# Patient Record
Sex: MALE | Race: BLACK OR AFRICAN AMERICAN | Employment: OTHER | URBAN - METROPOLITAN AREA
[De-identification: names, ages, dates, MRNs, and addresses within clinical notes are randomized per-mention and may not be internally consistent; named-entity substitution may affect disease eponyms.]

---

## 2017-01-25 ENCOUNTER — HOSPITAL ENCOUNTER (OUTPATIENT)
Dept: LAB | Age: 72
Discharge: HOME | End: 2017-01-25
Payer: MEDICARE

## 2017-01-25 ENCOUNTER — OFFICE VISIT (OUTPATIENT)
Dept: FAMILY MEDICINE CLINIC | Age: 72
End: 2017-01-25

## 2017-01-25 VITALS
DIASTOLIC BLOOD PRESSURE: 74 MMHG | HEIGHT: 66 IN | RESPIRATION RATE: 18 BRPM | WEIGHT: 250.4 LBS | BODY MASS INDEX: 40.24 KG/M2 | SYSTOLIC BLOOD PRESSURE: 151 MMHG | HEART RATE: 61 BPM | TEMPERATURE: 97.5 F | OXYGEN SATURATION: 98 %

## 2017-01-25 DIAGNOSIS — R01.1 MURMUR, CARDIAC: ICD-10-CM

## 2017-01-25 DIAGNOSIS — Z79.4 TYPE 2 DIABETES MELLITUS WITH DIABETIC NEPHROPATHY, WITH LONG-TERM CURRENT USE OF INSULIN (HCC): Primary | ICD-10-CM

## 2017-01-25 DIAGNOSIS — E05.00 GRAVES DISEASE: ICD-10-CM

## 2017-01-25 DIAGNOSIS — Z79.4 TYPE 2 DIABETES MELLITUS WITH DIABETIC NEPHROPATHY, WITH LONG-TERM CURRENT USE OF INSULIN (HCC): ICD-10-CM

## 2017-01-25 DIAGNOSIS — I10 ESSENTIAL HYPERTENSION: ICD-10-CM

## 2017-01-25 DIAGNOSIS — E11.21 TYPE 2 DIABETES MELLITUS WITH DIABETIC NEPHROPATHY, WITH LONG-TERM CURRENT USE OF INSULIN (HCC): ICD-10-CM

## 2017-01-25 DIAGNOSIS — E83.42 HYPOMAGNESEMIA: ICD-10-CM

## 2017-01-25 DIAGNOSIS — E11.21 TYPE 2 DIABETES MELLITUS WITH DIABETIC NEPHROPATHY, WITH LONG-TERM CURRENT USE OF INSULIN (HCC): Primary | ICD-10-CM

## 2017-01-25 DIAGNOSIS — E11.9 COMPREHENSIVE DIABETIC FOOT EXAMINATION, TYPE 2 DM, ENCOUNTER FOR (HCC): ICD-10-CM

## 2017-01-25 LAB
ALBUMIN SERPL BCP-MCNC: 3.7 G/DL (ref 3.4–5)
ALBUMIN/GLOB SERPL: 1.2 {RATIO} (ref 0.8–1.7)
ALP SERPL-CCNC: 97 U/L (ref 45–117)
ALT SERPL-CCNC: 34 U/L (ref 16–61)
ANION GAP BLD CALC-SCNC: 8 MMOL/L (ref 3–18)
AST SERPL W P-5'-P-CCNC: 14 U/L (ref 15–37)
BASOPHILS # BLD AUTO: 0 K/UL (ref 0–0.06)
BASOPHILS # BLD: 0 % (ref 0–2)
BILIRUB SERPL-MCNC: 0.3 MG/DL (ref 0.2–1)
BUN SERPL-MCNC: 22 MG/DL (ref 7–18)
BUN/CREAT SERPL: 17 (ref 12–20)
CALCIUM SERPL-MCNC: 9.2 MG/DL (ref 8.5–10.1)
CHLORIDE SERPL-SCNC: 110 MMOL/L (ref 100–108)
CHOLEST SERPL-MCNC: 140 MG/DL
CO2 SERPL-SCNC: 23 MMOL/L (ref 21–32)
CREAT SERPL-MCNC: 1.29 MG/DL (ref 0.6–1.3)
DIFFERENTIAL METHOD BLD: ABNORMAL
EOSINOPHIL # BLD: 0.4 K/UL (ref 0–0.4)
EOSINOPHIL NFR BLD: 8 % (ref 0–5)
ERYTHROCYTE [DISTWIDTH] IN BLOOD BY AUTOMATED COUNT: 15.6 % (ref 11.6–14.5)
EST. AVERAGE GLUCOSE BLD GHB EST-MCNC: 157 MG/DL
GLOBULIN SER CALC-MCNC: 3.2 G/DL (ref 2–4)
GLUCOSE SERPL-MCNC: 119 MG/DL (ref 74–99)
HBA1C MFR BLD: 7.1 % (ref 4.2–5.6)
HCT VFR BLD AUTO: 40 % (ref 36–48)
HDLC SERPL-MCNC: 51 MG/DL (ref 40–60)
HDLC SERPL: 2.7 {RATIO} (ref 0–5)
HGB BLD-MCNC: 13.3 G/DL (ref 13–16)
LDLC SERPL CALC-MCNC: 72 MG/DL (ref 0–100)
LIPID PROFILE,FLP: NORMAL
LYMPHOCYTES # BLD AUTO: 35 % (ref 21–52)
LYMPHOCYTES # BLD: 1.9 K/UL (ref 0.9–3.6)
MAGNESIUM SERPL-MCNC: 1.7 MG/DL (ref 1.8–2.4)
MCH RBC QN AUTO: 28.8 PG (ref 24–34)
MCHC RBC AUTO-ENTMCNC: 33.3 G/DL (ref 31–37)
MCV RBC AUTO: 86.6 FL (ref 74–97)
MONOCYTES # BLD: 0.6 K/UL (ref 0.05–1.2)
MONOCYTES NFR BLD AUTO: 12 % (ref 3–10)
NEUTS SEG # BLD: 2.5 K/UL (ref 1.8–8)
NEUTS SEG NFR BLD AUTO: 45 % (ref 40–73)
PLATELET # BLD AUTO: 203 K/UL (ref 135–420)
PMV BLD AUTO: 9.4 FL (ref 9.2–11.8)
POTASSIUM SERPL-SCNC: 4.4 MMOL/L (ref 3.5–5.5)
PROT SERPL-MCNC: 6.9 G/DL (ref 6.4–8.2)
RBC # BLD AUTO: 4.62 M/UL (ref 4.7–5.5)
SODIUM SERPL-SCNC: 141 MMOL/L (ref 136–145)
TRIGL SERPL-MCNC: 85 MG/DL (ref ?–150)
TSH SERPL DL<=0.05 MIU/L-ACNC: 1.12 UIU/ML (ref 0.36–3.74)
VLDLC SERPL CALC-MCNC: 17 MG/DL
WBC # BLD AUTO: 5.4 K/UL (ref 4.6–13.2)

## 2017-01-25 PROCEDURE — 83735 ASSAY OF MAGNESIUM: CPT | Performed by: FAMILY MEDICINE

## 2017-01-25 PROCEDURE — 84443 ASSAY THYROID STIM HORMONE: CPT | Performed by: FAMILY MEDICINE

## 2017-01-25 PROCEDURE — 85025 COMPLETE CBC W/AUTO DIFF WBC: CPT | Performed by: FAMILY MEDICINE

## 2017-01-25 PROCEDURE — 80053 COMPREHEN METABOLIC PANEL: CPT | Performed by: FAMILY MEDICINE

## 2017-01-25 PROCEDURE — 83036 HEMOGLOBIN GLYCOSYLATED A1C: CPT | Performed by: FAMILY MEDICINE

## 2017-01-25 PROCEDURE — 82043 UR ALBUMIN QUANTITATIVE: CPT | Performed by: FAMILY MEDICINE

## 2017-01-25 PROCEDURE — 80061 LIPID PANEL: CPT | Performed by: FAMILY MEDICINE

## 2017-01-25 RX ORDER — ZINC GLUCONATE 50 MG
1000 TABLET ORAL DAILY
COMMUNITY
End: 2019-09-11

## 2017-01-25 NOTE — PROGRESS NOTES
Chief Complaint   Patient presents with    Follow-up     DM2 and CKD, patient states blood sugars averaging 150 at home      1. Have you been to the ER, urgent care clinic since your last visit? Hospitalized since your last visit? No    2. Have you seen or consulted any other health care providers outside of the [address] [place] [address] since your last visit? Include any pap smears or colon screening. No     HPI   A [employer] comes in for f/u care. 1) DM2: Patient is on insulin. His blood glucose numbers have been in the 150's he says. Foot exam is stable. Will check labs today. I discussed dietary modification and portion control. 2) CKD: He has CKD. Will check labs. He has seen the nephrologist in the past.  3) Cardiac: patient has h/o cardiac murmur. Past echo showed valvular stenosis. He would like referral to cardiology for f/u care. He denies exertional dyspnea. He has dependent edema. I will request echocardiogram and place referral to cardiology. 4) Gout: stable on medication  5) Thyroid: patient has h/o Graves disease. Will check labs today. 6) Back pain: patient has low back pain. He has had surgery to the back last year. He is f/u by the spine physician. 7) HTN: On spironolactone and lopressor      Past Medical History  Past Medical History   Diagnosis Date    Diabetes ([facility] [address] 1995    Fractures 1995     R Knee/ Tib fib fracture/surgery    Gout 2010    High cholesterol 2005    HTN (hypertension) 1990    Osteoarthritis 2013    Thyroid trouble        Surgical History  Past Surgical History   Procedure Laterality Date    Hx hip replacement Left 2009    Hx orthopaedic Right      R knee/Tibfib surgery        Medications  Current Outpatient Prescriptions   Medication Sig Dispense Refill    cyanocobalamin (VITAMIN B-12) 1,000 mcg tablet Take 1,000 mcg by mouth daily.  febuxostat (ULORIC) 40 mg tab tablet Take 1 Tab by mouth daily.  Indications: GOUT PREVENTION 90 Tab 1    insulin glargine (LANTUS) 100 unit/mL injection Take 40 units once daily  Indications: TYPE 2 DIABETES MELLITUS 3 Vial 3    levothyroxine (SYNTHROID) 200 mcg tablet Take 1 Tab by mouth Daily (before breakfast). Indications: HYPOTHYROIDISM 30 Tab 5    insulin aspart (NOVOLOG) 100 unit/mL injection by SubCUTAneous route. 6 units before meals      spironolactone (ALDACTONE) 25 mg tablet Take 1 Tab by mouth daily. Indications: EDEMA 30 Tab 3    metoprolol (LOPRESSOR) 25 mg tablet Take 0.5 Tabs by mouth two (2) times a day. 30 Tab 1    glucose blood VI test strips (ASCENSIA AUTODISC VI, ONE TOUCH ULTRA TEST VI) strip Dispense precision extra test strips 3 x day to check blood glucose 200 Each 3    Magnesium Chloride 64 mg TbEC Take  by mouth. 8 pills 3 times daily      amLODIPine (NORVASC) 10 mg tablet Take 5 mg by mouth daily.  Omeprazole delayed release (PRILOSEC D/R) 20 mg tablet Take 20 mg by mouth daily.  atorvastatin (LIPITOR) 40 mg tablet Take 20 mg by mouth daily.  testosterone (ANDROGEL) 20.25 mg/1.25 gram (1.62 %) gel Apply  to affected area as needed.  aspirin (ASPIRIN) 325 mg tablet Take 325 mg by mouth daily.  Cholecalciferol, Vitamin D3, 1,000 unit cap Take 3,000 Units by mouth daily.  albuterol (VENTOLIN HFA) 90 mcg/actuation inhaler Take  by inhalation as needed. Allergies  Allergies   Allergen Reactions    Watermelon Swelling    Peach (Prunus Persica) Itching       Family History  Family History   Problem Relation Age of Onset    Drug Abuse Brother     Hypertension Mother     Hypertension Father     Diabetes Father     Thyroid Disease Mother        Social History  Social History     Social History    Marital status:      Spouse name: N/A    Number of children: N/A    Years of education: N/A     Occupational History    Not on file.      Social History Main Topics    Smoking status: Former Smoker     Packs/day: 1.00     Types: Cigarettes     Start date: 1/1/1969     Quit date: 1/1/1995    Smokeless tobacco: Never Used    Alcohol use No    Drug use: No    Sexual activity: Yes     Partners: Female     Other Topics Concern     Service Yes     Served in [address] Blood Transfusions No    Caffeine Concern No    Occupational Exposure No    Hobby Hazards No    Sleep Concern No    Stress Concern No    Weight Concern Yes    Special Diet No    Back Care Yes     Lower Back pain when walking    Exercise No    Bike Helmet No    Seat Belt Yes    Self-Exams Yes     Social History Narrative       Review of Systems  Review of Systems - History obtained from chart review and the patient  General ROS: positive for  - fatigue and malaise  negative for - chills or fever  Psychological ROS: negative  Ophthalmic ROS: negative  Endocrine ROS: DM2  Respiratory ROS: no cough, shortness of breath, or wheezing  Cardiovascular ROS: negative for - chest pain, dyspnea on exertion, irregular heartbeat, palpitations or rapid heart rate  Gastrointestinal ROS: no abdominal pain, change in bowel habits, or black or bloody stools  Musculoskeletal ROS: positive for - pain in back - lower  Neurological ROS: negative    Vital Signs  Visit Vitals    /74 (BP 1 Location: Left arm, BP Patient Position: Sitting)    Pulse 61    Temp 97.5 °F (36.4 °C) (Oral)    Resp 18    Ht 5' 6\" (1.676 m)    Wt 250 lb 6.4 oz (113.6 kg)    SpO2 98%    BMI 40.42 kg/m2         Physical Exam  Physical Examination: General appearance - oriented to person, place, and time, acyanotic, in no respiratory distress and well hydrated  Mental status - alert, oriented to person, place, and time, normal mood, behavior, speech, dress, motor activity, and thought processes  Mouth - mucous membranes moist, pharynx normal without lesions  Neck - supple, no significant adenopathy  Lymphatics - no palpable lymphadenopathy  Chest - clear to auscultation, no wheezes, rales or rhonchi, symmetric air entry, no tachypnea, retractions or cyanosis  Heart - normal rate, regular rhythm, normal S1, S2, no murmurs, rubs, clicks or gallops, no JVD  Back exam - limited range of motion, pain with motion noted during exam, tenderness noted PARALUMBAR MUSCLES  Neurological - alert, oriented, normal speech, no focal findings or movement disorder noted  Extremities - peripheral pulses normal, no pedal edema, no clubbing or cyanosis, feet normal, good pulses, normal color, temperature and sensation, monofilament sensory exam is normal in both feet  Diabetic foot exam:     Left: Reflexes 2+     Vibratory sensation normal    Proprioception normal   Sharp/dull discrimination normal    Filament test normal sensation with micro filament   Pulse DP: 2+ (normal)   Pulse PT: 2+ (normal)   Deformities: None  Right: Reflexes 2+   Vibratory sensation normal   Proprioception normal   Sharp/dull discrimination normal   Filament test normal sensation with micro filament   Pulse DP: 2+ (normal)   Pulse PT: 2+ (normal)   Deformities: None    Diagnostics  Orders Placed This Encounter    CBC WITH AUTOMATED DIFF     Standing Status:   Future     Standing Expiration Date:   1/26/2018    HEMOGLOBIN A1C WITH EAG     Standing Status:   Future     Standing Expiration Date:   1/26/2018    LIPID PANEL     Standing Status:   Future     Standing Expiration Date:   2/84/7003    METABOLIC PANEL, COMPREHENSIVE     Standing Status:   Future     Standing Expiration Date:   1/26/2018    MICROALBUMIN, UR, RAND W/ MICROALBUMIN/CREA RATIO     Standing Status:   Future     Standing Expiration Date:   1/26/2018    TSH 3RD GENERATION     Standing Status:   Future     Standing Expiration Date:   1/26/2018    MAGNESIUM     Standing Status:   Future     Standing Expiration Date:   1/26/2018    REFERRAL TO CARDIOLOGY     Referral Priority:   Routine     Referral Type:   Consultation     Referral Reason:   Specialty Services Required    2D ECHO COMPLETE ADULT (TTE) W OR WO CONTR     Standing Status:   Future     Standing Expiration Date:   7/25/2017     Order Specific Question:   Reason for Exam:     Answer:   cardiac murmur     Order Specific Question:   Contrast Enhancement (Bubble Study, Definity, Optison) may be used if criteria listed in established evidence-based protocol has been identified. Answer: Yes     DIABETES FOOT EXAM    cyanocobalamin (VITAMIN B-12) 1,000 mcg tablet     Sig: Take 1,000 mcg by mouth daily. Results  Results for orders placed or performed in visit on 10/28/16   AMB EXT CREATININE   Result Value Ref Range    Creatinine, External 1.3        ASSESSMENT and PLAN    ICD-10-CM ICD-9-CM    1. Type 2 diabetes mellitus with diabetic nephropathy, with long-term current use of insulin ([facility]) E11.21 250.40 CBC WITH AUTOMATED DIFF    Z79.4 583.81 HEMOGLOBIN A1C WITH EAG     V58.67 LIPID PANEL      METABOLIC PANEL, COMPREHENSIVE      MICROALBUMIN, UR, RAND W/ MICROALBUMIN/CREA RATIO   2. Essential hypertension I10 401.9    3. Graves disease E05.00 242.00 TSH 3RD GENERATION   4. Hypomagnesemia E83.42 275.2 MAGNESIUM   5. Murmur, cardiac R01.1 785.2 2D ECHO COMPLETE ADULT (TTE) W OR WO CONTR      REFERRAL TO CARDIOLOGY   6.  Comprehensive diabetic foot examination, type 2 DM, encounter for [facility] E11.9 250.00  DIABETES FOOT EXAM     current treatment plan is effective, no change in therapy  lab results and schedule of future lab studies reviewed with patient  reviewed diet, exercise and weight control  reviewed medications and side effects in detail  specific diabetic recommendations: low cholesterol diet, weight control and daily exercise discussed, home glucose monitoring emphasized, all medications, side effects and compliance discussed carefully, foot care discussed and Podiatry visits discussed, annual eye examinations at Ophthalmology discussed, glycohemoglobin and other lab monitoring discussed and long term diabetic complications discussed  use of aspirin to prevent MI and TIA's discussed      I have discussed the diagnosis with the patient and the intended plan of care as seen in the above orders. The patient has received an after-visit summary and questions were answered concerning future plans. I have discussed medication, side effects, and warnings with the patient in detail. The patient verbalized understanding and is in agreement with the plan of care. The patient will contact the office with any additional concerns.     Silvia Singer MD

## 2017-01-25 NOTE — PATIENT INSTRUCTIONS

## 2017-01-25 NOTE — MR AVS SNAPSHOT
Visit Information Date & Time Provider Department Dept. Phone Encounter #  
 1/25/2017  9:15 AM Aroldo Roman MD [address] [place] [phone] [id number] Follow-up Instructions Return in about 3 months (around 4/25/2017), or if symptoms worsen or fail to improve, for DM2, CKD. Upcoming Health Maintenance Date Due ZOSTER VACCINE AGE 60> 4/26/2006 EYE EXAM RETINAL OR DILATED Q1 10/14/2016 Pneumococcal 65+ High/Highest Risk (2 of 2 - PPSV23) 11/10/2016 FOOT EXAM Q1 11/16/2016 LIPID PANEL Q1 12/9/2016 MICROALBUMIN Q1 2/24/2017 HEMOGLOBIN A1C Q6M 3/15/2017 MEDICARE YEARLY EXAM 4/28/2017 FOBT Q 1 YEAR AGE 50-75 5/26/2017 GLAUCOMA SCREENING Q2Y 10/14/2017 DTaP/Tdap/Td series (2 - Td) 4/28/2025 Allergies as of 1/25/2017  Review Complete On: 1/25/2017 By: Angela Paredes MD  
  
 Severity Noted Reaction Type Reactions Watermelon High 01/21/2015    Swelling Peach (Prunus Persica)  01/21/2015    Itching Current Immunizations  Reviewed on 9/15/2016 Name Date Influenza Vaccine (Quad) PF 9/17/2015 Pneumococcal Conjugate (PCV-13) 9/15/2016 Tdap 4/28/2015 Not reviewed this visit You Were Diagnosed With   
  
 Codes Comments Type 2 diabetes mellitus with diabetic nephropathy, with long-term current use of insulin (HCC)    -  Primary ICD-10-CM: E11.21, Z79.4 ICD-9-CM: 250.40, 583.81, V58.67 Essential hypertension     ICD-10-CM: I10 
ICD-9-CM: 401.9 Graves disease     ICD-10-CM: E05.00 ICD-9-CM: 242.00 Hypomagnesemia     ICD-10-CM: K60.31 
ICD-9-CM: 275.2 Murmur, cardiac     ICD-10-CM: R01.1 ICD-9-CM: 193. 2 Comprehensive diabetic foot examination, type 2 DM, encounter for [facility])     ICD-10-CM: E11.9 ICD-9-CM: 250.00 Vitals BP Pulse Temp Resp Height(growth percentile) Weight(growth percentile)  151/74 (BP 1 Location: Left arm, BP Patient Position: Sitting) 61 97.5 °F (36.4 °C) (Oral) 18 5' 6\" (1.676 m) 250 lb 6.4 oz (113.6 kg) SpO2 BMI Smoking Status 98% 40.42 kg/m2 Former Smoker BMI and BSA Data Body Mass Index Body Surface Area 40.42 kg/m 2 2.3 m 2 Preferred Pharmacy Pharmacy Name Phone [facility] [address] [place] [phone] Your Updated Medication List  
  
   
This list is accurate as of: 1/25/17 10:11 AM.  Always use your most recent med list. amLODIPine 10 mg tablet Commonly known as:  Kayla Fraction Take 5 mg by mouth daily. aspirin 325 mg tablet Commonly known as:  ASPIRIN Take 325 mg by mouth daily. atorvastatin 40 mg tablet Commonly known as:  LIPITOR Take 20 mg by mouth daily. cholecalciferol 1,000 unit Cap Commonly known as:  VITAMIN D3 Take 3,000 Units by mouth daily. febuxostat 40 mg Tab tablet Commonly known as:  Janeane Dines Take 1 Tab by mouth daily. Indications: GOUT PREVENTION  
  
 glucose blood VI test strips strip Commonly known as:  ASCENSIA AUTODISC VI, ONE TOUCH ULTRA TEST VI Dispense precision extra test strips 3 x day to check blood glucose  
  
 insulin aspart 100 unit/mL injection Commonly known as:  NOVOLOG  
by SubCUTAneous route. 6 units before meals  
  
 insulin glargine 100 unit/mL injection Commonly known as:  LANTUS Take 40 units once daily  Indications: TYPE 2 DIABETES MELLITUS  
  
 levothyroxine 200 mcg tablet Commonly known as:  SYNTHROID Take 1 Tab by mouth Daily (before breakfast). Indications: HYPOTHYROIDISM  
  
 magnesium chloride 64 mg delayed release tablet Commonly known as:  MAG DELAY Take  by mouth. 8 pills 3 times daily  
  
 metoprolol tartrate 25 mg tablet Commonly known as:  LOPRESSOR Take 0.5 Tabs by mouth two (2) times a day. Omeprazole delayed release 20 mg tablet Commonly known as:  PRILOSEC D/R Take 20 mg by mouth daily. spironolactone 25 mg tablet Commonly known as:  ALDACTONE Take 1 Tab by mouth daily. Indications: EDEMA  
  
 testosterone 1.62 % (20.25 mg/1.25gram) gel Commonly known as:  ANDROGEL Apply  to affected area as needed. VENTOLIN HFA 90 mcg/actuation inhaler Generic drug:  albuterol Take  by inhalation as needed. VITAMIN B-12 1,000 mcg tablet Generic drug:  cyanocobalamin Take 1,000 mcg by mouth daily. We Performed the Following  DIABETES FOOT EXAM [HM7 Custom] REFERRAL TO CARDIOLOGY [BPM20 Custom] Comments:  
 Please evaluate patient for cardiac murmur f/u. Follow-up Instructions Return in about 3 months (around 4/25/2017), or if symptoms worsen or fail to improve, for DM2, CKD. To-Do List   
 01/25/2017 ECHO:  2D ECHO COMPLETE ADULT (TTE) W OR WO CONTR   
  
 01/25/2017 Lab:  CBC WITH AUTOMATED DIFF   
  
 01/25/2017 Lab:  HEMOGLOBIN A1C WITH EAG   
  
 01/25/2017 Lab:  LIPID PANEL   
  
 01/25/2017 Lab:  MAGNESIUM   
  
 01/25/2017 Lab:  METABOLIC PANEL, COMPREHENSIVE   
  
 01/25/2017 Lab:  MICROALBUMIN, UR, RAND W/ MICROALBUMIN/CREA RATIO   
  
 01/25/2017 Lab:  TSH 3RD GENERATION Referral Information Referral ID Referred By Referred To  
  
 [id number] Niranjan RIVERA Not Available Visits Status Start Date End Date 1 New Request 1/25/17 1/25/18 If your referral has a status of pending review or denied, additional information will be sent to support the outcome of this decision. Patient Instructions Learning About Diabetes Food Guidelines Your Care Instructions Meal planning is important to manage diabetes. It helps keep your blood sugar at a target level (which you set with your doctor). You don't have to eat special foods. You can eat what your family eats, including sweets once in a while. But you do have to pay attention to how often you eat and how much you eat of certain foods. You may want to work with a dietitian or a certified diabetes educator (CDE) to help you plan meals and snacks. A dietitian or CDE can also help you lose weight if that is one of your goals. What should you know about eating carbs? Managing the amount of carbohydrate (carbs) you eat is an important part of healthy meals when you have diabetes. Carbohydrate is found in many foods. · Learn which foods have carbs. And learn the amounts of carbs in different foods. ¨ Bread, cereal, pasta, and rice have about 15 grams of carbs in a serving. A serving is 1 slice of bread (1 ounce), ½ cup of cooked cereal, or 1/3 cup of cooked pasta or rice. ¨ Fruits have 15 grams of carbs in a serving. A serving is 1 small fresh fruit, such as an apple or orange; ½ of a banana; ½ cup of cooked or canned fruit; ½ cup of fruit juice; 1 cup of melon or raspberries; or 2 tablespoons of dried fruit. ¨ Milk and no-sugar-added yogurt have 15 grams of carbs in a serving. A serving is 1 cup of milk or 2/3 cup of no-sugar-added yogurt. ¨ Starchy vegetables have 15 grams of carbs in a serving. A serving is ½ cup of mashed potatoes or sweet potato; 1 cup winter squash; ½ of a small baked potato; ½ cup of cooked beans; or ½ cup cooked corn or green peas. · Learn how much carbs to eat each day and at each meal. A dietitian or CDE can teach you how to keep track of the amount of carbs you eat. This is called carbohydrate counting. · If you are not sure how to count carbohydrate grams, use the Plate Method to plan meals. It is a good, quick way to make sure that you have a balanced meal. It also helps you spread carbs throughout the day. ¨ Divide your plate by types of foods. Put non-starchy vegetables on half the plate, meat or other protein food on one-quarter of the plate, and a grain or starchy vegetable in the final quarter of the plate.  To this you can add a small piece of fruit and 1 cup of milk or yogurt, depending on how many carbs you are supposed to eat at a meal. 
· Try to eat about the same amount of carbs at each meal. Do not \"save up\" your daily allowance of carbs to eat at one meal. 
· Proteins have very little or no carbs per serving. Examples of proteins are beef, chicken, turkey, fish, eggs, tofu, cheese, cottage cheese, and peanut butter. A serving size of meat is 3 ounces, which is about the size of a deck of cards. Examples of meat substitute serving sizes (equal to 1 ounce of meat) are 1/4 cup of cottage cheese, 1 egg, 1 tablespoon of peanut butter, and ½ cup of tofu. How can you eat out and still eat healthy? · Learn to estimate the serving sizes of foods that have carbohydrate. If you measure food at home, it will be easier to estimate the amount in a serving of restaurant food. · If the meal you order has too much carbohydrate (such as potatoes, corn, or baked beans), ask to have a low-carbohydrate food instead. Ask for a salad or green vegetables. · If you use insulin, check your blood sugar before and after eating out to help you plan how much to eat in the future. · If you eat more carbohydrate at a meal than you had planned, take a walk or do other exercise. This will help lower your blood sugar. What else should you know? · Limit saturated fat, such as the fat from meat and dairy products. This is a healthy choice because people who have diabetes are at higher risk of heart disease. So choose lean cuts of meat and nonfat or low-fat dairy products. Use olive or canola oil instead of butter or shortening when cooking. · Don't skip meals. Your blood sugar may drop too low if you skip meals and take insulin or certain medicines for diabetes. · Check with your doctor before you drink alcohol. Alcohol can cause your blood sugar to drop too low. Alcohol can also cause a bad reaction if you take certain diabetes medicines. Follow-up care is a key part of your treatment and safety.  Be sure to make and go to all appointments, and call your doctor if you are having problems. It's also a good idea to know your test results and keep a list of the medicines you take. Where can you learn more? Go to http://adal-janes.info/. Enter D021 in the search box to learn more about \"Learning About Diabetes Food Guidelines. \" Current as of: May 23, 2016 Content Version: 11.1 © 8014-9329 [employer]. Care instructions adapted under license by [employer] (which disclaims liability or warranty for this information). If you have questions about a medical condition or this instruction, always ask your healthcare professional. [address] any warranty or liability for your use of this information. Introducing [facility]! Romayne Duster introduces [employer] patient portal. Now you can access parts of your medical record, email your doctor's office, and request medication refills online. 1. In your internet browser, go to [email or web address] 2. Click on the First Time User? Click Here link in the Sign In box. You will see the New Member Sign Up page. 3. Enter your [employer] Access Code exactly as it appears below. You will not need to use this code after youve completed the sign-up process. If you do not sign up before the expiration date, you must request a new code. · [employer] Access Code: [email or web address] Expires: 4/25/2017  9:20 AM 
 
4. Enter the last four digits of your Social Security Number (xxxx) and Date of Birth (mm/dd/yyyy) as indicated and click Submit. You will be taken to the next sign-up page. 5. Create a [employer] ID. This will be your [employer] login ID and cannot be changed, so think of one that is secure and easy to remember. 6. Create a [employer] password. You can change your password at any time. 7. Enter your Password Reset Question and Answer.  This can be used at a later time if you forget your password. 8. Enter your e-mail address. You will receive e-mail notification when new information is available in [address] 9. Click Sign Up. You can now view and download portions of your medical record. 10. Click the Download Summary menu link to download a portable copy of your medical information. If you have questions, please visit the Frequently Asked Questions section of the [employer] website. Remember, [employer] is NOT to be used for urgent needs. For medical emergencies, dial 911. Now available from your iPhone and Android! Please provide this summary of care documentation to your next provider. Your primary care clinician is listed as Aroldo Perez. If you have any questions after today's visit, please call [phone].

## 2017-01-26 LAB
CREAT UR-MCNC: 80.96 MG/DL (ref 30–125)
MICROALBUMIN UR-MCNC: 2.3 MG/DL (ref 0–3)
MICROALBUMIN/CREAT UR-RTO: 28 MG/G (ref 0–30)

## 2017-01-27 ENCOUNTER — DOCUMENTATION ONLY (OUTPATIENT)
Dept: FAMILY MEDICINE CLINIC | Age: 72
End: 2017-01-27

## 2017-01-27 NOTE — PROGRESS NOTES
Please let patient know his hba1c is up from previous. Should continue with his insulin as advised and keep a blood glucose log. He should keep blood glucose log and ensure his blood glucose pre breakfast levels are 80-13- range. If higher he should make appointment to come in for evaluation. His magnesium level is low and he should continue with the supplementation. Rest of results are stable.   Anthony Beasley MD

## 2017-01-30 NOTE — PROGRESS NOTES
Informed patient his hba1c is up from previous. Should continue with his insulin as advised and keep a blood glucose log. He should keep blood glucose log and ensure his blood glucose pre breakfast levels are  range. If higher he should make appointment to come in for evaluation. His magnesium level is low and he should continue with the supplementation. Rest of results are stable. Patient verbalized understanding.

## 2017-02-07 ENCOUNTER — HOSPITAL ENCOUNTER (OUTPATIENT)
Dept: NON INVASIVE DIAGNOSTICS | Age: 72
Discharge: HOME | End: 2017-02-07
Attending: FAMILY MEDICINE
Payer: MEDICARE

## 2017-02-07 DIAGNOSIS — R01.1 MURMUR, CARDIAC: ICD-10-CM

## 2017-02-07 PROCEDURE — C8929 TTE W OR WO FOL WCON,DOPPLER: HCPCS

## 2017-02-07 PROCEDURE — 74011250636 HC RX REV CODE- 250/636: Performed by: FAMILY MEDICINE

## 2017-02-07 RX ADMIN — PERFLUTREN 2 ML: 6.52 INJECTION, SUSPENSION INTRAVENOUS at 13:34

## 2017-02-13 ENCOUNTER — TELEPHONE (OUTPATIENT)
Dept: FAMILY MEDICINE CLINIC | Age: 72
End: 2017-02-13

## 2017-02-13 NOTE — PROGRESS NOTES
Patient has an appointment with the cardiologist on the March 21st. Would like to know if he still has to come in to discuss echo results with Dr. Sushila Gonzalez. Informed patient Dr. Sushila Gonzalez was out this week but would be returning on February 20th. I will put in a message for Dr. Sushila Gonzalez and get back to him next week. Patient verbalized understanding.

## 2017-02-13 NOTE — TELEPHONE ENCOUNTER
Patient was informed that he will need to come in to see Dr. Naldo Cox to discuss his echocardiogram results. Patient stated he has an appointment with the Cardiologist on March 21st and asked if he would still need to come in. I told patient I was not sure, I would put in a message for Dr. Naldo Cox for when he returns and we will contact him then.

## 2017-02-21 ENCOUNTER — TELEPHONE (OUTPATIENT)
Dept: FAMILY MEDICINE CLINIC | Age: 72
End: 2017-02-21

## 2017-02-21 NOTE — TELEPHONE ENCOUNTER
Mr. Shahbaz Enamorado called, he showed up at [facility] thinking his appointment was today, but it is scheduled for March 21st. Patient states he will not be able to make his appointment then. He would like for us to try to get him in to see a Cardiologist within [employer] between March 1-10th. I told patient we would resubmit his referral to a Cardiologist within [email or web address] but I was not sure if I would be able to get him an appointment within that time frame.

## 2017-02-21 NOTE — TELEPHONE ENCOUNTER
Called patient, informed him of the appointment I was able to get him at [facility] Pawan Ob[facility] on March 1st at 2pm. Patient was given the address and phone number.      [address] [facility], [address]

## 2017-03-01 ENCOUNTER — OFFICE VISIT (OUTPATIENT)
Dept: CARDIOLOGY CLINIC | Age: 72
End: 2017-03-01

## 2017-03-01 VITALS
SYSTOLIC BLOOD PRESSURE: 156 MMHG | DIASTOLIC BLOOD PRESSURE: 75 MMHG | HEART RATE: 75 BPM | BODY MASS INDEX: 40.82 KG/M2 | WEIGHT: 254 LBS | HEIGHT: 66 IN

## 2017-03-01 DIAGNOSIS — E11.21 TYPE 2 DIABETES MELLITUS WITH DIABETIC NEPHROPATHY, UNSPECIFIED LONG TERM INSULIN USE STATUS: ICD-10-CM

## 2017-03-01 DIAGNOSIS — R01.1 MURMUR, CARDIAC: ICD-10-CM

## 2017-03-01 DIAGNOSIS — R06.02 SOB (SHORTNESS OF BREATH): Primary | ICD-10-CM

## 2017-03-01 DIAGNOSIS — N18.30 CKD (CHRONIC KIDNEY DISEASE) STAGE 3, GFR 30-59 ML/MIN (HCC): ICD-10-CM

## 2017-03-01 DIAGNOSIS — Z01.818 PRE-OP EVALUATION: ICD-10-CM

## 2017-03-01 DIAGNOSIS — E78.5 DYSLIPIDEMIA: ICD-10-CM

## 2017-03-01 DIAGNOSIS — I10 ESSENTIAL HYPERTENSION: ICD-10-CM

## 2017-03-01 LAB
A-G RATIO,AGRAT: 1.4 RATIO (ref 1.1–2.6)
ALBUMIN SERPL-MCNC: 4 G/DL (ref 3.5–5)
ALP SERPL-CCNC: 71 U/L (ref 40–125)
ALT SERPL-CCNC: 24 U/L (ref 5–40)
ANION GAP SERPL CALC-SCNC: 14.1 MMOL/L
APTT PPP: 25 SEC (ref 22–36)
AST SERPL W P-5'-P-CCNC: 18 U/L (ref 10–37)
BILIRUB SERPL-MCNC: 0.3 MG/DL (ref 0.2–1.2)
BUN SERPL-MCNC: 16 MG/DL (ref 6–22)
CALCIUM SERPL-MCNC: 9.7 MG/DL (ref 8.4–10.4)
CHLORIDE SERPL-SCNC: 108 MMOL/L (ref 98–110)
CO2 SERPL-SCNC: 21 MMOL/L (ref 20–32)
CREAT SERPL-MCNC: 1 MG/DL (ref 0.8–1.6)
ERYTHROCYTE [DISTWIDTH] IN BLOOD BY AUTOMATED COUNT: 13.8 % (ref 10–16)
GFRAA, 66117: >60
GFRNA, 66118: >60
GLOBULIN,GLOB: 2.9 G/DL (ref 2–4)
GLUCOSE SERPL-MCNC: 130 MG/DL (ref 65–99)
HCT VFR BLD AUTO: 39.2 % (ref 37.8–52.2)
HGB BLD-MCNC: 13.2 G/DL (ref 12.6–17.1)
INR PPP: 1.04 (ref 0.89–1.29)
MAGNESIUM SERPL-MCNC: 1.8 MG/DL (ref 1.6–2.5)
MCH RBC QN AUTO: 28 PG (ref 26–34)
MCHC RBC AUTO-ENTMCNC: 34 G/DL (ref 32–36)
MCV RBC AUTO: 84 FL (ref 80–95)
PLATELET # BLD AUTO: 223 K/UL (ref 140–440)
PMV BLD AUTO: 9.3 FL (ref 6–10.8)
POTASSIUM SERPL-SCNC: 4.5 MMOL/L (ref 3.5–5.5)
PROT SERPL-MCNC: 6.9 G/DL (ref 6.2–8.1)
PROTHROMBIN TIME: 10.7 SEC (ref 9–13)
RBC # BLD AUTO: 4.68 M/UL (ref 3.8–5.8)
SODIUM SERPL-SCNC: 143 MMOL/L (ref 133–145)
TSH SERPL DL<=0.005 MIU/L-ACNC: 1.27 MCU/ML (ref 0.27–4.2)
WBC # BLD AUTO: 5.6 K/UL (ref 4–11)

## 2017-03-01 NOTE — MR AVS SNAPSHOT
Visit Information Date & Time Provider Department Dept. Phone Encounter #  
 3/1/2017 12:45 PM Catrachito Beck MD [facility] [id number] Follow-up Instructions Return in about 2 weeks (around 3/15/2017). Your Appointments 4/25/2017  9:15 AM  
ROUTINE CARE with MD Saima Lopez Dr ([facility]) Appt Note: rov for DM2 CKD [address]  
  
   
 [address] [facility] Upcoming Health Maintenance Date Due ZOSTER VACCINE AGE 60> 4/26/2006 MEDICARE YEARLY EXAM 5/28/2016 EYE EXAM RETINAL OR DILATED Q1 10/14/2016 Pneumococcal 65+ High/Highest Risk (2 of 2 - PPSV23) 11/10/2016 FOBT Q 1 YEAR AGE 50-75 5/26/2017 HEMOGLOBIN A1C Q6M 7/25/2017 GLAUCOMA SCREENING Q2Y 10/14/2017 FOOT EXAM Q1 1/25/2018 MICROALBUMIN Q1 1/25/2018 LIPID PANEL Q1 1/25/2018 DTaP/Tdap/Td series (2 - Td) 4/28/2025 Allergies as of 3/1/2017  Review Complete On: 3/1/2017 By: Catrachito Beck MD  
  
 Severity Noted Reaction Type Reactions Watermelon High 01/21/2015    Swelling Peach (Prunus Persica)  01/21/2015    Itching Current Immunizations  Reviewed on 9/15/2016 Name Date Influenza Vaccine (Quad) PF 9/17/2015 Pneumococcal Conjugate (PCV-13) 9/15/2016 Tdap 4/28/2015 Not reviewed this visit You Were Diagnosed With   
  
 Codes Comments SOB (shortness of breath)    -  Primary ICD-10-CM: R06.02 
ICD-9-CM: 786.05   
 Murmur, cardiac     ICD-10-CM: R01.1 ICD-9-CM: 785.2 moderate to severe Essential hypertension     ICD-10-CM: I10 
ICD-9-CM: 401.9 Type 2 diabetes mellitus with diabetic nephropathy, unspecified long term insulin use status (HCC)     ICD-10-CM: E11.21 
ICD-9-CM: 250.40, 583.81   
 CKD (chronic kidney disease) stage 3, GFR 30-59 ml/min     ICD-10-CM: N18.3 ICD-9-CM: 004. 3 Dyslipidemia     ICD-10-CM: E78.5 ICD-9-CM: 272.4 Vitals BP  
  
  
  
  
  
 156/75 Vitals History BMI and BSA Data Body Mass Index Body Surface Area 41 kg/m 2 2.32 m 2 Preferred Pharmacy Pharmacy Name Phone [facility] [address], [address] [place] [phone] Your Updated Medication List  
  
   
This list is accurate as of: 3/1/17  2:00 PM.  Always use your most recent med list. amLODIPine 10 mg tablet Commonly known as:  Dayna Spruce Take 5 mg by mouth daily. aspirin 325 mg tablet Commonly known as:  ASPIRIN Take 325 mg by mouth daily. atorvastatin 40 mg tablet Commonly known as:  LIPITOR Take 20 mg by mouth daily. cholecalciferol 1,000 unit Cap Commonly known as:  VITAMIN D3 Take 3,000 Units by mouth daily. febuxostat 40 mg Tab tablet Commonly known as:  [place] Take 1 Tab by mouth daily. Indications: GOUT PREVENTION  
  
 glucose blood VI test strips strip Commonly known as:  ASCENSIA AUTODISC VI, ONE TOUCH ULTRA TEST VI Dispense precision extra test strips 3 x day to check blood glucose  
  
 insulin aspart 100 unit/mL injection Commonly known as:  NOVOLOG  
by SubCUTAneous route. 6 units before meals  
  
 insulin glargine 100 unit/mL injection Commonly known as:  LANTUS Take 40 units once daily  Indications: TYPE 2 DIABETES MELLITUS  
  
 levothyroxine 200 mcg tablet Commonly known as:  SYNTHROID Take 1 Tab by mouth Daily (before breakfast). Indications: HYPOTHYROIDISM  
  
 magnesium chloride 64 mg delayed release tablet Commonly known as:  MAG DELAY Take  by mouth. 8 pills 3 times daily  
  
 metoprolol tartrate 25 mg tablet Commonly known as:  LOPRESSOR Take 0.5 Tabs by mouth two (2) times a day. Omeprazole delayed release 20 mg tablet Commonly known as:  PRILOSEC D/R Take 20 mg by mouth daily. spironolactone 25 mg tablet Commonly known as:  ALDACTONE Take 1 Tab by mouth daily. Indications: EDEMA  
  
 testosterone 20.25 mg/1.25 gram (1.62 %) gel Commonly known as:  ANDROGEL Apply  to affected area as needed. VENTOLIN HFA 90 mcg/actuation inhaler Generic drug:  albuterol Take  by inhalation as needed. VITAMIN B-12 1,000 mcg tablet Generic drug:  cyanocobalamin Take 1,000 mcg by mouth daily. We Performed the Following AMB POC EKG ROUTINE W/ 12 LEADS, INTER & REP [81182 CPT(R)] Follow-up Instructions Return in about 2 weeks (around 3/15/2017). To-Do List   
 03/01/2017 Lab:  CBC W/O DIFF   
  
 03/01/2017 Lab:  MAGNESIUM   
  
 03/01/2017 Lab:  METABOLIC PANEL, COMPREHENSIVE   
  
 03/01/2017 Lab:  PROTHROMBIN TIME + INR   
  
 03/01/2017 Lab:  PTT   
  
 03/01/2017 Lab:  TSH 3RD GENERATION   
  
 03/03/2017 Imaging:  XR CHEST PA LAT   
  
 03/06/2017 Cardiac Services:  CARDIAC CATHETERIZATION Patient Instructions High Blood Pressure: Care Instructions Your Care Instructions If your blood pressure is usually above 140/90, you have high blood pressure, or hypertension. That means the top number is 140 or higher or the bottom number is 90 or higher, or both. Despite what a lot of people think, high blood pressure usually doesn't cause headaches or make you feel dizzy or lightheaded. It usually has no symptoms. But it does increase your risk for heart attack, stroke, and kidney or eye damage. The higher your blood pressure, the more your risk increases. Your doctor will give you a goal for your blood pressure. Your goal will be based on your health and your age. An example of a goal is to keep your blood pressure below 140/90. Lifestyle changes, such as eating healthy and being active, are always important to help lower blood pressure.  You might also take medicine to reach your blood pressure goal. 
Follow-up care is a key part of your treatment and safety. Be sure to make and go to all appointments, and call your doctor if you are having problems. It's also a good idea to know your test results and keep a list of the medicines you take. How can you care for yourself at home? Medical treatment · If you stop taking your medicine, your blood pressure will go back up. You may take one or more types of medicine to lower your blood pressure. Be safe with medicines. Take your medicine exactly as prescribed. Call your doctor if you think you are having a problem with your medicine. · Talk to your doctor before you start taking aspirin every day. Aspirin can help certain people lower their risk of a heart attack or stroke. But taking aspirin isn't right for everyone, because it can cause serious bleeding. · See your doctor regularly. You may need to see the doctor more often at first or until your blood pressure comes down. · If you are taking blood pressure medicine, talk to your doctor before you take decongestants or anti-inflammatory medicine, such as ibuprofen. Some of these medicines can raise blood pressure. · Learn how to check your blood pressure at home. Lifestyle changes · Stay at a healthy weight. This is especially important if you put on weight around the waist. Losing even 10 pounds can help you lower your blood pressure. · If your doctor recommends it, get more exercise. Walking is a good choice. Bit by bit, increase the amount you walk every day. Try for at least 30 minutes on most days of the week. You also may want to swim, bike, or do other activities. · Avoid or limit alcohol. Talk to your doctor about whether you can drink any alcohol. · Try to limit how much sodium you eat to less than 2,300 milligrams (mg) a day. Your doctor may ask you to try to eat less than 1,500 mg a day.  
· Eat plenty of fruits (such as bananas and oranges), vegetables, legumes, whole grains, and low-fat dairy products. · Lower the amount of saturated fat in your diet. Saturated fat is found in animal products such as milk, cheese, and meat. Limiting these foods may help you lose weight and also lower your risk for heart disease. · Do not smoke. Smoking increases your risk for heart attack and stroke. If you need help quitting, talk to your doctor about stop-smoking programs and medicines. These can increase your chances of quitting for good. When should you call for help? Call 911 anytime you think you may need emergency care. This may mean having symptoms that suggest that your blood pressure is causing a serious heart or blood vessel problem. Your blood pressure may be over 180/110. For example, call 911 if: 
· You have symptoms of a heart attack. These may include: ¨ Chest pain or pressure, or a strange feeling in the chest. 
¨ Sweating. ¨ Shortness of breath. ¨ Nausea or vomiting. ¨ Pain, pressure, or a strange feeling in the back, neck, jaw, or upper belly or in one or both shoulders or arms. ¨ Lightheadedness or sudden weakness. ¨ A fast or irregular heartbeat. · You have symptoms of a stroke. These may include: 
¨ Sudden numbness, tingling, weakness, or loss of movement in your face, arm, or leg, especially on only one side of your body. ¨ Sudden vision changes. ¨ Sudden trouble speaking. ¨ Sudden confusion or trouble understanding simple statements. ¨ Sudden problems with walking or balance. ¨ A sudden, severe headache that is different from past headaches. · You have severe back or belly pain. Do not wait until your blood pressure comes down on its own. Get help right away. Call your doctor now or seek immediate care if: 
· Your blood pressure is much higher than normal (such as 180/110 or higher), but you don't have symptoms. · You think high blood pressure is causing symptoms, such as: ¨ Severe headache. ¨ Blurry vision. Watch closely for changes in your health, and be sure to contact your doctor if: 
· Your blood pressure measures 140/90 or higher at least 2 times. That means the top number is 140 or higher or the bottom number is 90 or higher, or both. · You think you may be having side effects from your blood pressure medicine. · Your blood pressure is usually normal, but it goes above normal at least 2 times. Where can you learn more? Go to http://adal-janes.info/. Enter T881 in the search box to learn more about \"High Blood Pressure: Care Instructions. \" Current as of: August 8, 2016 Content Version: 11.1 © 1581-8751 [facility]. Care instructions adapted under license by [employer] (which disclaims liability or warranty for this information). If you have questions about a medical condition or this instruction, always ask your healthcare professional. [address] any warranty or liability for your use of this information. Instructions Patients Name:  [address] are scheduled to have a Cath/PCI on March 6,2017  at [facility] Please check in at              . 2. Please go to [facility] and park in the outpatient parking lot that is located around to the back of the hospital and enter through the [facility] building. Once you enter through the [facility] check in with the  there. The  will either give you directions or assist you in getting to the cath holding area. 3. You are not to eat anything after midnight before the procedure. Please continue to drink fluids up until 12:00.  (water, juice, black coffee, soft drinks). Do not drink milk or milk products or anything with cream. You may take medications(except for diabetes) with a small sip of water before 6am on the day of the procedure. Luis Kumar 4. If you are diabetic, do not take your insulin/sugar pill the morning of the procedure. 5. MEDICATION INSTRUCTIONS:   Please take your morning medications with the following special instructions: 
 
          Please make sure to take your Blood pressure medication : With just enough water to swallow. Take your Aspirin and/or Plavix. With just enough water to swallow. Stop your Coumadin on   and do not resume it until after the procedure. Take Prednisone 60 mg and Benadryl 25 mg by mouth at Bedtime on  and again on  at . This is to prevent you from having an allergic reaction to the dye. 6. We encourage families to wait in the waiting room on the first floor while the procedure is being done. The Doctor will come out and talk with you as soon as the procedure is over. 7. There is the possibility that you may spend the night in the hospital, depending on the results of the procedure. This will be determined after the procedure is done. If angioplasty or stent is planned, you will stay at least one day. 8. If you or your family have any questions, please call our office Monday Friday, 9:00 a. m.4:30 p.m.,  At [phone], and ask to speak to one of the nurses. Introducing [facility] [place] [facility]! Dear Martha Villanueva: Thank you for requesting a [employer] account. Our records indicate that you already have an active [employer] account. You can access your account anytime at [email or web address] Did you know that you can access your hospital and ER discharge instructions at any time in [employer]? You can also review all of your test results from your hospital stay or ER visit. Additional Information If you have questions, please visit the Frequently Asked Questions section of the [employer] website at [email or web address] Remember, [employer] is NOT to be used for urgent needs. For medical emergencies, dial 911. Now available from your iPhone and Android! Please provide this summary of care documentation to your next provider. Your primary care clinician is listed as Aroldo Perez. If you have any questions after today's visit, please call [phone].

## 2017-03-01 NOTE — PATIENT INSTRUCTIONS
High Blood Pressure: Care Instructions  Your Care Instructions  If your blood pressure is usually above 140/90, you have high blood pressure, or hypertension. That means the top number is 140 or higher or the bottom number is 90 or higher, or both. Despite what a lot of people think, high blood pressure usually doesn't cause headaches or make you feel dizzy or lightheaded. It usually has no symptoms. But it does increase your risk for heart attack, stroke, and kidney or eye damage. The higher your blood pressure, the more your risk increases. Your doctor will give you a goal for your blood pressure. Your goal will be based on your health and your age. An example of a goal is to keep your blood pressure below 140/90. Lifestyle changes, such as eating healthy and being active, are always important to help lower blood pressure. You might also take medicine to reach your blood pressure goal.  Follow-up care is a key part of your treatment and safety. Be sure to make and go to all appointments, and call your doctor if you are having problems. It's also a good idea to know your test results and keep a list of the medicines you take. How can you care for yourself at home? Medical treatment  · If you stop taking your medicine, your blood pressure will go back up. You may take one or more types of medicine to lower your blood pressure. Be safe with medicines. Take your medicine exactly as prescribed. Call your doctor if you think you are having a problem with your medicine. · Talk to your doctor before you start taking aspirin every day. Aspirin can help certain people lower their risk of a heart attack or stroke. But taking aspirin isn't right for everyone, because it can cause serious bleeding. · See your doctor regularly. You may need to see the doctor more often at first or until your blood pressure comes down.   · If you are taking blood pressure medicine, talk to your doctor before you take decongestants or anti-inflammatory medicine, such as ibuprofen. Some of these medicines can raise blood pressure. · Learn how to check your blood pressure at home. Lifestyle changes  · Stay at a healthy weight. This is especially important if you put on weight around the waist. Losing even 10 pounds can help you lower your blood pressure. · If your doctor recommends it, get more exercise. Walking is a good choice. Bit by bit, increase the amount you walk every day. Try for at least 30 minutes on most days of the week. You also may want to swim, bike, or do other activities. · Avoid or limit alcohol. Talk to your doctor about whether you can drink any alcohol. · Try to limit how much sodium you eat to less than 2,300 milligrams (mg) a day. Your doctor may ask you to try to eat less than 1,500 mg a day. · Eat plenty of fruits (such as bananas and oranges), vegetables, legumes, whole grains, and low-fat dairy products. · Lower the amount of saturated fat in your diet. Saturated fat is found in animal products such as milk, cheese, and meat. Limiting these foods may help you lose weight and also lower your risk for heart disease. · Do not smoke. Smoking increases your risk for heart attack and stroke. If you need help quitting, talk to your doctor about stop-smoking programs and medicines. These can increase your chances of quitting for good. When should you call for help? Call 911 anytime you think you may need emergency care. This may mean having symptoms that suggest that your blood pressure is causing a serious heart or blood vessel problem. Your blood pressure may be over 180/110. For example, call 911 if:  · You have symptoms of a heart attack. These may include:  ¨ Chest pain or pressure, or a strange feeling in the chest.  ¨ Sweating. ¨ Shortness of breath. ¨ Nausea or vomiting. ¨ Pain, pressure, or a strange feeling in the back, neck, jaw, or upper belly or in one or both shoulders or arms.   ¨ Lightheadedness or sudden weakness. ¨ A fast or irregular heartbeat. · You have symptoms of a stroke. These may include:  ¨ Sudden numbness, tingling, weakness, or loss of movement in your face, arm, or leg, especially on only one side of your body. ¨ Sudden vision changes. ¨ Sudden trouble speaking. ¨ Sudden confusion or trouble understanding simple statements. ¨ Sudden problems with walking or balance. ¨ A sudden, severe headache that is different from past headaches. · You have severe back or belly pain. Do not wait until your blood pressure comes down on its own. Get help right away. Call your doctor now or seek immediate care if:  · Your blood pressure is much higher than normal (such as 180/110 or higher), but you don't have symptoms. · You think high blood pressure is causing symptoms, such as:  ¨ Severe headache. ¨ Blurry vision. Watch closely for changes in your health, and be sure to contact your doctor if:  · Your blood pressure measures 140/90 or higher at least 2 times. That means the top number is 140 or higher or the bottom number is 90 or higher, or both. · You think you may be having side effects from your blood pressure medicine. · Your blood pressure is usually normal, but it goes above normal at least 2 times. Where can you learn more? Go to http://adal-janes.info/. Enter Z957 in the search box to learn more about \"High Blood Pressure: Care Instructions. \"  Current as of: August 8, 2016  Content Version: 11.1  © 4333-8986 [employer]. Care instructions adapted under license by [employer] (which disclaims liability or warranty for this information). If you have questions about a medical condition or this instruction, always ask your healthcare professional. [place] [address] any warranty or liability for your use of this information.           Instructions    Patients Name:  [address] are scheduled to have a Cath/PCI on March 6,2017  at [facility] Please check in at 7:00 a.m.. 2. Please go to [facility] and park in the outpatient parking lot that is located around to the back of the hospital and enter through the [facility] building. Once you enter through the [facility] check in with the  there. The  will either give you directions or assist you in getting to the cath holding area. 3. You are not to eat anything after midnight before the procedure. Please continue to drink fluids up until 12:00.  (water, juice, black coffee, soft drinks). Do not drink milk or milk products or anything with cream. You may take medications(except for diabetes) with a small sip of water before 6am on the day of the procedure. .    4. If you are diabetic, do not take your insulin/sugar pill the morning of the procedure. 5. MEDICATION INSTRUCTIONS:   Please take your morning medications with the following special instructions:    [x]          Please make sure to take your Blood pressure medication : With just enough water to swallow. [x]          Take your Aspirin and/or Plavix. With just enough water to swallow. []          Stop your Coumadin on   and do not resume it until after the procedure.     []          Take Prednisone 60 mg and Benadryl 25 mg by mouth at Bedtime on  and again on  at . This is to prevent you from having an allergic reaction to the dye. 6. We encourage families to wait in the waiting room on the first floor while the procedure is being done. The Doctor will come out and talk with you as soon as the procedure is over. 7. There is the possibility that you may spend the night in the hospital, depending on the results of the procedure. This will be determined after the procedure is done. If angioplasty or stent is planned, you will stay at least one day.     8. If you or your family have any questions, please call our office Monday Friday, 9: 00 a. m.4:30 p.m.,  At [phone], and ask to speak to one of the nurses.

## 2017-03-01 NOTE — PROGRESS NOTES
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 New Patient   The history is provided by the patient. This is a chronic problem. The current episode started more than 1 week ago. The problem occurs daily. The problem has been gradually worsening. Associated symptoms include shortness of breath. Pertinent negatives include no chest pain, no abdominal pain and no headaches. Heart Murmur   The history is provided by the patient. This is a chronic problem. Associated symptoms include shortness of breath. Pertinent negatives include no chest pain, no abdominal pain and no headaches. Shortness of Breath   The history is provided by the patient. This is a chronic problem. The problem occurs intermittently. The current episode started more than 1 week ago. The problem has not changed since onset. Pertinent negatives include no fever, no headaches, no ear pain, no neck pain, no cough, no sputum production, no hemoptysis, no wheezing, no PND, no orthopnea, no chest pain, no syncope, no vomiting, no abdominal pain, no rash, no leg pain, no leg swelling and no claudication. Associated medical issues do not include chronic lung disease, CAD or heart failure. Review of Systems   Constitutional: Negative for chills, diaphoresis, fever, malaise/fatigue and weight loss. HENT: Negative for ear discharge, ear pain, hearing loss, nosebleeds and tinnitus. Eyes: Negative for blurred vision. Respiratory: Positive for shortness of breath. Negative for cough, hemoptysis, sputum production, wheezing and stridor. Cardiovascular: Negative for chest pain, palpitations, orthopnea, claudication, leg swelling, syncope and PND. Gastrointestinal: Negative for abdominal pain, heartburn, nausea and vomiting. Musculoskeletal: Negative for myalgias and neck pain. Skin: Negative for itching and rash.    Neurological: Negative for dizziness, tingling, tremors, focal weakness, loss of consciousness, weakness and headaches. Psychiatric/Behavioral: Negative for depression and suicidal ideas. Family History   Problem Relation Age of Onset    Hypertension Mother     Thyroid Disease Mother     Hypertension Father     Diabetes Father     Drug Abuse Brother        Past Medical History:   Diagnosis Date    Diabetes ([address] 1995    Fractures 1995    R Knee/ Tib fib fracture/surgery    Gout 2010    High cholesterol 2005    HTN (hypertension) 1990    Osteoarthritis 2013    Thyroid trouble        Past Surgical History:   Procedure Laterality Date    HX HIP REPLACEMENT Left 2009    HX ORTHOPAEDIC Right     R knee/Tibfib surgery       Social History   Substance Use Topics    Smoking status: Former Smoker     Packs/day: 1.00     Types: Cigarettes     Start date: 1/1/1969     Quit date: 1/1/1995    Smokeless tobacco: Never Used    Alcohol use No       Allergies   Allergen Reactions    Watermelon Swelling    Peach (Prunus Persica) Itching       Outpatient Prescriptions Marked as Taking for the 3/1/17 encounter (Office Visit) with Aura Walsh MD   Medication Sig Dispense Refill    cyanocobalamin (VITAMIN B-12) 1,000 mcg tablet Take 1,000 mcg by mouth daily.  febuxostat (ULORIC) 40 mg tab tablet Take 1 Tab by mouth daily. Indications: GOUT PREVENTION 90 Tab 1    insulin glargine (LANTUS) 100 unit/mL injection Take 40 units once daily  Indications: TYPE 2 DIABETES MELLITUS 3 Vial 3    levothyroxine (SYNTHROID) 200 mcg tablet Take 1 Tab by mouth Daily (before breakfast). Indications: HYPOTHYROIDISM 30 Tab 5    insulin aspart (NOVOLOG) 100 unit/mL injection by SubCUTAneous route. 6 units before meals      spironolactone (ALDACTONE) 25 mg tablet Take 1 Tab by mouth daily. Indications: EDEMA 30 Tab 3    metoprolol (LOPRESSOR) 25 mg tablet Take 0.5 Tabs by mouth two (2) times a day.  30 Tab 1    glucose blood VI test strips (ASCENSIA AUTODISC VI, ONE TOUCH ULTRA TEST VI) strip Dispense precision extra test strips 3 x day to check blood glucose 200 Each 3    Magnesium Chloride 64 mg TbEC Take  by mouth. 8 pills 3 times daily      amLODIPine (NORVASC) 10 mg tablet Take 5 mg by mouth daily.  Omeprazole delayed release (PRILOSEC D/R) 20 mg tablet Take 20 mg by mouth daily.  atorvastatin (LIPITOR) 40 mg tablet Take 20 mg by mouth daily.  testosterone (ANDROGEL) 20.25 mg/1.25 gram (1.62 %) gel Apply  to affected area as needed.  aspirin (ASPIRIN) 325 mg tablet Take 325 mg by mouth daily.  Cholecalciferol, Vitamin D3, 1,000 unit cap Take 3,000 Units by mouth daily.  albuterol (VENTOLIN HFA) 90 mcg/actuation inhaler Take  by inhalation as needed. Visit Vitals    /75    Pulse 75    Ht 5' 6\" (1.676 m)    Wt 115.2 kg (254 lb)    BMI 41 kg/m2     Physical Exam   Constitutional: He is oriented to person, place, and time. He appears well-developed and well-nourished. No distress. HENT:   Head: Atraumatic. Mouth/Throat: No oropharyngeal exudate. Eyes: Conjunctivae are normal. Right eye exhibits no discharge. Left eye exhibits no discharge. No scleral icterus. Neck: Normal range of motion. Neck supple. No JVD present. No tracheal deviation present. No thyromegaly present. Cardiovascular: Normal rate and regular rhythm. Exam reveals no gallop. Murmur (3/6 late peaking ejection systolic murmur best heard at aortic area with radiation to b/l carotids) heard. Pulmonary/Chest: Effort normal and breath sounds normal. No stridor. No respiratory distress. He has no wheezes. He has no rales. He exhibits no tenderness. Abdominal: Soft. There is no tenderness. There is no rebound and no guarding. Musculoskeletal: Normal range of motion. He exhibits no edema or tenderness. Lymphadenopathy:     He has no cervical adenopathy. Neurological: He is alert and oriented to person, place, and time. He exhibits normal muscle tone. Skin: Skin is warm. He is not diaphoretic. Psychiatric: He has a normal mood and affect. His behavior is normal.     ekg sinus rhythm with PVC, no acute st-t changes  Echo 02/2017:  SUMMARY:  Left ventricle: Systolic function was normal by visual assessment. Ejection fraction was estimated in the range of 60 % to 65 %. No obvious  wall motion abnormalities identified in the views obtained. Wall thickness  was at the upper limits of normal.    Aortic valve: Leaflets exhibited markedly increased thickness and reduced  mobility. There was moderate to severe stenosis. Valve peak gradient was  61 mmHg. Valve mean gradient was 39 mmHg. Estimated aortic valve area (by  VTI) was 0.5 cmï¾². ZACKERY likely overestimates severity of aortic stenosis due  to difficulty in measuring LVOT diameter. Aorta, systemic arteries: The root exhibited dilatation. ASSESSMENT and PLAN    ICD-10-CM ICD-9-CM    1. SOB (shortness of breath) R06.02 786.05    2. aortic stenosis R01.1 785.2 AMB POC EKG ROUTINE W/ 12 LEADS, INTER & REP      CARDIAC CATHETERIZATION      XR CHEST PA LAT      MAGNESIUM      METABOLIC PANEL, COMPREHENSIVE      PTT      PROTHROMBIN TIME + INR      TSH 3RD GENERATION      CBC W/O DIFF    moderate to severe   3. Essential hypertension I10 401.9    4. Type 2 diabetes mellitus with diabetic nephropathy, unspecified long term insulin use status (HCC) E11.21 250.40      583.81    5. CKD (chronic kidney disease) stage 3, GFR 30-59 ml/min N18.3 585.3    6. Dyslipidemia E78.5 272.4      Orders Placed This Encounter    XR CHEST PA LAT     Standing Status:   Future     Standing Expiration Date:   4/1/2018     Order Specific Question:   Reason for Exam     Answer:   pre op, sob     Order Specific Question:   Is Patient Allergic to Contrast Dye?      Answer:   No    MAGNESIUM     Standing Status:   Future     Standing Expiration Date:   2/4/6021    METABOLIC PANEL, COMPREHENSIVE     Standing Status:   Future     Standing Expiration Date:   3/2/2018    PTT     Standing Status:   Future     Standing Expiration Date:   3/2/2018    PROTHROMBIN TIME + INR     Standing Status:   Future     Standing Expiration Date:   3/2/2018    TSH 3RD GENERATION     Standing Status:   Future     Standing Expiration Date:   3/2/2018    CBC W/O DIFF     Standing Status:   Future     Standing Expiration Date:   3/2/2018    CARDIAC CATHETERIZATION     Standing Status:   Future     Standing Expiration Date:   9/1/2017     Order Specific Question:   Reason for Exam:     Answer:   aortic stenosis    AMB POC EKG ROUTINE W/ 12 LEADS, INTER & REP     Order Specific Question:   Reason for Exam:     Answer:   heart mumur     Follow-up Disposition:  Return in about 2 weeks (around 3/15/2017). current treatment plan is effective, no change in therapy  reviewed diet, exercise and weight control  cardiovascular risk and specific lipid/LDL goals reviewed  use of aspirin to prevent MI and TIA's discussed. Patient with moderate to severe AS by echo and severe AS by examination seen for pre op evaluation prior to knee surgery. Patient has exertional dyspnea with no HF symptoms. No syncope or chest pain. Will proceed with [facility] to evaluate CAD/ VHD.

## 2017-03-06 ENCOUNTER — HOSPITAL ENCOUNTER (OUTPATIENT)
Dept: CARDIAC CATH/INVASIVE PROCEDURES | Age: 72
Discharge: HOME | End: 2017-03-06
Attending: INTERNAL MEDICINE | Admitting: INTERNAL MEDICINE
Payer: MEDICARE

## 2017-03-06 VITALS
HEART RATE: 56 BPM | SYSTOLIC BLOOD PRESSURE: 160 MMHG | HEIGHT: 70 IN | OXYGEN SATURATION: 98 % | DIASTOLIC BLOOD PRESSURE: 77 MMHG | TEMPERATURE: 98.5 F | WEIGHT: 254 LBS | BODY MASS INDEX: 36.36 KG/M2 | RESPIRATION RATE: 15 BRPM

## 2017-03-06 LAB — GLUCOSE BLD STRIP.AUTO-MCNC: 142 MG/DL (ref 70–110)

## 2017-03-06 PROCEDURE — 77030015766

## 2017-03-06 PROCEDURE — C1769 GUIDE WIRE: HCPCS

## 2017-03-06 PROCEDURE — 74011636320 HC RX REV CODE- 636/320: Performed by: INTERNAL MEDICINE

## 2017-03-06 PROCEDURE — C1894 INTRO/SHEATH, NON-LASER: HCPCS

## 2017-03-06 PROCEDURE — 99153 MOD SED SAME PHYS/QHP EA: CPT

## 2017-03-06 PROCEDURE — 93571 IV DOP VEL&/PRESS C FLO 1ST: CPT

## 2017-03-06 PROCEDURE — 74011000250 HC RX REV CODE- 250: Performed by: INTERNAL MEDICINE

## 2017-03-06 PROCEDURE — 74011250636 HC RX REV CODE- 250/636: Performed by: INTERNAL MEDICINE

## 2017-03-06 PROCEDURE — 77030004558 HC CATH ANGI DX SUPR TORQ CARD -A

## 2017-03-06 PROCEDURE — 77030013797 HC KT TRNSDUC PRSSR EDWD -A

## 2017-03-06 PROCEDURE — 77030029997 HC DEV COM RDL R BND TELE -B

## 2017-03-06 PROCEDURE — 77030028790 HC ACC ST CTRL VLV COPLT ABBT -B

## 2017-03-06 PROCEDURE — 77030004567 HC CATH ANGI DX TELE -C

## 2017-03-06 PROCEDURE — C1887 CATHETER, GUIDING: HCPCS

## 2017-03-06 PROCEDURE — 99152 MOD SED SAME PHYS/QHP 5/>YRS: CPT

## 2017-03-06 PROCEDURE — 93458 L HRT ARTERY/VENTRICLE ANGIO: CPT

## 2017-03-06 PROCEDURE — 82962 GLUCOSE BLOOD TEST: CPT

## 2017-03-06 RX ORDER — VERAPAMIL HYDROCHLORIDE 2.5 MG/ML
5 INJECTION, SOLUTION INTRAVENOUS ONCE
Status: COMPLETED | OUTPATIENT
Start: 2017-03-06 | End: 2017-03-06

## 2017-03-06 RX ORDER — LIDOCAINE HYDROCHLORIDE 10 MG/ML
1-30 INJECTION, SOLUTION EPIDURAL; INFILTRATION; INTRACAUDAL; PERINEURAL
Status: DISCONTINUED | OUTPATIENT
Start: 2017-03-06 | End: 2017-03-06 | Stop reason: HOSPADM

## 2017-03-06 RX ORDER — SODIUM CHLORIDE 0.9 % (FLUSH) 0.9 %
5-10 SYRINGE (ML) INJECTION EVERY 8 HOURS
Status: DISCONTINUED | OUTPATIENT
Start: 2017-03-06 | End: 2017-03-06 | Stop reason: HOSPADM

## 2017-03-06 RX ORDER — FENTANYL CITRATE 50 UG/ML
25-100 INJECTION, SOLUTION INTRAMUSCULAR; INTRAVENOUS
Status: DISCONTINUED | OUTPATIENT
Start: 2017-03-06 | End: 2017-03-06 | Stop reason: HOSPADM

## 2017-03-06 RX ORDER — HEPARIN SODIUM 200 [USP'U]/100ML
1000 INJECTION, SOLUTION INTRAVENOUS ONCE
Status: COMPLETED | OUTPATIENT
Start: 2017-03-06 | End: 2017-03-06

## 2017-03-06 RX ORDER — EPTIFIBATIDE 0.75 MG/ML
2 INJECTION, SOLUTION INTRAVENOUS CONTINUOUS
Status: DISCONTINUED | OUTPATIENT
Start: 2017-03-06 | End: 2017-03-06 | Stop reason: HOSPADM

## 2017-03-06 RX ORDER — MIDAZOLAM HYDROCHLORIDE 1 MG/ML
1-4 INJECTION, SOLUTION INTRAMUSCULAR; INTRAVENOUS
Status: DISCONTINUED | OUTPATIENT
Start: 2017-03-06 | End: 2017-03-06 | Stop reason: HOSPADM

## 2017-03-06 RX ORDER — EPTIFIBATIDE 2 MG/ML
180 INJECTION, SOLUTION INTRAVENOUS ONCE
Status: DISCONTINUED | OUTPATIENT
Start: 2017-03-06 | End: 2017-03-06 | Stop reason: HOSPADM

## 2017-03-06 RX ORDER — SODIUM CHLORIDE 0.9 % (FLUSH) 0.9 %
5-10 SYRINGE (ML) INJECTION AS NEEDED
Status: DISCONTINUED | OUTPATIENT
Start: 2017-03-06 | End: 2017-03-06 | Stop reason: HOSPADM

## 2017-03-06 RX ORDER — HEPARIN SODIUM 1000 [USP'U]/ML
1000-10000 INJECTION, SOLUTION INTRAVENOUS; SUBCUTANEOUS
Status: DISCONTINUED | OUTPATIENT
Start: 2017-03-06 | End: 2017-03-06 | Stop reason: HOSPADM

## 2017-03-06 RX ORDER — SODIUM CHLORIDE 9 MG/ML
50 INJECTION, SOLUTION INTRAVENOUS CONTINUOUS
Status: DISPENSED | OUTPATIENT
Start: 2017-03-06 | End: 2017-03-06

## 2017-03-06 RX ADMIN — HEPARIN SODIUM 1000 UNITS: 200 INJECTION, SOLUTION INTRAVENOUS at 08:37

## 2017-03-06 RX ADMIN — HEPARIN SODIUM 2000 UNITS: 1000 INJECTION, SOLUTION INTRAVENOUS; SUBCUTANEOUS at 09:03

## 2017-03-06 RX ADMIN — SODIUM CHLORIDE 50 ML/HR: 900 INJECTION, SOLUTION INTRAVENOUS at 08:17

## 2017-03-06 RX ADMIN — FENTANYL CITRATE 25 MCG: 50 INJECTION INTRAMUSCULAR; INTRAVENOUS at 08:50

## 2017-03-06 RX ADMIN — MIDAZOLAM HYDROCHLORIDE 1 MG: 1 INJECTION, SOLUTION INTRAMUSCULAR; INTRAVENOUS at 08:51

## 2017-03-06 RX ADMIN — IOPAMIDOL 100 ML: 612 INJECTION, SOLUTION INTRAVENOUS at 09:44

## 2017-03-06 RX ADMIN — LIDOCAINE HYDROCHLORIDE 3 ML: 10 INJECTION, SOLUTION EPIDURAL; INFILTRATION; INTRACAUDAL; PERINEURAL at 08:59

## 2017-03-06 RX ADMIN — HEPARIN SODIUM 3000 UNITS: 1000 INJECTION, SOLUTION INTRAVENOUS; SUBCUTANEOUS at 09:24

## 2017-03-06 RX ADMIN — FENTANYL CITRATE 25 MCG: 50 INJECTION INTRAMUSCULAR; INTRAVENOUS at 09:26

## 2017-03-06 RX ADMIN — MIDAZOLAM HYDROCHLORIDE 1 MG: 1 INJECTION, SOLUTION INTRAMUSCULAR; INTRAVENOUS at 08:37

## 2017-03-06 RX ADMIN — VERAPAMIL HYDROCHLORIDE 1.25 MG: 2.5 INJECTION, SOLUTION INTRAVENOUS at 09:04

## 2017-03-06 RX ADMIN — FENTANYL CITRATE 25 MCG: 50 INJECTION INTRAMUSCULAR; INTRAVENOUS at 08:37

## 2017-03-06 RX ADMIN — NITROGLYCERIN 100 MCG: 5 INJECTION, SOLUTION INTRAVENOUS at 09:04

## 2017-03-06 NOTE — DISCHARGE INSTRUCTIONS
DISCHARGE SUMMARY from Nurse                     Cardiac Catheterization/Angiography Discharge Instructions    *Check the puncture site frequently for swelling or bleeding. If you see any bleeding, lie down and apply pressure over the area with a clean town or washcloth. Notify your doctor for any redness, swelling, drainage or oozing from the puncture site. Notify your doctor for any fever or chills. *If the leg or arm with the puncture becomes cold, numb or painful, call Dr Delfin Torres MD    *Activity should be limited for the next 48 hours. Climb stairs as little as possible and avoid any stooping, bending or strenuous activity for 48 hours. No heavy lifting (anything over 10 pounds) for three days. *Do not drive for 48 hours. *You may resume your usual diet. Drink more fluids than usual.    *Have a responsible person drive you home and stay with you for at least 24 hours after your heart catheterization/angiography. *You may remove the bandage from your Right Wrist in 24 hours. You may shower in 24 hours. No tub baths, hot tubs or swimming for one week. Do not place any lotions, creams, powders, ointments over the puncture site for one week. You may place a clean band-aid over the puncture site each day for 5 days. Change this daily. PATIENT INSTRUCTIONS:    After general anesthesia or intravenous sedation, for 24 hours or while taking prescription Narcotics:  · Limit your activities  · Do not drive and operate hazardous machinery  · Do not make important personal or business decisions  · Do  not drink alcoholic beverages  · If you have not urinated within 8 hours after discharge, please contact your surgeon on call.     Report the following to your surgeon:  · Excessive pain, swelling, redness or odor of or around the surgical area  · Temperature over 100.5  · Nausea and vomiting lasting longer than 4 hours or if unable to take medications  · Any signs of decreased circulation or nerve impairment to extremity: change in color, persistent  numbness, tingling, coldness or increase pain  · Any questions        What to do at Home:  Recommended activity: No lifting, Driving, or Strenuous exercise for 24 hours. If you experience any of the following symptoms bleeding, swelling,acute pain or numbness, fever, please follow up with Dr. Alex Mckeon MD.      *  Please give a list of your current medications to your Primary Care Provider. *  Please update this list whenever your medications are discontinued, doses are      changed, or new medications (including over-the-counter products) are added. *  Please carry medication information at all times in case of emergency situations. These are general instructions for a healthy lifestyle:    No smoking/ No tobacco products/ Avoid exposure to second hand smoke    Surgeon General's Warning:  Quitting smoking now greatly reduces serious risk to your health. Obesity, smoking, and sedentary lifestyle greatly increases your risk for illness    A healthy diet, regular physical exercise & weight monitoring are important for maintaining a healthy lifestyle    You may be retaining fluid if you have a history of heart failure or if you experience any of the following symptoms:  Weight gain of 3 pounds or more overnight or 5 pounds in a week, increased swelling in our hands or feet or shortness of breath while lying flat in bed. Please call your doctor as soon as you notice any of these symptoms; do not wait until your next office visit. Recognize signs and symptoms of STROKE:    F-face looks uneven    A-arms unable to move or move unevenly    S-speech slurred or non-existent    T-time-call 911 as soon as signs and symptoms begin-DO NOT go       Back to bed or wait to see if you get better-TIME IS BRAIN. Warning Signs of HEART ATTACK     Call 911 if you have these symptoms:   Chest discomfort.  Most heart attacks involve discomfort in the center of the chest that lasts more than a few minutes, or that goes away and comes back. It can feel like uncomfortable pressure, squeezing, fullness, or pain.  Discomfort in other areas of the upper body. Symptoms can include pain or discomfort in one or both arms, the back, neck, jaw, or stomach.  Shortness of breath with or without chest discomfort.  Other signs may include breaking out in a cold sweat, nausea, or lightheadedness. Don't wait more than five minutes to call 911 - MINUTES MATTER! Fast action can save your life. Calling 911 is almost always the fastest way to get lifesaving treatment. Emergency Medical Services staff can begin treatment when they arrive -- up to an hour sooner than if someone gets to the hospital by car. The discharge information has been reviewed with the patient. The patient verbalized understanding. Discharge medications reviewed with the patient and appropriate educational materials and side effects teaching were provided. Patient armband removed and shredded  MyChart Activation    Thank you for requesting access to [employer]. Please follow the instructions below to securely access and download your online medical record. [employer] allows you to send messages to your doctor, view your test results, renew your prescriptions, schedule appointments, and more. How Do I Sign Up? 1. In your internet browser, go to [email or web address]. 2. Click on the First Time User? Click Here link in the Sign In box. You will see the New Member Sign Up page. 3. Enter your [employer] Access Code exactly as it appears below. You will not need to use this code after youve completed the sign-up process. If you do not sign up before the expiration date, you must request a new code. [employer] Access Code: Activation code not generated  Current [employer] Status: Active (This is the date your [employer] access code will )    4.  Enter the last four digits of your Social Security Number (xxxx) and Date of Birth (mm/dd/yyyy) as indicated and click Submit. You will be taken to the next sign-up page. 5. Create a makr ID. This will be your makr login ID and cannot be changed, so think of one that is secure and easy to remember. 6. Create a makr password. You can change your password at any time. 7. Enter your Password Reset Question and Answer. This can be used at a later time if you forget your password. 8. Enter your e-mail address. You will receive e-mail notification when new information is available in [address] 9. Click Sign Up. You can now view and download portions of your medical record. 10. Click the Download Summary menu link to download a portable copy of your medical information. Additional Information    If you have questions, please visit the Frequently Asked Questions section of the makr website at [email or web address] Remember, makr is NOT to be used for urgent needs. For medical emergencies, dial 911.

## 2017-03-06 NOTE — ROUTINE PROCESS
Right wrist R-Band removed. Sterile dressing applied. No bleeding or swelling. Normal radial pulse. Normal distal circulation and neuro check.  Safety instructions reviewed with the patient

## 2017-03-06 NOTE — PROCEDURES
[facility] CATH LAB    Name:  Nallely Campoverde  MR#:  [id number]  :  1945  Account #:  [de-identified]  Date of Adm:  2017  Date of Service:  2017      ESTIMATED BLOOD LOSS: none    SPECIMENS REMOVED: none    PROCEDURE PERFORMED BY: Rik Kimball MD.    NAME OF PROCEDURE:  1. Left heart catheterization. 2. Left ventricular angiogram.  3. Coronary angiogram.  4. Aortic valve gradient measurement. 5. IFR of mid left anterior descending. INDICATION: Aortic valve stenosis, preop evaluation. ENTRY: Right radial artery. COMPLICATIONS: None. PROCEDURE IN DETAIL: After obtaining a written witnessed informed  consent, the patient was brought to cardiac catheterization laboratory  in fasting state. The patient was prepped and draped in a sterile  fashion. Sedation was obtained using IV Versed and fentanyl. Lidocaine 1% was injected around the right radial artery and right  radial artery was accessed using a 6-[place] arterial sheath without  any complication. Verapamil 1.25 mg and 100 mcg of nitroglycerin were  administered intraarterial to relieve vasospasm. Following this 2000  units of intravenous heparin was administered. We then obtained left  coronary angiogram using a Forrest catheter. A right coronary  angiogram was performed using a JR catheter. We then used a JR  catheter and a straight wire to cross the valve. With careful  manipulation, we were unable to cross the valve and a JR catheter  was then advanced over the wire to the left ventricle. A straight wire  was removed and was exchanged for an 0.035 long exchange wire. Following this, we then removed the JR catheter and the [facility]  catheter was advanced to the level of the left ventricle. The wire was  then removed. We then obtained simultaneous aortic and LV  pressures, following which we then performed LV angiogram using the  [facility] catheter.  Following this, the [facility] diagnostic catheter  was then removed. At this time, we decided to proceed with IFR of mid  LAD as it appeared to be moderately stenosed. There was  also moderate calcification in the mid LAD segment. XB LAD 4.0 guide  was advanced to the level of left main artery. An additional 3000 units  of IV heparin was administered. Following this, IFR wire was advanced  to the level of left main artery. We then advanced the wire to the level  of proximal LAD. Normalization was performed in the left main. Following with careful manipulation, we were able to cross the lesion  and the wire was positioned in the mid to distal left anterior descending  artery. IFR of this was 1.00. IFR in the proximal segment was  1.0. Following crossing the lesion IFR was 0.96 indicating  noncritical stenosis. Following this, supporting wire removed. We then  performed a repeat angiogram. Following this, supporting catheter and  wire were removed. A Rad-Band was applied to right radial artery to  achieve hemostasis. FINDINGS:  1. Left main is patent. It bifurcates into left anterior descending artery  and circumflex artery. 2. Left anterior descending artery has wall irregularities in its proximal  portion followed by a mid long calcific 50% stenosis. IFR was 0.96,  indicating noncritical nature of stenosis. Diagonal 1 and diagonal 2  artery medium caliber vessel with wall irregularities. 3. Circumflex artery is codominant with wall irregularities. Obtuse  marginal 1 and obtuse marginal 2 arteries appear to be patent. 4. Right coronary artery is codominant and appears to be medium  caliber vessel with mid 30% to 40% stenosis. 5. Left ventricular ejection fraction is 60% with left ventricular end-  diastolic pressure of 13 mmHg. 6. LV pressures were 173 x 16 mmHg. 7. Aortic pressures were 146 x 71 mmHg. 8. Peak-to-peak gradient was about 30 mmHg. Mean gradient was  calculated to be 39 mm of gradient. CONCLUSION:  1.  Moderate to severe aortic stenosis. 2. Normal left ventricular function. 3. Moderate left anterior descending stenosis with IFR of 0.96  indicating noncritical nature of stenosis. The patient is to be on intense medical management and risk factor  modification. We will continue to monitor aortic valve stenosis and will  need aortic valve replacement in the near future.         Ivan Boas, MD [facility] / Shankar  D:  03/06/2017   10:32  T:  03/06/2017   15:24  Job #:  [id number]

## 2017-03-06 NOTE — IP AVS SNAPSHOT
Markos Pineda 
 
 
 [address] Patient: Prentice Holstein. MRN: [id number] :1945 You are allergic to the following Allergen Reactions Watermelon Swelling Peach (Prunus Persica) Itching Recent Documentation Height Weight BMI Smoking Status 1.778 m 115.2 kg 36.45 kg/m2 Former Smoker Emergency Contacts Name Discharge Info Relation Home Work Mobile Patience Ely  Spouse [3] [phone] About your hospitalization You were admitted on:  2017 You last received care in the:  [facility] You were discharged on:  2017 Unit phone number:  [phone] Why you were hospitalized Your primary diagnosis was:  Not on File Providers Seen During Your Hospitalizations Provider Role Specialty Primary office phone Jennifer Kapoor MD Attending Provider Cardiology [phone] Your Primary Care Physician (PCP) Primary Care Physician Office Phone Office Fax Velasquez Victor [phone] Follow-up Information Follow up With Details Comments Contact Info Liyah Ortiz MD   [facility] [address] [facility] 20 Primary Care 200 [facility] 
[phone] Your Appointments 2017  9:30 AM EDT Follow Up with Jennifer Kapoor MD  
[facility] ([facility]) [address] [facility] [address]  
[phone] Current Discharge Medication List  
  
CONTINUE these medications which have NOT CHANGED Dose & Instructions Dispensing Information Comments Morning Noon Evening Bedtime  
 amLODIPine 10 mg tablet Commonly known as:  Tad Chuy Your next dose is: Today, Tomorrow Other:  _________ Dose:  5 mg Take 5 mg by mouth daily. Refills:  0 aspirin 325 mg tablet Commonly known as:  ASPIRIN Your next dose is: Today, Tomorrow Other:  _________ Dose:  325 mg Take 325 mg by mouth daily. Refills:  0  
     
   
   
   
  
 atorvastatin 40 mg tablet Commonly known as:  LIPITOR Your next dose is: Today, Tomorrow Other:  _________ Dose:  20 mg Take 20 mg by mouth daily. Refills:  0  
     
   
   
   
  
 cholecalciferol 1,000 unit Cap Commonly known as:  VITAMIN D3 Your next dose is: Today, Tomorrow Other:  _________ Dose:  3000 Units Take 3,000 Units by mouth daily. Refills:  0  
     
   
   
   
  
 febuxostat 40 mg Tab tablet Commonly known as:  Daivd Burdock Your next dose is: Today, Tomorrow Other:  _________ Dose:  40 mg Take 1 Tab by mouth daily. Indications: GOUT PREVENTION Quantity:  90 Tab Refills:  1  
     
   
   
   
  
 glucose blood VI test strips strip Commonly known as:  ASCENSIA AUTODISC VI, ONE TOUCH ULTRA TEST VI Your next dose is: Today, Tomorrow Other:  _________ Dispense precision extra test strips 3 x day to check blood glucose Quantity:  200 Each Refills:  3  
     
   
   
   
  
 insulin aspart 100 unit/mL injection Commonly known as:  Surendra [place] Your next dose is: Today, Tomorrow Other:  _________  
   
   
 by SubCUTAneous route. 6 units before meals Refills:  0  
     
   
   
   
  
 insulin glargine 100 unit/mL injection Commonly known as:  LANTUS Your next dose is: Today, Tomorrow Other:  _________ Take 40 units once daily  Indications: TYPE 2 DIABETES MELLITUS Quantity:  3 Vial  
Refills:  3  
     
   
   
   
  
 levothyroxine 200 mcg tablet Commonly known as:  SYNTHROID Your next dose is: Today, Tomorrow Other:  _________ Dose:  200 mcg Take 1 Tab by mouth Daily (before breakfast). Indications: HYPOTHYROIDISM Quantity:  30 Tab Refills:  5  
     
   
   
   
  
 magnesium chloride 64 mg delayed release tablet Commonly known as:  MAG DELAY Your next dose is: Today, Tomorrow Other:  _________ Take  by mouth. 8 pills 3 times daily Refills:  0  
     
   
   
   
  
 metoprolol tartrate 25 mg tablet Commonly known as:  LOPRESSOR Your next dose is: Today, Tomorrow Other:  _________ Dose:  12.5 mg Take 0.5 Tabs by mouth two (2) times a day. Quantity:  30 Tab Refills:  1 Omeprazole delayed release 20 mg tablet Commonly known as:  PRILOSEC D/R Your next dose is: Today, Tomorrow Other:  _________ Dose:  20 mg Take 20 mg by mouth daily. Refills:  0  
     
   
   
   
  
 spironolactone 25 mg tablet Commonly known as:  ALDACTONE Your next dose is: Today, Tomorrow Other:  _________ Dose:  25 mg Take 1 Tab by mouth daily. Indications: EDEMA Quantity:  30 Tab Refills:  3  
     
   
   
   
  
 testosterone 20.25 mg/1.25 gram (1.62 %) gel Commonly known as:  ANDROGEL Your next dose is: Today, Tomorrow Other:  _________ Apply  to affected area as needed. Refills:  0 VENTOLIN HFA 90 mcg/actuation inhaler Generic drug:  albuterol Your next dose is: Today, Tomorrow Other:  _________ Take  by inhalation as needed. Refills:  0  
     
   
   
   
  
 VITAMIN B-12 1,000 mcg tablet Generic drug:  cyanocobalamin Your next dose is: Today, Tomorrow Other:  _________ Dose:  1000 mcg Take 1,000 mcg by mouth daily. Refills:  0 Discharge Instructions DISCHARGE SUMMARY from Nurse Cardiac Catheterization/Angiography Discharge Instructions *Check the puncture site frequently for swelling or bleeding. If you see any bleeding, lie down and apply pressure over the area with a clean town or washcloth. Notify your doctor for any redness, swelling, drainage or oozing from the puncture site. Notify your doctor for any fever or chills. *If the leg or arm with the puncture becomes cold, numb or painful, call Dr Stan Ledesma MD 
 
*Activity should be limited for the next 48 hours. Climb stairs as little as possible and avoid any stooping, bending or strenuous activity for 48 hours. No heavy lifting (anything over 10 pounds) for three days. *Do not drive for 48 hours. *You may resume your usual diet. Drink more fluids than usual. 
 
*Have a responsible person drive you home and stay with you for at least 24 hours after your heart catheterization/angiography. *You may remove the bandage from your Right Wrist in 24 hours. You may shower in 24 hours. No tub baths, hot tubs or swimming for one week. Do not place any lotions, creams, powders, ointments over the puncture site for one week. You may place a clean band-aid over the puncture site each day for 5 days. Change this daily. PATIENT INSTRUCTIONS: 
 
After general anesthesia or intravenous sedation, for 24 hours or while taking prescription Narcotics: · Limit your activities · Do not drive and operate hazardous machinery · Do not make important personal or business decisions · Do  not drink alcoholic beverages · If you have not urinated within 8 hours after discharge, please contact your surgeon on call. Report the following to your surgeon: 
· Excessive pain, swelling, redness or odor of or around the surgical area · Temperature over 100.5 · Nausea and vomiting lasting longer than 4 hours or if unable to take medications · Any signs of decreased circulation or nerve impairment to extremity: change in color, persistent  numbness, tingling, coldness or increase pain · Any questions What to do at Home: 
Recommended activity: No lifting, Driving, or Strenuous exercise for 24 hours. If you experience any of the following symptoms bleeding, swelling,acute pain or numbness, fever, please follow up with Dr. Ciara Vargas MD. 
 
 
*  Please give a list of your current medications to your Primary Care Provider. *  Please update this list whenever your medications are discontinued, doses are 
    changed, or new medications (including over-the-counter products) are added. *  Please carry medication information at all times in case of emergency situations. These are general instructions for a healthy lifestyle: No smoking/ No tobacco products/ Avoid exposure to second hand smoke Surgeon General's Warning:  Quitting smoking now greatly reduces serious risk to your health. Obesity, smoking, and sedentary lifestyle greatly increases your risk for illness A healthy diet, regular physical exercise & weight monitoring are important for maintaining a healthy lifestyle You may be retaining fluid if you have a history of heart failure or if you experience any of the following symptoms:  Weight gain of 3 pounds or more overnight or 5 pounds in a week, increased swelling in our hands or feet or shortness of breath while lying flat in bed. Please call your doctor as soon as you notice any of these symptoms; do not wait until your next office visit. Recognize signs and symptoms of STROKE: 
 
F-face looks uneven A-arms unable to move or move unevenly S-speech slurred or non-existent T-time-call 911 as soon as signs and symptoms begin-DO NOT go Back to bed or wait to see if you get better-TIME IS BRAIN. Warning Signs of HEART ATTACK Call 911 if you have these symptoms: 
? Chest discomfort.  Most heart attacks involve discomfort in the center of the chest that lasts more than a few minutes, or that goes away and comes back. It can feel like uncomfortable pressure, squeezing, fullness, or pain. ? Discomfort in other areas of the upper body. Symptoms can include pain or discomfort in one or both arms, the back, neck, jaw, or stomach. ? Shortness of breath with or without chest discomfort. ? Other signs may include breaking out in a cold sweat, nausea, or lightheadedness. Don't wait more than five minutes to call [address]! Fast action can save your life. Calling 911 is almost always the fastest way to get lifesaving treatment. Emergency Medical Services staff can begin treatment when they arrive  up to an hour sooner than if someone gets to the hospital by car. The discharge information has been reviewed with the patient. The patient verbalized understanding. Discharge medications reviewed with the patient and appropriate educational materials and side effects teaching were provided. Patient armband removed and shredded MyChart Activation Thank you for requesting access to [employer]. Please follow the instructions below to securely access and download your online medical record. [employer] allows you to send messages to your doctor, view your test results, renew your prescriptions, schedule appointments, and more. How Do I Sign Up? 1. In your internet browser, go to [email or web address]. 2. Click on the First Time User? Click Here link in the Sign In box. You will see the New Member Sign Up page. 3. Enter your [employer] Access Code exactly as it appears below. You will not need to use this code after youve completed the sign-up process. If you do not sign up before the expiration date, you must request a new code. [employer] Access Code: Activation code not generated Current [employer] Status: Active (This is the date your [employer]ato access code will ) 4.  Enter the last four digits of your Social Security Number (xxxx) and Date of Birth (mm/dd/yyyy) as indicated and click Submit. You will be taken to the next sign-up page. 5. Create a [employer] ID. This will be your [employer] login ID and cannot be changed, so think of one that is secure and easy to remember. 6. Create a [employer] password. You can change your password at any time. 7. Enter your Password Reset Question and Answer. This can be used at a later time if you forget your password. 8. Enter your e-mail address. You will receive e-mail notification when new information is available in [address] 9. Click Sign Up. You can now view and download portions of your medical record. 10. Click the Download Summary menu link to download a portable copy of your medical information. Additional Information If you have questions, please visit the Frequently Asked Questions section of the [employer] website at [email or web address] Remember, [employer] is NOT to be used for urgent needs. For medical emergencies, dial 911. Discharge Orders None [facility]! Dear Noe West: Thank you for requesting a [employer] account. Our records indicate that you already have an active [employer] account. You can access your account anytime at [email or web address] Did you know that you can access your hospital and ER discharge instructions at any time in [employer]? You can also review all of your test results from your hospital stay or ER visit. Additional Information If you have questions, please visit the Frequently Asked Questions section of the [employer] website at [email or web address] Remember, [employer] is NOT to be used for urgent needs. For medical emergencies, dial 911. Now available from your iPhone and Android! General Information Please provide this summary of care documentation to your next provider.  
  
  
    
    
 Patient Signature: ____________________________________________________________ Date:  ____________________________________________________________  
  
Lena Wayne Provider Signature:  ____________________________________________________________ Date:  ____________________________________________________________

## 2017-03-06 NOTE — PROGRESS NOTES
A+Ox4, denied any complaints. Right wrist dressing intact, no bleeding or swelling. Discharge information reviewed. Escorted to car with wheelchair. TOT his wife for transport.

## 2017-03-06 NOTE — H&P
H&P update    No new symptoms  Risks, benefits and alternatives of LHC explained to  Patient. All questions answered.

## 2017-03-29 ENCOUNTER — OFFICE VISIT (OUTPATIENT)
Dept: CARDIOLOGY CLINIC | Age: 72
End: 2017-03-29

## 2017-03-29 VITALS
HEIGHT: 70 IN | DIASTOLIC BLOOD PRESSURE: 61 MMHG | BODY MASS INDEX: 36.36 KG/M2 | WEIGHT: 254 LBS | SYSTOLIC BLOOD PRESSURE: 128 MMHG | HEART RATE: 59 BPM

## 2017-03-29 DIAGNOSIS — I35.0 SEVERE AORTIC STENOSIS: Primary | ICD-10-CM

## 2017-03-29 DIAGNOSIS — I10 ESSENTIAL HYPERTENSION: ICD-10-CM

## 2017-03-29 DIAGNOSIS — N18.30 CKD (CHRONIC KIDNEY DISEASE) STAGE 3, GFR 30-59 ML/MIN (HCC): ICD-10-CM

## 2017-03-29 DIAGNOSIS — E11.21 TYPE 2 DIABETES MELLITUS WITH DIABETIC NEPHROPATHY, UNSPECIFIED LONG TERM INSULIN USE STATUS: ICD-10-CM

## 2017-03-29 DIAGNOSIS — Z01.818 PRE-OP EVALUATION: ICD-10-CM

## 2017-03-29 DIAGNOSIS — R06.02 SOB (SHORTNESS OF BREATH): ICD-10-CM

## 2017-03-29 DIAGNOSIS — E78.5 DYSLIPIDEMIA: ICD-10-CM

## 2017-03-29 DIAGNOSIS — E05.00 GRAVES DISEASE: ICD-10-CM

## 2017-03-29 NOTE — PROGRESS NOTES
1. Have you been to the ER, urgent care clinic since your last visit? Hospitalized since your last visit?     no    2. Have you seen or consulted any other health care providers outside of the [address] [place] [address] since your last visit? Include any pap smears or colon screening. No     3. Since your last visit, have you had any of the following symptoms? no     4. Have you had any blood work, X-rays or cardiac testing? No       5. Where do you normally have your labs drawn? 6. Do you need any refills today?    no

## 2017-03-29 NOTE — PROGRESS NOTES
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 Aortic Stenosis   The history is provided by the patient. This is a chronic problem. The problem occurs every several days. The problem has not changed since onset. Associated symptoms include shortness of breath. Pertinent negatives include no chest pain. Hypertension   The history is provided by the patient. This is a chronic problem. The current episode started more than 1 week ago. The problem occurs every several days. The problem has not changed since onset. Associated symptoms include shortness of breath. Pertinent negatives include no chest pain. Shortness of Breath   The history is provided by the patient. This is a chronic problem. The problem occurs intermittently. The current episode started more than 1 week ago. The problem has not changed since onset. Pertinent negatives include no fever, no ear pain, no neck pain, no cough, no sputum production, no hemoptysis, no wheezing, no PND, no orthopnea, no chest pain, no syncope, no vomiting, no rash, no leg pain, no leg swelling and no claudication. Associated medical issues do not include chronic lung disease, CAD or heart failure. Review of Systems   Constitutional: Negative for chills, diaphoresis, fever, malaise/fatigue and weight loss. HENT: Negative for ear discharge, ear pain, hearing loss, nosebleeds and tinnitus. Eyes: Negative for blurred vision. Respiratory: Positive for shortness of breath. Negative for cough, hemoptysis, sputum production, wheezing and stridor. Cardiovascular: Negative for chest pain, palpitations, orthopnea, claudication, leg swelling, syncope and PND. Gastrointestinal: Negative for heartburn, nausea and vomiting. Musculoskeletal: Negative for myalgias and neck pain. Skin: Negative for itching and rash. Neurological: Negative for dizziness, tingling, tremors, focal weakness, loss of consciousness and weakness.    Psychiatric/Behavioral: Negative for depression and suicidal ideas. Family History   Problem Relation Age of Onset    Hypertension Mother     Thyroid Disease Mother     Hypertension Father     Diabetes Father     Drug Abuse Brother        Past Medical History:   Diagnosis Date    Diabetes ([address] 1995    Fractures 1995    R Knee/ Tib fib fracture/surgery    Gout 2010    High cholesterol 2005    HTN (hypertension) 1990    Osteoarthritis 2013    Thyroid trouble        Past Surgical History:   Procedure Laterality Date    HX HIP REPLACEMENT Left 2009    HX ORTHOPAEDIC Right     R knee/Tibfib surgery       Social History   Substance Use Topics    Smoking status: Former Smoker     Packs/day: 1.00     Types: Cigarettes     Start date: 1/1/1969     Quit date: 1/1/1995    Smokeless tobacco: Never Used    Alcohol use No       Allergies   Allergen Reactions    Watermelon Swelling    Peach (Prunus Persica) Itching       Outpatient Prescriptions Marked as Taking for the 3/29/17 encounter (Office Visit) with Yessenia Tang MD   Medication Sig Dispense Refill    cyanocobalamin (VITAMIN B-12) 1,000 mcg tablet Take 1,000 mcg by mouth daily.  febuxostat (ULORIC) 40 mg tab tablet Take 1 Tab by mouth daily. Indications: GOUT PREVENTION 90 Tab 1    insulin glargine (LANTUS) 100 unit/mL injection Take 40 units once daily  Indications: TYPE 2 DIABETES MELLITUS 3 Vial 3    levothyroxine (SYNTHROID) 200 mcg tablet Take 1 Tab by mouth Daily (before breakfast). Indications: HYPOTHYROIDISM 30 Tab 5    insulin aspart (NOVOLOG) 100 unit/mL injection by SubCUTAneous route. 6 units before meals      spironolactone (ALDACTONE) 25 mg tablet Take 1 Tab by mouth daily. Indications: EDEMA 30 Tab 3    metoprolol (LOPRESSOR) 25 mg tablet Take 0.5 Tabs by mouth two (2) times a day.  30 Tab 1    glucose blood VI test strips (ASCENSIA AUTODISC VI, ONE TOUCH ULTRA TEST VI) strip Dispense precision extra test strips 3 x day to check blood glucose 200 Each 3    Magnesium Chloride 64 mg TbEC Take  by mouth. 8 pills 3 times daily      amLODIPine (NORVASC) 10 mg tablet Take 5 mg by mouth daily.  Omeprazole delayed release (PRILOSEC D/R) 20 mg tablet Take 20 mg by mouth daily.  atorvastatin (LIPITOR) 40 mg tablet Take 20 mg by mouth daily.  testosterone (ANDROGEL) 20.25 mg/1.25 gram (1.62 %) gel Apply  to affected area as needed.  aspirin (ASPIRIN) 325 mg tablet Take 325 mg by mouth daily.  Cholecalciferol, Vitamin D3, 1,000 unit cap Take 3,000 Units by mouth daily.  albuterol (VENTOLIN HFA) 90 mcg/actuation inhaler Take  by inhalation as needed. Visit Vitals    /61    Pulse (!) 59    Ht 5' 10\" (1.778 m)    Wt 115.2 kg (254 lb)    BMI 36.45 kg/m2     Physical Exam   Constitutional: He is oriented to person, place, and time. He appears well-developed and well-nourished. No distress. HENT:   Head: Atraumatic. Mouth/Throat: No oropharyngeal exudate. Eyes: Conjunctivae are normal. Right eye exhibits no discharge. Left eye exhibits no discharge. No scleral icterus. Neck: Normal range of motion. Neck supple. No JVD present. No tracheal deviation present. No thyromegaly present. Cardiovascular: Normal rate and regular rhythm. Exam reveals no gallop. Murmur (3/6 late peaking ejection systolic murmur best heard at aortic area with radiation to b/l carotids) heard. Pulmonary/Chest: Effort normal and breath sounds normal. No stridor. No respiratory distress. He has no wheezes. He has no rales. He exhibits no tenderness. Abdominal: Soft. There is no tenderness. There is no rebound and no guarding. Musculoskeletal: Normal range of motion. He exhibits no edema or tenderness. Lymphadenopathy:     He has no cervical adenopathy. Neurological: He is alert and oriented to person, place, and time. He exhibits normal muscle tone. Skin: Skin is warm. He is not diaphoretic.    Psychiatric: He has a normal mood and affect. His behavior is normal.     ekg sinus rhythm with PVC, no acute st-t changes  Echo 02/2017:  SUMMARY:  Left ventricle: Systolic function was normal by visual assessment. Ejection fraction was estimated in the range of 60 % to 65 %. No obvious  wall motion abnormalities identified in the views obtained. Wall thickness  was at the upper limits of normal.    Aortic valve: Leaflets exhibited markedly increased thickness and reduced  mobility. There was moderate to severe stenosis. Valve peak gradient was  61 mmHg. Valve mean gradient was 39 mmHg. Estimated aortic valve area (by  VTI) was 0.5 cmï¾². ZACKERY likely overestimates severity of aortic stenosis due  to difficulty in measuring LVOT diameter. Aorta, systemic arteries: The root exhibited dilatation. ASSESSMENT and PLAN    ICD-10-CM ICD-9-CM    1. Severe aortic stenosis I35.0 424.1    2. Essential hypertension I10 401.9    3. Dyslipidemia E78.5 272.4    4. Graves disease E05.00 242.00    5. Type 2 diabetes mellitus with diabetic nephropathy, unspecified long term insulin use status (HCC) E11.21 250.40      583.81    6. CKD (chronic kidney disease) stage 3, GFR 30-59 ml/min N18.3 585.3    7. SOB (shortness of breath) R06.02 786.05    8. Pre-op evaluation Z01.818 V72.84     prior to knee replacement     No orders of the defined types were placed in this encounter. Follow-up Disposition:  Return in about 3 months (around 6/29/2017). current treatment plan is effective, no change in therapy  reviewed diet, exercise and weight control  cardiovascular risk and specific lipid/LDL goals reviewed  use of aspirin to prevent MI and TIA's discussed. Patient with  severe AS by echo and [facility]. Patient has exertional dyspnea with no HF symptoms. No syncope or chest pain. Probably needs aortic valve replacement prior to elective surgery.

## 2017-03-29 NOTE — PATIENT INSTRUCTIONS

## 2017-03-29 NOTE — MR AVS SNAPSHOT
Visit Information Date & Time Provider Department Dept. Phone Encounter #  
 3/29/2017  9:30 AM Jennifer Kapoor MD [facility] [phone] [id number] Follow-up Instructions Return in about 3 months (around 6/29/2017). Your Appointments 4/25/2017  9:15 AM  
ROUTINE CARE with MD [facility] Appt Note: rov for DM2 CKD [address] [id number] [address]  
  
   
 [address] [facility] 6/14/2017  9:45 AM  
Follow Up with Jennifer Kapoor MD  
[facility] ([facility]) Appt Note: 3 month follow up  
 [address] [facility] [address]  
  
   
 [address] [address] [address] [address] Upcoming Health Maintenance Date Due ZOSTER VACCINE AGE 60> 4/26/2006 MEDICARE YEARLY EXAM 5/28/2016 EYE EXAM RETINAL OR DILATED Q1 10/14/2016 Pneumococcal 65+ High/Highest Risk (2 of 2 - PPSV23) 11/10/2016 FOBT Q 1 YEAR AGE 50-75 5/26/2017 HEMOGLOBIN A1C Q6M 7/25/2017 GLAUCOMA SCREENING Q2Y 10/14/2017 FOOT EXAM Q1 1/25/2018 MICROALBUMIN Q1 1/25/2018 LIPID PANEL Q1 1/25/2018 DTaP/Tdap/Td series (2 - Td) 4/28/2025 Allergies as of 3/29/2017  Review Complete On: 3/6/2017 By: Carmen Perdomo RN Severity Noted Reaction Type Reactions Watermelon High 01/21/2015    Swelling Peach (Prunus Persica)  01/21/2015    Itching Current Immunizations  Reviewed on 9/15/2016 Name Date Influenza Vaccine (Quad) PF 9/17/2015 Pneumococcal Conjugate (PCV-13) 9/15/2016 Tdap 4/28/2015 Not reviewed this visit Vitals BP Pulse Height(growth percentile) Weight(growth percentile) BMI Smoking Status 128/61 (!) 59 5' 10\" (1.778 m) 254 lb (115.2 kg) 36.45 kg/m2 Former Smoker Vitals History BMI and BSA Data Body Mass Index Body Surface Area  
 36.45 kg/m 2 2.39 m 2 Preferred Pharmacy Pharmacy Name Phone [facility] [address] [place] [phone] Your Updated Medication List  
  
   
This list is accurate as of: 3/29/17  9:51 AM.  Always use your most recent med list. amLODIPine 10 mg tablet Commonly known as:  Catron Mend Take 5 mg by mouth daily. aspirin 325 mg tablet Commonly known as:  ASPIRIN Take 325 mg by mouth daily. atorvastatin 40 mg tablet Commonly known as:  LIPITOR Take 20 mg by mouth daily. cholecalciferol 1,000 unit Cap Commonly known as:  VITAMIN D3 Take 3,000 Units by mouth daily. febuxostat 40 mg Tab tablet Commonly known as:  Willis Fair Take 1 Tab by mouth daily. Indications: GOUT PREVENTION  
  
 glucose blood VI test strips strip Commonly known as:  ASCENSIA AUTODISC VI, ONE TOUCH ULTRA TEST VI Dispense precision extra test strips 3 x day to check blood glucose  
  
 insulin aspart 100 unit/mL injection Commonly known as:  NOVOLOG  
by SubCUTAneous route. 6 units before meals  
  
 insulin glargine 100 unit/mL injection Commonly known as:  LANTUS Take 40 units once daily  Indications: TYPE 2 DIABETES MELLITUS  
  
 levothyroxine 200 mcg tablet Commonly known as:  SYNTHROID Take 1 Tab by mouth Daily (before breakfast). Indications: HYPOTHYROIDISM  
  
 magnesium chloride 64 mg delayed release tablet Commonly known as:  MAG DELAY Take  by mouth. 8 pills 3 times daily  
  
 metoprolol tartrate 25 mg tablet Commonly known as:  LOPRESSOR Take 0.5 Tabs by mouth two (2) times a day. Omeprazole delayed release 20 mg tablet Commonly known as:  PRILOSEC D/R Take 20 mg by mouth daily. spironolactone 25 mg tablet Commonly known as:  ALDACTONE Take 1 Tab by mouth daily. Indications: EDEMA  
  
 testosterone 20.25 mg/1.25 gram (1.62 %) gel Commonly known as:  ANDROGEL Apply  to affected area as needed. VENTOLIN HFA 90 mcg/actuation inhaler Generic drug:  albuterol Take  by inhalation as needed. VITAMIN B-12 1,000 mcg tablet Generic drug:  cyanocobalamin Take 1,000 mcg by mouth daily. Follow-up Instructions Return in about 3 months (around 6/29/2017). Patient Instructions Shortness of Breath: Care Instructions Your Care Instructions Shortness of breath has many causes. Sometimes conditions such as anxiety can lead to shortness of breath. Some people get mild shortness of breath when they exercise. Trouble breathing also can be a symptom of a serious problem, such as asthma, lung disease, emphysema, heart problems, and pneumonia. If your shortness of breath continues, you may need tests and treatment. Watch for any changes in your breathing and other symptoms. Follow-up care is a key part of your treatment and safety. Be sure to make and go to all appointments, and call your doctor if you are having problems. Its also a good idea to know your test results and keep a list of the medicines you take. How can you care for yourself at home? · Do not smoke or allow others to smoke around you. If you need help quitting, talk to your doctor about stop-smoking programs and medicines. These can increase your chances of quitting for good. · Get plenty of rest and sleep. · Take your medicines exactly as prescribed. Call your doctor if you think you are having a problem with your medicine. · Find healthy ways to deal with stress. ¨ Exercise daily. ¨ Get plenty of sleep. ¨ Eat regularly and well. When should you call for help? Call 911 anytime you think you may need emergency care. For example, call if: 
· You have severe shortness of breath. · You have symptoms of a heart attack. These may include: ¨ Chest pain or pressure, or a strange feeling in the chest. 
¨ Sweating. ¨ Shortness of breath. ¨ Nausea or vomiting. ¨ Pain, pressure, or a strange feeling in the back, neck, jaw, or upper belly or in one or both shoulders or arms. ¨ Lightheadedness or sudden weakness. ¨ A fast or irregular heartbeat. After you call 911, the  may tell you to chew 1 adult-strength or 2 to 4 low-dose aspirin. Wait for an ambulance. Do not try to drive yourself. Call your doctor now or seek immediate medical care if: 
· Your shortness of breath gets worse or you start to wheeze. Wheezing is a high-pitched sound when you breathe. · You wake up at night out of breath or have to prop your head up on several pillows to breathe. · You are short of breath after only light activity or while at rest. 
Watch closely for changes in your health, and be sure to contact your doctor if: 
· You do not get better over the next 1 to 2 days. Where can you learn more? Go to http://adal-janes.info/. Enter S780 in the search box to learn more about \"Shortness of Breath: Care Instructions. \" Current as of: May 23, 2016 Content Version: 11.2 © 6626-9323 [employer]. Care instructions adapted under license by [employer] (which disclaims liability or warranty for this information). If you have questions about a medical condition or this instruction, always ask your healthcare professional. [place] [address] any warranty or liability for your use of this information. Introducing [facility]! Dear Diane Stanford: Thank you for requesting a Privepass account. Our records indicate that you already have an active Privepass account. You can access your account anytime at [email or web address] Did you know that you can access your hospital and ER discharge instructions at any time in Privepass? You can also review all of your test results from your hospital stay or ER visit. Additional Information If you have questions, please visit the Frequently Asked Questions section of the [employer] website at [email or web address] Remember, CoinBatch is NOT to be used for urgent needs. For medical emergencies, dial 911. Now available from your iPhone and Android! Please provide this summary of care documentation to your next provider. Your primary care clinician is listed as Aroldo Perez. If you have any questions after today's visit, please call [phone].

## 2017-04-20 ENCOUNTER — TELEPHONE (OUTPATIENT)
Dept: CARDIOLOGY CLINIC | Age: 72
End: 2017-04-20

## 2017-04-20 NOTE — TELEPHONE ENCOUNTER
Patient called stating that he is scheduled to have a knee replacement with Dr Brock Hickman on 5/23/17. Patient last saw you in the office on 3/29/17. Can patient be cleared from that office visit for his knee replacement? Please Advise.

## 2017-04-25 ENCOUNTER — HOSPITAL ENCOUNTER (OUTPATIENT)
Dept: LAB | Age: 72
Discharge: HOME | End: 2017-04-25
Payer: MEDICARE

## 2017-04-25 ENCOUNTER — OFFICE VISIT (OUTPATIENT)
Dept: FAMILY MEDICINE CLINIC | Age: 72
End: 2017-04-25

## 2017-04-25 VITALS
SYSTOLIC BLOOD PRESSURE: 149 MMHG | BODY MASS INDEX: 35.62 KG/M2 | HEIGHT: 70 IN | HEART RATE: 50 BPM | RESPIRATION RATE: 18 BRPM | WEIGHT: 248.8 LBS | TEMPERATURE: 97.7 F | OXYGEN SATURATION: 95 % | DIASTOLIC BLOOD PRESSURE: 62 MMHG

## 2017-04-25 DIAGNOSIS — Z13.6 SCREENING FOR ISCHEMIC HEART DISEASE: ICD-10-CM

## 2017-04-25 DIAGNOSIS — Z79.4 TYPE 2 DIABETES MELLITUS WITH DIABETIC NEPHROPATHY, WITH LONG-TERM CURRENT USE OF INSULIN (HCC): Primary | ICD-10-CM

## 2017-04-25 DIAGNOSIS — I15.0 RENOVASCULAR HYPERTENSION: ICD-10-CM

## 2017-04-25 DIAGNOSIS — E11.21 TYPE 2 DIABETES MELLITUS WITH DIABETIC NEPHROPATHY, WITH LONG-TERM CURRENT USE OF INSULIN (HCC): Primary | ICD-10-CM

## 2017-04-25 DIAGNOSIS — M17.0 PRIMARY OSTEOARTHRITIS OF BOTH KNEES: ICD-10-CM

## 2017-04-25 DIAGNOSIS — Z12.5 SCREENING FOR PROSTATE CANCER: ICD-10-CM

## 2017-04-25 DIAGNOSIS — H26.9 CATARACT OF BOTH EYES, UNSPECIFIED CATARACT TYPE: ICD-10-CM

## 2017-04-25 DIAGNOSIS — I35.0 SEVERE AORTIC STENOSIS: ICD-10-CM

## 2017-04-25 DIAGNOSIS — Z13.39 SCREENING FOR ALCOHOLISM: ICD-10-CM

## 2017-04-25 DIAGNOSIS — Z00.00 ROUTINE GENERAL MEDICAL EXAMINATION AT A HEALTH CARE FACILITY: ICD-10-CM

## 2017-04-25 DIAGNOSIS — Z79.4 TYPE 2 DIABETES MELLITUS WITH DIABETIC NEPHROPATHY, WITH LONG-TERM CURRENT USE OF INSULIN (HCC): ICD-10-CM

## 2017-04-25 DIAGNOSIS — E11.21 TYPE 2 DIABETES MELLITUS WITH DIABETIC NEPHROPATHY, WITH LONG-TERM CURRENT USE OF INSULIN (HCC): ICD-10-CM

## 2017-04-25 DIAGNOSIS — Z71.89 ACP (ADVANCE CARE PLANNING): ICD-10-CM

## 2017-04-25 DIAGNOSIS — Z12.11 SCREEN FOR COLON CANCER: ICD-10-CM

## 2017-04-25 LAB
CHOLEST SERPL-MCNC: 137 MG/DL
EST. AVERAGE GLUCOSE BLD GHB EST-MCNC: 148 MG/DL
HBA1C MFR BLD: 6.8 % (ref 4.2–5.6)
HDLC SERPL-MCNC: 54 MG/DL (ref 40–60)
HDLC SERPL: 2.5 {RATIO} (ref 0–5)
LDLC SERPL CALC-MCNC: 69.6 MG/DL (ref 0–100)
LIPID PROFILE,FLP: NORMAL
PSA SERPL-MCNC: 2 NG/ML (ref 0–4)
TRIGL SERPL-MCNC: 67 MG/DL (ref ?–150)
VLDLC SERPL CALC-MCNC: 13.4 MG/DL

## 2017-04-25 PROCEDURE — 83036 HEMOGLOBIN GLYCOSYLATED A1C: CPT | Performed by: FAMILY MEDICINE

## 2017-04-25 PROCEDURE — 84153 ASSAY OF PSA TOTAL: CPT | Performed by: FAMILY MEDICINE

## 2017-04-25 PROCEDURE — 80061 LIPID PANEL: CPT | Performed by: FAMILY MEDICINE

## 2017-04-25 RX ORDER — INSULIN GLARGINE 100 [IU]/ML
45 INJECTION, SOLUTION SUBCUTANEOUS DAILY
Qty: 3 VIAL | Refills: 3 | COMMUNITY
Start: 2017-04-25

## 2017-04-25 NOTE — PROGRESS NOTES
[facility]  [employer] comes in for f/u care. 1) Cardiac: patient has been found to have aortic stenosis, moderate to severe. He was seen by the cardiologist. He would like to have surgery done and does need to get pre op cardiac clearance prior to procedure. This is TKA right. May need to have the aortic stenosis repaired prior to any elective surgery. Will set up appointment for him to see the cardiologist to discus this further. 2) DM2: he is on lantus and novolog. He has been seen through the [place] and was advised to lower the lantus to 30 units daily. Also has had his novolog reduced. Said to have been going into episodes of hypoglycemia. Will check labs today. 3) Knee pain: patient has right knee osteoarthritis. Would like to get this replaced. He has seen the orthopedist. Does need to get cardiac clearance prior to surgery. 4) Ophthalmology: patient has bilateral cataracts. Will need to have surgery for this. He does have tentative dates for this. 5) HTN: Stable on medication    Past Medical History  Past Medical History:   Diagnosis Date    Diabetes ([facility] [address] 1995    Fractures 1995    R Knee/ Tib fib fracture/surgery    Gout 2010    High cholesterol 2005    HTN (hypertension) 1990    Osteoarthritis 2013    Thyroid trouble        Surgical History  Past Surgical History:   Procedure Laterality Date    HX HIP REPLACEMENT Left 2009    HX ORTHOPAEDIC Right     R knee/Tibfib surgery        Medications  Current Outpatient Prescriptions   Medication Sig Dispense Refill    insulin glargine (LANTUS) 100 unit/mL injection Take 30 units once daily  Indications: type 2 diabetes mellitus 3 Vial 3    cyanocobalamin (VITAMIN B-12) 1,000 mcg tablet Take 1,000 mcg by mouth daily.  febuxostat (ULORIC) 40 mg tab tablet Take 1 Tab by mouth daily. Indications: GOUT PREVENTION 90 Tab 1    levothyroxine (SYNTHROID) 200 mcg tablet Take 1 Tab by mouth Daily (before breakfast).  Indications: HYPOTHYROIDISM 30 Tab 5    insulin aspart (NOVOLOG) 100 unit/mL injection by SubCUTAneous route. 6 units before meals      spironolactone (ALDACTONE) 25 mg tablet Take 1 Tab by mouth daily. Indications: EDEMA 30 Tab 3    metoprolol (LOPRESSOR) 25 mg tablet Take 0.5 Tabs by mouth two (2) times a day. 30 Tab 1    glucose blood VI test strips (ASCENSIA AUTODISC VI, ONE TOUCH ULTRA TEST VI) strip Dispense precision extra test strips 3 x day to check blood glucose 200 Each 3    Magnesium Chloride 64 mg TbEC Take  by mouth. 8 pills 3 times daily      amLODIPine (NORVASC) 10 mg tablet Take 5 mg by mouth daily.  Omeprazole delayed release (PRILOSEC D/R) 20 mg tablet Take 20 mg by mouth daily.  atorvastatin (LIPITOR) 40 mg tablet Take 20 mg by mouth daily.  testosterone (ANDROGEL) 20.25 mg/1.25 gram (1.62 %) gel Apply  to affected area as needed.  aspirin (ASPIRIN) 325 mg tablet Take 325 mg by mouth daily.  Cholecalciferol, Vitamin D3, 1,000 unit cap Take 3,000 Units by mouth daily.  albuterol (VENTOLIN HFA) 90 mcg/actuation inhaler Take  by inhalation as needed. Allergies  Allergies   Allergen Reactions    Watermelon Swelling    Peach (Prunus Persica) Itching       Family History  Family History   Problem Relation Age of Onset    Hypertension Mother     Thyroid Disease Mother     Hypertension Father     Diabetes Father     Drug Abuse Brother        Social History  Social History     Social History    Marital status:      Spouse name: N/A    Number of children: N/A    Years of education: N/A     Occupational History    Not on file.      Social History Main Topics    Smoking status: Former Smoker     Packs/day: 1.00     Types: Cigarettes     Start date: 1/1/1969     Quit date: 1/1/1995    Smokeless tobacco: Never Used    Alcohol use No    Drug use: No    Sexual activity: Yes     Partners: Female     Other Topics Concern     Service Yes     Served in [address] Blood Transfusions No    Caffeine Concern No    Occupational Exposure No    Hobby Hazards No    Sleep Concern No    Stress Concern No    Weight Concern Yes    Special Diet No    Back Care Yes     Lower Back pain when walking    Exercise No    Bike Helmet No    Seat Belt Yes    Self-Exams Yes     Social History Narrative       Review of Systems  Review of Systems - History obtained from chart review and the patient  General ROS: negative for - chills, fatigue, fever or malaise  Psychological ROS: negative  Ophthalmic ROS: positive for - decreased vision  Endocrine ROS: DM2, Hypothyroidism  Respiratory ROS: no cough, shortness of breath, or wheezing  Cardiovascular ROS: negative for - chest pain, edema or shortness of breath  Gastrointestinal ROS: no abdominal pain, change in bowel habits, or black or bloody stools  Genito-Urinary ROS: no dysuria, trouble voiding, or hematuria  Musculoskeletal ROS: positive for - joint pain and pain in knee - right  Neurological ROS: no TIA or stroke symptoms    Vital Signs  Visit Vitals    /62 (BP 1 Location: Left arm, BP Patient Position: Sitting)    Pulse (!) 50    Temp 97.7 °F (36.5 °C) (Oral)    Resp 18    Ht 5' 10\" (1.778 m)    Wt 248 lb 12.8 oz (112.9 kg)    SpO2 95%    BMI 35.7 kg/m2         Physical Exam  Physical Examination: General appearance - oriented to person, place, and time, acyanotic, in no respiratory distress and well hydrated  Mental status - alert, oriented to person, place, and time, normal mood, behavior, speech, dress, motor activity, and thought processes  Mouth - mucous membranes moist, pharynx normal without lesions  Neck - supple, no significant adenopathy  Chest - no tachypnea, retractions or cyanosis  Heart - normal rate and regular rhythm, S1 and S2 normal  Neurological - alert, oriented, normal speech, no focal findings or movement disorder noted  Musculoskeletal - right knee with limited ROM due to discomfort  Extremities - no pedal edema noted    Diagnostics  Orders Placed This Encounter    Lipid Panel ([id number])     Standing Status:   Future     Standing Expiration Date:   10/25/2017    PSA - SCREENING ()     Standing Status:   Future     Standing Expiration Date:   4/26/2018    OCCULT BLOOD, IMMUNOASSAY (FIT)     Standing Status:   Future     Standing Expiration Date:   4/26/2018    HEMOGLOBIN A1C WITH EAG     Standing Status:   Future     Standing Expiration Date:   4/26/2018    WV COLLECTION VENOUS BLOOD,VENIPUNCTURE    insulin glargine (LANTUS) 100 unit/mL injection     Sig: Take 30 units once daily  Indications: type 2 diabetes mellitus     Dispense:  3 Vial     Refill:  3         Results  Results for orders placed or performed during the hospital encounter of 03/06/17   GLUCOSE, POC   Result Value Ref Range    Glucose (POC) 142 (H) 70 - 110 mg/dL     ASSESSMENT and PLAN  1. Type 2 diabetes mellitus with diabetic nephropathy, with long-term current use of insulin (HCC)  - insulin glargine (LANTUS) 100 unit/mL injection; Take 30 units once daily  Indications: type 2 diabetes mellitus  Dispense: 3 Vial; Refill: 3  - HEMOGLOBIN A1C WITH EAG; Future  - [place] COLLECTION VENOUS BLOOD,VENIPUNCTURE    2. Renovascular hypertension    3. Severe aortic stenosis    4. Primary osteoarthritis of both knees    5.  Cataract of both eyes, unspecified cataract type    current treatment plan is effective, no change in therapy  lab results and schedule of future lab studies reviewed with patient  reviewed diet, exercise and weight control  cardiovascular risk and specific lipid/LDL goals reviewed  reviewed medications and side effects in detail  specific diabetic recommendations: low cholesterol diet, weight control and daily exercise discussed, home glucose monitoring emphasized, all medications, side effects and compliance discussed carefully and glycohemoglobin and other lab monitoring discussed    I have discussed the diagnosis with the patient and the intended plan of care as seen in the above orders. The patient has received an after-visit summary and questions were answered concerning future plans. I have discussed medication, side effects, and warnings with the patient in detail. The patient verbalized understanding and is in agreement with the plan of care. The patient will contact the office with any additional concerns.     Loraine Hussein MD

## 2017-04-25 NOTE — PATIENT INSTRUCTIONS
Medicare Part B Preventive Services Limitations Recommendation Scheduled   Bone Mass Measurement  (age 72 & older, biennial) Requires diagnosis related to osteoporosis or estrogen deficiency. Biennial benefit unless patient has history of long-term glucocorticoid tx or baseline is needed because initial test was by other method n/a    Cardiovascular Screening Blood Tests (every 5 years)  Total cholesterol, HDL, Triglycerides Order as a panel if possible UTD 3/01/17    Colorectal Cancer Screening  -Fecal occult blood test (annual)  -Flexible sigmoidoscopy (5y)  -Screening colonoscopy (10y)  -Barium Enema  UTD     Counseling to Prevent Tobacco Use (up to 8 sessions per year)  - Counseling greater than 3 and up to 10 minutes  - Counseling greater than 10 minutes Patients must be asymptomatic of tobacco-related conditions to receive as preventive service     Diabetes Screening Tests (at least every 3 years, Medicare covers annually or at 6-month intervals for prediabetic patients)    Fasting blood sugar (FBS) or glucose tolerance test (GTT) Patient must be diagnosed with one of the following:  -Hypertension, Dyslipidemia, obesity, previous impaired FBS or GTT  Or any two of the following: overweight, FH of diabetes, age ? 72, history of gestational diabetes, birth of baby weighing more than 9 pounds DUE 1/25/17     Diabetes Self-Management Training (DSMT) (no USPSTF recommendation) Requires referral by treating physician for patient with diabetes or renal disease. 10 hours of initial DSMT session of no less than 30 minutes each in a continuous 12-month period. 2 hours of follow-up DSMT in subsequent years.      Glaucoma Screening (no USPSTF recommendation) Diabetes mellitus, family history, , age 48 or over,  American, age 72 or over UTD Jan 2017     Human Immunodeficiency Virus (HIV) Screening (annually for increased risk patients)  HIV-1 and HIV-2 by EIA, DEREJE, rapid antibody test, or oral mucosa transudate Patient must be at increased risk for HIV infection per USPSTF guidelines or pregnant. Tests covered annually for patients at increased risk. Pregnant patients may receive up to 3 test during pregnancy. n/a    Medical Nutrition Therapy (MNT) (for diabetes or renal disease not recommended schedule) Requires referral by treating physician for patient with diabetes or renal disease. Can be provided in same year as diabetes self-management training (DSMT), and CMS recommends medical nutrition therapy take place after DSMT. Up to 3 hours for initial year and 2 hours in subsequent years. n/a    Prostate Cancer Screening (annually up to age 76)  - Digital rectal exam (ALTA)  - Prostate specific antigen (PSA) Annually (age 48 or over), ALTA not paid separately when covered E/M service is provided on same date  Men up to age 76 may need a screening blood test for prostate cancer at certain intervals, depending on their personal and family history. This decision is between the patient and his provider. UTD     Seasonal Influenza Vaccination (annually)  09/2016       Pneumococcal Vaccination (once after 72)      Hepatitis B Vaccinations (if medium/high risk) Medium/high risk factors:  End-stage renal disease,  Hemophiliacs who received Factor VIII or IX concentrates, Clients of institutions for the mentally retarded, Persons who live in the same house as a HepB virus carrier, Homosexual men, Illicit injectable drug abusers. UTD     Shingles Vaccination A shingles vaccine is also recommended once in a lifetime after age 61 DUE     Ultrasound Screening for Abdominal Aortic Aneurysm (AAA) (once) Patient must be referred through [facility] and not have had a screening for abdominal aortic aneurysm before under Medicare.   Limited to patients who meet one of the following criteria:  - Men who are 73-68 years old and have smoked more than 100 cigarettes in their lifetime.  -Anyone with a FH of AAA  -Anyone recommended for screening by USPSTF N/A

## 2017-04-25 NOTE — PROGRESS NOTES
Chief Complaint   Patient presents with    Follow-up     DM2    Annual Wellness Visit     1. Have you been to the ER, urgent care clinic since your last visit? Hospitalized since your last visit? No    2. Have you seen or consulted any other health care providers outside of the [address] [place] [address] since your last visit? Include any pap smears or colon screening. No    This is a Subsequent Medicare Annual Wellness Visit providing Personalized Prevention Plan Services (PPPS) (Performed 12 months after initial AWV and PPPS )    I have reviewed the patient's medical history in detail and updated the computerized patient record. 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 Past Medical History:   Diagnosis Date    Diabetes ([address] 1995    Fractures 1995    R Knee/ Tib fib fracture/surgery    Gout 2010    High cholesterol 2005    HTN (hypertension) 1990    Osteoarthritis 2013    Thyroid trouble       Past Surgical History:   Procedure Laterality Date    HX HIP REPLACEMENT Left 2009    HX ORTHOPAEDIC Right     R knee/Tibfib surgery     Current Outpatient Prescriptions   Medication Sig Dispense Refill    cyanocobalamin (VITAMIN B-12) 1,000 mcg tablet Take 1,000 mcg by mouth daily.  febuxostat (ULORIC) 40 mg tab tablet Take 1 Tab by mouth daily. Indications: GOUT PREVENTION 90 Tab 1    insulin glargine (LANTUS) 100 unit/mL injection Take 40 units once daily  Indications: TYPE 2 DIABETES MELLITUS (Patient taking differently: 30 Units. Take 40 units once daily  Indications: type 2 diabetes mellitus) 3 Vial 3    levothyroxine (SYNTHROID) 200 mcg tablet Take 1 Tab by mouth Daily (before breakfast). Indications: HYPOTHYROIDISM 30 Tab 5    insulin aspart (NOVOLOG) 100 unit/mL injection by SubCUTAneous route. 6 units before meals      spironolactone (ALDACTONE) 25 mg tablet Take 1 Tab by mouth daily.  Indications: EDEMA 30 Tab 3    metoprolol (LOPRESSOR) 25 mg tablet Take 0.5 Tabs by mouth two (2) times a day. 30 Tab 1    glucose blood VI test strips (ASCENSIA AUTODISC VI, ONE TOUCH ULTRA TEST VI) strip Dispense precision extra test strips 3 x day to check blood glucose 200 Each 3    Magnesium Chloride 64 mg TbEC Take  by mouth. 8 pills 3 times daily      amLODIPine (NORVASC) 10 mg tablet Take 5 mg by mouth daily.  Omeprazole delayed release (PRILOSEC D/R) 20 mg tablet Take 20 mg by mouth daily.  atorvastatin (LIPITOR) 40 mg tablet Take 20 mg by mouth daily.  testosterone (ANDROGEL) 20.25 mg/1.25 gram (1.62 %) gel Apply  to affected area as needed.  aspirin (ASPIRIN) 325 mg tablet Take 325 mg by mouth daily.  Cholecalciferol, Vitamin D3, 1,000 unit cap Take 3,000 Units by mouth daily.  albuterol (VENTOLIN HFA) 90 mcg/actuation inhaler Take  by inhalation as needed.        Allergies   Allergen Reactions    Watermelon Swelling    Peach (Prunus Persica) Itching     Family History   Problem Relation Age of Onset    Hypertension Mother     Thyroid Disease Mother     Hypertension Father     Diabetes Father     Drug Abuse Brother      Social History   Substance Use Topics    Smoking status: Former Smoker     Packs/day: 1.00     Types: Cigarettes     Start date: 1/1/1969     Quit date: 1/1/1995    Smokeless tobacco: Never Used    Alcohol use No     Patient Active Problem List   Diagnosis Code    Graves disease E05.00    Diabetes mellitus, type 2 ([facility] [address] E11.9    Hypertension I10    Heartburn R12    Gout M10.9    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    Hypomagnesemia E83.42    Renovascular hypertension I15.0    Murmur, cardiac R01.1    CKD (chronic kidney disease) stage 3, GFR 30-59 ml/min N18.3    Hypothyroidism E03.9    Dyslipidemia E78.5    SOB (shortness of breath) R06.02    Pre-op evaluation Z01.818    Severe aortic stenosis I35.0       Depression Risk Factor Screening:     PHQ 2 / 9, over the last two weeks 4/25/2017   Little interest or pleasure in doing things Not at all   Feeling down, depressed or hopeless Not at all   Total Score PHQ 2 0     Alcohol Risk Factor Screening: On any occasion during the past 3 months, have you had more than 4 drinks containing alcohol? No    Do you average more than 14 drinks per week? No      Functional Ability and Level of Safety:   1. Was the patient's timed Up and GO test unsteady or longer than 30 seconds? No  2. Does the patient need help with the phone, transportation, shopping, preparing meals, housework, laundry, medications or managing money? No  3. Does the patients' home have rugs in the hallway, lack grab bars in the bathroom, lack handrails on the stairs or have poor lighting? Yes  4. Have you noticed any hearing difficulties? No  Hearing Evaluation:    Hearing Loss   none    Activities of Daily Living   Self-care. Requires assistance with: no ADLs    Fall Risk     Fall Risk Assessment, last 12 mths 4/25/2017   Able to walk? Yes   Fall in past 12 months? No     Abuse Screen   Patient is not abused    Review of Systems   A comprehensive review of systems was negative.     Physical Examination     Evaluation of Cognitive Function:  Mood/affect:  neutral  Appearance: age appropriate and casually dressed  Family member/caregiver input: 0    Visit Vitals    /62 (BP 1 Location: Left arm, BP Patient Position: Sitting)    Pulse (!) 50    Temp 97.7 °F (36.5 °C) (Oral)    Resp 18    Ht 5' 10\" (1.778 m)    Wt 248 lb 12.8 oz (112.9 kg)    SpO2 95%    BMI 35.7 kg/m2     General appearance: alert, cooperative, no distress, appears stated age  Head: Normocephalic, without obvious abnormality, atraumatic  Lungs: clear to auscultation bilaterally  Heart: S1, S2 normal  Pulses: 2+ and symmetric  Neurologic: Grossly normal    Patient Care Team:  Malachi Cisneros MD as PCP - General (Family Practice)  Reggie Rm MD (Nephrology)    Advice/Referrals/Counseling   Education and counseling provided:  Prostate cancer screening tests (PSA, covered annually)      Assessment/Plan     1. Routine general medical examination at a health care facility    2. Screening for alcoholism  - UT COLLECTION VENOUS BLOOD,VENIPUNCTURE    3. Screening for ischemic heart disease  - Lipid Panel ([id number]); Future  - UT COLLECTION VENOUS BLOOD,VENIPUNCTURE    4. Screen for colon cancer  - OCCULT BLOOD, IMMUNOASSAY (FIT); Future    5. Screening for prostate cancer  - PSA - SCREENING (); Future  - UT COLLECTION VENOUS BLOOD,VENIPUNCTURE    6. ACP (advance care planning)    current treatment plan is effective, no change in therapy  lab results and schedule of future lab studies reviewed with patient  reviewed medications and side effects in detail. I have discussed the diagnosis with the patient and the intended plan of care as seen in the above orders. The patient has received an after-visit summary and questions were answered concerning future plans. I have discussed medication, side effects, and warnings with the patient in detail. The patient verbalized understanding and is in agreement with the plan of care. The patient will contact the office with any additional concerns.     Rosita Sharpe MD

## 2017-04-25 NOTE — MR AVS SNAPSHOT
Visit Information Date & Time Provider Department Dept. Phone Encounter #  
 4/25/2017  9:15 AM Hesed Lois Lamar MD [facility] [phone] [id number] Follow-up Instructions Return in about 3 months (around 7/25/2017), or if symptoms worsen or fail to improve, for DM2, HTN. Your Appointments 6/14/2017  9:45 AM  
Follow Up with Kala Han MD  
[facility] ([facility]) Appt Note: 3 month follow up  
 [address] [facility] [address]  
  
   
 [address] [id number] [address] [place] [address] Upcoming Health Maintenance Date Due ZOSTER VACCINE AGE 60> 4/26/2006 MEDICARE YEARLY EXAM 5/28/2016 EYE EXAM RETINAL OR DILATED Q1 10/14/2016 FOBT Q 1 YEAR AGE 50-75 5/26/2017 HEMOGLOBIN A1C Q6M 7/25/2017 Pneumococcal 65+ Low/Medium Risk (2 of 2 - PPSV23) 9/15/2017 GLAUCOMA SCREENING Q2Y 10/14/2017 FOOT EXAM Q1 1/25/2018 MICROALBUMIN Q1 1/25/2018 LIPID PANEL Q1 1/25/2018 DTaP/Tdap/Td series (2 - Td) 4/28/2025 Allergies as of 4/25/2017  Review Complete On: 4/25/2017 By: Robby Tucker MD  
  
 Severity Noted Reaction Type Reactions Watermelon High 01/21/2015    Swelling Peach (Prunus Persica)  01/21/2015    Itching Current Immunizations  Reviewed on 9/15/2016 Name Date Influenza Vaccine (Quad) PF 9/17/2015 Pneumococcal Conjugate (PCV-13) 9/15/2016 Tdap 4/28/2015 Not reviewed this visit You Were Diagnosed With   
  
 Codes Comments Type 2 diabetes mellitus with diabetic nephropathy, with long-term current use of insulin (HCC)    -  Primary ICD-10-CM: E11.21, Z79.4 ICD-9-CM: 250.40, 583.81, V58.67 Routine general medical examination at a health care facility     ICD-10-CM: Z00.00 ICD-9-CM: V70.0 Screening for alcoholism     ICD-10-CM: Z13.89 ICD-9-CM: V79.1 Screening for ischemic heart disease     ICD-10-CM: Z13.6 ICD-9-CM: V81.0 Screen for colon cancer     ICD-10-CM: Z12.11 ICD-9-CM: V76.51 Screening for prostate cancer     ICD-10-CM: Z12.5 ICD-9-CM: V76.44 Renovascular hypertension     ICD-10-CM: I15.0 ICD-9-CM: 405.91 Vitals BP Pulse Temp Resp Height(growth percentile) Weight(growth percentile) 149/62 (BP 1 Location: Left arm, BP Patient Position: Sitting) (!) 50 97.7 °F (36.5 °C) (Oral) 18 5' 10\" (1.778 m) 248 lb 12.8 oz (112.9 kg) SpO2 BMI Smoking Status 95% 35.7 kg/m2 Former Smoker Vitals History BMI and BSA Data Body Mass Index Body Surface Area 35.7 kg/m 2 2.36 m 2 Preferred Pharmacy Pharmacy Name Phone [facility] [address] [facility] [phone] Your Updated Medication List  
  
   
This list is accurate as of: 4/25/17 10:48 AM.  Always use your most recent med list. amLODIPine 10 mg tablet Commonly known as:  Karyle Suleiman Take 5 mg by mouth daily. aspirin 325 mg tablet Commonly known as:  ASPIRIN Take 325 mg by mouth daily. atorvastatin 40 mg tablet Commonly known as:  LIPITOR Take 20 mg by mouth daily. cholecalciferol 1,000 unit Cap Commonly known as:  VITAMIN D3 Take 3,000 Units by mouth daily. febuxostat 40 mg Tab tablet Commonly known as:  Meg Norris Take 1 Tab by mouth daily. Indications: GOUT PREVENTION  
  
 glucose blood VI test strips strip Commonly known as:  ASCENSIA AUTODISC VI, ONE TOUCH ULTRA TEST VI Dispense precision extra test strips 3 x day to check blood glucose  
  
 insulin aspart 100 unit/mL injection Commonly known as:  NOVOLOG  
by SubCUTAneous route. 6 units before meals  
  
 insulin glargine 100 unit/mL injection Commonly known as:  LANTUS Take 30 units once daily  Indications: type 2 diabetes mellitus  
  
 levothyroxine 200 mcg tablet Commonly known as:  SYNTHROID  
 Take 1 Tab by mouth Daily (before breakfast). Indications: HYPOTHYROIDISM  
  
 magnesium chloride 64 mg delayed release tablet Commonly known as:  MAG DELAY Take  by mouth. 8 pills 3 times daily  
  
 metoprolol tartrate 25 mg tablet Commonly known as:  LOPRESSOR Take 0.5 Tabs by mouth two (2) times a day. Omeprazole delayed release 20 mg tablet Commonly known as:  PRILOSEC D/R Take 20 mg by mouth daily. spironolactone 25 mg tablet Commonly known as:  ALDACTONE Take 1 Tab by mouth daily. Indications: EDEMA  
  
 testosterone 20.25 mg/1.25 gram (1.62 %) gel Commonly known as:  ANDROGEL Apply  to affected area as needed. VENTOLIN HFA 90 mcg/actuation inhaler Generic drug:  albuterol Take  by inhalation as needed. VITAMIN B-12 1,000 mcg tablet Generic drug:  cyanocobalamin Take 1,000 mcg by mouth daily. Follow-up Instructions Return in about 3 months (around 7/25/2017), or if symptoms worsen or fail to improve, for DM2, HTN. To-Do List   
 04/25/2017 Lab:  HEMOGLOBIN A1C WITH EAG   
  
 04/25/2017 Lab:  LIPID PANEL   
  
 04/25/2017 Lab:  OCCULT BLOOD, IMMUNOASSAY (FIT)   
  
 04/25/2017 Lab:  PSA SCREENING (SCREENING) Patient Instructions Medicare Part B Preventive Services Limitations Recommendation Scheduled Bone Mass Measurement 
(age 72 & older, biennial) Requires diagnosis related to osteoporosis or estrogen deficiency. Biennial benefit unless patient has history of long-term glucocorticoid tx or baseline is needed because initial test was by other method n/a Cardiovascular Screening Blood Tests (every 5 years) Total cholesterol, HDL, Triglycerides Order as a panel if possible UTD 3/01/17 Colorectal Cancer Screening 
-Fecal occult blood test (annual) -Flexible sigmoidoscopy (5y) 
-Screening colonoscopy (10y) -Barium Enema  UTD Counseling to Prevent Tobacco Use (up to 8 sessions per year) - Counseling greater than 3 and up to 10 minutes - Counseling greater than 10 minutes Patients must be asymptomatic of tobacco-related conditions to receive as preventive service Diabetes Screening Tests (at least every 3 years, Medicare covers annually or at 6-month intervals for prediabetic patients) Fasting blood sugar (FBS) or glucose tolerance test (GTT) Patient must be diagnosed with one of the following: 
-Hypertension, Dyslipidemia, obesity, previous impaired FBS or GTT 
Or any two of the following: overweight, FH of diabetes, age ? 72, history of gestational diabetes, birth of baby weighing more than 9 pounds DUE 1/25/17 Diabetes Self-Management Training (DSMT) (no USPSTF recommendation) Requires referral by treating physician for patient with diabetes or renal disease. 10 hours of initial DSMT session of no less than 30 minutes each in a continuous 12-month period. 2 hours of follow-up DSMT in subsequent years. Glaucoma Screening (no USPSTF recommendation) Diabetes mellitus, family history, , age 48 or over,  American, age 72 or over UTD Jan 2017 Human Immunodeficiency Virus (HIV) Screening (annually for increased risk patients) HIV-1 and HIV-2 by EIA, DEREJE, rapid antibody test, or oral mucosa transudate Patient must be at increased risk for HIV infection per USPSTF guidelines or pregnant. Tests covered annually for patients at increased risk. Pregnant patients may receive up to 3 test during pregnancy. n/a Medical Nutrition Therapy (MNT) (for diabetes or renal disease not recommended schedule) Requires referral by treating physician for patient with diabetes or renal disease. Can be provided in same year as diabetes self-management training (DSMT), and CMS recommends medical nutrition therapy take place after DSMT. Up to 3 hours for initial year and 2 hours in subsequent years. n/a Prostate Cancer Screening (annually up to age 76) - Digital rectal exam (ALTA) - Prostate specific antigen (PSA) Annually (age 48 or over), ALTA not paid separately when covered E/M service is provided on same date Men up to age 76 may need a screening blood test for prostate cancer at certain intervals, depending on their personal and family history. This decision is between the patient and his provider. UTD Seasonal Influenza Vaccination (annually)  09/2016 Pneumococcal Vaccination (once after 65) Hepatitis B Vaccinations (if medium/high risk) Medium/high risk factors:  End-stage renal disease, Hemophiliacs who received Factor VIII or IX concentrates, Clients of institutions for the mentally retarded, Persons who live in the same house as a HepB virus carrier, Homosexual men, Illicit injectable drug abusers. UTD Shingles Vaccination A shingles vaccine is also recommended once in a lifetime after age 61 DUE Ultrasound Screening for Abdominal Aortic Aneurysm (AAA) (once) Patient must be referred through IPPE and not have had a screening for abdominal aortic aneurysm before under Medicare. Limited to patients who meet one of the following criteria: 
- Men who are 73-68 years old and have smoked more than 100 cigarettes in their lifetime. 
-Anyone with a FH of AAA 
-Anyone recommended for screening by USPSTF N/A Introducing [facility]! Dear Tiny Oliver: Thank you for requesting a [employer] account. Our records indicate that you already have an active [employer] account. You can access your account anytime at [email or web address] Did you know that you can access your hospital and ER discharge instructions at any time in [employer]? You can also review all of your test results from your hospital stay or ER visit. Additional Information If you have questions, please visit the Frequently Asked Questions section of the [employer] website at [email or web address] Remember, MyChart is NOT to be used for urgent needs. For medical emergencies, dial 911. Now available from your iPhone and Android! Please provide this summary of care documentation to your next provider. Your primary care clinician is listed as Aroldo Perez. If you have any questions after today's visit, please call [phone].

## 2017-04-26 ENCOUNTER — OFFICE VISIT (OUTPATIENT)
Dept: CARDIOLOGY CLINIC | Age: 72
End: 2017-04-26

## 2017-04-26 ENCOUNTER — TELEPHONE (OUTPATIENT)
Dept: CARDIOTHORACIC SURGERY | Age: 72
End: 2017-04-26

## 2017-04-26 VITALS
DIASTOLIC BLOOD PRESSURE: 62 MMHG | BODY MASS INDEX: 35.36 KG/M2 | HEART RATE: 56 BPM | SYSTOLIC BLOOD PRESSURE: 139 MMHG | HEIGHT: 70 IN | WEIGHT: 247 LBS

## 2017-04-26 DIAGNOSIS — I35.0 SEVERE AORTIC STENOSIS: Primary | ICD-10-CM

## 2017-04-26 DIAGNOSIS — N18.30 CKD (CHRONIC KIDNEY DISEASE) STAGE 3, GFR 30-59 ML/MIN (HCC): ICD-10-CM

## 2017-04-26 DIAGNOSIS — E03.4 HYPOTHYROIDISM DUE TO ACQUIRED ATROPHY OF THYROID: ICD-10-CM

## 2017-04-26 DIAGNOSIS — E11.21 TYPE 2 DIABETES MELLITUS WITH DIABETIC NEPHROPATHY, UNSPECIFIED LONG TERM INSULIN USE STATUS: ICD-10-CM

## 2017-04-26 DIAGNOSIS — E78.5 DYSLIPIDEMIA: ICD-10-CM

## 2017-04-26 DIAGNOSIS — I10 ESSENTIAL HYPERTENSION: ICD-10-CM

## 2017-04-26 NOTE — TELEPHONE ENCOUNTER
S/w Marilin Valente @ Dr. Eli Davies office. Marilin Valente informs Dr. Amarilys Oconnor is requesting Dr. Monet Bertrand consult w/ pt for aortic stenosis. Able to schedule pt for consult appt Tuesday, 5/2 @ 2:30. Pt asked to arrive by 2 to complete necessary paperwork.

## 2017-04-26 NOTE — PROGRESS NOTES
Informed patient at this time of normal  lab results Lipid ,PSA, and A1C at this time. Patient verbalized understanding at this time.

## 2017-04-26 NOTE — ACP (ADVANCE CARE PLANNING)
Advance Care Planning (ACP) Provider Conversation Snapshot    Date of ACP Conversation: 04/25/17  Persons included in Conversation:  patient  Length of ACP Conversation in minutes:  16 minutes    Authorized Decision Maker (if patient is incapable of making informed decisions): This person is:   Patient has not thought much about advance directives. He is full code at the moment. He will go and discuss with his family and will fill out forms then bring them in for scanning into his chart.           For Patients with Decision Making Capacity:   Values/Goals: Exploration of values, goals, and preferences if recovery is not expected, even with continued medical treatment in the event of:  Imminent death  Severe, permanent brain injury    Conversation Outcomes / Follow-Up Plan:   Recommended completion of Advance Directive form after review of ACP materials and conversation with prospective healthcare agent      Aroldo Jones MD

## 2017-04-26 NOTE — PATIENT INSTRUCTIONS

## 2017-04-26 NOTE — PROGRESS NOTES
Please let patient know hba1c down to 6.8 indicating improve blood glucose control.   Chris Garcia MD

## 2017-04-26 NOTE — MR AVS SNAPSHOT
Visit Information Date & Time Provider Department Dept. Phone Encounter #  
 4/26/2017  1:00 PM Kala Han MD [facility] [phone] [id number] Follow-up Instructions Return in about 1 month (around 5/26/2017). Your Appointments 5/31/2017 10:45 AM  
Follow Up with Kala Han MD  
[facility] [place] [facility] ([address]) Appt Note: 1 month follow up  
 [address] [facility] [address]  
  
   
 [address] [facility]  
  
    
 8/1/2017  9:15 AM  
ROUTINE CARE with Aroldo Lamar MD  
[facility] ([address]) Appt Note: Return in about 3 months (around 7/25/2017), or if symptoms worsen or fail to improve, for DM2, HTN. [address] [id number] [address]  
  
   
 [address] [facility] Upcoming Health Maintenance Date Due ZOSTER VACCINE AGE 60> 4/26/2006 EYE EXAM RETINAL OR DILATED Q1 10/14/2016 FOBT Q 1 YEAR AGE 50-75 5/26/2017 Pneumococcal 65+ Low/Medium Risk (2 of 2 - PPSV23) 9/15/2017 GLAUCOMA SCREENING Q2Y 10/14/2017 HEMOGLOBIN A1C Q6M 10/25/2017 FOOT EXAM Q1 1/25/2018 MICROALBUMIN Q1 1/25/2018 LIPID PANEL Q1 4/25/2018 MEDICARE YEARLY EXAM 4/26/2018 DTaP/Tdap/Td series (2 - Td) 4/28/2025 Allergies as of 4/26/2017  Review Complete On: 4/26/2017 By: Donovan Coffey. Karina Méndez Severity Noted Reaction Type Reactions Watermelon High 01/21/2015    Swelling Peach (Prunus Persica)  01/21/2015    Itching Current Immunizations  Reviewed on 9/15/2016 Name Date Influenza Vaccine (Quad) PF 9/17/2015 Pneumococcal Conjugate (PCV-13) 9/15/2016 Tdap 4/28/2015 Not reviewed this visit Vitals BP Pulse Height(growth percentile) Weight(growth percentile) BMI Smoking Status 139/62 (!) 56 5' 10\" (1.778 m) 247 lb (112 kg) 35.44 kg/m2 Former Smoker Vitals History BMI and BSA Data Body Mass Index Body Surface Area  
 35.44 kg/m 2 2.35 m 2 Preferred Pharmacy Pharmacy Name Phone [facility] [address] [place] [phone] Your Updated Medication List  
  
   
This list is accurate as of: 4/26/17  2:40 PM.  Always use your most recent med list. amLODIPine 10 mg tablet Commonly known as:  Amy Shaun Take 5 mg by mouth daily. aspirin 325 mg tablet Commonly known as:  ASPIRIN Take 325 mg by mouth daily. atorvastatin 40 mg tablet Commonly known as:  LIPITOR Take 20 mg by mouth daily. cholecalciferol 1,000 unit Cap Commonly known as:  VITAMIN D3 Take 3,000 Units by mouth daily. febuxostat 40 mg Tab tablet Commonly known as:  [place] Take 1 Tab by mouth daily. Indications: GOUT PREVENTION  
  
 glucose blood VI test strips strip Commonly known as:  ASCENSIA AUTODISC VI, ONE TOUCH ULTRA TEST VI Dispense precision extra test strips 3 x day to check blood glucose  
  
 insulin aspart 100 unit/mL injection Commonly known as:  NOVOLOG  
by SubCUTAneous route. 6 units before meals  
  
 insulin glargine 100 unit/mL injection Commonly known as:  LANTUS Take 30 units once daily  Indications: type 2 diabetes mellitus  
  
 levothyroxine 200 mcg tablet Commonly known as:  SYNTHROID Take 1 Tab by mouth Daily (before breakfast). Indications: HYPOTHYROIDISM  
  
 magnesium chloride 64 mg delayed release tablet Commonly known as:  MAG DELAY Take  by mouth. 8 pills 3 times daily  
  
 metoprolol tartrate 25 mg tablet Commonly known as:  LOPRESSOR Take 0.5 Tabs by mouth two (2) times a day. Omeprazole delayed release 20 mg tablet Commonly known as:  PRILOSEC D/R Take 20 mg by mouth daily. spironolactone 25 mg tablet Commonly known as:  ALDACTONE  
 Take 1 Tab by mouth daily. Indications: EDEMA  
  
 testosterone 20.25 mg/1.25 gram (1.62 %) gel Commonly known as:  ANDROGEL Apply  to affected area as needed. VENTOLIN HFA 90 mcg/actuation inhaler Generic drug:  albuterol Take  by inhalation as needed. VITAMIN B-12 1,000 mcg tablet Generic drug:  cyanocobalamin Take 1,000 mcg by mouth daily. Follow-up Instructions Return in about 1 month (around 5/26/2017). Patient Instructions High Blood Pressure: Care Instructions Your Care Instructions If your blood pressure is usually above 140/90, you have high blood pressure, or hypertension. That means the top number is 140 or higher or the bottom number is 90 or higher, or both. Despite what a lot of people think, high blood pressure usually doesn't cause headaches or make you feel dizzy or lightheaded. It usually has no symptoms. But it does increase your risk for heart attack, stroke, and kidney or eye damage. The higher your blood pressure, the more your risk increases. Your doctor will give you a goal for your blood pressure. Your goal will be based on your health and your age. An example of a goal is to keep your blood pressure below 140/90. Lifestyle changes, such as eating healthy and being active, are always important to help lower blood pressure. You might also take medicine to reach your blood pressure goal. 
Follow-up care is a key part of your treatment and safety. Be sure to make and go to all appointments, and call your doctor if you are having problems. It's also a good idea to know your test results and keep a list of the medicines you take. How can you care for yourself at home? Medical treatment · If you stop taking your medicine, your blood pressure will go back up. You may take one or more types of medicine to lower your blood pressure. Be safe with medicines. Take your medicine exactly as prescribed.  Call your doctor if you think you are having a problem with your medicine. · Talk to your doctor before you start taking aspirin every day. Aspirin can help certain people lower their risk of a heart attack or stroke. But taking aspirin isn't right for everyone, because it can cause serious bleeding. · See your doctor regularly. You may need to see the doctor more often at first or until your blood pressure comes down. · If you are taking blood pressure medicine, talk to your doctor before you take decongestants or anti-inflammatory medicine, such as ibuprofen. Some of these medicines can raise blood pressure. · Learn how to check your blood pressure at home. Lifestyle changes · Stay at a healthy weight. This is especially important if you put on weight around the waist. Losing even 10 pounds can help you lower your blood pressure. · If your doctor recommends it, get more exercise. Walking is a good choice. Bit by bit, increase the amount you walk every day. Try for at least 30 minutes on most days of the week. You also may want to swim, bike, or do other activities. · Avoid or limit alcohol. Talk to your doctor about whether you can drink any alcohol. · Try to limit how much sodium you eat to less than 2,300 milligrams (mg) a day. Your doctor may ask you to try to eat less than 1,500 mg a day. · Eat plenty of fruits (such as bananas and oranges), vegetables, legumes, whole grains, and low-fat dairy products. · Lower the amount of saturated fat in your diet. Saturated fat is found in animal products such as milk, cheese, and meat. Limiting these foods may help you lose weight and also lower your risk for heart disease. · Do not smoke. Smoking increases your risk for heart attack and stroke. If you need help quitting, talk to your doctor about stop-smoking programs and medicines. These can increase your chances of quitting for good. When should you call for help? Call 911 anytime you think you may need emergency care. This may mean having symptoms that suggest that your blood pressure is causing a serious heart or blood vessel problem. Your blood pressure may be over 180/110. For example, call 911 if: 
· You have symptoms of a heart attack. These may include: ¨ Chest pain or pressure, or a strange feeling in the chest. 
¨ Sweating. ¨ Shortness of breath. ¨ Nausea or vomiting. ¨ Pain, pressure, or a strange feeling in the back, neck, jaw, or upper belly or in one or both shoulders or arms. ¨ Lightheadedness or sudden weakness. ¨ A fast or irregular heartbeat. · You have symptoms of a stroke. These may include: 
¨ Sudden numbness, tingling, weakness, or loss of movement in your face, arm, or leg, especially on only one side of your body. ¨ Sudden vision changes. ¨ Sudden trouble speaking. ¨ Sudden confusion or trouble understanding simple statements. ¨ Sudden problems with walking or balance. ¨ A sudden, severe headache that is different from past headaches. · You have severe back or belly pain. Do not wait until your blood pressure comes down on its own. Get help right away. Call your doctor now or seek immediate care if: 
· Your blood pressure is much higher than normal (such as 180/110 or higher), but you don't have symptoms. · You think high blood pressure is causing symptoms, such as: ¨ Severe headache. ¨ Blurry vision. Watch closely for changes in your health, and be sure to contact your doctor if: 
· Your blood pressure measures 140/90 or higher at least 2 times. That means the top number is 140 or higher or the bottom number is 90 or higher, or both. · You think you may be having side effects from your blood pressure medicine. · Your blood pressure is usually normal, but it goes above normal at least 2 times. Where can you learn more? Go to http://adal-janes.info/. Enter X397 in the search box to learn more about \"High Blood Pressure: Care Instructions. \" Current as of: August 8, 2016 Content Version: 11.2 © 1760-8981 [employer]. Care instructions adapted under license by [employer] (which disclaims liability or warranty for this information). If you have questions about a medical condition or this instruction, always ask your healthcare professional. [address] any warranty or liability for your use of this information. Patient is scheduled to see Dr. Camila Wilson on 5/2/17 @ 2:00. Introducing [facility]! Dear Harvinder Bauman: Thank you for requesting a [employer] account. Our records indicate that you already have an active [employer] account. You can access your account anytime at [email or web address] Did you know that you can access your hospital and ER discharge instructions at any time in [employer]? You can also review all of your test results from your hospital stay or ER visit. Additional Information If you have questions, please visit the Frequently Asked Questions section of the [employer] website at [email or web address] Remember, [employer] is NOT to be used for urgent needs. For medical emergencies, dial 911. Now available from your iPhone and Android! Please provide this summary of care documentation to your next provider. Your primary care clinician is listed as Aroldo Perez. If you have any questions after today's visit, please call [phone].

## 2017-04-27 NOTE — PROGRESS NOTES
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 Aortic Stenosis   The history is provided by the patient. This is a chronic problem. The problem occurs every several days. The problem has not changed since onset. Associated symptoms include shortness of breath. Pertinent negatives include no chest pain. Hypertension   The history is provided by the patient. This is a chronic problem. The current episode started more than 1 week ago. The problem occurs every several days. The problem has not changed since onset. Associated symptoms include shortness of breath. Pertinent negatives include no chest pain. Shortness of Breath   The history is provided by the patient. This is a chronic problem. The problem occurs intermittently. The current episode started more than 1 week ago. The problem has not changed since onset. Pertinent negatives include no fever, no ear pain, no neck pain, no cough, no sputum production, no hemoptysis, no wheezing, no PND, no orthopnea, no chest pain, no syncope, no vomiting, no rash, no leg pain, no leg swelling and no claudication. Associated medical issues do not include chronic lung disease, CAD or heart failure. Review of Systems   Constitutional: Negative for chills, diaphoresis, fever, malaise/fatigue and weight loss. HENT: Negative for ear discharge, ear pain, hearing loss, nosebleeds and tinnitus. Eyes: Negative for blurred vision. Respiratory: Positive for shortness of breath. Negative for cough, hemoptysis, sputum production, wheezing and stridor. Cardiovascular: Negative for chest pain, palpitations, orthopnea, claudication, leg swelling, syncope and PND. Gastrointestinal: Negative for heartburn, nausea and vomiting. Musculoskeletal: Negative for myalgias and neck pain. Skin: Negative for itching and rash. Neurological: Negative for dizziness, tingling, tremors, focal weakness, loss of consciousness and weakness.    Psychiatric/Behavioral: Negative for depression and suicidal ideas. Family History   Problem Relation Age of Onset    Hypertension Mother     Thyroid Disease Mother     Hypertension Father     Diabetes Father     Drug Abuse Brother        Past Medical History:   Diagnosis Date    Diabetes ([address] 1995    Fractures 1995    R Knee/ Tib fib fracture/surgery    Gout 2010    High cholesterol 2005    HTN (hypertension) 1990    Osteoarthritis 2013    Thyroid trouble        Past Surgical History:   Procedure Laterality Date    HX HIP REPLACEMENT Left 2009    HX ORTHOPAEDIC Right     R knee/Tibfib surgery       Social History   Substance Use Topics    Smoking status: Former Smoker     Packs/day: 1.00     Types: Cigarettes     Start date: 1/1/1969     Quit date: 1/1/1995    Smokeless tobacco: Never Used    Alcohol use No       Allergies   Allergen Reactions    Watermelon Swelling    Peach (Prunus Persica) Itching       Outpatient Prescriptions Marked as Taking for the 4/26/17 encounter (Office Visit) with Elpidio Miller MD   Medication Sig Dispense Refill    insulin glargine (LANTUS) 100 unit/mL injection Take 30 units once daily  Indications: type 2 diabetes mellitus 3 Vial 3    cyanocobalamin (VITAMIN B-12) 1,000 mcg tablet Take 1,000 mcg by mouth daily.  febuxostat (ULORIC) 40 mg tab tablet Take 1 Tab by mouth daily. Indications: GOUT PREVENTION 90 Tab 1    levothyroxine (SYNTHROID) 200 mcg tablet Take 1 Tab by mouth Daily (before breakfast). Indications: HYPOTHYROIDISM (Patient taking differently: Take 175 mcg by mouth Daily (before breakfast). Indications: hypothyroidism) 30 Tab 5    insulin aspart (NOVOLOG) 100 unit/mL injection by SubCUTAneous route. 6 units before meals      spironolactone (ALDACTONE) 25 mg tablet Take 1 Tab by mouth daily. Indications: EDEMA 30 Tab 3    metoprolol (LOPRESSOR) 25 mg tablet Take 0.5 Tabs by mouth two (2) times a day.  30 Tab 1    glucose blood VI test strips (ASCENSIA AUTODISC VI, ONE TOUCH ULTRA TEST VI) strip Dispense precision extra test strips 3 x day to check blood glucose 200 Each 3    Magnesium Chloride 64 mg TbEC Take  by mouth. 8 pills 3 times daily      amLODIPine (NORVASC) 10 mg tablet Take 5 mg by mouth daily.  Omeprazole delayed release (PRILOSEC D/R) 20 mg tablet Take 20 mg by mouth daily.  atorvastatin (LIPITOR) 40 mg tablet Take 20 mg by mouth daily.  testosterone (ANDROGEL) 20.25 mg/1.25 gram (1.62 %) gel Apply  to affected area as needed.  aspirin (ASPIRIN) 325 mg tablet Take 325 mg by mouth daily.  Cholecalciferol, Vitamin D3, 1,000 unit cap Take 3,000 Units by mouth daily. Visit Vitals    /62    Pulse (!) 56    Ht 5' 10\" (1.778 m)    Wt 112 kg (247 lb)    BMI 35.44 kg/m2     Physical Exam   Constitutional: He is oriented to person, place, and time. He appears well-developed and well-nourished. No distress. HENT:   Head: Atraumatic. Mouth/Throat: No oropharyngeal exudate. Eyes: Conjunctivae are normal. Right eye exhibits no discharge. Left eye exhibits no discharge. No scleral icterus. Neck: Normal range of motion. Neck supple. No JVD present. No tracheal deviation present. No thyromegaly present. Cardiovascular: Normal rate and regular rhythm. Exam reveals no gallop. Murmur (3/6 late peaking ejection systolic murmur best heard at aortic area with radiation to b/l carotids) heard. Pulmonary/Chest: Effort normal and breath sounds normal. No stridor. No respiratory distress. He has no wheezes. He has no rales. He exhibits no tenderness. Abdominal: Soft. There is no tenderness. There is no rebound and no guarding. Musculoskeletal: Normal range of motion. He exhibits no edema or tenderness. Lymphadenopathy:     He has no cervical adenopathy. Neurological: He is alert and oriented to person, place, and time. He exhibits normal muscle tone. Skin: Skin is warm. He is not diaphoretic. Psychiatric: He has a normal mood and affect. His behavior is normal.     ekg sinus rhythm with PVC, no acute st-t changes  Echo 02/2017:  SUMMARY:  Left ventricle: Systolic function was normal by visual assessment. Ejection fraction was estimated in the range of 60 % to 65 %. No obvious  wall motion abnormalities identified in the views obtained. Wall thickness  was at the upper limits of normal.    Aortic valve: Leaflets exhibited markedly increased thickness and reduced  mobility. There was moderate to severe stenosis. Valve peak gradient was  61 mmHg. Valve mean gradient was 39 mmHg. Estimated aortic valve area (by  VTI) was 0.5 cmï¾². ZACKERY likely overestimates severity of aortic stenosis due  to difficulty in measuring LVOT diameter. Aorta, systemic arteries: The root exhibited dilatation. ASSESSMENT and PLAN    ICD-10-CM ICD-9-CM    1. Severe aortic stenosis I35.0 424.1 REFERRAL TO CARDIOLOGY   2. Essential hypertension I10 401.9    3. Dyslipidemia E78.5 272.4    4. Type 2 diabetes mellitus with diabetic nephropathy, unspecified long term insulin use status E11.21 250.40      583.81    5. CKD (chronic kidney disease) stage 3, GFR 30-59 ml/min N18.3 585.3    6. Hypothyroidism due to acquired atrophy of thyroid E03.4 244.8      246.8      Orders Placed This Encounter    REFERRAL TO CARDIOLOGY     Referral Priority:   Routine     Referral Type:   Consultation     Referral Reason:   Specialty Services Required     Referred to Provider:   Klaudia Le MD     Follow-up Disposition:  Return in about 1 month (around 5/26/2017). current treatment plan is effective, no change in therapy  reviewed diet, exercise and weight control  cardiovascular risk and specific lipid/LDL goals reviewed  use of aspirin to prevent MI and TIA's discussed. Patient with  severe AS by echo and LHC. Patient has exertional dyspnea with no HF symptoms. No syncope or chest pain.   Will refer to CTS for AVR

## 2017-05-02 ENCOUNTER — OFFICE VISIT (OUTPATIENT)
Dept: CARDIOTHORACIC SURGERY | Age: 72
End: 2017-05-02

## 2017-05-02 ENCOUNTER — DOCUMENTATION ONLY (OUTPATIENT)
Dept: CARDIOTHORACIC SURGERY | Age: 72
End: 2017-05-02

## 2017-05-02 VITALS
SYSTOLIC BLOOD PRESSURE: 151 MMHG | WEIGHT: 245 LBS | HEART RATE: 62 BPM | RESPIRATION RATE: 20 BRPM | DIASTOLIC BLOOD PRESSURE: 73 MMHG | OXYGEN SATURATION: 98 % | BODY MASS INDEX: 35.07 KG/M2 | HEIGHT: 70 IN | TEMPERATURE: 98 F

## 2017-05-02 DIAGNOSIS — I35.0 NONRHEUMATIC AORTIC VALVE STENOSIS: Primary | ICD-10-CM

## 2017-05-02 NOTE — PROGRESS NOTES
Esme Fernandes is a 71 y.o. male referred to Dr. María Saini by Dr. Ashlee Sunshine for aortic stenosis. Advanced directive: No    Medications not taking or discontinued: See Med Rec    1. Have you been to the ER, urgent care clinic since your last visit? Hospitalized since your last visit? No    2. Have you seen or consulted any other health care providers outside of the [address] [place] [address] since your last visit? Include any pap smears or colon screening. No    Mr. Gregory Brower has a reminder for a \"due or due soon\" health maintenance. I have asked that he contact his primary care provider for follow-up on this health maintenance.

## 2017-05-02 NOTE — PATIENT INSTRUCTIONS
Aortic Valve Stenosis: Care Instructions  Your Care Instructions    Having aortic valve stenosis means that the valve between your heart and the large blood vessel that carries blood to the body (aorta) has narrowed. That forces the heart to pump harder to get enough blood through the valve. As stenosis gets worse, the valve gets narrower. This can cause symptoms. Symptoms include chest pain, dizziness, fainting, or shortness of breath. Surgery can fix the valve. The most common surgery is to replace the aortic valve. Your doctor may want to delay valve replacement until you have severe narrowing. You may take medicine to prevent or treat problems caused by aortic valve stenosis. Follow-up care is a key part of your treatment and safety. Be sure to make and go to all appointments, and call your doctor if you are having problems. It's also a good idea to know your test results and keep a list of the medicines you take. How can you care for yourself at home? · Be safe with medicines. Take your medicines exactly as prescribed. Call your doctor if you think you are having a problem with your medicine. You will get more details on the specific medicines your doctor prescribes. · Plan your meals so that you are eating heart-healthy foods. ¨ Eat a variety of foods daily. Fresh fruits and vegetables and whole grains are good choices. ¨ Limit your fat intake, especially saturated and trans fat. ¨ Limit salt (sodium). ¨ Increase fiber in your diet. ¨ Limit alcohol. · Be active. Ask your doctor what type and level of exercise is safe for you. Walking is a good choice. Your doctor may suggest that you join a cardiac rehabilitation program so that you can have help increasing your physical activity safely. · Do not smoke. Smoking can make aortic valve stenosis worse. If you need help quitting, talk to your doctor about stop-smoking programs and medicines.  These can increase your chances of quitting for good.  · Stay at a healthy weight. Lose weight if you need to. · Avoid colds and flu. Get a pneumococcal vaccine shot. If you have had one before, ask your doctor if you need another dose. Get a flu vaccine every year. · Take care of your teeth and gums. Get regular dental checkups. Good dental health is important because bacteria can spread from infected teeth and gums to the heart valves. When should you call for help? Call 911 anytime you think you may need emergency care. For example, call if:  · You passed out (lost consciousness). · You have symptoms of a heart attack. These may include:  ¨ Chest pain or pressure, or a strange feeling in the chest.  ¨ Sweating. ¨ Shortness of breath. ¨ Nausea or vomiting. ¨ Pain, pressure, or a strange feeling in the back, neck, jaw, or upper belly or in one or both shoulders or arms. ¨ Lightheadedness or sudden weakness. ¨ A fast or irregular heartbeat. After you call 911, the  may tell you to chew 1 adult-strength or 2 to 4 low-dose aspirin. Wait for an ambulance. Do not try to drive yourself. Call your doctor now or seek immediate medical care if:  · You develop new symptoms of aortic valve stenosis, such as chest pain, dizziness, or shortness of breath. · You have new or increased shortness of breath. · You are dizzy or lightheaded, or you feel like you may faint. · You have sudden weight gain, such as 3 pounds or more in 2 to 3 days. · You have increased swelling in your legs, ankles, or feet. Watch closely for changes in your health, and be sure to contact your doctor if:  · You have trouble making healthy lifestyle changes. · You want more information about healthy lifestyle changes. Where can you learn more? Go to http://adal-janes.info/. Enter K919 in the search box to learn more about \"Aortic Valve Stenosis: Care Instructions. \"  Current as of: January 27, 2016  Content Version: 11.2  © 5676-9945 [employer]. Care instructions adapted under license by [employer] (which disclaims liability or warranty for this information). If you have questions about a medical condition or this instruction, always ask your healthcare professional. [address] any warranty or liability for your use of this information. We will schedule the operation for next week.

## 2017-05-02 NOTE — PROGRESS NOTES
CARDIOTHORACIC SURGERY CONSULTATION NOTE    5/2/2017  3:31 PM    I am seeing this patient for the first time in consultation at the request of Dr. Nicola Flores. I have also reviewed his records and pertinent studies, and have obtained additional information on the patient's history from the patient's spouse who was present during the evaluation. Luis M Elaine is a 71 y.o. male who presents with mild shortness of breath that he notices primarily when he is singing in [employer]. He has suffered significant trauma to his right leg and is unable to ambulate very far easily so is unsure if he has significant exertional dyspnea. He needs surgery to replace his right knee, but can not have it until his severe aortic stenosis is repaired. He has also been experiencing lower extremity edema, palpitations, and light headedness, but denies chest pain, chest pressure/discomfort, claudication, exertional chest pressure/discomfort, fatigue, near-syncope, orthopnea, paroxysmal nocturnal dyspnea, syncope, tachypnea. Cardiac risk factors include dyslipidemia, diabetes mellitus, hypertension. There is no history of smoking/ tobacco exposure, family history. Past Medical History:   Diagnosis Date    Diabetes ([address] 1995    Fractures 1995    R Knee/ Tib fib fracture/surgery    Gout 2010    High cholesterol 2005    HTN (hypertension) 1990    Osteoarthritis 2013    Thyroid trouble        The past medical history is also notable for a right leg DVT and cataracts for which he needs an operation. Past Surgical History:   Procedure Laterality Date    HX HIP REPLACEMENT Left 2009    HX ORTHOPAEDIC Right     R knee/Tibfib surgery       The past surgical history is also notable for back surgery (fusion).     Present medications include   Current Outpatient Prescriptions   Medication Sig Dispense Refill    insulin glargine (LANTUS) 100 unit/mL injection Take 30 units once daily  Indications: type 2 diabetes mellitus 3 Vial 3    cyanocobalamin (VITAMIN B-12) 1,000 mcg tablet Take 1,000 mcg by mouth daily.  febuxostat (ULORIC) 40 mg tab tablet Take 1 Tab by mouth daily. Indications: GOUT PREVENTION 90 Tab 1    levothyroxine (SYNTHROID) 200 mcg tablet Take 1 Tab by mouth Daily (before breakfast). Indications: HYPOTHYROIDISM (Patient taking differently: Take 175 mcg by mouth Daily (before breakfast). Indications: hypothyroidism) 30 Tab 5    insulin aspart (NOVOLOG) 100 unit/mL injection by SubCUTAneous route. 6 units before meals      spironolactone (ALDACTONE) 25 mg tablet Take 1 Tab by mouth daily. Indications: EDEMA 30 Tab 3    metoprolol (LOPRESSOR) 25 mg tablet Take 0.5 Tabs by mouth two (2) times a day. 30 Tab 1    glucose blood VI test strips (ASCENSIA AUTODISC VI, ONE TOUCH ULTRA TEST VI) strip Dispense precision extra test strips 3 x day to check blood glucose 200 Each 3    Magnesium Chloride 64 mg TbEC Take  by mouth. 8 pills 3 times daily      amLODIPine (NORVASC) 10 mg tablet Take 5 mg by mouth daily.  Omeprazole delayed release (PRILOSEC D/R) 20 mg tablet Take 20 mg by mouth daily.  atorvastatin (LIPITOR) 40 mg tablet Take 20 mg by mouth daily.  testosterone (ANDROGEL) 20.25 mg/1.25 gram (1.62 %) gel Apply  to affected area as needed.  aspirin (ASPIRIN) 325 mg tablet Take 325 mg by mouth daily.  Cholecalciferol, Vitamin D3, 1,000 unit cap Take 3,000 Units by mouth daily.  albuterol (VENTOLIN HFA) 90 mcg/actuation inhaler Take  by inhalation as needed. Drug allergies: Allergies   Allergen Reactions    Watermelon Swelling    Peach (Prunus Persica) Itching       Family History: There is not a history of heart disease in the family. Social History: Smoking history as above. He denies alcohol use. He lives with his spouse and is a retired .      REVIEW OF SYSTEMS:   Constitutional: denies significant weight gain or loss recently, fevers or chills  Integumentary: denies recent rashes  Eyes: denies diplopia, transient visual loss  ENMT: denies hearing loss, sinus congestion, sore throat  Respiratory: denies chronic cough, recent productive cough  Cardiovascular: as noted above in history   Gastrointestinal: positive for diarrhea, and denies jaundice, abdominal pain  Genitourinary: denies flank pain, dysuria  Musculoskeletal: positive for chronic back pain and leg cramping and joint pain, but denies recent fractures  Hematologic / Lymphatic: denies easy bruisability, clots in legs  Neurological: positive for sciatica, and denies headaches and seizures  Psychiatric: denies anxiety, depression    PHYSICAL EXAMINATION:  Vital signs:   BP Readings from Last 3 Encounters:   05/02/17 151/73   04/26/17 139/62   04/25/17 149/62     @TIRU(15)@  Wt Readings from Last 3 Encounters:   05/02/17 111.1 kg (245 lb)   04/26/17 112 kg (247 lb)   04/25/17 112.9 kg (248 lb 12.8 oz)     Ht Readings from Last 3 Encounters:   05/02/17 5' 10\" (1.778 m)   04/26/17 5' 10\" (1.778 m)   04/25/17 5' 10\" (1.778 m)     General appearance:  He appeared well-nourished and of moderate build. Integument: The skin was warm and dry and had good turgor. There were not lower extremity venous stasis changes. Head and Neck: The head was normocephalic and was atraumatic. The neck was supple with good range of motion. Thyromegaly was absent, and cervical and supraclavicular lymphadenopathy were absent. EENMT: Conjunctivae were not injected. The sclerae were anicteric, and the nares were patent bilaterally. Oral mucosa were moist.  The tongue was in the midline. Dentition was good. Back: CVA and vertebral tenderness were absent. He was not kyphotic. Lungs: Respirations were not labored, and the lungs were clear to auscultation bilaterally, without rales, without rhonchi, and without wheezes. Heart:   The rate and rhythm were regular, without an S3, without JVD, and with a grade III/VI harsh systolic murmur heard best over the right upper sternal border. The PMI was not displaced laterally. Abdomen: The abdomen was globoid and was soft and was not tender, with good bowel sounds, and hepatomegaly was absent, and splenomegaly was absent. Pulsatile masses and bruits in the abdomen were absent. Vascular: Carotid pulses were 2+ bilaterally, and radial pulses were 2+ bilaterally. Pedal pulses were present bilaterally. Varicose veins were absent in both legs. Extremities: Clubbing was absent, cyanosis was absent, and pedal edema was mild. Neurologic: He is alert and oriented, and is a good historian. Strength and motion appeared normal and symmetrical in his extremities. Psychiatric: He is pleasant, calm, and has a normal affect. LABORATORIES:   Lab Results   Component Value Date/Time    WBC 5.6 03/01/2017 03:35 PM    HCT 39.2 03/01/2017 03:35 PM     03/01/2017 03:35 PM      Lab Results   Component Value Date/Time     03/01/2017 03:35 PM    K 4.5 03/01/2017 03:35 PM     03/01/2017 03:35 PM    CO2 21 03/01/2017 03:35 PM     (H) 03/01/2017 03:35 PM    BUN 16 03/01/2017 03:35 PM    CREA 1.0 03/01/2017 03:35 PM    CREA 1.29 01/25/2017 10:22 AM    CREA 1.41 (H) 07/08/2016 12:05 PM     Albumin 4, total bilirubin 0.3, INR 1  Cholesterol 137, triglycerides 67, HDL 59, and LDL 69    The EKG print-out, which I have personally reviewed, shows sinus rhythm with PVCs. The echocardiogram image revealed an ejection fraction of 65%. There was severe aortic stenosis, with a peak and mean gradients of 61 and 39 mm Hg, respectively, and an aortic valve area of 0.5 square centimeters. The LVOT was 1.9 cm. The ascending aorta proximally was 4.1 cm. I have also personally reviewed the cardiac catheterization images. They show an ejection fraction of 60%. The LVEDP was 13. The peak gradient across the aortic valve was 30 and mean was 39.   Coronary arteriography was noted to reveal no atheromatous plaque stenoses in the native coronary arteries. Impression:  Diagnoses:  1. severe aortic stenosis (non-rheumatic)  2. Essential hypertension  3. hypercholesterolemia  4. diabetes mellitus type-2    With severe aortic stenosis there would be potential benefit from aortic valve repalcement. I have discussed this operation in detail with him and his spouse, along with the alternatives (medical therapy). I also outlined the risks and benefits of the surgery, which would include, but not be limited to, operative mortality, stroke, pneumonia, renal failure, infection, bleeding and need for re-exploration, perioperative myocardial infarction, postoperative arrhythmias, sternal dehiscence, hand dysesthesias and/or weakness, blood component transfusions, and the need for a permanent pacemaker postoperatively. The estimated STS risks were calculated for this patient and were discussed with the patient as outlined above. Recommendations: Following our discussion, he has decided to proceed with surgery next week. He will need the additional preoperative tests, which I will order:  CBC  Chem-7  Coagulation profile / INR  HbA1c  Urinalysis  EKG  CXR  Chest CT scan to assess the size of his aorta and LVOT    Thank you for involving me in his care.     Pratik Shrestha MD

## 2017-05-02 NOTE — MR AVS SNAPSHOT
Visit Information Date & Time Provider Department Dept. Phone Encounter #  
 5/2/2017  2:30 PM Nallely Hamlin, [address] AND THORACIC Christophe Lowell [phone] [id number] Your Appointments 5/31/2017 10:45 AM  
Follow Up with Vidya Juan MD  
[facility] [place] ([address]) Appt Note: 1 month follow up  
 [address] [facility] [address]  
  
   
 [address] [facility]  
  
    
 8/1/2017  9:15 AM  
ROUTINE CARE with Aroldo Garcia MD  
[facility] ([address]) Appt Note: Return in about 3 months (around 7/25/2017), or if symptoms worsen or fail to improve, for DM2, HTN. [facility] [address] [id number] [address]  
  
   
 [facility] [address] Upcoming Health Maintenance Date Due ZOSTER VACCINE AGE 60> 4/26/2006 EYE EXAM RETINAL OR DILATED Q1 10/14/2016 FOBT Q 1 YEAR AGE 50-75 5/26/2017 INFLUENZA AGE 9 TO ADULT 8/1/2017 Pneumococcal 65+ Low/Medium Risk (2 of 2 - PPSV23) 9/15/2017 GLAUCOMA SCREENING Q2Y 10/14/2017 HEMOGLOBIN A1C Q6M 10/25/2017 FOOT EXAM Q1 1/25/2018 MICROALBUMIN Q1 1/25/2018 LIPID PANEL Q1 4/25/2018 MEDICARE YEARLY EXAM 4/26/2018 DTaP/Tdap/Td series (2 - Td) 4/28/2025 Allergies as of 5/2/2017  Review Complete On: 5/2/2017 By: Nallely Hamlin MD  
  
 Severity Noted Reaction Type Reactions Watermelon High 01/21/2015    Swelling Peach (Prunus Persica)  01/21/2015    Itching Current Immunizations  Reviewed on 9/15/2016 Name Date Influenza Vaccine (Quad) PF 9/17/2015 Pneumococcal Conjugate (PCV-13) 9/15/2016 Tdap 4/28/2015 Not reviewed this visit You Were Diagnosed With   
  
 Codes Comments Nonrheumatic aortic valve stenosis    -  Primary ICD-10-CM: I35.0 ICD-9-CM: 424.1 Vitals BP Pulse Temp Resp Height(growth percentile) Weight(growth percentile) 151/73 (BP 1 Location: Left arm, BP Patient Position: Sitting) 62 98 °F (36.7 °C) (Oral) 20 5' 10\" (1.778 m) 245 lb (111.1 kg) SpO2 BMI Smoking Status 98% 35.15 kg/m2 Former Smoker Vitals History BMI and BSA Data Body Mass Index Body Surface Area  
 35.15 kg/m 2 2.34 m 2 Preferred Pharmacy Pharmacy Name Phone [facility] [address] [place] [phone] Your Updated Medication List  
  
   
This list is accurate as of: 5/2/17  4:09 PM.  Always use your most recent med list. amLODIPine 10 mg tablet Commonly known as:  Saira Rafter Take 5 mg by mouth daily. aspirin 325 mg tablet Commonly known as:  ASPIRIN Take 325 mg by mouth daily. atorvastatin 40 mg tablet Commonly known as:  LIPITOR Take 20 mg by mouth daily. cholecalciferol 1,000 unit Cap Commonly known as:  VITAMIN D3 Take 3,000 Units by mouth daily. febuxostat 40 mg Tab tablet Commonly known as:  Bettylou Birks Take 1 Tab by mouth daily. Indications: GOUT PREVENTION  
  
 glucose blood VI test strips strip Commonly known as:  ASCENSIA AUTODISC VI, ONE TOUCH ULTRA TEST VI Dispense precision extra test strips 3 x day to check blood glucose  
  
 insulin aspart 100 unit/mL injection Commonly known as:  NOVOLOG  
by SubCUTAneous route. 6 units before meals  
  
 insulin glargine 100 unit/mL injection Commonly known as:  LANTUS Take 30 units once daily  Indications: type 2 diabetes mellitus  
  
 levothyroxine 200 mcg tablet Commonly known as:  SYNTHROID Take 1 Tab by mouth Daily (before breakfast). Indications: HYPOTHYROIDISM  
  
 magnesium chloride 64 mg delayed release tablet Commonly known as:  MAG DELAY Take  by mouth. 8 pills 3 times daily  
  
 metoprolol tartrate 25 mg tablet Commonly known as:  LOPRESSOR  
 Take 0.5 Tabs by mouth two (2) times a day. Omeprazole delayed release 20 mg tablet Commonly known as:  PRILOSEC D/R Take 20 mg by mouth daily. spironolactone 25 mg tablet Commonly known as:  ALDACTONE Take 1 Tab by mouth daily. Indications: EDEMA  
  
 testosterone 20.25 mg/1.25 gram (1.62 %) gel Commonly known as:  ANDROGEL Apply  to affected area as needed. VENTOLIN HFA 90 mcg/actuation inhaler Generic drug:  albuterol Take  by inhalation as needed. VITAMIN B-12 1,000 mcg tablet Generic drug:  cyanocobalamin Take 1,000 mcg by mouth daily. To-Do List   
 05/02/2017 Imaging:  CT CHEST W CONT   
  
 05/04/2017 1:00 PM  
  Appointment with [facility] CT RM 2 at [facility] RAD [address] ([phone]) GENERAL INSTRUCTIONS  If you were given medications to take for a contrast allergy prior to having this study, please arrange to have someone drive you to your appointment. If you have had a creatinine level drawn within the past 30 days, please bring most recent results to your appt. This study does not require you to drink contrast prior to your study. MEDICATIONS  Bring a complete list of all medications you are currently taking to include prescriptions, over-the-counter meds, herbals, vitamins & any dietary supplements. STUDY DETAILS Patient must be NPO (nothing to eat/drink, including meds and water) for 4 hours prior to study. OUTSIDE FILMS  Bring outside films, CDs, and reports related to the study with you on the day of your exam.  QUESTIONS  Notify the CT Department if you have any questions concerning your study. [address] [phone]  
  
 05/11/2017 11:00 AM  
  Appointment with CVTS, NURSE VISIT at [address] ([phone]) Patient Instructions Aortic Valve Stenosis: Care Instructions Your Care Instructions Having aortic valve stenosis means that the valve between your heart and the large blood vessel that carries blood to the body (aorta) has narrowed. That forces the heart to pump harder to get enough blood through the valve. As stenosis gets worse, the valve gets narrower. This can cause symptoms. Symptoms include chest pain, dizziness, fainting, or shortness of breath. Surgery can fix the valve. The most common surgery is to replace the aortic valve. Your doctor may want to delay valve replacement until you have severe narrowing. You may take medicine to prevent or treat problems caused by aortic valve stenosis. Follow-up care is a key part of your treatment and safety. Be sure to make and go to all appointments, and call your doctor if you are having problems. It's also a good idea to know your test results and keep a list of the medicines you take. How can you care for yourself at home? · Be safe with medicines. Take your medicines exactly as prescribed. Call your doctor if you think you are having a problem with your medicine. You will get more details on the specific medicines your doctor prescribes. · Plan your meals so that you are eating heart-healthy foods. ¨ Eat a variety of foods daily. Fresh fruits and vegetables and whole grains are good choices. ¨ Limit your fat intake, especially saturated and trans fat. ¨ Limit salt (sodium). ¨ Increase fiber in your diet. ¨ Limit alcohol. · Be active. Ask your doctor what type and level of exercise is safe for you. Walking is a good choice. Your doctor may suggest that you join a cardiac rehabilitation program so that you can have help increasing your physical activity safely. · Do not smoke. Smoking can make aortic valve stenosis worse. If you need help quitting, talk to your doctor about stop-smoking programs and medicines. These can increase your chances of quitting for good. · Stay at a healthy weight. Lose weight if you need to. · Avoid colds and flu. Get a pneumococcal vaccine shot. If you have had one before, ask your doctor if you need another dose. Get a flu vaccine every year. · Take care of your teeth and gums. Get regular dental checkups. Good dental health is important because bacteria can spread from infected teeth and gums to the heart valves. When should you call for help? Call 911 anytime you think you may need emergency care. For example, call if: 
· You passed out (lost consciousness). · You have symptoms of a heart attack. These may include: ¨ Chest pain or pressure, or a strange feeling in the chest. 
¨ Sweating. ¨ Shortness of breath. ¨ Nausea or vomiting. ¨ Pain, pressure, or a strange feeling in the back, neck, jaw, or upper belly or in one or both shoulders or arms. ¨ Lightheadedness or sudden weakness. ¨ A fast or irregular heartbeat. After you call 911, the  may tell you to chew 1 adult-strength or 2 to 4 low-dose aspirin. Wait for an ambulance. Do not try to drive yourself. Call your doctor now or seek immediate medical care if: 
· You develop new symptoms of aortic valve stenosis, such as chest pain, dizziness, or shortness of breath. · You have new or increased shortness of breath. · You are dizzy or lightheaded, or you feel like you may faint. · You have sudden weight gain, such as 3 pounds or more in 2 to 3 days. · You have increased swelling in your legs, ankles, or feet. Watch closely for changes in your health, and be sure to contact your doctor if: 
· You have trouble making healthy lifestyle changes. · You want more information about healthy lifestyle changes. Where can you learn more? Go to http://adal-janes.info/. Enter S204 in the search box to learn more about \"Aortic Valve Stenosis: Care Instructions. \" Current as of: January 27, 2016 Content Version: 11.2 © 2221-0372 [employer], Station [employer].  Care instructions adapted under license by Mike Musa (which disclaims liability or warranty for this information). If you have questions about a medical condition or this instruction, always ask your healthcare professional. [address] any warranty or liability for your use of this information. We will schedule the operation for next week. Introducing [facility]! Dear Kemal Garcia: Thank you for requesting a [employer] account. Our records indicate that you already have an active [employer] account. You can access your account anytime at [email or web address] Did you know that you can access your hospital and ER discharge instructions at any time in [employer]? You can also review all of your test results from your hospital stay or ER visit. Additional Information If you have questions, please visit the Frequently Asked Questions section of the [employer] website at [email or web address] Remember, [employer] is NOT to be used for urgent needs. For medical emergencies, dial 911. Now available from your iPhone and Android! Please provide this summary of care documentation to your next provider. Your primary care clinician is listed as Aroldo Perez. If you have any questions after today's visit, please call [phone].

## 2017-05-02 NOTE — LETTER
2017 Patient:  Valerie Sagastume. MRN:     [id number] :     1945 Dear Kim Knight: 
 
I had the pleasure of seeing Valerie Martinez in consultation today at [facility]. Thanks very much for involving me in his care. He is a 71 y.o. male who presents with severe aortic stenosis. Cardiac catheterization revealed no significant coronary artery disease. I had a lengthy discussion with him and his spouse regarding treatment options. Following our discussion, he has decided to proceed with surgery next week. I appreciate you referring him along to me. If you have any questions about the case, please feel free to contact me. Best regards, Kala Wilson MD 
Medical Director, Cardiovascular and Thoracic Services [facility] [place] [facility]

## 2017-05-03 ENCOUNTER — TELEPHONE (OUTPATIENT)
Dept: CARDIOTHORACIC SURGERY | Age: 72
End: 2017-05-03

## 2017-05-03 DIAGNOSIS — I71.21 ASCENDING AORTIC ANEURYSM: Primary | ICD-10-CM

## 2017-05-03 NOTE — TELEPHONE ENCOUNTER
S/w scheduling to give an updated code for pt's scheduled CT scan tomorrow, 5/4. When scan was scheduled yesterday, our office was informed the code Dr. Gage Alcantara used was not covered by Medicare. Dr. Gage Alcantara updated the code this morning. Called to give scheduling the new, updated code. Was informed by scheduling this code is covered by Medicare. Confirmed that pt is still scheduled for CT scan tomorrow, 5/4 @ 1 pm @ [facility].

## 2017-05-04 ENCOUNTER — HOSPITAL ENCOUNTER (OUTPATIENT)
Dept: PREADMISSION TESTING | Age: 72
Discharge: HOME | End: 2017-05-04
Payer: MEDICARE

## 2017-05-04 ENCOUNTER — CLINICAL SUPPORT (OUTPATIENT)
Dept: CARDIOTHORACIC SURGERY | Age: 72
End: 2017-05-04

## 2017-05-04 ENCOUNTER — HOSPITAL ENCOUNTER (OUTPATIENT)
Dept: GENERAL RADIOLOGY | Age: 72
Discharge: HOME | End: 2017-05-04
Payer: MEDICARE

## 2017-05-04 ENCOUNTER — HOSPITAL ENCOUNTER (OUTPATIENT)
Dept: CT IMAGING | Age: 72
Discharge: HOME | End: 2017-05-04
Attending: THORACIC SURGERY (CARDIOTHORACIC VASCULAR SURGERY)
Payer: MEDICARE

## 2017-05-04 VITALS
DIASTOLIC BLOOD PRESSURE: 73 MMHG | HEART RATE: 54 BPM | BODY MASS INDEX: 35.07 KG/M2 | SYSTOLIC BLOOD PRESSURE: 147 MMHG | RESPIRATION RATE: 20 BRPM | WEIGHT: 245 LBS | OXYGEN SATURATION: 98 % | TEMPERATURE: 97.9 F | HEIGHT: 70 IN

## 2017-05-04 DIAGNOSIS — I35.0 SEVERE AORTIC STENOSIS: Primary | ICD-10-CM

## 2017-05-04 DIAGNOSIS — Z01.818 PREOP TESTING: ICD-10-CM

## 2017-05-04 DIAGNOSIS — I71.21 ASCENDING AORTIC ANEURYSM: ICD-10-CM

## 2017-05-04 LAB
ALBUMIN SERPL BCP-MCNC: 3.7 G/DL (ref 3.4–5)
ALBUMIN/GLOB SERPL: 1 {RATIO} (ref 0.8–1.7)
ALP SERPL-CCNC: 80 U/L (ref 45–117)
ALT SERPL-CCNC: 30 U/L (ref 16–61)
ANION GAP BLD CALC-SCNC: 7 MMOL/L (ref 3–18)
APPEARANCE UR: CLEAR
APTT PPP: 24.5 SEC (ref 23–36.4)
AST SERPL W P-5'-P-CCNC: 13 U/L (ref 15–37)
BACTERIA URNS QL MICRO: ABNORMAL /HPF
BILIRUB SERPL-MCNC: 0.5 MG/DL (ref 0.2–1)
BILIRUB UR QL: NEGATIVE
BUN SERPL-MCNC: 26 MG/DL (ref 7–18)
BUN/CREAT SERPL: 19 (ref 12–20)
CALCIUM SERPL-MCNC: 9.3 MG/DL (ref 8.5–10.1)
CHLORIDE SERPL-SCNC: 109 MMOL/L (ref 100–108)
CO2 SERPL-SCNC: 25 MMOL/L (ref 21–32)
COLOR UR: YELLOW
CREAT SERPL-MCNC: 1.35 MG/DL (ref 0.6–1.3)
CREAT UR-MCNC: 1.4 MG/DL (ref 0.6–1.3)
EPITH CASTS URNS QL MICRO: ABNORMAL /LPF (ref 0–5)
ERYTHROCYTE [DISTWIDTH] IN BLOOD BY AUTOMATED COUNT: 14.2 % (ref 11.6–14.5)
GLOBULIN SER CALC-MCNC: 3.6 G/DL (ref 2–4)
GLUCOSE SERPL-MCNC: 85 MG/DL (ref 74–99)
GLUCOSE UR STRIP.AUTO-MCNC: NEGATIVE MG/DL
HCT VFR BLD AUTO: 40.7 % (ref 36–48)
HGB BLD-MCNC: 14 G/DL (ref 13–16)
HGB UR QL STRIP: NEGATIVE
HYALINE CASTS URNS QL MICRO: ABNORMAL /LPF (ref 0–2)
INR PPP: 1.1 (ref 0.8–1.2)
KETONES UR QL STRIP.AUTO: NEGATIVE MG/DL
LEUKOCYTE ESTERASE UR QL STRIP.AUTO: ABNORMAL
MCH RBC QN AUTO: 28.6 PG (ref 24–34)
MCHC RBC AUTO-ENTMCNC: 34.4 G/DL (ref 31–37)
MCV RBC AUTO: 83.2 FL (ref 74–97)
NITRITE UR QL STRIP.AUTO: NEGATIVE
PH UR STRIP: 5 [PH] (ref 5–8)
PLATELET # BLD AUTO: 222 K/UL (ref 135–420)
PMV BLD AUTO: 9.3 FL (ref 9.2–11.8)
POTASSIUM SERPL-SCNC: 4.2 MMOL/L (ref 3.5–5.5)
PROT SERPL-MCNC: 7.3 G/DL (ref 6.4–8.2)
PROT UR STRIP-MCNC: NEGATIVE MG/DL
PROTHROMBIN TIME: 13.5 SEC (ref 11.5–15.2)
RBC # BLD AUTO: 4.89 M/UL (ref 4.7–5.5)
RBC #/AREA URNS HPF: NEGATIVE /HPF (ref 0–5)
SODIUM SERPL-SCNC: 141 MMOL/L (ref 136–145)
SP GR UR REFRACTOMETRY: 1.02 (ref 1–1.03)
UROBILINOGEN UR QL STRIP.AUTO: 0.2 EU/DL (ref 0.2–1)
WBC # BLD AUTO: 7 K/UL (ref 4.6–13.2)
WBC URNS QL MICRO: ABNORMAL /HPF (ref 0–4)

## 2017-05-04 PROCEDURE — 86920 COMPATIBILITY TEST SPIN: CPT | Performed by: PHYSICIAN ASSISTANT

## 2017-05-04 PROCEDURE — 71260 CT THORAX DX C+: CPT

## 2017-05-04 PROCEDURE — 74011636320 HC RX REV CODE- 636/320: Performed by: THORACIC SURGERY (CARDIOTHORACIC VASCULAR SURGERY)

## 2017-05-04 RX ORDER — CHLORHEXIDINE GLUCONATE 4 G/100ML
15 SOLUTION TOPICAL DAILY
Qty: 3 PACKET | Refills: 0 | Status: SHIPPED | COMMUNITY
Start: 2017-05-05 | End: 2017-05-08

## 2017-05-04 RX ADMIN — IOPAMIDOL 100 ML: 612 INJECTION, SOLUTION INTRAVENOUS at 14:12

## 2017-05-04 NOTE — PROGRESS NOTES
Spent approximately 75 minutes with patient and his wife today for preoperative testing and teaching. Obtained consent for surgery and blood transfusion. Reviewed preoperative instructions and dispensed Hibiclens. Provided patient with incentive spirometer and instructed on use. Patient able to get IS up to 2500 with good technique. Provided patient with education concerning the surgery, hospital expectations, home recovery and advance directives. Provided educational handouts for both surgical site and bloodstream infection and prevention. Patient and his wife viewed videos \"Preparing for Heart Surgery\" and \"Heart Surgery: First days of recovery\". Provided guided tour of nursing units and showed where to check in morning of surgery. Patient sent to patient registration for pre-operative testing. Received verbal order w/ read back from Dr. Yulisa Canada to dispense three 15 mL packets of HIbiclens to pt to use pre-operatively.

## 2017-05-04 NOTE — PATIENT INSTRUCTIONS
Surgical Instructions:  · Complete your three showers using the Hibiclens surgical soap on Friday night, Saturday night, and Sunday night. · Discontinue your Aspirin medication after Friday's dose. · Nothing to eat or drink after midnight on Sunday night. · Take your Metoprolol, Synthroid, and Prilosec medication with a very small sip of water the morning of surgery. · Check in at 6 am Monday morning to the lobby of the [facility]. · Please bring your incentive spirometer and all your medication bottles to the hospital the morning of surgery. Call [phone] if any questions or concerns.

## 2017-05-04 NOTE — MR AVS SNAPSHOT
Visit Information Date & Time Provider Department Dept. Phone Encounter #  
 5/4/2017 11:00 AM CVTS, NURSE VISIT CARDIOVASCULAR AND THORACIC [address] [phone] [id number] Your Appointments 5/31/2017 10:45 AM  
Follow Up with Juan Lockett MD  
[facility] ([facility]) Appt Note: 1 month follow up  
 [address] [facility] [address]  
  
   
 [address] [facility]  
  
    
 8/1/2017  9:15 AM  
ROUTINE CARE with Aroldo Webster MD  
[facility] ([facility]) Appt Note: Return in about 3 months (around 7/25/2017), or if symptoms worsen or fail to improve, for DM2, HTN. [address]  
  
   
 [address] [facility] Upcoming Health Maintenance Date Due ZOSTER VACCINE AGE 60> 4/26/2006 EYE EXAM RETINAL OR DILATED Q1 10/14/2016 FOBT Q 1 YEAR AGE 50-75 5/26/2017 INFLUENZA AGE 9 TO ADULT 8/1/2017 Pneumococcal 65+ Low/Medium Risk (2 of 2 - PPSV23) 9/15/2017 GLAUCOMA SCREENING Q2Y 10/14/2017 HEMOGLOBIN A1C Q6M 10/25/2017 FOOT EXAM Q1 1/25/2018 MICROALBUMIN Q1 1/25/2018 LIPID PANEL Q1 4/25/2018 MEDICARE YEARLY EXAM 4/26/2018 DTaP/Tdap/Td series (2 - Td) 4/28/2025 Allergies as of 5/4/2017  Review Complete On: 5/4/2017 By: Mauricio Sepulveda LPN Severity Noted Reaction Type Reactions Watermelon High 01/21/2015    Swelling Peach (Prunus Persica)  01/21/2015    Itching Current Immunizations  Reviewed on 9/15/2016 Name Date Influenza Vaccine (Quad) PF 9/17/2015 Pneumococcal Conjugate (PCV-13) 9/15/2016 Tdap 4/28/2015 Not reviewed this visit You Were Diagnosed With   
  
 Codes Comments Severe aortic stenosis    -  Primary ICD-10-CM: I35.0 ICD-9-CM: 424.1 Vitals BP Pulse Temp Resp Height(growth percentile) Weight(growth percentile) 162/74 (BP 1 Location: Left arm, BP Patient Position: Sitting) (!) 54 97.9 °F (36.6 °C) (Oral) 20 5' 10\" (1.778 m) 245 lb (111.1 kg) SpO2 BMI Smoking Status 98% 35.15 kg/m2 Former Smoker Vitals History BMI and BSA Data Body Mass Index Body Surface Area  
 35.15 kg/m 2 2.34 m 2 Preferred Pharmacy Pharmacy Name Phone [facility] [address] [place] [phone] Your Updated Medication List  
  
   
This list is accurate as of: 5/4/17 11:56 AM.  Always use your most recent med list. amLODIPine 10 mg tablet Commonly known as:  Clauddread Odonnellbach Take 5 mg by mouth daily. aspirin 325 mg tablet Commonly known as:  ASPIRIN Take 325 mg by mouth daily. atorvastatin 40 mg tablet Commonly known as:  LIPITOR Take 20 mg by mouth daily. chlorhexidine 4 % liquid Commonly known as:  HIBICLENS Apply 15 mL to affected area daily for 3 days. Start taking on:  5/5/2017  
  
 cholecalciferol 1,000 unit Cap Commonly known as:  VITAMIN D3 Take 3,000 Units by mouth daily. febuxostat 40 mg Tab tablet Commonly known as:  Dimitry Bowling Take 1 Tab by mouth daily. Indications: GOUT PREVENTION  
  
 glucose blood VI test strips strip Commonly known as:  ASCENSIA AUTODISC VI, ONE TOUCH ULTRA TEST VI Dispense precision extra test strips 3 x day to check blood glucose  
  
 insulin aspart 100 unit/mL injection Commonly known as:  NOVOLOG  
by SubCUTAneous route. 6 units before meals  
  
 insulin glargine 100 unit/mL injection Commonly known as:  LANTUS Take 30 units once daily  Indications: type 2 diabetes mellitus  
  
 levothyroxine 200 mcg tablet Commonly known as:  SYNTHROID Take 1 Tab by mouth Daily (before breakfast). Indications: HYPOTHYROIDISM  
  
 magnesium chloride 64 mg delayed release tablet Commonly known as:  MAG DELAY Take  by mouth. 8 pills 3 times daily  
  
 metoprolol tartrate 25 mg tablet Commonly known as:  LOPRESSOR Take 0.5 Tabs by mouth two (2) times a day. Omeprazole delayed release 20 mg tablet Commonly known as:  PRILOSEC D/R Take 20 mg by mouth daily. spironolactone 25 mg tablet Commonly known as:  ALDACTONE Take 1 Tab by mouth daily. Indications: EDEMA  
  
 testosterone 20.25 mg/1.25 gram (1.62 %) gel Commonly known as:  ANDROGEL Apply  to affected area as needed. VENTOLIN HFA 90 mcg/actuation inhaler Generic drug:  albuterol Take  by inhalation as needed. VITAMIN B-12 1,000 mcg tablet Generic drug:  cyanocobalamin Take 1,000 mcg by mouth daily. To-Do List   
 05/04/2017 1:00 PM  
  Appointment with [facility] CT RM 2 at [facility] RAD [address] ([phone]) GENERAL INSTRUCTIONS  If you were given medications to take for a contrast allergy prior to having this study, please arrange to have someone drive you to your appointment. If you have had a creatinine level drawn within the past 30 days, please bring most recent results to your appt. This study does not require you to drink contrast prior to your study. MEDICATIONS  Bring a complete list of all medications you are currently taking to include prescriptions, over-the-counter meds, herbals, vitamins & any dietary supplements. STUDY DETAILS Patient must be NPO (nothing to eat/drink, including meds and water) for 4 hours prior to study. OUTSIDE FILMS  Bring outside films, CDs, and reports related to the study with you on the day of your exam.  QUESTIONS  Notify the CT Department if you have any questions concerning your study. Opal Earing - [phone] [facility] Yessenia Khan - [phone] Patient Instructions Surgical Instructions: · Complete your three showers using the Hibiclens surgical soap on Friday night, Saturday night, and Sunday night. · Discontinue your Aspirin medication after Friday's dose. · Nothing to eat or drink after midnight on Sunday night. · Take your Metoprolol, Synthroid, and Prilosec medication with a very small sip of water the morning of surgery. · Check in at 6 am Monday morning to the lobby of the [facility]. · Please bring your incentive spirometer and all your medication bottles to the hospital the morning of surgery. Call [phone] if any questions or concerns. Introducing [facility]! Dear Amanuel Arriaga: Thank you for requesting a BUX account. Our records indicate that you already have an active BUX account. You can access your account anytime at [email or web address] Did you know that you can access your hospital and ER discharge instructions at any time in BUX? You can also review all of your test results from your hospital stay or ER visit. Additional Information If you have questions, please visit the Frequently Asked Questions section of the BUX website at [email or web address] Remember, BUX is NOT to be used for urgent needs. For medical emergencies, dial 911. Now available from your iPhone and Android! Please provide this summary of care documentation to your next provider. Your primary care clinician is listed as Aroldo Perez. If you have any questions after today's visit, please call [phone].

## 2017-05-05 ENCOUNTER — HOSPITAL ENCOUNTER (OUTPATIENT)
Dept: LAB | Age: 72
Discharge: HOME | End: 2017-05-05
Payer: MEDICARE

## 2017-05-05 DIAGNOSIS — Z12.11 SCREEN FOR COLON CANCER: ICD-10-CM

## 2017-05-05 LAB
ATRIAL RATE: 55 BPM
CALCULATED P AXIS, ECG09: 47 DEGREES
CALCULATED R AXIS, ECG10: -29 DEGREES
CALCULATED T AXIS, ECG11: 10 DEGREES
DIAGNOSIS, 93000: NORMAL
P-R INTERVAL, ECG05: 146 MS
Q-T INTERVAL, ECG07: 432 MS
QRS DURATION, ECG06: 108 MS
QTC CALCULATION (BEZET), ECG08: 413 MS
VENTRICULAR RATE, ECG03: 55 BPM

## 2017-05-05 PROCEDURE — 82274 ASSAY TEST FOR BLOOD FECAL: CPT | Performed by: FAMILY MEDICINE

## 2017-05-07 LAB
BACTERIA SPEC CULT: ABNORMAL
SERVICE CMNT-IMP: ABNORMAL

## 2017-05-08 ENCOUNTER — TELEPHONE (OUTPATIENT)
Dept: CARDIOTHORACIC SURGERY | Age: 72
End: 2017-05-08

## 2017-05-08 DIAGNOSIS — I15.0 RENOVASCULAR HYPERTENSION: ICD-10-CM

## 2017-05-08 DIAGNOSIS — Z01.818 PRE-OP EVALUATION: ICD-10-CM

## 2017-05-08 DIAGNOSIS — I35.0 SEVERE AORTIC STENOSIS: Primary | ICD-10-CM

## 2017-05-08 DIAGNOSIS — N18.30 CKD (CHRONIC KIDNEY DISEASE) STAGE 3, GFR 30-59 ML/MIN (HCC): ICD-10-CM

## 2017-05-08 DIAGNOSIS — R01.1 MURMUR, CARDIAC: ICD-10-CM

## 2017-05-08 RX ORDER — SULFAMETHOXAZOLE AND TRIMETHOPRIM 400; 80 MG/1; MG/1
1 TABLET ORAL 2 TIMES DAILY
Qty: 10 TAB | Refills: 0 | Status: ON HOLD | OUTPATIENT
Start: 2017-05-08 | End: 2017-05-16

## 2017-05-08 NOTE — TELEPHONE ENCOUNTER
Verbal order with read back given from Dr. María Saini for pt to complete UA w/ culture this Wednesday, 5/10/2017. S/w pt re- request from Dr. María Saini to have pt complete UA w/ culture this coming Wednesday, 5/10. Pt states he will be able to come to [facility] that morning and give a urine sample. Relayed above information to Dr. María Saini. Dr. María Saini informs pt has been prescribed Bactrim for results of UA pt completed last Friday, 5/5/2017. Dr. María Saini also requests pt stop taking Spironolactone starting today, 5/8/2017. Called pt back, relayed that order has been entered into the system for pt to complete UA w/ culture. Also relayed that Bridgette Whitmore has asked pt stop taking Spironolactone starting today. Pt states he took this medication this morning, but will not take any more until advised to.

## 2017-05-10 ENCOUNTER — TELEPHONE (OUTPATIENT)
Dept: CARDIOTHORACIC SURGERY | Age: 72
End: 2017-05-10

## 2017-05-10 ENCOUNTER — HOSPITAL ENCOUNTER (OUTPATIENT)
Dept: PREADMISSION TESTING | Age: 72
Discharge: HOME | End: 2017-05-10
Payer: MEDICARE

## 2017-05-10 DIAGNOSIS — I35.0 SEVERE AORTIC STENOSIS: ICD-10-CM

## 2017-05-10 DIAGNOSIS — Z01.818 PRE-OP EVALUATION: ICD-10-CM

## 2017-05-10 DIAGNOSIS — N39.0 URINARY TRACT INFECTION WITHOUT HEMATURIA, SITE UNSPECIFIED: Primary | ICD-10-CM

## 2017-05-10 LAB
APPEARANCE UR: CLEAR
BACTERIA URNS QL MICRO: ABNORMAL /HPF
BILIRUB UR QL: NEGATIVE
COLOR UR: YELLOW
EPITH CASTS URNS QL MICRO: ABNORMAL /LPF (ref 0–5)
GLUCOSE UR STRIP.AUTO-MCNC: NEGATIVE MG/DL
HGB UR QL STRIP: NEGATIVE
KETONES UR QL STRIP.AUTO: NEGATIVE MG/DL
LEUKOCYTE ESTERASE UR QL STRIP.AUTO: ABNORMAL
NITRITE UR QL STRIP.AUTO: NEGATIVE
PH UR STRIP: 5 [PH] (ref 5–8)
PROT UR STRIP-MCNC: NEGATIVE MG/DL
RBC #/AREA URNS HPF: ABNORMAL /HPF (ref 0–5)
SP GR UR REFRACTOMETRY: 1.01 (ref 1–1.03)
UROBILINOGEN UR QL STRIP.AUTO: 0.2 EU/DL (ref 0.2–1)
WBC URNS QL MICRO: ABNORMAL /HPF (ref 0–4)

## 2017-05-10 PROCEDURE — 87186 SC STD MICRODIL/AGAR DIL: CPT | Performed by: THORACIC SURGERY (CARDIOTHORACIC VASCULAR SURGERY)

## 2017-05-10 PROCEDURE — 87086 URINE CULTURE/COLONY COUNT: CPT | Performed by: THORACIC SURGERY (CARDIOTHORACIC VASCULAR SURGERY)

## 2017-05-10 PROCEDURE — 87077 CULTURE AEROBIC IDENTIFY: CPT | Performed by: THORACIC SURGERY (CARDIOTHORACIC VASCULAR SURGERY)

## 2017-05-10 PROCEDURE — 36415 COLL VENOUS BLD VENIPUNCTURE: CPT | Performed by: THORACIC SURGERY (CARDIOTHORACIC VASCULAR SURGERY)

## 2017-05-10 PROCEDURE — 81001 URINALYSIS AUTO W/SCOPE: CPT | Performed by: THORACIC SURGERY (CARDIOTHORACIC VASCULAR SURGERY)

## 2017-05-10 NOTE — TELEPHONE ENCOUNTER
Patient called to see about the soap for his surgery. I was advised by Renetta Fajardo to let the patient know he had one package left and to use that pack the night of his surgery. Dr. Fabby Trevino , [phone] or nurse will be contacting him after the results of his urinalysis come back for re-scheduling of his surgery.

## 2017-05-11 LAB — HEMOCCULT STL QL IA: NEGATIVE

## 2017-05-12 ENCOUNTER — TELEPHONE (OUTPATIENT)
Dept: CARDIOTHORACIC SURGERY | Age: 72
End: 2017-05-12

## 2017-05-12 LAB
BACTERIA SPEC CULT: ABNORMAL
SERVICE CMNT-IMP: ABNORMAL

## 2017-05-12 NOTE — TELEPHONE ENCOUNTER
I spoke with Mrs Mara Garcia last night. Her 's urine culture after 5 days of Bactrim continues to show 100K col of gram negative rods. He though remains asymptomatic. I had spoken to Dr Tish Parker, Infectious Disease specialist, prior to calling the patient. She stated that asymptomatic UTIs do not need to be treated in general, and if it is treated in preparation for implanting a prosthetic device, a full course of antibiotics (as he received) is appropriate treatment and a follow-up urine culture is not needed. She recommended proceeding with the surgery without further treatment. We will bring him in early next week for surgery.

## 2017-05-15 ENCOUNTER — TELEPHONE (OUTPATIENT)
Dept: CARDIOTHORACIC SURGERY | Age: 72
End: 2017-05-15

## 2017-05-15 ENCOUNTER — ANESTHESIA EVENT (OUTPATIENT)
Dept: CARDIOTHORACIC SURGERY | Age: 72
DRG: 220 | End: 2017-05-15
Payer: MEDICARE

## 2017-05-15 ENCOUNTER — PATIENT MESSAGE (OUTPATIENT)
Dept: CARDIOTHORACIC SURGERY | Age: 72
End: 2017-05-15

## 2017-05-15 RX ORDER — PHENYLEPHRINE HCL IN 0.9% NACL 30MG/250ML
10-100 PLASTIC BAG, INJECTION (ML) INTRAVENOUS
Status: DISCONTINUED | OUTPATIENT
Start: 2017-05-16 | End: 2017-05-16

## 2017-05-15 NOTE — ANESTHESIA PREPROCEDURE EVALUATION
Anesthetic History   No history of anesthetic complications       Comments:   Risk Factors for Postoperative nausea/vomiting:       History of postoperative nausea/vomiting? NO       Female? NO       Motion sickness? NO       Intended opioid administration for postoperative analgesia? YES      Smoking Abstinence  Current Smoker? NO  Elective Surgery? YES  Seen preoperatively by anesthesiologist or proxy prior to day of surgery? YES  Pt abstained from smoking 24 hours prior to anesthesia? YES     Review of Systems / Medical History  Patient summary reviewed and pertinent labs reviewed    Pulmonary          Shortness of breath and undiagnosed apnea         Neuro/Psych   Within defined limits           Cardiovascular    Hypertension: poorly controlled  Valvular problems/murmurs: aortic stenosis    CHF: NYHA Classification II, dyspnea on exertion    Hyperlipidemia    Exercise tolerance: >4 METS  Comments: Left ventricle: Systolic function was normal by visual assessment. Ejection fraction was estimated in the range of 60 % to 65 %. No obvious wall motion abnormalities identified in the views obtained. Wall thickness was at the upper limits of normal.    Aortic valve: Leaflets exhibited markedly increased thickness and reduced mobility. There was moderate to severe stenosis. Valve peak gradient was 61 mmHg. Valve mean gradient was 39 mmHg. Estimated aortic valve area (by VTI) was 0.5 cmï¾². ZACKERY likely overestimates severity of aortic stenosis due to difficulty in measuring LVOT diameter. Aorta, systemic arteries: The root exhibited dilatation. LHC  1. Left main is patent. It bifurcates into left anterior descending artery and circumflex artery. 2. Left anterior descending artery has wall irregularities in its proximal portion followed by a mid long calcific 50% stenosis. IFR was 0.96, indicating noncritical nature of stenosis.  Diagonal 1 and diagonal 2 artery medium caliber vessel with wall irregularities. 3. Circumflex artery is codominant with wall irregularities. Obtuse  marginal 1 and obtuse marginal 2 arteries appear to be patent. 4. Right coronary artery is codominant and appears to be medium  caliber vessel with mid 30% to 40% stenosis. 5. Left ventricular ejection fraction is 60% with left ventricular end-  diastolic pressure of 13 mmHg. 6. LV pressures were 173 x 16 mmHg. 7. Aortic pressures were 146 x 71 mmHg. 8. Peak-to-peak gradient was about 30 mmHg. Mean gradient was  calculated to be 39 mm of gradient. GI/Hepatic/Renal     GERD: well controlled    Renal disease: CRI      Comments: RENAL:  1. No significant renal artery stenosis identified, both renal  arteries visualized. 2. The right kidney measures 10.6 cm.  3. The left kidney measures 11.6 cm.  4. The renal vein is patent bilaterally. 5. No evidence of aneurysm noted in the abdominal aorta. 6. Cyst(s) noted on both kidneys measuring 1.2 x .94 cm on the right and 1.1 x .92 cm on the left.    Endo/Other    Diabetes: poorly controlled, type 2  Hypothyroidism: well controlled  Obesity and arthritis     Other Findings   Comments: Calcifications of the aortic valve leaflet noted. The thoracic aorta is normal in caliber as described above. At the level of the main pulmonary artery, the ascending aorta measures 3.9 cm x 3.8 cm.     Three-vessel coronary artery disease.     No adenopathy.     Mild dependent atelectasis.     Left lower lobe granuloma.          Physical Exam    Airway  Mallampati: II  TM Distance: 4 - 6 cm  Neck ROM: normal range of motion        Cardiovascular    Rhythm: regular  Rate: abnormal    Murmur: Grade 5, Aortic area     Dental    Dentition: Caps/crowns     Pulmonary  Breath sounds clear to auscultation               Abdominal  GI exam deferred       Other Findings            Anesthetic Plan    ASA: 4  Anesthesia type: general    Monitoring Plan: Arterial line, NATHANAEL, BIS, CVP and Worden-Joseline    Post procedure ventilation   Induction: Intravenous  Anesthetic plan and risks discussed with: Patient and Spouse

## 2017-05-15 NOTE — TELEPHONE ENCOUNTER
Called pt to remind him of his upcoming surgery on 5/16 at 0730. He denies any cough, fever or UTI symptoms. He is still wearing his blood band. Confirmed NPO after midnight except for metoprolol, synthroid and prilosec with a sip of water and 0600 arrival. Confirmed with blood bank that his current specimen in the lab is adequate and he does not need another sample. Urine culture results noted and d/w Dr. Lucio Yadav. See his previous note. Surgery instructions also sent to pt via [employer].     Dami Cannon PA-C CVTS  05/15/17, 1:42 PM

## 2017-05-16 ENCOUNTER — HOSPITAL ENCOUNTER (INPATIENT)
Age: 72
LOS: 4 days | Discharge: HOME HEALTH | DRG: 220 | End: 2017-05-20
Attending: THORACIC SURGERY (CARDIOTHORACIC VASCULAR SURGERY) | Admitting: THORACIC SURGERY (CARDIOTHORACIC VASCULAR SURGERY)
Payer: MEDICARE

## 2017-05-16 ENCOUNTER — ANESTHESIA (OUTPATIENT)
Dept: CARDIOTHORACIC SURGERY | Age: 72
DRG: 220 | End: 2017-05-16
Payer: MEDICARE

## 2017-05-16 ENCOUNTER — APPOINTMENT (OUTPATIENT)
Dept: GENERAL RADIOLOGY | Age: 72
DRG: 220 | End: 2017-05-16
Attending: PHYSICIAN ASSISTANT
Payer: MEDICARE

## 2017-05-16 DIAGNOSIS — I35.0 SEVERE AORTIC STENOSIS: Primary | Chronic | ICD-10-CM

## 2017-05-16 DIAGNOSIS — Z95.2 S/P AVR (AORTIC VALVE REPLACEMENT): ICD-10-CM

## 2017-05-16 PROBLEM — E78.5 DYSLIPIDEMIA: Chronic | Status: ACTIVE | Noted: 2017-03-01

## 2017-05-16 LAB
ADMINISTERED INITIALS, ADMINIT: NORMAL
ALBUMIN SERPL BCP-MCNC: 3.4 G/DL (ref 3.4–5)
ALBUMIN/GLOB SERPL: 0.9 {RATIO} (ref 0.8–1.7)
ALP SERPL-CCNC: 94 U/L (ref 45–117)
ALT SERPL-CCNC: 33 U/L (ref 16–61)
ANION GAP BLD CALC-SCNC: 5 MMOL/L (ref 3–18)
ANION GAP BLD CALC-SCNC: 7 MMOL/L (ref 3–18)
APTT PPP: 28.6 SEC (ref 23–36.4)
ARTERIAL PATENCY WRIST A: ABNORMAL
AST SERPL W P-5'-P-CCNC: 21 U/L (ref 15–37)
ATRIAL RATE: 65 BPM
BASE DEFICIT BLD-SCNC: 1 MMOL/L
BASE DEFICIT BLD-SCNC: 2 MMOL/L
BASE DEFICIT BLD-SCNC: 3 MMOL/L
BASE DEFICIT BLD-SCNC: 4 MMOL/L
BASE DEFICIT BLD-SCNC: 5 MMOL/L
BASE DEFICIT BLD-SCNC: 6 MMOL/L
BASE DEFICIT BLD-SCNC: 8 MMOL/L
BASE EXCESS BLD CALC-SCNC: 0 MMOL/L
BASE EXCESS BLD CALC-SCNC: 3 MMOL/L
BASOPHILS # BLD AUTO: 0 K/UL (ref 0–0.1)
BASOPHILS # BLD: 0 % (ref 0–2)
BDY SITE: ABNORMAL
BILIRUB SERPL-MCNC: 0.3 MG/DL (ref 0.2–1)
BODY TEMPERATURE: 32.4
BODY TEMPERATURE: 32.9
BODY TEMPERATURE: 34.8
BODY TEMPERATURE: 35.2
BODY TEMPERATURE: 35.3
BODY TEMPERATURE: 35.3
BODY TEMPERATURE: 35.9
BODY TEMPERATURE: 36.2
BODY TEMPERATURE: 36.2
BODY TEMPERATURE: 36.3
BODY TEMPERATURE: 36.4
BUN SERPL-MCNC: 21 MG/DL (ref 7–18)
BUN SERPL-MCNC: 24 MG/DL (ref 7–18)
BUN/CREAT SERPL: 18 (ref 12–20)
BUN/CREAT SERPL: 19 (ref 12–20)
CA-I BLD-MCNC: 0.96 MMOL/L (ref 1.12–1.32)
CA-I BLD-MCNC: 1.05 MMOL/L (ref 1.12–1.32)
CA-I BLD-MCNC: 1.07 MMOL/L (ref 1.12–1.32)
CA-I BLD-MCNC: 1.1 MMOL/L (ref 1.12–1.32)
CA-I BLD-MCNC: 1.1 MMOL/L (ref 1.12–1.32)
CA-I BLD-MCNC: 1.18 MMOL/L (ref 1.12–1.32)
CA-I BLD-MCNC: 1.21 MMOL/L (ref 1.12–1.32)
CA-I BLD-MCNC: 1.21 MMOL/L (ref 1.12–1.32)
CA-I BLD-MCNC: 1.23 MMOL/L (ref 1.12–1.32)
CA-I BLD-MCNC: 1.29 MMOL/L (ref 1.12–1.32)
CALCIUM SERPL-MCNC: 8.6 MG/DL (ref 8.5–10.1)
CALCIUM SERPL-MCNC: 9.2 MG/DL (ref 8.5–10.1)
CALCULATED P AXIS, ECG09: 45 DEGREES
CALCULATED R AXIS, ECG10: -36 DEGREES
CALCULATED T AXIS, ECG11: 8 DEGREES
CHLORIDE SERPL-SCNC: 110 MMOL/L (ref 100–108)
CHLORIDE SERPL-SCNC: 113 MMOL/L (ref 100–108)
CO2 SERPL-SCNC: 23 MMOL/L (ref 21–32)
CO2 SERPL-SCNC: 26 MMOL/L (ref 21–32)
CREAT SERPL-MCNC: 1.15 MG/DL (ref 0.6–1.3)
CREAT SERPL-MCNC: 1.27 MG/DL (ref 0.6–1.3)
D50 ADMINISTERED, D50ADM: 0 ML
D50 ORDER, D50ORD: 0 ML
DIAGNOSIS, 93000: NORMAL
DIFFERENTIAL METHOD BLD: ABNORMAL
EOSINOPHIL # BLD: 0.1 K/UL (ref 0–0.4)
EOSINOPHIL NFR BLD: 1 % (ref 0–5)
ERYTHROCYTE [DISTWIDTH] IN BLOOD BY AUTOMATED COUNT: 14.4 % (ref 11.6–14.5)
EST. AVERAGE GLUCOSE BLD GHB EST-MCNC: 134 MG/DL
GAS FLOW.O2 O2 DELIVERY SYS: ABNORMAL L/MIN
GAS FLOW.O2 SETTING OXYMISER: 12 BPM
GAS FLOW.O2 SETTING OXYMISER: 12 BPM
GAS FLOW.O2 SETTING OXYMISER: 14 BPM
GAS FLOW.O2 SETTING OXYMISER: 4 L/M
GLOBULIN SER CALC-MCNC: 3.9 G/DL (ref 2–4)
GLUCOSE BLD STRIP.AUTO-MCNC: 102 MG/DL (ref 70–110)
GLUCOSE BLD STRIP.AUTO-MCNC: 113 MG/DL (ref 70–110)
GLUCOSE BLD STRIP.AUTO-MCNC: 115 MG/DL (ref 70–110)
GLUCOSE BLD STRIP.AUTO-MCNC: 119 MG/DL (ref 70–110)
GLUCOSE BLD STRIP.AUTO-MCNC: 120 MG/DL (ref 70–110)
GLUCOSE BLD STRIP.AUTO-MCNC: 120 MG/DL (ref 74–106)
GLUCOSE BLD STRIP.AUTO-MCNC: 127 MG/DL (ref 74–106)
GLUCOSE BLD STRIP.AUTO-MCNC: 130 MG/DL (ref 74–106)
GLUCOSE BLD STRIP.AUTO-MCNC: 136 MG/DL (ref 74–106)
GLUCOSE BLD STRIP.AUTO-MCNC: 143 MG/DL (ref 74–106)
GLUCOSE BLD STRIP.AUTO-MCNC: 144 MG/DL (ref 70–110)
GLUCOSE BLD STRIP.AUTO-MCNC: 153 MG/DL (ref 70–110)
GLUCOSE BLD STRIP.AUTO-MCNC: 156 MG/DL (ref 70–110)
GLUCOSE BLD STRIP.AUTO-MCNC: 158 MG/DL (ref 70–110)
GLUCOSE BLD STRIP.AUTO-MCNC: 159 MG/DL (ref 74–106)
GLUCOSE BLD STRIP.AUTO-MCNC: 164 MG/DL (ref 70–110)
GLUCOSE BLD STRIP.AUTO-MCNC: 169 MG/DL (ref 74–106)
GLUCOSE BLD STRIP.AUTO-MCNC: 79 MG/DL (ref 74–106)
GLUCOSE BLD STRIP.AUTO-MCNC: 80 MG/DL (ref 74–106)
GLUCOSE BLD STRIP.AUTO-MCNC: 80 MG/DL (ref 74–106)
GLUCOSE BLD STRIP.AUTO-MCNC: 93 MG/DL (ref 70–110)
GLUCOSE BLD STRIP.AUTO-MCNC: 98 MG/DL (ref 70–110)
GLUCOSE SERPL-MCNC: 100 MG/DL (ref 74–99)
GLUCOSE SERPL-MCNC: 122 MG/DL (ref 74–99)
GLUCOSE, GLC: 102 MG/DL
GLUCOSE, GLC: 113 MG/DL
GLUCOSE, GLC: 115 MG/DL
GLUCOSE, GLC: 119 MG/DL
GLUCOSE, GLC: 120 MG/DL
GLUCOSE, GLC: 120 MG/DL
GLUCOSE, GLC: 144 MG/DL
GLUCOSE, GLC: 153 MG/DL
GLUCOSE, GLC: 156 MG/DL
GLUCOSE, GLC: 158 MG/DL
GLUCOSE, GLC: 164 MG/DL
GLUCOSE, GLC: 98 MG/DL
HBA1C MFR BLD: 6.3 % (ref 4.2–5.6)
HCO3 BLD-SCNC: 18.2 MMOL/L (ref 22–26)
HCO3 BLD-SCNC: 19.5 MMOL/L (ref 22–26)
HCO3 BLD-SCNC: 20.6 MMOL/L (ref 22–26)
HCO3 BLD-SCNC: 21.5 MMOL/L (ref 22–26)
HCO3 BLD-SCNC: 21.9 MMOL/L (ref 22–26)
HCO3 BLD-SCNC: 22 MMOL/L (ref 22–26)
HCO3 BLD-SCNC: 23.1 MMOL/L (ref 22–26)
HCO3 BLD-SCNC: 23.2 MMOL/L (ref 22–26)
HCO3 BLD-SCNC: 23.2 MMOL/L (ref 22–26)
HCO3 BLD-SCNC: 24.1 MMOL/L (ref 22–26)
HCO3 BLD-SCNC: 24.2 MMOL/L (ref 22–26)
HCO3 BLD-SCNC: 24.3 MMOL/L (ref 22–26)
HCO3 BLD-SCNC: 27.5 MMOL/L (ref 22–26)
HCT VFR BLD AUTO: 31.3 % (ref 36–48)
HCT VFR BLD CALC: 25 % (ref 36–49)
HCT VFR BLD CALC: 26 % (ref 36–49)
HCT VFR BLD CALC: 28 % (ref 36–49)
HCT VFR BLD CALC: 29 % (ref 36–49)
HCT VFR BLD CALC: 29 % (ref 36–49)
HCT VFR BLD CALC: 30 % (ref 36–49)
HCT VFR BLD CALC: 33 % (ref 36–49)
HGB BLD-MCNC: 10.2 G/DL (ref 12–16)
HGB BLD-MCNC: 10.7 G/DL (ref 13–16)
HGB BLD-MCNC: 11.2 G/DL (ref 12–16)
HGB BLD-MCNC: 8.5 G/DL (ref 12–16)
HGB BLD-MCNC: 8.8 G/DL (ref 12–16)
HGB BLD-MCNC: 9.5 G/DL (ref 12–16)
HGB BLD-MCNC: 9.9 G/DL (ref 12–16)
HGB BLD-MCNC: 9.9 G/DL (ref 12–16)
HIGH TARGET, HITG: 150 MG/DL
HIGH TARGET, HITG: 180 MG/DL
INR PPP: 1.4 (ref 0.8–1.2)
INSPIRATION.DURATION SETTING TIME VENT: 1 SEC
INSPIRATION.DURATION SETTING TIME VENT: 1 SEC
INSULIN ADMINSTERED, INSADM: 0 UNITS/HOUR
INSULIN ADMINSTERED, INSADM: 0.5 UNITS/HOUR
INSULIN ADMINSTERED, INSADM: 1.1 UNITS/HOUR
INSULIN ADMINSTERED, INSADM: 1.8 UNITS/HOUR
INSULIN ADMINSTERED, INSADM: 1.9 UNITS/HOUR
INSULIN ADMINSTERED, INSADM: 2 UNITS/HOUR
INSULIN ADMINSTERED, INSADM: 2.9 UNITS/HOUR
INSULIN ADMINSTERED, INSADM: 2.9 UNITS/HOUR
INSULIN ADMINSTERED, INSADM: 3 UNITS/HOUR
INSULIN ADMINSTERED, INSADM: 4.2 UNITS/HOUR
INSULIN ADMINSTERED, INSADM: 4.2 UNITS/HOUR
INSULIN ADMINSTERED, INSADM: 4.7 UNITS/HOUR
INSULIN ORDER, INSORD: 0 UNITS/HOUR
INSULIN ORDER, INSORD: 0.5 UNITS/HOUR
INSULIN ORDER, INSORD: 1.1 UNITS/HOUR
INSULIN ORDER, INSORD: 1.8 UNITS/HOUR
INSULIN ORDER, INSORD: 1.9 UNITS/HOUR
INSULIN ORDER, INSORD: 2 UNITS/HOUR
INSULIN ORDER, INSORD: 2.9 UNITS/HOUR
INSULIN ORDER, INSORD: 2.9 UNITS/HOUR
INSULIN ORDER, INSORD: 3 UNITS/HOUR
INSULIN ORDER, INSORD: 4.2 UNITS/HOUR
INSULIN ORDER, INSORD: 4.2 UNITS/HOUR
INSULIN ORDER, INSORD: 4.7 UNITS/HOUR
LOW TARGET, LOT: 140 MG/DL
LOW TARGET, LOT: 80 MG/DL
LYMPHOCYTES # BLD AUTO: 11 % (ref 21–52)
LYMPHOCYTES # BLD: 1.1 K/UL (ref 0.9–3.6)
MAGNESIUM SERPL-MCNC: 2.5 MG/DL (ref 1.6–2.6)
MCH RBC QN AUTO: 28.4 PG (ref 24–34)
MCHC RBC AUTO-ENTMCNC: 34.2 G/DL (ref 31–37)
MCV RBC AUTO: 83 FL (ref 74–97)
MINUTES UNTIL NEXT BG, NBG: 60 MIN
MONOCYTES # BLD: 0.3 K/UL (ref 0.05–1.2)
MONOCYTES NFR BLD AUTO: 3 % (ref 3–10)
MULTIPLIER, MUL: 0
MULTIPLIER, MUL: 0.01
MULTIPLIER, MUL: 0.02
MULTIPLIER, MUL: 0.02
MULTIPLIER, MUL: 0.03
MULTIPLIER, MUL: 0.04
MULTIPLIER, MUL: 0.05
NEUTS SEG # BLD: 8 K/UL (ref 1.8–8)
NEUTS SEG NFR BLD AUTO: 85 % (ref 40–73)
O2/TOTAL GAS SETTING VFR VENT: 100 %
O2/TOTAL GAS SETTING VFR VENT: 40 %
O2/TOTAL GAS SETTING VFR VENT: 40 %
O2/TOTAL GAS SETTING VFR VENT: 50 %
O2/TOTAL GAS SETTING VFR VENT: 60 %
O2/TOTAL GAS SETTING VFR VENT: 75 %
O2/TOTAL GAS SETTING VFR VENT: 80 %
ORDER INITIALS, ORDINIT: NORMAL
P-R INTERVAL, ECG05: 182 MS
PCO2 BLD: 31.2 MMHG (ref 35–45)
PCO2 BLD: 31.9 MMHG (ref 35–45)
PCO2 BLD: 32 MMHG (ref 35–45)
PCO2 BLD: 32.3 MMHG (ref 35–45)
PCO2 BLD: 32.6 MMHG (ref 35–45)
PCO2 BLD: 32.7 MMHG (ref 35–45)
PCO2 BLD: 33.4 MMHG (ref 35–45)
PCO2 BLD: 34.9 MMHG (ref 35–45)
PCO2 BLD: 35.9 MMHG (ref 35–45)
PCO2 BLD: 38 MMHG (ref 35–45)
PCO2 BLD: 38.6 MMHG (ref 35–45)
PCO2 BLD: 40.9 MMHG (ref 35–45)
PCO2 BLD: 41 MMHG (ref 35–45)
PEEP RESPIRATORY: 5 CMH2O
PEEP RESPIRATORY: 8 CMH2O
PH BLD: 7.36 [PH] (ref 7.35–7.45)
PH BLD: 7.38 [PH] (ref 7.35–7.45)
PH BLD: 7.39 [PH] (ref 7.35–7.45)
PH BLD: 7.4 [PH] (ref 7.35–7.45)
PH BLD: 7.4 [PH] (ref 7.35–7.45)
PH BLD: 7.43 [PH] (ref 7.35–7.45)
PH BLD: 7.44 [PH] (ref 7.35–7.45)
PH BLD: 7.44 [PH] (ref 7.35–7.45)
PH BLD: 7.45 [PH] (ref 7.35–7.45)
PH BLD: 7.45 [PH] (ref 7.35–7.45)
PH BLD: 7.48 [PH] (ref 7.35–7.45)
PLATELET # BLD AUTO: 112 K/UL (ref 135–420)
PMV BLD AUTO: 9.1 FL (ref 9.2–11.8)
PO2 BLD: 120 MMHG (ref 80–100)
PO2 BLD: 123 MMHG (ref 80–100)
PO2 BLD: 136 MMHG (ref 80–100)
PO2 BLD: 147 MMHG (ref 80–100)
PO2 BLD: 173 MMHG (ref 80–100)
PO2 BLD: 177 MMHG (ref 80–100)
PO2 BLD: 307 MMHG (ref 80–100)
PO2 BLD: 318 MMHG (ref 80–100)
PO2 BLD: 344 MMHG (ref 80–100)
PO2 BLD: 466 MMHG (ref 80–100)
PO2 BLD: 488 MMHG (ref 80–100)
PO2 BLD: 86 MMHG (ref 80–100)
PO2 BLD: 91 MMHG (ref 80–100)
POTASSIUM BLD-SCNC: 3.8 MMOL/L (ref 3.5–5.5)
POTASSIUM BLD-SCNC: 3.8 MMOL/L (ref 3.5–5.5)
POTASSIUM BLD-SCNC: 4 MMOL/L (ref 3.5–5.5)
POTASSIUM BLD-SCNC: 4.1 MMOL/L (ref 3.5–5.5)
POTASSIUM BLD-SCNC: 4.1 MMOL/L (ref 3.5–5.5)
POTASSIUM BLD-SCNC: 4.2 MMOL/L (ref 3.5–5.5)
POTASSIUM BLD-SCNC: 4.5 MMOL/L (ref 3.5–5.5)
POTASSIUM BLD-SCNC: 4.8 MMOL/L (ref 3.5–5.5)
POTASSIUM SERPL-SCNC: 3.9 MMOL/L (ref 3.5–5.5)
POTASSIUM SERPL-SCNC: 4 MMOL/L (ref 3.5–5.5)
PRESSURE SUPPORT SETTING VENT: 10 CMH2O
PRESSURE SUPPORT SETTING VENT: 7 CMH2O
PROT SERPL-MCNC: 7.3 G/DL (ref 6.4–8.2)
PROTHROMBIN TIME: 16.4 SEC (ref 11.5–15.2)
Q-T INTERVAL, ECG07: 404 MS
QRS DURATION, ECG06: 106 MS
QTC CALCULATION (BEZET), ECG08: 420 MS
RBC # BLD AUTO: 3.77 M/UL (ref 4.7–5.5)
SAO2 % BLD: 100 % (ref 92–97)
SAO2 % BLD: 96 % (ref 92–97)
SAO2 % BLD: 98 % (ref 92–97)
SAO2 % BLD: 99 % (ref 92–97)
SERVICE CMNT-IMP: ABNORMAL
SODIUM BLD-SCNC: 138 MMOL/L (ref 136–145)
SODIUM BLD-SCNC: 138 MMOL/L (ref 136–145)
SODIUM BLD-SCNC: 139 MMOL/L (ref 136–145)
SODIUM BLD-SCNC: 140 MMOL/L (ref 136–145)
SODIUM BLD-SCNC: 140 MMOL/L (ref 136–145)
SODIUM BLD-SCNC: 141 MMOL/L (ref 136–145)
SODIUM BLD-SCNC: 143 MMOL/L (ref 136–145)
SODIUM BLD-SCNC: 145 MMOL/L (ref 136–145)
SODIUM SERPL-SCNC: 140 MMOL/L (ref 136–145)
SODIUM SERPL-SCNC: 144 MMOL/L (ref 136–145)
SPECIMEN TYPE: ABNORMAL
TOTAL RESP. RATE, ITRR: 12
TOTAL RESP. RATE, ITRR: 14
VENTILATION MODE VENT: ABNORMAL
VENTRICULAR RATE, ECG03: 65 BPM
VOLUME CONTROL PLUS IVLCP: YES
VT SETTING VENT: 600 ML
WBC # BLD AUTO: 9.5 K/UL (ref 4.6–13.2)

## 2017-05-16 PROCEDURE — 74011250636 HC RX REV CODE- 250/636: Performed by: ANESTHESIOLOGY

## 2017-05-16 PROCEDURE — 74011250636 HC RX REV CODE- 250/636: Performed by: PHYSICIAN ASSISTANT

## 2017-05-16 PROCEDURE — 77030002996 HC SUT SLK J&J -A: Performed by: ANESTHESIOLOGY

## 2017-05-16 PROCEDURE — 74011250636 HC RX REV CODE- 250/636

## 2017-05-16 PROCEDURE — 74011000250 HC RX REV CODE- 250: Performed by: ANESTHESIOLOGY

## 2017-05-16 PROCEDURE — 77030008643 HC TU CONN PERF MEDT -A: Performed by: THORACIC SURGERY (CARDIOTHORACIC VASCULAR SURGERY)

## 2017-05-16 PROCEDURE — 77030013079 HC BLNKT BAIR HGGR 3M -A: Performed by: ANESTHESIOLOGY

## 2017-05-16 PROCEDURE — 77030002912 HC SUT ETHBND J&J -A: Performed by: THORACIC SURGERY (CARDIOTHORACIC VASCULAR SURGERY)

## 2017-05-16 PROCEDURE — 74011000250 HC RX REV CODE- 250

## 2017-05-16 PROCEDURE — 74011636637 HC RX REV CODE- 636/637: Performed by: PHYSICIAN ASSISTANT

## 2017-05-16 PROCEDURE — 83735 ASSAY OF MAGNESIUM: CPT | Performed by: PHYSICIAN ASSISTANT

## 2017-05-16 PROCEDURE — 77030014491 HC PLEDG PTFE BARD -A: Performed by: THORACIC SURGERY (CARDIOTHORACIC VASCULAR SURGERY)

## 2017-05-16 PROCEDURE — 77030011640 HC PAD GRND REM COVD -A: Performed by: THORACIC SURGERY (CARDIOTHORACIC VASCULAR SURGERY)

## 2017-05-16 PROCEDURE — 77030016037 HC MANFLD MULTI QUES -B: Performed by: ANESTHESIOLOGY

## 2017-05-16 PROCEDURE — 77030009354: Performed by: THORACIC SURGERY (CARDIOTHORACIC VASCULAR SURGERY)

## 2017-05-16 PROCEDURE — 74011250637 HC RX REV CODE- 250/637

## 2017-05-16 PROCEDURE — 82803 BLOOD GASES ANY COMBINATION: CPT

## 2017-05-16 PROCEDURE — 77030013368: Performed by: THORACIC SURGERY (CARDIOTHORACIC VASCULAR SURGERY)

## 2017-05-16 PROCEDURE — 74011250637 HC RX REV CODE- 250/637: Performed by: PHYSICIAN ASSISTANT

## 2017-05-16 PROCEDURE — P9045 ALBUMIN (HUMAN), 5%, 250 ML: HCPCS | Performed by: PHYSICIAN ASSISTANT

## 2017-05-16 PROCEDURE — 85730 THROMBOPLASTIN TIME PARTIAL: CPT | Performed by: PHYSICIAN ASSISTANT

## 2017-05-16 PROCEDURE — 77030025885 HC SPNG GELFOAM PFIZ -B: Performed by: THORACIC SURGERY (CARDIOTHORACIC VASCULAR SURGERY)

## 2017-05-16 PROCEDURE — 77030008516 HC TBNG INSUF ENDO S&N -B: Performed by: THORACIC SURGERY (CARDIOTHORACIC VASCULAR SURGERY)

## 2017-05-16 PROCEDURE — 77030018729 HC ELECTRD DEFIB PAD CARD -B: Performed by: ANESTHESIOLOGY

## 2017-05-16 PROCEDURE — 82947 ASSAY GLUCOSE BLOOD QUANT: CPT

## 2017-05-16 PROCEDURE — 76060000040 HC ANESTHESIA 4.5 TO 5 HR: Performed by: THORACIC SURGERY (CARDIOTHORACIC VASCULAR SURGERY)

## 2017-05-16 PROCEDURE — 77030006913 HC BLD STRNL SAW BRSM -B: Performed by: THORACIC SURGERY (CARDIOTHORACIC VASCULAR SURGERY)

## 2017-05-16 PROCEDURE — 93005 ELECTROCARDIOGRAM TRACING: CPT

## 2017-05-16 PROCEDURE — C1713 ANCHOR/SCREW BN/BN,TIS/BN: HCPCS | Performed by: THORACIC SURGERY (CARDIOTHORACIC VASCULAR SURGERY)

## 2017-05-16 PROCEDURE — 71010 XR CHEST PORT: CPT

## 2017-05-16 PROCEDURE — 77030006247 HC LD PCMKR MYOCRD BPLR TEMP MEDT -B: Performed by: THORACIC SURGERY (CARDIOTHORACIC VASCULAR SURGERY)

## 2017-05-16 PROCEDURE — 88311 DECALCIFY TISSUE: CPT | Performed by: THORACIC SURGERY (CARDIOTHORACIC VASCULAR SURGERY)

## 2017-05-16 PROCEDURE — 77030018836 HC SOL IRR NACL ICUM -A: Performed by: THORACIC SURGERY (CARDIOTHORACIC VASCULAR SURGERY)

## 2017-05-16 PROCEDURE — 77030003010 HC SUT SURG STL J&J -B: Performed by: THORACIC SURGERY (CARDIOTHORACIC VASCULAR SURGERY)

## 2017-05-16 PROCEDURE — 77030009080 HC CLP HSTAT ENDOSC TELE -A: Performed by: THORACIC SURGERY (CARDIOTHORACIC VASCULAR SURGERY)

## 2017-05-16 PROCEDURE — 77030037326 HC PLT 8H X STRNLLOK TI BIOM -F: Performed by: THORACIC SURGERY (CARDIOTHORACIC VASCULAR SURGERY)

## 2017-05-16 PROCEDURE — 88305 TISSUE EXAM BY PATHOLOGIST: CPT | Performed by: THORACIC SURGERY (CARDIOTHORACIC VASCULAR SURGERY)

## 2017-05-16 PROCEDURE — 77030008544 HC TBNG MON PRSS MRTM -B: Performed by: THORACIC SURGERY (CARDIOTHORACIC VASCULAR SURGERY)

## 2017-05-16 PROCEDURE — 74011000258 HC RX REV CODE- 258: Performed by: PHYSICIAN ASSISTANT

## 2017-05-16 PROCEDURE — 77030010939 HC CLP LIG TELE -B: Performed by: THORACIC SURGERY (CARDIOTHORACIC VASCULAR SURGERY)

## 2017-05-16 PROCEDURE — 77010033678 HC OXYGEN DAILY

## 2017-05-16 PROCEDURE — 77030019908 HC STETH ESOPH SIMS -A: Performed by: ANESTHESIOLOGY

## 2017-05-16 PROCEDURE — 83036 HEMOGLOBIN GLYCOSYLATED A1C: CPT | Performed by: PHYSICIAN ASSISTANT

## 2017-05-16 PROCEDURE — 77030013797 HC KT TRNSDUC PRSSR EDWD -A: Performed by: ANESTHESIOLOGY

## 2017-05-16 PROCEDURE — 5A1221Z PERFORMANCE OF CARDIAC OUTPUT, CONTINUOUS: ICD-10-PCS | Performed by: THORACIC SURGERY (CARDIOTHORACIC VASCULAR SURGERY)

## 2017-05-16 PROCEDURE — 74011000272 HC RX REV CODE- 272: Performed by: THORACIC SURGERY (CARDIOTHORACIC VASCULAR SURGERY)

## 2017-05-16 PROCEDURE — 77030014007 HC SPNG HEMSTAT J&J -B: Performed by: THORACIC SURGERY (CARDIOTHORACIC VASCULAR SURGERY)

## 2017-05-16 PROCEDURE — 77030008500 HC TBNG CONN PRSS BD -A: Performed by: ANESTHESIOLOGY

## 2017-05-16 PROCEDURE — 80048 BASIC METABOLIC PNL TOTAL CA: CPT | Performed by: PHYSICIAN ASSISTANT

## 2017-05-16 PROCEDURE — 76010000199 HC CV SURG 4.5 TO 5 HR INTENSV-TIER 1: Performed by: THORACIC SURGERY (CARDIOTHORACIC VASCULAR SURGERY)

## 2017-05-16 PROCEDURE — 77030010938 HC CLP LIG TELE -A: Performed by: THORACIC SURGERY (CARDIOTHORACIC VASCULAR SURGERY)

## 2017-05-16 PROCEDURE — 77030013373: Performed by: THORACIC SURGERY (CARDIOTHORACIC VASCULAR SURGERY)

## 2017-05-16 PROCEDURE — 77030020061 HC IV BLD WRMR ADMIN SET 3M -B: Performed by: ANESTHESIOLOGY

## 2017-05-16 PROCEDURE — 77030016570 HC BLNKT BAIR HGGR 3M -B: Performed by: THORACIC SURGERY (CARDIOTHORACIC VASCULAR SURGERY)

## 2017-05-16 PROCEDURE — 77030002987 HC SUT PROL J&J -B: Performed by: THORACIC SURGERY (CARDIOTHORACIC VASCULAR SURGERY)

## 2017-05-16 PROCEDURE — 77030027138 HC INCENT SPIROMETER -A

## 2017-05-16 PROCEDURE — 85014 HEMATOCRIT: CPT

## 2017-05-16 PROCEDURE — 77030010796: Performed by: THORACIC SURGERY (CARDIOTHORACIC VASCULAR SURGERY)

## 2017-05-16 PROCEDURE — 77030008771 HC TU NG SALEM SUMP -A: Performed by: ANESTHESIOLOGY

## 2017-05-16 PROCEDURE — 65620000000 HC RM CCU GENERAL

## 2017-05-16 PROCEDURE — 85025 COMPLETE CBC W/AUTO DIFF WBC: CPT | Performed by: PHYSICIAN ASSISTANT

## 2017-05-16 PROCEDURE — 77030008683 HC TU ET CUF COVD -A: Performed by: ANESTHESIOLOGY

## 2017-05-16 PROCEDURE — 77030026918 HC ADMN ST IV BLD BD -A: Performed by: ANESTHESIOLOGY

## 2017-05-16 PROCEDURE — 36600 WITHDRAWAL OF ARTERIAL BLOOD: CPT

## 2017-05-16 PROCEDURE — 02RF08Z REPLACEMENT OF AORTIC VALVE WITH ZOOPLASTIC TISSUE, OPEN APPROACH: ICD-10-PCS | Performed by: THORACIC SURGERY (CARDIOTHORACIC VASCULAR SURGERY)

## 2017-05-16 PROCEDURE — 77030019802: Performed by: THORACIC SURGERY (CARDIOTHORACIC VASCULAR SURGERY)

## 2017-05-16 PROCEDURE — 31500 INSERT EMERGENCY AIRWAY: CPT

## 2017-05-16 PROCEDURE — 77030018729 HC ELECTRD DEFIB PAD CARD -B: Performed by: THORACIC SURGERY (CARDIOTHORACIC VASCULAR SURGERY)

## 2017-05-16 PROCEDURE — P9045 ALBUMIN (HUMAN), 5%, 250 ML: HCPCS

## 2017-05-16 PROCEDURE — 85610 PROTHROMBIN TIME: CPT | Performed by: PHYSICIAN ASSISTANT

## 2017-05-16 PROCEDURE — 74011250636 HC RX REV CODE- 250/636: Performed by: THORACIC SURGERY (CARDIOTHORACIC VASCULAR SURGERY)

## 2017-05-16 PROCEDURE — 77030010817: Performed by: THORACIC SURGERY (CARDIOTHORACIC VASCULAR SURGERY)

## 2017-05-16 PROCEDURE — 77030030254: Performed by: THORACIC SURGERY (CARDIOTHORACIC VASCULAR SURGERY)

## 2017-05-16 PROCEDURE — 77030012390 HC DRN CHST BTL GTNG -B: Performed by: THORACIC SURGERY (CARDIOTHORACIC VASCULAR SURGERY)

## 2017-05-16 PROCEDURE — 82962 GLUCOSE BLOOD TEST: CPT

## 2017-05-16 PROCEDURE — 77030012891

## 2017-05-16 PROCEDURE — 77030010800: Performed by: THORACIC SURGERY (CARDIOTHORACIC VASCULAR SURGERY)

## 2017-05-16 PROCEDURE — 77030002913 HC SUT ETHBND J&J -B: Performed by: THORACIC SURGERY (CARDIOTHORACIC VASCULAR SURGERY)

## 2017-05-16 PROCEDURE — 77030037329 HC PLT L STRNLLOK TI BIOM -F: Performed by: THORACIC SURGERY (CARDIOTHORACIC VASCULAR SURGERY)

## 2017-05-16 PROCEDURE — 77030002986 HC SUT PROL J&J -A: Performed by: THORACIC SURGERY (CARDIOTHORACIC VASCULAR SURGERY)

## 2017-05-16 PROCEDURE — 77030019799 HC CBL ATRL DISP MEDT -B: Performed by: THORACIC SURGERY (CARDIOTHORACIC VASCULAR SURGERY)

## 2017-05-16 PROCEDURE — 77030028990 HC ADH TISS DERMFLX CHMP -B: Performed by: THORACIC SURGERY (CARDIOTHORACIC VASCULAR SURGERY)

## 2017-05-16 PROCEDURE — 74011000250 HC RX REV CODE- 250: Performed by: PHYSICIAN ASSISTANT

## 2017-05-16 PROCEDURE — 77030005401 HC CATH RAD ARRO -A: Performed by: ANESTHESIOLOGY

## 2017-05-16 PROCEDURE — 77030012012 HC AIRWY OP SFT TELE -A: Performed by: ANESTHESIOLOGY

## 2017-05-16 PROCEDURE — 80053 COMPREHEN METABOLIC PANEL: CPT | Performed by: PHYSICIAN ASSISTANT

## 2017-05-16 PROCEDURE — 77030037494 HC VLV AORT TRIFECTA STJU -J: Performed by: THORACIC SURGERY (CARDIOTHORACIC VASCULAR SURGERY)

## 2017-05-16 PROCEDURE — 77030031139 HC SUT VCRL2 J&J -A: Performed by: THORACIC SURGERY (CARDIOTHORACIC VASCULAR SURGERY)

## 2017-05-16 PROCEDURE — 77030018719 HC DRSG PTCH ANTIMIC J&J -A: Performed by: ANESTHESIOLOGY

## 2017-05-16 PROCEDURE — 74011000250 HC RX REV CODE- 250: Performed by: THORACIC SURGERY (CARDIOTHORACIC VASCULAR SURGERY)

## 2017-05-16 PROCEDURE — 77030016791 HC CATH CV SWN1 EDWD -C: Performed by: ANESTHESIOLOGY

## 2017-05-16 PROCEDURE — 77030010514 HC APPL CLP LIG COVD -B: Performed by: THORACIC SURGERY (CARDIOTHORACIC VASCULAR SURGERY)

## 2017-05-16 DEVICE — VALVE STNT HRT AORT 25MM -- TRIFECTA: Type: IMPLANTABLE DEVICE | Site: AORTIC VALVE | Status: FUNCTIONAL

## 2017-05-16 DEVICE — IMPLANTABLE DEVICE: Type: IMPLANTABLE DEVICE | Site: CHEST | Status: FUNCTIONAL

## 2017-05-16 DEVICE — BARD® PTFE FELT PLEDGETS, (RECTANGLE), 4.8 MM X 6 MM
Type: IMPLANTABLE DEVICE | Site: AORTIC VALVE | Status: FUNCTIONAL
Brand: BARD® PTFE FELT PLEDGETS

## 2017-05-16 DEVICE — IMPLANTABLE DEVICE
Type: IMPLANTABLE DEVICE | Site: CHEST | Status: FUNCTIONAL
Brand: STERNALOCK® BLU SYSTEM

## 2017-05-16 DEVICE — PLATE BNE 8 H X SHP STERNALOCK BLU PRI CLSR SYS: Type: IMPLANTABLE DEVICE | Site: CHEST | Status: FUNCTIONAL

## 2017-05-16 RX ORDER — FENTANYL CITRATE 50 UG/ML
25-50 INJECTION, SOLUTION INTRAMUSCULAR; INTRAVENOUS
Status: DISCONTINUED | OUTPATIENT
Start: 2017-05-16 | End: 2017-05-19

## 2017-05-16 RX ORDER — SODIUM CHLORIDE 9 MG/ML
INJECTION INTRAMUSCULAR; INTRAVENOUS; SUBCUTANEOUS
Status: DISCONTINUED
Start: 2017-05-16 | End: 2017-05-16

## 2017-05-16 RX ORDER — ENOXAPARIN SODIUM 100 MG/ML
40 INJECTION SUBCUTANEOUS EVERY 24 HOURS
Status: DISCONTINUED | OUTPATIENT
Start: 2017-05-17 | End: 2017-05-19

## 2017-05-16 RX ORDER — DEXAMETHASONE SODIUM PHOSPHATE 4 MG/ML
INJECTION, SOLUTION INTRA-ARTICULAR; INTRALESIONAL; INTRAMUSCULAR; INTRAVENOUS; SOFT TISSUE AS NEEDED
Status: DISCONTINUED | OUTPATIENT
Start: 2017-05-16 | End: 2017-05-16 | Stop reason: HOSPADM

## 2017-05-16 RX ORDER — NALOXONE HYDROCHLORIDE 0.4 MG/ML
0.4 INJECTION, SOLUTION INTRAMUSCULAR; INTRAVENOUS; SUBCUTANEOUS AS NEEDED
Status: DISCONTINUED | OUTPATIENT
Start: 2017-05-16 | End: 2017-05-20 | Stop reason: HOSPADM

## 2017-05-16 RX ORDER — HEPARIN SODIUM 1000 [USP'U]/ML
INJECTION, SOLUTION INTRAVENOUS; SUBCUTANEOUS AS NEEDED
Status: DISCONTINUED | OUTPATIENT
Start: 2017-05-16 | End: 2017-05-16 | Stop reason: HOSPADM

## 2017-05-16 RX ORDER — FENTANYL CITRATE 50 UG/ML
INJECTION, SOLUTION INTRAMUSCULAR; INTRAVENOUS
Status: COMPLETED
Start: 2017-05-16 | End: 2017-05-16

## 2017-05-16 RX ORDER — AMINOCAPROIC ACID 250 MG/ML
INJECTION, SOLUTION INTRAVENOUS AS NEEDED
Status: DISCONTINUED | OUTPATIENT
Start: 2017-05-16 | End: 2017-05-16 | Stop reason: HOSPADM

## 2017-05-16 RX ORDER — CEFAZOLIN SODIUM 1 G/3ML
INJECTION, POWDER, FOR SOLUTION INTRAMUSCULAR; INTRAVENOUS AS NEEDED
Status: DISCONTINUED | OUTPATIENT
Start: 2017-05-16 | End: 2017-05-16 | Stop reason: HOSPADM

## 2017-05-16 RX ORDER — LIDOCAINE HYDROCHLORIDE 20 MG/ML
JELLY TOPICAL
Status: DISCONTINUED
Start: 2017-05-16 | End: 2017-05-16

## 2017-05-16 RX ORDER — CISATRACURIUM BESYLATE 2 MG/ML
INJECTION, SOLUTION INTRAVENOUS AS NEEDED
Status: DISCONTINUED | OUTPATIENT
Start: 2017-05-16 | End: 2017-05-16 | Stop reason: HOSPADM

## 2017-05-16 RX ORDER — SODIUM CHLORIDE 9 MG/ML
75 INJECTION, SOLUTION INTRAVENOUS CONTINUOUS
Status: DISCONTINUED | OUTPATIENT
Start: 2017-05-16 | End: 2017-05-16

## 2017-05-16 RX ORDER — SODIUM CHLORIDE 0.9 % (FLUSH) 0.9 %
5-10 SYRINGE (ML) INJECTION AS NEEDED
Status: DISCONTINUED | OUTPATIENT
Start: 2017-05-16 | End: 2017-05-16

## 2017-05-16 RX ORDER — CEFAZOLIN SODIUM 2 G/50ML
2 SOLUTION INTRAVENOUS
Status: COMPLETED | OUTPATIENT
Start: 2017-05-16 | End: 2017-05-16

## 2017-05-16 RX ORDER — AMIODARONE HYDROCHLORIDE 200 MG/1
400 TABLET ORAL 2 TIMES DAILY
Status: DISCONTINUED | OUTPATIENT
Start: 2017-05-17 | End: 2017-05-20 | Stop reason: HOSPADM

## 2017-05-16 RX ORDER — ETOMIDATE 2 MG/ML
INJECTION INTRAVENOUS AS NEEDED
Status: DISCONTINUED | OUTPATIENT
Start: 2017-05-16 | End: 2017-05-16 | Stop reason: HOSPADM

## 2017-05-16 RX ORDER — FEBUXOSTAT 40 MG/1
40 TABLET, FILM COATED ORAL DAILY
Status: DISCONTINUED | OUTPATIENT
Start: 2017-05-17 | End: 2017-05-20 | Stop reason: HOSPADM

## 2017-05-16 RX ORDER — SODIUM CHLORIDE 9 MG/ML
10 INJECTION, SOLUTION INTRAVENOUS CONTINUOUS
Status: DISCONTINUED | OUTPATIENT
Start: 2017-05-16 | End: 2017-05-18

## 2017-05-16 RX ORDER — ALBUMIN HUMAN 50 G/1000ML
12.5 SOLUTION INTRAVENOUS
Status: DISCONTINUED | OUTPATIENT
Start: 2017-05-16 | End: 2017-05-20 | Stop reason: HOSPADM

## 2017-05-16 RX ORDER — FENTANYL CITRATE 50 UG/ML
INJECTION, SOLUTION INTRAMUSCULAR; INTRAVENOUS AS NEEDED
Status: DISCONTINUED | OUTPATIENT
Start: 2017-05-16 | End: 2017-05-16 | Stop reason: HOSPADM

## 2017-05-16 RX ORDER — POTASSIUM CHLORIDE 14.9 MG/ML
10 INJECTION INTRAVENOUS ONCE
Status: COMPLETED | OUTPATIENT
Start: 2017-05-16 | End: 2017-05-19

## 2017-05-16 RX ORDER — ACETAMINOPHEN 650 MG/1
SUPPOSITORY RECTAL
Status: COMPLETED
Start: 2017-05-16 | End: 2017-05-16

## 2017-05-16 RX ORDER — HYDROMORPHONE HYDROCHLORIDE 1 MG/ML
0.5 INJECTION, SOLUTION INTRAMUSCULAR; INTRAVENOUS; SUBCUTANEOUS
Status: DISPENSED | OUTPATIENT
Start: 2017-05-16 | End: 2017-05-17

## 2017-05-16 RX ORDER — POLYETHYLENE GLYCOL 3350 17 G/17G
17 POWDER, FOR SOLUTION ORAL DAILY
Status: DISCONTINUED | OUTPATIENT
Start: 2017-05-17 | End: 2017-05-20 | Stop reason: HOSPADM

## 2017-05-16 RX ORDER — DOPAMINE HYDROCHLORIDE 160 MG/100ML
3-10 INJECTION, SOLUTION INTRAVENOUS
Status: DISCONTINUED | OUTPATIENT
Start: 2017-05-16 | End: 2017-05-16

## 2017-05-16 RX ORDER — PAPAVERINE HYDROCHLORIDE 30 MG/ML
INJECTION INTRAMUSCULAR; INTRAVENOUS
Status: DISCONTINUED
Start: 2017-05-16 | End: 2017-05-16

## 2017-05-16 RX ORDER — DOCUSATE SODIUM 100 MG/1
100 CAPSULE, LIQUID FILLED ORAL 2 TIMES DAILY
Status: DISCONTINUED | OUTPATIENT
Start: 2017-05-17 | End: 2017-05-20 | Stop reason: HOSPADM

## 2017-05-16 RX ORDER — CALCIUM CHLORIDE INJECTION 100 MG/ML
INJECTION, SOLUTION INTRAVENOUS
Status: DISCONTINUED
Start: 2017-05-16 | End: 2017-05-16

## 2017-05-16 RX ORDER — VANCOMYCIN HYDROCHLORIDE 1 G/20ML
INJECTION, POWDER, LYOPHILIZED, FOR SOLUTION INTRAVENOUS AS NEEDED
Status: DISCONTINUED | OUTPATIENT
Start: 2017-05-16 | End: 2017-05-16 | Stop reason: HOSPADM

## 2017-05-16 RX ORDER — MAGNESIUM SULFATE 1 G/100ML
INJECTION INTRAVENOUS AS NEEDED
Status: DISCONTINUED | OUTPATIENT
Start: 2017-05-16 | End: 2017-05-16 | Stop reason: HOSPADM

## 2017-05-16 RX ORDER — ALBUTEROL SULFATE 0.83 MG/ML
2.5 SOLUTION RESPIRATORY (INHALATION)
Status: DISCONTINUED | OUTPATIENT
Start: 2017-05-16 | End: 2017-05-20 | Stop reason: HOSPADM

## 2017-05-16 RX ORDER — SUCCINYLCHOLINE CHLORIDE 20 MG/ML
INJECTION INTRAMUSCULAR; INTRAVENOUS AS NEEDED
Status: DISCONTINUED | OUTPATIENT
Start: 2017-05-16 | End: 2017-05-16 | Stop reason: HOSPADM

## 2017-05-16 RX ORDER — MANNITOL 20 G/100ML
INJECTION, SOLUTION INTRAVENOUS
Status: DISCONTINUED | OUTPATIENT
Start: 2017-05-16 | End: 2017-05-16 | Stop reason: HOSPADM

## 2017-05-16 RX ORDER — ALBUMIN HUMAN 50 G/1000ML
12.5 SOLUTION INTRAVENOUS
Status: COMPLETED | OUTPATIENT
Start: 2017-05-16 | End: 2017-05-16

## 2017-05-16 RX ORDER — LIDOCAINE HYDROCHLORIDE 20 MG/ML
INJECTION, SOLUTION INFILTRATION; PERINEURAL AS NEEDED
Status: DISCONTINUED | OUTPATIENT
Start: 2017-05-16 | End: 2017-05-16 | Stop reason: HOSPADM

## 2017-05-16 RX ORDER — MIDAZOLAM HYDROCHLORIDE 1 MG/ML
INJECTION, SOLUTION INTRAMUSCULAR; INTRAVENOUS AS NEEDED
Status: DISCONTINUED | OUTPATIENT
Start: 2017-05-16 | End: 2017-05-16 | Stop reason: HOSPADM

## 2017-05-16 RX ORDER — ALBUMIN HUMAN 50 G/1000ML
SOLUTION INTRAVENOUS
Status: DISCONTINUED | OUTPATIENT
Start: 2017-05-16 | End: 2017-05-16 | Stop reason: HOSPADM

## 2017-05-16 RX ORDER — ACETAMINOPHEN 325 MG/1
650 TABLET ORAL
Status: DISCONTINUED | OUTPATIENT
Start: 2017-05-16 | End: 2017-05-20 | Stop reason: HOSPADM

## 2017-05-16 RX ORDER — ASPIRIN 81 MG/1
81 TABLET ORAL DAILY
Status: DISCONTINUED | OUTPATIENT
Start: 2017-05-17 | End: 2017-05-20 | Stop reason: HOSPADM

## 2017-05-16 RX ORDER — SODIUM CHLORIDE 0.9 % (FLUSH) 0.9 %
5-10 SYRINGE (ML) INJECTION EVERY 8 HOURS
Status: DISCONTINUED | OUTPATIENT
Start: 2017-05-16 | End: 2017-05-20 | Stop reason: HOSPADM

## 2017-05-16 RX ORDER — SODIUM CHLORIDE 0.9 % (FLUSH) 0.9 %
5-10 SYRINGE (ML) INJECTION AS NEEDED
Status: DISCONTINUED | OUTPATIENT
Start: 2017-05-16 | End: 2017-05-20 | Stop reason: HOSPADM

## 2017-05-16 RX ORDER — BUPIVACAINE HYDROCHLORIDE 5 MG/ML
INJECTION, SOLUTION EPIDURAL; INTRACAUDAL AS NEEDED
Status: DISCONTINUED | OUTPATIENT
Start: 2017-05-16 | End: 2017-05-16 | Stop reason: HOSPADM

## 2017-05-16 RX ORDER — PROPOFOL 10 MG/ML
INJECTION, EMULSION INTRAVENOUS
Status: DISCONTINUED | OUTPATIENT
Start: 2017-05-16 | End: 2017-05-16 | Stop reason: HOSPADM

## 2017-05-16 RX ORDER — PROTAMINE SULFATE 10 MG/ML
INJECTION, SOLUTION INTRAVENOUS AS NEEDED
Status: DISCONTINUED | OUTPATIENT
Start: 2017-05-16 | End: 2017-05-16 | Stop reason: HOSPADM

## 2017-05-16 RX ORDER — HEPARIN SODIUM 1000 [USP'U]/ML
INJECTION, SOLUTION INTRAVENOUS; SUBCUTANEOUS
Status: DISCONTINUED
Start: 2017-05-16 | End: 2017-05-16

## 2017-05-16 RX ORDER — CHLORHEXIDINE GLUCONATE 1.2 MG/ML
10 RINSE ORAL 2 TIMES DAILY
Status: DISCONTINUED | OUTPATIENT
Start: 2017-05-16 | End: 2017-05-20 | Stop reason: HOSPADM

## 2017-05-16 RX ORDER — SODIUM BICARBONATE 1 MEQ/ML
50 SYRINGE (ML) INTRAVENOUS ONCE
Status: COMPLETED | OUTPATIENT
Start: 2017-05-16 | End: 2017-05-16

## 2017-05-16 RX ORDER — VANCOMYCIN HYDROCHLORIDE 1 G/20ML
INJECTION, POWDER, LYOPHILIZED, FOR SOLUTION INTRAVENOUS
Status: DISCONTINUED
Start: 2017-05-16 | End: 2017-05-16

## 2017-05-16 RX ORDER — SODIUM CHLORIDE 0.9 % (FLUSH) 0.9 %
5-10 SYRINGE (ML) INJECTION EVERY 8 HOURS
Status: DISCONTINUED | OUTPATIENT
Start: 2017-05-16 | End: 2017-05-16

## 2017-05-16 RX ORDER — METOPROLOL TARTRATE 25 MG/1
12.5 TABLET, FILM COATED ORAL EVERY 12 HOURS
Status: DISCONTINUED | OUTPATIENT
Start: 2017-05-17 | End: 2017-05-20 | Stop reason: HOSPADM

## 2017-05-16 RX ORDER — HYDROCODONE BITARTRATE AND ACETAMINOPHEN 5; 325 MG/1; MG/1
1-2 TABLET ORAL
Status: DISCONTINUED | OUTPATIENT
Start: 2017-05-16 | End: 2017-05-20 | Stop reason: HOSPADM

## 2017-05-16 RX ORDER — SODIUM BICARBONATE 1 MEQ/ML
SYRINGE (ML) INTRAVENOUS
Status: COMPLETED
Start: 2017-05-16 | End: 2017-05-16

## 2017-05-16 RX ORDER — NITROGLYCERIN 40 MG/100ML
INJECTION INTRAVENOUS
Status: COMPLETED
Start: 2017-05-16 | End: 2017-05-16

## 2017-05-16 RX ORDER — WARFARIN SODIUM 5 MG/1
5 TABLET ORAL ONCE
Status: COMPLETED | OUTPATIENT
Start: 2017-05-17 | End: 2017-05-17

## 2017-05-16 RX ORDER — MAGNESIUM SULFATE 100 %
4 CRYSTALS MISCELLANEOUS AS NEEDED
Status: DISCONTINUED | OUTPATIENT
Start: 2017-05-16 | End: 2017-05-17 | Stop reason: SDUPTHER

## 2017-05-16 RX ORDER — ATORVASTATIN CALCIUM 20 MG/1
20 TABLET, FILM COATED ORAL DAILY
Status: DISCONTINUED | OUTPATIENT
Start: 2017-05-17 | End: 2017-05-20 | Stop reason: HOSPADM

## 2017-05-16 RX ORDER — MUPIROCIN 20 MG/G
OINTMENT TOPICAL 2 TIMES DAILY
Status: DISCONTINUED | OUTPATIENT
Start: 2017-05-16 | End: 2017-05-16 | Stop reason: SDUPTHER

## 2017-05-16 RX ORDER — POTASSIUM CHLORIDE 29.8 MG/ML
INJECTION INTRAVENOUS AS NEEDED
Status: DISCONTINUED | OUTPATIENT
Start: 2017-05-16 | End: 2017-05-16 | Stop reason: HOSPADM

## 2017-05-16 RX ORDER — OXYCODONE HYDROCHLORIDE 5 MG/1
5-10 TABLET ORAL
Status: DISPENSED | OUTPATIENT
Start: 2017-05-16 | End: 2017-05-17

## 2017-05-16 RX ORDER — PROPOFOL 10 MG/ML
5-100 VIAL (ML) INTRAVENOUS
Status: DISCONTINUED | OUTPATIENT
Start: 2017-05-16 | End: 2017-05-17

## 2017-05-16 RX ORDER — MUPIROCIN 20 MG/G
OINTMENT TOPICAL 2 TIMES DAILY
Status: DISCONTINUED | OUTPATIENT
Start: 2017-05-16 | End: 2017-05-20 | Stop reason: HOSPADM

## 2017-05-16 RX ORDER — NITROGLYCERIN 40 MG/100ML
5-200 INJECTION INTRAVENOUS
Status: DISCONTINUED | OUTPATIENT
Start: 2017-05-16 | End: 2017-05-17

## 2017-05-16 RX ORDER — CEFAZOLIN SODIUM 2 G/50ML
2 SOLUTION INTRAVENOUS EVERY 8 HOURS
Status: COMPLETED | OUTPATIENT
Start: 2017-05-16 | End: 2017-05-19

## 2017-05-16 RX ORDER — CISATRACURIUM BESYLATE 2 MG/ML
INJECTION, SOLUTION INTRAVENOUS
Status: DISCONTINUED
Start: 2017-05-16 | End: 2017-05-16

## 2017-05-16 RX ORDER — ONDANSETRON 2 MG/ML
4 INJECTION INTRAMUSCULAR; INTRAVENOUS
Status: DISCONTINUED | OUTPATIENT
Start: 2017-05-16 | End: 2017-05-20 | Stop reason: HOSPADM

## 2017-05-16 RX ORDER — ONDANSETRON 2 MG/ML
INJECTION INTRAMUSCULAR; INTRAVENOUS AS NEEDED
Status: DISCONTINUED | OUTPATIENT
Start: 2017-05-16 | End: 2017-05-16 | Stop reason: HOSPADM

## 2017-05-16 RX ORDER — SODIUM CHLORIDE 9 MG/ML
INJECTION, SOLUTION INTRAVENOUS
Status: DISCONTINUED | OUTPATIENT
Start: 2017-05-16 | End: 2017-05-16 | Stop reason: HOSPADM

## 2017-05-16 RX ORDER — PROPOFOL 10 MG/ML
INJECTION, EMULSION INTRAVENOUS AS NEEDED
Status: DISCONTINUED | OUTPATIENT
Start: 2017-05-16 | End: 2017-05-16 | Stop reason: HOSPADM

## 2017-05-16 RX ORDER — FAMOTIDINE 20 MG/1
20 TABLET, FILM COATED ORAL 2 TIMES DAILY
Status: DISCONTINUED | OUTPATIENT
Start: 2017-05-17 | End: 2017-05-20 | Stop reason: HOSPADM

## 2017-05-16 RX ORDER — DEXTROSE 50 % IN WATER (D50W) INTRAVENOUS SYRINGE
25-50 AS NEEDED
Status: DISCONTINUED | OUTPATIENT
Start: 2017-05-16 | End: 2017-05-20 | Stop reason: HOSPADM

## 2017-05-16 RX ORDER — BISACODYL 5 MG
10 TABLET, DELAYED RELEASE (ENTERIC COATED) ORAL DAILY
Status: DISCONTINUED | OUTPATIENT
Start: 2017-05-17 | End: 2017-05-20 | Stop reason: HOSPADM

## 2017-05-16 RX ORDER — BUPIVACAINE HYDROCHLORIDE 5 MG/ML
INJECTION, SOLUTION EPIDURAL; INTRACAUDAL
Status: DISCONTINUED
Start: 2017-05-16 | End: 2017-05-16

## 2017-05-16 RX ORDER — PHENYLEPHRINE HCL IN 0.9% NACL 30MG/250ML
10-100 PLASTIC BAG, INJECTION (ML) INTRAVENOUS
Status: DISCONTINUED | OUTPATIENT
Start: 2017-05-16 | End: 2017-05-17

## 2017-05-16 RX ORDER — CALCIUM CHLORIDE INJECTION 100 MG/ML
INJECTION, SOLUTION INTRAVENOUS AS NEEDED
Status: DISCONTINUED | OUTPATIENT
Start: 2017-05-16 | End: 2017-05-16 | Stop reason: HOSPADM

## 2017-05-16 RX ADMIN — CALCIUM CHLORIDE INJECTION 200 MG: 100 INJECTION, SOLUTION INTRAVENOUS at 11:40

## 2017-05-16 RX ADMIN — SODIUM CHLORIDE 1 UNITS/HR: 900 INJECTION, SOLUTION INTRAVENOUS at 09:38

## 2017-05-16 RX ADMIN — MUPIROCIN: 20 OINTMENT TOPICAL at 14:25

## 2017-05-16 RX ADMIN — ONDANSETRON 4 MG: 2 INJECTION INTRAMUSCULAR; INTRAVENOUS at 09:00

## 2017-05-16 RX ADMIN — SODIUM CHLORIDE 2.9 UNITS/HR: 900 INJECTION, SOLUTION INTRAVENOUS at 18:56

## 2017-05-16 RX ADMIN — SODIUM CHLORIDE: 9 INJECTION, SOLUTION INTRAVENOUS at 11:35

## 2017-05-16 RX ADMIN — Medication 10 ML: at 06:48

## 2017-05-16 RX ADMIN — ALBUMIN (HUMAN) 12.5 G: 12.5 INJECTION, SOLUTION INTRAVENOUS at 20:35

## 2017-05-16 RX ADMIN — SODIUM CHLORIDE 1 G/HR: 900 INJECTION, SOLUTION INTRAVENOUS at 08:50

## 2017-05-16 RX ADMIN — NITROGLYCERIN 10 MCG/MIN: 40 INJECTION INTRAVENOUS at 14:04

## 2017-05-16 RX ADMIN — HYDROMORPHONE HYDROCHLORIDE 0.5 MG: 1 INJECTION, SOLUTION INTRAMUSCULAR; INTRAVENOUS; SUBCUTANEOUS at 18:42

## 2017-05-16 RX ADMIN — PROPOFOL 50 MG: 10 INJECTION, EMULSION INTRAVENOUS at 09:45

## 2017-05-16 RX ADMIN — PROPOFOL 15 MCG/KG/MIN: 10 INJECTION, EMULSION INTRAVENOUS at 13:14

## 2017-05-16 RX ADMIN — PROTAMINE SULFATE 50 MG: 10 INJECTION, SOLUTION INTRAVENOUS at 11:50

## 2017-05-16 RX ADMIN — SODIUM BICARBONATE 50 MEQ: 84 INJECTION, SOLUTION INTRAVENOUS at 14:36

## 2017-05-16 RX ADMIN — ONDANSETRON 4 MG: 2 INJECTION INTRAMUSCULAR; INTRAVENOUS at 12:15

## 2017-05-16 RX ADMIN — MIDAZOLAM HYDROCHLORIDE 1 MG: 1 INJECTION, SOLUTION INTRAMUSCULAR; INTRAVENOUS at 07:56

## 2017-05-16 RX ADMIN — FENTANYL CITRATE 25 MCG: 50 INJECTION INTRAMUSCULAR; INTRAVENOUS at 17:37

## 2017-05-16 RX ADMIN — LIDOCAINE HYDROCHLORIDE 100 MG: 20 INJECTION, SOLUTION INFILTRATION; PERINEURAL at 11:08

## 2017-05-16 RX ADMIN — CEFAZOLIN SODIUM 2 G: 2 SOLUTION INTRAVENOUS at 21:00

## 2017-05-16 RX ADMIN — CHLORHEXIDINE GLUCONATE 10 ML: 1.2 RINSE ORAL at 18:00

## 2017-05-16 RX ADMIN — HYDROMORPHONE HYDROCHLORIDE 0.5 MG: 1 INJECTION, SOLUTION INTRAMUSCULAR; INTRAVENOUS; SUBCUTANEOUS at 22:42

## 2017-05-16 RX ADMIN — HEPARIN SODIUM 44000 UNITS: 1000 INJECTION, SOLUTION INTRAVENOUS; SUBCUTANEOUS at 09:25

## 2017-05-16 RX ADMIN — PROPOFOL 20 MG: 10 INJECTION, EMULSION INTRAVENOUS at 08:20

## 2017-05-16 RX ADMIN — SODIUM CHLORIDE 75 ML/HR: 900 INJECTION, SOLUTION INTRAVENOUS at 07:00

## 2017-05-16 RX ADMIN — CHLORHEXIDINE GLUCONATE 10 ML: 1.2 RINSE ORAL at 14:24

## 2017-05-16 RX ADMIN — SODIUM CHLORIDE: 9 INJECTION, SOLUTION INTRAVENOUS at 07:15

## 2017-05-16 RX ADMIN — PROPOFOL 5 MCG/KG/MIN: 10 INJECTION, EMULSION INTRAVENOUS at 09:38

## 2017-05-16 RX ADMIN — CEFAZOLIN SODIUM 2 G: 2 SOLUTION INTRAVENOUS at 08:50

## 2017-05-16 RX ADMIN — OXYCODONE HYDROCHLORIDE 5 MG: 5 TABLET ORAL at 20:01

## 2017-05-16 RX ADMIN — CISATRACURIUM BESYLATE 10 MG: 2 INJECTION, SOLUTION INTRAVENOUS at 09:45

## 2017-05-16 RX ADMIN — PROTAMINE SULFATE 50 MG: 10 INJECTION, SOLUTION INTRAVENOUS at 11:42

## 2017-05-16 RX ADMIN — FENTANYL CITRATE 50 MCG: 50 INJECTION, SOLUTION INTRAMUSCULAR; INTRAVENOUS at 11:43

## 2017-05-16 RX ADMIN — SODIUM CHLORIDE 10 ML/HR: 900 INJECTION, SOLUTION INTRAVENOUS at 20:54

## 2017-05-16 RX ADMIN — FENTANYL CITRATE 200 MCG: 50 INJECTION, SOLUTION INTRAMUSCULAR; INTRAVENOUS at 08:17

## 2017-05-16 RX ADMIN — NITROGLYCERIN 5 MCG/MIN: 40 INJECTION INTRAVENOUS at 09:00

## 2017-05-16 RX ADMIN — ACETAMINOPHEN 650 MG: 650 SUPPOSITORY RECTAL at 12:00

## 2017-05-16 RX ADMIN — ALBUMIN (HUMAN) 12.5 G: 12.5 INJECTION, SOLUTION INTRAVENOUS at 19:30

## 2017-05-16 RX ADMIN — Medication 50 MEQ: at 14:36

## 2017-05-16 RX ADMIN — SUCCINYLCHOLINE CHLORIDE 120 MG: 20 INJECTION INTRAMUSCULAR; INTRAVENOUS at 08:19

## 2017-05-16 RX ADMIN — Medication 10 ML: at 15:01

## 2017-05-16 RX ADMIN — SODIUM CHLORIDE 1 G/HR: 900 INJECTION, SOLUTION INTRAVENOUS at 13:18

## 2017-05-16 RX ADMIN — SODIUM CHLORIDE 0.5 UNITS/HR: 900 INJECTION, SOLUTION INTRAVENOUS at 15:17

## 2017-05-16 RX ADMIN — MIDAZOLAM HYDROCHLORIDE 1 MG: 1 INJECTION, SOLUTION INTRAMUSCULAR; INTRAVENOUS at 08:05

## 2017-05-16 RX ADMIN — FENTANYL CITRATE 550 MCG: 50 INJECTION, SOLUTION INTRAMUSCULAR; INTRAVENOUS at 09:00

## 2017-05-16 RX ADMIN — NITROGLYCERIN 5 MCG/MIN: 40 INJECTION INTRAVENOUS at 14:48

## 2017-05-16 RX ADMIN — ACETAMINOPHEN 650 MG: 650 SUPPOSITORY RECTAL at 08:25

## 2017-05-16 RX ADMIN — ALBUMIN HUMAN 500 ML/HR: 50 SOLUTION INTRAVENOUS at 12:25

## 2017-05-16 RX ADMIN — PROPOFOL 50 MG: 10 INJECTION, EMULSION INTRAVENOUS at 10:59

## 2017-05-16 RX ADMIN — CISATRACURIUM BESYLATE 20 MG: 2 INJECTION, SOLUTION INTRAVENOUS at 11:00

## 2017-05-16 RX ADMIN — FENTANYL CITRATE 50 MCG: 50 INJECTION, SOLUTION INTRAMUSCULAR; INTRAVENOUS at 11:21

## 2017-05-16 RX ADMIN — DEXAMETHASONE SODIUM PHOSPHATE 4 MG: 4 INJECTION, SOLUTION INTRA-ARTICULAR; INTRALESIONAL; INTRAMUSCULAR; INTRAVENOUS; SOFT TISSUE at 08:25

## 2017-05-16 RX ADMIN — PROPOFOL 50 MG: 10 INJECTION, EMULSION INTRAVENOUS at 11:11

## 2017-05-16 RX ADMIN — AMINOCAPROIC ACID 5 G: 250 INJECTION, SOLUTION INTRAVENOUS at 09:00

## 2017-05-16 RX ADMIN — PROTAMINE SULFATE 10 MG: 10 INJECTION, SOLUTION INTRAVENOUS at 11:34

## 2017-05-16 RX ADMIN — MUPIROCIN: 20 OINTMENT TOPICAL at 18:00

## 2017-05-16 RX ADMIN — MANNITOL: 20 INJECTION, SOLUTION INTRAVENOUS at 09:09

## 2017-05-16 RX ADMIN — PROTAMINE SULFATE 50 MG: 10 INJECTION, SOLUTION INTRAVENOUS at 11:46

## 2017-05-16 RX ADMIN — POTASSIUM CHLORIDE 10 MEQ: 14.9 INJECTION, SOLUTION INTRAVENOUS at 15:53

## 2017-05-16 RX ADMIN — ALBUMIN (HUMAN) 12.5 G: 12.5 INJECTION, SOLUTION INTRAVENOUS at 18:26

## 2017-05-16 RX ADMIN — MAGNESIUM SULFATE 1 G: 1 INJECTION INTRAVENOUS at 09:35

## 2017-05-16 RX ADMIN — CALCIUM CHLORIDE INJECTION 200 MG: 100 INJECTION, SOLUTION INTRAVENOUS at 11:36

## 2017-05-16 RX ADMIN — CEFAZOLIN SODIUM 2 G: 2 SOLUTION INTRAVENOUS at 14:22

## 2017-05-16 RX ADMIN — ETOMIDATE 20 MG: 2 INJECTION INTRAVENOUS at 08:18

## 2017-05-16 RX ADMIN — CISATRACURIUM BESYLATE 10 MG: 2 INJECTION, SOLUTION INTRAVENOUS at 09:30

## 2017-05-16 RX ADMIN — EPINEPHRINE 1 MCG/MIN: 1 INJECTION PARENTERAL at 11:18

## 2017-05-16 RX ADMIN — CEFAZOLIN SODIUM 2 G: 1 INJECTION, POWDER, FOR SOLUTION INTRAMUSCULAR; INTRAVENOUS at 12:00

## 2017-05-16 RX ADMIN — PROTAMINE SULFATE 50 MG: 10 INJECTION, SOLUTION INTRAVENOUS at 11:38

## 2017-05-16 RX ADMIN — Medication 10 ML: at 21:00

## 2017-05-16 RX ADMIN — CISATRACURIUM BESYLATE 20 MG: 2 INJECTION, SOLUTION INTRAVENOUS at 08:30

## 2017-05-16 RX ADMIN — PROTAMINE SULFATE 40 MG: 10 INJECTION, SOLUTION INTRAVENOUS at 11:36

## 2017-05-16 RX ADMIN — POTASSIUM CHLORIDE 20 MEQ: 29.8 INJECTION INTRAVENOUS at 12:15

## 2017-05-16 RX ADMIN — SODIUM CHLORIDE 0 UNITS/HR: 900 INJECTION, SOLUTION INTRAVENOUS at 16:21

## 2017-05-16 RX ADMIN — CALCIUM CHLORIDE INJECTION 200 MG: 100 INJECTION, SOLUTION INTRAVENOUS at 12:09

## 2017-05-16 RX ADMIN — AMINOCAPROIC ACID 5 G: 250 INJECTION, SOLUTION INTRAVENOUS at 08:50

## 2017-05-16 NOTE — ROUTINE PROCESS
1920: Report received and care assumed from [facility]. Vital signs stable pt resting in bed. Will continue to monitor. 2230: contacted [facility]ON regarding PAP 20/9 and CVP of 4. 5% Albumin 250 ml x 3 doses given per PRN order. New order received for 5% albumin 250 mls x 2 dose received if needed. 2250: Failure to get 100% sense and capture with pacer. Increased MA slowly to 20 on atrial wires and achieved 100% sense/capture. Will continue to monitor. 0000: Bedside shift change report given to [employer] (oncoming nurse) by Nisha Reece RN (offgoing nurse). Report included the following information SBAR, Kardex, OR Summary, Procedure Summary, Intake/Output, Recent Results, Med Rec Status, Cardiac Rhythm paced and Alarm Parameters .

## 2017-05-16 NOTE — ROUTINE PROCESS
Received pt from CVT OR. Intubated VSS. Not responsive to any stimuli. Settled. Atrial paced. 5% Albumin given for low CI of 1.7, slowly increased to 2.0. Nitro used for short period of time for increased BP with good effect. Propofol weaned slowly, pt at first woke and grabbed at ETT, and was unable to follow instructions. Restraints used for short period. At 1556 pt extubated to 4L  NC. At first deniing any pain, later IV Fentanyl and Hydromorphone given. With greater results from Dilaudid. IS used to 1250. Pt in high fowlers position with pillows behind knees.

## 2017-05-16 NOTE — H&P (VIEW-ONLY)
CARDIOTHORACIC SURGERY CONSULTATION NOTE    5/2/2017  3:31 PM    I am seeing this patient for the first time in consultation at the request of Dr. Heaven Chen. I have also reviewed his records and pertinent studies, and have obtained additional information on the patient's history from the patient's spouse who was present during the evaluation. Dinora Avalos is a 71 y.o. male who presents with mild shortness of breath that he notices primarily when he is singing in [employer]. He has suffered significant trauma to his right leg and is unable to ambulate very far easily so is unsure if he has significant exertional dyspnea. He needs surgery to replace his right knee, but can not have it until his severe aortic stenosis is repaired. He has also been experiencing lower extremity edema, palpitations, and light headedness, but denies chest pain, chest pressure/discomfort, claudication, exertional chest pressure/discomfort, fatigue, near-syncope, orthopnea, paroxysmal nocturnal dyspnea, syncope, tachypnea. Cardiac risk factors include dyslipidemia, diabetes mellitus, hypertension. There is no history of smoking/ tobacco exposure, family history. Past Medical History:   Diagnosis Date    Diabetes ([facility] [address] 1995    Fractures 1995    R Knee/ Tib fib fracture/surgery    Gout 2010    High cholesterol 2005    HTN (hypertension) 1990    Osteoarthritis 2013    Thyroid trouble        The past medical history is also notable for a right leg DVT and cataracts for which he needs an operation. Past Surgical History:   Procedure Laterality Date    HX HIP REPLACEMENT Left 2009    HX ORTHOPAEDIC Right     R knee/Tibfib surgery       The past surgical history is also notable for back surgery (fusion).     Present medications include   Current Outpatient Prescriptions   Medication Sig Dispense Refill    insulin glargine (LANTUS) 100 unit/mL injection Take 30 units once daily  Indications: type 2 diabetes mellitus 3 Vial 3    cyanocobalamin (VITAMIN B-12) 1,000 mcg tablet Take 1,000 mcg by mouth daily.  febuxostat (ULORIC) 40 mg tab tablet Take 1 Tab by mouth daily. Indications: GOUT PREVENTION 90 Tab 1    levothyroxine (SYNTHROID) 200 mcg tablet Take 1 Tab by mouth Daily (before breakfast). Indications: HYPOTHYROIDISM (Patient taking differently: Take 175 mcg by mouth Daily (before breakfast). Indications: hypothyroidism) 30 Tab 5    insulin aspart (NOVOLOG) 100 unit/mL injection by SubCUTAneous route. 6 units before meals      spironolactone (ALDACTONE) 25 mg tablet Take 1 Tab by mouth daily. Indications: EDEMA 30 Tab 3    metoprolol (LOPRESSOR) 25 mg tablet Take 0.5 Tabs by mouth two (2) times a day. 30 Tab 1    glucose blood VI test strips (ASCENSIA AUTODISC VI, ONE TOUCH ULTRA TEST VI) strip Dispense precision extra test strips 3 x day to check blood glucose 200 Each 3    Magnesium Chloride 64 mg TbEC Take  by mouth. 8 pills 3 times daily      amLODIPine (NORVASC) 10 mg tablet Take 5 mg by mouth daily.  Omeprazole delayed release (PRILOSEC D/R) 20 mg tablet Take 20 mg by mouth daily.  atorvastatin (LIPITOR) 40 mg tablet Take 20 mg by mouth daily.  testosterone (ANDROGEL) 20.25 mg/1.25 gram (1.62 %) gel Apply  to affected area as needed.  aspirin (ASPIRIN) 325 mg tablet Take 325 mg by mouth daily.  Cholecalciferol, Vitamin D3, 1,000 unit cap Take 3,000 Units by mouth daily.  albuterol (VENTOLIN HFA) 90 mcg/actuation inhaler Take  by inhalation as needed. Drug allergies: Allergies   Allergen Reactions    Watermelon Swelling    Peach (Prunus Persica) Itching       Family History: There is not a history of heart disease in the family. Social History: Smoking history as above. He denies alcohol use. He lives with his spouse and is a retired .      REVIEW OF SYSTEMS:   Constitutional: denies significant weight gain or loss recently, fevers or chills  Integumentary: denies recent rashes  Eyes: denies diplopia, transient visual loss  ENMT: denies hearing loss, sinus congestion, sore throat  Respiratory: denies chronic cough, recent productive cough  Cardiovascular: as noted above in history   Gastrointestinal: positive for diarrhea, and denies jaundice, abdominal pain  Genitourinary: denies flank pain, dysuria  Musculoskeletal: positive for chronic back pain and leg cramping and joint pain, but denies recent fractures  Hematologic / Lymphatic: denies easy bruisability, clots in legs  Neurological: positive for sciatica, and denies headaches and seizures  Psychiatric: denies anxiety, depression    PHYSICAL EXAMINATION:  Vital signs:   BP Readings from Last 3 Encounters:   05/02/17 151/73   04/26/17 139/62   04/25/17 149/62     @EXHW(16)@  Wt Readings from Last 3 Encounters:   05/02/17 111.1 kg (245 lb)   04/26/17 112 kg (247 lb)   04/25/17 112.9 kg (248 lb 12.8 oz)     Ht Readings from Last 3 Encounters:   05/02/17 5' 10\" (1.778 m)   04/26/17 5' 10\" (1.778 m)   04/25/17 5' 10\" (1.778 m)     General appearance:  He appeared well-nourished and of moderate build. Integument: The skin was warm and dry and had good turgor. There were not lower extremity venous stasis changes. Head and Neck: The head was normocephalic and was atraumatic. The neck was supple with good range of motion. Thyromegaly was absent, and cervical and supraclavicular lymphadenopathy were absent. EENMT: Conjunctivae were not injected. The sclerae were anicteric, and the nares were patent bilaterally. Oral mucosa were moist.  The tongue was in the midline. Dentition was good. Back: CVA and vertebral tenderness were absent. He was not kyphotic. Lungs: Respirations were not labored, and the lungs were clear to auscultation bilaterally, without rales, without rhonchi, and without wheezes. Heart:   The rate and rhythm were regular, without an S3, without JVD, and with a grade III/VI harsh systolic murmur heard best over the right upper sternal border. The PMI was not displaced laterally. Abdomen: The abdomen was globoid and was soft and was not tender, with good bowel sounds, and hepatomegaly was absent, and splenomegaly was absent. Pulsatile masses and bruits in the abdomen were absent. Vascular: Carotid pulses were 2+ bilaterally, and radial pulses were 2+ bilaterally. Pedal pulses were present bilaterally. Varicose veins were absent in both legs. Extremities: Clubbing was absent, cyanosis was absent, and pedal edema was mild. Neurologic: He is alert and oriented, and is a good historian. Strength and motion appeared normal and symmetrical in his extremities. Psychiatric: He is pleasant, calm, and has a normal affect. LABORATORIES:   Lab Results   Component Value Date/Time    WBC 5.6 03/01/2017 03:35 PM    HCT 39.2 03/01/2017 03:35 PM     03/01/2017 03:35 PM      Lab Results   Component Value Date/Time     03/01/2017 03:35 PM    K 4.5 03/01/2017 03:35 PM     03/01/2017 03:35 PM    CO2 21 03/01/2017 03:35 PM     (H) 03/01/2017 03:35 PM    BUN 16 03/01/2017 03:35 PM    CREA 1.0 03/01/2017 03:35 PM    CREA 1.29 01/25/2017 10:22 AM    CREA 1.41 (H) 07/08/2016 12:05 PM     Albumin 4, total bilirubin 0.3, INR 1  Cholesterol 137, triglycerides 67, HDL 59, and LDL 69    The EKG print-out, which I have personally reviewed, shows sinus rhythm with PVCs. The echocardiogram image revealed an ejection fraction of 65%. There was severe aortic stenosis, with a peak and mean gradients of 61 and 39 mm Hg, respectively, and an aortic valve area of 0.5 square centimeters. The LVOT was 1.9 cm. The ascending aorta proximally was 4.1 cm. I have also personally reviewed the cardiac catheterization images. They show an ejection fraction of 60%. The LVEDP was 13. The peak gradient across the aortic valve was 30 and mean was 39.   Coronary arteriography was noted to reveal no atheromatous plaque stenoses in the native coronary arteries. Impression:  Diagnoses:  1. severe aortic stenosis (non-rheumatic)  2. Essential hypertension  3. hypercholesterolemia  4. diabetes mellitus type-2    With severe aortic stenosis there would be potential benefit from aortic valve repalcement. I have discussed this operation in detail with him and his spouse, along with the alternatives (medical therapy). I also outlined the risks and benefits of the surgery, which would include, but not be limited to, operative mortality, stroke, pneumonia, renal failure, infection, bleeding and need for re-exploration, perioperative myocardial infarction, postoperative arrhythmias, sternal dehiscence, hand dysesthesias and/or weakness, blood component transfusions, and the need for a permanent pacemaker postoperatively. The estimated STS risks were calculated for this patient and were discussed with the patient as outlined above. Recommendations: Following our discussion, he has decided to proceed with surgery next week. He will need the additional preoperative tests, which I will order:  CBC  Chem-7  Coagulation profile / INR  HbA1c  Urinalysis  EKG  CXR  Chest CT scan to assess the size of his aorta and LVOT    Thank you for involving me in his care.     Sally Benavides MD

## 2017-05-16 NOTE — OP NOTES
CARDIAC SURGERY OPERATIVE NOTE    5/16/2017    PREOPERATIVE DIAGNOSIS: severe aortic stenosis    POSTOPERATIVE DIAGNOSIS: severe aortic stenosis    PROCEDURE: Aortic valve replacement with a 25 mm bovine pericardial bioprosthesis ([employer]if[employer]) and epiaortic scanning. ATTENDING SURGEON:  Flex Mccormack MD       ASSISTANT: NITESH Ortega, [facility]. (There was no qualified resident available to assist with this operation.)      ANESTHESIA:  General with endotracheal tube. ANESTHESIOLOGIST:  Lupis Crocker MD     PERFUSIONIST: Kaushal Gómez CCP     TOTAL BYPASS TIME: 108 minutes    TOTAL CROSS-CLAMP TIME: 78 minutes    ESTIMATED BLOOD LOSS: not applicable (cardiopulmonary bypass)    SPECIMENS REMOVED: aortic valve tissue sent to Pathology    INDICATIONS:  Leonard Mensah is a 71 y.o. male who presents with class II congestive heart failure. Cardiac risk factors include dyslipidemia, diabetes mellitus, hypertension. Echocardiography demonstrated good left ventricular function with severe aortic stenosis with peak and mean gradients of 61 and 39, respectively, and an estimated aortic valve area of 0.5 cm squared. Cardiac catheterization showed an Ejection Fraction of 60%. Coronary arteriography was noted to reveal no significant atheromatous plaque stenoses in the native coronary arteries. He is taken for aortic valve replacement (with a bioprosthesis at his request). FINDINGS: The sternum was of good quality and was not osteoporotic. The ascending aorta was not calcified. The left ventricle was hypertrophied and was not dilated. The aortic valve had three cusps and had severe calcification extending down into the LVOT. It was quite friable and crumbly. Transesophageal echocardiography at the beginning of the operation showed good left ventricular function, and at the conclusion of the operation showed no new wall motion abnormalities.   The new valve appeared well-seated and had no paravalvar leak. PROCEDURE:  He was taken to the operating room and was placed on the table in supine position, and after being placed under general anesthesia, was intubated without difficulty. Appropriate monitoring lines were then placed, including a radial arterial line, a pulmonary artery catheter, a Heath catheter, and a transesophegeal echo probe. He was then prepped and draped in sterile fashion from neck to toes. A full Time Out was performed and the Cardiac Surgery Operative Checklist was then reviewed, which included confirming the patients identity and planned operation, and also that the preoperative antibiotics were appropriate and had been delivered in appropriate timely fashion, and that beta blockers were not contraindicated and were administered within the past 24 hours. A median sternotomy incision was then performed with a sternal saw and the pericardium was suspended. Epiaortic echocardiography was then undertaken using a probe passed onto the field in a sterile sheath to scan the entire ascending aorta, revealing no thickened intraluminal atheromatous disease. He was then systemically heparinized and was cannulated for cardiopulmonary bypass with a single cannula in the distal ascending aorta through double purse-string sutures. A single two-staged cannula was placed into the right atrium through a single purse-string suture. An antegrade cardioplegia cannula was placed into the ascending aorta. After confirming that the ACT was greater than 400 seconds, and following retrograde autologous priming of the cardiopulmonary bypass machine, the patient was placed on cardiopulmonary bypass and cooled to 32 degrees Celsius. A retrograde cardioplegia cannula was placed into the coronary sinus via the right atrium.      A cross clamp was then placed on the distal ascending aorta, and 500 cc of warm blood potassium cardioplegia was infused into the aortic root to obtain cardiac standstill, followed by 500 cc of cold blood potassium cardioplegia in retrograde fashion. Thereafter, approximately every 10 minutes, additional boluses of cold blood potassium cardioplegia were administered in retrograde fashion and as well into the right coronary ostium. The anterior surface of the right ventricle was covered with a cold lap pad. Dark blood was seen to emanate well from both coronary ostia with delivery of retrograde cardioplegia, and the veins on the diaphragmatic surface of the heart simultaneously appeared to fill with red blood. A dose of warm cardioplegia was administered in retrograde fashion just prior to removal of the cross clamp. Carbon dioxide was now insufflated across the field (and remained as such until the aortotomy was eventually closed). A transverse aortotomy was then performed a few cm above the sinotubular junction for approximately half the circumference of the aorta. The aortic valve was examined and was found to have severe calcification. The leaflets were resected and the annulus was carefully decalcified, taking care not to violate the integrity of the annulus. The left ventricle was then irrigated with copious amounts of sterile saline to remove any potential debris. After appropriate sizing, a 25 mm bovine pericardial bioprosthetic valve was secured in place using 21 sutures of 2-0 Ethibond, placed in horizontal mattress fashion with pledgets on the left ventricular side. The valve appeared unhindered from surrounding structures once seated, and did not impinge on the coronary ostia once seated. While he was being warmed, the transverse aortotomy was closed in two layers, with appropriate de-airing techniques performed just prior to securing the suture line, including filling the left ventricle and inflating the lungs. After a total time of 78 minutes, the aortic cross clamp was removed.   Ventricular fibrillation occurred and was easily cardioverted with 20 joules. Normal sinus rhythm returned and remained. The retrograde cardioplegia cannula was removed, and epicardial pacing wires were placed on the right atrium and left ventricle and were secured to the skin with interrupted sutures. Drainage tubes were positioned in the mediastinum (two in all), were secured to the skin with interrupted sutures, and were then attached to a Pleurevac drainage system. After allowing adequate time for reperfusion, he was weaned successfully from cardiopulmonary bypass after a total time of 108 minutes using neither inotropic nor pressor support and with no pacing. There were no ischemic EKG changes. The antegrade cardioplegia cannula was removed as were the cardiopulmonary bypass cannulas, and Protamine was administered. With adequate hemostasis present, the chest was then closed by approximating the pericardium with interrupted silk sutures, then using 3 parasternal wires and 3 Sternalok plates after spraying the edges with platelet gel. The sternal wound was irrigated with sterile saline, and 50 cc of 1/2% Marcaine was infiltrated into the parasternal tissues for postoperative pain control. Wound closure was then completed with by running a heavy nylon suture in the subcutaneous layer, then running absorbable suture in the superficial subcutaneous layer, and a running absorbable suture in the subcuticular layer. The procedure was then completed after application of dry sterile dressings to all wounds. He tolerated the procedure well and was transported to the intensive care unit in stable condition. Needle and sponge counts were correct following the procedure. There were no complications. I was present in the operating room from the time the patient arrived until he left the room and performed the entire procedure as dictated above.

## 2017-05-16 NOTE — IP AVS SNAPSHOT
Robyn Cheryl 
 
 
 [address] [facility] [address] 
[phone] Patient: Bridgett Morel. MRN: [id number] :1945 You are allergic to the following Allergen Reactions Watermelon Swelling Peach (Prunus Persica) Itching Recent Documentation Height Weight BMI Smoking Status 1.778 m 113.4 kg 35.89 kg/m2 Former Smoker Emergency Contacts Name Discharge Info Relation Home Work Mobile Patience Ely DISCHARGE CAREGIVER [3] Spouse [3]  [phone] About your hospitalization You were admitted on:  May 16, 2017 You last received care in the:  [facility] 2 CV INTNSV CARE You were discharged on:  May 20, 2017 Unit phone number:  [phone] Why you were hospitalized Your primary diagnosis was:  Severe Aortic Stenosis Your diagnoses also included:  Type 2 Diabetes Mellitus With Diabetic Nephropathy (Hcc), Ckd (Chronic Kidney Disease) Stage 3, Gfr 30-59 Ml/Min, Hypothyroidism, Dyslipidemia, Osteoarthritis, Renovascular Hypertension, Graves Disease, S/P Avr (Aortic Valve Replacement) Providers Seen During Your Hospitalizations Provider Role Specialty Primary office phone Nelson Rabago MD Attending Provider Cardiothoracic Surgery [phone] Your Primary Care Physician (PCP) Primary Care Physician Office Phone Office Fax Kirsten Echols [phone] Follow-up Information Follow up With Details Comments Contact Info Aman [address]  for [address] with physical therapy & occupational therapy [address] [facility], [facility] [address] 
[phone] Robinson Alves MD In 1 week  [address] [place] [address] Primary Care [address] [facility] 
[phone] Abdon Neely MD In 3 weeks  [address] [place] [address] [address] [facility] [place] [address] 
[phone] Kika John MD In 1 week  [address] [facility] [address] 
[phone] Kika John MD In 30 days  [address] [facility] [address] [phone] Your Appointments Wednesday May 31, 2017 10:45 AM EDT Follow Up with Shandra Gómez MD  
[facility] [place] [facility] [address] [facility]  
[phone] Current Discharge Medication List  
  
START taking these medications Dose & Instructions Dispensing Information Comments Morning Noon Evening Bedtime  
 amiodarone 200 mg tablet Commonly known as:  CORDARONE Your last dose was: Your next dose is:    
   
   
 Dose:  200 mg Take 1 Tab by mouth daily for 14 days. Quantity:  14 Tab Refills:  0  
     
   
   
   
  
 aspirin delayed-release 81 mg tablet Replaces:  aspirin 325 mg tablet Your last dose was: Your next dose is:    
   
   
 Dose:  81 mg Take 1 Tab by mouth daily. Quantity:  30 Tab Refills:  2  
     
   
   
   
  
 docusate sodium 100 mg capsule Commonly known as:  Haven Kilgore Your last dose was: Your next dose is:    
   
   
 Dose:  100 mg Take 1 Cap by mouth two (2) times daily as needed for Constipation for up to 90 days. Quantity:  60 Cap Refills:  2 HYDROcodone-acetaminophen 5-325 mg per tablet Commonly known as:  Eliezer Charles Your last dose was: Your next dose is:    
   
   
 Dose:  1 Tab Take 1 Tab by mouth every six (6) hours as needed. Max Daily Amount: 4 Tabs. Quantity:  40 Tab Refills:  0  
     
   
   
   
  
 warfarin 2.5 mg tablet Commonly known as:  COUMADIN Your last dose was: Your next dose is:    
   
   
 Dose:  5 mg Take 2 Tabs by mouth daily. Or as directed. Goal INR 2.5-3.5. Quantity:  100 Tab Refills:  2 CONTINUE these medications which have CHANGED Dose & Instructions Dispensing Information Comments Morning Noon Evening Bedtime  
 levothyroxine 200 mcg tablet Commonly known as:  SYNTHROID What changed:  how much to take Your last dose was: Your next dose is:    
   
   
 Dose:  200 mcg Take 1 Tab by mouth Daily (before breakfast). Indications: HYPOTHYROIDISM Quantity:  30 Tab Refills:  5 CONTINUE these medications which have NOT CHANGED Dose & Instructions Dispensing Information Comments Morning Noon Evening Bedtime  
 atorvastatin 40 mg tablet Commonly known as:  LIPITOR Your last dose was: Your next dose is:    
   
   
 Dose:  20 mg Take 20 mg by mouth daily. Refills:  0  
     
   
   
   
  
 cholecalciferol 1,000 unit Cap Commonly known as:  VITAMIN D3 Your last dose was: Your next dose is:    
   
   
 Dose:  3000 Units Take 3,000 Units by mouth daily. Refills:  0  
     
   
   
   
  
 febuxostat 40 mg Tab tablet Commonly known as:  Arianna Madsena Your last dose was: Your next dose is:    
   
   
 Dose:  40 mg Take 1 Tab by mouth daily. Indications: GOUT PREVENTION Quantity:  90 Tab Refills:  1  
     
   
   
   
  
 glucose blood VI test strips strip Commonly known as:  ASCENSIA AUTODISC VI, ONE TOUCH ULTRA TEST VI Your last dose was: Your next dose is:    
   
   
 Dispense precision extra test strips 3 x day to check blood glucose Quantity:  200 Each Refills:  3  
     
   
   
   
  
 insulin aspart 100 unit/mL injection Commonly known as:  Luis Antonio Trinh Your last dose was: Your next dose is:    
   
   
 by SubCUTAneous route. 6 units before meals Refills:  0  
     
   
   
   
  
 insulin glargine 100 unit/mL injection Commonly known as:  LANTUS Your last dose was: Your next dose is: Take 30 units once daily  Indications: type 2 diabetes mellitus Quantity:  3 Vial  
Refills:  3  
     
   
   
   
  
 metoprolol tartrate 25 mg tablet Commonly known as:  LOPRESSOR Your last dose was: Your next dose is:    
   
   
 Dose:  12.5 mg Take 0.5 Tabs by mouth two (2) times a day. Quantity:  30 Tab Refills:  1 Omeprazole delayed release 20 mg tablet Commonly known as:  PRILOSEC D/R Your last dose was: Your next dose is:    
   
   
 Dose:  20 mg Take 20 mg by mouth daily. Refills:  0  
     
   
   
   
  
 spironolactone 25 mg tablet Commonly known as:  ALDACTONE Your last dose was: Your next dose is:    
   
   
 Dose:  25 mg Take 1 Tab by mouth daily. Indications: EDEMA Quantity:  30 Tab Refills:  3  
     
   
   
   
  
 testosterone 20.25 mg/1.25 gram (1.62 %) gel Commonly known as:  ANDROGEL Your last dose was: Your next dose is:    
   
   
 Apply  to affected area as needed. Refills:  0  
     
   
   
   
  
 VITAMIN B-12 1,000 mcg tablet Generic drug:  cyanocobalamin Your last dose was: Your next dose is:    
   
   
 Dose:  1000 mcg Take 1,000 mcg by mouth daily. Refills:  0 STOP taking these medications   
 amLODIPine 10 mg tablet Commonly known as:  NORVASC  
   
  
 aspirin 325 mg tablet Commonly known as:  ASPIRIN Replaced by:  aspirin delayed-release 81 mg tablet  
   
  
 magnesium chloride 64 mg delayed release tablet Commonly known as:  MAG DELAY Where to Get Your Medications Information on where to get these meds will be given to you by the nurse or doctor. ! Ask your nurse or doctor about these medications  
  amiodarone 200 mg tablet  
 aspirin delayed-release 81 mg tablet  
 docusate sodium 100 mg capsule HYDROcodone-acetaminophen 5-325 mg per tablet  
 warfarin 2.5 mg tablet Discharge Instructions DISCHARGE SUMMARY from Nurse The following personal items are in your possession at time of discharge: 
 
Dental Appliances: None Visual Aid: None Home Medications: Kept at bedside Jewelry: None Clothing: Shirt, Socks, Footwear, Undergarments, Pants Other Valuables: Cell Phone PATIENT INSTRUCTIONS: 
 
 
F-face looks uneven A-arms unable to move or move unevenly S-speech slurred or non-existent T-time-call 911 as soon as signs and symptoms begin-DO NOT go Back to bed or wait to see if you get better-TIME IS BRAIN. Warning Signs of HEART ATTACK Call 911 if you have these symptoms: 
? Chest discomfort. Most heart attacks involve discomfort in the center of the chest that lasts more than a few minutes, or that goes away and comes back. It can feel like uncomfortable pressure, squeezing, fullness, or pain. ? Discomfort in other areas of the upper body. Symptoms can include pain or discomfort in one or both arms, the back, neck, jaw, or stomach. ? Shortness of breath with or without chest discomfort. ? Other signs may include breaking out in a cold sweat, nausea, or lightheadedness. Don't wait more than five minutes to call [address]! Fast action can save your life. Calling 911 is almost always the fastest way to get lifesaving treatment. Emergency Medical Services staff can begin treatment when they arrive  up to an hour sooner than if someone gets to the hospital by car. The discharge information has been reviewed with the patient and spouse. The patient and spouse verbalized understanding. Discharge medications reviewed with the patient and spouse and appropriate educational materials and side effects teaching were provided. Discharge Instructions Attachments/References ASPIRIN (BY MOUTH) (ENGLISH) HYDROCODONE/ACETAMINOPHEN (BY MOUTH) (ENGLISH) AMIODARONE (BY MOUTH) (ENGLISH) WARFARIN (BY MOUTH) (ENGLISH) LAXATIVE, STOOL SOFTENERS (BY MOUTH) (ENGLISH) Discharge Orders Procedure Order Date Status Priority Quantity Spec Type Associated Dx XR CHEST PA LAT 05/20/17 0841 Future Routine 1  Severe aortic stenosis [[id number]] Schedule Instructions:  Please obtain the day of or just before your appointment with Dr. Jim Rosado. Questions: Reason for Exam:  s/p Cardiothoracic Surgery Is Patient Allergic to Contrast Dye?:  Unknown EKG, 12 LEAD, INITIAL 05/20/17 0841 Future Routine 1  Severe aortic stenosis [[id number]] Comments:  Please obtain the day of or just before the appointment with Dr. Jim Rosado. Questions: Reason for Exam:  s/p Cardiac Surgery ACO Transitions of Care Introducing [facility] [address] offers a voluntary care coordination program to provide high quality service and care to [facility] fee-for-service beneficiaries. Meg Kasper was designed to help you enhance your health and well-being through the following services: ? Transitions of Care  support for individuals who are transitioning from one care setting to another (example: Hospital to home). ? Chronic and Complex Care Coordination  support for individuals and caregivers of those with serious or chronic illnesses or with more than one chronic (ongoing) condition and those who take a number of different medications. If you meet specific medical criteria, a [address] [place] [address] Rd may call you directly to coordinate your care with your primary care physician and your other care providers. For questions about the [facility] programs, please, contact your physicians office. For general questions or additional information about Accountable Care Organizations: Please visit www.medicare.gov/acos. html or call 1-800-MEDICARE (1-103.998.2980) TTY users should call 7-921.273.7728. Introducing [facility]! Dear Alverda Gitelman: Thank you for requesting a Tractive account. Our records indicate that you already have an active Tractive account. You can access your account anytime at [email or web address] Did you know that you can access your hospital and ER discharge instructions at any time in Tractive? You can also review all of your test results from your hospital stay or ER visit. Additional Information If you have questions, please visit the Frequently Asked Questions section of the Tractive website at [email or web address] Remember, Tractive is NOT to be used for urgent needs. For medical emergencies, dial 911. Now available from your iPhone and Android! General Information Please provide this summary of care documentation to your next provider. Patient Signature:  ____________________________________________________________ Date:  ____________________________________________________________  
  
César Sanches Provider Signature:  ____________________________________________________________ Date:  ____________________________________________________________ More Information Aspirin (By mouth) Aspirin (AS-pir-in) Treats pain, fever, and inflammation. May lower risk of heart attack and stroke. Brand Name(s): Ascriptin Regular Strength, Aspergum, Aspir Low, Aspirin Adult Low Dose, Florencio Aspirin Children's, Florencio Aspirin Regimen, Florencio Extra Strength, Florencio Genuine Aspirin, Bufferin, Bufferin Low Dose, Durlaza, Ecotrin, Ecpirin, Enteric Aspirin, Genuine Aspirin There may be other brand names for this medicine. When This Medicine Should Not Be Used: This medicine is not right for everyone.  Do not use it if you had an allergic reaction to aspirin or other NSAIDs, or if you have a history of asthma with nasal polyps and rhinitis. How to Use This Medicine:  
Delayed Release Capsule, Long Acting Capsule, Gum, Tablet, Chewable Tablet, Fizzy Tablet, Coated Tablet, Long Acting Tablet, 24 Hour Capsule · Your doctor will tell you how much medicine to use. Do not use more than directed. · It is best to take this medicine with food or milk. · Capsule, tablet, or coated tablet: Swallow whole. Do not crush, break, or chew it. · Chewable tablet: You may chew it completely or swallow it whole. · Gum: Chew completely to make sure you get as much medicine as possible. Drink a full glass (8 ounces) of water after chewing the gum. · Swallow the extended-release capsule whole. Do not crush, break, or chew it. Take the capsule with a full glass of water at the same time each day. · Follow the instructions on the medicine label if you are using this medicine without a prescription. · Missed dose: If you miss a dose of Durlaza, skip the missed dose and go back to your regular dosing schedule. Do not take extra medicine to make up for a missed dose. · Store the medicine in a closed container at room temperature, away from heat, moisture, and direct light. Drugs and Foods to Avoid: Ask your doctor or pharmacist before using any other medicine, including over-the-counter medicines, vitamins, and herbal products. · Some foods and medicines can affect how aspirin works. Tell your doctor if you are using any of the following: ¨ Dipyridamole, methotrexate, probenecid, sulfinpyrazone, ticlopidine ¨ Blood thinner (including clopidogrel, prasugrel, ticagrelor, warfarin) ¨ Blood pressure medicine ¨ Medicine to treat seizures (including phenytoin, valproic acid) ¨ NSAID pain or arthritis medicine (including celecoxib, diclofenac, ibuprofen, naproxen) ¨ Steroid medicine (including dexamethasone, hydrocortisone, methylprednisolone, prednisolone, prednisone) · Do not take Durlaza 2 hours before or 1 hour after you drink alcohol or take medicines that contain alcohol. Warnings While Using This Medicine: · Tell your doctor if you are pregnant or breastfeeding. Do not use this medicine during the later part of a pregnancy unless your doctor tells you to. · Tell your doctor if you have kidney disease, liver disease, high blood pressure, heart disease, or a history of stomach bleeding or ulcers. · This medicine may increase your risk for bleeding, including stomach ulcers. · Do not give aspirin to a child or teenager who has chickenpox or flu symptoms, unless the doctor says it is okay. Aspirin can cause a life-threatening reaction called Reye syndrome. · Tell any doctor or dentist who treats you that you are using this medicine. This medicine may affect certain medical test results. · Keep all medicine out of the reach of children. Never share your medicine with anyone. Possible Side Effects While Using This Medicine:  
Call your doctor right away if you notice any of these side effects: · Allergic reaction: Itching or hives, swelling in your face or hands, swelling or tingling in your mouth or throat, chest tightness, trouble breathing · Bloody or black stools, bloody vomit or vomit that looks like coffee grounds · Chest tightness, wheezing · Ringing in the ears · Severe stomach pain · Unusual bleeding, bruising, or weakness If you notice other side effects that you think are caused by this medicine, tell your doctor. Call your doctor for medical advice about side effects. You may report side effects to FDA at 5-742-FDA-3248 © 2017 [address] Information is for End User's use only and may not be sold, redistributed or otherwise used for commercial purposes. The above information is an  only.  It is not intended as medical advice for individual conditions or treatments. Talk to your doctor, nurse or pharmacist before following any medical regimen to see if it is safe and effective for you. Hydrocodone/Acetaminophen (By mouth) Acetaminophen (o-lpif-h-MIN-oh-fen), Hydrocodone Bitartrate (oqy-dtrg-JUY-done bye-TAR-trate) Treats pain. This medicine contains a narcotic pain reliever. Brand Name(s): Hycet, Lorcet, Lorcet HD, Lorcet Plus, Lortab 10/325, Lortab 5/325, Lortab 7.5/325, Lortab Elixir, Norco, Verdrocet, Vicodin, Vicodin ES, Vicodin HP, Xodol, Xodol 5/300 There may be other brand names for this medicine. When This Medicine Should Not Be Used: This medicine is not right for everyone. Do not use it if you had an allergic reaction to acetaminophen, hydrocodone, or other narcotic medicines, or stomach or bowel blockage (including paralytic ileus). How to Use This Medicine:  
Capsule, Liquid, Tablet · Your doctor will tell you how much medicine to use. Do not use more than directed. · An overdose can be dangerous. Follow directions carefully so you do not get too much medicine at one time. · Oral liquid: Measure the oral liquid medicine with a marked measuring spoon, oral syringe, or medicine cup. · Drink plenty of liquids to help avoid constipation. · This medicine should come with a Medication Guide. Ask your pharmacist for a copy if you do not have one. · Missed dose: Take a dose as soon as you remember. If it is almost time for your next dose, wait until then and take a regular dose. Do not take extra medicine to make up for a missed dose. · Store the medicine in a closed container at room temperature, away from heat, moisture, and direct light. Flush any unused Norco® tablets down the toilet. Drugs and Foods to Avoid: Ask your doctor or pharmacist before using any other medicine, including over-the-counter medicines, vitamins, and herbal products. · Do not use this medicine if you are using or have used an MAO inhibitor within the past 14 days. · Some medicines can affect how hydrocodone/acetaminophen works. Tell your doctor if you are using any of the following: ¨ Carbamazepine, erythromycin, ketoconazole, mirtazapine, phenytoin, rifampin, ritonavir, tramadol, trazodone ¨ Diuretic (water pill) ¨ Medicine to treat depression or mental health problems ¨ Medicine to treat migraine headaches ¨ Phenothiazine medicine · Tell your doctor if you use anything else that makes you sleepy. Some examples are allergy medicine, narcotic pain medicine, and alcohol. Tell your doctor if you are using buprenorphine, butorphanol, nalbuphine, pentazocine, or a muscle relaxer. · Do not drink alcohol while you are using this medicine. Acetaminophen can damage your liver, and your risk is higher if you also drink alcohol. Warnings While Using This Medicine: · Tell your doctor if you are pregnant or breastfeeding, or if you have kidney disease, liver disease, lung or breathing problems, gallbladder or pancreas problems, an underactive thyroid, [place] disease, prostate problems, trouble urinating, stomach problems, or a history of head injury or brain tumor, seizures, alcohol or drug addiction. · This medicine may cause the following problems:  
¨ High risk of overdose, which can lead to death ¨ Respiratory depression (serious breathing problem that can be life-threatening) ¨ Liver problems ¨ Serious skin reactions ¨ Serotonin syndrome (when used with certain medicines) · This medicine can be habit-forming. Do not use more than your prescribed dose. Call your doctor if you think your medicine is not working. · This medicine may make you dizzy or drowsy. Do not drive or doing anything else that could be dangerous until you know how this medicine affects you. · This medicine contains acetaminophen.  Read the labels of all other medicines you are using to see if they also contain acetaminophen, or ask your doctor or pharmacist. Do not use more than 4 grams (4,000 milligrams) total of acetaminophen in one day. · Tell any doctor or dentist who treats you that you are using this medicine. This medicine may affect certain medical test results. · This medicine may cause constipation, especially with long-term use. Ask your doctor if you should use a laxative to prevent and treat constipation. · This medicine could cause infertility. Talk with your doctor before using this medicine if you plan to have children. · Keep all medicine out of the reach of children. Never share your medicine with anyone. Possible Side Effects While Using This Medicine:  
Call your doctor right away if you notice any of these side effects: · Allergic reaction: Itching or hives, swelling in your face or hands, swelling or tingling in your mouth or throat, chest tightness, trouble breathing · Anxiety, restlessness, fast heartbeat, fever, sweating, muscle spasms, twitching, diarrhea, seeing or hearing things that are not there · Blistering, peeling, red skin rash · Blue lips, fingernails, or skin · Dark urine or pale stools, loss of appetite, nausea or vomiting, stomach pain, yellow skin or eyes · Extreme weakness, shallow breathing, slow heartbeat, sweating, seizures, cold or clammy skin · Lightheadedness, dizziness, fainting If you notice these less serious side effects, talk with your doctor: · Constipation, nausea, vomiting · Tiredness or sleepiness If you notice other side effects that you think are caused by this medicine, tell your doctor. Call your doctor for medical advice about side effects. You may report side effects to FDA at 4-668-FDA-9302 © 2017 [address] Information is for End User's use only and may not be sold, redistributed or otherwise used for commercial purposes. The above information is an  only. It is not intended as medical advice for individual conditions or treatments. Talk to your doctor, nurse or pharmacist before following any medical regimen to see if it is safe and effective for you. Amiodarone (By mouth) Amiodarone Hydrochloride (b-mda-HZ-da-virginia leonard-droe-KLOR-melodie) Treats heart rhythm problems. Brand Name(s): Cordarone, Pacerone There may be other brand names for this medicine. When This Medicine Should Not Be Used: This medicine is not right for everyone. Do not use it if you had an allergic reaction to amiodarone or iodine, or you are pregnant or breastfeeding. How to Use This Medicine:  
Tablet · Take your medicine as directed. Your dose may need to be changed several times to find what works best for you. · Take this medicine the same way every day. This means take it at the same time and take it consistently with or without food. · This medicine should come with a Medication Guide. Ask your pharmacist for a copy if you do not have one. · Missed dose: Take a dose as soon as you remember. If it is almost time for your next dose, wait until then and take a regular dose. Do not take extra medicine to make up for a missed dose. · Store the medicine in a closed container at room temperature, away from heat, moisture, and direct light. Drugs and Foods to Avoid: Ask your doctor or pharmacist before using any other medicine, including over-the-counter medicines, vitamins, and herbal products. · Some foods and medicines can affect how amiodarone works. Tell your doctor if you are using any of the following: ¨ Cimetidine, clopidogrel, cyclosporine, dabigatran, dextromethorphan, digoxin, fentanyl, ledipasvir/sofosbuvir, lithium, loratadine, phenytoin, rifampin, simeprevir, sofosbuvir, or Kb's wort ¨ Blood pressure medicines (including diltiazem, verapamil) ¨ Blood thinner (including warfarin) ¨ Medicine to lower cholesterol (including atorvastatin, cholestyramine, lovastatin, simvastatin) ¨ Medicine to treat depression (including trazodone) ¨ Medicine to treat heart rhythm problems (including flecainide, lidocaine, procainamide, quinidine) ¨ Medicine to treat HIV/AIDS ¨ Medicine to treat an infection ¨ Phenothiazine medicine (including chlorpromazine, perphenazine, promethazine, thioridazine) · Do not eat grapefruit or drink grapefruit juice while you are using this medicine. Warnings While Using This Medicine: · It is not safe to take this medicine during pregnancy. It could harm an unborn baby. Tell your doctor right away if you become pregnant. · Tell your doctor if you have liver problems, heart disease, heart failure, thyroid problems, or lung disease or breathing problems. Tell your doctor if you have a pacemaker or another implanted heart device. · This medicine may cause the following problems: 
¨ Lung problems ¨ Worsening heart rhythm problems ¨ Thyroid problems ¨ Liver problems ¨ Changes in vision · Do not stop using this medicine suddenly. Your doctor will need to slowly decrease your dose before you stop it completely. · This medicine may make your skin more sensitive to sunlight. Wear sunscreen. Do not use sunlamps or tanning beds. Your skin may become discolored if you use this medicine for a long time. · Your doctor will do lab tests at regular visits to check on the effects of this medicine. Keep all appointments. · Keep all medicine out of the reach of children. Never share your medicine with anyone. Possible Side Effects While Using This Medicine:  
Call your doctor right away if you notice any of these side effects: · Allergic reaction: Itching or hives, swelling in your face or hands, swelling or tingling in your mouth or throat, chest tightness, trouble breathing · Blistering, peeling, or red skin rash · Blurred vision or other vision changes, eye pain · Chest pain, cough, trouble breathing · Dark urine or pale stools, nausea, vomiting, loss of appetite, stomach pain, yellow skin or eyes · Fast, slow, pounding, or uneven heartbeat (new or worsening) · Lightheadedness, dizziness, or fainting · Numbness, tingling, or burning pain in your hands, arms, legs, or feet · Weight gain or loss, nervousness, tremors, trouble sleeping, unusual tiredness, hair loss If you notice these less serious side effects, talk with your doctor: · Mild nausea, vomiting, or constipation If you notice other side effects that you think are caused by this medicine, tell your doctor. Call your doctor for medical advice about side effects. You may report side effects to FDA at 1-602-FDA-6649 © 2017 [address] Information is for End User's use only and may not be sold, redistributed or otherwise used for commercial purposes. The above information is an  only. It is not intended as medical advice for individual conditions or treatments. Talk to your doctor, nurse or pharmacist before following any medical regimen to see if it is safe and effective for you. Warfarin (By mouth) Warfarin (WAR-far-in) Prevents and treats blood clots. May lower the risk of serious complications after a heart attack. This medicine is a blood thinner. Brand Name(s): CoumadinTorin There may be other brand names for this medicine. When This Medicine Should Not Be Used: This medicine is not right for everyone. Do not use it if you had an allergic reaction to warfarin, if you are pregnant, or if you have health problems that could cause bleeding. How to Use This Medicine:  
Tablet · Take your medicine as directed. Your dose may need to be changed several times to find what works best for you. · This medicine should come with a Medication Guide. Ask your pharmacist for a copy if you do not have one. · Missed dose: Take a dose as soon as you remember. If it is almost time for your next dose, wait until then and take a regular dose. Do not take extra medicine to make up for a missed dose. · Store the medicine in a closed container at room temperature, away from heat, moisture, and direct light. Drugs and Foods to Avoid: Ask your doctor or pharmacist before using any other medicine, including over-the-counter medicines, vitamins, and herbal products. · Many medicines and foods can affect how warfarin works and may affect the PT/INR test results. Tell your doctor before you start or stop any medicine, especially the following: ¨ Ginkgo, echinacea, goldenseal, or Kb's wort ¨ Another blood thinner, including apixaban, argatroban, bivalirudin, cilostazol, clopidogrel, dabigatran, desirudin, dipyridamole, heparin, lepirudin, prasugrel, rivaroxaban, ticlopidine ¨ Medicine to treat depression or anxiety, including citalopram, desvenlafaxine, duloxetine, escitalopram, fluoxetine, fluvoxamine, milnacipran, paroxetine, sertraline, venlafaxine, vilazodone ¨ Medicine to treat an infection ¨ NSAID pain or arthritis medicine, including aspirin, celecoxib, diclofenac, diflunisal, fenoprofen, ibuprofen, indomethacin, ketoprofen, ketorolac, mefenamic acid, naproxen, oxaprozin, piroxicam, sulindac. Check labels for over-the-counter medicines to find out if they contain an NSAID. ¨ Steroid medicine, including dexamethasone, hydrocortisone, methylprednisolone, prednisolone, prednisone · Warfarin works best if you eat about the same amount of vitamin K every day. Foods high in vitamin K include asparagus, broccoli, brussels sprouts, cabbage, green leafy vegetables, plums, rhubarb, and canola oil. Talk to your doctor before you make changes to your normal diet. · Do not eat grapefruit or drink grapefruit juice while you are using this medicine. Warnings While Using This Medicine: · It is not safe to take this medicine during pregnancy. It could harm an unborn baby. Tell your doctor right away if you become pregnant. Use an effective form of birth control to keep from getting pregnant during treatment and for at least 1 month after your last dose. · Tell your doctor if you are breastfeeding, or if you have kidney disease, liver disease, diabetes, heart disease, heart failure, high blood pressure, an infection, a stomach ulcer, or protein C deficiency. Also tell your doctor if you had recent surgery or an injury, or a history of stroke, anemia, severe bleeding or bruising, or problems caused by heparin use. · This medicine may cause the following problems: ¨ Bleeding, which may be life-threatening ¨ Gangrene (skin or tissue damage) ¨ Calciphylaxis or calcium uremic arteriolopathy ¨ Purple toes syndrome · You must have a PT/INR blood test while you use this medicine to check how well your blood is clotting. Your doctor will use the test results to make sure the medicine is working properly. Keep all appointments for the PT/INR blood tests. · You may bleed or bruise more easily with warfarin. To prevent injury or cuts, do not play rough sports, be careful with sharp objects, and brush and floss your teeth gently. Shaun Nova your nose gently, and do not pick your nose. · Carry an ID card or wear a medical alert bracelet to let emergency caregivers know that you use warfarin. · Tell any doctor or dentist who treats you that you are using this medicine. You may need to stop using this medicine several days before you have surgery or medical tests. · Keep all medicine out of the reach of children. Never share your medicine with anyone. Possible Side Effects While Using This Medicine:  
Call your doctor right away if you notice any of these side effects: · Allergic reaction: Itching or hives, swelling in your face or hands, swelling or tingling in your mouth or throat, chest tightness, trouble breathing · Bleeding from your gums or nose, coughing up blood, heavy monthly periods · Bleeding that does not stop, bruising, or weakness · Dizziness, fainting, lightheadedness · Pain, brown or black skin, or skin that is cool to the touch · Purple toes or feet, or new pain in your leg, foot, or toes · Purplish red, net-like, blotchy spots on the skin · Red or dark brown urine, or red or black, tarry stools · Vomiting blood or material that looks like coffee grounds If you notice other side effects that you think are caused by this medicine, tell your doctor. Call your doctor for medical advice about side effects. You may report side effects to FDA at 6-126-FDA-1466 © 2017 [address]  Information is for End User's use only and may not be sold, redistributed or otherwise used for commercial purposes. The above information is an  only. It is not intended as medical advice for individual conditions or treatments. Talk to your doctor, nurse or pharmacist before following any medical regimen to see if it is safe and effective for you. Laxative, Stool Softeners (By mouth) Treats constipation by helping you have a bowel movement. Brand Name(s): Col-Rite, Colace, Colace Clear, DSS, Diocto, Diocto Liquid, Doc-Q-Lace, Docu Liquid, Docuprene, Docusil, Dok, Dulcolax, Fleet Sof-Lax, [facility] Docusate Calcium, [facility] Stool Softener There may be other brand names for this medicine. When This Medicine Should Not Be Used: You should not use this medicine if you have severe stomach pain, nausea, or vomiting. Stool softeners should not be used if you have severe stomach pain and do not know the cause. How to Use This Medicine:  
Capsule, Tablet, Liquid, Liquid Filled Capsule · Your doctor will tell you how much medicine to use. Do not use more than directed. · Follow the instructions on the medicine label if you are using this medicine without a prescription. · Drink 6 to 8 glasses of water daily while using any laxative. · To make the oral liquid taste better, you may mix it with one-half glass of milk or fruit juice. · Measure the oral liquid medicine with a marked measuring spoon, oral syringe, medicine cup, or medicine dropper. If a dose is missed: · Use the missed dose as soon as possible. · If you do not remember the missed dose until the next day, skip the missed dose and go back to your regular dosing schedule. · You should not use two doses at the same time. How to Store and Dispose of This Medicine: · Store the medicine in a tightly closed container at room temperature, away from heat and moisture. Do not store liquid-filled capsules in the refrigerator. · Keep all medicine out of the reach of children. Drugs and Foods to Avoid: Ask your doctor or pharmacist before using any other medicine, including over-the-counter medicines, vitamins, and herbal products. · You should not use mineral oil while you are using a stool softener. · You should not use a stool softener within 2 hours before or after taking any other medicines. Laxatives can keep other medicines from working correctly. Warnings While Using This Medicine: · If you are pregnant or breastfeeding, talk to your doctor before taking this medicine. · Do not give laxatives to children under 10years old unless you talk to your doctor. · You should not use this laxative for longer than 1 week unless approved by your doctor. Laxatives may be habit-forming and can harm your bowels if you use them too long. · Stool softeners usually work in 1 to 2 days, but for some people, results can take as long as 3 to 5 days. Possible Side Effects While Using This Medicine: If you notice these less serious side effects, talk with your doctor: · Nausea · Sore throat · Skin rash If you notice other side effects that you think are caused by this medicine, tell your doctor. Call your doctor for medical advice about side effects. You may report side effects to FDA at 3-181-TQO-6811 © 2017 [address] Information is for End User's use only and may not be sold, redistributed or otherwise used for commercial purposes. The above information is an  only. It is not intended as medical advice for individual conditions or treatments. Talk to your doctor, nurse or pharmacist before following any medical regimen to see if it is safe and effective for you.

## 2017-05-16 NOTE — PROGRESS NOTES
When  attempted to visit patient in Cardiac ICU, it was not a good time to visit. Staff was with patient. Chaplains will provide spiritual care as needed, as requested. José Antonio Bonilla MDiv.   Board Certified [employer] [phone]

## 2017-05-16 NOTE — INTERVAL H&P NOTE
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 History and physical has been reviewed. The patient has been examined. There have been no significant clinical changes since the completion of the originally dated History and Physical. Urine culture noted. Additional treatment not indicated.  (See Dr. Cynthia De La O note in EMR.)    Signed By: Tiffanie Zapata PA-C     May 16, 2017 7:50 AM

## 2017-05-16 NOTE — IP AVS SNAPSHOT
Current Discharge Medication List  
  
START taking these medications Dose & Instructions Dispensing Information Comments Morning Noon Evening Bedtime  
 amiodarone 200 mg tablet Commonly known as:  CORDARONE Your last dose was: Your next dose is:    
   
   
 Dose:  200 mg Take 1 Tab by mouth daily for 14 days. Quantity:  14 Tab Refills:  0  
     
   
   
   
  
 aspirin delayed-release 81 mg tablet Replaces:  aspirin 325 mg tablet Your last dose was: Your next dose is:    
   
   
 Dose:  81 mg Take 1 Tab by mouth daily. Quantity:  30 Tab Refills:  2  
     
   
   
   
  
 docusate sodium 100 mg capsule Commonly known as:  Major Durbin Your last dose was: Your next dose is:    
   
   
 Dose:  100 mg Take 1 Cap by mouth two (2) times daily as needed for Constipation for up to 90 days. Quantity:  60 Cap Refills:  2 HYDROcodone-acetaminophen 5-325 mg per tablet Commonly known as:  Cori Schlichter Your last dose was: Your next dose is:    
   
   
 Dose:  1 Tab Take 1 Tab by mouth every six (6) hours as needed. Max Daily Amount: 4 Tabs. Quantity:  40 Tab Refills:  0  
     
   
   
   
  
 warfarin 2.5 mg tablet Commonly known as:  COUMADIN Your last dose was: Your next dose is:    
   
   
 Dose:  5 mg Take 2 Tabs by mouth daily. Or as directed. Goal INR 2.5-3.5. Quantity:  100 Tab Refills:  2 CONTINUE these medications which have CHANGED Dose & Instructions Dispensing Information Comments Morning Noon Evening Bedtime  
 levothyroxine 200 mcg tablet Commonly known as:  SYNTHROID What changed:  how much to take Your last dose was: Your next dose is:    
   
   
 Dose:  200 mcg Take 1 Tab by mouth Daily (before breakfast). Indications: HYPOTHYROIDISM Quantity:  30 Tab Refills:  5 CONTINUE these medications which have NOT CHANGED Dose & Instructions Dispensing Information Comments Morning Noon Evening Bedtime  
 atorvastatin 40 mg tablet Commonly known as:  LIPITOR Your last dose was: Your next dose is:    
   
   
 Dose:  20 mg Take 20 mg by mouth daily. Refills:  0  
     
   
   
   
  
 cholecalciferol 1,000 unit Cap Commonly known as:  VITAMIN D3 Your last dose was: Your next dose is:    
   
   
 Dose:  3000 Units Take 3,000 Units by mouth daily. Refills:  0  
     
   
   
   
  
 febuxostat 40 mg Tab tablet Commonly known as:  Mariusz Mccabe Your last dose was: Your next dose is:    
   
   
 Dose:  40 mg Take 1 Tab by mouth daily. Indications: GOUT PREVENTION Quantity:  90 Tab Refills:  1  
     
   
   
   
  
 glucose blood VI test strips strip Commonly known as:  ASCENSIA AUTODISC VI, ONE TOUCH ULTRA TEST VI Your last dose was: Your next dose is:    
   
   
 Dispense precision extra test strips 3 x day to check blood glucose Quantity:  200 Each Refills:  3  
     
   
   
   
  
 insulin aspart 100 unit/mL injection Commonly known as:  Mayur Garcia Your last dose was: Your next dose is:    
   
   
 by SubCUTAneous route. 6 units before meals Refills:  0  
     
   
   
   
  
 insulin glargine 100 unit/mL injection Commonly known as:  LANTUS Your last dose was: Your next dose is: Take 30 units once daily  Indications: type 2 diabetes mellitus Quantity:  3 Vial  
Refills:  3  
     
   
   
   
  
 metoprolol tartrate 25 mg tablet Commonly known as:  LOPRESSOR Your last dose was: Your next dose is:    
   
   
 Dose:  12.5 mg Take 0.5 Tabs by mouth two (2) times a day. Quantity:  30 Tab Refills:  1 Omeprazole delayed release 20 mg tablet Commonly known as:  PRILOSEC D/R Your last dose was: Your next dose is:    
   
   
 Dose:  20 mg Take 20 mg by mouth daily. Refills:  0  
     
   
   
   
  
 spironolactone 25 mg tablet Commonly known as:  ALDACTONE Your last dose was: Your next dose is:    
   
   
 Dose:  25 mg Take 1 Tab by mouth daily. Indications: EDEMA Quantity:  30 Tab Refills:  3  
     
   
   
   
  
 testosterone 20.25 mg/1.25 gram (1.62 %) gel Commonly known as:  ANDROGEL Your last dose was: Your next dose is:    
   
   
 Apply  to affected area as needed. Refills:  0  
     
   
   
   
  
 VITAMIN B-12 1,000 mcg tablet Generic drug:  cyanocobalamin Your last dose was: Your next dose is:    
   
   
 Dose:  1000 mcg Take 1,000 mcg by mouth daily. Refills:  0 STOP taking these medications   
 amLODIPine 10 mg tablet Commonly known as:  NORVASC  
   
  
 aspirin 325 mg tablet Commonly known as:  ASPIRIN Replaced by:  aspirin delayed-release 81 mg tablet  
   
  
 magnesium chloride 64 mg delayed release tablet Commonly known as:  MAG DELAY Where to Get Your Medications Information on where to get these meds will be given to you by the nurse or doctor. ! Ask your nurse or doctor about these medications  
  amiodarone 200 mg tablet  
 aspirin delayed-release 81 mg tablet  
 docusate sodium 100 mg capsule HYDROcodone-acetaminophen 5-325 mg per tablet  
 warfarin 2.5 mg tablet

## 2017-05-16 NOTE — ROUTINE PROCESS
Bedside shift change report given to Jaz   (oncoming nurse) by Maryellen Cali (offgoing nurse). Report included the following information SBAR, Kardex, OR Summary, Intake/Output, MAR, Recent Results, Med Rec Status, Cardiac Rhythm Atrial Paced and Alarm Parameters .

## 2017-05-16 NOTE — ANESTHESIA PROCEDURE NOTES
Central Line Placement    Performed by: Sarah Vaughn by: Lisa Nelson     Indications: vascular access, central pressure monitoring and need for vasopressors  Preanesthetic Checklist: patient identified, risks and benefits discussed, anesthesia consent, site marked, patient being monitored and timeout performed      Pre-procedure: All elements of maximal sterile barrier technique followed? Yes    Maximal barrier precautions followed, 2% Chlorhexidine for cutaneous antisepsis and Hand hygiene performed prior to catheter insertion            Procedure:   Prep:  Chlorhexidine  Location:  Internal jugular  Orientation:  Right  Patient position:  Trendelenburg  Catheter type:  Single lumen  Catheter size:  9 Fr  Catheter length:  16 cm  Number of attempts:  1  Successful placement: Yes      Assessment:   Post-procedure:  Catheter secured, sterile dressing applied and sterile dressing with CHG applied  Assessment:  Free fluid flow and blood return through all ports  Insertion:  Uncomplicated  Patient tolerance:  Patient tolerated the procedure well with no immediate complications  Central Line Procedure Note / PAC Francisco Mccall CCO catheter placement note    Referring physician:      Indication: Need for vasopressors    Risks, benefits, alternatives explained and patient agrees to proceed. Patient positioned in Trendelenburg. 7-Step Sterility Protocol followed. (cap, mask sterile gown, sterile gloves, large sterile sheet, hand hygiene, 2% chlorhexidine for cutaneous antisepsis)  5 mL 1% Lidocaine placed at insertion site. 9 Fr Introducer placed into the right IJ using a sterile Seldinger technique. Right internal jugular cannulated x 1 attempt(s) using  Selinger technique. Venous cannulation confirmed with column drop test.    Catheter secured & Biopatch applied. Sterile Tegaderm placed. CXR pending.       7 Fr CCO PAC placed through the introducer into the PA, adequate waveform obtained, no wedge pressure attempted, secured at 50 cm ana. No blood loss. No complications.

## 2017-05-16 NOTE — ANESTHESIA PROCEDURE NOTES
Arterial Line Placement    Performed by: Robel Graham by: Mary Avila     Pre-Procedure  Indications:  Arterial pressure monitoring and blood sampling  Preanesthetic Checklist: patient identified, risks and benefits discussed, anesthesia consent, site marked, patient being monitored, timeout performed and patient being monitored      Procedure:   Prep:  Chlorhexidine  Seldinger Technique?: Yes    Orientation:  Left  Location:  Radial artery  Catheter size:  20 G  Number of attempts:  2  Cont Cardiac Output Sensor: No      Assessment:   Post-procedure:  Line secured and sterile dressing applied  Patient Tolerance:  Patient tolerated the procedure well with no immediate complications

## 2017-05-16 NOTE — ANESTHESIA PROCEDURE NOTES
NATHANAEL        Procedure Details: probe placement, image aquisition & interpretation    Site marked, Timeout performed  Risks and benefits discussed with the patient and plans are to proceed    Procedure Note    Performed by: Anaya Ortiz by: María Bryson       Indications: assessment of ascending aorta and assessment of surgical repair  Modalities: 2D, CF, CWD, PWD  Probe Type: epiaortic and multiplane  Insertion: atraumatic  Patient Status: intubated and sedated    Echocardiographic and Doppler Measurements   Aorta  Size  Diam(cm)  Dissection PlaqueThick(mm)  Plaque Mobile    Ascending dilated  No >3 No    Arch dilated  No >3 No    Descending normal  No >3 No          Valves  Annulus  Stenosis  Area/Grad  Regurg  Leaflet   Morph  Leaflet   Motion    Aortic calcified severe  2+ calcified, thickened restricted    Mitral  none  1+ thickened normal    Tricuspid normal none  1+ normal normal          Atria  Size  SEC (smoke)  Thrombus  Tumor  Device    Rt Atrium normal No No No Yes    Lt Atrium normal No No No No     Interatrial Septum Morphology: normal    Interventricular Septum Morphology: hypertrophy    Ventricle  Cavity Size  Cavity Dimension Hypertrophy  Thrombus  Gloal FXN  EF    RV normal  No no normal     LV normal  Yes No normal        Regional Function  (1 = normal, 2 = mildly hypokinetic, 3 = severely hypokinetic, 4 = akinetic, 5 = dyskinetic) LAV - Long [place]   ME LAV = 0  ME LAV = 90  ME LAV = 130   Basal Sept:1 Basal Ant:1 Basal Post:1   Mid Sept:1 Mid Ant:1 Mid Post:1   Apical Sept:1 Apical Ant:1 Basal Ant Sept:1   Basal Lat:1 Basal Inf:1 Mid Ant Sept:1   Mid Lat:1 Mid Inf:1    Apical Lat:1 Apical Inf:1        Pericardium: normal    Post Intervention Follow-up Study  Ventricular Global Function: unchanged  Ventricular Regional Function: unchanged     Valve  Function  Regurgitation  Area    Aortic improved 0     Mitral no change 2+     Tricuspid no change 1+     Prosthetic normal Complications: None  Comments: PRE CPB EXAM:    LV EF 55%  LVH CONCENTRIC   NO RWMA     AV 3 LEAFLETS - SEVERE AS PG 30mmHg / MG 23 mmHg ; MILD TO MODERATE AI - EXCENTRIC AI JET ORIENTED POSTERIORLY / VTI LVOT/AO RATIO LESS THAN 0.2 ; severely restricted, RCC fused to [address], heavily calcified extended into the LVOT; and anterior mitral valve annulus    MV - ANNULUS CALCIFIED; MILD MR CENTRAL JET     LA - WNL DIAMOND FREE OF MASS, PV FLOW WNL    AO - ATHEROMA GRADE 2 AND 3 IN ASC AO/ ARCH/ DESC AO ; ASC AO AND ARCH MILDLY DILATED     RV - WNL     TV - MILD TR CENTRAL JET     PV - WNL     PA - MILD DILATATION     RA WNL IAS - NO DEFECT BY CFD      POST CPB:     NO INOTROP     AV BIOPROSTHETIC TRIFECTA 25# VALVE WELL SITTED, NO PARAVALVULAR LEAK, PG 9 mmHg, MG 3 mmHg, WITH CHEST CLOSED   LV EF >60% NO RWMA   NO AO DISSECTION POST-DECANULATION     NO OTHER CHANGES FROM PRE CPB EXAM     discussed with the surgeon all of the above

## 2017-05-16 NOTE — PROGRESS NOTES
Passed SBT. Able to follow commands.   Positive cuff leak.     05/16/17 1652   Weaning Parameters   Spontaneous Breathing Trial Complete Yes   Resp Rate Observed 12   Ve 6.9      RSBI 13   Plan to extubate per protocol to Nasal Cannula

## 2017-05-17 ENCOUNTER — APPOINTMENT (OUTPATIENT)
Dept: GENERAL RADIOLOGY | Age: 72
DRG: 220 | End: 2017-05-17
Attending: PHYSICIAN ASSISTANT
Payer: MEDICARE

## 2017-05-17 LAB
ADMINISTERED INITIALS, ADMINIT: NORMAL
ANION GAP BLD CALC-SCNC: 8 MMOL/L (ref 3–18)
ARTERIAL PATENCY WRIST A: ABNORMAL
ATRIAL RATE: 80 BPM
BASE DEFICIT BLD-SCNC: 3 MMOL/L
BDY SITE: ABNORMAL
BODY TEMPERATURE: 36.5
BUN SERPL-MCNC: 20 MG/DL (ref 7–18)
BUN/CREAT SERPL: 17 (ref 12–20)
CA-I BLD-MCNC: 1.22 MMOL/L (ref 1.12–1.32)
CALCIUM SERPL-MCNC: 8.4 MG/DL (ref 8.5–10.1)
CALCULATED P AXIS, ECG09: 33 DEGREES
CALCULATED R AXIS, ECG10: 84 DEGREES
CALCULATED T AXIS, ECG11: 66 DEGREES
CHLORIDE SERPL-SCNC: 113 MMOL/L (ref 100–108)
CO2 SERPL-SCNC: 25 MMOL/L (ref 21–32)
CREAT SERPL-MCNC: 1.17 MG/DL (ref 0.6–1.3)
D50 ADMINISTERED, D50ADM: 0 ML
D50 ORDER, D50ORD: 0 ML
DIAGNOSIS, 93000: NORMAL
ERYTHROCYTE [DISTWIDTH] IN BLOOD BY AUTOMATED COUNT: 14.9 % (ref 11.6–14.5)
GAS FLOW.O2 O2 DELIVERY SYS: ABNORMAL L/MIN
GAS FLOW.O2 SETTING OXYMISER: 3 L/M
GLUCOSE BLD STRIP.AUTO-MCNC: 101 MG/DL (ref 70–110)
GLUCOSE BLD STRIP.AUTO-MCNC: 109 MG/DL (ref 70–110)
GLUCOSE BLD STRIP.AUTO-MCNC: 111 MG/DL (ref 70–110)
GLUCOSE BLD STRIP.AUTO-MCNC: 112 MG/DL (ref 70–110)
GLUCOSE BLD STRIP.AUTO-MCNC: 116 MG/DL (ref 70–110)
GLUCOSE BLD STRIP.AUTO-MCNC: 117 MG/DL (ref 70–110)
GLUCOSE BLD STRIP.AUTO-MCNC: 123 MG/DL (ref 70–110)
GLUCOSE BLD STRIP.AUTO-MCNC: 168 MG/DL (ref 70–110)
GLUCOSE BLD STRIP.AUTO-MCNC: 74 MG/DL (ref 70–110)
GLUCOSE BLD STRIP.AUTO-MCNC: 85 MG/DL (ref 70–110)
GLUCOSE BLD STRIP.AUTO-MCNC: 86 MG/DL (ref 70–110)
GLUCOSE BLD STRIP.AUTO-MCNC: 86 MG/DL (ref 70–110)
GLUCOSE BLD STRIP.AUTO-MCNC: 86 MG/DL (ref 74–106)
GLUCOSE BLD STRIP.AUTO-MCNC: 87 MG/DL (ref 70–110)
GLUCOSE BLD STRIP.AUTO-MCNC: 89 MG/DL (ref 70–110)
GLUCOSE BLD STRIP.AUTO-MCNC: 91 MG/DL (ref 70–110)
GLUCOSE BLD STRIP.AUTO-MCNC: 94 MG/DL (ref 70–110)
GLUCOSE BLD STRIP.AUTO-MCNC: 94 MG/DL (ref 70–110)
GLUCOSE BLD STRIP.AUTO-MCNC: 95 MG/DL (ref 70–110)
GLUCOSE BLD STRIP.AUTO-MCNC: 98 MG/DL (ref 70–110)
GLUCOSE SERPL-MCNC: 86 MG/DL (ref 74–99)
GLUCOSE, GLC: 101 MG/DL
GLUCOSE, GLC: 109 MG/DL
GLUCOSE, GLC: 112 MG/DL
GLUCOSE, GLC: 116 MG/DL
GLUCOSE, GLC: 117 MG/DL
GLUCOSE, GLC: 123 MG/DL
GLUCOSE, GLC: 168 MG/DL
GLUCOSE, GLC: 74 MG/DL
GLUCOSE, GLC: 85 MG/DL
GLUCOSE, GLC: 86 MG/DL
GLUCOSE, GLC: 86 MG/DL
GLUCOSE, GLC: 87 MG/DL
GLUCOSE, GLC: 89 MG/DL
GLUCOSE, GLC: 91 MG/DL
GLUCOSE, GLC: 94 MG/DL
GLUCOSE, GLC: 94 MG/DL
GLUCOSE, GLC: 95 MG/DL
GLUCOSE, GLC: 98 MG/DL
HCO3 BLD-SCNC: 22.8 MMOL/L (ref 22–26)
HCT VFR BLD AUTO: 29.2 % (ref 36–48)
HCT VFR BLD CALC: 29 % (ref 36–49)
HGB BLD-MCNC: 9.9 G/DL (ref 12–16)
HGB BLD-MCNC: 9.9 G/DL (ref 13–16)
HIGH TARGET, HITG: 150 MG/DL
INSULIN ADMINSTERED, INSADM: 0.6 UNITS/HOUR
INSULIN ADMINSTERED, INSADM: 1 UNITS/HOUR
INSULIN ADMINSTERED, INSADM: 1.3 UNITS/HOUR
INSULIN ADMINSTERED, INSADM: 1.3 UNITS/HOUR
INSULIN ADMINSTERED, INSADM: 1.4 UNITS/HOUR
INSULIN ADMINSTERED, INSADM: 1.4 UNITS/HOUR
INSULIN ADMINSTERED, INSADM: 1.5 UNITS/HOUR
INSULIN ADMINSTERED, INSADM: 1.6 UNITS/HOUR
INSULIN ADMINSTERED, INSADM: 1.7 UNITS/HOUR
INSULIN ADMINSTERED, INSADM: 1.8 UNITS/HOUR
INSULIN ADMINSTERED, INSADM: 1.9 UNITS/HOUR
INSULIN ADMINSTERED, INSADM: 2.1 UNITS/HOUR
INSULIN ADMINSTERED, INSADM: 2.1 UNITS/HOUR
INSULIN ADMINSTERED, INSADM: 2.5 UNITS/HOUR
INSULIN ADMINSTERED, INSADM: 2.8 UNITS/HOUR
INSULIN ADMINSTERED, INSADM: 2.9 UNITS/HOUR
INSULIN ADMINSTERED, INSADM: 3.2 UNITS/HOUR
INSULIN ADMINSTERED, INSADM: 5.4 UNITS/HOUR
INSULIN ORDER, INSORD: 0.6 UNITS/HOUR
INSULIN ORDER, INSORD: 1 UNITS/HOUR
INSULIN ORDER, INSORD: 1.3 UNITS/HOUR
INSULIN ORDER, INSORD: 1.3 UNITS/HOUR
INSULIN ORDER, INSORD: 1.4 UNITS/HOUR
INSULIN ORDER, INSORD: 1.4 UNITS/HOUR
INSULIN ORDER, INSORD: 1.5 UNITS/HOUR
INSULIN ORDER, INSORD: 1.6 UNITS/HOUR
INSULIN ORDER, INSORD: 1.7 UNITS/HOUR
INSULIN ORDER, INSORD: 1.8 UNITS/HOUR
INSULIN ORDER, INSORD: 1.9 UNITS/HOUR
INSULIN ORDER, INSORD: 2.1 UNITS/HOUR
INSULIN ORDER, INSORD: 2.1 UNITS/HOUR
INSULIN ORDER, INSORD: 2.5 UNITS/HOUR
INSULIN ORDER, INSORD: 2.8 UNITS/HOUR
INSULIN ORDER, INSORD: 2.9 UNITS/HOUR
INSULIN ORDER, INSORD: 3.2 UNITS/HOUR
INSULIN ORDER, INSORD: 5.4 UNITS/HOUR
LOW TARGET, LOT: 80 MG/DL
MAGNESIUM SERPL-MCNC: 2.1 MG/DL (ref 1.6–2.6)
MCH RBC QN AUTO: 28.6 PG (ref 24–34)
MCHC RBC AUTO-ENTMCNC: 33.9 G/DL (ref 31–37)
MCV RBC AUTO: 84.4 FL (ref 74–97)
MINUTES UNTIL NEXT BG, NBG: 60 MIN
MULTIPLIER, MUL: 0.04
MULTIPLIER, MUL: 0.05
ORDER INITIALS, ORDINIT: NORMAL
P-R INTERVAL, ECG05: 176 MS
PCO2 BLD: 40.7 MMHG (ref 35–45)
PH BLD: 7.35 [PH] (ref 7.35–7.45)
PLATELET # BLD AUTO: 99 K/UL (ref 135–420)
PMV BLD AUTO: 9.3 FL (ref 9.2–11.8)
PO2 BLD: 82 MMHG (ref 80–100)
POTASSIUM BLD-SCNC: 3.8 MMOL/L (ref 3.5–5.5)
POTASSIUM SERPL-SCNC: 3.8 MMOL/L (ref 3.5–5.5)
Q-T INTERVAL, ECG07: 396 MS
QRS DURATION, ECG06: 108 MS
QTC CALCULATION (BEZET), ECG08: 456 MS
RBC # BLD AUTO: 3.46 M/UL (ref 4.7–5.5)
SAO2 % BLD: 96 % (ref 92–97)
SERVICE CMNT-IMP: ABNORMAL
SODIUM BLD-SCNC: 146 MMOL/L (ref 136–145)
SODIUM SERPL-SCNC: 146 MMOL/L (ref 136–145)
SPECIMEN TYPE: ABNORMAL
TOTAL RESP. RATE, ITRR: 16
VENTRICULAR RATE, ECG03: 80 BPM
WBC # BLD AUTO: 9.9 K/UL (ref 4.6–13.2)

## 2017-05-17 PROCEDURE — 97116 GAIT TRAINING THERAPY: CPT

## 2017-05-17 PROCEDURE — 74011250636 HC RX REV CODE- 250/636

## 2017-05-17 PROCEDURE — 80048 BASIC METABOLIC PNL TOTAL CA: CPT | Performed by: PHYSICIAN ASSISTANT

## 2017-05-17 PROCEDURE — 93005 ELECTROCARDIOGRAM TRACING: CPT

## 2017-05-17 PROCEDURE — 85027 COMPLETE CBC AUTOMATED: CPT | Performed by: PHYSICIAN ASSISTANT

## 2017-05-17 PROCEDURE — 65620000000 HC RM CCU GENERAL

## 2017-05-17 PROCEDURE — 71010 XR CHEST PORT: CPT

## 2017-05-17 PROCEDURE — 74011250636 HC RX REV CODE- 250/636: Performed by: PHYSICIAN ASSISTANT

## 2017-05-17 PROCEDURE — 74011636637 HC RX REV CODE- 636/637: Performed by: PHYSICIAN ASSISTANT

## 2017-05-17 PROCEDURE — P9047 ALBUMIN (HUMAN), 25%, 50ML: HCPCS

## 2017-05-17 PROCEDURE — 97162 PT EVAL MOD COMPLEX 30 MIN: CPT

## 2017-05-17 PROCEDURE — 83735 ASSAY OF MAGNESIUM: CPT | Performed by: PHYSICIAN ASSISTANT

## 2017-05-17 PROCEDURE — 77030027138 HC INCENT SPIROMETER -A

## 2017-05-17 PROCEDURE — 97165 OT EVAL LOW COMPLEX 30 MIN: CPT

## 2017-05-17 PROCEDURE — 74011000250 HC RX REV CODE- 250

## 2017-05-17 PROCEDURE — 82947 ASSAY GLUCOSE BLOOD QUANT: CPT

## 2017-05-17 PROCEDURE — 82962 GLUCOSE BLOOD TEST: CPT

## 2017-05-17 PROCEDURE — 97535 SELF CARE MNGMENT TRAINING: CPT

## 2017-05-17 PROCEDURE — 77030012891

## 2017-05-17 PROCEDURE — 74011250636 HC RX REV CODE- 250/636: Performed by: THORACIC SURGERY (CARDIOTHORACIC VASCULAR SURGERY)

## 2017-05-17 PROCEDURE — 82803 BLOOD GASES ANY COMBINATION: CPT

## 2017-05-17 PROCEDURE — 74011250637 HC RX REV CODE- 250/637: Performed by: PHYSICIAN ASSISTANT

## 2017-05-17 RX ORDER — INSULIN LISPRO 100 [IU]/ML
INJECTION, SOLUTION INTRAVENOUS; SUBCUTANEOUS
Status: DISCONTINUED | OUTPATIENT
Start: 2017-05-18 | End: 2017-05-20 | Stop reason: HOSPADM

## 2017-05-17 RX ORDER — INSULIN LISPRO 100 [IU]/ML
INJECTION, SOLUTION INTRAVENOUS; SUBCUTANEOUS ONCE
Status: ACTIVE | OUTPATIENT
Start: 2017-05-18 | End: 2017-05-18

## 2017-05-17 RX ORDER — POTASSIUM CHLORIDE 7.45 MG/ML
10 INJECTION INTRAVENOUS
Status: ACTIVE | OUTPATIENT
Start: 2017-05-17 | End: 2017-05-17

## 2017-05-17 RX ORDER — INSULIN GLARGINE 100 [IU]/ML
20 INJECTION, SOLUTION SUBCUTANEOUS ONCE
Status: COMPLETED | OUTPATIENT
Start: 2017-05-17 | End: 2017-05-17

## 2017-05-17 RX ORDER — MAGNESIUM SULFATE 100 %
4 CRYSTALS MISCELLANEOUS AS NEEDED
Status: DISCONTINUED | OUTPATIENT
Start: 2017-05-17 | End: 2017-05-20 | Stop reason: HOSPADM

## 2017-05-17 RX ORDER — DEXTROSE 50 % IN WATER (D50W) INTRAVENOUS SYRINGE
25-50 AS NEEDED
Status: DISCONTINUED | OUTPATIENT
Start: 2017-05-17 | End: 2017-05-20 | Stop reason: HOSPADM

## 2017-05-17 RX ORDER — POTASSIUM CHLORIDE 14.9 MG/ML
20 INJECTION INTRAVENOUS ONCE
Status: COMPLETED | OUTPATIENT
Start: 2017-05-17 | End: 2017-05-19

## 2017-05-17 RX ORDER — INSULIN GLARGINE 100 [IU]/ML
20 INJECTION, SOLUTION SUBCUTANEOUS
Status: DISCONTINUED | OUTPATIENT
Start: 2017-05-18 | End: 2017-05-18

## 2017-05-17 RX ADMIN — FAMOTIDINE 20 MG: 20 TABLET ORAL at 18:02

## 2017-05-17 RX ADMIN — INSULIN GLARGINE 20 UNITS: 100 INJECTION, SOLUTION SUBCUTANEOUS at 18:03

## 2017-05-17 RX ADMIN — Medication 10 ML: at 14:00

## 2017-05-17 RX ADMIN — POTASSIUM CHLORIDE 20 MEQ: 14.9 INJECTION, SOLUTION INTRAVENOUS at 06:00

## 2017-05-17 RX ADMIN — BISACODYL 10 MG: 5 TABLET, COATED ORAL at 08:53

## 2017-05-17 RX ADMIN — HYDROCODONE BITARTRATE AND ACETAMINOPHEN 2 TABLET: 5; 325 TABLET ORAL at 22:33

## 2017-05-17 RX ADMIN — WARFARIN SODIUM 5 MG: 5 TABLET ORAL at 18:02

## 2017-05-17 RX ADMIN — FENTANYL CITRATE 25 MCG: 50 INJECTION INTRAMUSCULAR; INTRAVENOUS at 00:57

## 2017-05-17 RX ADMIN — Medication 10 ML: at 21:16

## 2017-05-17 RX ADMIN — AMIODARONE HYDROCHLORIDE 400 MG: 200 TABLET ORAL at 18:02

## 2017-05-17 RX ADMIN — CEFAZOLIN SODIUM 2 G: 2 SOLUTION INTRAVENOUS at 06:09

## 2017-05-17 RX ADMIN — FENTANYL CITRATE 50 MCG: 50 INJECTION INTRAMUSCULAR; INTRAVENOUS at 20:26

## 2017-05-17 RX ADMIN — AMIODARONE HYDROCHLORIDE 400 MG: 200 TABLET ORAL at 08:53

## 2017-05-17 RX ADMIN — POLYETHYLENE GLYCOL 3350 17 G: 17 POWDER, FOR SOLUTION ORAL at 08:58

## 2017-05-17 RX ADMIN — HYDROCODONE BITARTRATE AND ACETAMINOPHEN 2 TABLET: 5; 325 TABLET ORAL at 09:32

## 2017-05-17 RX ADMIN — ENOXAPARIN SODIUM 40 MG: 40 INJECTION SUBCUTANEOUS at 18:09

## 2017-05-17 RX ADMIN — HYDROMORPHONE HYDROCHLORIDE 0.5 MG: 1 INJECTION, SOLUTION INTRAMUSCULAR; INTRAVENOUS; SUBCUTANEOUS at 06:08

## 2017-05-17 RX ADMIN — MUPIROCIN: 20 OINTMENT TOPICAL at 23:35

## 2017-05-17 RX ADMIN — Medication 10 ML: at 07:24

## 2017-05-17 RX ADMIN — FAMOTIDINE 20 MG: 20 TABLET ORAL at 08:53

## 2017-05-17 RX ADMIN — METOPROLOL TARTRATE 12.5 MG: 25 TABLET ORAL at 21:12

## 2017-05-17 RX ADMIN — ATORVASTATIN CALCIUM 20 MG: 20 TABLET, FILM COATED ORAL at 08:53

## 2017-05-17 RX ADMIN — METOPROLOL TARTRATE 12.5 MG: 25 TABLET ORAL at 08:53

## 2017-05-17 RX ADMIN — ASPIRIN 81 MG: 81 TABLET, COATED ORAL at 08:53

## 2017-05-17 RX ADMIN — CHLORHEXIDINE GLUCONATE 10 ML: 1.2 RINSE ORAL at 08:52

## 2017-05-17 RX ADMIN — FEBUXOSTAT 40 MG: 40 TABLET ORAL at 08:53

## 2017-05-17 RX ADMIN — DOCUSATE SODIUM 100 MG: 100 CAPSULE, LIQUID FILLED ORAL at 08:53

## 2017-05-17 RX ADMIN — LEVOTHYROXINE SODIUM 175 MCG: 125 TABLET ORAL at 08:53

## 2017-05-17 RX ADMIN — CHLORHEXIDINE GLUCONATE 10 ML: 1.2 RINSE ORAL at 18:02

## 2017-05-17 RX ADMIN — HYDROCODONE BITARTRATE AND ACETAMINOPHEN 2 TABLET: 5; 325 TABLET ORAL at 16:07

## 2017-05-17 RX ADMIN — DOCUSATE SODIUM 100 MG: 100 CAPSULE, LIQUID FILLED ORAL at 18:02

## 2017-05-17 NOTE — PROGRESS NOTES
Problem: Mobility Impaired (Adult and Pediatric)  Goal: *Acute Goals and Plan of Care (Insert Text)  STGs to be addressed within 3 days:  1. Bed mobility: Supine to sit to supine S with HR for meals. 2. Activity tolerance: Tolerate up in chair 1-2 hrs for ADLs. 3. Transfers: Sit to stand to chair S with LRAD for ADLs. LTGs to be addressed within 7 days:  1. Standing/Ambulation Balance: Increase to Good with LRAD for safe transfers and gait. 2. Ambulation: Ambulate > 200 ft. S with LRAD for home mobility. 3. Patient Education: Independent with HEP for home safety. 4. Stairs: Up/Down 2 steps CGA with HR for home entry. Outcome: Progressing Towards Goal  PHYSICAL THERAPY EVALUATION     Patient: Hellen Shore (76 y.o. male)  Date: 5/17/2017  Primary Diagnosis: Acquired stenosis of aortic valve [I35.0]  Procedure(s) (LRB):  AORTIC VALVE REPLACEMENT (AVR) WITH BIOPROSTHESIS WITH STERNALOK (N/A) 1 Day Post-Op   Precautions:  Fall, Sternal, A-line placement, Chest tube placement (R) flank      ASSESSMENT :  Based on the objective data described below, the patient presents to PT s/p aortic valve replacement with decreased functional mobility with regard to bed mobility, transfers, gait, and overall tolerance for activity. Patient up in chair upon arrival for PT. Patient educated with sternal precautions and use of heart hugger. Patient transitioned into standing min A with vc's for use of heart hugger. Patient able to ambulate ~ 175 ft CGA with rollator, no LOB, however, gt is antalgic on R LE. Patient reports that his R LE was weak PTA. Patient able to return back to sitting in chair at conclusion of session. Patient would benefit from PT to address above impairments and assist with discharge planning. Patient will benefit from skilled intervention to address the above impairments.   Patients rehabilitation potential is considered to be Good  Factors which may influence rehabilitation potential include:   [X]         None noted  [ ]         Mental ability/status  [ ]         Medical condition  [ ]         Home/family situation and support systems  [ ]         Safety awareness  [ ]         Pain tolerance/management  [ ]         Other:        PLAN :  Recommendations and Planned Interventions:  [X]           Bed Mobility Training             [X]    Neuromuscular Re-Education  [X]           Transfer Training                   [ ]    Orthotic/Prosthetic Training  [X]           Gait Training                          [ ]    Modalities  [X]           Therapeutic Exercises          [ ]    Edema Management/Control  [X]           Therapeutic Activities            [X]    Patient and Family Training/Education  [ ]           Other (comment):     Frequency/Duration: Patient will be followed by physical therapy daily x 4-7 x week to address goals. Discharge Recommendations: Home Health  Further Equipment Recommendations for Discharge: rolling walker       SUBJECTIVE:   Patient stated I am feeling okay.       OBJECTIVE DATA SUMMARY:       Past Medical History:   Diagnosis Date    Diabetes ([facility] [address] 1995    Dyslipidemia 3/1/2017    Fractures 1995     R Knee/ Tib fib fracture/surgery    Gout 2010    Graves disease 1/21/2015    High cholesterol 2005    HTN (hypertension) 1990    Osteoarthritis 2013    Severe aortic stenosis 3/29/2017    Spinal stenosis of lumbar region 3/1/2016    Spondylolisthesis 3/1/2016    Thyroid trouble      Type 2 diabetes mellitus with diabetic nephropathy ([facility] [address] 4/28/2015     Past Surgical History:   Procedure Laterality Date    HX HIP REPLACEMENT Left 2009    HX ORTHOPAEDIC Right       R knee/Tibfib surgery     Barriers to Learning/Limitations: None  Compensate with: N/A  Prior Level of Function/Home Situation:   Home Situation  Home Environment: Private residence  # Steps to Enter: 5  Rails to Enter: Yes  Hand Rails : Bilateral  One/Two Story Residence: One story  Living Alone: No  Support Systems: Spouse/Significant Other/Partner, Family member(s)  Patient Expects to be Discharged to[de-identified] Private residence  Current DME Used/Available at Home: Abram Cumins, straight  Tub or Shower Type: Tub/Shower combination  Critical Behavior:  Neurologic State: Alert; Appropriate for age  Psychosocial  Patient Behaviors: Appropriate for age;Calm; Cooperative  Family  Behaviors: Appropriate for situation;Calm; Cooperative;Supportive  Needs Expressed: Educational;Social/Environmental  Purposeful Interaction: Yes  Pt Identified Daily Priority: Clinical issues (comment); Family issues (comment)  Caritas Process: Nurture loving kindness;Establish trust;Attend basic human needs;Create healing environment  Caring Interventions: Reassure; Therapeutic modalities  Reassure: Therapeutic listening; Informing  Therapeutic Modalities: Intentional therapeutic touch  Strength:    Strength: Generally decreased, functional (R LE 3/5, L LE 5/5)  Tone & Sensation:   Tone: Normal (B LE)  Sensation: Intact (B LE intact to LT)   Range Of Motion:  AROM: Within functional limits (B LE)  Functional Mobility:  Bed Mobility:  Rolling:  (N/A-pt up in chair at bedside)  Scooting: Contact guard assistance  Transfers:  Sit to Stand: Minimum assistance; Additional time (with rollator)  Stand to Sit: Contact guard assistance; Additional time  Balance:   Sitting: Intact  Standing: Intact; With support (with rollator)  Ambulation/Gait Training:  Distance (ft): 175 Feet (ft)  Assistive Device: Walker, rollator  Ambulation - Level of Assistance: Contact guard assistance  Base of Support: Widened  Speed/Deloris: Pace decreased (<100 feet/min)  Interventions: Verbal cues  Stairs:  Stairs - Level of Assistance:  (N/A)  Pain:  Pain Scale 1: Numeric (0 - 10)  Pain Intensity 1: 4  Pain Location 1: Chest;Incisional  Pain Orientation 1: Anterior  Pain Description 1: Aching  Pain Intervention(s) 1: Nurse notified  Activity Tolerance:   Good  Please refer to the flowsheet for vital signs taken during this treatment. After treatment:   [X] Patient left in no apparent distress sitting up in chair  [ ] Patient left sitting on EOB  [ ] Patient left in no apparent distress in bed  [ ] Patient declined to be OOB at this time due to   [X] Call bell left within reach  [X] Nursing notified(Sameera, RN)  [X] Caregiver present  [ ] Bed alarm activated      COMMUNICATION/EDUCATION:   [X]         Fall prevention education was provided and the patient/caregiver indicated understanding. [X]         Patient/family have participated as able in goal setting and plan of care. [X]         Patient/family agree to work toward stated goals and plan of care. [ ]         Patient understands intent and goals of therapy, but is neutral about his/her participation. [ ]         Patient is unable to participate in goal setting and plan of care. Thank you for this referral.  Gerardo Pennington, PT   Time Calculation: 29 mins      G-codes:  Mobility  Current  CJ= 20-39%   Goal  CI= 1-19%. The severity rating is based on the Level of Assistance required for Functional Mobility and ADLs.      Eval Complexity: History: MEDIUM  Complexity : 1-2 comorbidities / personal factors will impact the outcome/ POC Exam:MEDIUM Complexity : 3 Standardized tests and measures addressing body structure, function, activity limitation and / or participation in recreation  Presentation: MEDIUM Complexity : Evolving with changing characteristics Overall Complexity:MEDIUM

## 2017-05-17 NOTE — DIABETES MGMT
Diabetes Patient/Family Education Record    Factors That  May Influence Patients Ability  to Learn or  Comply With  Recommendations:    []   Language barrier    []   Cultural needs   []   Motivation    []   Cognitive limitation    []   Physical   [x]   Education    []   Physiological factors   []   Hearing/vision/speaking impairment   []   Mosque beliefs    []   Financial factors   []  Other:   []  No factors identified at this time. Person Instructed:   [x]   Patient   []   Family   []  Other     Preference for Learning:   [x]   Verbal   [x]   Written   []  Demonstration     Level of Comprehension & Competence:    [x]  Good                                      [] Fair                                     []  Poor                             []  Needs Reinforcement   [x]  Teachback completed    Education Component:   [x]  Medication management, including how to administer insulin (if appropriate) and potential medication interactions: Patient stated that he is taking the following diabetes meds at home as prescribed by his doctor from the [facility]:  Lantus insulin 30 units daily. Novolog insulin 6 units TID AC. [x]  Nutritional management including the role of carbohydrate intake: Discussed and encouraged patient to eat 3 meals daily, serving size/portion control of carbs (starches, fruits, dairy), and limit intake of concentrated sweets. [x]  Exercise: Patient verbalized understanding to follow the recommended exercise to him post CABG.    [x]  Signs, symptoms, and treatment of hyperglycemia and hypoglycemia   [x] Treatment of hyperglycemia and hypoglycemia   [x]  Importance of blood glucose monitoring and how to obtain a blood glucose meter: Patient stated that he is using OneTouch BG meter and testing his blood sugar at least 3-4x daily at home.    []  Instruction on use of blood glucose meter   [x]  Discuss the importance of HbA1C monitoring and provide patient with results: 6.3% (05/16/2017) is equivalent to average blood glucose of 134 mg/dL during the past 2-3 months. Encouraged patient to continue to follow his diabetes treatment plan to help keep A1C <7%.    []  Sick day guidelines   []  Proper use and disposal of lancets, needles, syringes or insulin pens (if appropriate)   []  Potential long-term complications (retinopathy, kidney disease, neuropathy, heart disease, stroke, vascular disease, foot care)   [x] Provide emergency contact number and contact number for more information    [x]  Goal:  Patient/family will demonstrate understanding of Diabetes Self Management Skills by: 05/24/2017  Plan for post-discharge education or self-management support:    [x] Outpatient class schedule provided            [] Patient Declined    [] Scheduled for outpatient classes (date) _______         Amanda Marin RN

## 2017-05-17 NOTE — PROGRESS NOTES
CARDIAC SURGERY PROGRESS NOTE    2017, 10:20 AM     Post Operative Day # 1     Chart reviewed. Interval History/Events of Past 24 hours:   Up in chair no gtts. PW not working  Subjective:  Patient seen and examined on rounds today. - c/o pain  Pain level: not controlled    Objective:  Vital signs:   Visit Vitals    /70    Pulse 90    Temp 98.3 °F (36.8 °C)    Resp 18    Ht 5' 10\" (1.778 m)    Wt 112.2 kg (247 lb 4.8 oz)    SpO2 98%    BMI 35.48 kg/m2     Temp (24hrs), Av.1 °F (36.2 °C), Min:95.7 °F (35.4 °C), Max:98.3 °F (36.8 °C)    Admission Weight: Last Weight   Weight: 111.1 kg (245 lb) Weight: 112.2 kg (247 lb 4.8 oz)     Last 3 Recorded Weights in this Encounter    17 0605 17 0628   Weight: 111.1 kg (245 lb) 112.2 kg (247 lb 4.8 oz)       Telemetry:     Physical Examination:     General:  Alert, oriented  Lungs: Clear to ascultation without rales, wheezes or rhonchi. Chest: Dressings clean and dry. Sternum stable. Heart: regular rate and rhythm, No murmur. Abdomen: Soft and non-tender without masses. Bowel sounds present. Extremities: Warm and well perfused. Edema absent. Incisions: clean and dry  Neuro: No deficit. Beta-blocker:  Yes  ASA: Yes   Statin: Yes  ACE-I: No  Diuretic: No     SUP Prophylaxis: Pepcid  DVT Prophylaxis:   pneumatic compression boots: Yes  Compression stockings:  Yes  Enoxaparin:  No    Chest tubes:  present, 560 ml/18h    Pacing wires:  present, not functioning properly    DME needs:  tbd          Labs:  Lab Results   Component Value Date/Time    WBC 9.9 2017 04:00 AM    HCT 29.2 (L) 2017 04:00 AM    PLT 99 (L) 2017 04:00 AM      Lab Results   Component Value Date/Time     (H) 2017 04:00 AM    K 3.8 2017 04:00 AM     (H) 2017 04:00 AM    CO2 25 2017 04:00 AM    GLU 86 2017 04:00 AM    BUN 20 (H) 2017 04:00 AM    CREA 1.17 2017 04:00 AM    CREA 1.15 2017 01:05 PM CREA 1.27 05/16/2017 06:31 AM     Lab Results   Component Value Date/Time    HBA1C 6.3 (H) 05/16/2017 06:31 AM     pH (POC)   Date Value Ref Range Status   05/17/2017 7. 354 7.35 - 7.45   Final     pCO2 (POC)   Date Value Ref Range Status   05/17/2017 40.7 35 - 45 MMHG Final     pO2 (POC)   Date Value Ref Range Status   05/17/2017 82 80 - 100 MMHG Final     HCO3 (POC)   Date Value Ref Range Status   05/17/2017 22.8 22 - 26 MMOL/L Final     sO2 (POC)   Date Value Ref Range Status   05/17/2017 96 92 - 97 % Final     Base excess (POC)   Date Value Ref Range Status   05/16/2017 0 mmol/L Final          EKG: NSR    CXR: RUL ATX, T/L ok    Assessment:  AS S/P  AVR 25mm bio - on ASA  HTN/DLD  - resume BB, statin  Respiratory parameters stable  Cardiac status stable     Plans:    1. Start enoxaparin tonight,  warfarin 5 mg tonight  2. Lantus  3. d/c mendoza  4. wean oxygen  5. d/c cordis  6. D/c gordy  7. Pulmonary hygiene  8. eval chest tubes for d/c later today    Heart Hugger use encouraged to mitigate pain and will also assist with deep breathing and incentive spirometry goals. Possible discharge 3 days. SARAI Phelps-FABIANA CVTS  05/17/17, 10:20 AM      ATTENDING SURGEON NOTE    I saw and evaluated Lawyer A Juna Sever. on rounds today, and discussed my assessment and the plans with the PA on our service. He is resting comfortably in a chair and is feeling well. He was extubated last night without difficulty and is not short of breath presently. Hemodynamically stable overnight. His incisional pain is under good control. He did sleep well last night. VS stable. Lungs are clear to auscultation bilaterally, without wheezes and without rhonchi. The heart rate and rhythm were regular. The abdomen was soft, with scant bowel sounds. The sternal wound dressing was dry and intact. His oxygen saturation was 98% on 3 liters of oxygen via nasal cannula.     Hct 29  K+ 3.8  creat 1.1    The CXR images, which I personally reviewed, showed clear lungs bilaterally, with RUL atelectasis resolved. The EKG demonstrates sinus rhythm. He is progressing well. Plans for today include:  Ambulate at bedside and in jones. Advance oral intake as tolerated. Wean off oxygen. Remove Heath catheter. Keep in epicardial wires and chest tubes. Start warafrin today (2.5 mg). Patient verbalizes understanding and agreement with the plan. I spoke with his spouse today to provide an update on his condition.     Mak Okeefe MD

## 2017-05-17 NOTE — ANESTHESIA POSTPROCEDURE EVALUATION
Post-Anesthesia Evaluation and Assessment    Patient: Osvaldo Ruth MRN: [id number]  SSN: [id number]    YOB: 1945  Age: 71 y.o. Sex: male       Cardiovascular Function/Vital Signs  Visit Vitals    /72    Pulse 72    Temp 37 °C (98.6 °F)    Resp 24    Ht 5' 10\" (1.778 m)    Wt 112.2 kg (247 lb 4.8 oz)    SpO2 92%    BMI 35.48 kg/m2       Patient is status post general anesthesia for Procedure(s):  AORTIC VALVE REPLACEMENT (AVR) WITH BIOPROSTHESIS WITH STERNALOK. Nausea/Vomiting: None    Postoperative hydration reviewed and adequate. Pain:  Pain Scale 1: Numeric (0 - 10) (05/17/17 1600)  Pain Intensity 1: 2 (05/17/17 1730)   Managed    Neurological Status:   Neuro (WDL): Within Defined Limits (05/16/17 0620)  Neuro  Neurologic State: Alert (05/17/17 1246)  Orientation Level: Oriented X4 (05/17/17 1246)  Cognition: Follows commands (05/17/17 1246)  Speech: Clear (05/17/17 0800)   At baseline    Mental Status and Level of Consciousness: Arousable    Pulmonary Status:   O2 Device: Nasal cannula (05/17/17 0800)   Adequate oxygenation and airway patent    Complications related to anesthesia: None    Post-anesthesia assessment completed.  No concerns    Signed By: Ross Dumont MD     May 17, 2017

## 2017-05-17 NOTE — DIABETES MGMT
Glycemic Control Plan of Care    Recommendations: 20 units basal lantus insulin daily and very resistant dose of correctional lispro insulin after regular insulin drip discontinued. Administer the first dose of lantus insulin 2 hours before regular insulin drip is discontinued. Assessment:   Patient is 71year old with past medical history including type 2 diabetes mellitus, diabetic nephropathy,  CKD stage 3, hypothyroidism, and dyslipidemia - was admitted on 05/16/2017 for procedure. Noted:  Severe aortic stenosis. Status post aortic valve replacement. Type 2 diabetes mellitus with current A1C of 6.3% (05/16/2017). Regular insulin drip (GlucoStabilzer) post op started on 05/16/2017at 1312. Discussed inpatient glycemic management with patient. Completed assessment of home diabetes management and education. See separate notes, 05/17/2017. Most recent blood glucose values:    Results for Guillermo Gilmore (MRN [id number]) as of 5/17/2017 12:51   Ref. Range 5/16/2017 14:10 5/16/2017 15:14 5/16/2017 16:20 5/16/2017 16:49 5/16/2017 17:55 5/16/2017 17:57 5/16/2017 18:55 5/16/2017 19:49 5/16/2017 20:43 5/16/2017 21:38 5/16/2017 22:31 5/16/2017 23:34   GLUCOSE,FAST - POC Latest Ref Range: 70 - 110 mg/dL 115 (H) 113 (H) 102 120 (H) 158 (H) 159 (H) 156 (H) 164 (H) 153 (H) 144 (H) 119 (H) 98     Results for Guillermo Gilmore (MRN [id number]) as of 5/17/2017 12:51   Ref. Range 5/17/2017 00:36 5/17/2017 01:44 5/17/2017 02:46 5/17/2017 03:55 5/17/2017 03:58 5/17/2017 04:59 5/17/2017 06:07 5/17/2017 07:12 5/17/2017 08:15 5/17/2017 09:12 5/17/2017 10:21 5/17/2017 11:21 5/17/2017 12:33   GLUCOSE,FAST - POC Latest Ref Range: 70 - 110 mg/dL 98 87 116 (H) 89 86 91 85 101 94 123 (H) 109 95 117 (H)     Current A1C: 6.3% (05/16/2017) is equivalent to average blood glucose of 134 mg/dL during the past 2-3 months. Current hospital diabetes medications:  Regular insulin drip (Loyce Jewel).  Drip rate at time of review: 2.9 units/hr    Total dose insulin requirement previous day: 05/16/2017  Regular insulin drip (Carlos Bridgette): 67.9 units    Home diabetes medications: As stated by patient on 05/17/2017:  Lantus insulin 30 units daily. Novolog insulin 6 units TID AC.     Diet: Clear liquid; no concentrated sweets - then advance as tolerated    Goals:  Blood glucose will be within target range of  mg/dL by 05/18/2017    Education:  __X_  Refer to Diabetes Education Record: 05/17/2017             ___  Education not indicated at this time    Dario Rodriguez RN

## 2017-05-17 NOTE — ROUTINE PROCESS
Bedside and Verbal shift change report given to Lacey De Leon (oncoming nurse) by Jonathan Washburn RN   (offgoing nurse). Report included the following information SBAR, Kardex, Recent Results, Med Rec Status and Cardiac Rhythm PACED.

## 2017-05-17 NOTE — PROGRESS NOTES
Problem: Self Care Deficits Care Plan (Adult)  Goal: *Acute Goals and Plan of Care (Insert Text)  Occupational Therapy Goals  Initiated 5/17/2017 within 7 day(s). 1. Patient will perform grooming with supervision/set-up   2. Patient will perform lower body dressing/bathing with supervision/set-up. 3. Patient will perform upper body dressing/bathing with supervision/set-up. 4. Patient will perform toilet transfers with supervision/set-up. 5. Patient will perform all aspects of toileting with supervision/set-up. 6. Patient will participate in upper extremity therapeutic exercise/activities with supervision/set-up in preparation for self care tasks. 7. Patient will recall and implement sternal precautions during functional tasks with no verbal cues. Outcome: Progressing Towards Goal  OCCUPATIONAL THERAPY EVALUATION     Patient: Joycelyn Fine (76 y.o. male)  Date: 5/17/2017  Primary Diagnosis: Acquired stenosis of aortic valve [I35.0]  Procedure(s) (LRB):  AORTIC VALVE REPLACEMENT (AVR) WITH BIOPROSTHESIS WITH STERNALOK (N/A) 1 Day Post-Op   Precautions:  Fall, Sternal      ASSESSMENT :  Based on the objective data described below, the patient needed CGA during functional mobility with rollator and needed min/mod A during simulated self care tasks. Patient needed CGA during simulated toilet transfer. Patient had decreased, but functional BUE AROM, secondary to sternal precautions, and bilateral  strength. Educated patient on sternal precautions as it relates to function; patient needed max verbal cues to recall and min verbal cues to implement them during functional tasks. Patient left comfortable in chair. Patient will benefit from skilled intervention to address the above impairments.   Patients rehabilitation potential is considered to be Good  Factors which may influence rehabilitation potential include:   [ ]             None noted  [ ]             Mental ability/status  [X] Medical condition  [ ]             Home/family situation and support systems  [ ]             Safety awareness  [ ]             Pain tolerance/management  [ ]             Other:        PLAN :  Recommendations and Planned Interventions:  [X]               Self Care Training                  [X]        Therapeutic Activities  [X]               Functional Mobility Training    [ ]        Cognitive Retraining  [X]               Therapeutic Exercises           [X]        Endurance Activities  [X]               Balance Training                   [ ]        Neuromuscular Re-Education  [ ]               Visual/Perceptual Training     [X]   Home Safety Training  [X]               Patient Education                 [ ]        Family Training/Education  [ ]               Other (comment):     Frequency/Duration: Patient will be followed by occupational therapy 1-2 times per day/4-7 days per week to address goals. Discharge Recommendations: Home Health  Further Equipment Recommendations for Discharge: TBD       PATIENT COMPLEXITY      Eval Complexity: History: LOW Complexity : Brief history review ; Examination: LOW Complexity : 1-3 performance deficits relating to physical, cognitive , or psychosocial skils that result in activity limitations and / or participation restrictions ; Decision Making:LOW Complexity : No comorbidities that affect functional and no verbal or physical assistance needed to complete eval tasks  Assessment: Low Complexity       G-CODES:      Self Care  Current  CK= 40-59%   Goal  CI= 1-19%. The severity rating is based on the Level of Assistance required for Functional Mobility and ADLs. SUBJECTIVE:   Patient stated It feels good being up.       OBJECTIVE DATA SUMMARY:       Past Medical History:   Diagnosis Date    Diabetes ([facility] [address] 1995    Dyslipidemia 3/1/2017    Fractures 1995     R Knee/ Tib fib fracture/surgery    Gout 2010    Graves disease 1/21/2015    High cholesterol 2005    HTN (hypertension) 1990    Osteoarthritis 2013    Severe aortic stenosis 3/29/2017    Spinal stenosis of lumbar region 3/1/2016    Spondylolisthesis 3/1/2016    Thyroid trouble      Type 2 diabetes mellitus with diabetic nephropathy ([facility] [address] 4/28/2015     Past Surgical History:   Procedure Laterality Date    HX HIP REPLACEMENT Left 2009    HX ORTHOPAEDIC Right       R knee/Tibfib surgery     Prior Level of Function/Home Situation: Patient reported he was independent in basic self care tasks and functional mobility PTA. Home Situation  Home Environment: Private residence  # Steps to Enter: 5  Rails to Enter: Yes  Hand Rails : Bilateral  One/Two Story Residence: One story  Living Alone: No  Support Systems: Spouse/Significant Other/Partner, Family member(s)  Patient Expects to be Discharged to[de-identified] Private residence  Current DME Used/Available at Home: Cane, straight  Tub or Shower Type: Tub/Shower combination  [X]  Right hand dominant          [ ]  Left hand dominant  Cognitive/Behavioral Status:  Neurologic State: Alert  Orientation Level: Oriented X4  Cognition: Follows commands  Safety/Judgement: Awareness of environment; Fall prevention;Good awareness of safety precautions     Skin: No skin changes noted     Edema: No edema noted     Vision/Perceptual:       Acuity: Within Defined Limits       Coordination:  Coordination: Within functional limits (BUEs)       Balance:  Sitting: Intact  Standing: Intact; With support (rollator)     Strength:  Strength: Generally decreased, functional ( strength)     Tone & Sensation:  Tone: Normal (BUEs)  Sensation: Intact (BUEs)     Range of Motion:  AROM: Generally decreased, functional (BUEs within sternal precautions)     Functional Mobility and Transfers for ADLs:  Bed Mobility:  Rolling:  (N/A-pt up in chair at bedside)   Scooting: Contact guard assistance  Transfers:  Sit to Stand: Minimum assistance;Contact guard assistance              Toilet Transfer : Contact guard assistance (simulated with rollator)     ADL Assessment:(clincial judgement based on functional mobility)  Feeding: Independent     Oral Facial Hygiene/Grooming: Contact guard assistance     Bathing: Moderate assistance     Upper Body Dressing: Minimum assistance     Lower Body Dressing: Moderate assistance     Toileting: Minimum assistance     Pain:  Pt reports 3/10 pain or discomfort prior to treatment. Pt reports 3/10 pain or discomfort post treatment. Activity Tolerance:  Good     Please refer to the flowsheet for vital signs taken during this treatment. After treatment:   [X] Patient left in no apparent distress sitting up in chair  [ ] Patient left in no apparent distress in bed  [X] Call bell left within reach  [X] Nursing notified  [X] Caregiver present  [ ] Bed alarm activated      COMMUNICATION/EDUCATION:   [ ] Home safety education was provided and the patient/caregiver indicated understanding. [X] Patient/family have participated as able in goal setting and plan of care. [X] Patient/family agree to work toward stated goals and plan of care. [ ] Patient understands intent and goals of therapy, but is neutral about his/her participation. [ ] Patient is unable to participate in goal setting and plan of care.      Thank you for this referral.  Susan Pino OTR/L  Time Calculation: 29 mins

## 2017-05-17 NOTE — PROGRESS NOTES
NUTRITION    Nutrition Consult: General Nutrition Management & Supplements     RECOMMENDATIONS / PLAN:     - Advance po diet as medically appropriate  - Continue RD inpatient monitoring and evaluation. NUTRITION INTERVENTIONS & DIAGNOSIS:     [x] Meals/Snacks: modified diet  [x] Nutrition education: coumadin and vitamin K consistent diet on 5/17; cardiac diet on 5/17    Nutrition Diagnosis:  Inadequate energy intake related to insufficient calorie diet following surgical procedure as evidenced by clear liquid diet  Food and nutrition related knowledge deficit related to decreased knowledge of cardiac diet and vitamin K consistent diet as evidenced by diet education provided    ASSESSMENT:     Subjective/Objective:  Patient reported appetite and meal intake were good PTA and since admission. On clear liquid diet following surgery; pt tolerating with good po intake. Wants to try solid foods. Provided education on cardiac diet; educational handouts provided/reviewed with pt and family. Pt stated on coumadin; consistent vitamin K diet discussed with pt. Educational handout provided/reviewed with pt. Average po intake adequate to meet patients estimated nutritional needs:   [] Yes     [x] No   [] Unable to determine at this time    Diet: DIET CLEAR LIQUID No Conc. Sweets      Food Allergies:  Watermelon, peach  Current Appetite:   [x] Good     [] Fair     [] Poor     [] Other:  Appetite/meal intake prior to admission:   [x] Good     [] Fair     [] Poor     [] Other:  Feeding Limitations:  [] Swallowing difficulty    [] Chewing difficulty    [] Other:  Current Meal Intake: No data found.       BM:  5/16  Skin Integrity:  Surgical incision on mid chest  Edema:  None   Pertinent Medications: Reviewed    Recent Labs      05/17/17   0400  05/16/17   1305  05/16/17   0631   NA  146*  144  140   K  3.8  3.9  4.0   CL  113*  113*  110*   CO2  25  26  23   GLU  86  122*  100*   BUN  20*  21*  24*   CREA  1.17  1.15  1.27 CA  8.4*  8.6  9.2   MG  2.1  2.5   --    ALB   --    --   3.4   SGOT   --    --   21   ALT   --    --   33       Intake/Output Summary (Last 24 hours) at 05/17/17 1259  Last data filed at 05/17/17 1000   Gross per 24 hour   Intake          3451.82 ml   Output             2640 ml   Net           811.82 ml       Anthropometrics:  Ht Readings from Last 1 Encounters:   05/16/17 5' 10\" (1.778 m)     Last 3 Recorded Weights in this Encounter    05/16/17 0605 05/17/17 0628   Weight: 111.1 kg (245 lb) 112.2 kg (247 lb 4.8 oz)     Body mass index is 35.48 kg/(m^2).           Weight History:  Patient denied experiencing any recent changes in weight PTA    Weight Metrics 5/17/2017 5/16/2017 5/4/2017 5/2/2017 4/26/2017 4/25/2017 3/29/2017   Weight 247 lb 4.8 oz - 245 lb 245 lb 247 lb 248 lb 12.8 oz 254 lb   BMI - 35.48 kg/m2 35.15 kg/m2 35.15 kg/m2 35.44 kg/m2 35.7 kg/m2 36.45 kg/m2        Admitting Diagnosis: Acquired stenosis of aortic valve [I35.0]  Past Medical History:   Diagnosis Date    Diabetes ([facility] [address] 1995    Dyslipidemia 3/1/2017    Fractures 1995    R Knee/ Tib fib fracture/surgery    Gout 2010    Graves disease 1/21/2015    High cholesterol 2005    HTN (hypertension) 1990    Osteoarthritis 2013    Severe aortic stenosis 3/29/2017    Spinal stenosis of lumbar region 3/1/2016    Spondylolisthesis 3/1/2016    Thyroid trouble     Type 2 diabetes mellitus with diabetic nephropathy ([facility] [address] 4/28/2015       Education Needs:        [] None identified  [] Identified - Not appropriate at this time  [x]  Identified and addressed - refer to education log  Learning Limitations:   [x] None identified  [] Identified    Cultural, Hoahaoism & ethnic food preferences:  [x] None identified    [] Identified and addressed     ESTIMATED NUTRITION NEEDS:     Calories: 9015-9188 kcal (MSJx1.2-1.3) based on  [x] Actual BW: 112 kg      [] IBW   CHO: 282-306 gm (50% kcal)   Protein:  gm (0.8-1 gm/kg) based on  [x] Actual BW [] IBW   Fluid: 1 mL/kcal     MONITORING & EVALUATION:     Nutrition Goal(s):   1. Po intake of meals will meet >75% of patient estimated nutritional needs within the next 7 days. Outcome:  [] Met/Ongoing    []  Not Met    [x] New/Initial Goal   2. Patient will increase knowledge of appropriate food choices on a cardiac diet prior to discharge. Outcome:  [x] Met/Ongoing    []  Not Met    [x] New/Initial Goal   3. Patient will increase knowledge of appropriate food choices on a vitamin K consistent diet prior to discharge.   Outcome:  [x] Met/Ongoing    []  Not Met    [x] New/Initial Goal       Monitoring:   [x] Diet tolerance   [x] Meal intake   [] Supplement intake   [] GI symptoms/ability to tolerate po diet   [] Respiratory status   [] Plan of care      Previous Recommendations (for follow-up assessments only):     []   Implemented       []   Not Implemented (RD to address)     [] No Recommendation Made     Discharge Planning:  Cardiac, diabetic diet   [x] Participated in care planning, discharge planning, & interdisciplinary rounds as appropriate      Shraddha Haider, [address] [place] [address]   Pager: [phone]

## 2017-05-18 LAB
EST. AVERAGE GLUCOSE BLD GHB EST-MCNC: 131 MG/DL
GLUCOSE BLD STRIP.AUTO-MCNC: 115 MG/DL (ref 70–110)
GLUCOSE BLD STRIP.AUTO-MCNC: 118 MG/DL (ref 70–110)
GLUCOSE BLD STRIP.AUTO-MCNC: 144 MG/DL (ref 70–110)
GLUCOSE BLD STRIP.AUTO-MCNC: 242 MG/DL (ref 70–110)
HBA1C MFR BLD: 6.2 % (ref 4.2–5.6)
INR PPP: 1.3 (ref 0.8–1.2)
PROTHROMBIN TIME: 16.1 SEC (ref 11.5–15.2)

## 2017-05-18 PROCEDURE — 82962 GLUCOSE BLOOD TEST: CPT

## 2017-05-18 PROCEDURE — 74011636637 HC RX REV CODE- 636/637: Performed by: PHYSICIAN ASSISTANT

## 2017-05-18 PROCEDURE — 83036 HEMOGLOBIN GLYCOSYLATED A1C: CPT | Performed by: PHYSICIAN ASSISTANT

## 2017-05-18 PROCEDURE — 74011250636 HC RX REV CODE- 250/636: Performed by: PHYSICIAN ASSISTANT

## 2017-05-18 PROCEDURE — 65620000000 HC RM CCU GENERAL

## 2017-05-18 PROCEDURE — 74011250637 HC RX REV CODE- 250/637: Performed by: THORACIC SURGERY (CARDIOTHORACIC VASCULAR SURGERY)

## 2017-05-18 PROCEDURE — 74011250637 HC RX REV CODE- 250/637: Performed by: PHYSICIAN ASSISTANT

## 2017-05-18 PROCEDURE — 85610 PROTHROMBIN TIME: CPT | Performed by: THORACIC SURGERY (CARDIOTHORACIC VASCULAR SURGERY)

## 2017-05-18 PROCEDURE — 36415 COLL VENOUS BLD VENIPUNCTURE: CPT | Performed by: THORACIC SURGERY (CARDIOTHORACIC VASCULAR SURGERY)

## 2017-05-18 RX ORDER — WARFARIN SODIUM 5 MG/1
2.5 TABLET ORAL ONCE
Status: DISCONTINUED | OUTPATIENT
Start: 2017-05-18 | End: 2017-05-18

## 2017-05-18 RX ORDER — WARFARIN SODIUM 5 MG/1
5 TABLET ORAL ONCE
Status: COMPLETED | OUTPATIENT
Start: 2017-05-18 | End: 2017-05-18

## 2017-05-18 RX ORDER — INSULIN LISPRO 100 [IU]/ML
6 INJECTION, SOLUTION INTRAVENOUS; SUBCUTANEOUS
Status: DISCONTINUED | OUTPATIENT
Start: 2017-05-18 | End: 2017-05-19

## 2017-05-18 RX ORDER — INSULIN GLARGINE 100 [IU]/ML
30 INJECTION, SOLUTION SUBCUTANEOUS
Status: DISCONTINUED | OUTPATIENT
Start: 2017-05-18 | End: 2017-05-20 | Stop reason: HOSPADM

## 2017-05-18 RX ADMIN — METOPROLOL TARTRATE 12.5 MG: 25 TABLET ORAL at 21:27

## 2017-05-18 RX ADMIN — LEVOTHYROXINE SODIUM 175 MCG: 125 TABLET ORAL at 08:18

## 2017-05-18 RX ADMIN — Medication 10 ML: at 14:00

## 2017-05-18 RX ADMIN — DOCUSATE SODIUM 100 MG: 100 CAPSULE, LIQUID FILLED ORAL at 08:19

## 2017-05-18 RX ADMIN — ATORVASTATIN CALCIUM 20 MG: 20 TABLET, FILM COATED ORAL at 08:18

## 2017-05-18 RX ADMIN — INSULIN GLARGINE 30 UNITS: 100 INJECTION, SOLUTION SUBCUTANEOUS at 21:27

## 2017-05-18 RX ADMIN — HYDROCODONE BITARTRATE AND ACETAMINOPHEN 2 TABLET: 5; 325 TABLET ORAL at 13:01

## 2017-05-18 RX ADMIN — INSULIN LISPRO 6 UNITS: 100 INJECTION, SOLUTION INTRAVENOUS; SUBCUTANEOUS at 18:29

## 2017-05-18 RX ADMIN — AMIODARONE HYDROCHLORIDE 400 MG: 200 TABLET ORAL at 08:18

## 2017-05-18 RX ADMIN — Medication 10 ML: at 21:27

## 2017-05-18 RX ADMIN — CHLORHEXIDINE GLUCONATE 10 ML: 1.2 RINSE ORAL at 18:23

## 2017-05-18 RX ADMIN — HYDROCODONE BITARTRATE AND ACETAMINOPHEN 2 TABLET: 5; 325 TABLET ORAL at 20:24

## 2017-05-18 RX ADMIN — CHLORHEXIDINE GLUCONATE 10 ML: 1.2 RINSE ORAL at 08:19

## 2017-05-18 RX ADMIN — DOCUSATE SODIUM 100 MG: 100 CAPSULE, LIQUID FILLED ORAL at 18:21

## 2017-05-18 RX ADMIN — Medication 10 ML: at 05:47

## 2017-05-18 RX ADMIN — BISACODYL 10 MG: 5 TABLET, COATED ORAL at 08:18

## 2017-05-18 RX ADMIN — FEBUXOSTAT 40 MG: 40 TABLET ORAL at 08:18

## 2017-05-18 RX ADMIN — WARFARIN SODIUM 5 MG: 5 TABLET ORAL at 18:22

## 2017-05-18 RX ADMIN — ASPIRIN 81 MG: 81 TABLET, COATED ORAL at 08:18

## 2017-05-18 RX ADMIN — FAMOTIDINE 20 MG: 20 TABLET ORAL at 08:18

## 2017-05-18 RX ADMIN — FAMOTIDINE 20 MG: 20 TABLET ORAL at 18:21

## 2017-05-18 RX ADMIN — MUPIROCIN: 20 OINTMENT TOPICAL at 13:01

## 2017-05-18 RX ADMIN — ENOXAPARIN SODIUM 40 MG: 40 INJECTION SUBCUTANEOUS at 18:22

## 2017-05-18 RX ADMIN — AMIODARONE HYDROCHLORIDE 400 MG: 200 TABLET ORAL at 18:22

## 2017-05-18 RX ADMIN — HYDROCODONE BITARTRATE AND ACETAMINOPHEN 2 TABLET: 5; 325 TABLET ORAL at 04:21

## 2017-05-18 RX ADMIN — INSULIN LISPRO 6 UNITS: 100 INJECTION, SOLUTION INTRAVENOUS; SUBCUTANEOUS at 13:01

## 2017-05-18 RX ADMIN — Medication 10 ML: at 05:48

## 2017-05-18 RX ADMIN — MUPIROCIN: 20 OINTMENT TOPICAL at 18:31

## 2017-05-18 RX ADMIN — METOPROLOL TARTRATE 12.5 MG: 25 TABLET ORAL at 08:18

## 2017-05-18 RX ADMIN — POLYETHYLENE GLYCOL 3350 17 G: 17 POWDER, FOR SOLUTION ORAL at 08:23

## 2017-05-18 NOTE — ROUTINE PROCESS
Bedside, Verbal and Written shift change report given to SIVAKUMAR Arrington RN (oncoming nurse) by Velia Chiu RN (offgoing nurse). Report included the following information SBAR, Kardex, OR Summary, Intake/Output and Recent Results. Assumed care of pt laying in bed, aox4 following commands, denies pain at this time, room air, up at mariama, sternal dressing dry intact, chest hugger on.  2200  Pt resting in bed, scheduled med's given. 0200  Pt status unchanged, sleeping, appears asymptomatic.  0600  Pt OBC, tolerated well, pain with cough, med's given. 0710  Bedside, Verbal and Written shift change report given to Chris Luna RN (oncoming nurse) by Kurt Mckee. Tiarra Linares (offgoing nurse). Report included the following information SBAR, Kardex, OR Summary, Intake/Output and Recent Results.

## 2017-05-18 NOTE — PROGRESS NOTES
1555 pt c/o decreased sensitivity to left leg, pulse per doppler     Bedside and Verbal shift change report given to Rajat Clark (oncoming nurse) by Maria Del Carmen Mccray (offgoing nurse). Report included the following information SBAR, Kardex, MAR and Recent Results.   Maria Del Carmen Mccray

## 2017-05-18 NOTE — DIABETES MGMT
Glycemic Control Plan of Care    Recommendations: Increase basal lantus insulin to 30 units daily at bedtime and add prandial lispro insulin 6 units TID AC. Continue on very resistant dose of correctional lispro insulin. Assessment:   Patient is 71year old with past medical history including type 2 diabetes mellitus, diabetic nephropathy,  CKD stage 3, hypothyroidism, and dyslipidemia - was admitted on 05/16/2017 for procedure. Noted:  Severe aortic stenosis. Status post aortic valve replacement. Type 2 diabetes mellitus with current A1C of 6.3% (05/16/2017). POC BG range on 05/17/2017:  mg/dL. POC BG report on 05/18/2017 at time of review: 118, 242 mg/dL. Patient stated that he ate breakfast well and then blood sugar went up to 242 prior to lunch. Patient also stated that he is on lantus insulin 30 units daily and prandial novolog insulin 6 units TID AC at home. Most recent blood glucose values:    Results for Megan Beyer (MRN [id number]) as of 5/17/2017 12:51   Ref. Range 5/16/2017 14:10 5/16/2017 15:14 5/16/2017 16:20 5/16/2017 16:49 5/16/2017 17:55 5/16/2017 17:57 5/16/2017 18:55 5/16/2017 19:49 5/16/2017 20:43 5/16/2017 21:38 5/16/2017 22:31 5/16/2017 23:34   GLUCOSE,FAST - POC Latest Ref Range: 70 - 110 mg/dL 115 (H) 113 (H) 102 120 (H) 158 (H) 159 (H) 156 (H) 164 (H) 153 (H) 144 (H) 119 (H) 98     Results for Megan Beyer (MRN [id number]) as of 5/17/2017 12:51   Ref. Range 5/17/2017 00:36 5/17/2017 01:44 5/17/2017 02:46 5/17/2017 03:55 5/17/2017 03:58 5/17/2017 04:59 5/17/2017 06:07 5/17/2017 07:12 5/17/2017 08:15 5/17/2017 09:12 5/17/2017 10:21 5/17/2017 11:21 5/17/2017 12:33   GLUCOSE,FAST - POC Latest Ref Range: 70 - 110 mg/dL 98 87 116 (H) 89 86 91 85 101 94 123 (H) 109 95 117 (H)     Current A1C: 6.3% (05/16/2017) is equivalent to average blood glucose of 134 mg/dL during the past 2-3 months.      Current hospital diabetes medications:  Basal lantus insulin 20 units daily at bedtime. Correctional lispro insulin ACHS. Very resistant dose. Total dose insulin requirement previous day: 05/17/2017  Regular insulin drip (Jeremy Leader): 33.6 units. Lantus: 20 units  Lispro: None. Patient did not require insulin coverage. Home diabetes medications: As stated by patient on 05/17/2017:  Lantus insulin 30 units daily. Novolog insulin 6 units TID AC. Diet: Diabetic consistent carb 1800kcal; regular; 2 Gm NA; no concentrated sweets.     Goals:  Blood glucose will be within target range of  mg/dL by 05/21/2017    Education:  __X_  Refer to Diabetes Education Record: 05/17/2017             ___  Education not indicated at this time    Nadine Ceron RN

## 2017-05-18 NOTE — PROGRESS NOTES
Bedside and Verbal shift change report given to Jesse Mahan (oncoming nurse) by Addi Paz (offgoing nurse). Report included the following information SBAR, Kardex, MAR and Recent Results.       Addi Paz

## 2017-05-18 NOTE — CARDIO/PULMONARY
Cardiac rehab in to see patient. We discussed relaxation, exercise and nutrition. He stated that he does sit and relax each day for a little bit. He stated that he has not been doing much activity as he was to have a knee replacement and the heart issue was discovered. Encouraged patient to do what he could and think about outpatient cardiac rehab to build his strength and stamina for the upcoming knee surgery. We then talked about his diet. He stated that he does ok with his diet. He drinks a shake in the am and then usually just eats dinner. Encouraged patient to eat a little something for lunch so that he is getting enough nutrients. He stated that he will begin to eat something small. He stated that he does watch the carbs in his diet as he is diabetic and he also watches the salt intake as well. Will continue to follow throughout his hospital stay.

## 2017-05-18 NOTE — PROGRESS NOTES
Attempted to visit pt in CVT ICU but he is fast asleep. From [place], [place] . [facility] liason state they do not cover there. [facility] also does not cover there. [facility] states they do cover there. Will meet with pt in am to determine [facility] choice & interview pt.

## 2017-05-18 NOTE — ROUTINE PROCESS
Bedside shift change report given to Sameera (oncoming nurse) by [facility] (offgoing nurse). Report included the following information SBAR, Kardex, OR Summary, Procedure Summary, Intake/Output, MAR, Recent Results, Med Rec Status, Cardiac Rhythm NSR and Alarm Parameters .

## 2017-05-18 NOTE — ROUTINE PROCESS
Received pt in chair. C/o of some pain, too early for Norco, Fentanyl 50mcg adm per order, with relief. Pt able to ambulate and return to bed for rest. Slept well till approx 0330. 0415 bath started. Pt pleased with his progress, stated that his left leg is less swollen than at home because he has been \"off it\". IS used WA to 1250. Gait steady with noticeable limp, reminders given about sternal precautions.

## 2017-05-19 LAB
ABO + RH BLD: NORMAL
BASOPHILS # BLD AUTO: 0 K/UL (ref 0–0.06)
BASOPHILS # BLD: 0 % (ref 0–2)
BLD PROD TYP BPU: NORMAL
BLOOD BANK CMNT PATIENT-IMP: NORMAL
BLOOD GROUP ANTIBODIES SERPL: NORMAL
BPU ID: NORMAL
CROSSMATCH RESULT,%XM: NORMAL
DIFFERENTIAL METHOD BLD: ABNORMAL
EOSINOPHIL # BLD: 0.1 K/UL (ref 0–0.4)
EOSINOPHIL NFR BLD: 1 % (ref 0–5)
ERYTHROCYTE [DISTWIDTH] IN BLOOD BY AUTOMATED COUNT: 14.8 % (ref 11.6–14.5)
GLUCOSE BLD STRIP.AUTO-MCNC: 103 MG/DL (ref 70–110)
GLUCOSE BLD STRIP.AUTO-MCNC: 110 MG/DL (ref 70–110)
GLUCOSE BLD STRIP.AUTO-MCNC: 130 MG/DL (ref 70–110)
GLUCOSE BLD STRIP.AUTO-MCNC: 209 MG/DL (ref 70–110)
HCT VFR BLD AUTO: 29.3 % (ref 36–48)
HGB BLD-MCNC: 9.9 G/DL (ref 13–16)
INR PPP: 1.6 (ref 0.8–1.2)
LYMPHOCYTES # BLD AUTO: 19 % (ref 21–52)
LYMPHOCYTES # BLD: 1.4 K/UL (ref 0.9–3.6)
MCH RBC QN AUTO: 28.5 PG (ref 24–34)
MCHC RBC AUTO-ENTMCNC: 33.8 G/DL (ref 31–37)
MCV RBC AUTO: 84.4 FL (ref 74–97)
MONOCYTES # BLD: 0.7 K/UL (ref 0.05–1.2)
MONOCYTES NFR BLD AUTO: 10 % (ref 3–10)
NEUTS SEG # BLD: 5 K/UL (ref 1.8–8)
NEUTS SEG NFR BLD AUTO: 70 % (ref 40–73)
PLATELET # BLD AUTO: 73 K/UL (ref 135–420)
PMV BLD AUTO: 9.5 FL (ref 9.2–11.8)
PROTHROMBIN TIME: 18.5 SEC (ref 11.5–15.2)
RBC # BLD AUTO: 3.47 M/UL (ref 4.7–5.5)
SPECIMEN EXP DATE BLD: NORMAL
STATUS OF UNIT,%ST: NORMAL
UNIT DIVISION, %UDIV: 0
WBC # BLD AUTO: 7.2 K/UL (ref 4.6–13.2)

## 2017-05-19 PROCEDURE — 85610 PROTHROMBIN TIME: CPT | Performed by: PHYSICIAN ASSISTANT

## 2017-05-19 PROCEDURE — 74011636637 HC RX REV CODE- 636/637: Performed by: PHYSICIAN ASSISTANT

## 2017-05-19 PROCEDURE — 85025 COMPLETE CBC W/AUTO DIFF WBC: CPT | Performed by: PHYSICIAN ASSISTANT

## 2017-05-19 PROCEDURE — 82962 GLUCOSE BLOOD TEST: CPT

## 2017-05-19 PROCEDURE — 74011250637 HC RX REV CODE- 250/637: Performed by: PHYSICIAN ASSISTANT

## 2017-05-19 PROCEDURE — 65620000000 HC RM CCU GENERAL

## 2017-05-19 PROCEDURE — 36415 COLL VENOUS BLD VENIPUNCTURE: CPT | Performed by: PHYSICIAN ASSISTANT

## 2017-05-19 RX ORDER — WARFARIN SODIUM 5 MG/1
5 TABLET ORAL ONCE
Status: DISCONTINUED | OUTPATIENT
Start: 2017-05-19 | End: 2017-05-19

## 2017-05-19 RX ORDER — WARFARIN SODIUM 5 MG/1
2.5 TABLET ORAL ONCE
Status: COMPLETED | OUTPATIENT
Start: 2017-05-19 | End: 2017-05-19

## 2017-05-19 RX ADMIN — ATORVASTATIN CALCIUM 20 MG: 20 TABLET, FILM COATED ORAL at 08:09

## 2017-05-19 RX ADMIN — METOPROLOL TARTRATE 12.5 MG: 25 TABLET ORAL at 22:09

## 2017-05-19 RX ADMIN — FAMOTIDINE 20 MG: 20 TABLET ORAL at 17:21

## 2017-05-19 RX ADMIN — HYDROCODONE BITARTRATE AND ACETAMINOPHEN 2 TABLET: 5; 325 TABLET ORAL at 23:54

## 2017-05-19 RX ADMIN — DOCUSATE SODIUM 100 MG: 100 CAPSULE, LIQUID FILLED ORAL at 08:09

## 2017-05-19 RX ADMIN — METOPROLOL TARTRATE 12.5 MG: 25 TABLET ORAL at 08:15

## 2017-05-19 RX ADMIN — POLYETHYLENE GLYCOL 3350 17 G: 17 POWDER, FOR SOLUTION ORAL at 08:12

## 2017-05-19 RX ADMIN — INSULIN GLARGINE 30 UNITS: 100 INJECTION, SOLUTION SUBCUTANEOUS at 22:10

## 2017-05-19 RX ADMIN — LEVOTHYROXINE SODIUM 175 MCG: 125 TABLET ORAL at 08:10

## 2017-05-19 RX ADMIN — WARFARIN SODIUM 2.5 MG: 5 TABLET ORAL at 17:21

## 2017-05-19 RX ADMIN — INSULIN LISPRO 6 UNITS: 100 INJECTION, SOLUTION INTRAVENOUS; SUBCUTANEOUS at 08:14

## 2017-05-19 RX ADMIN — BISACODYL 10 MG: 5 TABLET, COATED ORAL at 08:08

## 2017-05-19 RX ADMIN — Medication 10 ML: at 05:46

## 2017-05-19 RX ADMIN — INSULIN LISPRO 6 UNITS: 100 INJECTION, SOLUTION INTRAVENOUS; SUBCUTANEOUS at 17:22

## 2017-05-19 RX ADMIN — MUPIROCIN: 20 OINTMENT TOPICAL at 17:24

## 2017-05-19 RX ADMIN — CHLORHEXIDINE GLUCONATE 10 ML: 1.2 RINSE ORAL at 08:08

## 2017-05-19 RX ADMIN — Medication 10 ML: at 22:10

## 2017-05-19 RX ADMIN — AMIODARONE HYDROCHLORIDE 400 MG: 200 TABLET ORAL at 17:22

## 2017-05-19 RX ADMIN — FAMOTIDINE 20 MG: 20 TABLET ORAL at 08:09

## 2017-05-19 RX ADMIN — MUPIROCIN: 20 OINTMENT TOPICAL at 08:14

## 2017-05-19 RX ADMIN — FEBUXOSTAT 40 MG: 40 TABLET ORAL at 08:09

## 2017-05-19 RX ADMIN — Medication 10 ML: at 12:06

## 2017-05-19 RX ADMIN — ASPIRIN 81 MG: 81 TABLET, COATED ORAL at 10:01

## 2017-05-19 RX ADMIN — DOCUSATE SODIUM 100 MG: 100 CAPSULE, LIQUID FILLED ORAL at 17:22

## 2017-05-19 RX ADMIN — HYDROCODONE BITARTRATE AND ACETAMINOPHEN 2 TABLET: 5; 325 TABLET ORAL at 10:04

## 2017-05-19 RX ADMIN — CHLORHEXIDINE GLUCONATE 10 ML: 1.2 RINSE ORAL at 17:20

## 2017-05-19 RX ADMIN — HYDROCODONE BITARTRATE AND ACETAMINOPHEN 2 TABLET: 5; 325 TABLET ORAL at 17:21

## 2017-05-19 RX ADMIN — AMIODARONE HYDROCHLORIDE 400 MG: 200 TABLET ORAL at 08:10

## 2017-05-19 NOTE — PROGRESS NOTES
completed follow up visit with patient while in Cardiac ICU and offered Pastoral care, see flow sheets for interventions. Patient was sitting in a chair, his wife was visiting. Patient spoke in positive tones. His wife said their  from [place] has visited.  provided pastoral support. Chaplains will continue to follow and will provide pastoral care  as needed or requested.    Chou Irene, [address]  Board Certified [address] [place] [address]  Office [phone]

## 2017-05-19 NOTE — PROGRESS NOTES
CARDIAC SURGERY PROGRESS NOTE    2017, 10:33 AM     Post Operative Day # 3     Chart reviewed. Interval History/Events of Past 24 hours:   PLT 73    Subjective:  Patient seen and examined on rounds today. Pain level: controlled  Ambulating: well   Sleeping: well  Eating:  well  Sternal pain: NO     BM: No    Objective:  Vital signs:   Visit Vitals    /70    Pulse 78    Temp 98.3 °F (36.8 °C)    Resp 16    Ht 5' 10\" (1.778 m)    Wt 113.9 kg (251 lb)    SpO2 94%    BMI 36.01 kg/m2     Temp (24hrs), Av.4 °F (36.9 °C), Min:98.1 °F (36.7 °C), Max:98.6 °F (37 °C)    Admission Weight: Last Weight   Weight: 111.1 kg (245 lb) Weight: 113.9 kg (251 lb)     Last 3 Recorded Weights in this Encounter    17 0628 17 0600 17 0607   Weight: 112.2 kg (247 lb 4.8 oz) 114.2 kg (251 lb 12.3 oz) 113.9 kg (251 lb)       Telemetry: NSR 71    Physical Examination:     General:  Alert, oriented  Lungs: Clear to ascultation without rales, wheezes or rhonchi. Chest: Dressings clean and dry. Sternum stable. Heart: regular rate and rhythm, No murmur. Abdomen: Soft and non-tender without masses. Bowel sounds present. Extremities: Warm and well perfused. Edema absent. Incisions: clean and dry  Neuro: No deficit. Beta-blocker:  Yes  ASA: Yes   Statin: Yes  ACE-I: No  Diuretic: No     SUP Prophylaxis: Pepcid  DVT Prophylaxis:   pneumatic compression boots: Yes  Compression stockings:  Yes  Enoxaparin:  Yes    Chest tubes:  not present    Pacing wires:  present    DME needs:  Needs RW          Labs:  Lab Results   Component Value Date/Time    WBC 7.2 2017 06:09 AM    HCT 29.3 (L) 2017 06:09 AM    PLT 73 (L) 2017 06:09 AM      Lab Results   Component Value Date/Time     (H) 2017 04:00 AM    K 3.8 2017 04:00 AM     (H) 2017 04:00 AM    CO2 25 2017 04:00 AM    GLU 86 2017 04:00 AM    BUN 20 (H) 2017 04:00 AM    CREA 1.17 2017 04:00 AM    CREA 1.15 05/16/2017 01:05 PM    CREA 1.27 05/16/2017 06:31 AM     Lab Results   Component Value Date/Time    HBA1C 6.2 (H) 05/18/2017 05:47 AM       Assessment:  · AS S/P AVR 25 mm bio on ASA  · afib prophylaxis cont BB and PO Amio  · HTN stable   · DLD on statin  · Respiratory parameters stable  · Cardiac status stable   · Anticoagulation INR 1.6 on warfarin  · Thrombocytopenia PLT 73    Plans:  1. D/c enoxaparin  2. Cont PW  3. CBC in am  4. Warfarin 2.5 mg   5. Cont cathartics  6. Needs RW for home, appreciate CM assistance. HHN not able to see pt at home until 5/23 per CM [employer]. Heart Hugger use encouraged to mitigate pain and will also assist with deep breathing and incentive spirometry goals. Possible discharge 1-2 days.     Silverio Conley PA-C CV[facility]  05/19/17, 10:33 AM

## 2017-05-19 NOTE — PROGRESS NOTES
Care Management Interventions  PCP Verified by CM: Yes  Mode of Transport at Discharge:  (wife )  Transition of Care Consult (CM Consult): [address]: No  Reason Outside [place]: Out of service area  Discharge Durable Medical Equipment: Yes (RW)  Physical Therapy Consult: Yes  Occupational Therapy Consult: Yes  Speech Therapy Consult: No  Current Support Network: Lives with Spouse (Lives independently with wife in a one story home with 5 steps to enter)  Confirm Follow Up Transport: Family  Plan discussed with Pt/Family/Caregiver: Yes (dw pt who is AAO x3 )  Freedom of Choice Offered: Yes (gave pt FOC for [facility] & he chose [facility] / contacted Vicky Still in intake & they state they can take if case but that he cannot be seen until Tues next week / informed Petra MOON & she is agreeable )  Discharge Location  Discharge Placement: Home with [facility] ([address] [facility] , [address])   Wife - [place] - [phone]  DME at home - none,but does have a wheelchair ramp at his home     Met with pt who is AAO x3. States he plans to return home with his wife at [facility] to [facility] address above. HHC has been ordered. [address] cannot take case due to outside of service area. [facility] unable to staff case. [facility] [address] states it is outside their service area also. [facility] states they can staff case but cannot see pt until Tues. Informed Petra MOON. FOC to pt & he chose [facility] . [facility] not working this am so I faxed H&P,Op report facesheet & orders to Vicky Still at [employer] at fax # [phone] & she states she has rec'ed them. Pt wants a Rollator. [facility] Physical therapy staff . They state since a RW was recommended by therapist that it would be safer to hold off on Rollator until later in the recovery . [facility] at [employer] . RW will be ordered thru [facility]. Rj Armando @ [facility] & she will bring the RW to pt today.     Medicare Important Message given to pt with copy to chart. SEFERINO MOON & she anticipates dc this weekend.      Time started 0820 Time completed 2776

## 2017-05-19 NOTE — PROGRESS NOTES
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 He feels well. Incisional pain is absent. He is sleeping better, and his appetite is better also. He is ambulating with a walker. He has not had a bowel movement yet. VS stable. Lungs were clear bilaterally. The heart rate and rhythm were regular. Abdomen was soft, non-tender, good bowel sounds. Sternal wound dressing was dry and intact. Oxygen saturation of 94% on room air. Hct 29  plt ct 73K (down)  INR 1.6    He is progressing well. Unclear why progressive thrombocytopenia. Will discontinue enoxaparin, continue warfarin though slowly due to need to remove pacing wires (which we can not do today with platelet count so low). Emphasized the need to team and to patient to ambulate regularly with enoxaparin off (given potential for MOLLY). Plans for today include:  Ambulate in jones. Advance oral intake. Remove epicardial wires once platelet count better. Warfarin 2.5 mg today. Check plt ct tomorrow. Patient verbalizes understanding and agreement with the plan. I spoke with his spouse today to provide an update on his condition.     Heike Dasilva MD

## 2017-05-19 NOTE — DIABETES MGMT
Glycemic Control Plan of Care    Recommendations: Continue current insulin regimen/dose: basal, prandial and correctional.    Assessment:   Patient is 71year old with past medical history including type 2 diabetes mellitus, diabetic nephropathy,  CKD stage 3, hypothyroidism, and dyslipidemia - was admitted on 05/16/2017 for procedure. Noted:  Severe aortic stenosis. Status post aortic valve replacement. Type 2 diabetes mellitus with current A1C of 6.3% (05/16/2017). POC BG range on 05/18/2017: 115-242 mg/dL. POC BG report on 05/19/2017 at time of review: 103 mg/dL. Discussed inpatient glycemic intervention with patient and he verbalized understanding. Most recent blood glucose values:    Results for Cyril Clayton (MRN [id number]) as of 5/19/2017 09:53   Ref. Range 5/18/2017 07:54 5/18/2017 11:42 5/18/2017 16:50 5/18/2017 21:03   GLUCOSE,FAST - POC Latest Ref Range: 70 - 110 mg/dL 118 (H) 242 (H) 144 (H) 115 (H)     Results for Cyril Clayton (MRN [id number]) as of 5/19/2017 09:53   Ref. Range 5/19/2017 07:53   GLUCOSE,FAST - POC Latest Ref Range: 70 - 110 mg/dL 103     Current A1C: 6.3% (05/16/2017) is equivalent to average blood glucose of 134 mg/dL during the past 2-3 months. Current hospital diabetes medications:  Basal lantus insulin 30 units daily at bedtime. Prandial lispro insulin 6 units TID AC. Correctional lispro insulin ACHS. Very resistant dose. Total dose insulin requirement previous day: 05/18/2017  Lantus: 30 units  Lispro: 12 units (prandial and correctional). TDD: 42 units of insulin    Home diabetes medications: As stated by patient on 05/17/2017:  Lantus insulin 30 units daily. Novolog insulin 6 units TID AC. Diet: Diabetic consistent carb 1800kcal; regular; 2 Gm NA; no concentrated sweets.     Goals:  Blood glucose will be within target range of  mg/dL by 05/22/2017    Education:  __X_  Refer to Diabetes Education Record: 05/17/2017             ___ Education not indicated at this time    Nadine Ceron, RN

## 2017-05-19 NOTE — ROUTINE PROCESS
0730: Report received and care assumed from THE [facility]. Vital signs stable, pt resting up in recliner A&O x4. Will continue to monitor. 1200: Vital signs stable, pt resting in recliner. No change in condition or acute events. 1800: Vital signs stable, pt A&O x4. Amb x3 today. Epicardial pacer wires in place, pt remains SR with 1st AVB. Voiding spont and geena PO intake. Pain well controlled with PO regimen, +BM this evening. 1930: Bedside shift change report given to [address] [place] [address] (oncoming nurse) by Preet Mejia RN (offgoing nurse). Report included the following information SBAR, Kardex, Intake/Output, MAR, Med Rec Status, Cardiac Rhythm SR with 1st AVB and Alarm Parameters .

## 2017-05-19 NOTE — PROGRESS NOTES
NUTRITION    Nutrition Consult: General Nutrition Management & Supplements     RECOMMENDATIONS / PLAN:     - Continue current nutrition interventions. Patient would benefit from outpatient nutrition counseling/classes. - Continue RD inpatient monitoring and evaluation. NUTRITION INTERVENTIONS & DIAGNOSIS:     [x] Meals/Snacks: modified diet  [x] Nutrition education: coumadin and vitamin K consistent diet on 5/17; cardiac diet on 5/17; reinforcement provided 5/19. Outpatient counseling discussed with Dr. Agustín Dubose. Nutrition Diagnosis:  Inadequate energy intake related to insufficient calorie diet following surgical procedure as evidenced by clear liquid diet  Food and nutrition related knowledge deficit related to decreased knowledge of cardiac diet and vitamin K consistent diet as evidenced by diet education provided    ASSESSMENT:     5/19: Tolerating diet. Did not recall receiving education, wife recalled. Reinforced diet education. Pt reports consuming a lot of fried foods and red meat. Wife receptive but appeared unhappy with dietary recommendations as well. Encouraged patient to attend outpatient diabetes classes. 5/17: Patient reported appetite and meal intake were good PTA and since admission. Tolerating clear liquids. Provided education on cardiac diet; educational handouts provided/reviewed with pt and family. Pt stated on coumadin; consistent vitamin K diet discussed with pt. Educational handout provided/reviewed with pt. Average po intake adequate to meet patients estimated nutritional needs:   [x] Yes     [] No   [] Unable to determine at this time    Diet: DIET DIABETIC WITH OPTIONS Consistent Carb 1800kcal; Regular; 2 GM NA (House Low NA); AHA-LOW-CHOL FAT; No Conc.  Sweets      Food Allergies:  Watermelon, peach  Current Appetite:   [x] Good     [] Fair     [] Poor     [] Other:  Appetite/meal intake prior to admission:   [x] Good     [] Fair     [] Poor     [] Other:  Feeding Limitations:  [] Swallowing difficulty    [] Chewing difficulty    [] Other:  Current Meal Intake: Patient Vitals for the past 100 hrs:   % Diet Eaten   05/19/17 0815 100 %   05/18/17 1600 100 %   05/18/17 1200 100 %   05/18/17 0800 100 %   05/17/17 1600 100 %   05/17/17 1200 100 %   05/17/17 0800 100 %       BM:  5/16  Skin Integrity:  Surgical incision on mid chest  Edema:  1+ LEs  Pertinent Medications: Reviewed    Recent Labs      05/17/17   0400  05/16/17   1305   NA  146*  144   K  3.8  3.9   CL  113*  113*   CO2  25  26   GLU  86  122*   BUN  20*  21*   CREA  1.17  1.15   CA  8.4*  8.6   MG  2.1  2.5       Intake/Output Summary (Last 24 hours) at 05/19/17 1155  Last data filed at 05/19/17 0815   Gross per 24 hour   Intake              480 ml   Output             2125 ml   Net            -1645 ml       Anthropometrics:  Ht Readings from Last 1 Encounters:   05/16/17 5' 10\" (1.778 m)     Last 3 Recorded Weights in this Encounter    05/17/17 0628 05/18/17 0600 05/19/17 0607   Weight: 112.2 kg (247 lb 4.8 oz) 114.2 kg (251 lb 12.3 oz) 113.9 kg (251 lb)     Body mass index is 36.01 kg/(m^2).     Obese, class II    Weight History:  Patient denied experiencing any recent changes in weight PTA    Weight Metrics 5/19/2017 5/16/2017 5/4/2017 5/2/2017 4/26/2017 4/25/2017 3/29/2017   Weight 251 lb - 245 lb 245 lb 247 lb 248 lb 12.8 oz 254 lb   BMI - 36.01 kg/m2 35.15 kg/m2 35.15 kg/m2 35.44 kg/m2 35.7 kg/m2 36.45 kg/m2        Admitting Diagnosis: Acquired stenosis of aortic valve [I35.0]  Past Medical History:   Diagnosis Date    Diabetes ([facility] [address] 1995    Dyslipidemia 3/1/2017    Fractures 1995    R Knee/ Tib fib fracture/surgery    Gout 2010    Graves disease 1/21/2015    High cholesterol 2005    HTN (hypertension) 1990    Osteoarthritis 2013    Severe aortic stenosis 3/29/2017    Spinal stenosis of lumbar region 3/1/2016    Spondylolisthesis 3/1/2016    Thyroid trouble     Type 2 diabetes mellitus with diabetic nephropathy ([facility] [address] 4/28/2015       Education Needs:        [] None identified  [] Identified - Not appropriate at this time  [x]  Identified and addressed - refer to education log  Learning Limitations:   [x] None identified  [] Identified    Cultural, Quaker & ethnic food preferences:  [x] None identified    [] Identified and addressed     ESTIMATED NUTRITION NEEDS:     Calories: 0206-8099 kcal (MSJx1.2-1.3) based on  [x] Actual BW: 112 kg      [] IBW   CHO: 282-306 gm (50% kcal)   Protein:  gm (0.8-1 gm/kg) based on  [x] Actual BW      [] IBW   Fluid: 1 mL/kcal     MONITORING & EVALUATION:     Nutrition Goal(s):   1. Po intake of meals will meet >75% of patient estimated nutritional needs within the next 7 days. Outcome:  [x] Met/Ongoing    []  Not Met    [] New/Initial Goal   2. Patient will increase knowledge of appropriate food choices on a cardiac diet prior to discharge. Outcome:  [x] Met/Ongoing    []  Not Met    [] New/Initial Goal   3. Patient will increase knowledge of appropriate food choices on a vitamin K consistent diet prior to discharge.   Outcome:  [x] Met/Ongoing    []  Not Met    [] New/Initial Goal       Monitoring:   [x] Diet tolerance   [x] Meal intake   [] Supplement intake   [] GI symptoms/ability to tolerate po diet   [] Respiratory status   [] Plan of care      Previous Recommendations (for follow-up assessments only):     [x]   Implemented       []   Not Implemented (RD to address)     [] No Recommendation Made     Discharge Planning:  Cardiac, diabetic diet   [x] Participated in care planning, discharge planning, & interdisciplinary rounds as appropriate      Aliza Coy, [address]    Pager: [phone]

## 2017-05-20 VITALS
WEIGHT: 250.1 LBS | OXYGEN SATURATION: 97 % | DIASTOLIC BLOOD PRESSURE: 68 MMHG | RESPIRATION RATE: 13 BRPM | BODY MASS INDEX: 35.8 KG/M2 | HEIGHT: 70 IN | HEART RATE: 72 BPM | SYSTOLIC BLOOD PRESSURE: 121 MMHG | TEMPERATURE: 98.7 F

## 2017-05-20 LAB
ERYTHROCYTE [DISTWIDTH] IN BLOOD BY AUTOMATED COUNT: 14.5 % (ref 11.6–14.5)
GLUCOSE BLD STRIP.AUTO-MCNC: 206 MG/DL (ref 70–110)
GLUCOSE BLD STRIP.AUTO-MCNC: 85 MG/DL (ref 70–110)
HCT VFR BLD AUTO: 29 % (ref 36–48)
HGB BLD-MCNC: 9.9 G/DL (ref 13–16)
INR PPP: 1.8 (ref 0.8–1.2)
MCH RBC QN AUTO: 28.6 PG (ref 24–34)
MCHC RBC AUTO-ENTMCNC: 34.1 G/DL (ref 31–37)
MCV RBC AUTO: 83.8 FL (ref 74–97)
PLATELET # BLD AUTO: 106 K/UL (ref 135–420)
PMV BLD AUTO: 9.2 FL (ref 9.2–11.8)
PROTHROMBIN TIME: 20 SEC (ref 11.5–15.2)
RBC # BLD AUTO: 3.46 M/UL (ref 4.7–5.5)
WBC # BLD AUTO: 6.5 K/UL (ref 4.6–13.2)

## 2017-05-20 PROCEDURE — 36415 COLL VENOUS BLD VENIPUNCTURE: CPT | Performed by: THORACIC SURGERY (CARDIOTHORACIC VASCULAR SURGERY)

## 2017-05-20 PROCEDURE — 74011636637 HC RX REV CODE- 636/637: Performed by: PHYSICIAN ASSISTANT

## 2017-05-20 PROCEDURE — 74011250637 HC RX REV CODE- 250/637: Performed by: PHYSICIAN ASSISTANT

## 2017-05-20 PROCEDURE — 85027 COMPLETE CBC AUTOMATED: CPT | Performed by: PHYSICIAN ASSISTANT

## 2017-05-20 PROCEDURE — 97535 SELF CARE MNGMENT TRAINING: CPT

## 2017-05-20 PROCEDURE — 97530 THERAPEUTIC ACTIVITIES: CPT

## 2017-05-20 PROCEDURE — 85610 PROTHROMBIN TIME: CPT | Performed by: THORACIC SURGERY (CARDIOTHORACIC VASCULAR SURGERY)

## 2017-05-20 PROCEDURE — 82962 GLUCOSE BLOOD TEST: CPT

## 2017-05-20 RX ORDER — WARFARIN 2.5 MG/1
5 TABLET ORAL DAILY
Qty: 100 TAB | Refills: 2 | Status: SHIPPED | OUTPATIENT
Start: 2017-05-20 | End: 2017-06-20 | Stop reason: SDUPTHER

## 2017-05-20 RX ORDER — POTASSIUM CHLORIDE 1.5 G/1.77G
20 POWDER, FOR SOLUTION ORAL DAILY
Status: DISCONTINUED | OUTPATIENT
Start: 2017-05-21 | End: 2017-05-20 | Stop reason: HOSPADM

## 2017-05-20 RX ORDER — AMIODARONE HYDROCHLORIDE 200 MG/1
200 TABLET ORAL DAILY
Qty: 14 TAB | Refills: 0 | Status: SHIPPED | OUTPATIENT
Start: 2017-05-20 | End: 2017-06-03

## 2017-05-20 RX ORDER — ASPIRIN 81 MG/1
81 TABLET ORAL DAILY
Qty: 30 TAB | Refills: 2 | Status: SHIPPED | OUTPATIENT
Start: 2017-05-20 | End: 2018-05-29 | Stop reason: ALTCHOICE

## 2017-05-20 RX ORDER — HYDROCODONE BITARTRATE AND ACETAMINOPHEN 5; 325 MG/1; MG/1
1 TABLET ORAL
Qty: 40 TAB | Refills: 0 | Status: SHIPPED | OUTPATIENT
Start: 2017-05-20 | End: 2017-05-30 | Stop reason: SDUPTHER

## 2017-05-20 RX ORDER — DOCUSATE SODIUM 100 MG/1
100 CAPSULE, LIQUID FILLED ORAL
Qty: 60 CAP | Refills: 2 | Status: SHIPPED | OUTPATIENT
Start: 2017-05-20 | End: 2017-08-11

## 2017-05-20 RX ORDER — FUROSEMIDE 40 MG/1
40 TABLET ORAL DAILY
Status: DISCONTINUED | OUTPATIENT
Start: 2017-05-21 | End: 2017-05-20 | Stop reason: HOSPADM

## 2017-05-20 RX ADMIN — LEVOTHYROXINE SODIUM 175 MCG: 125 TABLET ORAL at 08:20

## 2017-05-20 RX ADMIN — ASPIRIN 81 MG: 81 TABLET, COATED ORAL at 08:21

## 2017-05-20 RX ADMIN — POLYETHYLENE GLYCOL 3350 17 G: 17 POWDER, FOR SOLUTION ORAL at 08:20

## 2017-05-20 RX ADMIN — FAMOTIDINE 20 MG: 20 TABLET ORAL at 08:21

## 2017-05-20 RX ADMIN — BISACODYL 10 MG: 5 TABLET, COATED ORAL at 08:21

## 2017-05-20 RX ADMIN — INSULIN LISPRO 6 UNITS: 100 INJECTION, SOLUTION INTRAVENOUS; SUBCUTANEOUS at 11:55

## 2017-05-20 RX ADMIN — CHLORHEXIDINE GLUCONATE 10 ML: 1.2 RINSE ORAL at 08:21

## 2017-05-20 RX ADMIN — MUPIROCIN: 20 OINTMENT TOPICAL at 11:00

## 2017-05-20 RX ADMIN — AMIODARONE HYDROCHLORIDE 400 MG: 200 TABLET ORAL at 08:20

## 2017-05-20 RX ADMIN — DOCUSATE SODIUM 100 MG: 100 CAPSULE, LIQUID FILLED ORAL at 08:21

## 2017-05-20 RX ADMIN — FEBUXOSTAT 40 MG: 40 TABLET ORAL at 08:21

## 2017-05-20 RX ADMIN — HYDROCODONE BITARTRATE AND ACETAMINOPHEN 1 TABLET: 5; 325 TABLET ORAL at 08:21

## 2017-05-20 RX ADMIN — METOPROLOL TARTRATE 12.5 MG: 25 TABLET ORAL at 08:21

## 2017-05-20 NOTE — DISCHARGE SUMMARY
CARDIAC SURGERY DISCHARGE SUMMARY    5/20/2017  12:52 PM    CHIEF COMPLAINT: shortness of breath      HISTORY OF PRESENT ILLNESS:  Evelia Leach is a 71 y.o. male who presented with shortness of breath, representing CHF. Echocardiography revealed good left ventricular function and severe aortic stenosis. Cardiac catheterization showed no significant coronary artery disease. He was admitted and was taken for aortic valve replacement. PAST MEDICAL HISTORY:   Past Medical History:   Diagnosis Date    Diabetes ([facility] [address] 1995    Dyslipidemia 3/1/2017    Fractures 1995    R Knee/ Tib fib fracture/surgery    Gout 2010    Graves disease 1/21/2015    High cholesterol 2005    HTN (hypertension) 1990    Osteoarthritis 2013    Severe aortic stenosis 3/29/2017    Spinal stenosis of lumbar region 3/1/2016    Spondylolisthesis 3/1/2016    Thyroid trouble     Type 2 diabetes mellitus with diabetic nephropathy ([facility] [address] 4/28/2015       PAST SURGICAL HISTORY:   Past Surgical History:   Procedure Laterality Date    HX HIP REPLACEMENT Left 2009    HX ORTHOPAEDIC Right     R knee/Tibfib surgery       HOSPITAL COURSE:  He was admitted to the hospital and underwent aortic valve replacement with a bioprosthetic valve on 5/16/17. He was extubated on the first night following surgery and was eventually up and ambulating well and taking a diet well. Wires and tubes were removed. He was started on oral anticoagulation with warfarin. He is to be discharged to home today. At the time of discharge, his lungs were clear to auscultation bilaterally and the heart had a regular rate and rhythm. The sternum was stable and all wounds were healing well with no evidence of infection. There was no pedal edema. Room air oximetry was 97%.     LABS:  Lab Results   Component Value Date/Time    WBC 6.5 05/20/2017 06:34 AM    HCT 29.0 (L) 05/20/2017 06:34 AM     (L) 05/20/2017 06:34 AM      Lab Results   Component Value Date/Time     (H) 05/17/2017 04:00 AM    K 3.8 05/17/2017 04:00 AM     (H) 05/17/2017 04:00 AM    CO2 25 05/17/2017 04:00 AM    GLU 86 05/17/2017 04:00 AM    BUN 20 (H) 05/17/2017 04:00 AM    CREA 1.17 05/17/2017 04:00 AM    CREA 1.15 05/16/2017 01:05 PM    CREA 1.27 05/16/2017 06:31 AM       DISCHARGE DIAGNOSES:    Diagnoses:  1. Severe aortic stenosis  2. Congestive heart failure, systolic, Chronic  3. Essential hypertension  4. diabetes mellitus type-2  5. hypercholesterolemia    DISCHARGE DISPOSITION:  1. Activities: limited, with strict sternal precautions including no heavy lifting and with constant wearing of the sternal harness. 2. Diet: Diabetic Diet  3. Condition: good  4. Prognosis:  good    DISCHARGE INSTRUCTIONS:  He is to follow up with the cardiac surgeon in 7 days and in one month, and will see the primary care physician and cardiologist preferably within one month. The visiting nurses will see him once home. DISCHARGE MEDICATIONS:    Current Discharge Medication List      START taking these medications    Details   amiodarone (CORDARONE) 200 mg tablet Take 1 Tab by mouth daily for 14 days. Qty: 14 Tab, Refills: 0      aspirin delayed-release 81 mg tablet Take 1 Tab by mouth daily. Qty: 30 Tab, Refills: 2      docusate sodium (COLACE) 100 mg capsule Take 1 Cap by mouth two (2) times daily as needed for Constipation for up to 90 days. Qty: 60 Cap, Refills: 2      HYDROcodone-acetaminophen (NORCO) 5-325 mg per tablet Take 1 Tab by mouth every six (6) hours as needed. Max Daily Amount: 4 Tabs. Qty: 40 Tab, Refills: 0      warfarin (COUMADIN) 2.5 mg tablet Take 2 Tabs by mouth daily. Or as directed. Goal INR 2.5-3.5.   Qty: 100 Tab, Refills: 2         CONTINUE these medications which have NOT CHANGED    Details   insulin glargine (LANTUS) 100 unit/mL injection Take 30 units once daily  Indications: type 2 diabetes mellitus  Qty: 3 Vial, Refills: 3    Associated Diagnoses: Type 2 diabetes mellitus with diabetic nephropathy, with long-term current use of insulin (HCC)      cyanocobalamin (VITAMIN B-12) 1,000 mcg tablet Take 1,000 mcg by mouth daily. febuxostat (ULORIC) 40 mg tab tablet Take 1 Tab by mouth daily. Indications: GOUT PREVENTION  Qty: 90 Tab, Refills: 1    Associated Diagnoses: Acute idiopathic gout, unspecified site      levothyroxine (SYNTHROID) 200 mcg tablet Take 1 Tab by mouth Daily (before breakfast). Indications: HYPOTHYROIDISM  Qty: 30 Tab, Refills: 5    Associated Diagnoses: Other specified hypothyroidism      insulin aspart (NOVOLOG) 100 unit/mL injection by SubCUTAneous route. 6 units before meals      spironolactone (ALDACTONE) 25 mg tablet Take 1 Tab by mouth daily. Indications: EDEMA  Qty: 30 Tab, Refills: 3    Associated Diagnoses: Renovascular hypertension      metoprolol (LOPRESSOR) 25 mg tablet Take 0.5 Tabs by mouth two (2) times a day. Qty: 30 Tab, Refills: 1      glucose blood VI test strips (ASCENSIA AUTODISC VI, ONE TOUCH ULTRA TEST VI) strip Dispense precision extra test strips 3 x day to check blood glucose  Qty: 200 Each, Refills: 3    Associated Diagnoses: Type 2 diabetes mellitus with diabetic nephropathy (HCC)      Omeprazole delayed release (PRILOSEC D/R) 20 mg tablet Take 20 mg by mouth daily. atorvastatin (LIPITOR) 40 mg tablet Take 20 mg by mouth daily. testosterone (ANDROGEL) 20.25 mg/1.25 gram (1.62 %) gel Apply  to affected area as needed. Cholecalciferol, Vitamin D3, 1,000 unit cap Take 3,000 Units by mouth daily.          STOP taking these medications       Magnesium Chloride 64 mg TbEC Comments:   Reason for Stopping:         amLODIPine (NORVASC) 10 mg tablet Comments:   Reason for Stopping:         aspirin (ASPIRIN) 325 mg tablet Comments:   Reason for Stopping:

## 2017-05-20 NOTE — PROGRESS NOTES
pt is dischagred today and will now need lab drawn on 05-23-17 via home health care nurse.    additional home health order entered in e- dischage and  CM notified  [employer] on-call RN, Jarett Zapata , who stated she will pass on to [employer] in [facility]

## 2017-05-20 NOTE — DISCHARGE INSTRUCTIONS
DISCHARGE SUMMARY from Nurse    The following personal items are in your possession at time of discharge:    Dental Appliances: None  Visual Aid: None     Home Medications: Kept at bedside  Jewelry: None  Clothing: Shirt, Socks, Footwear, Undergarments, Pants  Other Valuables: Cell Phone             PATIENT INSTRUCTIONS:    After general anesthesia or intravenous sedation, for 24 hours or while taking prescription Narcotics:  · Limit your activities  · Do not drive and operate hazardous machinery  · Do not make important personal or business decisions  · Do  not drink alcoholic beverages  · If you have not urinated within 8 hours after discharge, please contact your surgeon on call. Report the following to your surgeon:  · Excessive pain, swelling, redness or odor of or around the surgical area  · Temperature over 100.5  · Nausea and vomiting lasting longer than 4 hours or if unable to take medications  · Any signs of decreased circulation or nerve impairment to extremity: change in color, persistent  numbness, tingling, coldness or increase pain  · Any questions        What to do at Home:  Recommended activity: No lifting, Driving, Bathing or Strenuous exercise for 4 weeks or otherwise directed by your surgeon. *  Please give a list of your current medications to your Primary Care Provider. *  Please update this list whenever your medications are discontinued, doses are      changed, or new medications (including over-the-counter products) are added. *  Please carry medication information at all times in case of emergency situations. These are general instructions for a healthy lifestyle:    No smoking/ No tobacco products/ Avoid exposure to second hand smoke    Surgeon General's Warning:  Quitting smoking now greatly reduces serious risk to your health.     Obesity, smoking, and sedentary lifestyle greatly increases your risk for illness    A healthy diet, regular physical exercise & weight monitoring are important for maintaining a healthy lifestyle    You may be retaining fluid if you have a history of heart failure or if you experience any of the following symptoms:  Weight gain of 3 pounds or more overnight or 5 pounds in a week, increased swelling in our hands or feet or shortness of breath while lying flat in bed. Please call your doctor as soon as you notice any of these symptoms; do not wait until your next office visit. Recognize signs and symptoms of STROKE:    F-face looks uneven    A-arms unable to move or move unevenly    S-speech slurred or non-existent    T-time-call 911 as soon as signs and symptoms begin-DO NOT go       Back to bed or wait to see if you get better-TIME IS BRAIN. Warning Signs of HEART ATTACK     Call 911 if you have these symptoms:   Chest discomfort. Most heart attacks involve discomfort in the center of the chest that lasts more than a few minutes, or that goes away and comes back. It can feel like uncomfortable pressure, squeezing, fullness, or pain.  Discomfort in other areas of the upper body. Symptoms can include pain or discomfort in one or both arms, the back, neck, jaw, or stomach.  Shortness of breath with or without chest discomfort.  Other signs may include breaking out in a cold sweat, nausea, or lightheadedness. Don't wait more than five minutes to call 911 - MINUTES MATTER! Fast action can save your life. Calling 911 is almost always the fastest way to get lifesaving treatment. Emergency Medical Services staff can begin treatment when they arrive -- up to an hour sooner than if someone gets to the hospital by car. The discharge information has been reviewed with the patient and spouse. The patient and spouse verbalized understanding. Discharge medications reviewed with the patient and spouse and appropriate educational materials and side effects teaching were provided.

## 2017-05-20 NOTE — ROUTINE PROCESS
1900:  Rec'd Luis M Dank. @ 1900 from EVIE Fernandes RN. SBAR reviewed with two-nurse check-offs done of meds, dressings, wounds, LDA's & revelant assessment findings. Tonight:  Bright spirits, watching TV.    0700:  Up 0400, did hygiene, one walk, briefly, needs to catch his breath at end of walk. Verbal Patient-side shift change report given to Marlena Lozada, (oncoming nurse) by Bailey Graham RN  (offgoing nurse). Report included the following information SBAR, Kardex, ED Summary, Procedure Summary, Intake/Output, MAR, Recent Results and Med Rec Status. Included:  Intro, hx, two-RN eval of relevant assessment findings, LDAs, skin, diagnostics and infusions.

## 2017-05-20 NOTE — PROGRESS NOTES
CARDIAC SURGERY PROGRESS NOTE    2017, 7:45 AM     Post Operative Day # 4     Chart reviewed. Interval History/Events of Past 24 hours:    none  Subjective:  Patient seen and examined on rounds today. Pain level: controlled  Ambulating: well   Sleeping: well  Eating:  well  Sternal pain: YES     BM: Yes    Objective:  Vital signs:   Visit Vitals    /82    Pulse 78    Temp 98.4 °F (36.9 °C)    Resp 14    Ht 5' 10\" (1.778 m)    Wt 113.4 kg (250 lb 1.6 oz)    SpO2 97%    BMI 35.89 kg/m2     Temp (24hrs), Av.3 °F (36.8 °C), Min:98 °F (36.7 °C), Max:98.8 °F (37.1 °C)    Admission Weight: Last Weight   Weight: 111.1 kg (245 lb) Weight: 113.4 kg (250 lb 1.6 oz)     Last 3 Recorded Weights in this Encounter    17 0600 17 0607 17 0600   Weight: 114.2 kg (251 lb 12.3 oz) 113.9 kg (251 lb) 113.4 kg (250 lb 1.6 oz)       Telemetry: NSR 74    Physical Examination:     General:  Alert, oriented  Lungs: Clear to ascultation without rales, wheezes or rhonchi. Chest: Dressings clean and dry. Midline incision healing well. Sternum stable. Heart: regular rate and rhythm, No murmur. Abdomen: Soft and non-tender without masses. Bowel sounds present. Extremities: Warm and well perfused. Edema absent. Incisions: clean and dry  Neuro: No deficit. Beta-blocker:  Yes  ASA: Yes   Statin: Yes  ACE-I: No  Diuretic: No     SUP Prophylaxis: Pepcid  DVT Prophylaxis:   pneumatic compression boots: Yes  Compression stockings:  Yes  Enoxaparin:  No, warfarin    Pacing wires:  not present    DME needs: [employer] delivered to pt room          Labs:  Lab Results   Component Value Date/Time    WBC 6.5 2017 06:34 AM    HCT 29.0 (L) 2017 06:34 AM     (L) 2017 06:34 AM      Lab Results   Component Value Date/Time     (H) 2017 04:00 AM    K 3.8 2017 04:00 AM     (H) 2017 04:00 AM    CO2 25 2017 04:00 AM    GLU 86 2017 04:00 AM    BUN 20 (H) 05/17/2017 04:00 AM    CREA 1.17 05/17/2017 04:00 AM    CREA 1.15 05/16/2017 01:05 PM    CREA 1.27 05/16/2017 06:31 AM     Lab Results   Component Value Date/Time    HBA1C 6.2 (H) 05/18/2017 05:47 AM     pH (POC)   Date Value Ref Range Status   05/17/2017 7. 354 7.35 - 7.45   Final     pCO2 (POC)   Date Value Ref Range Status   05/17/2017 40.7 35 - 45 MMHG Final     pO2 (POC)   Date Value Ref Range Status   05/17/2017 82 80 - 100 MMHG Final     HCO3 (POC)   Date Value Ref Range Status   05/17/2017 22.8 22 - 26 MMOL/L Final     sO2 (POC)   Date Value Ref Range Status   05/17/2017 96 92 - 97 % Final     Base excess (POC)   Date Value Ref Range Status   05/16/2017 0 mmol/L Final       Assessment:  · AS S/P AVR 25 mm bio on ASA  · afib prophylaxis cont BB and PO Amio  · HTN stable   · DLD on statin  · Respiratory parameters stable  · Cardiac status stable   · Anticoagulation INR 1.8 on warfarin  · Thrombocytopenia, improved PLT 73 > 106    Plans:  1. D/c Pacing wires - done without difficulty. 2. Warfarin 5 mg today   3. Possible home today    Heart Hugger use encouraged to mitigate pain and will also assist with deep breathing and incentive spirometry goals.       Ramakrishna Clarke PA-C CVTS  05/20/17, 7:45 AM

## 2017-05-20 NOTE — PROGRESS NOTES
Problem: Self Care Deficits Care Plan (Adult)  Goal: *Acute Goals and Plan of Care (Insert Text)  Occupational Therapy Goals  Initiated 5/17/2017 within 7 day(s). 1. Patient will perform grooming with supervision/set-up   2. Patient will perform lower body dressing/bathing with supervision/set-up. 3. Patient will perform upper body dressing/bathing with supervision/set-up. 4. Patient will perform toilet transfers with supervision/set-up. 5. Patient will perform all aspects of toileting with supervision/set-up. 6. Patient will participate in upper extremity therapeutic exercise/activities with supervision/set-up in preparation for self care tasks. 7. Patient will recall and implement sternal precautions during functional tasks with no verbal cues. Outcome: Progressing Towards Goal  OCCUPATIONAL THERAPY TREATMENT     Patient: Jayesh Khanna (76 y.o. male)  Date: 5/20/2017  Diagnosis: Acquired stenosis of aortic valve [I35.0] Severe aortic stenosis  Procedure(s) (LRB):  AORTIC VALVE REPLACEMENT (AVR) WITH BIOPROSTHESIS WITH STERNALOK (N/A) 4 Days Post-Op  Precautions: Fall, Sternal  Chart, occupational therapy assessment, plan of care, and goals were reviewed. ASSESSMENT:  Pt is up in the chair upon entry, stating he just returned from long walk w/nurse. Pt however is agreeable to UB/LB ADL training. Pt was able to recall his sternal precautions w/min VCs, required Mod VCs to relate his precautions to his ADLs and functional mobility. Pt participated in LB dressing training w/AE (issued reacher, sock aid, long handled sponge), pt was educated on use of AE, pt required occasional VCs to perform donning socks, and simulate donning of underwear, for std aspects. Pt educated on and simulated UB ADLs w/Supervised A and occasional VCs for technique.  Pt demonstrates good understanding on the importance of following his sternal precautions to ensure proper healing and increase safety and independence w/ADLs. Pt left sitting in the chair w/call bell within reach. Progression toward goals:  [X]          Improving appropriately and progressing toward goals  [ ]          Improving slowly and progressing toward goals  [ ]          Not making progress toward goals and plan of care will be adjusted       PLAN:  Patient continues to benefit from skilled intervention to address the above impairments. Continue treatment per established plan of care. Discharge Recommendations:  Home Health  Further Equipment Recommendations for Discharge:  N/A      G-CODES:      Self Care  Current  CI= 1-19%. The severity rating is based on the Level of Assistance required for Functional Mobility and ADLs. SUBJECTIVE:   Patient stated I am ready to go home. I've been here long enough.       OBJECTIVE DATA SUMMARY:   Cognitive/Behavioral Status:  Neurologic State: Alert  Orientation Level: Oriented X4  Cognition: Appropriate for age attention/concentration, Appropriate decision making, Follows commands  Safety/Judgement: Awareness of environment, Fall prevention, Good awareness of safety precautions     Functional Mobility and Transfers for ADLs:                          Transfers:  Sit to Stand: Stand-by asssistance   Balance:  Sitting: Intact     ADL Intervention:     Grooming  Grooming Assistance: Supervision/set up  Washing Face: Supervision/set-up  Brushing/Combing Hair: Supervision/set-up     Lower Body Bathing  Perineal  : Stand-by assistance (simulated)  Lower Body : Supervision/set-up     Upper Body Dressing Assistance  Pullover Shirt: Stand-by assistance (simulated)     Lower Body Dressing Assistance  Underpants: Supervision/set-up (simulated)  Socks: Supervision/set-up  Adaptive Equipment Used: Reacher;Sock aid     Pain:  Pt reports 2/10 pain or discomfort prior to treatment. Incision site  Pt reports 2/10 pain or discomfort post treatment.  Incision site  Activity Tolerance:    Good     Please refer to the flowsheet for vital signs taken during this treatment.   After treatment:   [X]  Patient left in no apparent distress sitting up in chair  [ ]  Patient left in no apparent distress in bed  [X]  Call bell left within reach  [X]  Nursing notified  [ ]  Caregiver present  [ ]  Bed alarm activated     ROLA Stark  Time Calculation: 27 mins

## 2017-05-20 NOTE — ROUTINE PROCESS
0900: VSS. Afebrile. UO adequate. Bowel movement this AM. Ambulating well. CVT at bedside. Will continue to monitor. 1130: Discharge video watched by patient and spouse. CVT surgeon at bedside and all questions answered. 1330: AVS reviewed with patient and spouse. Arm band applied with CVT phone number. Heart Hugger use encouraged to mitigate pain and will also assist with deep breathing and incentive spirometry goals. All questions answered. Will discharge.

## 2017-05-22 ENCOUNTER — PATIENT OUTREACH (OUTPATIENT)
Dept: FAMILY MEDICINE CLINIC | Age: 72
End: 2017-05-22

## 2017-05-22 ENCOUNTER — TELEPHONE (OUTPATIENT)
Dept: CARDIOTHORACIC SURGERY | Age: 72
End: 2017-05-22

## 2017-05-22 NOTE — PROGRESS NOTES
Transitions of Care Coordination    Lawyer Noel had a scheduled admission to [facility] 5/16-5/20/17 for an aortic valve replacement and was discharged to home with [facility]. RRAT = 33    Portions of the Discharge Summary are below in italics. CHIEF COMPLAINT: shortness of breath      HISTORY OF PRESENT ILLNESS: Andrea Sow is a 71 y.o. male who presented with shortness of breath, representing CHF. Echocardiography revealed good left ventricular function and severe aortic stenosis. Cardiac catheterization showed no significant coronary artery disease. He was admitted and was taken for aortic valve replacement.     PAST MEDICAL HISTORY:        Past Medical History:   Diagnosis Date    Diabetes ([facility] [address] 1995    Dyslipidemia 3/1/2017    Fractures 1995     R Knee/ Tib fib fracture/surgery    Gout 2010    Graves disease 1/21/2015    High cholesterol 2005    HTN (hypertension) 1990    Osteoarthritis 2013    Severe aortic stenosis 3/29/2017    Spinal stenosis of lumbar region 3/1/2016    Spondylolisthesis 3/1/2016    Thyroid trouble      Type 2 diabetes mellitus with diabetic nephropathy ([facility] [address] 4/28/2015         PAST SURGICAL HISTORY:   Past Surgical History:   Procedure Laterality Date    HX HIP REPLACEMENT Left 2009    HX ORTHOPAEDIC Right       R knee/Tibfib surgery         HOSPITAL COURSE: He was admitted to the hospital and underwent aortic valve replacement with a bioprosthetic valve on 5/16/17. He was extubated on the first night following surgery and was eventually up and ambulating well and taking a diet well. Wires and tubes were removed. He was started on oral anticoagulation with warfarin.      He is to be discharged to home today. At the time of discharge, his lungs were clear to auscultation bilaterally and the heart had a regular rate and rhythm. The sternum was stable and all wounds were healing well with no evidence of infection. There was no pedal edema.  Room air oximetry was 97%.     LABS:        Lab Results   Component Value Date/Time     WBC 6.5 05/20/2017 06:34 AM     HCT 29.0 (L) 05/20/2017 06:34 AM      (L) 05/20/2017 06:34 AM            Lab Results   Component Value Date/Time      (H) 05/17/2017 04:00 AM     K 3.8 05/17/2017 04:00 AM      (H) 05/17/2017 04:00 AM     CO2 25 05/17/2017 04:00 AM     GLU 86 05/17/2017 04:00 AM     BUN 20 (H) 05/17/2017 04:00 AM     CREA 1.17 05/17/2017 04:00 AM     CREA 1.15 05/16/2017 01:05 PM     CREA 1.27 05/16/2017 06:31 AM         DISCHARGE DIAGNOSES:   Diagnoses:  1. Severe aortic stenosis  2. Congestive heart failure, systolic, Chronic  3. Essential hypertension  4. diabetes mellitus type-2  5. hypercholesterolemia     DISCHARGE DISPOSITION:  1. Activities: limited, with strict sternal precautions including no heavy lifting and with constant wearing of the sternal harness. 2. Diet: Diabetic Diet  3. Condition: good  4. Prognosis: good     DISCHARGE INSTRUCTIONS:  He is to follow up with the cardiac surgeon in 7 days and in one month, and will see the primary care physician and cardiologist preferably within one month. The visiting nurses will see him once home. Reached patient and identified self/role. Mr. Rubi Hidalgo stated \"As far as I know I'm doing just fine. \"  Patient stated he is wearing heart hugger as directed and has no sternal pain. The patient does have pain from his left shoulder down to his hand and stated he was told it is related to positioning during surgery. Reconciled medications. Patient has filled new prescriptions and is taking all medications as directed. Call to [facility] Home care is scheduled to start service on 5/23/17. An INR is ordered and the agency has received the orders. Results to be called to Dr. Hill Wilhelm. Mr. Rubi Hidalgo is testing blood sugar three-four times daily. Blood sugar this AM was 115.      Upcoming appointments:  Dr. Carley Alvarado 5/30/17 at [phone] and 6/20/17 at 1430; Dr. Sara Felder 17 at [phone]. Patient stated he will schedule an appointment with PCP after he has seen his cardiologist and surgeon. Voiced no questions or concerns and has been provided with my direct number for future assistance. Patient stated he has read and understands all discharged instructions including s/s to be reported immediately to surgeon, cardiologist or PCP. Sepsis Transition of Care Note     Discharge vital signs: 98.7, HR 72, Resp 13, O2 Sat 97% RA, /68    Source of Infection:  None    Antibiotic prescribed at discharge: none     Infectious Disease Consult during admission: no     Patient Questions:     Are you taking your prescribed antibiotic? NA  Have you needed any fever reducers: no  What is your respiratory rate? Unable to remember. Subjectively stated he is breathing normally. What is your heart rate? Mid 70's. Can you tell me your name, , and why you were in the hospital? A&O x 3  Have you experienced any fever, chills or confusion? no  Are you experiencing any of the following; no, cough, shortness of breath, wound site abnormality, difficulty passing urine and abdominal pain    Scheduled follow up appointment with PCP: Will defer until seen by cardiology. Wants to self-schedule          NN provided patient/caregive education as follows:  Reviewed discharge instructions. Patient has read and understands. Discussed taking VS and weight each morning  and recording. Patient understands when to call surgeon/PCP. Reviewed signs of sepsis as listed below. Signs     Confusion   Fast breathing   Chills or shaking   Fever or low body temperature   Fast heart beat   Lightheadedness   Less urine output   Skin rash or skin color changes      The key to a safe transition from the hospital is being an informed patient and working with your health care team very closely over the next 30 days once you get home.

## 2017-05-22 NOTE — TELEPHONE ENCOUNTER
S/w pt to discuss scheduling postop appts. Able to schedule pt to see Bob Balderas Tuesday, 5/30 @ 2:30 pm. Pt informed CXR and EKG needs to be completed prior to appt. Advised pt arrive to pt registration by 1 pm and then come to Dr. Francheska Diaz office when finished. Inquired as to how pt feels he is doing since dc from [facility] pt states he feels he is doing well. Pt has no c/o pain r/t sternal incision but has c/o numbness and pain radiating from his shoulder and arm. Pt states pain is worse when he has to  something with his fingers. Informed will relay the above stated info to Dr. Sharyle Marshall.

## 2017-05-23 ENCOUNTER — TELEPHONE ANTICOAG (OUTPATIENT)
Dept: CARDIOLOGY CLINIC | Age: 72
End: 2017-05-23

## 2017-05-23 ENCOUNTER — TELEPHONE (OUTPATIENT)
Dept: CARDIOTHORACIC SURGERY | Age: 72
End: 2017-05-23

## 2017-05-23 DIAGNOSIS — E11.9 TYPE 2 DIABETES MELLITUS WITHOUT COMPLICATION, WITHOUT LONG-TERM CURRENT USE OF INSULIN (HCC): Primary | ICD-10-CM

## 2017-05-23 LAB — INR, EXTERNAL: 1.12

## 2017-05-23 NOTE — TELEPHONE ENCOUNTER
2017    Patient:  Lorena Henriquez. MRN:     [id number]  :     1945     I called and spoke to Lorena Henriquez. last night. He has been doing well since being home. Denies fevers and chills. Sternal pain is absent. SOB is absent. Ankle swelling is absent. He has had some pain radiating from his neck to his left shoulder and left arm since the operation that is slowly getting better (musculoskeletal in nature it sounds). He is eating well and is moving his bowels regularly, and is ambulating well. His stamina has been increasing. Overall he seems to be doing well. Will have INRs followed for warfarin dosing by [facility]. I reinforced the need to call me with any questions, concerns, or problems, and I will be seeing him within the next week in clinic.     Italo Nails MD

## 2017-05-23 NOTE — PATIENT INSTRUCTIONS
Patient's home care nurse called to give instructions. He will increase coumadin to 7.5mg every evening and repeat INR on Friday. She will educate patient regarding food and medication. He voices understanding and acceptance of this advice and will call back if any further questions or concerns.

## 2017-05-23 NOTE — TELEPHONE ENCOUNTER
S/w Laurette Meigs, RN @ [facility] to request if their office can manage INR for pt per Dr. Elizabet Sandra. Laurette Meigs informs their office will be able to manage INR for pt. Laurette Meigs informs she has s/w pt's HHN to inform of their office managing INR and when it needs to be checked.

## 2017-05-23 NOTE — TELEPHONE ENCOUNTER
Pt's HHN called the office to inform pt tried to get her to take his sutures today. HHN also informs she wants to make Dr. Carley Alvarado aware pt has refused to let her review medications with him. I called pt to remind the sutures will be taken out on his postop visit. Pt stated he was pulling on them and wanted to take them out himself. Again, I requested pt leave the sutures alone and they will be taken out during his postop visit.

## 2017-05-26 ENCOUNTER — TELEPHONE (OUTPATIENT)
Dept: CARDIOTHORACIC SURGERY | Age: 72
End: 2017-05-26

## 2017-05-26 ENCOUNTER — TELEPHONE ANTICOAG (OUTPATIENT)
Dept: CARDIOLOGY CLINIC | Age: 72
End: 2017-05-26

## 2017-05-26 DIAGNOSIS — Z95.2 S/P AVR (AORTIC VALVE REPLACEMENT): ICD-10-CM

## 2017-05-26 LAB — INR, EXTERNAL: 2 (ref 2.5–3.5)

## 2017-05-26 NOTE — PATIENT INSTRUCTIONS
Patient's nurse called to review INR results. He will take coumadin 10mg tonight and Tuesday and 7.5mg on all other days and repeat INR on Tuesday. She voices understanding and acceptance of this advice and will call back if any further questions or concerns.

## 2017-05-26 NOTE — TELEPHONE ENCOUNTER
Appt reminder call placed to pt re- pt's scheduled postop appt w/ Dr. Sheree Bennett 5/30 @ 2:30 pm. Reminded pt to complete EKG and CXR prior to coming up to Dr. Aguilar Mems office. Also asked pt to bring current list of medications.

## 2017-05-30 ENCOUNTER — OFFICE VISIT (OUTPATIENT)
Dept: CARDIOTHORACIC SURGERY | Age: 72
End: 2017-05-30

## 2017-05-30 ENCOUNTER — HOSPITAL ENCOUNTER (OUTPATIENT)
Dept: LAB | Age: 72
Discharge: HOME | End: 2017-05-30
Payer: MEDICARE

## 2017-05-30 ENCOUNTER — HOSPITAL ENCOUNTER (OUTPATIENT)
Dept: GENERAL RADIOLOGY | Age: 72
Discharge: HOME | End: 2017-05-30
Payer: MEDICARE

## 2017-05-30 VITALS
DIASTOLIC BLOOD PRESSURE: 94 MMHG | TEMPERATURE: 98.1 F | BODY MASS INDEX: 34.79 KG/M2 | OXYGEN SATURATION: 98 % | HEIGHT: 70 IN | SYSTOLIC BLOOD PRESSURE: 153 MMHG | RESPIRATION RATE: 20 BRPM | HEART RATE: 81 BPM | WEIGHT: 243 LBS

## 2017-05-30 DIAGNOSIS — I35.0 SEVERE AORTIC STENOSIS: Chronic | ICD-10-CM

## 2017-05-30 DIAGNOSIS — Z95.2 S/P AVR (AORTIC VALVE REPLACEMENT): ICD-10-CM

## 2017-05-30 DIAGNOSIS — Z95.2 S/P AVR (AORTIC VALVE REPLACEMENT): Primary | ICD-10-CM

## 2017-05-30 LAB
ATRIAL RATE: 75 BPM
CALCULATED P AXIS, ECG09: 51 DEGREES
CALCULATED R AXIS, ECG10: 1 DEGREES
CALCULATED T AXIS, ECG11: 72 DEGREES
DIAGNOSIS, 93000: NORMAL
P-R INTERVAL, ECG05: 174 MS
Q-T INTERVAL, ECG07: 410 MS
QRS DURATION, ECG06: 108 MS
QTC CALCULATION (BEZET), ECG08: 457 MS
VENTRICULAR RATE, ECG03: 75 BPM

## 2017-05-30 PROCEDURE — 93005 ELECTROCARDIOGRAM TRACING: CPT

## 2017-05-30 PROCEDURE — 71020 XR CHEST PA LAT: CPT

## 2017-05-30 RX ORDER — HYDROCODONE BITARTRATE AND ACETAMINOPHEN 5; 325 MG/1; MG/1
1 TABLET ORAL
Qty: 15 TAB | Refills: 0 | Status: SHIPPED | OUTPATIENT
Start: 2017-05-30 | End: 2017-06-14

## 2017-05-30 RX ORDER — MAGNESIUM CHLORIDE 70 MG
64 TABLET, DELAYED RELEASE (ENTERIC COATED) ORAL DAILY
COMMUNITY
End: 2017-08-11 | Stop reason: SDUPTHER

## 2017-05-30 NOTE — MR AVS SNAPSHOT
Visit Information Date & Time Provider Department Dept. Phone Encounter #  
 5/30/2017  2:30 PM Lashawn Juan MD [facility] [address] [place] [address] [phone] [id number] Your Appointments 5/31/2017 10:45 AM  
Follow Up with Lesvia Berger MD  
[facility] [place] ([facility]) Appt Note: 1 month follow up  
 [address] [facility] [address]  
  
   
 [address] [address]  
  
    
 6/20/2017  2:30 PM  
SURGICAL FOLLOW UP with Lashawn Juan MD  
[facility] SPEC ([facility] SCHEDULING) Appt Note: 30 day f/u from AVR 5/16/17 [facility] [address] [place] [address]  
[phone] [facility] [address] [address]  
  
    
 8/1/2017  9:15 AM  
ROUTINE CARE with Aroldo Lewis MD  
[facility] ([facility]) Appt Note: Return in about 3 months (around 7/25/2017), or if symptoms worsen or fail to improve, for DM2, HTN. [address] [facility] [place] [address]  
  
   
 [address] [facility] Upcoming Health Maintenance Date Due ZOSTER VACCINE AGE 60> 4/26/2006 EYE EXAM RETINAL OR DILATED Q1 10/14/2016 INFLUENZA AGE 9 TO ADULT 8/1/2017 Pneumococcal 65+ Low/Medium Risk (2 of 2 - PPSV23) 9/15/2017 GLAUCOMA SCREENING Q2Y 10/14/2017 HEMOGLOBIN A1C Q6M 11/18/2017 FOOT EXAM Q1 1/25/2018 MICROALBUMIN Q1 1/25/2018 LIPID PANEL Q1 4/25/2018 MEDICARE YEARLY EXAM 4/26/2018 FOBT Q 1 YEAR AGE 50-75 5/5/2018 DTaP/Tdap/Td series (2 - Td) 4/28/2025 Allergies as of 5/30/2017  Review Complete On: 5/30/2017 By: Lashawn Juan MD  
  
 Severity Noted Reaction Type Reactions Watermelon High 01/21/2015    Swelling Peach (Prunus Persica)  01/21/2015    Itching Current Immunizations  Reviewed on 9/15/2016 Name Date Influenza Vaccine (Quad) PF 9/17/2015 Pneumococcal Conjugate (PCV-13) 9/15/2016 Tdap 4/28/2015 Not reviewed this visit You Were Diagnosed With   
  
 Codes Comments S/P AVR (aortic valve replacement)    -  Primary ICD-10-CM: W52.6 ICD-9-CM: V43.3 Vitals BP Pulse Temp Resp Height(growth percentile) Weight(growth percentile) (!) 153/94 (BP 1 Location: Right arm, BP Patient Position: Sitting) 81 98.1 °F (36.7 °C) (Oral) 20 5' 10\" (1.778 m) 243 lb (110.2 kg) SpO2 BMI Smoking Status 98% 34.87 kg/m2 Former Smoker BMI and BSA Data Body Mass Index Body Surface Area 34.87 kg/m 2 2.33 m 2 Preferred Pharmacy Pharmacy Name Phone [facility] [address] [place] [phone] Your Updated Medication List  
  
   
This list is accurate as of: 5/30/17  3:32 PM.  Always use your most recent med list.  
  
  
  
  
 amiodarone 200 mg tablet Commonly known as:  CORDARONE Take 1 Tab by mouth daily for 14 days. aspirin delayed-release 81 mg tablet Take 1 Tab by mouth daily. atorvastatin 40 mg tablet Commonly known as:  LIPITOR Take 20 mg by mouth daily. cholecalciferol 1,000 unit Cap Commonly known as:  VITAMIN D3 Take 3,000 Units by mouth daily. docusate sodium 100 mg capsule Commonly known as:  Nanetta Ariadna Take 1 Cap by mouth two (2) times daily as needed for Constipation for up to 90 days. febuxostat 40 mg Tab tablet Commonly known as:  Dimitry Bowling Take 1 Tab by mouth daily. Indications: GOUT PREVENTION  
  
 glucose blood VI test strips strip Commonly known as:  ASCENSIA AUTODISC VI, ONE TOUCH ULTRA TEST VI Dispense precision extra test strips 3 x day to check blood glucose HYDROcodone-acetaminophen 5-325 mg per tablet Commonly known as:  Elyn Barley Take 1 Tab by mouth every six (6) hours as needed. Max Daily Amount: 4 Tabs. insulin aspart 100 unit/mL injection Commonly known as:  NOVOLOG  
by SubCUTAneous route. 6 units before meals  
  
 insulin glargine 100 unit/mL injection Commonly known as:  LANTUS Take 30 units once daily  Indications: type 2 diabetes mellitus  
  
 levothyroxine 200 mcg tablet Commonly known as:  SYNTHROID Take 1 Tab by mouth Daily (before breakfast). Indications: HYPOTHYROIDISM  
  
 magnesium chloride 64 mg delayed release tablet Commonly known as:  MAG DELAY Take 64 mg by mouth daily. metoprolol tartrate 25 mg tablet Commonly known as:  LOPRESSOR Take 0.5 Tabs by mouth two (2) times a day. Omeprazole delayed release 20 mg tablet Commonly known as:  PRILOSEC D/R Take 20 mg by mouth daily. spironolactone 25 mg tablet Commonly known as:  ALDACTONE Take 1 Tab by mouth daily. Indications: EDEMA  
  
 testosterone 20.25 mg/1.25 gram (1.62 %) gel Commonly known as:  ANDROGEL Apply  to affected area as needed. VITAMIN B-12 1,000 mcg tablet Generic drug:  cyanocobalamin Take 1,000 mcg by mouth daily. warfarin 2.5 mg tablet Commonly known as:  COUMADIN Take 2 Tabs by mouth daily. Or as directed. Goal INR 2.5-3.5. Prescriptions Printed Refills HYDROcodone-acetaminophen (NORCO) 5-325 mg per tablet 0 Sig: Take 1 Tab by mouth every six (6) hours as needed. Max Daily Amount: 4 Tabs. Class: Print Route: Oral  
  
Patient Instructions Leg and Ankle Edema: Care Instructions Your Care Instructions Swelling in the legs, ankles, and feet is called edema. It is common after you sit or stand for a while. Long plane flights or car rides often cause swelling in the legs and feet. You may also have swelling if you have to stand for long periods of time at your job. Problems with the veins in the legs (varicose veins) and changes in hormones can also cause swelling.  Sometimes the swelling in the ankles and feet is caused by a more serious problem, such as heart failure, infection, blood clots, or liver or kidney disease. Follow-up care is a key part of your treatment and safety. Be sure to make and go to all appointments, and call your doctor if you are having problems. Its also a good idea to know your test results and keep a list of the medicines you take. How can you care for yourself at home? · If your doctor gave you medicine, take it as prescribed. Call your doctor if you think you are having a problem with your medicine. · Whenever you are resting, raise your legs up. Try to keep the swollen area higher than the level of your heart. · Take breaks from standing or sitting in one position. ¨ Walk around to increase the blood flow in your lower legs. ¨ Move your feet and ankles often while you stand, or tighten and relax your leg muscles. · Wear support stockings. Put them on in the morning, before swelling gets worse. · Eat a balanced diet. Lose weight if you need to. · Limit the amount of salt (sodium) in your diet. Salt holds fluid in the body and may increase swelling. When should you call for help? Call 911 anytime you think you may need emergency care. For example, call if: 
· You have symptoms of a blood clot in your lung (called a pulmonary embolism). These may include: 
¨ Sudden chest pain. ¨ Trouble breathing. ¨ Coughing up blood. Call your doctor now or seek immediate medical care if: 
· You have signs of a blood clot, such as: 
¨ Pain in your calf, back of the knee, thigh, or groin. ¨ Redness and swelling in your leg or groin. · You have symptoms of infection, such as: 
¨ Increased pain, swelling, warmth, or redness. ¨ Red streaks or pus. ¨ A fever. Watch closely for changes in your health, and be sure to contact your doctor if: 
· Your swelling is getting worse. · You have new or worsening pain in your legs. · You do not get better as expected. Where can you learn more? Go to http://adal-janes.info/. Enter C679 in the search box to learn more about \"Leg and Ankle Edema: Care Instructions. \" Current as of: May 27, 2016 Content Version: 11.2 © 8483-1515 [employer]. Care instructions adapted under license by [employer] (which disclaims liability or warranty for this information). If you have questions about a medical condition or this instruction, always ask your healthcare professional. [address] any warranty or liability for your use of this information. No heavy lifting, and continue to wear the white sternal harness all the time. Keep your regularly scheduled appointment with us in three weeks. Gradually increase your activities as your stamina allows. Please call the office with any concerns. Follow-up with your Primary Care Provider and Cardiologist in the future as directed. Patient Instructions History Introducing [facility]! Dear Rosa Parra: Thank you for requesting a [employer] account. Our records indicate that you already have an active [employer] account. You can access your account anytime at [email or web address] Did you know that you can access your hospital and ER discharge instructions at any time in [employer]? You can also review all of your test results from your hospital stay or ER visit. Additional Information If you have questions, please visit the Frequently Asked Questions section of the [employer] website at [email or web address] Remember, [employer] is NOT to be used for urgent needs. For medical emergencies, dial 911. Now available from your iPhone and Android! Please provide this summary of care documentation to your next provider. Your primary care clinician is listed as Aroldo Perez. If you have any questions after today's visit, please call [phone].

## 2017-05-30 NOTE — PROGRESS NOTES
Krystin Mitchell. is a 71 y.o. male presenting for f/u from AVR surgery 5/16/17 . Advanced directives: Yes    Medications not taking or discontinued: See Med Rec    Patient requests 1 prescriptions. Patient is requesting a refill of pain medication. Removed x2 chest tube sutures    Surgical incision:  appears intact with minimal redness around midsternal incision site. Minimum swelling noted BLE. Patient is not wearing ELDA stockings. Patient is wearing their heart hugger. Patient is wearing red bracelet. 1. Have you been to the ER, urgent care clinic since your last visit? Hospitalized since your last visit? No    2. Have you seen or consulted any other health care providers outside of the [address] [place] [address] since your last visit? Include any pap smears or colon screening. No      Mr. Kayley Carl has a reminder for a \"due or due soon\" health maintenance. I have asked that he contact his primary care provider for follow-up on this health maintenance.

## 2017-05-30 NOTE — LETTER
2017 Patient:  Krystin Mitchell. MRN:     [id number] :     1945 Dear Damien Nava: 
 
 I had the pleasure of seeing Krystin Lazo in clinic today at [facility] in follow-up from open-heart surgery at [facility]. He has been doing well, and denies angina and dyspnea. Sternal pain is absent. He has noted that his activity level has been increasing. On examination, his vital signs were stable. The lungs were clear to auscultation bilaterally, and his heart had a regular rate and rhythm with a soft murmur. The sternum was stable, and the sternal wound was clean and dry without drainage, healing well without signs of infection. Pedal edema was mild. The EKG shows normal sinus rhythm with no ischemic changes, and the chest x-ray shows clear lungs bilaterally without significant residual effusions. Overall, he appears to be healing well following open-heart surgery and is pleased with the outcome of the operation as am I.  I have asked him to follow-up with you and his Primary Care Physician in the future, and I will be seeing him in clinic again in three weeks. Thanks very much for involving me in his care, and if you have any questions about his case, please feel free to contact me. Best regards, Kasandra Rowe MD 
Medical Director, Cardiovascular and Thoracic Services [facility] [place] [facility]

## 2017-05-30 NOTE — PATIENT INSTRUCTIONS
Leg and Ankle Edema: Care Instructions  Your Care Instructions  Swelling in the legs, ankles, and feet is called edema. It is common after you sit or stand for a while. Long plane flights or car rides often cause swelling in the legs and feet. You may also have swelling if you have to stand for long periods of time at your job. Problems with the veins in the legs (varicose veins) and changes in hormones can also cause swelling. Sometimes the swelling in the ankles and feet is caused by a more serious problem, such as heart failure, infection, blood clots, or liver or kidney disease. Follow-up care is a key part of your treatment and safety. Be sure to make and go to all appointments, and call your doctor if you are having problems. Its also a good idea to know your test results and keep a list of the medicines you take. How can you care for yourself at home? · If your doctor gave you medicine, take it as prescribed. Call your doctor if you think you are having a problem with your medicine. · Whenever you are resting, raise your legs up. Try to keep the swollen area higher than the level of your heart. · Take breaks from standing or sitting in one position. ¨ Walk around to increase the blood flow in your lower legs. ¨ Move your feet and ankles often while you stand, or tighten and relax your leg muscles. · Wear support stockings. Put them on in the morning, before swelling gets worse. · Eat a balanced diet. Lose weight if you need to. · Limit the amount of salt (sodium) in your diet. Salt holds fluid in the body and may increase swelling. When should you call for help? Call 911 anytime you think you may need emergency care. For example, call if:  · You have symptoms of a blood clot in your lung (called a pulmonary embolism). These may include:  ¨ Sudden chest pain. ¨ Trouble breathing. ¨ Coughing up blood.   Call your doctor now or seek immediate medical care if:  · You have signs of a blood clot, such as:  ¨ Pain in your calf, back of the knee, thigh, or groin. ¨ Redness and swelling in your leg or groin. · You have symptoms of infection, such as:  ¨ Increased pain, swelling, warmth, or redness. ¨ Red streaks or pus. ¨ A fever. Watch closely for changes in your health, and be sure to contact your doctor if:  · Your swelling is getting worse. · You have new or worsening pain in your legs. · You do not get better as expected. Where can you learn more? Go to http://adal-janes.info/. Enter T923 in the search box to learn more about \"Leg and Ankle Edema: Care Instructions. \"  Current as of: May 27, 2016  Content Version: 11.2  © 7662-5019 [employer]. Care instructions adapted under license by [employer] (which disclaims liability or warranty for this information). If you have questions about a medical condition or this instruction, always ask your healthcare professional. [place] [address] any warranty or liability for your use of this information. No heavy lifting, and continue to wear the white sternal harness all the time. Keep your regularly scheduled appointment with us in three weeks. Gradually increase your activities as your stamina allows. Please call the office with any concerns. Follow-up with your Primary Care Provider and Cardiologist in the future as directed.

## 2017-05-30 NOTE — PROGRESS NOTES
CARDIAC SURGERY FOLLOW-UP NOTE    5/30/2017  3:14 PM    Subjective:   Lorena Henriquez. returns for a follow-up visit following AVR. He feels well, and feels that he is improving. Angina is absent. Shortness of breath is absent. He has not had fevers and chills. Sternal pain is absent. Stamina is improving, able to perform several daily activities without restriction. Eating well. Bowel movements regular. Sopme lefgt shoulder pain with abduction along with mild numbness in left little finger. Current medications include:  Current Outpatient Prescriptions   Medication Sig Dispense Refill    magnesium chloride (MAG DELAY) 64 mg delayed release tablet Take 64 mg by mouth daily.  amiodarone (CORDARONE) 200 mg tablet Take 1 Tab by mouth daily for 14 days. 14 Tab 0    aspirin delayed-release 81 mg tablet Take 1 Tab by mouth daily. 30 Tab 2    docusate sodium (COLACE) 100 mg capsule Take 1 Cap by mouth two (2) times daily as needed for Constipation for up to 90 days. 60 Cap 2    HYDROcodone-acetaminophen (NORCO) 5-325 mg per tablet Take 1 Tab by mouth every six (6) hours as needed. Max Daily Amount: 4 Tabs. 40 Tab 0    warfarin (COUMADIN) 2.5 mg tablet Take 2 Tabs by mouth daily. Or as directed. Goal INR 2.5-3.5. 100 Tab 2    insulin glargine (LANTUS) 100 unit/mL injection Take 30 units once daily  Indications: type 2 diabetes mellitus 3 Vial 3    cyanocobalamin (VITAMIN B-12) 1,000 mcg tablet Take 1,000 mcg by mouth daily.  febuxostat (ULORIC) 40 mg tab tablet Take 1 Tab by mouth daily. Indications: GOUT PREVENTION 90 Tab 1    levothyroxine (SYNTHROID) 200 mcg tablet Take 1 Tab by mouth Daily (before breakfast). Indications: HYPOTHYROIDISM (Patient taking differently: Take 175 mcg by mouth Daily (before breakfast). Indications: hypothyroidism) 30 Tab 5    insulin aspart (NOVOLOG) 100 unit/mL injection by SubCUTAneous route.  6 units before meals      spironolactone (ALDACTONE) 25 mg tablet Take 1 Tab by mouth daily. Indications: EDEMA 30 Tab 3    metoprolol (LOPRESSOR) 25 mg tablet Take 0.5 Tabs by mouth two (2) times a day. 30 Tab 1    Omeprazole delayed release (PRILOSEC D/R) 20 mg tablet Take 20 mg by mouth daily.  atorvastatin (LIPITOR) 40 mg tablet Take 20 mg by mouth daily.  Cholecalciferol, Vitamin D3, 1,000 unit cap Take 3,000 Units by mouth daily.  glucose blood VI test strips (ASCENSIA AUTODISC VI, ONE TOUCH ULTRA TEST VI) strip Dispense precision extra test strips 3 x day to check blood glucose 200 Each 3    testosterone (ANDROGEL) 20.25 mg/1.25 gram (1.62 %) gel Apply  to affected area as needed. Objective:   Vital signs:    BP Readings from Last 3 Encounters:   05/30/17 (!) 153/94   05/20/17 121/68   05/04/17 147/73     Wt Readings from Last 3 Encounters:   05/30/17 110.2 kg (243 lb)   05/20/17 113.4 kg (250 lb 1.6 oz)   05/04/17 111.1 kg (245 lb)     @TMAX(24)@  Wt Readings from Last 3 Encounters:   05/30/17 110.2 kg (243 lb)   05/20/17 113.4 kg (250 lb 1.6 oz)   05/04/17 111.1 kg (245 lb)     Ht Readings from Last 3 Encounters:   05/30/17 5' 10\" (1.778 m)   05/16/17 5' 10\" (1.778 m)   05/04/17 5' 10\" (1.778 m)     Respiratory exam: clear to auscultation bilaterally. Cardiac exam: regular rate and rhythm, soft valve murmur   Sternum: stable. Sternal wound: clean, dry, no drainage, healed. Pedal edema: mild. EKG: normal sinus rhythm with no ischemic changes. CXR: clear lungs bilaterally without residual pleural effusions. Assessment:  Overall he is doing well following open-heart surgery. Probable mild left brachial plexopathy. Plans / Recommendations:   No heavy lifting, and continue to wear the sternal harness at all times. Regularly scheduled appointment set to see us in three weeks. Increase activities as stamina allows. Call the office with any concerns.   Follow-up with Primary Care Provider and Cardiologist in the future as directed. Will provide additional pain meds for brachial plexopathy pain in left shoulder.

## 2017-05-31 ENCOUNTER — ANTI-COAG VISIT (OUTPATIENT)
Dept: CARDIOLOGY CLINIC | Age: 72
End: 2017-05-31

## 2017-05-31 ENCOUNTER — OFFICE VISIT (OUTPATIENT)
Dept: CARDIOLOGY CLINIC | Age: 72
End: 2017-05-31

## 2017-05-31 ENCOUNTER — PATIENT OUTREACH (OUTPATIENT)
Dept: FAMILY MEDICINE CLINIC | Age: 72
End: 2017-05-31

## 2017-05-31 VITALS
SYSTOLIC BLOOD PRESSURE: 153 MMHG | BODY MASS INDEX: 35.07 KG/M2 | WEIGHT: 245 LBS | DIASTOLIC BLOOD PRESSURE: 79 MMHG | HEART RATE: 74 BPM | HEIGHT: 70 IN

## 2017-05-31 DIAGNOSIS — E03.4 HYPOTHYROIDISM DUE TO ACQUIRED ATROPHY OF THYROID: Chronic | ICD-10-CM

## 2017-05-31 DIAGNOSIS — Z95.2 S/P AVR (AORTIC VALVE REPLACEMENT): ICD-10-CM

## 2017-05-31 DIAGNOSIS — E11.21 TYPE 2 DIABETES MELLITUS WITH DIABETIC NEPHROPATHY, UNSPECIFIED LONG TERM INSULIN USE STATUS: Chronic | ICD-10-CM

## 2017-05-31 DIAGNOSIS — Z95.2 S/P AVR (AORTIC VALVE REPLACEMENT): Primary | ICD-10-CM

## 2017-05-31 DIAGNOSIS — N18.30 CKD (CHRONIC KIDNEY DISEASE) STAGE 3, GFR 30-59 ML/MIN (HCC): Chronic | ICD-10-CM

## 2017-05-31 DIAGNOSIS — E78.5 DYSLIPIDEMIA: Chronic | ICD-10-CM

## 2017-05-31 DIAGNOSIS — I10 ESSENTIAL HYPERTENSION: ICD-10-CM

## 2017-05-31 LAB
INR BLD: 2.4
PT POC: NORMAL SECONDS
VALID INTERNAL CONTROL?: YES

## 2017-05-31 RX ORDER — METOPROLOL TARTRATE 25 MG/1
25 TABLET, FILM COATED ORAL 2 TIMES DAILY
Qty: 180 TAB | Refills: 3 | Status: SHIPPED | OUTPATIENT
Start: 2017-05-31 | End: 2021-05-19 | Stop reason: CLARIF

## 2017-05-31 NOTE — PROGRESS NOTES
Transitions of Care Coordination    Lorenza Smart had a scheduled admission to [facility] 5/16-5/20/17 for an aortic valve replacement and was discharged to home with [facility]. The patient attended office visits with Dr. Bunny Watkins, surgeon, on 5/30/17 and Dr. Hayden Olivares, cardiology, on 5/31/17. Per office visit notes the patient is doing well. Sternal wound is healed, lungs clear, heart with NSR, no CP, SOB gradually improving. Lopressor was increased to 25 mg twice daily. INR on 5/31/17 2.4 with adjustment of Coumadin dosage. Next appointments:  Dr. Bunny Watkins, surgeon, 6/20/17  Dr. Gama Frederick 8/1/17  Dr. Hayden Olivares, cardiology, 8/2/17    Will follow.

## 2017-05-31 NOTE — PROGRESS NOTES
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 Hypertension   The history is provided by the patient. This is a chronic problem. The current episode started more than 1 week ago. The problem occurs every several days. The problem has not changed since onset. Associated symptoms include shortness of breath. Pertinent negatives include no chest pain. Aortic Stenosis   The history is provided by the patient. The problem has been resolved. Associated symptoms include shortness of breath. Pertinent negatives include no chest pain. Shortness of Breath   The history is provided by the patient. This is a chronic problem. The problem occurs intermittently. The current episode started more than 1 week ago. The problem has been gradually improving. Pertinent negatives include no fever, no ear pain, no neck pain, no cough, no sputum production, no hemoptysis, no wheezing, no PND, no orthopnea, no chest pain, no syncope, no vomiting, no rash, no leg pain, no leg swelling and no claudication. Associated medical issues do not include chronic lung disease, CAD or heart failure. Review of Systems   Constitutional: Negative for chills, diaphoresis, fever, malaise/fatigue and weight loss. HENT: Negative for ear discharge, ear pain, hearing loss, nosebleeds and tinnitus. Eyes: Negative for blurred vision. Respiratory: Positive for shortness of breath. Negative for cough, hemoptysis, sputum production, wheezing and stridor. Cardiovascular: Negative for chest pain, palpitations, orthopnea, claudication, leg swelling, syncope and PND. Gastrointestinal: Negative for heartburn, nausea and vomiting. Musculoskeletal: Negative for myalgias and neck pain. Skin: Negative for itching and rash. Neurological: Negative for dizziness, tingling, tremors, focal weakness, loss of consciousness and weakness. Psychiatric/Behavioral: Negative for depression and suicidal ideas.      Family History   Problem Relation Age of Onset    Hypertension Mother     Thyroid Disease Mother     Hypertension Father     Diabetes Father     Drug Abuse Brother        Past Medical History:   Diagnosis Date    Diabetes ([facility] [address] 1995    Dyslipidemia 3/1/2017    Fractures 1995    R Knee/ Tib fib fracture/surgery    Gout 2010    Graves disease 1/21/2015    High cholesterol 2005    HTN (hypertension) 1990    Osteoarthritis 2013    Severe aortic stenosis 3/29/2017    Spinal stenosis of lumbar region 3/1/2016    Spondylolisthesis 3/1/2016    Thyroid trouble     Type 2 diabetes mellitus with diabetic nephropathy ([facility] [address] 4/28/2015       Past Surgical History:   Procedure Laterality Date    HX HIP REPLACEMENT Left 2009    HX ORTHOPAEDIC Right     R knee/Tibfib surgery       Social History   Substance Use Topics    Smoking status: Former Smoker     Packs/day: 1.00     Types: Cigarettes     Start date: 1/1/1969     Quit date: 1/1/1995    Smokeless tobacco: Never Used    Alcohol use No       Allergies   Allergen Reactions    Watermelon Swelling    Peach (Prunus Persica) Itching       Outpatient Prescriptions Marked as Taking for the 5/31/17 encounter (Office Visit) with Kala Han MD   Medication Sig Dispense Refill    metoprolol tartrate (LOPRESSOR) 25 mg tablet Take 1 Tab by mouth two (2) times a day. 180 Tab 3    magnesium chloride (MAG DELAY) 64 mg delayed release tablet Take 64 mg by mouth daily.  HYDROcodone-acetaminophen (NORCO) 5-325 mg per tablet Take 1 Tab by mouth every six (6) hours as needed. Max Daily Amount: 4 Tabs. 15 Tab 0    amiodarone (CORDARONE) 200 mg tablet Take 1 Tab by mouth daily for 14 days. 14 Tab 0    aspirin delayed-release 81 mg tablet Take 1 Tab by mouth daily. 30 Tab 2    docusate sodium (COLACE) 100 mg capsule Take 1 Cap by mouth two (2) times daily as needed for Constipation for up to 90 days.  60 Cap 2    insulin glargine (LANTUS) 100 unit/mL injection Take 30 units once daily  Indications: type 2 diabetes mellitus 3 Vial 3    cyanocobalamin (VITAMIN B-12) 1,000 mcg tablet Take 1,000 mcg by mouth daily.  febuxostat (ULORIC) 40 mg tab tablet Take 1 Tab by mouth daily. Indications: GOUT PREVENTION 90 Tab 1    levothyroxine (SYNTHROID) 200 mcg tablet Take 1 Tab by mouth Daily (before breakfast). Indications: HYPOTHYROIDISM (Patient taking differently: Take 175 mcg by mouth Daily (before breakfast). Indications: hypothyroidism) 30 Tab 5    insulin aspart (NOVOLOG) 100 unit/mL injection by SubCUTAneous route. 6 units before meals      spironolactone (ALDACTONE) 25 mg tablet Take 1 Tab by mouth daily. Indications: EDEMA 30 Tab 3    glucose blood VI test strips (ASCENSIA AUTODISC VI, ONE TOUCH ULTRA TEST VI) strip Dispense precision extra test strips 3 x day to check blood glucose 200 Each 3    Omeprazole delayed release (PRILOSEC D/R) 20 mg tablet Take 20 mg by mouth daily.  atorvastatin (LIPITOR) 40 mg tablet Take 20 mg by mouth daily.  testosterone (ANDROGEL) 20.25 mg/1.25 gram (1.62 %) gel Apply  to affected area as needed.  Cholecalciferol, Vitamin D3, 1,000 unit cap Take 3,000 Units by mouth daily. Visit Vitals    /79    Pulse 74    Ht 5' 10\" (1.778 m)    Wt 111.1 kg (245 lb)    BMI 35.15 kg/m2     Physical Exam   Constitutional: He is oriented to person, place, and time. He appears well-developed and well-nourished. No distress. HENT:   Head: Atraumatic. Mouth/Throat: No oropharyngeal exudate. Eyes: Conjunctivae are normal. Right eye exhibits no discharge. Left eye exhibits no discharge. No scleral icterus. Neck: Normal range of motion. Neck supple. No JVD present. No tracheal deviation present. No thyromegaly present. Cardiovascular: Normal rate and regular rhythm. Exam reveals no gallop. No murmur heard. Surgical sternal scar   Pulmonary/Chest: Effort normal and breath sounds normal. No stridor. No respiratory distress. He has no wheezes. He has no rales. He exhibits no tenderness. Abdominal: Soft. There is no tenderness. There is no rebound and no guarding. Musculoskeletal: Normal range of motion. He exhibits no edema or tenderness. Lymphadenopathy:     He has no cervical adenopathy. Neurological: He is alert and oriented to person, place, and time. He exhibits normal muscle tone. Skin: Skin is warm. He is not diaphoretic. Psychiatric: He has a normal mood and affect. His behavior is normal.     ekg sinus rhythm with PVC, no acute st-t changes  Echo 02/2017:  SUMMARY:  Left ventricle: Systolic function was normal by visual assessment. Ejection fraction was estimated in the range of 60 % to 65 %. No obvious  wall motion abnormalities identified in the views obtained. Wall thickness  was at the upper limits of normal.    Aortic valve: Leaflets exhibited markedly increased thickness and reduced  mobility. There was moderate to severe stenosis. Valve peak gradient was  61 mmHg. Valve mean gradient was 39 mmHg. Estimated aortic valve area (by  VTI) was 0.5 cmï¾². ZACKERY likely overestimates severity of aortic stenosis due  to difficulty in measuring LVOT diameter. Aorta, systemic arteries: The root exhibited dilatation. ASSESSMENT and PLAN    ICD-10-CM ICD-9-CM    1. S/P AVR (aortic valve replacement) Z95.2 V43.3    2. Dyslipidemia E78.5 272.4    3. Type 2 diabetes mellitus with diabetic nephropathy, unspecified long term insulin use status E11.21 250.40      583.81    4. CKD (chronic kidney disease) stage 3, GFR 30-59 ml/min N18.3 585.3    5. Hypothyroidism due to acquired atrophy of thyroid E03.4 244.8      246.8    6. Essential hypertension I10 401.9      Orders Placed This Encounter    metoprolol tartrate (LOPRESSOR) 25 mg tablet     Sig: Take 1 Tab by mouth two (2) times a day. Dispense:  180 Tab     Refill:  3     Follow-up Disposition:  Return in about 3 months (around 8/31/2017).     reviewed diet, exercise and weight control  cardiovascular risk and specific lipid/LDL goals reviewed  use of aspirin to prevent MI and TIA's discussed. Patient with  severe AS by echo and LHC. Patient has exertional dyspnea with no HF symptoms. No syncope or chest pain. Subsequently underwent AVR- bioprosthetic. Now stable post op course. Will increase lopressor to 25 mg bid. Duration of coumadin to be determined by CTS- he is in normal sinus rhythm now.

## 2017-05-31 NOTE — PATIENT INSTRUCTIONS

## 2017-05-31 NOTE — PATIENT INSTRUCTIONS
Patient will continue taking coumadin 10mg on Tuesday and Friday and repeat INR in 2 weeks, He voices understanding and acceptance of this advice and will call back if any further questions or concerns.

## 2017-05-31 NOTE — PROGRESS NOTES
1. Have you been to the ER, urgent care clinic since your last visit? Hospitalized since your last visit? Sharif, aortic valve    2. Have you seen or consulted any other health care providers outside of the [address] [place] [address] since your last visit? Include any pap smears or colon screening. Yes, pcp    3. Since your last visit, have you had any of the following symptoms? Swelling legs and ankles and feet          4. Have you had any blood work, X-rays or cardiac testing? Yes, [facility]        5. Where do you normally have your labs drawn? [facility]    6. Do you need any refills today?   no

## 2017-05-31 NOTE — MR AVS SNAPSHOT
Visit Information Date & Time Provider Department Dept. Phone Encounter #  
 5/31/2017 10:45 AM Tarun Pierce MD [facility] [place] [id number] [phone] Follow-up Instructions Return in about 3 months (around 8/31/2017). Follow-up and Disposition History Your Appointments 6/20/2017  2:30 PM  
SURGICAL FOLLOW UP with Nica Dixon MD  
CARDIOVASCULAR AND THORACIC SPEC (LYNN SCHEDULING) Appt Note: 30 day f/u from AVR 5/16/17 [facility] [place] [address] [address]  
[phone] [facility] [place] [address] [address]  
  
    
 8/1/2017  9:15 AM  
ROUTINE CARE with Aroldo Lee MD  
[facility] ([facility]) Appt Note: Return in about 3 months (around 7/25/2017), or if symptoms worsen or fail to improve, for DM2, HTN. [employer] [address] [id number] [address]  
  
   
 [employer] [address] [facility] Upcoming Health Maintenance Date Due ZOSTER VACCINE AGE 60> 4/26/2006 EYE EXAM RETINAL OR DILATED Q1 10/14/2016 INFLUENZA AGE 9 TO ADULT 8/1/2017 Pneumococcal 65+ Low/Medium Risk (2 of 2 - PPSV23) 9/15/2017 GLAUCOMA SCREENING Q2Y 10/14/2017 HEMOGLOBIN A1C Q6M 11/18/2017 FOOT EXAM Q1 1/25/2018 MICROALBUMIN Q1 1/25/2018 LIPID PANEL Q1 4/25/2018 MEDICARE YEARLY EXAM 4/26/2018 FOBT Q 1 YEAR AGE 50-75 5/5/2018 DTaP/Tdap/Td series (2 - Td) 4/28/2025 Allergies as of 5/31/2017  Review Complete On: 5/31/2017 By: Tarun Pierce MD  
  
 Severity Noted Reaction Type Reactions Watermelon High 01/21/2015    Swelling Peach (Prunus Persica)  01/21/2015    Itching Current Immunizations  Reviewed on 9/15/2016 Name Date Influenza Vaccine (Quad) PF 9/17/2015 Pneumococcal Conjugate (PCV-13) 9/15/2016 Tdap 4/28/2015 Not reviewed this visit You Were Diagnosed With   
  
 Codes Comments S/P AVR (aortic valve replacement)    -  Primary ICD-10-CM: J81.3 ICD-9-CM: V43.3 Dyslipidemia     ICD-10-CM: E78.5 ICD-9-CM: 272.4 Type 2 diabetes mellitus with diabetic nephropathy, unspecified long term insulin use status     ICD-10-CM: E11.21 
ICD-9-CM: 250.40, 583.81   
 CKD (chronic kidney disease) stage 3, GFR 30-59 ml/min     ICD-10-CM: N18.3 ICD-9-CM: 498. 3 Hypothyroidism due to acquired atrophy of thyroid     ICD-10-CM: E03.4 ICD-9-CM: 244.8, 246.8 Essential hypertension     ICD-10-CM: I10 
ICD-9-CM: 401.9 Vitals BP Pulse Height(growth percentile) Weight(growth percentile) BMI Smoking Status 153/79 74 5' 10\" (1.778 m) 245 lb (111.1 kg) 35.15 kg/m2 Former Smoker BMI and BSA Data Body Mass Index Body Surface Area  
 35.15 kg/m 2 2.34 m 2 Preferred Pharmacy Pharmacy Name Phone [facility], 309 [facility] [phone] Your Updated Medication List  
  
   
This list is accurate as of: 5/31/17 11:14 AM.  Always use your most recent med list.  
  
  
  
  
 amiodarone 200 mg tablet Commonly known as:  CORDARONE Take 1 Tab by mouth daily for 14 days. aspirin delayed-release 81 mg tablet Take 1 Tab by mouth daily. atorvastatin 40 mg tablet Commonly known as:  LIPITOR Take 20 mg by mouth daily. cholecalciferol 1,000 unit Cap Commonly known as:  VITAMIN D3 Take 3,000 Units by mouth daily. docusate sodium 100 mg capsule Commonly known as:  Shirley Constant Take 1 Cap by mouth two (2) times daily as needed for Constipation for up to 90 days. febuxostat 40 mg Tab tablet Commonly known as:  Milner Compa Take 1 Tab by mouth daily. Indications: GOUT PREVENTION  
  
 glucose blood VI test strips strip Commonly known as:  ASCENSIA AUTODISC VI, ONE TOUCH ULTRA TEST VI Dispense precision extra test strips 3 x day to check blood glucose HYDROcodone-acetaminophen 5-325 mg per tablet Commonly known as:  Kimmie Proper Take 1 Tab by mouth every six (6) hours as needed. Max Daily Amount: 4 Tabs. insulin aspart 100 unit/mL injection Commonly known as:  NOVOLOG  
by SubCUTAneous route. 6 units before meals  
  
 insulin glargine 100 unit/mL injection Commonly known as:  LANTUS Take 30 units once daily  Indications: type 2 diabetes mellitus  
  
 levothyroxine 200 mcg tablet Commonly known as:  SYNTHROID Take 1 Tab by mouth Daily (before breakfast). Indications: HYPOTHYROIDISM  
  
 magnesium chloride 64 mg delayed release tablet Commonly known as:  MAG DELAY Take 64 mg by mouth daily. metoprolol tartrate 25 mg tablet Commonly known as:  LOPRESSOR Take 1 Tab by mouth two (2) times a day. Omeprazole delayed release 20 mg tablet Commonly known as:  PRILOSEC D/R Take 20 mg by mouth daily. spironolactone 25 mg tablet Commonly known as:  ALDACTONE Take 1 Tab by mouth daily. Indications: EDEMA  
  
 testosterone 20.25 mg/1.25 gram (1.62 %) gel Commonly known as:  ANDROGEL Apply  to affected area as needed. VITAMIN B-12 1,000 mcg tablet Generic drug:  cyanocobalamin Take 1,000 mcg by mouth daily. warfarin 2.5 mg tablet Commonly known as:  COUMADIN Take 2 Tabs by mouth daily. Or as directed. Goal INR 2.5-3.5. Prescriptions Sent to Pharmacy Refills  
 metoprolol tartrate (LOPRESSOR) 25 mg tablet 3 Sig: Take 1 Tab by mouth two (2) times a day. Class: Normal  
 Pharmacy: [facility] 42, 204 [facility] #: [phone] Route: Oral  
  
Follow-up Instructions Return in about 3 months (around 8/31/2017). Patient Instructions High Blood Pressure: Care Instructions Your Care Instructions If your blood pressure is usually above 140/90, you have high blood pressure, or hypertension. That means the top number is 140 or higher or the bottom number is 90 or higher, or both. Despite what a lot of people think, high blood pressure usually doesn't cause headaches or make you feel dizzy or lightheaded. It usually has no symptoms. But it does increase your risk for heart attack, stroke, and kidney or eye damage. The higher your blood pressure, the more your risk increases. Your doctor will give you a goal for your blood pressure. Your goal will be based on your health and your age. An example of a goal is to keep your blood pressure below 140/90. Lifestyle changes, such as eating healthy and being active, are always important to help lower blood pressure. You might also take medicine to reach your blood pressure goal. 
Follow-up care is a key part of your treatment and safety. Be sure to make and go to all appointments, and call your doctor if you are having problems. It's also a good idea to know your test results and keep a list of the medicines you take. How can you care for yourself at home? Medical treatment · If you stop taking your medicine, your blood pressure will go back up. You may take one or more types of medicine to lower your blood pressure. Be safe with medicines. Take your medicine exactly as prescribed. Call your doctor if you think you are having a problem with your medicine. · Talk to your doctor before you start taking aspirin every day. Aspirin can help certain people lower their risk of a heart attack or stroke. But taking aspirin isn't right for everyone, because it can cause serious bleeding. · See your doctor regularly. You may need to see the doctor more often at first or until your blood pressure comes down. · If you are taking blood pressure medicine, talk to your doctor before you take decongestants or anti-inflammatory medicine, such as ibuprofen. Some of these medicines can raise blood pressure. · Learn how to check your blood pressure at home. Lifestyle changes · Stay at a healthy weight. This is especially important if you put on weight around the waist. Losing even 10 pounds can help you lower your blood pressure. · If your doctor recommends it, get more exercise. Walking is a good choice. Bit by bit, increase the amount you walk every day. Try for at least 30 minutes on most days of the week. You also may want to swim, bike, or do other activities. · Avoid or limit alcohol. Talk to your doctor about whether you can drink any alcohol. · Try to limit how much sodium you eat to less than 2,300 milligrams (mg) a day. Your doctor may ask you to try to eat less than 1,500 mg a day. · Eat plenty of fruits (such as bananas and oranges), vegetables, legumes, whole grains, and low-fat dairy products. · Lower the amount of saturated fat in your diet. Saturated fat is found in animal products such as milk, cheese, and meat. Limiting these foods may help you lose weight and also lower your risk for heart disease. · Do not smoke. Smoking increases your risk for heart attack and stroke. If you need help quitting, talk to your doctor about stop-smoking programs and medicines. These can increase your chances of quitting for good. When should you call for help? Call 911 anytime you think you may need emergency care. This may mean having symptoms that suggest that your blood pressure is causing a serious heart or blood vessel problem. Your blood pressure may be over 180/110. For example, call 911 if: 
· You have symptoms of a heart attack. These may include: ¨ Chest pain or pressure, or a strange feeling in the chest. 
¨ Sweating. ¨ Shortness of breath. ¨ Nausea or vomiting. ¨ Pain, pressure, or a strange feeling in the back, neck, jaw, or upper belly or in one or both shoulders or arms. ¨ Lightheadedness or sudden weakness. ¨ A fast or irregular heartbeat. · You have symptoms of a stroke. These may include: 
¨ Sudden numbness, tingling, weakness, or loss of movement in your face, arm, or leg, especially on only one side of your body. ¨ Sudden vision changes. ¨ Sudden trouble speaking. ¨ Sudden confusion or trouble understanding simple statements. ¨ Sudden problems with walking or balance. ¨ A sudden, severe headache that is different from past headaches. · You have severe back or belly pain. Do not wait until your blood pressure comes down on its own. Get help right away. Call your doctor now or seek immediate care if: 
· Your blood pressure is much higher than normal (such as 180/110 or higher), but you don't have symptoms. · You think high blood pressure is causing symptoms, such as: ¨ Severe headache. ¨ Blurry vision. Watch closely for changes in your health, and be sure to contact your doctor if: 
· Your blood pressure measures 140/90 or higher at least 2 times. That means the top number is 140 or higher or the bottom number is 90 or higher, or both. · You think you may be having side effects from your blood pressure medicine. · Your blood pressure is usually normal, but it goes above normal at least 2 times. Where can you learn more? Go to http://adal-janes.info/. Enter X466 in the search box to learn more about \"High Blood Pressure: Care Instructions. \" Current as of: August 8, 2016 Content Version: 11.2 © 0122-6074 [employer]. Care instructions adapted under license by [employer] (which disclaims liability or warranty for this information). If you have questions about a medical condition or this instruction, always ask your healthcare professional. [address] any warranty or liability for your use of this information. Patient Instructions History Introducing [facility] & HEALTH SERVICES! Dear Alverda Gitelman: Thank you for requesting a [employer] account.   Our records indicate that you already have an active Meijob account. You can access your account anytime at [email or web address] Did you know that you can access your hospital and ER discharge instructions at any time in Meijob? You can also review all of your test results from your hospital stay or ER visit. Additional Information If you have questions, please visit the Frequently Asked Questions section of the Meijob website at [email or web address] Remember, Meijob is NOT to be used for urgent needs. For medical emergencies, dial 911. Now available from your iPhone and Android! Please provide this summary of care documentation to your next provider. Your primary care clinician is listed as Aroldo Perez. If you have any questions after today's visit, please call [phone].

## 2017-05-31 NOTE — MR AVS SNAPSHOT
Visit Information Date & Time Provider Department Dept. Phone Encounter #  
 5/31/2017  9:00 AM [facility] [address] [facility] [id number] Follow-up Instructions Return in about 2 weeks (around 6/14/2017). Follow-up and Disposition History Your Appointments 6/20/2017  2:30 PM  
SURGICAL FOLLOW UP with Ashley Michael MD  
CARDIOVASCULAR AND THORACIC SPEC (LYNN SCHEDULING) Appt Note: 30 day f/u from AVR 5/16/17 [facility] [place] [address] [address]  
[phone] [facility] [place] [address] [address]  
  
    
 8/1/2017  9:15 AM  
ROUTINE CARE with Aroldo Saunders MD  
[facility] ([facility]) Appt Note: Return in about 3 months (around 7/25/2017), or if symptoms worsen or fail to improve, for DM2, HTN. [email or web address] [address] [address] [place] [address]  
  
   
 [email or web address] [address] [facility] Upcoming Health Maintenance Date Due ZOSTER VACCINE AGE 60> 4/26/2006 EYE EXAM RETINAL OR DILATED Q1 10/14/2016 INFLUENZA AGE 9 TO ADULT 8/1/2017 Pneumococcal 65+ Low/Medium Risk (2 of 2 - PPSV23) 9/15/2017 GLAUCOMA SCREENING Q2Y 10/14/2017 HEMOGLOBIN A1C Q6M 11/18/2017 FOOT EXAM Q1 1/25/2018 MICROALBUMIN Q1 1/25/2018 LIPID PANEL Q1 4/25/2018 MEDICARE YEARLY EXAM 4/26/2018 FOBT Q 1 YEAR AGE 50-75 5/5/2018 DTaP/Tdap/Td series (2 - Td) 4/28/2025 Allergies as of 5/31/2017  Review Complete On: 5/31/2017 By: Renetta Swann MD  
  
 Severity Noted Reaction Type Reactions Watermelon High 01/21/2015    Swelling Peach (Prunus Persica)  01/21/2015    Itching Current Immunizations  Reviewed on 9/15/2016 Name Date Influenza Vaccine (Quad) PF 9/17/2015 Pneumococcal Conjugate (PCV-13) 9/15/2016 Tdap 4/28/2015 Not reviewed this visit You Were Diagnosed With   
  
 Codes Comments S/P AVR (aortic valve replacement)     ICD-10-CM: A26.3 ICD-9-CM: V43.3 Vitals Smoking Status Former Smoker Preferred Pharmacy Pharmacy Name Phone [facility] [place] [address], [address] [place] [phone] Your Updated Medication List  
  
   
This list is accurate as of: 5/31/17 11:14 AM.  Always use your most recent med list.  
  
  
  
  
 amiodarone 200 mg tablet Commonly known as:  CORDARONE Take 1 Tab by mouth daily for 14 days. aspirin delayed-release 81 mg tablet Take 1 Tab by mouth daily. atorvastatin 40 mg tablet Commonly known as:  LIPITOR Take 20 mg by mouth daily. cholecalciferol 1,000 unit Cap Commonly known as:  VITAMIN D3 Take 3,000 Units by mouth daily. docusate sodium 100 mg capsule Commonly known as:  Darnelle Novak Take 1 Cap by mouth two (2) times daily as needed for Constipation for up to 90 days. febuxostat 40 mg Tab tablet Commonly known as:  Caitlin Quintanilla Take 1 Tab by mouth daily. Indications: GOUT PREVENTION  
  
 glucose blood VI test strips strip Commonly known as:  ASCENSIA AUTODISC VI, ONE TOUCH ULTRA TEST VI Dispense precision extra test strips 3 x day to check blood glucose HYDROcodone-acetaminophen 5-325 mg per tablet Commonly known as:  Kyle Hunter Take 1 Tab by mouth every six (6) hours as needed. Max Daily Amount: 4 Tabs. insulin aspart 100 unit/mL injection Commonly known as:  NOVOLOG  
by SubCUTAneous route. 6 units before meals  
  
 insulin glargine 100 unit/mL injection Commonly known as:  LANTUS Take 30 units once daily  Indications: type 2 diabetes mellitus  
  
 levothyroxine 200 mcg tablet Commonly known as:  SYNTHROID Take 1 Tab by mouth Daily (before breakfast). Indications: HYPOTHYROIDISM  
  
 magnesium chloride 64 mg delayed release tablet Commonly known as:  MAG DELAY Take 64 mg by mouth daily. metoprolol tartrate 25 mg tablet Commonly known as:  LOPRESSOR Take 1 Tab by mouth two (2) times a day. Omeprazole delayed release 20 mg tablet Commonly known as:  PRILOSEC D/R Take 20 mg by mouth daily. spironolactone 25 mg tablet Commonly known as:  ALDACTONE Take 1 Tab by mouth daily. Indications: EDEMA  
  
 testosterone 20.25 mg/1.25 gram (1.62 %) gel Commonly known as:  ANDROGEL Apply  to affected area as needed. VITAMIN B-12 1,000 mcg tablet Generic drug:  cyanocobalamin Take 1,000 mcg by mouth daily. warfarin 2.5 mg tablet Commonly known as:  COUMADIN Take 2 Tabs by mouth daily. Or as directed. Goal INR 2.5-3.5. Description May 2017 Details Sun Mon Tue Wed Thu Fri Sat  
   1  
  
  
  
   2  
  
  
  
   3  
  
  
  
   4  
  
  
  
   5  
  
  
  
   6  
  
  
  
  
  7  
  
  
  
   8  
  
  
  
   9  
  
  
  
   10  
  
  
  
   11  
  
  
  
   12  
  
  
  
   13  
  
  
  
  
  14  
  
  
  
   15  
  
  
  
   16  
  
  
  
   17  
  
  
  
   18  
  
  
  
   19  
  
  
  
   20  
  
  
  
  
  21  
  
  
  
   22  
  
  
  
   23  
  
  
  
   24  
  
  
  
   25  
  
  
  
   26  
  
  
  
   27  
  
  
  
  
  28  
  
  
  
   29  
  
  
  
   30  
  
  
  
   31  
  
7.5 mg  
See details Date Details 05/31 This INR check INR: 2.4! How to take your warfarin dose To take:  7.5 mg Take three of the 2.5 mg tablets. June 2017 Details Noemy Miranda Tue Wed Thu Fri Sat  
      1  
  
7.5 mg  
  
   2  
  
10 mg  
  
   3  
  
7.5 mg  
  
  
  4  
  
7.5 mg  
  
   5  
  
7.5 mg  
  
   6  
  
10 mg  
  
   7  
  
7.5 mg  
  
   8  
  
7.5 mg  
  
   9  
  
10 mg  
  
   10  
  
7.5 mg  
  
  
  11  
  
7.5 mg  
  
   12  
  
7.5 mg  
  
   13  
  
10 mg  
  
   14  
  
7.5 mg  
  
   15  
  
  
  
   16  
  
  
  
   17  
  
  
  
  
  18  
  
  
  
   19  
 20  
  
  
  
   21  
  
  
  
   22  
  
  
  
   23  
  
  
  
   24  
  
  
  
  
  25  
  
  
  
   26  
  
  
  
   27  
  
  
  
   28  
  
  
  
   29  
  
  
  
   30  
  
  
  
   
 Date Details No additional details Date of next INR:  6/14/2017 How to take your warfarin dose To take:  7.5 mg Take three of the 2.5 mg tablets. To take:  10 mg Take four of the 2.5 mg tablets. We Performed the Following AMB POC PT/INR [20532 CPT(R)] Follow-up Instructions Return in about 2 weeks (around 6/14/2017). Patient Instructions Patient will continue taking coumadin 10mg on Tuesday and Friday and repeat INR in 2 weeks, He voices understanding and acceptance of this advice and will call back if any further questions or concerns. Introducing [facility] & HEALTH SERVICES! Dear Tiny Oliver: Thank you for requesting a [employer] account. Our records indicate that you already have an active [employer] account. You can access your account anytime at [email or web address] Did you know that you can access your hospital and ER discharge instructions at any time in San[employer]? You can also review all of your test results from your hospital stay or ER visit. Additional Information If you have questions, please visit the Frequently Asked Questions section of the [employer] website at [email or web address] Remember, Sanako is NOT to be used for urgent needs. For medical emergencies, dial 911. Now available from your iPhone and Android! Please provide this summary of care documentation to your next provider. Your primary care clinician is listed as Aroldo Perez. If you have any questions after today's visit, please call [phone].

## 2017-06-07 ENCOUNTER — PATIENT OUTREACH (OUTPATIENT)
Dept: FAMILY MEDICINE CLINIC | Age: 72
End: 2017-06-07

## 2017-06-07 NOTE — PROGRESS NOTES
Transitions of Care Coordination    Follow up for scheduled admission to [facility] 5/16-5/20/17 for an aortic valve replacement and discharge to home with [facility]. Reached patient and identified self/role. Mr. Mila Esqueda stated \"I'm doing very well except for the nerve pain in my arm. \"  Patient stated the pain in his left arm can be severe and began immediately after surgery. Patient stated he is taking all medications as directed and wearing sternal harness. Patient weighs daily, checks blood sugar and vital signs. Per patient blood sugar is under good control and is 130 or below in the morning. A1c on 5/17/17 was 6.2. Dr. Carolyne Aleman increased patient's metoprolol to 25 mg BID on 5/31/17. Patient mentioned for the last three days his heart rate has been 41 when he gets up in the morning. Patient stated he is not dizzy or light headed. Advised patient to contact Dr. Jocelyn Hu to relay the information re heart rate and patient stated he will call today. Patient also advised to check heart rate several times during the day and record. Will follow. Patient continues to receive PT and OT provided by [facility]. An appointment was scheduled with Dr. Giulia Ramirez on 6/14/17 to address left arm pain. Next appointment with Dr. Jim Rosado, surgeon, is 6/20/17 at 1430.

## 2017-06-14 ENCOUNTER — HOSPITAL ENCOUNTER (OUTPATIENT)
Dept: LAB | Age: 72
Discharge: HOME | End: 2017-06-14
Payer: MEDICARE

## 2017-06-14 ENCOUNTER — OFFICE VISIT (OUTPATIENT)
Dept: CARDIOLOGY CLINIC | Age: 72
End: 2017-06-14

## 2017-06-14 ENCOUNTER — OFFICE VISIT (OUTPATIENT)
Dept: FAMILY MEDICINE CLINIC | Age: 72
End: 2017-06-14

## 2017-06-14 ENCOUNTER — ANTI-COAG VISIT (OUTPATIENT)
Dept: CARDIOLOGY CLINIC | Age: 72
End: 2017-06-14

## 2017-06-14 VITALS
DIASTOLIC BLOOD PRESSURE: 70 MMHG | HEART RATE: 51 BPM | RESPIRATION RATE: 18 BRPM | OXYGEN SATURATION: 95 % | HEIGHT: 70 IN | SYSTOLIC BLOOD PRESSURE: 146 MMHG | WEIGHT: 246 LBS | TEMPERATURE: 97.7 F | BODY MASS INDEX: 35.22 KG/M2

## 2017-06-14 VITALS
HEART RATE: 63 BPM | HEIGHT: 70 IN | WEIGHT: 248 LBS | BODY MASS INDEX: 35.5 KG/M2 | DIASTOLIC BLOOD PRESSURE: 80 MMHG | SYSTOLIC BLOOD PRESSURE: 152 MMHG

## 2017-06-14 DIAGNOSIS — Z95.2 S/P AVR (AORTIC VALVE REPLACEMENT): ICD-10-CM

## 2017-06-14 DIAGNOSIS — E83.42 HYPOMAGNESEMIA: ICD-10-CM

## 2017-06-14 DIAGNOSIS — M25.562 LEFT KNEE PAIN, UNSPECIFIED CHRONICITY: ICD-10-CM

## 2017-06-14 DIAGNOSIS — R35.0 URINARY FREQUENCY: ICD-10-CM

## 2017-06-14 DIAGNOSIS — G62.9 NEUROPATHY: Primary | ICD-10-CM

## 2017-06-14 LAB
BILIRUB UR QL STRIP: NEGATIVE
GLUCOSE UR-MCNC: NEGATIVE MG/DL
INR BLD: 4.4
KETONES P FAST UR STRIP-MCNC: NEGATIVE MG/DL
MAGNESIUM SERPL-MCNC: 1.8 MG/DL (ref 1.6–2.6)
PH UR STRIP: 5 [PH] (ref 4.6–8)
PROT UR QL STRIP: NEGATIVE MG/DL
PT POC: NORMAL SECONDS
SP GR UR STRIP: 1.01 (ref 1–1.03)
UA UROBILINOGEN AMB POC: NORMAL (ref 0.2–1)
URINALYSIS CLARITY POC: CLEAR
URINALYSIS COLOR POC: YELLOW
URINE BLOOD POC: NEGATIVE
URINE LEUKOCYTES POC: NEGATIVE
URINE NITRITES POC: NEGATIVE
VALID INTERNAL CONTROL?: YES

## 2017-06-14 PROCEDURE — 83735 ASSAY OF MAGNESIUM: CPT | Performed by: FAMILY MEDICINE

## 2017-06-14 RX ORDER — GABAPENTIN 100 MG/1
100 CAPSULE ORAL 3 TIMES DAILY
Qty: 90 CAP | Refills: 0 | Status: SHIPPED | OUTPATIENT
Start: 2017-06-14 | End: 2017-08-11

## 2017-06-14 RX ORDER — TRAMADOL HYDROCHLORIDE 50 MG/1
50 TABLET ORAL
Qty: 60 TAB | Refills: 0 | Status: SHIPPED | OUTPATIENT
Start: 2017-06-14 | End: 2017-08-11 | Stop reason: SDUPTHER

## 2017-06-14 NOTE — MR AVS SNAPSHOT
Visit Information Date & Time Provider Department Dept. Phone Encounter #  
 6/14/2017 10:00 AM Aroldo Kumar MD [facility] [phone] [id number] Follow-up Instructions Return if symptoms worsen or fail to improve. Your Appointments 6/14/2017 12:45 PM  
Follow Up with Roc Saucedo MD  
[facility] [place] ([facility]) Appt Note: Low HR  
 [address] [place] [address] [facility] [address]  
  
   
 [address] [place] [id number]  
  
    
 6/20/2017  2:30 PM  
SURGICAL FOLLOW UP with Alexys Conroy MD  
[facility]ARDIOVASCULAR [facility]ORAC[facility] SPEC ([facility] SCHEDULING) Appt Note: 30 day f/u from AVR 5/16/17 [facility] [address] [facility] [address]  
[phone] [facility] [place] [address] [address]  
  
    
 8/1/2017  9:15 AM  
ROUTINE CARE with Aroldo Kumar MD  
[facility] ([facility]) Appt Note: Return in about 3 months (around 7/25/2017), or if symptoms worsen or fail to improve, for DM2, HTN. [address] [place] [address] [facility] [address]  
  
   
 [address]  
  
    
 8/2/2017 10:45 AM  
Follow Up with Roc Saucedo MD  
[facility] [place] ([facility]) Appt Note: 3 month follow up  
 [address] [facility]  
[phone] Upcoming Health Maintenance Date Due ZOSTER VACCINE AGE 60> 4/26/2006 EYE EXAM RETINAL OR DILATED Q1 10/14/2016 INFLUENZA AGE 9 TO ADULT 8/1/2017 Pneumococcal 65+ Low/Medium Risk (2 of 2 - PPSV23) 9/15/2017 GLAUCOMA SCREENING Q2Y 10/14/2017 HEMOGLOBIN A1C Q6M 11/18/2017 FOOT EXAM Q1 1/25/2018 MICROALBUMIN Q1 1/25/2018 LIPID PANEL Q1 4/25/2018 MEDICARE YEARLY EXAM 4/26/2018 FOBT Q 1 YEAR AGE 50-75 5/5/2018 DTaP/Tdap/Td series (2 - Td) 4/28/2025 Allergies as of 6/14/2017  Review Complete On: 6/14/2017 By: Luciano Ortiz MD  
  
 Severity Noted Reaction Type Reactions Watermelon High 01/21/2015    Swelling Peach (Prunus Persica)  01/21/2015    Itching Current Immunizations  Reviewed on 9/15/2016 Name Date Influenza Vaccine (Quad) PF 9/17/2015 Pneumococcal Conjugate (PCV-13) 9/15/2016 Tdap 4/28/2015 Not reviewed this visit You Were Diagnosed With   
  
 Codes Comments Neuropathy    -  Primary ICD-10-CM: G62.9 ICD-9-CM: 355.9 Left knee pain, unspecified chronicity     ICD-10-CM: C27.522 ICD-9-CM: 719.46 Hypomagnesemia     ICD-10-CM: T82.62 
ICD-9-CM: 275.2 Vitals BP Pulse Temp Resp Height(growth percentile) Weight(growth percentile) 146/70 (BP 1 Location: Left arm, BP Patient Position: Sitting) (!) 51 97.7 °F (36.5 °C) (Oral) 18 5' 10\" (1.778 m) 246 lb (111.6 kg) SpO2 BMI Smoking Status 95% 35.3 kg/m2 Former Smoker Vitals History BMI and BSA Data Body Mass Index Body Surface Area  
 35.3 kg/m 2 2.35 m 2 Preferred Pharmacy Pharmacy Name Phone [facility] [address] [facility] [phone] Your Updated Medication List  
  
   
This list is accurate as of: 6/14/17 10:34 AM.  Always use your most recent med list.  
  
  
  
  
 aspirin delayed-release 81 mg tablet Take 1 Tab by mouth daily. atorvastatin 40 mg tablet Commonly known as:  LIPITOR Take 20 mg by mouth daily. cholecalciferol 1,000 unit Cap Commonly known as:  VITAMIN D3 Take 3,000 Units by mouth daily. docusate sodium 100 mg capsule Commonly known as:  Ree Corona Take 1 Cap by mouth two (2) times daily as needed for Constipation for up to 90 days. febuxostat 40 mg Tab tablet Commonly known as:  Milagro Costain Take 1 Tab by mouth daily. Indications: GOUT PREVENTION  
  
 gabapentin 100 mg capsule Commonly known as:  NEURONTIN  
 Take 1 Cap by mouth three (3) times daily. glucose blood VI test strips strip Commonly known as:  ASCENSIA AUTODISC VI, ONE TOUCH ULTRA TEST VI Dispense precision extra test strips 3 x day to check blood glucose  
  
 insulin aspart 100 unit/mL injection Commonly known as:  NOVOLOG  
by SubCUTAneous route. 6 units before meals  
  
 insulin glargine 100 unit/mL injection Commonly known as:  LANTUS Take 30 units once daily  Indications: type 2 diabetes mellitus  
  
 levothyroxine 200 mcg tablet Commonly known as:  SYNTHROID Take 1 Tab by mouth Daily (before breakfast). Indications: HYPOTHYROIDISM  
  
 magnesium chloride 64 mg delayed release tablet Commonly known as:  MAG DELAY Take 64 mg by mouth daily. metoprolol tartrate 25 mg tablet Commonly known as:  LOPRESSOR Take 1 Tab by mouth two (2) times a day. Omeprazole delayed release 20 mg tablet Commonly known as:  PRILOSEC D/R Take 20 mg by mouth daily. spironolactone 25 mg tablet Commonly known as:  ALDACTONE Take 1 Tab by mouth daily. Indications: EDEMA  
  
 testosterone 20.25 mg/1.25 gram (1.62 %) gel Commonly known as:  ANDROGEL Apply  to affected area as needed. traMADol 50 mg tablet Commonly known as:  ULTRAM  
Take 1 Tab by mouth two (2) times daily as needed for Pain. Max Daily Amount: 100 mg. Indications: NEUROPATHIC PAIN  
  
 VITAMIN B-12 1,000 mcg tablet Generic drug:  cyanocobalamin Take 1,000 mcg by mouth daily. warfarin 2.5 mg tablet Commonly known as:  COUMADIN Take 2 Tabs by mouth daily. Or as directed. Goal INR 2.5-3.5. Prescriptions Printed Refills  
 traMADol (ULTRAM) 50 mg tablet 0 Sig: Take 1 Tab by mouth two (2) times daily as needed for Pain. Max Daily Amount: 100 mg. Indications: NEUROPATHIC PAIN Class: Print Route: Oral  
  
Prescriptions Sent to Pharmacy  Refills  
 gabapentin (NEURONTIN) 100 mg capsule 0  
 Sig: Take 1 Cap by mouth three (3) times daily. Class: Normal  
 Pharmacy: [address] [place]  #: [phone] Route: Oral  
  
Follow-up Instructions Return if symptoms worsen or fail to improve. To-Do List   
 06/14/2017 Lab:  MAGNESIUM Patient Instructions Neuropathic Pain: Care Instructions Your Care Instructions Neuropathic pain is caused by pressure on or damage to your nerves. It's often simply called nerve pain. Some people feel this type of pain all the time. For others, it comes and goes. Diabetes, shingles, or an injury can cause nerve pain. Many people say the pain feels sharp, burning, or stabbing. But some people feel it as a dull ache. In some cases, it makes your skin very sensitive. So touch, pressure, and other sensations that did not hurt before may now cause pain. It's important to know that this kind of pain is real and can affect your quality of life. It's also important to know that treatment can help. Treatment includes pain medicines, exercise, and physical therapy. Medicines can help reduce the number of pain signals that travel over the nerves. This can make the painful areas less sensitive. It can also help you sleep better and improve your mood. But medicines are only one part of successful treatment. Most people do best with more than one kind of treatment. Your doctor may recommend that you try cognitive-behavioral therapy and stress management. Or, if needed, you may decide to try to quit smoking, lower your blood pressure, or better control blood sugar. These kinds of healthy changes can also make a difference. If you feel that your treatment is not working, talk to your doctor. And be sure to tell your doctor if you think you might be depressed or anxious. These are common problems that can also be treated. Follow-up care is a key part of your treatment and safety.  Be sure to make and go to all appointments, and call your doctor if you are having problems. It's also a good idea to know your test results and keep a list of the medicines you take. How can you care for yourself at home? · Be safe with medicines. Read and follow all instructions on the label. ¨ If the doctor gave you a prescription medicine for pain, take it as prescribed. ¨ If you are not taking a prescription pain medicine, ask your doctor if you can take an over-the-counter medicine. · Save hard tasks for days when you have less pain. Follow a hard task with an easy task. And remember to take breaks. · Relax, and reduce stress. You may want to try deep breathing or meditation. These can help. · Keep moving. Gentle, daily exercise can help reduce pain. Your doctor or physical therapist can tell you what type of exercise is best for you. This may include walking, swimming, and stationary biking. It may also include stretches and range-of-motion exercises. · Try heat, cold packs, and massage. · Get enough sleep. Constant pain can make you more tired. If the pain makes it hard to sleep, talk with your doctor. · Think positively. Your thoughts can affect your pain. Do fun things to distract yourself from the pain. See a movie, read a book, listen to music, or spend time with a friend. · Keep a pain diary. Try to write down how strong your pain is and what it feels like. Also try to notice and write down how your moods, thoughts, sleep, activities, and medicine affect your pain. These notes can help you and your doctor find the best ways to treat your pain. Reducing constipation caused by pain medicine Pain medicines often cause constipation. To reduce constipation: 
· Include fruits, vegetables, beans, and whole grains in your diet each day. These foods are high in fiber. · Drink plenty of fluids, enough so that your urine is light yellow or clear like water.  If you have kidney, heart, or liver disease and have to limit fluids, talk with your doctor before you increase the amount of fluids you drink. · Get some exercise every day. Build up slowly to 30 to 60 minutes a day on 5 or more days of the week. · Take a fiber supplement, such as Citrucel or Metamucil, every day if needed. Read and follow all instructions on the label. · Schedule time each day for a bowel movement. Having a daily routine may help. Take your time and do not strain when having a bowel movement. · Ask your doctor about a laxative. The goal is to have one easy bowel movement every 1 to 2 days. Do not let constipation go untreated for more than 3 days. When should you call for help? Call your doctor now or seek immediate medical care if: 
· You feel sad, anxious, or hopeless for more than a few days. This could mean you are depressed. Depression is common in people who have a lot of pain. But it can be treated. · You have trouble with bowel movements, such as: 
¨ No bowel movement in 3 days. ¨ Blood in the anal area, in your stool, or on the toilet paper. ¨ Diarrhea for more than 24 hours. Watch closely for changes in your health, and be sure to contact your doctor if: 
· Your pain is getting worse. · You can't sleep because of pain. · You are very worried or anxious about your pain. · You have trouble taking your pain medicine. · You have any concerns about your pain medicine or its side effects. · You have vomiting or cramps for more than 2 hours. Where can you learn more? Go to http://adal-janes.info/. Enter D681 in the search box to learn more about \"Neuropathic Pain: Care Instructions. \" Current as of: October 14, 2016 Content Version: 11.2 © 7400-2936 [employer]. Care instructions adapted under license by [employer] (which disclaims liability or warranty for this information).  If you have questions about a medical condition or this instruction, always ask your healthcare professional. [address] any warranty or liability for your use of this information. Introducing [facility]! Dear Ravin Elizalde: Thank you for requesting a Balloon account. Our records indicate that you already have an active Balloon account. You can access your account anytime at [email or web address] Did you know that you can access your hospital and ER discharge instructions at any time in Balloon? You can also review all of your test results from your hospital stay or ER visit. Additional Information If you have questions, please visit the Frequently Asked Questions section of the Balloon website at [email or web address] Remember, Balloon is NOT to be used for urgent needs. For medical emergencies, dial 911. Now available from your iPhone and Android! Please provide this summary of care documentation to your next provider. Your primary care clinician is listed as Aroldo Perez. If you have any questions after today's visit, please call [phone].

## 2017-06-14 NOTE — PATIENT INSTRUCTIONS
Patient will hold coumadin tonight and tomorrow night and then resume taking coumadin 10mg on Tuesday and Friday and 7.5mg on all other days and repeat INR in one week. He voices understanding and acceptance of this advice and will call back if any further questions or concerns.

## 2017-06-14 NOTE — PATIENT INSTRUCTIONS
Neuropathic Pain: Care Instructions  Your Care Instructions  Neuropathic pain is caused by pressure on or damage to your nerves. It's often simply called nerve pain. Some people feel this type of pain all the time. For others, it comes and goes. Diabetes, shingles, or an injury can cause nerve pain. Many people say the pain feels sharp, burning, or stabbing. But some people feel it as a dull ache. In some cases, it makes your skin very sensitive. So touch, pressure, and other sensations that did not hurt before may now cause pain. It's important to know that this kind of pain is real and can affect your quality of life. It's also important to know that treatment can help. Treatment includes pain medicines, exercise, and physical therapy. Medicines can help reduce the number of pain signals that travel over the nerves. This can make the painful areas less sensitive. It can also help you sleep better and improve your mood. But medicines are only one part of successful treatment. Most people do best with more than one kind of treatment. Your doctor may recommend that you try cognitive-behavioral therapy and stress management. Or, if needed, you may decide to try to quit smoking, lower your blood pressure, or better control blood sugar. These kinds of healthy changes can also make a difference. If you feel that your treatment is not working, talk to your doctor. And be sure to tell your doctor if you think you might be depressed or anxious. These are common problems that can also be treated. Follow-up care is a key part of your treatment and safety. Be sure to make and go to all appointments, and call your doctor if you are having problems. It's also a good idea to know your test results and keep a list of the medicines you take. How can you care for yourself at home? · Be safe with medicines. Read and follow all instructions on the label.   ¨ If the doctor gave you a prescription medicine for pain, take it as prescribed. ¨ If you are not taking a prescription pain medicine, ask your doctor if you can take an over-the-counter medicine. · Save hard tasks for days when you have less pain. Follow a hard task with an easy task. And remember to take breaks. · Relax, and reduce stress. You may want to try deep breathing or meditation. These can help. · Keep moving. Gentle, daily exercise can help reduce pain. Your doctor or physical therapist can tell you what type of exercise is best for you. This may include walking, swimming, and stationary biking. It may also include stretches and range-of-motion exercises. · Try heat, cold packs, and massage. · Get enough sleep. Constant pain can make you more tired. If the pain makes it hard to sleep, talk with your doctor. · Think positively. Your thoughts can affect your pain. Do fun things to distract yourself from the pain. See a movie, read a book, listen to music, or spend time with a friend. · Keep a pain diary. Try to write down how strong your pain is and what it feels like. Also try to notice and write down how your moods, thoughts, sleep, activities, and medicine affect your pain. These notes can help you and your doctor find the best ways to treat your pain. Reducing constipation caused by pain medicine  Pain medicines often cause constipation. To reduce constipation:  · Include fruits, vegetables, beans, and whole grains in your diet each day. These foods are high in fiber. · Drink plenty of fluids, enough so that your urine is light yellow or clear like water. If you have kidney, heart, or liver disease and have to limit fluids, talk with your doctor before you increase the amount of fluids you drink. · Get some exercise every day. Build up slowly to 30 to 60 minutes a day on 5 or more days of the week. · Take a fiber supplement, such as Citrucel or Metamucil, every day if needed. Read and follow all instructions on the label.   · Schedule time each day for a bowel movement. Having a daily routine may help. Take your time and do not strain when having a bowel movement. · Ask your doctor about a laxative. The goal is to have one easy bowel movement every 1 to 2 days. Do not let constipation go untreated for more than 3 days. When should you call for help? Call your doctor now or seek immediate medical care if:  · You feel sad, anxious, or hopeless for more than a few days. This could mean you are depressed. Depression is common in people who have a lot of pain. But it can be treated. · You have trouble with bowel movements, such as:  ¨ No bowel movement in 3 days. ¨ Blood in the anal area, in your stool, or on the toilet paper. ¨ Diarrhea for more than 24 hours. Watch closely for changes in your health, and be sure to contact your doctor if:  · Your pain is getting worse. · You can't sleep because of pain. · You are very worried or anxious about your pain. · You have trouble taking your pain medicine. · You have any concerns about your pain medicine or its side effects. · You have vomiting or cramps for more than 2 hours. Where can you learn more? Go to http://adal-janes.info/. Enter M213 in the search box to learn more about \"Neuropathic Pain: Care Instructions. \"  Current as of: October 14, 2016  Content Version: 11.2  © 3267-4518 [employer]. Care instructions adapted under license by [employer] (which disclaims liability or warranty for this information). If you have questions about a medical condition or this instruction, always ask your healthcare professional. [place] [address] any warranty or liability for your use of this information.

## 2017-06-14 NOTE — MR AVS SNAPSHOT
Visit Information Date & Time Provider Department Dept. Phone Encounter #  
 6/14/2017  8:30 AM [address] [facility] [address] [id number] Your Appointments 6/20/2017  2:30 PM  
SURGICAL FOLLOW UP with Michael Gibbs MD  
CARDIOVASCULAR AND THORACIC SPEC (LYNN SCHEDULING) Appt Note: 30 day f/u from AVR 5/16/17 [facility] [place] [address] [address]  
[phone] [facility] [place] [address] [address]  
  
    
 8/1/2017  9:15 AM  
ROUTINE CARE with Aroldo Giron MD  
[facility] Appt Note: Return in about 3 months (around 7/25/2017), or if symptoms worsen or fail to improve, for DM2, HTN. [address] [facility] [address]  
  
   
 [address]  
  
    
 8/2/2017 10:45 AM  
Follow Up with Sophia Stewart MD  
[facility] ([facility]) Appt Note: 3 month follow up  
 [address] [facility] [address]  
  
   
 [address] [facility] [id number] Upcoming Health Maintenance Date Due ZOSTER VACCINE AGE 60> 4/26/2006 EYE EXAM RETINAL OR DILATED Q1 10/14/2016 INFLUENZA AGE 9 TO ADULT 8/1/2017 Pneumococcal 65+ Low/Medium Risk (2 of 2 - PPSV23) 9/15/2017 GLAUCOMA SCREENING Q2Y 10/14/2017 HEMOGLOBIN A1C Q6M 11/18/2017 FOOT EXAM Q1 1/25/2018 MICROALBUMIN Q1 1/25/2018 LIPID PANEL Q1 4/25/2018 MEDICARE YEARLY EXAM 4/26/2018 FOBT Q 1 YEAR AGE 50-75 5/5/2018 DTaP/Tdap/Td series (2 - Td) 4/28/2025 Allergies as of 6/14/2017  Review Complete On: 6/14/2017 By: Jayant Boudreaux MD  
  
 Severity Noted Reaction Type Reactions Watermelon High 01/21/2015    Swelling Peach (Prunus Persica)  01/21/2015    Itching Current Immunizations  Reviewed on 9/15/2016 Name Date Influenza Vaccine (Quad) PF 9/17/2015 Pneumococcal Conjugate (PCV-13) 9/15/2016 Tdap 4/28/2015 Not reviewed this visit You Were Diagnosed With   
  
 Codes Comments S/P AVR (aortic valve replacement)     ICD-10-CM: M67.8 ICD-9-CM: V43.3 Vitals Smoking Status Former Smoker Preferred Pharmacy Pharmacy Name Phone [facility] [address] [place] [phone] Your Updated Medication List  
  
   
This list is accurate as of: 6/14/17 12:52 PM.  Always use your most recent med list.  
  
  
  
  
 aspirin delayed-release 81 mg tablet Take 1 Tab by mouth daily. atorvastatin 40 mg tablet Commonly known as:  LIPITOR Take 20 mg by mouth daily. cholecalciferol 1,000 unit Cap Commonly known as:  VITAMIN D3 Take 3,000 Units by mouth daily. docusate sodium 100 mg capsule Commonly known as:  Karli Gaw Take 1 Cap by mouth two (2) times daily as needed for Constipation for up to 90 days. febuxostat 40 mg Tab tablet Commonly known as:  Severa Boards Take 1 Tab by mouth daily. Indications: GOUT PREVENTION  
  
 gabapentin 100 mg capsule Commonly known as:  NEURONTIN Take 1 Cap by mouth three (3) times daily. glucose blood VI test strips strip Commonly known as:  ASCENSIA AUTODISC VI, ONE TOUCH ULTRA TEST VI Dispense precision extra test strips 3 x day to check blood glucose  
  
 insulin aspart 100 unit/mL injection Commonly known as:  NOVOLOG  
by SubCUTAneous route. 6 units before meals  
  
 insulin glargine 100 unit/mL injection Commonly known as:  LANTUS Take 30 units once daily  Indications: type 2 diabetes mellitus  
  
 levothyroxine 200 mcg tablet Commonly known as:  SYNTHROID Take 1 Tab by mouth Daily (before breakfast). Indications: HYPOTHYROIDISM  
  
 magnesium chloride 64 mg delayed release tablet Commonly known as:  MAG DELAY Take 64 mg by mouth daily. metoprolol tartrate 25 mg tablet Commonly known as:  LOPRESSOR Take 1 Tab by mouth two (2) times a day. Omeprazole delayed release 20 mg tablet Commonly known as:  PRILOSEC D/R Take 20 mg by mouth daily. spironolactone 25 mg tablet Commonly known as:  ALDACTONE Take 1 Tab by mouth daily. Indications: EDEMA  
  
 testosterone 20.25 mg/1.25 gram (1.62 %) gel Commonly known as:  ANDROGEL Apply  to affected area as needed. traMADol 50 mg tablet Commonly known as:  ULTRAM  
Take 1 Tab by mouth two (2) times daily as needed for Pain. Max Daily Amount: 100 mg. Indications: NEUROPATHIC PAIN  
  
 VITAMIN B-12 1,000 mcg tablet Generic drug:  cyanocobalamin Take 1,000 mcg by mouth daily. warfarin 2.5 mg tablet Commonly known as:  COUMADIN Take 2 Tabs by mouth daily. Or as directed. Goal INR 2.5-3.5. Description June 2017 Details Sun Mon Tue Wed Thu Fri Sat  
      1  
  
  
  
   2  
  
  
  
   3  
  
  
  
  
  4  
  
  
  
   5  
  
  
  
   6  
  
  
  
   7  
  
  
  
   8  
  
  
  
   9  
  
  
  
   10  
  
  
  
  
  11  
  
  
  
   12  
  
  
  
   13  
  
  
  
   14  
  
Hold See details 15 Hold  
  
   16  
  
10 mg  
  
   17  
  
7.5 mg  
  
  
  18  
  
7.5 mg  
  
   19  
  
7.5 mg  
  
   20  
  
10 mg  
  
   21  
  
7.5 mg  
  
   22  
  
  
  
   23  
  
  
  
   24  
  
  
  
  
  25  
  
  
  
   26  
  
  
  
   27  
  
  
  
   28  
  
  
  
   29  
  
  
  
   30  
  
  
  
   
 Date Details 06/14 This INR check INR: 4.4! Date of next INR:  6/21/2017 How to take your warfarin dose To take:  7.5 mg Take three of the 2.5 mg tablets. To take:  10 mg Take four of the 2.5 mg tablets. Hold Do not take your warfarin dose. See the Details table to the right for additional instructions. We Performed the Following AMB POC PT/INR [37585 CPT(R)] Introducing [facility]! Dear Amanuel Arriaga: Thank you for requesting a [employer] account. Our records indicate that you already have an active [employer] account. You can access your account anytime at [email or web address] Did you know that you can access your hospital and ER discharge instructions at any time in [employer]? You can also review all of your test results from your hospital stay or ER visit. Additional Information If you have questions, please visit the Frequently Asked Questions section of the [employer] website at [email or web address] Remember, [employer] is NOT to be used for urgent needs. For medical emergencies, dial 911. Now available from your iPhone and Android! Please provide this summary of care documentation to your next provider. Your primary care clinician is listed as Aroldo Perez. If you have any questions after today's visit, please call [phone].

## 2017-06-14 NOTE — PROGRESS NOTES
1. Have you been to the ER, urgent care clinic since your last visit? Hospitalized since your last visit?     no  2. Have you seen or consulted any other health care providers outside of the [address] [place] [address] since your last visit? Include any pap smears or colon screening. Yes Dr. Cassandra So  3. Since your last visit, have you had any of the following symptoms? no       4. Have you had any blood work, X-rays or cardiac testing? Yes Where: Dr. Cassandra So         5. Where do you normally have your labs drawn? Dr. Cassandra So  6. Do you need any refills today?    no

## 2017-06-14 NOTE — PROGRESS NOTES
Chief Complaint   Patient presents with    Arm Pain     LEFT ARM    1. Have you been to the ER, urgent care clinic since your last visit? Hospitalized since your last visit? No    2. Have you seen or consulted any other health care providers outside of the [address] [place] [address] since your last visit? Include any pap smears or colon screening. 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 1) Left upper extremity numbness and tingling: Patient has numbness and tingling left upper extremity. Also has pain that is sharp and radicular type in nature when he moves his left upper extremity in certain positions. Pain is felt deep in the arm and axillary area. The numbness is worse at night and affects mainly the fingers.  strength is preserved. This has been worse when he is doing physical therapy. He recently had cardiac surgery for valve replacement. The pain is limiting what he can do. Advised to get some medication for neuropathy. He would like to try gabapentin. For now I will give some tramadol for pain as needed. He has been taking this which helps with the pain. Will also give gabapentin to take 100 mg up to 3 times a day. 2) Cardiac: Patient had aortic valve replacement. Post surgery course has been stable. He is on metoprolol. Has noticed some fatigue with the medication. Has also noticed slowing down of his pulse. He has contacted his cardiologist.  He does have an appointment today for medication adjustment. 3) HTN: Patient's blood pressure is stable. Will continue with the current treatment plan. 4) Hypomagnesemia: Patient has a history of hypomagnesemia. Does get leg cramps when magnesium levels are low. While in the hospital his magnesium was stopped. Now he would like to have his magnesium level checked. He did have some muscle cramps but he is back on the magnesium and these seem to have improved. 5) DM2: Patient has a history of type 2 diabetes mellitus. He is on Lantus and NovoLog. Blood glucose numbers have been stable. We will continue with the current treatment plan. 6) UTI: Patient was recently treated for UTI. He does not have any symptoms. Urinalysis done is negative. Past Medical History  Past Medical History:   Diagnosis Date    Diabetes ([facility] [address] 1995    Dyslipidemia 3/1/2017    Fractures 1995    R Knee/ Tib fib fracture/surgery    Gout 2010    Graves disease 1/21/2015    High cholesterol 2005    HTN (hypertension) 1990    Osteoarthritis 2013    Severe aortic stenosis 3/29/2017    Spinal stenosis of lumbar region 3/1/2016    Spondylolisthesis 3/1/2016    Thyroid trouble     Type 2 diabetes mellitus with diabetic nephropathy ([facility] [address] 4/28/2015       Surgical History  Past Surgical History:   Procedure Laterality Date    HX HIP REPLACEMENT Left 2009    HX ORTHOPAEDIC Right     R knee/Tibfib surgery        Medications  Current Outpatient Prescriptions   Medication Sig Dispense Refill    gabapentin (NEURONTIN) 100 mg capsule Take 1 Cap by mouth three (3) times daily. 90 Cap 0    traMADol (ULTRAM) 50 mg tablet Take 1 Tab by mouth two (2) times daily as needed for Pain. Max Daily Amount: 100 mg. Indications: NEUROPATHIC PAIN 60 Tab 0    metoprolol tartrate (LOPRESSOR) 25 mg tablet Take 1 Tab by mouth two (2) times a day. 180 Tab 3    magnesium chloride (MAG DELAY) 64 mg delayed release tablet Take 64 mg by mouth daily.  aspirin delayed-release 81 mg tablet Take 1 Tab by mouth daily. 30 Tab 2    warfarin (COUMADIN) 2.5 mg tablet Take 2 Tabs by mouth daily. Or as directed. Goal INR 2.5-3.5. 100 Tab 2    insulin glargine (LANTUS) 100 unit/mL injection Take 30 units once daily  Indications: type 2 diabetes mellitus 3 Vial 3    cyanocobalamin (VITAMIN B-12) 1,000 mcg tablet Take 1,000 mcg by mouth daily.  febuxostat (ULORIC) 40 mg tab tablet Take 1 Tab by mouth daily.  Indications: GOUT PREVENTION 90 Tab 1    levothyroxine (SYNTHROID) 200 mcg tablet Take 1 Tab by mouth Daily (before breakfast). Indications: HYPOTHYROIDISM (Patient taking differently: Take 175 mcg by mouth Daily (before breakfast). Indications: hypothyroidism) 30 Tab 5    insulin aspart (NOVOLOG) 100 unit/mL injection by SubCUTAneous route. 6 units before meals      spironolactone (ALDACTONE) 25 mg tablet Take 1 Tab by mouth daily. Indications: EDEMA 30 Tab 3    glucose blood VI test strips (ASCENSIA AUTODISC VI, ONE TOUCH ULTRA TEST VI) strip Dispense precision extra test strips 3 x day to check blood glucose 200 Each 3    Omeprazole delayed release (PRILOSEC D/R) 20 mg tablet Take 20 mg by mouth daily.  atorvastatin (LIPITOR) 40 mg tablet Take 20 mg by mouth daily.  testosterone (ANDROGEL) 20.25 mg/1.25 gram (1.62 %) gel Apply  to affected area as needed.  Cholecalciferol, Vitamin D3, 1,000 unit cap Take 3,000 Units by mouth daily.  docusate sodium (COLACE) 100 mg capsule Take 1 Cap by mouth two (2) times daily as needed for Constipation for up to 90 days. 60 Cap 2       Allergies  Allergies   Allergen Reactions    Watermelon Swelling    Peach (Prunus Persica) Itching       Family History  Family History   Problem Relation Age of Onset    Hypertension Mother     Thyroid Disease Mother     Hypertension Father     Diabetes Father     Drug Abuse Brother        Social History  Social History     Social History    Marital status:      Spouse name: N/A    Number of children: N/A    Years of education: N/A     Occupational History    Not on file.      Social History Main Topics    Smoking status: Former Smoker     Packs/day: 1.00     Types: Cigarettes     Start date: 1/1/1969     Quit date: 1/1/1995    Smokeless tobacco: Never Used    Alcohol use No    Drug use: No    Sexual activity: Yes     Partners: Female     Other Topics Concern     Service Yes     Served in [address] [address] Blood Transfusions No    Caffeine Concern No  Occupational Exposure No    Hobby Hazards No    Sleep Concern No    Stress Concern No    Weight Concern Yes    Special Diet No    Back Care Yes     Lower Back pain when walking    Exercise No    Bike Helmet No    Seat Belt Yes    Self-Exams Yes     Social History Narrative       Review of Systems  Review of Systems - History obtained from chart review and the patient  General ROS: positive for  - fatigue and malaise  negative for - chills or fever  Psychological ROS: negative  Ophthalmic ROS: negative  ENT ROS: negative  Allergy and Immunology ROS: negative  Hematological and Lymphatic ROS: negative  Endocrine ROS: DM2  Respiratory ROS: no cough, shortness of breath, or wheezing  Cardiovascular ROS: recent aortic valve replacement  Gastrointestinal ROS: no abdominal pain, change in bowel habits, or black or bloody stools  Genito-Urinary ROS: no dysuria, trouble voiding, or hematuria  Musculoskeletal ROS: positive for - pain in arm - left  Neurological ROS: positive for - numbness/tingling    Vital Signs  Visit Vitals    /70 (BP 1 Location: Left arm, BP Patient Position: Sitting)    Pulse (!) 51    Temp 97.7 °F (36.5 °C) (Oral)    Resp 18    Ht 5' 10\" (1.778 m)    Wt 246 lb (111.6 kg)    SpO2 95%    BMI 35.3 kg/m2         Physical Exam  Physical Examination: General appearance - alert, well appearing, and in no distress, oriented to person, place, and time and acyanotic, in no respiratory distress  Mental status - alert, oriented to person, place, and time  Mouth - mucous membranes moist, pharynx normal without lesions  Neck - supple, no significant adenopathy  Lymphatics - no palpable lymphadenopathy  Chest - clear to auscultation, no wheezes, rales or rhonchi, symmetric air entry, no tachypnea, retractions or cyanosis  Heart - S1 and S2 normal, systolic murmur 2/6 at 2nd left intercostal space  Neurological - neck supple without rigidity, patient has numbness and tingling fingers left hand.  Musculoskeletal -mild discomfort to flexion of the left upper extremity at the shoulder. No swelling no erythema. Pulse normal.  Extremities - no pedal edema noted, intact peripheral pulses    Diagnostics  Orders Placed This Encounter    MAGNESIUM     Standing Status:   Future     Standing Expiration Date:   6/15/2018    AMB POC URINALYSIS DIP STICK MANUAL W/O MICRO    ME COLLECTION VENOUS BLOOD,VENIPUNCTURE    gabapentin (NEURONTIN) 100 mg capsule     Sig: Take 1 Cap by mouth three (3) times daily. Dispense:  90 Cap     Refill:  0    traMADol (ULTRAM) 50 mg tablet     Sig: Take 1 Tab by mouth two (2) times daily as needed for Pain. Max Daily Amount: 100 mg. Indications: NEUROPATHIC PAIN     Dispense:  60 Tab     Refill:  0         Results  Results for orders placed or performed in visit on 06/14/17   AMB POC URINALYSIS DIP STICK MANUAL W/O MICRO   Result Value Ref Range    Color (UA POC) Yellow     Clarity (UA POC) Clear     Glucose (UA POC) Negative Negative    Bilirubin (UA POC) Negative Negative    Ketones (UA POC) Negative Negative    Specific gravity (UA POC) 1.010 1.001 - 1.035    Blood (UA POC) Negative Negative    pH (UA POC) 5.0 4.6 - 8.0    Protein (UA POC) Negative Negative mg/dL    Urobilinogen (UA POC) 0.2 mg/dL 0.2 - 1    Nitrites (UA POC) Negative Negative    Leukocyte esterase (UA POC) Negative Negative       ASSESSMENT and PLAN    ICD-10-CM ICD-9-CM    1. Neuropathy G62.9 355.9 gabapentin (NEURONTIN) 100 mg capsule      traMADol (ULTRAM) 50 mg tablet   2. Left knee pain, unspecified chronicity M25.562 719.46 traMADol (ULTRAM) 50 mg tablet   3. Hypomagnesemia E83.42 275.2 MAGNESIUM      ME COLLECTION VENOUS BLOOD,VENIPUNCTURE   4.  Urinary frequency R35.0 788.41 AMB POC URINALYSIS DIP STICK MANUAL W/O MICRO     lab results and schedule of future lab studies reviewed with patient  reviewed diet, exercise and weight control  cardiovascular risk and specific lipid/LDL goals reviewed  reviewed medications and side effects in detail  specific diabetic recommendations: home glucose monitoring emphasized, all medications, side effects and compliance discussed carefully, annual eye examinations at Ophthalmology discussed and long term diabetic complications discusseduse of aspirin to prevent MI and TIA's discussed    I have discussed the diagnosis with the patient and the intended plan of care as seen in the above orders. The patient has received an after-visit summary and questions were answered concerning future plans. I have discussed medication, side effects, and warnings with the patient in detail. The patient verbalized understanding and is in agreement with the plan of care. The patient will contact the office with any additional concerns.     Addi Cruz MD

## 2017-06-19 ENCOUNTER — TELEPHONE (OUTPATIENT)
Dept: CARDIOTHORACIC SURGERY | Age: 72
End: 2017-06-19

## 2017-06-19 NOTE — TELEPHONE ENCOUNTER
S/w regarding pt's scheduled appt for tomorrow: 06/20/17 @ 2:30 pm.   Pt is aware of date/time/location of appt.

## 2017-06-20 ENCOUNTER — OFFICE VISIT (OUTPATIENT)
Dept: CARDIOTHORACIC SURGERY | Age: 72
End: 2017-06-20

## 2017-06-20 ENCOUNTER — TELEPHONE (OUTPATIENT)
Dept: CARDIAC REHAB | Age: 72
End: 2017-06-20

## 2017-06-20 ENCOUNTER — ANTI-COAG VISIT (OUTPATIENT)
Dept: CARDIOLOGY CLINIC | Age: 72
End: 2017-06-20

## 2017-06-20 VITALS
TEMPERATURE: 98.1 F | OXYGEN SATURATION: 98 % | DIASTOLIC BLOOD PRESSURE: 80 MMHG | SYSTOLIC BLOOD PRESSURE: 150 MMHG | HEART RATE: 55 BPM

## 2017-06-20 DIAGNOSIS — Z95.2 AORTIC VALVE REPLACED: Primary | ICD-10-CM

## 2017-06-20 DIAGNOSIS — Z95.2 S/P AVR (AORTIC VALVE REPLACEMENT): Primary | ICD-10-CM

## 2017-06-20 DIAGNOSIS — Z95.2 S/P AVR (AORTIC VALVE REPLACEMENT): ICD-10-CM

## 2017-06-20 LAB
INR BLD: 2.5
PT POC: NORMAL SECONDS
VALID INTERNAL CONTROL?: YES

## 2017-06-20 RX ORDER — WARFARIN 2.5 MG/1
5 TABLET ORAL DAILY
Qty: 100 TAB | Refills: 2 | Status: SHIPPED | OUTPATIENT
Start: 2017-06-20 | End: 2017-09-13 | Stop reason: ALTCHOICE

## 2017-06-20 NOTE — PATIENT INSTRUCTIONS
Continue warfarin for another 2 months for the aortic valve. Sternal precautions for one more month. Increase activity level as tolerated. Follow up with cardiology as planned.

## 2017-06-20 NOTE — PATIENT INSTRUCTIONS
Called patient instructed patient to continue current dose of coumadin. Recheck INR in 1 week on 6/27/17. He voices understanding and acceptance of this advice and will call back if any further questions or concerns.

## 2017-06-20 NOTE — PROGRESS NOTES
[facility] Progress Note      Today's date: 6/20/2017    Interval History:     Patient is here for his 30 day follow-up visit after aortic valve replacement for severe aortic stenosis. His date of surgery was 5/16/2017. His left ulnar neuropathy has improved after starting gabapentin and using as needed tramadol. His INR is being managed by [facility]. His endurance is progressing well. He reports only getting slightly short of breath with long walks. Assessment:     Very satisfactory 30 day follow-up visit after aortic valve replacement. Plan:     1. Continue Coumadin for a total of 3 months postoperative. 2.  Continue to increase activity level. Cardiac rehab consult placed. 3.  Noted plans for right knee surgery in August.  4.  Follow-up with cardiology and PCP as scheduled. No routine follow-up appointments with our service needed at this time. Subjective:     No specific complaints    Objective:     Admission Weight: Last Weight           Visit Vitals    /80    Pulse (!) 55    Temp 98.1 °F (36.7 °C)    SpO2 98%       Physical Exam:  General: Well-appearing. No apparent distress. Lungs: Clear to auscultation without wheezes, rales or rhonchi. Chest: Midline incision healing very well. Sternum is stable. Heart: Regular rate and rhythm without murmur, rub or gallop  Abdomen: Active sounds  Extremities: Warm and well-perfused.   Trace bipedal edema    Sabrina Muir PA-C

## 2017-06-20 NOTE — MR AVS SNAPSHOT
Visit Information Date & Time Provider Department Dept. Phone Encounter #  
 6/20/2017 12:00 PM Stephany Jordan PA-C CARDIOVASCULAR AND THORACIC [address] [place] [id number] [phone] [id number] Your Appointments 6/21/2017  9:30 AM  
ANTICOAG NURSE with [place] PTINR [facility] ([facility]) Appt Note: one week follow up  
 [address] [facility] [address]  
  
   
 [address] [facility]  
  
    
 8/1/2017  9:15 AM  
ROUTINE CARE with Aroldo Saunders MD  
[facility] ([facility]) Appt Note: Return in about 3 months (around 7/25/2017), or if symptoms worsen or fail to improve, for DM2, HTN. [facility] [address] [facility] [address]  
  
   
 [address]  
  
    
 8/2/2017 10:45 AM  
Follow Up with Renetta Swann MD  
[facility] ([facility]) Appt Note: 3 month follow up  
 [address] [facility] [address]  
  
   
 [address] [address] [place] [address] Upcoming Health Maintenance Date Due ZOSTER VACCINE AGE 60> 4/26/2006 EYE EXAM RETINAL OR DILATED Q1 10/14/2016 INFLUENZA AGE 9 TO ADULT 8/1/2017 Pneumococcal 65+ Low/Medium Risk (2 of 2 - PPSV23) 9/15/2017 GLAUCOMA SCREENING Q2Y 10/14/2017 HEMOGLOBIN A1C Q6M 11/18/2017 FOOT EXAM Q1 1/25/2018 MICROALBUMIN Q1 1/25/2018 LIPID PANEL Q1 4/25/2018 MEDICARE YEARLY EXAM 4/26/2018 FOBT Q 1 YEAR AGE 50-75 5/5/2018 DTaP/Tdap/Td series (2 - Td) 4/28/2025 Allergies as of 6/20/2017  Review Complete On: 6/14/2017 By: Yossi Jefferson MD  
  
 Severity Noted Reaction Type Reactions Watermelon High 01/21/2015    Swelling Peach (Prunus Persica)  01/21/2015    Itching Current Immunizations  Reviewed on 9/15/2016 Name Date Influenza Vaccine (Quad) PF 9/17/2015 Pneumococcal Conjugate (PCV-13) 9/15/2016 Tdap 4/28/2015 Not reviewed this visit You Were Diagnosed With   
  
 Codes Comments S/P AVR (aortic valve replacement)    -  Primary ICD-10-CM: X76.4 ICD-9-CM: V43.3 Vitals BP Pulse Temp SpO2 Smoking Status 150/80 (!) 55 98.1 °F (36.7 °C) 98% Former Smoker Preferred Pharmacy Pharmacy Name Phone [facility] [address] [place] [phone] Your Updated Medication List  
  
   
This list is accurate as of: 6/20/17 12:37 PM.  Always use your most recent med list.  
  
  
  
  
 aspirin delayed-release 81 mg tablet Take 1 Tab by mouth daily. atorvastatin 40 mg tablet Commonly known as:  LIPITOR Take 20 mg by mouth daily. cholecalciferol 1,000 unit Cap Commonly known as:  VITAMIN D3 Take 3,000 Units by mouth daily. docusate sodium 100 mg capsule Commonly known as:  Art Chihuahua Take 1 Cap by mouth two (2) times daily as needed for Constipation for up to 90 days. febuxostat 40 mg Tab tablet Commonly known as:  Matt Mariya Take 1 Tab by mouth daily. Indications: GOUT PREVENTION  
  
 gabapentin 100 mg capsule Commonly known as:  NEURONTIN Take 1 Cap by mouth three (3) times daily. glucose blood VI test strips strip Commonly known as:  ASCENSIA AUTODISC VI, ONE TOUCH ULTRA TEST VI Dispense precision extra test strips 3 x day to check blood glucose  
  
 insulin aspart 100 unit/mL injection Commonly known as:  NOVOLOG  
by SubCUTAneous route. 6 units before meals  
  
 insulin glargine 100 unit/mL injection Commonly known as:  LANTUS Take 30 units once daily  Indications: type 2 diabetes mellitus  
  
 levothyroxine 200 mcg tablet Commonly known as:  SYNTHROID Take 1 Tab by mouth Daily (before breakfast). Indications: HYPOTHYROIDISM  
  
 magnesium chloride 64 mg delayed release tablet Commonly known as:  MAG DELAY Take 64 mg by mouth daily. metoprolol tartrate 25 mg tablet Commonly known as:  LOPRESSOR Take 1 Tab by mouth two (2) times a day. Omeprazole delayed release 20 mg tablet Commonly known as:  PRILOSEC D/R Take 20 mg by mouth daily. spironolactone 25 mg tablet Commonly known as:  ALDACTONE Take 1 Tab by mouth daily. Indications: EDEMA  
  
 testosterone 20.25 mg/1.25 gram (1.62 %) gel Commonly known as:  ANDROGEL Apply  to affected area as needed. traMADol 50 mg tablet Commonly known as:  ULTRAM  
Take 1 Tab by mouth two (2) times daily as needed for Pain. Max Daily Amount: 100 mg. Indications: NEUROPATHIC PAIN  
  
 VITAMIN B-12 1,000 mcg tablet Generic drug:  cyanocobalamin Take 1,000 mcg by mouth daily. warfarin 2.5 mg tablet Commonly known as:  COUMADIN Take 2 Tabs by mouth daily. Or as directed. Goal INR 2.5-3.5. We Performed the Following REFERRAL TO CARDIAC REHAB [LCB590 Custom] Comments:  
 Please evaluate patient for after aortic valve replacement. Referral Information Referral ID Referred By Referred To  
  
 [id number] [address] [place] [address] F Not Available Visits Status Start Date End Date 1 New Request 6/20/17 6/20/18 If your referral has a status of pending review or denied, additional information will be sent to support the outcome of this decision. Patient Instructions Continue warfarin for another 2 months for the aortic valve. Sternal precautions for one more month. Increase activity level as tolerated. Follow up with cardiology as planned. Introducing [facility]! Dear Gorge Lyon: Thank you for requesting a [email or web address]@ account. Our records indicate that you already have an active [email or web address]@ account. You can access your account anytime at [email or web address] Did you know that you can access your hospital and ER discharge instructions at any time in Precog? You can also review all of your test results from your hospital stay or ER visit. Additional Information If you have questions, please visit the Frequently Asked Questions section of the Precog website at [email or web address] Remember, Precog is NOT to be used for urgent needs. For medical emergencies, dial 911. Now available from your iPhone and Android! Please provide this summary of care documentation to your next provider. Your primary care clinician is listed as Aroldo Perez. If you have any questions after today's visit, please call [phone].

## 2017-06-20 NOTE — TELEPHONE ENCOUNTER
Cardiac Rehab spoke to Charles Mishra at Dr. Thelma Abebe office and she stated that program is attending a similar program at [facility].     Thank you,  Clive Blackmon

## 2017-06-20 NOTE — TELEPHONE ENCOUNTER
Requested Prescriptions     Pending Prescriptions Disp Refills    warfarin (COUMADIN) 2.5 mg tablet 100 Tab 2     Sig: Take 2 Tabs by mouth daily. Or as directed. Goal INR 2.5-3.5.      Please refill patient out

## 2017-06-21 ENCOUNTER — PATIENT OUTREACH (OUTPATIENT)
Dept: INTERNAL MEDICINE CLINIC | Age: 72
End: 2017-06-21

## 2017-06-21 ENCOUNTER — TELEPHONE (OUTPATIENT)
Dept: CARDIOTHORACIC SURGERY | Age: 72
End: 2017-06-21

## 2017-06-21 ENCOUNTER — TELEPHONE (OUTPATIENT)
Dept: CARDIAC REHAB | Age: 72
End: 2017-06-21

## 2017-06-21 NOTE — TELEPHONE ENCOUNTER
Cardiac Rehab called and spoke with patient who stated that he is still receiving Occupational Therapy at home. Once Home Health is completed he has been asked to contact us so we may schedule an evaluation with him. I believe he is ready to begin our program and will be speaking with the OT today for an estimated discharge date. Will continue to follow.     Thank you,  Ashlyn Powell

## 2017-06-21 NOTE — TELEPHONE ENCOUNTER
Spoke with Adolph Goodell at cardiac Rehab. Faxed her over the referral. Patient would like to stay with [facility] and is fine with coming over here to [place] to rehab.

## 2017-06-21 NOTE — PROGRESS NOTES
Transitions of Care Coordination    Elmira Chau had a scheduled admission to [address] [place] [address] 5/16-5/20/17 for an aortic valve replacement and was discharged to home with [facility]. Since discharge the patient has attended appointments with his PCP, cardiology and cardiothoracic surgeon. Last appointment was with SARAI Brower at [facility]. The patient is reported to be doing well with increased endurance and decreased SOB. Patient was instructed to continue Coumadin for 3 months post op. [facility]. Patient is attending an outpatient cardiac rehab program at [facility].    Goals met. Episode resolved:  No hospitalization or ED visit 30 days from discharge on 5/20/17.

## 2017-06-27 ENCOUNTER — DOCUMENTATION ONLY (OUTPATIENT)
Dept: CARDIOTHORACIC SURGERY | Age: 72
End: 2017-06-27

## 2017-06-27 ENCOUNTER — ANTI-COAG VISIT (OUTPATIENT)
Dept: CARDIOLOGY CLINIC | Age: 72
End: 2017-06-27

## 2017-06-27 DIAGNOSIS — Z95.2 S/P AVR (AORTIC VALVE REPLACEMENT): ICD-10-CM

## 2017-06-27 LAB
INR BLD: 3.4
PT POC: NORMAL SECONDS
VALID INTERNAL CONTROL?: YES

## 2017-06-27 NOTE — PATIENT INSTRUCTIONS
Patient called to review INR results. Spoke with patient's wife, he will take coumadin 2.5mg tonight, then 7.5mg on Wednesday and Thursday, 10mg on Friday and 7.5mg on Saturday, Sunday and Monday and 10mg on Tuesday and retest on Wednesday. She voices understanding and acceptance of this advice and will call back if any further questions or concerns.

## 2017-06-27 NOTE — PROGRESS NOTES
Follow up with Tino Floyd to see if patient has been discharged with OT so he can start his Cardiac rehab

## 2017-07-05 ENCOUNTER — ANTI-COAG VISIT (OUTPATIENT)
Dept: CARDIOLOGY CLINIC | Age: 72
End: 2017-07-05

## 2017-07-05 ENCOUNTER — HOSPITAL ENCOUNTER (OUTPATIENT)
Dept: LAB | Age: 72
Discharge: HOME | End: 2017-07-05
Payer: MEDICARE

## 2017-07-05 ENCOUNTER — OFFICE VISIT (OUTPATIENT)
Dept: FAMILY MEDICINE CLINIC | Age: 72
End: 2017-07-05

## 2017-07-05 VITALS
TEMPERATURE: 97.6 F | BODY MASS INDEX: 35.65 KG/M2 | WEIGHT: 249 LBS | HEIGHT: 70 IN | RESPIRATION RATE: 20 BRPM | HEART RATE: 63 BPM | DIASTOLIC BLOOD PRESSURE: 58 MMHG | SYSTOLIC BLOOD PRESSURE: 128 MMHG | OXYGEN SATURATION: 96 %

## 2017-07-05 DIAGNOSIS — N18.30 CKD (CHRONIC KIDNEY DISEASE) STAGE 3, GFR 30-59 ML/MIN (HCC): Chronic | ICD-10-CM

## 2017-07-05 DIAGNOSIS — Z95.2 S/P AVR (AORTIC VALVE REPLACEMENT): ICD-10-CM

## 2017-07-05 DIAGNOSIS — I35.0 SEVERE AORTIC STENOSIS: Chronic | ICD-10-CM

## 2017-07-05 DIAGNOSIS — M25.561 CHRONIC PAIN OF RIGHT KNEE: ICD-10-CM

## 2017-07-05 DIAGNOSIS — I10 ESSENTIAL HYPERTENSION: ICD-10-CM

## 2017-07-05 DIAGNOSIS — M10.9 GOUT, UNSPECIFIED CAUSE, UNSPECIFIED CHRONICITY, UNSPECIFIED SITE: Primary | ICD-10-CM

## 2017-07-05 DIAGNOSIS — M10.9 GOUT, UNSPECIFIED CAUSE, UNSPECIFIED CHRONICITY, UNSPECIFIED SITE: ICD-10-CM

## 2017-07-05 DIAGNOSIS — G89.29 CHRONIC PAIN OF RIGHT KNEE: ICD-10-CM

## 2017-07-05 DIAGNOSIS — E11.21 TYPE 2 DIABETES MELLITUS WITH DIABETIC NEPHROPATHY, UNSPECIFIED LONG TERM INSULIN USE STATUS: Chronic | ICD-10-CM

## 2017-07-05 LAB
INR BLD: 2.9
PT POC: NORMAL SECONDS
URATE SERPL-MCNC: 3.4 MG/DL (ref 2.6–7.2)
VALID INTERNAL CONTROL?: YES

## 2017-07-05 PROCEDURE — 84550 ASSAY OF BLOOD/URIC ACID: CPT | Performed by: FAMILY MEDICINE

## 2017-07-05 RX ORDER — PYRIDOXINE HCL (VITAMIN B6) 100 MG
100 TABLET ORAL DAILY
COMMUNITY
End: 2019-09-11

## 2017-07-05 RX ORDER — PREDNISONE 10 MG/1
10 TABLET ORAL 2 TIMES DAILY
Qty: 14 TAB | Refills: 0 | Status: SHIPPED | OUTPATIENT
Start: 2017-07-05 | End: 2017-08-11

## 2017-07-05 RX ORDER — PREDNISONE 10 MG/1
10 TABLET ORAL 2 TIMES DAILY
Qty: 14 TAB | Refills: 0 | Status: SHIPPED | OUTPATIENT
Start: 2017-07-05 | End: 2017-07-05 | Stop reason: SDUPTHER

## 2017-07-05 NOTE — PATIENT INSTRUCTIONS
Patient will continue taking coumadin 10mg on Tuesday and Friday and 7.5mg on all other days and repeat INR in 2 weeks. He voices understanding and acceptance of this advice and will call back if any further questions or concerns. Printed calendar provided to clarify date of repeat INR and dosing.

## 2017-07-05 NOTE — MR AVS SNAPSHOT
Visit Information Date & Time Provider Department Dept. Phone Encounter #  
 7/5/2017 12:15 PM Mahad Naylor [phone] [id number] Your Appointments 7/5/2017 12:15 PM  
SAME DAY with MD Mahad Naylor ([place]) Appt Note: gout [address] [address] [place] [address]  
  
   
 [address] 7/19/2017  8:30 AM  
ANTICOAG NURSE with [place] PTIN[facility] [place] ([place]) Appt Note: 2 week follow up  
 [address] [place] [address] [facility] [address]  
  
   
 [address] [facility] [address]  
  
    
 8/1/2017  9:15 AM  
ROUTINE CARE with MD Mahad Naylor ([place]) Appt Note: Return in about 3 months (around 7/25/2017), or if symptoms worsen or fail to improve, for DM2, HTN. [address] [place] [address] [facility] [address]  
  
   
 [address]  
  
    
 8/2/2017 10:45 AM  
Follow Up with Jinny Ashton MD  
[facility] [place] ([place]) Appt Note: 3 month follow up  
 [address] [place] [address] [facility] [address]  
  
   
 [address] [facility] [address]  
  
    
 8/11/2017 10:45 AM  
ROUTINE CARE with MD Mahad Naylor ([place]) Appt Note: medical clearance for right total knee replacement [address] [facility]  
[phone] Upcoming Health Maintenance Date Due ZOSTER VACCINE AGE 60> 4/26/2006 EYE EXAM RETINAL OR DILATED Q1 10/14/2016 INFLUENZA AGE 9 TO ADULT 8/1/2017 Pneumococcal 65+ Low/Medium Risk (2 of 2 - PPSV23) 9/15/2017 GLAUCOMA SCREENING Q2Y 10/14/2017 HEMOGLOBIN A1C Q6M 11/18/2017 FOOT EXAM Q1 1/25/2018 MICROALBUMIN Q1 1/25/2018 LIPID PANEL Q1 4/25/2018 MEDICARE YEARLY EXAM 4/26/2018 FOBT Q 1 YEAR AGE 50-75 5/5/2018 DTaP/Tdap/Td series (2 - Td) 4/28/2025 Allergies as of 7/5/2017  Review Complete On: 7/5/2017 By: Mariajose Ernst MD  
  
 Severity Noted Reaction Type Reactions Watermelon High 01/21/2015    Swelling Peach (Prunus Persica)  01/21/2015    Itching Current Immunizations  Reviewed on 9/15/2016 Name Date Influenza Vaccine (Quad) PF 9/17/2015 Pneumococcal Conjugate (PCV-13) 9/15/2016 Tdap 4/28/2015 Not reviewed this visit You Were Diagnosed With   
  
 Codes Comments Gout, unspecified cause, unspecified chronicity, unspecified site    -  Primary ICD-10-CM: M10.9 ICD-9-CM: 274.9 Vitals BP Pulse Temp Resp Height(growth percentile) Weight(growth percentile) 128/58 (BP 1 Location: Left arm, BP Patient Position: Sitting) 63 97.6 °F (36.4 °C) (Oral) 20 5' 10\" (1.778 m) 249 lb (112.9 kg) SpO2 BMI Smoking Status 96% 35.73 kg/m2 Former Smoker Vitals History BMI and BSA Data Body Mass Index Body Surface Area 35.73 kg/m 2 2.36 m 2 Preferred Pharmacy Pharmacy Name Phone [facility] [address] [facility] [phone] Your Updated Medication List  
  
   
This list is accurate as of: 7/5/17 12:03 PM.  Always use your most recent med list.  
  
  
  
  
 aspirin delayed-release 81 mg tablet Take 1 Tab by mouth daily. atorvastatin 40 mg tablet Commonly known as:  LIPITOR Take 20 mg by mouth daily. cholecalciferol 1,000 unit Cap Commonly known as:  VITAMIN D3 Take 3,000 Units by mouth daily. docusate sodium 100 mg capsule Commonly known as:  Laruth [place] Take 1 Cap by mouth two (2) times daily as needed for Constipation for up to 90 days. febuxostat 40 mg Tab tablet Commonly known as:  Beverly Beatty  
 Take 1 Tab by mouth daily. Indications: GOUT PREVENTION  
  
 gabapentin 100 mg capsule Commonly known as:  NEURONTIN Take 1 Cap by mouth three (3) times daily. glucose blood VI test strips strip Commonly known as:  ASCENSIA AUTODISC VI, ONE TOUCH ULTRA TEST VI Dispense precision extra test strips 3 x day to check blood glucose  
  
 insulin aspart 100 unit/mL injection Commonly known as:  NOVOLOG  
by SubCUTAneous route. 6 units before meals  
  
 insulin glargine 100 unit/mL injection Commonly known as:  LANTUS Take 30 units once daily  Indications: type 2 diabetes mellitus  
  
 levothyroxine 200 mcg tablet Commonly known as:  SYNTHROID Take 1 Tab by mouth Daily (before breakfast). Indications: HYPOTHYROIDISM  
  
 magnesium chloride 64 mg delayed release tablet Commonly known as:  MAG DELAY Take 64 mg by mouth daily. metoprolol tartrate 25 mg tablet Commonly known as:  LOPRESSOR Take 1 Tab by mouth two (2) times a day. Omeprazole delayed release 20 mg tablet Commonly known as:  PRILOSEC D/R Take 20 mg by mouth daily. predniSONE 10 mg tablet Commonly known as:  Mikael Smaller Take 1 Tab by mouth two (2) times a day. spironolactone 25 mg tablet Commonly known as:  ALDACTONE Take 1 Tab by mouth daily. Indications: EDEMA  
  
 testosterone 20.25 mg/1.25 gram (1.62 %) gel Commonly known as:  ANDROGEL Apply  to affected area as needed. traMADol 50 mg tablet Commonly known as:  ULTRAM  
Take 1 Tab by mouth two (2) times daily as needed for Pain. Max Daily Amount: 100 mg. Indications: NEUROPATHIC PAIN  
  
 VITAMIN B-12 1,000 mcg tablet Generic drug:  cyanocobalamin Take 1,000 mcg by mouth daily. VITAMIN B-6 100 mg tablet Generic drug:  pyridoxine (vitamin B6) Take 100 mg by mouth daily. warfarin 2.5 mg tablet Commonly known as:  COUMADIN Take 2 Tabs by mouth daily. Or as directed. Goal INR 2.5-3.5. Prescriptions Printed Refills  
 predniSONE (DELTASONE) 10 mg tablet 0 Sig: Take 1 Tab by mouth two (2) times a day. Class: Print Route: Oral  
  
To-Do List   
 07/05/2017 Lab:  URIC ACID Patient Instructions Gout: Care Instructions Your Care Instructions Gout is a form of arthritis caused by a buildup of uric acid crystals in a joint. It causes sudden attacks of pain, swelling, redness, and stiffness, usually in one joint, especially the big toe. Gout usually comes on without a cause. But it can be brought on by drinking alcohol (especially beer) or eating seafood and red meat. Taking certain medicines, such as diuretics or aspirin, also can bring on an attack of gout. Taking your medicines as prescribed and following up with your doctor regularly can help you avoid gout attacks in the future. Follow-up care is a key part of your treatment and safety. Be sure to make and go to all appointments, and call your doctor if you are having problems. Its also a good idea to know your test results and keep a list of the medicines you take. How can you care for yourself at home? · If the joint is swollen, put ice or a cold pack on the area for 10 to 20 minutes at a time. Put a thin cloth between the ice and your skin. · Prop up the sore limb on a pillow when you ice it or anytime you sit or lie down during the next 3 days. Try to keep it above the level of your heart. This will help reduce swelling. · Rest sore joints. Avoid activities that put weight or strain on the joints for a few days. Take short rest breaks from your regular activities during the day. · Take your medicines exactly as prescribed. Call your doctor if you think you are having a problem with your medicine. · Take pain medicines exactly as directed. ¨ If the doctor gave you a prescription medicine for pain, take it as prescribed.  
¨ If you are not taking a prescription pain medicine, ask your doctor if you can take an over-the-counter medicine. · Eat less seafood and red meat. · Check with your doctor before drinking alcohol. · Losing weight, if you are overweight, may help reduce attacks of gout. But do not go on a [employer]. \" Losing a lot of weight in a short amount of time can cause a gout attack. When should you call for help? Call your doctor now or seek immediate medical care if: 
· You have a fever. · The joint is so painful you cannot use it. · You have sudden, unexplained swelling, redness, warmth, or severe pain in one or more joints. Watch closely for changes in your health, and be sure to contact your doctor if: 
· You have joint pain. · Your symptoms get worse or are not improving after 2 or 3 days. Where can you learn more? Go to http://adal-janes.info/. Enter J878 in the search box to learn more about \"Gout: Care Instructions. \" Current as of: October 31, 2016 Content Version: 11.3 © 4570-3707 Kelan. Care instructions adapted under license by SocialMatica (which disclaims liability or warranty for this information). If you have questions about a medical condition or this instruction, always ask your healthcare professional. [address] any warranty or liability for your use of this information. Purine-Restricted Diet: Care Instructions Your Care Instructions Purines are substances that are found in some foods. Your body turns purines into uric acid. High levels of uric acid can cause gout, which is a form of arthritis that causes pain and inflammation in joints. You may be able to help control the amount of uric acid in your body by limiting high-purine foods in your diet. Follow-up care is a key part of your treatment and safety. Be sure to make and go to all appointments, and call your doctor if you are having problems.  It's also a good idea to know your test results and keep a list of the medicines you take. How can you care for yourself at home? · Plan your meals and snacks around foods that are low in purines and are safe for you to eat. These foods include: ¨ Green vegetables and tomatoes. ¨ Fruits. ¨ Whole-grain breads, rice, and cereals. ¨ Eggs, peanut butter, and nuts. ¨ Low-fat milk, cheese, and other milk products. ¨ Popcorn. ¨ Gelatin desserts, chocolate, cocoa, and cakes and sweets, in small amounts. · You can eat certain foods that are medium-high in purines, but eat them only once in a while. These foods include: ¨ Legumes, such as dried beans and dried peas. You can have 1 cup cooked legumes each day. ¨ Asparagus, cauliflower, spinach, mushrooms, and green peas. ¨ Fish and seafood (other than very high-purine seafood). ¨ Oatmeal, wheat bran, and wheat germ. · Limit very high-purine foods, including: ¨ Organ meats, such as liver, kidneys, sweetbreads, and brains. ¨ Meats, including carroll, beef, pork, and lamb. ¨ Game meats and any other meats in large amounts. ¨ Anchovies, sardines, herring, mackerel, and scallops. ¨ Gravy. ¨ Beer. Where can you learn more? Go to http://adal-janes.info/. Enter F448 in the search box to learn more about \"Purine-Restricted Diet: Care Instructions. \" Current as of: July 26, 2016 Content Version: 11.3 © 8901-4567 Audemat. Care instructions adapted under license by Aquto (which disclaims liability or warranty for this information). If you have questions about a medical condition or this instruction, always ask your healthcare professional. [address] any warranty or liability for your use of this information. Introducing [facility]! Dear Ashok Gabriel: Thank you for requesting a LoopPay account. Our records indicate that you already have an active LoopPay account.   You can access your account anytime at [email or web address] Did you know that you can access your hospital and ER discharge instructions at any time in Enubila? You can also review all of your test results from your hospital stay or ER visit. Additional Information If you have questions, please visit the Frequently Asked Questions section of the Enubila website at [email or web address] Remember, Enubila is NOT to be used for urgent needs. For medical emergencies, dial 911. Now available from your iPhone and Android! Please provide this summary of care documentation to your next provider. Your primary care clinician is listed as Aroldo Perez. If you have any questions after today's visit, please call [phone].

## 2017-07-05 NOTE — PROGRESS NOTES
Chief Complaint   Patient presents with    Foot Pain     pt here with c/o right foot pain       No other comps at this time. 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 comes in for acute care. 1) Acute gout: Patient has a history of gout arthritis. Currently has an acute events where his right foot has pain redness and swelling especially on the great toe. This is similar to his previous arthritis. Has trouble even wearing his shoes or touching his stool. He does take uloric daily. I will start him on prednisone 10 mg twice daily for a week. He does have tramadol for pain. He has a history of chronic kidney disease thus would avoid any NSAID use including indomethacin. Will check his uric acid levels today. 2) DM2: Patient has a history of diabetes mellitus type 2. This has been controlled. He is on Lantus and NovoLog. He is followed up by an endocrinologist.  Last HbA1c was 6.2. We will continue with the current management plan. 3) HTN: Patient's blood pressure is stable. He is on Lopressor and Spironolactone. He does have a history of renal artery stenosis. Will continue with the current treatment plan. 4) Cardiac: Patient recently had valve replacement. He does have a bioprosthetic valve placed. He is on anticoagulation with Coumadin. He is followed up by the cardiologist.  5) Knee pain: Patient has chronic right knee pain. Has been advised to have total knee replacement. He did have cardiac surgery for valve replacement and now would want to consider right total knee arthroplasty. He is due to see the cardiologist early next month for cardiac clearance. I will see him after that.       Past Medical History  Past Medical History:   Diagnosis Date    Diabetes ([facility] [address] 1995    Dyslipidemia 3/1/2017    Fractures 1995    R Knee/ Tib fib fracture/surgery    Gout 2010    Graves disease 1/21/2015    High cholesterol 2005    HTN (hypertension) 1990    Osteoarthritis 2013    Severe aortic stenosis 3/29/2017    Spinal stenosis of lumbar region 3/1/2016    Spondylolisthesis 3/1/2016    Thyroid trouble     Type 2 diabetes mellitus with diabetic nephropathy ([facility] [address] 4/28/2015       Surgical History  Past Surgical History:   Procedure Laterality Date    HX HIP REPLACEMENT Left 2009    HX ORTHOPAEDIC Right     R knee/Tibfib surgery        Medications  Current Outpatient Prescriptions   Medication Sig Dispense Refill    pyridoxine, vitamin B6, (VITAMIN B-6) 100 mg tablet Take 100 mg by mouth daily.  predniSONE (DELTASONE) 10 mg tablet Take 1 Tab by mouth two (2) times a day. 14 Tab 0    warfarin (COUMADIN) 2.5 mg tablet Take 2 Tabs by mouth daily. Or as directed. Goal INR 2.5-3.5. 100 Tab 2    gabapentin (NEURONTIN) 100 mg capsule Take 1 Cap by mouth three (3) times daily. 90 Cap 0    traMADol (ULTRAM) 50 mg tablet Take 1 Tab by mouth two (2) times daily as needed for Pain. Max Daily Amount: 100 mg. Indications: NEUROPATHIC PAIN 60 Tab 0    metoprolol tartrate (LOPRESSOR) 25 mg tablet Take 1 Tab by mouth two (2) times a day. 180 Tab 3    magnesium chloride (MAG DELAY) 64 mg delayed release tablet Take 64 mg by mouth daily.  aspirin delayed-release 81 mg tablet Take 1 Tab by mouth daily. 30 Tab 2    insulin glargine (LANTUS) 100 unit/mL injection Take 30 units once daily  Indications: type 2 diabetes mellitus 3 Vial 3    cyanocobalamin (VITAMIN B-12) 1,000 mcg tablet Take 1,000 mcg by mouth daily.  febuxostat (ULORIC) 40 mg tab tablet Take 1 Tab by mouth daily. Indications: GOUT PREVENTION 90 Tab 1    levothyroxine (SYNTHROID) 200 mcg tablet Take 1 Tab by mouth Daily (before breakfast). Indications: HYPOTHYROIDISM (Patient taking differently: Take 175 mcg by mouth Daily (before breakfast). Indications: hypothyroidism) 30 Tab 5    insulin aspart (NOVOLOG) 100 unit/mL injection by SubCUTAneous route.  6 units before meals      spironolactone (ALDACTONE) 25 mg tablet Take 1 Tab by mouth daily. Indications: EDEMA 30 Tab 3    glucose blood VI test strips (ASCENSIA AUTODISC VI, ONE TOUCH ULTRA TEST VI) strip Dispense precision extra test strips 3 x day to check blood glucose 200 Each 3    Omeprazole delayed release (PRILOSEC D/R) 20 mg tablet Take 20 mg by mouth daily.  atorvastatin (LIPITOR) 40 mg tablet Take 20 mg by mouth daily.  testosterone (ANDROGEL) 20.25 mg/1.25 gram (1.62 %) gel Apply  to affected area as needed.  Cholecalciferol, Vitamin D3, 1,000 unit cap Take 3,000 Units by mouth daily.  docusate sodium (COLACE) 100 mg capsule Take 1 Cap by mouth two (2) times daily as needed for Constipation for up to 90 days. 60 Cap 2       Allergies  Allergies   Allergen Reactions    Watermelon Swelling    Peach (Prunus Persica) Itching       Family History  Family History   Problem Relation Age of Onset    Hypertension Mother     Thyroid Disease Mother     Hypertension Father     Diabetes Father     Drug Abuse Brother        Social History  Social History     Social History    Marital status:      Spouse name: N/A    Number of children: N/A    Years of education: N/A     Occupational History    Not on file.      Social History Main Topics    Smoking status: Former Smoker     Packs/day: 1.00     Types: Cigarettes     Start date: 1/1/1969     Quit date: 1/1/1995    Smokeless tobacco: Never Used    Alcohol use No    Drug use: No    Sexual activity: Yes     Partners: Female     Other Topics Concern     Service Yes     Served in [address] Blood Transfusions No    Caffeine Concern No    Occupational Exposure No    Hobby Hazards No    Sleep Concern No    Stress Concern No    Weight Concern Yes    Special Diet No    Back Care Yes     Lower Back pain when walking    Exercise No    Bike Helmet No    Seat Belt Yes    Self-Exams Yes     Social History Narrative       Review of Systems  Review of Systems - History obtained from chart review and the patient  General ROS: negative for - chills, fatigue or fever  Psychological ROS: negative  Ophthalmic ROS: positive for - uses glasses  ENT ROS: negative  Allergy and Immunology ROS: negative  Hematological and Lymphatic ROS: on anticoagulation  Endocrine ROS: DM2  Respiratory ROS: no cough, shortness of breath, or wheezing  Cardiovascular ROS: Recent aortic valve replacement  Gastrointestinal ROS: no abdominal pain, change in bowel habits, or black or bloody stools  Genito-Urinary ROS: no dysuria, trouble voiding, or hematuria  Musculoskeletal ROS: gout arthritis attack right big toe  Neurological ROS: no TIA or stroke symptoms    Vital Signs  Visit Vitals    /58 (BP 1 Location: Left arm, BP Patient Position: Sitting)    Pulse 63    Temp 97.6 °F (36.4 °C) (Oral)    Resp 20    Ht 5' 10\" (1.778 m)    Wt 249 lb (112.9 kg)    SpO2 96%    BMI 35.73 kg/m2         Physical Exam  Physical Examination: General appearance - oriented to person, place, and time and acyanotic, in no respiratory distress  Mental status - alert, oriented to person, place, and time, affect appropriate to mood  Eyes - sclera anicteric  Mouth - mucous membranes moist, pharynx normal without lesions  Neck - supple, no significant adenopathy  Lymphatics - no palpable lymphadenopathy  Chest - no tachypnea, retractions or cyanosis  Heart - normal rate and regular rhythm, S1 and S2 normal  Neurological - motor and sensory grossly normal bilaterally  Musculoskeletal - osteoarthritic changes noted in both hands  Extremities - right foot with erythema and tenderness 1st MTP joint with warmth and limited ROM big toe due to pain    Diagnostics  Orders Placed This Encounter    URIC ACID     Standing Status:   Future     Standing Expiration Date:   7/6/2018    [place] COLLECTION VENOUS BLOOD,VENIPUNCTURE    pyridoxine, vitamin B6, (VITAMIN B-6) 100 mg tablet     Sig: Take 100 mg by mouth daily.     DISCONTD: predniSONE (DELTASONE) 10 mg tablet Sig: Take 1 Tab by mouth two (2) times a day. Dispense:  14 Tab     Refill:  0    predniSONE (DELTASONE) 10 mg tablet     Sig: Take 1 Tab by mouth two (2) times a day. Dispense:  14 Tab     Refill:  0         Results  Results for orders placed or performed in visit on 07/05/17   AMB POC PT/INR   Result Value Ref Range    VALID INTERNAL CONTROL POC Yes     Prothrombin time (POC)  seconds    INR POC 2.9      ASSESSMENT and PLAN    ICD-10-CM ICD-9-CM    1. Gout, unspecified cause, unspecified chronicity, unspecified site M10.9 274.9 URIC ACID      predniSONE (DELTASONE) 10 mg tablet      [place] COLLECTION VENOUS BLOOD,VENIPUNCTURE      DISCONTINUED: predniSONE (DELTASONE) 10 mg tablet   2. Type 2 diabetes mellitus with diabetic nephropathy, unspecified long term insulin use status E11.21 250.40      583.81    3. CKD (chronic kidney disease) stage 3, GFR 30-59 ml/min N18.3 585.3    4. Severe aortic stenosis I35.0 424.1    5. S/P AVR (aortic valve replacement) Z95.2 V43.3    6. Essential hypertension I10 401.9    7. Chronic pain of right knee M25.561 719.46     G89.29 338.29      current treatment plan is effective, no change in therapy  lab results and schedule of future lab studies reviewed with patient  reviewed diet, exercise and weight control  reviewed medications and side effects in detail  specific diabetic recommendations: home glucose monitoring emphasized, all medications, side effects and compliance discussed carefully, foot care discussed and Podiatry visits discussed and glycohemoglobin and other lab monitoring discussed    I have discussed the diagnosis with the patient and the intended plan of care as seen in the above orders. The patient has received an after-visit summary and questions were answered concerning future plans. I have discussed medication, side effects, and warnings with the patient in detail. The patient verbalized understanding and is in agreement with the plan of care.  The patient will contact the office with any additional concerns.     Lazarus Book, MD

## 2017-07-05 NOTE — PATIENT INSTRUCTIONS
Gout: Care Instructions  Your Care Instructions  Gout is a form of arthritis caused by a buildup of uric acid crystals in a joint. It causes sudden attacks of pain, swelling, redness, and stiffness, usually in one joint, especially the big toe. Gout usually comes on without a cause. But it can be brought on by drinking alcohol (especially beer) or eating seafood and red meat. Taking certain medicines, such as diuretics or aspirin, also can bring on an attack of gout. Taking your medicines as prescribed and following up with your doctor regularly can help you avoid gout attacks in the future. Follow-up care is a key part of your treatment and safety. Be sure to make and go to all appointments, and call your doctor if you are having problems. Its also a good idea to know your test results and keep a list of the medicines you take. How can you care for yourself at home? · If the joint is swollen, put ice or a cold pack on the area for 10 to 20 minutes at a time. Put a thin cloth between the ice and your skin. · Prop up the sore limb on a pillow when you ice it or anytime you sit or lie down during the next 3 days. Try to keep it above the level of your heart. This will help reduce swelling. · Rest sore joints. Avoid activities that put weight or strain on the joints for a few days. Take short rest breaks from your regular activities during the day. · Take your medicines exactly as prescribed. Call your doctor if you think you are having a problem with your medicine. · Take pain medicines exactly as directed. ¨ If the doctor gave you a prescription medicine for pain, take it as prescribed. ¨ If you are not taking a prescription pain medicine, ask your doctor if you can take an over-the-counter medicine. · Eat less seafood and red meat. · Check with your doctor before drinking alcohol. · Losing weight, if you are overweight, may help reduce attacks of gout. But do not go on a [employer]. \" Losing a lot of weight in a short amount of time can cause a gout attack. When should you call for help? Call your doctor now or seek immediate medical care if:  · You have a fever. · The joint is so painful you cannot use it. · You have sudden, unexplained swelling, redness, warmth, or severe pain in one or more joints. Watch closely for changes in your health, and be sure to contact your doctor if:  · You have joint pain. · Your symptoms get worse or are not improving after 2 or 3 days. Where can you learn more? Go to http://adal-janes.info/. Enter O629 in the search box to learn more about \"Gout: Care Instructions. \"  Current as of: October 31, 2016  Content Version: 11.3  © 5169-9428 [employer]. Care instructions adapted under license by [employer] (which disclaims liability or warranty for this information). If you have questions about a medical condition or this instruction, always ask your healthcare professional. [place] [address] any warranty or liability for your use of this information. Purine-Restricted Diet: Care Instructions  Your Care Instructions  Purines are substances that are found in some foods. Your body turns purines into uric acid. High levels of uric acid can cause gout, which is a form of arthritis that causes pain and inflammation in joints. You may be able to help control the amount of uric acid in your body by limiting high-purine foods in your diet. Follow-up care is a key part of your treatment and safety. Be sure to make and go to all appointments, and call your doctor if you are having problems. It's also a good idea to know your test results and keep a list of the medicines you take. How can you care for yourself at home? · Plan your meals and snacks around foods that are low in purines and are safe for you to eat. These foods include:  ¨ Green vegetables and tomatoes. ¨ Fruits.   ¨ Whole-grain breads, rice, and cereals. ¨ Eggs, peanut butter, and nuts. ¨ Low-fat milk, cheese, and other milk products. ¨ Popcorn. ¨ Gelatin desserts, chocolate, cocoa, and cakes and sweets, in small amounts. · You can eat certain foods that are medium-high in purines, but eat them only once in a while. These foods include:  ¨ Legumes, such as dried beans and dried peas. You can have 1 cup cooked legumes each day. ¨ Asparagus, cauliflower, spinach, mushrooms, and green peas. ¨ Fish and seafood (other than very high-purine seafood). ¨ Oatmeal, wheat bran, and wheat germ. · Limit very high-purine foods, including:  ¨ Organ meats, such as liver, kidneys, sweetbreads, and brains. ¨ Meats, including carroll, beef, pork, and lamb. ¨ Game meats and any other meats in large amounts. ¨ Anchovies, sardines, herring, mackerel, and scallops. ¨ Gravy. ¨ Beer. Where can you learn more? Go to http://adal-janes.info/. Enter F448 in the search box to learn more about \"Purine-Restricted Diet: Care Instructions. \"  Current as of: July 26, 2016  Content Version: 11.3  © 6098-8510 [employer]. Care instructions adapted under license by [employer] (which disclaims liability or warranty for this information). If you have questions about a medical condition or this instruction, always ask your healthcare professional. [place] [address] any warranty or liability for your use of this information.

## 2017-07-05 NOTE — MR AVS SNAPSHOT
Visit Information Date & Time Provider Department Dept. Phone Encounter #  
 7/5/2017 10:00 AM [address] [facility] [address] [id number] Your Appointments 8/1/2017  9:15 AM  
ROUTINE CARE with MD Saima [address] ([facility]) Appt Note: Return in about 3 months (around 7/25/2017), or if symptoms worsen or fail to improve, for DM2, HTN. [address] [facility] [address]  
  
   
 [address]  
  
    
 8/2/2017 10:45 AM  
Follow Up with Carrillo Hillman MD  
[facility] ([address] [place] [address] Road) Appt Note: 3 month follow up  
 [address] [facility] [address]  
  
   
 [address] [facility] [address]  
  
    
 8/11/2017 10:45 AM  
ROUTINE CARE with MD Saima [address] Dr ([facility]) Appt Note: medical clearance for right total knee replacement [facility]. [address] [facility] [address]  
[phone] Upcoming Health Maintenance Date Due ZOSTER VACCINE AGE 60> 4/26/2006 EYE EXAM RETINAL OR DILATED Q1 10/14/2016 INFLUENZA AGE 9 TO ADULT 8/1/2017 Pneumococcal 65+ Low/Medium Risk (2 of 2 - PPSV23) 9/15/2017 GLAUCOMA SCREENING Q2Y 10/14/2017 HEMOGLOBIN A1C Q6M 11/18/2017 FOOT EXAM Q1 1/25/2018 MICROALBUMIN Q1 1/25/2018 LIPID PANEL Q1 4/25/2018 MEDICARE YEARLY EXAM 4/26/2018 FOBT Q 1 YEAR AGE 50-75 5/5/2018 DTaP/Tdap/Td series (2 - Td) 4/28/2025 Allergies as of 7/5/2017  Review Complete On: 6/14/2017 By: Carrillo Coleman MD  
  
 Severity Noted Reaction Type Reactions Watermelon High 01/21/2015    Swelling Peach (Prunus Persica)  01/21/2015    Itching Current Immunizations  Reviewed on 9/15/2016 Name Date Influenza Vaccine (Quad) PF 9/17/2015 Pneumococcal Conjugate (PCV-13) 9/15/2016 Tdap 4/28/2015 Not reviewed this visit You Were Diagnosed With   
  
 Codes Comments S/P AVR (aortic valve replacement)     ICD-10-CM: H17.8 ICD-9-CM: V43.3 Vitals Smoking Status Former Smoker Preferred Pharmacy Pharmacy Name Phone [facility] [address] [employer] [facility] [employer] [place] [phone] Your Updated Medication List  
  
   
This list is accurate as of: 7/5/17 10:49 AM.  Always use your most recent med list.  
  
  
  
  
 aspirin delayed-release 81 mg tablet Take 1 Tab by mouth daily. atorvastatin 40 mg tablet Commonly known as:  LIPITOR Take 20 mg by mouth daily. cholecalciferol 1,000 unit Cap Commonly known as:  VITAMIN D3 Take 3,000 Units by mouth daily. docusate sodium 100 mg capsule Commonly known as:  Genice Flax Take 1 Cap by mouth two (2) times daily as needed for Constipation for up to 90 days. febuxostat 40 mg Tab tablet Commonly known as:  Rupesh Lucía Take 1 Tab by mouth daily. Indications: GOUT PREVENTION  
  
 gabapentin 100 mg capsule Commonly known as:  NEURONTIN Take 1 Cap by mouth three (3) times daily. glucose blood VI test strips strip Commonly known as:  ASCENSIA AUTODISC VI, ONE TOUCH ULTRA TEST VI Dispense precision extra test strips 3 x day to check blood glucose  
  
 insulin aspart 100 unit/mL injection Commonly known as:  NOVOLOG  
by SubCUTAneous route. 6 units before meals  
  
 insulin glargine 100 unit/mL injection Commonly known as:  LANTUS Take 30 units once daily  Indications: type 2 diabetes mellitus  
  
 levothyroxine 200 mcg tablet Commonly known as:  SYNTHROID Take 1 Tab by mouth Daily (before breakfast). Indications: HYPOTHYROIDISM  
  
 magnesium chloride 64 mg delayed release tablet Commonly known as:  MAG DELAY Take 64 mg by mouth daily. metoprolol tartrate 25 mg tablet Commonly known as:  LOPRESSOR  
 Take 1 Tab by mouth two (2) times a day. Omeprazole delayed release 20 mg tablet Commonly known as:  PRILOSEC D/R Take 20 mg by mouth daily. spironolactone 25 mg tablet Commonly known as:  ALDACTONE Take 1 Tab by mouth daily. Indications: EDEMA  
  
 testosterone 20.25 mg/1.25 gram (1.62 %) gel Commonly known as:  ANDROGEL Apply  to affected area as needed. traMADol 50 mg tablet Commonly known as:  ULTRAM  
Take 1 Tab by mouth two (2) times daily as needed for Pain. Max Daily Amount: 100 mg. Indications: NEUROPATHIC PAIN  
  
 VITAMIN B-12 1,000 mcg tablet Generic drug:  cyanocobalamin Take 1,000 mcg by mouth daily. warfarin 2.5 mg tablet Commonly known as:  COUMADIN Take 2 Tabs by mouth daily. Or as directed. Goal INR 2.5-3.5. July 2017 Details Chan [place] Tue Wed Thu Fri Sat  
        1  
  
  
  
  
  2  
  
  
  
   3  
  
  
  
   4  
  
  
  
   5  
  
7.5 mg  
See details 6  
  
7.5 mg  
  
   7  
  
10 mg  
  
   8  
  
7.5 mg  
  
  
  9  
  
7.5 mg  
  
   10  
  
7.5 mg  
  
   11  
  
10 mg  
  
   12  
  
7.5 mg  
  
   13  
  
7.5 mg  
  
   14  
  
10 mg  
  
   15  
  
7.5 mg  
  
  
  16  
  
7.5 mg  
  
   17  
  
7.5 mg  
  
   18  
  
10 mg  
  
   19  
  
7.5 mg  
  
   20  
  
  
  
   21  
  
  
  
   22  
  
  
  
  
  23  
  
  
  
   24  
  
  
  
   25  
  
  
  
   26  
  
  
  
   27  
  
  
  
   28  
  
  
  
   29  
  
  
  
  
  30  
  
  
  
   31  
  
  
  
       
 Date Details 07/05 This INR check INR: 2.9 Date of next INR:  7/19/2017 How to take your warfarin dose To take:  7.5 mg Take three of the 2.5 mg tablets. To take:  10 mg Take four of the 2.5 mg tablets. We Performed the Following AMB POC PT/INR [78623 CPT(R)] Patient Instructions Patient will continue taking coumadin Introducing [facility]! Dear Julia Going: Thank you for requesting a SwingTime account. Our records indicate that you already have an active SwingTime account. You can access your account anytime at [email or web address] Did you know that you can access your hospital and ER discharge instructions at any time in SwingTime? You can also review all of your test results from your hospital stay or ER visit. Additional Information If you have questions, please visit the Frequently Asked Questions section of the SwingTime website at [email or web address] Remember, SwingTime is NOT to be used for urgent needs. For medical emergencies, dial 911. Now available from your iPhone and Android! Please provide this summary of care documentation to your next provider. Your primary care clinician is listed as Aroldo Perez. If you have any questions after today's visit, please call [phone].

## 2017-07-11 ENCOUNTER — TELEPHONE (OUTPATIENT)
Dept: FAMILY MEDICINE CLINIC | Age: 72
End: 2017-07-11

## 2017-07-11 ENCOUNTER — DOCUMENTATION ONLY (OUTPATIENT)
Dept: CARDIOTHORACIC SURGERY | Age: 72
End: 2017-07-11

## 2017-07-11 DIAGNOSIS — M10.9 GOUT, UNSPECIFIED CAUSE, UNSPECIFIED CHRONICITY, UNSPECIFIED SITE: ICD-10-CM

## 2017-07-11 RX ORDER — INDOMETHACIN 50 MG/1
50 CAPSULE ORAL
Qty: 20 CAP | Refills: 2 | Status: SHIPPED | OUTPATIENT
Start: 2017-07-11 | End: 2017-07-16

## 2017-07-11 RX ORDER — PROBENECID AND COLCHICINE 500; .5 MG/1; MG/1
1 TABLET ORAL 2 TIMES DAILY
Qty: 20 TAB | Refills: 0 | Status: SHIPPED | OUTPATIENT
Start: 2017-07-11 | End: 2017-07-12

## 2017-07-11 NOTE — TELEPHONE ENCOUNTER
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 Patient is seen recently for gout. Put on prednisone. This has not helped with the pain. He would like to try something different. We discussed various options. I will put him on the indomethacin 50 mg up to twice daily as needed for gout pain. Will give him colchicine-probenecid combination pill to take twice daily for 10 days. Follow-up in case of no improvement worsening symptoms. Patient verbalized understanding to the plan and was in agreement with it.   Pieter Pelaez MD

## 2017-07-11 NOTE — PROGRESS NOTES
AS OF 07/06/17 PATIENT HAS BEEN DISCHARGED FROM [facility] PT/ HH/ AND OT . HE CAN START CARDIAC REHAB NOW. I WILL HAVE KHANH CONTACT THE PATIENT TO START REHAB.

## 2017-07-12 ENCOUNTER — OFFICE VISIT (OUTPATIENT)
Dept: FAMILY MEDICINE CLINIC | Age: 72
End: 2017-07-12

## 2017-07-12 VITALS
BODY MASS INDEX: 35.62 KG/M2 | SYSTOLIC BLOOD PRESSURE: 150 MMHG | RESPIRATION RATE: 18 BRPM | TEMPERATURE: 96.1 F | HEART RATE: 57 BPM | HEIGHT: 70 IN | DIASTOLIC BLOOD PRESSURE: 67 MMHG | WEIGHT: 248.8 LBS | OXYGEN SATURATION: 98 %

## 2017-07-12 DIAGNOSIS — M79.671 RIGHT FOOT PAIN: Primary | ICD-10-CM

## 2017-07-12 RX ORDER — COLCHICINE 0.6 MG/1
0.6 CAPSULE ORAL 2 TIMES DAILY
Qty: 60 CAP | Refills: 0 | Status: SHIPPED | OUTPATIENT
Start: 2017-07-12 | End: 2017-08-11

## 2017-07-12 NOTE — PATIENT INSTRUCTIONS

## 2017-07-12 NOTE — MR AVS SNAPSHOT
Visit Information Date & Time Provider Department Dept. Phone Encounter #  
 7/12/2017 12:30 PM Aroldo Pearson MD [address] [facility] [phone] Your Appointments 7/12/2017 12:30 PM  
SAME DAY with Aroldo Pearson MD  
[address] [facility] ([address] [place] [address]) Appt Note: foot pain [address] [id number] [address]  
  
   
 [address] [facility] 7/19/2017  8:30 AM  
ANTICOAG NURSE with [facility] ([address] [place] [address]) Appt Note: 2 week follow up  
 [address] [facility] [address]  
  
   
 [address] [facility]  
  
    
 8/2/2017 10:45 AM  
Follow Up with Juancho Delarosa MD  
[facility] ([address] [place] [address]) Appt Note: 3 month follow up  
 [address] [facility] [address]  
  
   
 [address] [facility]  
  
    
 8/11/2017 10:45 AM  
ROUTINE CARE with Aroldo Pearson MD  
[address] [facility] ([address] [place] [address]) Appt Note: medical clearance for right total knee replacement [address] [id number] [address]  
  
   
 [address] 700 [facility] Upcoming Health Maintenance Date Due ZOSTER VACCINE AGE 60> 4/26/2006 EYE EXAM RETINAL OR DILATED Q1 10/14/2016 INFLUENZA AGE 9 TO ADULT 8/1/2017 Pneumococcal 65+ Low/Medium Risk (2 of 2 - PPSV23) 9/15/2017 GLAUCOMA SCREENING Q2Y 10/14/2017 HEMOGLOBIN A1C Q6M 11/18/2017 FOOT EXAM Q1 1/25/2018 MICROALBUMIN Q1 1/25/2018 LIPID PANEL Q1 4/25/2018 MEDICARE YEARLY EXAM 4/26/2018 FOBT Q 1 YEAR AGE 50-75 5/5/2018 DTaP/Tdap/Td series (2 - Td) 4/28/2025 Allergies as of 7/12/2017  Review Complete On: 7/5/2017 By: Ludwin Meraz MD  
  
 Severity Noted Reaction Type Reactions Watermelon High 01/21/2015    Swelling Peach (Prunus Persica)  01/21/2015    Itching Current Immunizations  Reviewed on 9/15/2016 Name Date Influenza Vaccine (Quad) PF 9/17/2015 Pneumococcal Conjugate (PCV-13) 9/15/2016 Tdap 4/28/2015 Not reviewed this visit You Were Diagnosed With   
  
 Codes Comments Right foot pain    -  Primary ICD-10-CM: P88.262 ICD-9-CM: 729.5 Vitals BP Pulse Temp Resp Height(growth percentile) Weight(growth percentile) 150/67 (BP 1 Location: Right arm, BP Patient Position: Sitting) (!) 57 96.1 °F (35.6 °C) (Oral) 18 5' 10\" (1.778 m) 248 lb 12.8 oz (112.9 kg) SpO2 BMI Smoking Status 98% 35.7 kg/m2 Former Smoker BMI and BSA Data Body Mass Index Body Surface Area 35.7 kg/m 2 2.36 m 2 Preferred Pharmacy Pharmacy Name [facility], 211 [facility] [phone] Your Updated Medication List  
  
   
This list is accurate as of: 7/12/17 12:15 PM.  Always use your most recent med list.  
  
  
  
  
 aspirin delayed-release 81 mg tablet Take 1 Tab by mouth daily. atorvastatin 40 mg tablet Commonly known as:  LIPITOR Take 20 mg by mouth daily. cholecalciferol 1,000 unit Cap Commonly known as:  VITAMIN D3 Take 3,000 Units by mouth daily. colchicine 0.6 mg capsule Commonly known as:  Johana Moses Take 1 Cap by mouth two (2) times a day. Indications: acute gouty arthritis  
  
 docusate sodium 100 mg capsule Commonly known as:  Leeland Jefferson Take 1 Cap by mouth two (2) times daily as needed for Constipation for up to 90 days. febuxostat 40 mg Tab tablet Commonly known as:  Gilda Farmington Take 1 Tab by mouth daily. Indications: GOUT PREVENTION  
  
 gabapentin 100 mg capsule Commonly known as:  NEURONTIN Take 1 Cap by mouth three (3) times daily. glucose blood VI test strips strip Commonly known as:  ASCENSIA AUTODISC VI, ONE TOUCH ULTRA TEST VI Dispense precision extra test strips 3 x day to check blood glucose  
  
 indomethacin 50 mg capsule Commonly known as:  INDOCIN Take 1 Cap by mouth two (2) times daily as needed for up to 5 days. insulin aspart 100 unit/mL injection Commonly known as:  NOVOLOG  
by SubCUTAneous route. 6 units before meals  
  
 insulin glargine 100 unit/mL injection Commonly known as:  LANTUS Take 30 units once daily  Indications: type 2 diabetes mellitus  
  
 levothyroxine 200 mcg tablet Commonly known as:  SYNTHROID Take 1 Tab by mouth Daily (before breakfast). Indications: HYPOTHYROIDISM  
  
 magnesium chloride 64 mg delayed release tablet Commonly known as:  MAG DELAY Take 64 mg by mouth daily. metoprolol tartrate 25 mg tablet Commonly known as:  LOPRESSOR Take 1 Tab by mouth two (2) times a day. Omeprazole delayed release 20 mg tablet Commonly known as:  PRILOSEC D/R Take 20 mg by mouth daily. predniSONE 10 mg tablet Commonly known as:  Pattie Gong Take 1 Tab by mouth two (2) times a day. spironolactone 25 mg tablet Commonly known as:  ALDACTONE Take 1 Tab by mouth daily. Indications: EDEMA  
  
 testosterone 20.25 mg/1.25 gram (1.62 %) gel Commonly known as:  ANDROGEL Apply  to affected area as needed. traMADol 50 mg tablet Commonly known as:  ULTRAM  
Take 1 Tab by mouth two (2) times daily as needed for Pain. Max Daily Amount: 100 mg. Indications: NEUROPATHIC PAIN  
  
 VITAMIN B-12 1,000 mcg tablet Generic drug:  cyanocobalamin Take 1,000 mcg by mouth daily. VITAMIN B-6 100 mg tablet Generic drug:  pyridoxine (vitamin B6) Take 100 mg by mouth daily. warfarin 2.5 mg tablet Commonly known as:  COUMADIN Take 2 Tabs by mouth daily. Or as directed. Goal INR 2.5-3.5. Prescriptions Printed  Refills  
 colchicine (MITIGARE) 0.6 mg capsule 0  
 Sig: Take 1 Cap by mouth two (2) times a day. Indications: acute gouty arthritis Class: Print Route: Oral  
  
To-Do List   
 07/12/2017 Imaging:  XR FOOT RT MIN 3 V   
  
 07/24/2017 2:30 PM  
  Appointment with [facility] at [facility] [address] ([phone]) Patient Instructions Foot Pain: Care Instructions Your Care Instructions Foot injuries that cause pain and swelling are fairly common. Almost all sports or home repair projects can cause a misstep that ends up as foot pain. Normal wear and tear, especially as you get older, also can cause foot pain. Most minor foot injuries will heal on their own, and home treatment is usually all you need to do. If you have a severe injury, you may need tests and treatment. Follow-up care is a key part of your treatment and safety. Be sure to make and go to all appointments, and call your doctor if you are having problems. Its also a good idea to know your test results and keep a list of the medicines you take. How can you care for yourself at home? · Take pain medicines exactly as directed. ¨ If the doctor gave you a prescription medicine for pain, take it as prescribed. ¨ If you are not taking a prescription pain medicine, ask your doctor if you can take an over-the-counter medicine. · Rest and protect your foot. Take a break from any activity that may cause pain. · Put ice or a cold pack on your foot for 10 to 20 minutes at a time. Put a thin cloth between the ice and your skin. · Prop up the sore foot on a pillow when you ice it or anytime you sit or lie down during the next 3 days. Try to keep it above the level of your heart. This will help reduce swelling. · Your doctor may recommend that you wrap your foot with an elastic bandage. Keep your foot wrapped for as long as your doctor advises. · If your doctor recommends crutches, use them as directed. · Wear roomy footwear. · As soon as pain and swelling end, begin gentle exercises of your foot. Your doctor can tell you which exercises will help. When should you call for help? Call 911 anytime you think you may need emergency care. For example, call if: 
· Your foot turns pale, white, blue, or cold. Call your doctor now or seek immediate medical care if: 
· You cannot move or stand on your foot. · Your foot looks twisted or out of its normal position. · Your foot is not stable when you step down. · You have signs of infection, such as: 
¨ Increased pain, swelling, warmth, or redness. ¨ Red streaks leading from the sore area. ¨ Pus draining from a place on your foot. ¨ A fever. · Your foot is numb or tingly. Watch closely for changes in your health, and be sure to contact your doctor if: 
· You do not get better as expected. · You have bruises from an injury that last longer than 2 weeks. Where can you learn more? Go to http://adal-janes.info/. Enter D727 in the search box to learn more about \"Foot Pain: Care Instructions. \" Current as of: March 21, 2017 Content Version: 11.3 © 0510-4098 Auth0. Care instructions adapted under license by [employer] (which disclaims liability or warranty for this information). If you have questions about a medical condition or this instruction, always ask your healthcare professional. [place] [address] any warranty or liability for your use of this information. Introducing [facility] HE[facility]! Dear Gentry Mcnally: Thank you for requesting a [employer] account. Our records indicate that you already have an active [employer] account. You can access your account anytime at [email or web address] Did you know that you can access your hospital and ER discharge instructions at any time in [employer]? You can also review all of your test results from your hospital stay or ER visit. Additional Information If you have questions, please visit the Frequently Asked Questions section of the [employer] labshart website at [email or web address] Remember, [employer] Box is NOT to be used for urgent needs. For medical emergencies, dial 911. Now available from your iPhone and Android! Please provide this summary of care documentation to your next provider. Your primary care clinician is listed as Aroldo Perez. If you have any questions after today's visit, please call [phone].

## 2017-07-12 NOTE — PROGRESS NOTES
Chief Complaint   Patient presents with    Foot Pain     Gout related right foot      1. Have you been to the ER, urgent care clinic since your last visit? Hospitalized since your last visit? No    2. Have you seen or consulted any other health care providers outside of the [address] [place] [address] [place] since your last visit? Include any pap smears or colon screening. 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 1) Foot pain: Patient has a history of gout. I did see the patient in clinic last week with the right foot pain. At that time there was some redness mainly at the great toe. Pain also on the past MTP joint. Started him on the treatment for gout. I put him on prednisone and we sent him for uric acid studies. Uric acid studies have been stable. Pain has not diminished. He is taking tramadol for pain. Completed a course of prednisone at the pain persists. Redness is still there. He did call me yesterday and I put him on probenecid-colchicine combination. States that that this has not helped. States that in the past he has been given just plain colchicine for this pain and that is what he wants. He is due to travel to [place] tomorrow. He is driving thus he needs to be pain-free disease. I did let him know that the medication prescribed did have colchicine. He insists that he just wants plain colchicine and I did give him a prescription for this. His uric acid however is negative. Given we have prescribed medication and he is still not getting better it would be advisable to get a foot x-ray done. May need to see the podiatrist.  X-ray request was placed. I will follow-up with the patient after x-ray and the result is done. Patient is agreeable to this. He is discharged in stable state. He will take Tylenol arthritis for the pain and then may use tramadol for pain that is severe. Cautioned against driving or using machinery when he is taking the tramadol.   Patient is on Coumadin following heart valve replacement surgery and thus not a candidate for indomethacin. Past Medical History  Past Medical History:   Diagnosis Date    Diabetes ([facility] [address] 1995    Dyslipidemia 3/1/2017    Fractures 1995    R Knee/ Tib fib fracture/surgery    Gout 2010    Graves disease 1/21/2015    High cholesterol 2005    HTN (hypertension) 1990    Osteoarthritis 2013    Severe aortic stenosis 3/29/2017    Spinal stenosis of lumbar region 3/1/2016    Spondylolisthesis 3/1/2016    Thyroid trouble     Type 2 diabetes mellitus with diabetic nephropathy ([facility] [address] 4/28/2015       Surgical History  Past Surgical History:   Procedure Laterality Date    HX HIP REPLACEMENT Left 2009    HX ORTHOPAEDIC Right     R knee/Tibfib surgery        Medications  Current Outpatient Prescriptions   Medication Sig Dispense Refill    colchicine (MITIGARE) 0.6 mg capsule Take 1 Cap by mouth two (2) times a day. Indications: acute gouty arthritis 60 Cap 0    pyridoxine, vitamin B6, (VITAMIN B-6) 100 mg tablet Take 100 mg by mouth daily.  warfarin (COUMADIN) 2.5 mg tablet Take 2 Tabs by mouth daily. Or as directed. Goal INR 2.5-3.5. 100 Tab 2    traMADol (ULTRAM) 50 mg tablet Take 1 Tab by mouth two (2) times daily as needed for Pain. Max Daily Amount: 100 mg. Indications: NEUROPATHIC PAIN 60 Tab 0    metoprolol tartrate (LOPRESSOR) 25 mg tablet Take 1 Tab by mouth two (2) times a day. 180 Tab 3    magnesium chloride (MAG DELAY) 64 mg delayed release tablet Take 64 mg by mouth daily.  aspirin delayed-release 81 mg tablet Take 1 Tab by mouth daily. 30 Tab 2    docusate sodium (COLACE) 100 mg capsule Take 1 Cap by mouth two (2) times daily as needed for Constipation for up to 90 days. 60 Cap 2    insulin glargine (LANTUS) 100 unit/mL injection Take 30 units once daily  Indications: type 2 diabetes mellitus 3 Vial 3    cyanocobalamin (VITAMIN B-12) 1,000 mcg tablet Take 1,000 mcg by mouth daily.       febuxostat (ULORIC) 40 mg tab tablet Take 1 Tab by mouth daily. Indications: GOUT PREVENTION 90 Tab 1    levothyroxine (SYNTHROID) 200 mcg tablet Take 1 Tab by mouth Daily (before breakfast). Indications: HYPOTHYROIDISM (Patient taking differently: Take 175 mcg by mouth Daily (before breakfast). Indications: hypothyroidism) 30 Tab 5    insulin aspart (NOVOLOG) 100 unit/mL injection by SubCUTAneous route. 6 units before meals      spironolactone (ALDACTONE) 25 mg tablet Take 1 Tab by mouth daily. Indications: EDEMA 30 Tab 3    glucose blood VI test strips (ASCENSIA AUTODISC VI, ONE TOUCH ULTRA TEST VI) strip Dispense precision extra test strips 3 x day to check blood glucose 200 Each 3    Omeprazole delayed release (PRILOSEC D/R) 20 mg tablet Take 20 mg by mouth daily.  atorvastatin (LIPITOR) 40 mg tablet Take 20 mg by mouth daily.  testosterone (ANDROGEL) 20.25 mg/1.25 gram (1.62 %) gel Apply  to affected area as needed.  Cholecalciferol, Vitamin D3, 1,000 unit cap Take 3,000 Units by mouth daily.  indomethacin (INDOCIN) 50 mg capsule Take 1 Cap by mouth two (2) times daily as needed for up to 5 days. 20 Cap 2    predniSONE (DELTASONE) 10 mg tablet Take 1 Tab by mouth two (2) times a day. 14 Tab 0    gabapentin (NEURONTIN) 100 mg capsule Take 1 Cap by mouth three (3) times daily. 90 Cap 0       Allergies  Allergies   Allergen Reactions    Watermelon Swelling    Peach (Prunus Persica) Itching       Family History  Family History   Problem Relation Age of Onset    Hypertension Mother     Thyroid Disease Mother     Hypertension Father     Diabetes Father     Drug Abuse Brother        Social History  Social History     Social History    Marital status:      Spouse name: N/A    Number of children: N/A    Years of education: N/A     Occupational History    Not on file.      Social History Main Topics    Smoking status: Former Smoker     Packs/day: 1.00     Types: Cigarettes Start date: 1/1/1969     Quit date: 1/1/1995    Smokeless tobacco: Never Used    Alcohol use No    Drug use: No    Sexual activity: Yes     Partners: Female     Other Topics Concern     Service Yes     Served in [address] Blood Transfusions No    Caffeine Concern No    Occupational Exposure No    Hobby Hazards No    Sleep Concern No    Stress Concern No    Weight Concern Yes    Special Diet No    Back Care Yes     Lower Back pain when walking    Exercise No    Bike Helmet No    Seat Belt Yes    Self-Exams Yes     Social History Narrative       Review of Systems  Review of Systems - History obtained from chart review and the patient  General ROS: positive for  - fatigue and malaise  Psychological ROS: negative  Ophthalmic ROS: positive for - uses glasses  Endocrine ROS: DM2  Respiratory ROS: no cough, shortness of breath, or wheezing  Cardiovascular ROS: no chest pain or dyspnea on exertion  Gastrointestinal ROS: no abdominal pain, change in bowel habits, or black or bloody stools  Genito-Urinary ROS: no dysuria, trouble voiding, or hematuria  Musculoskeletal ROS: positive for - joint pain and pain in foot - right and toe - right  Neurological ROS: no TIA or stroke symptoms    Vital Signs  Visit Vitals    /67 (BP 1 Location: Right arm, BP Patient Position: Sitting)    Pulse (!) 57    Temp 96.1 °F (35.6 °C) (Oral)    Resp 18    Ht 5' 10\" (1.778 m)    Wt 248 lb 12.8 oz (112.9 kg)    SpO2 98%    BMI 35.7 kg/m2         Physical Exam  Physical Examination: General appearance - oriented to person, place, and time and acyanotic, in no respiratory distress  Mental status - alert, oriented to person, place, and time, affect appropriate to mood  Neck - supple, no significant adenopathy  Lymphatics - no palpable lymphadenopathy  Chest - clear to auscultation, no wheezes, rales or rhonchi, symmetric air entry  Heart - normal rate and regular rhythm  Neurological - alert, oriented, normal speech, no focal findings or movement disorder noted  Extremities -right foot with the tenderness and redness right MTP joint. There is limitation in flexion and extension of the right great toe at the MTP joint. Tenderness at the ball of the foot and on attempted the palpation of the foot. Circulation and sensation is normal.  No streaking of the veins. Diagnostics  Orders Placed This Encounter    XR FOOT RT MIN 3 V     Standing Status:   Future     Standing Expiration Date:   8/12/2018     Order Specific Question:   Reason for Exam     Answer:   foot pain    colchicine (MITIGARE) 0.6 mg capsule     Sig: Take 1 Cap by mouth two (2) times a day. Indications: acute gouty arthritis     Dispense:  60 Cap     Refill:  0         Results  Results for orders placed or performed during the hospital encounter of 07/05/17   URIC ACID   Result Value Ref Range    Uric acid 3.4 2.6 - 7.2 MG/DL     ASSESSMENT and PLAN    ICD-10-CM ICD-9-CM    1. Right foot pain M79.671 729.5 colchicine (MITIGARE) 0.6 mg capsule      XR FOOT RT MIN 3 V     lab results and schedule of future lab studies reviewed with patient  reviewed diet, exercise and weight control  reviewed medications and side effects in detail  radiology results and schedule of future radiology studies reviewed with patient    I have discussed the diagnosis with the patient and the intended plan of care as seen in the above orders. The patient has received an after-visit summary and questions were answered concerning future plans. I have discussed medication, side effects, and warnings with the patient in detail. The patient verbalized understanding and is in agreement with the plan of care. The patient will contact the office with any additional concerns.     Tc Ortega MD

## 2017-07-21 ENCOUNTER — TELEPHONE (OUTPATIENT)
Dept: CARDIAC REHAB | Age: 72
End: 2017-07-21

## 2017-07-21 NOTE — TELEPHONE ENCOUNTER
Cardiac Rehab called patient and left a message to confirm evaluation time slot on 7/24/17 at 12:30.     Thank you,  Wendy Cardona

## 2017-07-24 ENCOUNTER — HOSPITAL ENCOUNTER (OUTPATIENT)
Dept: CARDIAC REHAB | Age: 72
Discharge: HOME | End: 2017-07-24
Payer: MEDICARE

## 2017-07-24 PROCEDURE — 93798 PHYS/QHP OP CAR RHAB W/ECG: CPT

## 2017-07-26 ENCOUNTER — HOSPITAL ENCOUNTER (OUTPATIENT)
Dept: CARDIAC REHAB | Age: 72
Discharge: HOME | End: 2017-07-26
Payer: MEDICARE

## 2017-07-26 PROCEDURE — 93797 PHYS/QHP OP CAR RHAB WO ECG: CPT

## 2017-07-26 PROCEDURE — 93798 PHYS/QHP OP CAR RHAB W/ECG: CPT

## 2017-07-26 NOTE — PROGRESS NOTES
CARDIAC REHAB FLOWSHEET Service Date:7/26/17 Session #:2 Time In:1230 Time Out:1330 30-day Re-Assessment Date:   Patients carry-over of treatment evident by:  NONE   OBJECTIVE Objective Comments:   Skin [x] - Warm    [] - Cool     [] - Dry   [] - Moist    [] - Dusky   [] - Pale   [] - Cyanosis   Dependent Edema: [x] - None   OR Location:   Blood Glucose: [x]- NA      Fasting:   Pre-Exercise:  Post-Exercise:   Medications taken as prescribed:   [x] - Yes              [] - No Medication Comments:   Pre SpO2:    98 Pre HR:70 Pre BP:151/79 Chest Pain:0 [id number]:924 Other Pain (1-10) and Location:0   Post SpO2:96         Post HR:82 Post BP:141/82 Chest Pain:0 Other Pain (1-10) and Location:0   ECG interpretation at rest:  NSR ECG interpretation post-exercise:      NSR           Self-Care/ Education   []-CHF/Heart Disease         []- Heart Attack Warning Signs        [x]- Exercise Safety        []- Risk Factors for CAD        []- Medications   []- Breathing Techniques    []- Collaborative Self-Mgt                 []- Hypertension            []-Adequate Sleep                []-  Nutrition                                              []-Cholesterol and Lipids    []- Stress Mgt/Emotional Health      []-Diabetes Mellitus       []-Intimacy Concerns with Heart Disease         []-Travel/Environment         []-Returning pre-cardiac activities   []-Home Exercise     []-Other:     Patient Response []- NA [x]- Patient actively participated in education    []- Patient disengaged during education     [] - Able to recite main objectives    [] - Unable to recite main objectives [] - Able to demonstrate     [] - Unable to demonstrate   Educational comments:    EXERCISE PRESCRIPTION:        MODALITY   Time   (min) Speed/Level During Exercise Post Exercise      HR RPD   1-4 RPE  6-20 Pain  1-10 HR RPD   1-4 RPE  6-20 Pain  1-10   stretching/monitoring/deep breathing             ERGOMETER 20 10W 79 1 13 1 70 1 6 1   STEPPER 20 L1 94 1 12 1 69 1 6 1   DARBY AMBULATION 10  122 2 14 4 101 1 6 1                   Session #3 Time in:1330 Time out:1405   MODALITY   Time   (min) Speed/Level During Exercise Post Exercise      HR RPD   1-4 RPE   6 - 20 Pain  1 - 10 HR RPD   1-4 RPE   6 - 20 Pain   1 - 10   stretching/monitoring/deep breathing 5  82 1 6 1 83 1 6 1   NUTRITION CLASS 30                                                   Patient Response to Exercise Prescription   []- ? Dyspnea                        [] - ? Fatigue                              [] - Abnormal responses:  Explain         [] - ? Pain (Describe location, description of pain, action(s) taken:         [] - Experienced no problems; no cuing required            [x]- Good effort               []- Fair effort                   []- Poor   effort        [x]- Good motivation         []- Fair motivation                 []- Poor motivation         [] - Appropriate physiological responses                             Exercise Comments:    ASSESSMENT   []- Patient unable to  progress towards  LTGs due to:  []- Illness       []- Weakness/debility       []- Poor effort       []- Poor Motivation       []- Poor Attendance        [x]- Patient progressing towards LTGs as evident by:    [] Decreased dyspnea on exertion                 [] Decreased fatigue                  [] Improved strength                [] Improved aerobic capacity & endurance                [] Decreased pain               [] Increased pacing          [] Improved emotional health          [] Cigarette reduction / Smoking cessation participation            [] Improved self-monitoring at home          [] Improved nutritional awareness/monitoring          [x] Other (Describe): Additional Comments: 1ST SESSION   PLAN OF CARE [x] - Continue as written   OR    [] - Adjust treatment plan as follows:           Physician supervision provided this date of service by:      [employer] -77   BILLING Code 19584 (non-monitored):     Total minutes:    Code 59967 (monitored):             Total minutes:   *ECG rhythm attached      [x] 1 CR session only   OR     []  2 CR sessions       [x] 1 CR session only     OR     [x]  2 CR sessions       *EKG Strip:    Tamy Hernandez RN  7/26/2017, 3:52 PM

## 2017-07-27 ENCOUNTER — OFFICE VISIT (OUTPATIENT)
Dept: CARDIOLOGY CLINIC | Age: 72
End: 2017-07-27

## 2017-07-27 ENCOUNTER — ANTI-COAG VISIT (OUTPATIENT)
Dept: CARDIOLOGY CLINIC | Age: 72
End: 2017-07-27

## 2017-07-27 VITALS
HEART RATE: 61 BPM | SYSTOLIC BLOOD PRESSURE: 136 MMHG | HEIGHT: 70 IN | DIASTOLIC BLOOD PRESSURE: 78 MMHG | WEIGHT: 249 LBS | BODY MASS INDEX: 35.65 KG/M2

## 2017-07-27 DIAGNOSIS — E11.21 TYPE 2 DIABETES MELLITUS WITH DIABETIC NEPHROPATHY, UNSPECIFIED LONG TERM INSULIN USE STATUS: Chronic | ICD-10-CM

## 2017-07-27 DIAGNOSIS — N18.30 CKD (CHRONIC KIDNEY DISEASE) STAGE 3, GFR 30-59 ML/MIN (HCC): Chronic | ICD-10-CM

## 2017-07-27 DIAGNOSIS — Z95.2 S/P AVR (AORTIC VALVE REPLACEMENT): ICD-10-CM

## 2017-07-27 DIAGNOSIS — Z95.2 S/P AVR (AORTIC VALVE REPLACEMENT): Primary | ICD-10-CM

## 2017-07-27 DIAGNOSIS — E78.5 DYSLIPIDEMIA: Chronic | ICD-10-CM

## 2017-07-27 DIAGNOSIS — E03.4 HYPOTHYROIDISM DUE TO ACQUIRED ATROPHY OF THYROID: Chronic | ICD-10-CM

## 2017-07-27 DIAGNOSIS — I10 ESSENTIAL HYPERTENSION: ICD-10-CM

## 2017-07-27 LAB
INR BLD: 3
PT POC: NORMAL SECONDS
VALID INTERNAL CONTROL?: YES

## 2017-07-27 NOTE — PATIENT INSTRUCTIONS
Patient will continue taking coumadin 10mg on Tuesday and Friday and 7.5mg on all other days until told to hold for surgery. He will then discontinue taking coumadin for his bioprosthetic valve. He voices understanding and acceptance of this advice and will call back if any further questions or concerns.

## 2017-07-27 NOTE — PROGRESS NOTES
1. Have you been to the ER, urgent care clinic since your last visit? Hospitalized since your last visit? No    2. Have you seen or consulted any other health care providers outside of the [address] [place] [address] since your last visit? Include any pap smears or colon screening. PCP    3. Since your last visit, have you had any of the following symptoms? .           4. Have you had any blood work, X-rays or cardiac testing? No              5.  Where do you normally have your labs drawn? PCP Office    6. Do you need any refills today?    No

## 2017-07-27 NOTE — MR AVS SNAPSHOT
Visit Information Date & Time Provider Department Dept. Phone Encounter #  
 7/27/2017  1:00 PM [address] [facility]uyler Valdovinos 835 [id number] Follow-up Instructions Routing History Your Appointments 8/11/2017 10:45 AM  
ROUTINE CARE with MD Mahad Ho ([facility]) Appt Note: medical clearance for right total knee replacement [address] [id number] [address]  
  
   
 [address] Upcoming Health Maintenance Date Due ZOSTER VACCINE AGE 60> 2/26/2006 EYE EXAM RETINAL OR DILATED Q1 10/14/2016 INFLUENZA AGE 9 TO ADULT 8/1/2017 Pneumococcal 65+ Low/Medium Risk (2 of 2 - PPSV23) 9/15/2017 GLAUCOMA SCREENING Q2Y 10/14/2017 HEMOGLOBIN A1C Q6M 11/18/2017 FOOT EXAM Q1 1/25/2018 MICROALBUMIN Q1 1/25/2018 LIPID PANEL Q1 4/25/2018 MEDICARE YEARLY EXAM 4/26/2018 FOBT Q 1 YEAR AGE 50-75 5/5/2018 DTaP/Tdap/Td series (2 - Td) 4/28/2025 Allergies as of 7/27/2017  Review Complete On: 7/27/2017 By: Joyce Belle Severity Noted Reaction Type Reactions Watermelon High 01/21/2015    Swelling Peach (Prunus Persica)  01/21/2015    Itching Current Immunizations  Reviewed on 9/15/2016 Name Date Influenza Vaccine (Quad) PF 9/17/2015 Pneumococcal Conjugate (PCV-13) 9/15/2016 Tdap 4/28/2015 Not reviewed this visit You Were Diagnosed With   
  
 Codes Comments S/P AVR (aortic valve replacement)     ICD-10-CM: Q81.6 ICD-9-CM: V43.3 Vitals Smoking Status Former Smoker Preferred Pharmacy Pharmacy Name Phone [facility], [address] [place] [phone] Your Updated Medication List  
  
   
This list is accurate as of: 7/27/17  1:38 PM.  Always use your most recent med list.  
  
  
  
  
 aspirin delayed-release 81 mg tablet Take 1 Tab by mouth daily. atorvastatin 40 mg tablet Commonly known as:  LIPITOR Take 20 mg by mouth daily. cholecalciferol 1,000 unit Cap Commonly known as:  VITAMIN D3 Take 3,000 Units by mouth daily. colchicine 0.6 mg capsule Commonly known as:  Giulia Dance Take 1 Cap by mouth two (2) times a day. Indications: acute gouty arthritis  
  
 docusate sodium 100 mg capsule Commonly known as:  Sveta Parsley Take 1 Cap by mouth two (2) times daily as needed for Constipation for up to 90 days. febuxostat 40 mg Tab tablet Commonly known as:  Ileana Dallas Take 1 Tab by mouth daily. Indications: GOUT PREVENTION  
  
 gabapentin 100 mg capsule Commonly known as:  NEURONTIN Take 1 Cap by mouth three (3) times daily. glucose blood VI test strips strip Commonly known as:  ASCENSIA AUTODISC VI, ONE TOUCH ULTRA TEST VI Dispense precision extra test strips 3 x day to check blood glucose  
  
 insulin aspart 100 unit/mL injection Commonly known as:  NOVOLOG  
by SubCUTAneous route. 6 units before meals  
  
 insulin glargine 100 unit/mL injection Commonly known as:  LANTUS Take 30 units once daily  Indications: type 2 diabetes mellitus  
  
 levothyroxine 200 mcg tablet Commonly known as:  SYNTHROID Take 1 Tab by mouth Daily (before breakfast). Indications: HYPOTHYROIDISM  
  
 magnesium chloride 64 mg delayed release tablet Commonly known as:  MAG DELAY Take 64 mg by mouth daily. metoprolol tartrate 25 mg tablet Commonly known as:  LOPRESSOR Take 1 Tab by mouth two (2) times a day. Omeprazole delayed release 20 mg tablet Commonly known as:  PRILOSEC D/R Take 20 mg by mouth daily. predniSONE 10 mg tablet Commonly known as:  Davi Maki Take 1 Tab by mouth two (2) times a day. spironolactone 25 mg tablet Commonly known as:  ALDACTONE Take 1 Tab by mouth daily. Indications: EDEMA testosterone 20.25 mg/1.25 gram (1.62 %) gel Commonly known as:  ANDROGEL Apply  to affected area as needed. traMADol 50 mg tablet Commonly known as:  ULTRAM  
Take 1 Tab by mouth two (2) times daily as needed for Pain. Max Daily Amount: 100 mg. Indications: NEUROPATHIC PAIN  
  
 VITAMIN B-12 1,000 mcg tablet Generic drug:  cyanocobalamin Take 1,000 mcg by mouth daily. VITAMIN B-6 100 mg tablet Generic drug:  pyridoxine (vitamin B6) Take 100 mg by mouth daily. warfarin 2.5 mg tablet Commonly known as:  COUMADIN Take 2 Tabs by mouth daily. Or as directed. Goal INR 2.5-3.5. July 2017 Details Sun Mon Tue Wed Thu Fri Sat  
        1  
  
  
  
  
  2  
  
  
  
   3  
  
  
  
   4  
  
  
  
   5  
  
  
  
   6  
  
  
  
   7  
  
  
  
   8  
  
  
  
  
  9  
  
  
  
   10  
  
  
  
   11  
  
  
  
   12  
  
  
  
   13  
  
  
  
   14  
  
  
  
   15  
  
  
  
  
  16  
  
  
  
   17  
  
  
  
   18  
  
  
  
   19  
  
  
  
   20  
  
  
  
   21  
  
  
  
   22  
  
  
  
  
  23  
  
  
  
   24  
  
  
  
   25  
  
  
  
   26  
  
  
  
   27  
  
7.5 mg  
See details 28  
  
10 mg  
  
   29  
  
7.5 mg  
  
  
  30  
  
7.5 mg  
  
   31  
  
7.5 mg Date Details 07/27 This INR check INR: 3.0 How to take your warfarin dose To take:  7.5 mg Take three of the 2.5 mg tablets. To take:  10 mg Take four of the 2.5 mg tablets. August 2017 Details Beena Mcnair Tue Wed Thu Fri Sat  
    1  
  
10 mg  
  
   2  
  
7.5 mg  
  
   3  
  
7.5 mg  
  
   4  
  
10 mg  
  
   5  
  
7.5 mg  
  
  
  6  
  
7.5 mg  
  
   7  
  
7.5 mg  
  
   8  
  
10 mg  
  
   9  
  
7.5 mg  
  
   10  
  
7.5 mg  
  
   11  
  
10 mg  
  
   12  
  
7.5 mg  
  
  
  13  
  
7.5 mg  
  
   14  
  
7.5 mg  
  
   15  
  
10 mg  
  
   16  
  
7.5 mg  
  
   17  
  
7.5 mg  
  
   18  
  
10 mg  
  
   19  
  
7.5 mg  
 20  
  
  
  
   21  
  
  
  
   22  
  
  
  
   23  
  
  
  
   24  
  
  
  
   25  
  
  
  
   26  
  
  
  
  
  27  
  
  
  
   28  
  
  
  
   29  
  
  
  
   30  
  
  
  
   31  
  
  
  
    
 Date Details No additional details Date of next INR:  8/19/2017 How to take your warfarin dose To take:  7.5 mg Take three of the 2.5 mg tablets. To take:  10 mg Take four of the 2.5 mg tablets. We Performed the Following AMB POC PT/INR [14876 CPT(R)] To-Do List   
 07/28/2017 12:30 PM  
  Appointment with [facility] REHAB/WELLNESS at [place] [address] ([phone])  
  
 07/31/2017 12:30 PM  
  Appointment with [facility] REH[facility]WELLNESS at [place] [address] ([phone])  
  
 08/02/2017 12:30 PM  
  Appointment with [facility] REH[facility]WELLNESS at [place] [address] ([phone])  
  
 08/04/2017 12:30 PM  
  Appointment with [facility] REH[facility]WELLNESS at [place] [address] ([phone])  
  
 08/07/2017 12:30 PM  
  [facility] at [place] [address] ([phone])  
  
 08/09/2017 12:30 PM  
  Appointment with [facility] REHAB/WELLNESS at [place] [address] ([phone])  
  
 08/11/2017 12:30 PM  
  Appointment with [facility] REHAB/WELLNESS at [place] [address] ([phone])  
  
 08/14/2017 12:30 PM  
  Appointment with [facility] REHAB/WELLNESS at [place] [address] ([phone])  
  
 08/16/2017 12:30 PM  
  [facility] at [place] [address] ([phone])  
  
 08/18/2017 12:30 PM  
  Appointment with [facility] REHAB/WELLNESS at [place] [address] ([phone])  
  
 08/21/2017 12:30 PM  
  Appointment with [facility] REH[facility]/WELLNESS at [place] [address] ([phone])  
  
 08/23/2017 12:30 PM  
  Appointment with [facility] REHAB/WELLNESS at [place] [address] ([phone])  
  
 08/25/2017 12:30 PM  
  [facility] at [place] [address] ([phone]) 08/28/2017 12:30 PM  
  Appointment with [address] REHAB/WELLNESS at [facility] ([phone])  
  
 08/30/2017 12:30 PM  
  Appointment with [address] REHAB/WELLNESS at [facility] ([phone])  
  
 09/01/2017 12:30 PM  
  Appointment with [address] REHAB/WELLNESS at [place] [address] ([phone])  
  
 09/06/2017 12:30 PM  
  Appointment with [address] REHAB/WELLNESS at [place] [address] ([phone])  
  
 09/08/2017 12:30 PM  
  Appointment with [address] REHAB/WELLNESS at [place] [address] ([phone])  
  
 09/11/2017 12:30 PM  
  Appointment with [address] REHAB/WELLNESS at [place] [address] ([phone])  
  
 09/13/2017 12:30 PM  
  Appointment with [address] REHAB/WELLNESS at [place] [address] ([phone])  
  
 09/15/2017 12:30 PM  
  Appointment with [address] REHAB/WELLNESS at [place] [address] ([phone])  
  
 09/18/2017 12:30 PM  
  Appointment with [address] REHAB/WELLNESS at [place] [address] ([phone])  
  
 09/20/2017 12:30 PM  
  Appointment with [address] REHAB/WELLNESS at [place] [address] ([phone])  
  
 09/22/2017 12:30 PM  
  Appointment with [address] REHAB/WELLNESS at [place] [address] ([phone])  
  
 09/25/2017 12:30 PM  
  Appointment with [address] REHAB/WELLNESS at [place] [address] ([phone])  
  
 09/27/2017 12:30 PM  
  Appointment with [address] REHAB/WELLNESS at [place] [address] ([phone])  
  
 09/29/2017 12:30 PM  
  Appointment with [address] REHAB/WELLNESS at [place] [address] ([phone]) 10/02/2017 12:30 PM  
  Appointment with [address] [place] [address] REHAB/WELLNESS at [place] [address] ([phone]) [facility] SERVICES! Dear Nat Paredes: Thank you for requesting a [employer] account. Our records indicate that you already have an active [employer] account. You can access your account anytime at [email or web address] Did you know that you can access your hospital and ER discharge instructions at any time in [employer]? You can also review all of your test results from your hospital stay or ER visit. Additional Information If you have questions, please visit the Frequently Asked Questions section of the [employer] website at [email or web address] Remember, [employer] is NOT to be used for urgent needs. For medical emergencies, dial 911. Now available from your iPhone and Android! Please provide this summary of care documentation to your next provider. Your primary care clinician is listed as Aroldo Perez. If you have any questions after today's visit, please call [phone].

## 2017-07-28 ENCOUNTER — HOSPITAL ENCOUNTER (OUTPATIENT)
Dept: CARDIAC REHAB | Age: 72
Discharge: HOME | End: 2017-07-28
Payer: MEDICARE

## 2017-07-28 PROCEDURE — 93798 PHYS/QHP OP CAR RHAB W/ECG: CPT

## 2017-07-31 ENCOUNTER — DOCUMENTATION ONLY (OUTPATIENT)
Dept: CARDIOTHORACIC SURGERY | Age: 72
End: 2017-07-31

## 2017-07-31 ENCOUNTER — HOSPITAL ENCOUNTER (OUTPATIENT)
Dept: CARDIAC REHAB | Age: 72
Discharge: HOME | End: 2017-07-31
Payer: MEDICARE

## 2017-07-31 PROCEDURE — 93798 PHYS/QHP OP CAR RHAB W/ECG: CPT

## 2017-08-02 ENCOUNTER — HOSPITAL ENCOUNTER (OUTPATIENT)
Dept: CARDIAC REHAB | Age: 72
Discharge: HOME | End: 2017-08-02
Payer: MEDICARE

## 2017-08-02 PROCEDURE — 93798 PHYS/QHP OP CAR RHAB W/ECG: CPT

## 2017-08-04 ENCOUNTER — HOSPITAL ENCOUNTER (OUTPATIENT)
Dept: CARDIAC REHAB | Age: 72
Discharge: HOME | End: 2017-08-04
Payer: MEDICARE

## 2017-08-04 PROCEDURE — 93798 PHYS/QHP OP CAR RHAB W/ECG: CPT

## 2017-08-07 NOTE — PROGRESS NOTES
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 Hypertension   The history is provided by the patient. This is a chronic problem. The current episode started more than 1 week ago. The problem occurs every several days. The problem has not changed since onset. Associated symptoms include shortness of breath. Pertinent negatives include no chest pain. Aortic Stenosis   The history is provided by the patient. The problem has been resolved. Associated symptoms include shortness of breath. Pertinent negatives include no chest pain. Shortness of Breath   The history is provided by the patient. This is a chronic problem. The problem occurs intermittently. The current episode started more than 1 week ago. The problem has been gradually improving. Pertinent negatives include no fever, no ear pain, no neck pain, no cough, no sputum production, no hemoptysis, no wheezing, no PND, no orthopnea, no chest pain, no syncope, no vomiting, no rash, no leg pain, no leg swelling and no claudication. Associated medical issues do not include chronic lung disease, CAD or heart failure. Review of Systems   Constitutional: Negative for chills, diaphoresis, fever, malaise/fatigue and weight loss. HENT: Negative for ear discharge, ear pain, hearing loss, nosebleeds and tinnitus. Eyes: Negative for blurred vision. Respiratory: Positive for shortness of breath. Negative for cough, hemoptysis, sputum production, wheezing and stridor. Cardiovascular: Negative for chest pain, palpitations, orthopnea, claudication, leg swelling, syncope and PND. Gastrointestinal: Negative for heartburn, nausea and vomiting. Musculoskeletal: Negative for myalgias and neck pain. Skin: Negative for itching and rash. Neurological: Negative for dizziness, tingling, tremors, focal weakness, loss of consciousness and weakness. Psychiatric/Behavioral: Negative for depression and suicidal ideas.      Family History   Problem Relation Age of Onset    Hypertension Mother     Thyroid Disease Mother     Hypertension Father     Diabetes Father     Drug Abuse Brother        Past Medical History:   Diagnosis Date    Diabetes ([facility] 1995    Dyslipidemia 3/1/2017    Fractures 1995    R Knee/ Tib fib fracture/surgery    Gout 2010    Graves disease 1/21/2015    High cholesterol 2005    HTN (hypertension) 1990    Osteoarthritis 2013    Severe aortic stenosis 3/29/2017    Spinal stenosis of lumbar region 3/1/2016    Spondylolisthesis 3/1/2016    Thyroid trouble     Type 2 diabetes mellitus with diabetic nephropathy ([facility] 4/28/2015       Past Surgical History:   Procedure Laterality Date    HX HIP REPLACEMENT Left 2009    HX ORTHOPAEDIC Right     R knee/Tibfib surgery       Social History   Substance Use Topics    Smoking status: Former Smoker     Packs/day: 1.00     Types: Cigarettes     Start date: 1/1/1969     Quit date: 1/1/1995    Smokeless tobacco: Never Used    Alcohol use No       Allergies   Allergen Reactions    Watermelon Swelling    Peach (Prunus Persica) Itching       Outpatient Prescriptions Marked as Taking for the 7/27/17 encounter (Office Visit) with Chase Vance MD   Medication Sig Dispense Refill    pyridoxine, vitamin B6, (VITAMIN B-6) 100 mg tablet Take 100 mg by mouth daily.  warfarin (COUMADIN) 2.5 mg tablet Take 2 Tabs by mouth daily. Or as directed. Goal INR 2.5-3.5. 100 Tab 2    traMADol (ULTRAM) 50 mg tablet Take 1 Tab by mouth two (2) times daily as needed for Pain. Max Daily Amount: 100 mg. Indications: NEUROPATHIC PAIN 60 Tab 0    metoprolol tartrate (LOPRESSOR) 25 mg tablet Take 1 Tab by mouth two (2) times a day. 180 Tab 3    magnesium chloride (MAG DELAY) 64 mg delayed release tablet Take 64 mg by mouth daily.  aspirin delayed-release 81 mg tablet Take 1 Tab by mouth daily.  30 Tab 2    insulin glargine (LANTUS) 100 unit/mL injection Take 30 units once daily  Indications: type 2 diabetes mellitus 3 Vial 3    cyanocobalamin (VITAMIN B-12) 1,000 mcg tablet Take 1,000 mcg by mouth daily.  febuxostat (ULORIC) 40 mg tab tablet Take 1 Tab by mouth daily. Indications: GOUT PREVENTION 90 Tab 1    levothyroxine (SYNTHROID) 200 mcg tablet Take 1 Tab by mouth Daily (before breakfast). Indications: HYPOTHYROIDISM (Patient taking differently: Take 175 mcg by mouth Daily (before breakfast). Indications: hypothyroidism) 30 Tab 5    insulin aspart (NOVOLOG) 100 unit/mL injection by SubCUTAneous route. 6 units before meals      spironolactone (ALDACTONE) 25 mg tablet Take 1 Tab by mouth daily. Indications: EDEMA 30 Tab 3    glucose blood VI test strips (ASCENSIA AUTODISC VI, ONE TOUCH ULTRA TEST VI) strip Dispense precision extra test strips 3 x day to check blood glucose 200 Each 3    Omeprazole delayed release (PRILOSEC D/R) 20 mg tablet Take 20 mg by mouth daily.  atorvastatin (LIPITOR) 40 mg tablet Take 20 mg by mouth daily.  testosterone (ANDROGEL) 20.25 mg/1.25 gram (1.62 %) gel Apply  to affected area as needed.  Cholecalciferol, Vitamin D3, 1,000 unit cap Take 3,000 Units by mouth daily. Visit Vitals    /78    Pulse 61    Ht 5' 10\" (1.778 m)    Wt 112.9 kg (249 lb)    BMI 35.73 kg/m2     Physical Exam   Constitutional: He is oriented to person, place, and time. He appears well-developed and well-nourished. No distress. HENT:   Head: Atraumatic. Mouth/Throat: No oropharyngeal exudate. Eyes: Conjunctivae are normal. Right eye exhibits no discharge. Left eye exhibits no discharge. No scleral icterus. Neck: Normal range of motion. Neck supple. No JVD present. No tracheal deviation present. No thyromegaly present. Cardiovascular: Normal rate and regular rhythm. Exam reveals no gallop. No murmur heard. Surgical sternal scar   Pulmonary/Chest: Effort normal and breath sounds normal. No stridor. No respiratory distress. He has no wheezes. He has no rales. He exhibits no tenderness. Abdominal: Soft. There is no tenderness. There is no rebound and no guarding. Musculoskeletal: Normal range of motion. He exhibits no edema or tenderness. Lymphadenopathy:     He has no cervical adenopathy. Neurological: He is alert and oriented to person, place, and time. He exhibits normal muscle tone. Skin: Skin is warm. He is not diaphoretic. Psychiatric: He has a normal mood and affect. His behavior is normal.     ekg sinus rhythm with PVC, no acute st-t changes  Echo 02/2017:  SUMMARY:  Left ventricle: Systolic function was normal by visual assessment. Ejection fraction was estimated in the range of 60 % to 65 %. No obvious  wall motion abnormalities identified in the views obtained. Wall thickness  was at the upper limits of normal.    Aortic valve: Leaflets exhibited markedly increased thickness and reduced  mobility. There was moderate to severe stenosis. Valve peak gradient was  61 mmHg. Valve mean gradient was 39 mmHg. Estimated aortic valve area (by  VTI) was 0.5 cmï¾². ZACKERY likely overestimates severity of aortic stenosis due  to difficulty in measuring LVOT diameter. Aorta, systemic arteries: The root exhibited dilatation. ASSESSMENT and PLAN    ICD-10-CM ICD-9-CM    1. S/P AVR (aortic valve replacement) Z95.2 V43.3    2. Essential hypertension I10 401.9 AMB POC EKG ROUTINE W/ 12 LEADS, INTER & REP   3. Dyslipidemia E78.5 272.4    4. Hypothyroidism due to acquired atrophy of thyroid E03.4 244.8      246.8    5. CKD (chronic kidney disease) stage 3, GFR 30-59 ml/min N18.3 585.3    6. Type 2 diabetes mellitus with diabetic nephropathy, unspecified long term insulin use status (HCC) E11.21 250.40      583.81      Orders Placed This Encounter    AMB POC EKG ROUTINE W/ 12 LEADS, INTER & REP     Order Specific Question:   Reason for Exam:     Answer:   htn     Follow-up Disposition:  Return in about 6 months (around 1/27/2018).     reviewed diet, exercise and weight control  cardiovascular risk and specific lipid/LDL goals reviewed  use of aspirin to prevent MI and TIA's discussed. Now S/p AVR. Now stable post op course. He can now proceed to knee surgery with low cardiac risk. Okay to discontinue coumadin after completing 3 months of anticoagulation per CT surgery recommendation.   Post surgery recommend to take aspirin 81mg daily

## 2017-08-09 ENCOUNTER — HOSPITAL ENCOUNTER (OUTPATIENT)
Dept: CARDIAC REHAB | Age: 72
Discharge: HOME | End: 2017-08-09
Payer: MEDICARE

## 2017-08-09 PROCEDURE — 93798 PHYS/QHP OP CAR RHAB W/ECG: CPT

## 2017-08-11 ENCOUNTER — OFFICE VISIT (OUTPATIENT)
Dept: FAMILY MEDICINE CLINIC | Age: 72
End: 2017-08-11

## 2017-08-11 ENCOUNTER — HOSPITAL ENCOUNTER (OUTPATIENT)
Dept: CARDIAC REHAB | Age: 72
Discharge: HOME | End: 2017-08-11
Payer: MEDICARE

## 2017-08-11 ENCOUNTER — HOSPITAL ENCOUNTER (OUTPATIENT)
Dept: LAB | Age: 72
Discharge: HOME | End: 2017-08-11
Payer: MEDICARE

## 2017-08-11 VITALS
DIASTOLIC BLOOD PRESSURE: 83 MMHG | RESPIRATION RATE: 18 BRPM | WEIGHT: 241 LBS | HEART RATE: 64 BPM | TEMPERATURE: 98 F | SYSTOLIC BLOOD PRESSURE: 153 MMHG | BODY MASS INDEX: 34.5 KG/M2 | OXYGEN SATURATION: 97 % | HEIGHT: 70 IN

## 2017-08-11 DIAGNOSIS — M43.10 SPONDYLOLISTHESIS, UNSPECIFIED SPINAL REGION: ICD-10-CM

## 2017-08-11 DIAGNOSIS — I15.0 RENOVASCULAR HYPERTENSION: Chronic | ICD-10-CM

## 2017-08-11 DIAGNOSIS — E03.4 HYPOTHYROIDISM DUE TO ACQUIRED ATROPHY OF THYROID: Chronic | ICD-10-CM

## 2017-08-11 DIAGNOSIS — M25.562 LEFT KNEE PAIN, UNSPECIFIED CHRONICITY: ICD-10-CM

## 2017-08-11 DIAGNOSIS — Z01.818 PREOP EXAMINATION: Primary | ICD-10-CM

## 2017-08-11 DIAGNOSIS — G62.9 NEUROPATHY: ICD-10-CM

## 2017-08-11 PROCEDURE — 93798 PHYS/QHP OP CAR RHAB W/ECG: CPT

## 2017-08-11 PROCEDURE — 80307 DRUG TEST PRSMV CHEM ANLYZR: CPT | Performed by: FAMILY MEDICINE

## 2017-08-11 RX ORDER — AMLODIPINE BESYLATE 10 MG/1
5 TABLET ORAL DAILY
COMMUNITY
Start: 2017-08-11 | End: 2021-08-24 | Stop reason: SDUPTHER

## 2017-08-11 RX ORDER — MAGNESIUM CHLORIDE 70 MG
TABLET, DELAYED RELEASE (ENTERIC COATED) ORAL
COMMUNITY
Start: 2017-08-11

## 2017-08-11 RX ORDER — TRAMADOL HYDROCHLORIDE 50 MG/1
50 TABLET ORAL
Qty: 60 TAB | Refills: 0 | Status: SHIPPED | OUTPATIENT
Start: 2017-08-11 | End: 2017-12-20 | Stop reason: SDUPTHER

## 2017-08-11 RX ORDER — LEVOTHYROXINE SODIUM 125 UG/1
187.5 TABLET ORAL
Qty: 45 TAB | Refills: 2 | Status: SHIPPED | OUTPATIENT
Start: 2017-08-11 | End: 2018-03-27

## 2017-08-11 NOTE — PATIENT INSTRUCTIONS
Hypothyroidism: Care Instructions  Your Care Instructions  You have hypothyroidism, which means that your body is not making enough thyroid hormone. This hormone helps your body use energy. If your thyroid level is low, you may feel tired, be constipated, have an increase in your blood pressure, or have dry skin or memory problems. You may also get cold easily, even when it is warm. Women with low thyroid levels may have heavy menstrual periods. A blood test to find your thyroid-stimulating hormone (TSH) level is used to check for hypothyroidism. A high TSH level may mean that you have low thyroid. When your body is not making enough thyroid hormone, TSH levels rise in an effort to make the body produce more. The treatment for hypothyroidism is to take thyroid hormone pills. You should start to feel better in 1 to 2 weeks. But it can take several months to see changes in the TSH level. You will need regular visits with your doctor to make sure you have the right dose of medicine. Most people need treatment for the rest of their lives. You will need to see your doctor regularly to have blood tests and to make sure you are doing well. Follow-up care is a key part of your treatment and safety. Be sure to make and go to all appointments, and call your doctor if you are having problems. Its also a good idea to know your test results and keep a list of the medicines you take. How can you care for yourself at home? · Take your thyroid hormone medicine exactly as prescribed. Call your doctor if you think you are having a problem with your medicine. Most people do not have side effects if they take the right amount of medicine regularly. ¨ Take the medicine 30 minutes before breakfast, and do not take it with calcium, vitamins, or iron. ¨ Do not take extra doses of your thyroid medicine. It will not help you get better any faster, and it may cause side effects.   ¨ If you forget to take a dose, do NOT take a double dose of medicine. Take your usual dose the next day. · Tell your doctor about all prescription, herbal, or over-the-counter products you take. · Take care of yourself. Eat a healthy diet, get enough sleep, and get regular exercise. When should you call for help? Call 911 anytime you think you may need emergency care. For example, call if:  · You passed out (lost consciousness). · You have severe trouble breathing. · You have a very slow heartbeat (less than 60 beats a minute). · You have a low body temperature (95°F or below). Call your doctor now or seek immediate medical care if:  · You feel tired, sluggish, or weak. · You have trouble remembering things or concentrating. · You do not begin to feel better 2 weeks after starting your medicine. Watch closely for changes in your health, and be sure to contact your doctor if you have any problems. Where can you learn more? Go to http://adal-janes.info/. Enter Z647 in the search box to learn more about \"Hypothyroidism: Care Instructions. \"  Current as of: July 28, 2016  Content Version: 11.3  © 9695-8439 [employer]. Care instructions adapted under license by [employer] (which disclaims liability or warranty for this information). If you have questions about a medical condition or this instruction, always ask your healthcare professional. [address] any warranty or liability for your use of this information.

## 2017-08-11 NOTE — PROGRESS NOTES
Chief Complaint   Patient presents with    Surgical Clearance     right knee      1. Have you been to the ER, urgent care clinic since your last visit? Hospitalized since your last visit? No    2. Have you seen or consulted any other health care providers outside of the [address] [place] [address] since your last visit? Include any pap smears or colon screening. No     HPI   A [employer]. comes in for preop evaluation. Planned surgery: Right Total Knee Arthroplasty  Date of surgery: 08/22/2017  Referring physician: Ana Rosa Mcfadden MD.  [facility] of surgery: [facility]  Indication for procedure: Right total knee tricompartmental osteoarthritis and knee pain. Akilah Arevalo. is a patient who comes in for preop evaluation. Patient has severe tricompartmental osteoarthritis of the right knee. Has been reviewed by the orthopedist.  He has intractable pain in that knee. He has in the past tried conservative measures but the pain has gotten worse. This limits his activity. He is now planned for total knee arthroplasty. Patient has a history of aortic stenosis and did have aortic valve replacement. He has been on Coumadin. He has been seen by the cardiologist.  He has been cleared to undergo the right total knee replacement. Surgery is low risk. He has had blood work done in the urine tests. The urine culture is negative. Lab results are stable. Patient will take Coumadin up to the 16th of this month. He will take metoprolol on the morning of his surgery. He may take his blood pressure medications on the morning of surgery. Patient is also diabetic and he takes Lantus. Takes this at bedtime and he will take his usual dose of Lantus on the night prior to surgery. Will not take NovoLog on the morning of surgery. This is because he will  be fasting. Rest of the medications will take up to the day prior to surgery. He is medically stabilized and cleared to undergo planned procedure.   Knee pain: Patient has chronic knee pain. Has taken tramadol for the pain with some relief. Would like a refill of this. He did sign controlled substance agreement. We will do compliance urine drug screen. Tramadol refilled given after I looked at the  and that was stable. Hypothyroidism: Patient has a history of hypothyroidism. Takes 175 mcg of Synthroid. Most recently did take 187.5 mcg of Synthroid and this seems to help him better. States he has tremors that seemed to improve. He would like to have the higher dose of the Synthroid. I will give him 125 mcg to take 1.5 tabs daily. Past Medical History  Past Medical History:   Diagnosis Date    Diabetes ([facility] 1995    Dyslipidemia 3/1/2017    Fractures 1995    R Knee/ Tib fib fracture/surgery    Gout 2010    Graves disease 1/21/2015    High cholesterol 2005    HTN (hypertension) 1990    Osteoarthritis 2013    Severe aortic stenosis 3/29/2017    Spinal stenosis of lumbar region 3/1/2016    Spondylolisthesis 3/1/2016    Thyroid trouble     Type 2 diabetes mellitus with diabetic nephropathy ([facility] 4/28/2015       Surgical History  Past Surgical History:   Procedure Laterality Date    HX HIP REPLACEMENT Left 2009    HX ORTHOPAEDIC Right     R knee/Tibfib surgery        Medications  Current Outpatient Prescriptions   Medication Sig Dispense Refill    magnesium chloride (MAG DELAY) 64 mg delayed release tablet Take 8 tablets 3 x day      levothyroxine (SYNTHROID) 125 mcg tablet Take 1.5 Tabs by mouth Daily (before breakfast). 45 Tab 2    traMADol (ULTRAM) 50 mg tablet Take 1 Tab by mouth two (2) times daily as needed for Pain. Max Daily Amount: 100 mg. Indications: NEUROPATHIC PAIN 60 Tab 0    amLODIPine (NORVASC) 10 mg tablet Take 1 Tab by mouth daily.  pyridoxine, vitamin B6, (VITAMIN B-6) 100 mg tablet Take 100 mg by mouth daily.  warfarin (COUMADIN) 2.5 mg tablet Take 2 Tabs by mouth daily. Or as directed.  Goal INR 2.5-3.5. 100 Tab 2  metoprolol tartrate (LOPRESSOR) 25 mg tablet Take 1 Tab by mouth two (2) times a day. 180 Tab 3    aspirin delayed-release 81 mg tablet Take 1 Tab by mouth daily. 30 Tab 2    insulin glargine (LANTUS) 100 unit/mL injection Take 30 units once daily  Indications: type 2 diabetes mellitus 3 Vial 3    cyanocobalamin (VITAMIN B-12) 1,000 mcg tablet Take 1,000 mcg by mouth daily.  febuxostat (ULORIC) 40 mg tab tablet Take 1 Tab by mouth daily. Indications: GOUT PREVENTION 90 Tab 1    insulin aspart (NOVOLOG) 100 unit/mL injection by SubCUTAneous route. 6 units before meals      spironolactone (ALDACTONE) 25 mg tablet Take 1 Tab by mouth daily. Indications: EDEMA 30 Tab 3    glucose blood VI test strips (ASCENSIA AUTODISC VI, ONE TOUCH ULTRA TEST VI) strip Dispense precision extra test strips 3 x day to check blood glucose 200 Each 3    Omeprazole delayed release (PRILOSEC D/R) 20 mg tablet Take 20 mg by mouth daily.  atorvastatin (LIPITOR) 40 mg tablet Take 20 mg by mouth daily.  testosterone (ANDROGEL) 20.25 mg/1.25 gram (1.62 %) gel Apply  to affected area as needed.  Cholecalciferol, Vitamin D3, 1,000 unit cap Take 3,000 Units by mouth daily. Allergies  Allergies   Allergen Reactions    Watermelon Swelling    Peach (Prunus Persica) Itching       Family History  Family History   Problem Relation Age of Onset    Hypertension Mother     Thyroid Disease Mother     Hypertension Father     Diabetes Father     Drug Abuse Brother        Social History  Social History     Social History    Marital status:      Spouse name: N/A    Number of children: N/A    Years of education: N/A     Occupational History    Not on file.      Social History Main Topics    Smoking status: Former Smoker     Packs/day: 1.00     Types: Cigarettes     Start date: 1/1/1969     Quit date: 1/1/1995    Smokeless tobacco: Never Used    Alcohol use No    Drug use: No    Sexual activity: Yes Partners: Female     Other Topics Concern     Service Yes     Served in [address] Blood Transfusions No    Caffeine Concern No    Occupational Exposure No    Hobby Hazards No    Sleep Concern No    Stress Concern No    Weight Concern Yes    Special Diet No    Back Care Yes     Lower Back pain when walking    Exercise No    Bike Helmet No    Seat Belt Yes    Self-Exams Yes     Social History Narrative       Review of Systems  Review of Systems - History obtained from chart review and the patient  General ROS: positive for  - malaise  negative for - chills, fatigue or fever  Psychological ROS: negative  Ophthalmic ROS: positive for - uses glasses  ENT ROS: negative  Allergy and Immunology ROS: negative  Hematological and Lymphatic ROS: on coumadin.   Endocrine ROS: DM2, Hypothyroidism  Respiratory ROS: no cough, shortness of breath, or wheezing  Cardiovascular ROS: no chest pain or dyspnea on exertion  Gastrointestinal ROS: no abdominal pain, change in bowel habits, or black or bloody stools  Genito-Urinary ROS: no dysuria, trouble voiding, or hematuria  Musculoskeletal ROS: positive for - pain in knee - right  Neurological ROS: negative    Vital Signs  Visit Vitals    /83 (BP 1 Location: Left arm, BP Patient Position: Sitting)    Pulse 64    Temp 98 °F (36.7 °C) (Oral)    Resp 18    Ht 5' 10\" (1.778 m)    Wt 241 lb (109.3 kg)    SpO2 97%    BMI 34.58 kg/m2         Physical Exam  Physical Examination: General appearance - oriented to person, place, and time, normal appearing weight, acyanotic, in no respiratory distress and well hydrated  Mental status - alert, oriented to person, place, and time, normal mood, behavior, speech, dress, motor activity, and thought processes  Nose - normal and patent, no erythema, discharge or polyps and normal nontender sinuses  Mouth - mucous membranes moist, pharynx normal without lesions  Neck - supple, no significant adenopathy  Lymphatics - no palpable lymphadenopathy  Chest - clear to auscultation, no wheezes, rales or rhonchi, symmetric air entry  Heart - systolic murmur 2/6 at 2nd right intercostal space  Abdomen - soft, nontender, nondistended, no masses or organomegaly  Neurological - alert, oriented, normal speech, no focal findings or movement disorder noted, motor and sensory grossly normal bilaterally  Musculoskeletal - no joint tenderness, deformity or swelling  Extremities - no pedal edema noted, intact peripheral pulses    Diagnostics  Orders Placed This Encounter    magnesium chloride (MAG DELAY) 64 mg delayed release tablet     Sig: Take 8 tablets 3 x day    levothyroxine (SYNTHROID) 125 mcg tablet     Sig: Take 1.5 Tabs by mouth Daily (before breakfast). Dispense:  45 Tab     Refill:  2    traMADol (ULTRAM) 50 mg tablet     Sig: Take 1 Tab by mouth two (2) times daily as needed for Pain. Max Daily Amount: 100 mg. Indications: NEUROPATHIC PAIN     Dispense:  60 Tab     Refill:  0    amLODIPine (NORVASC) 10 mg tablet     Sig: Take 1 Tab by mouth daily. Results  Results for orders placed or performed in visit on 07/27/17   AMB POC PT/INR   Result Value Ref Range    VALID INTERNAL CONTROL POC Yes     Prothrombin time (POC)  seconds    INR POC 3.0      ASSESSMENT and PLAN    ICD-10-CM ICD-9-CM    1. Preop examination Z01.818 V72.84    2. Neuropathy (HCC) G62.9 355.9 traMADol (ULTRAM) 50 mg tablet   3. Left knee pain, unspecified chronicity M25.562 719.46 traMADol (ULTRAM) 50 mg tablet      COMPLIANCE DRUG SCREEN/PRESCRIPTION MONITORING   4. Renovascular hypertension I15.0 405.91    5.  Hypothyroidism due to acquired atrophy of thyroid E03.4 244.8      246.8    6. Spondylolisthesis, unspecified spinal region M43.10 756.12 COMPLIANCE DRUG SCREEN/PRESCRIPTION MONITORING     lab results and schedule of future lab studies reviewed with patient  reviewed diet, exercise and weight control  reviewed medications and side effects in detail      I have discussed the diagnosis with the patient and the intended plan of care as seen in the above orders. The patient has received an after-visit summary and questions were answered concerning future plans. I have discussed medication, side effects, and warnings with the patient in detail. The patient verbalized understanding and is in agreement with the plan of care. The patient will contact the office with any additional concerns.     Kalyani Bergman MD

## 2017-08-11 NOTE — MR AVS SNAPSHOT
Visit Information Date & Time Provider Department Dept. Phone Encounter #  
 8/11/2017 10:15 AM Hesed Roxanne Page MD [facility] [phone] [id number] Follow-up Instructions Return in about 6 weeks (around 9/22/2017), or if symptoms worsen or fail to improve, for dm2, knee pain. Your Appointments 1/31/2018  9:45 AM  
ESTABLISHED PATIENT with Demetria Leggett MD  
[facility] ([address] [facility]) Appt Note: 6 month [address] [place] [address]  
  
   
 [address] [address] Upcoming Health Maintenance Date Due ZOSTER VACCINE AGE 60> 2/26/2006 EYE EXAM RETINAL OR DILATED Q1 10/14/2016 INFLUENZA AGE 9 TO ADULT 8/1/2017 Pneumococcal 65+ Low/Medium Risk (2 of 2 - PPSV23) 9/15/2017 GLAUCOMA SCREENING Q2Y 10/14/2017 HEMOGLOBIN A1C Q6M 11/18/2017 FOOT EXAM Q1 1/25/2018 MICROALBUMIN Q1 1/25/2018 LIPID PANEL Q1 4/25/2018 MEDICARE YEARLY EXAM 4/26/2018 FOBT Q 1 YEAR AGE 50-75 5/5/2018 DTaP/Tdap/Td series (2 - Td) 4/28/2025 Allergies as of 8/11/2017  Review Complete On: 8/11/2017 By: Tan Fabian MD  
  
 Severity Noted Reaction Type Reactions Watermelon High 01/21/2015    Swelling Peach (Prunus Persica)  01/21/2015    Itching Current Immunizations  Reviewed on 9/15/2016 Name Date Influenza Vaccine (Quad) PF 9/17/2015 Pneumococcal Conjugate (PCV-13) 9/15/2016 Tdap 4/28/2015 Not reviewed this visit You Were Diagnosed With   
  
 Codes Comments Preop examination    -  Primary ICD-10-CM: D88.902 ICD-9-CM: V72.84 Neuropathy ([facility] [address]     ICD-10-CM: G62.9 ICD-9-CM: 355.9 Left knee pain, unspecified chronicity     ICD-10-CM: S89.077 ICD-9-CM: 719.46 Renovascular hypertension     ICD-10-CM: I15.0 ICD-9-CM: 405.91 Hypothyroidism due to acquired atrophy of thyroid     ICD-10-CM: E03.4 ICD-9-CM: 244.8, 246.8 Spondylolisthesis, unspecified spinal region     ICD-10-CM: M43.10 ICD-9-CM: 756.12 Vitals BP Pulse Temp Resp Height(growth percentile) Weight(growth percentile) 153/83 (BP 1 Location: Left arm, BP Patient Position: Sitting) 64 98 °F (36.7 °C) (Oral) 18 5' 10\" (1.778 m) 241 lb (109.3 kg) SpO2 BMI Smoking Status 97% 34.58 kg/m2 Former Smoker Vitals History BMI and BSA Data Body Mass Index Body Surface Area 34.58 kg/m 2 2.32 m 2 Preferred Pharmacy Pharmacy Name Phone [facility] [address] [place] [phone] Your Updated Medication List  
  
   
This list is accurate as of: 8/11/17 11:34 AM.  Always use your most recent med list. amLODIPine 10 mg tablet Commonly known as:  Caralee Dupes Take 1 Tab by mouth daily. aspirin delayed-release 81 mg tablet Take 1 Tab by mouth daily. atorvastatin 40 mg tablet Commonly known as:  LIPITOR Take 20 mg by mouth daily. cholecalciferol 1,000 unit Cap Commonly known as:  VITAMIN D3 Take 3,000 Units by mouth daily. febuxostat 40 mg Tab tablet Commonly known as:  Luca Dianelys Take 1 Tab by mouth daily. Indications: GOUT PREVENTION  
  
 glucose blood VI test strips strip Commonly known as:  ASCENSIA AUTODISC VI, ONE TOUCH ULTRA TEST VI Dispense precision extra test strips 3 x day to check blood glucose  
  
 insulin aspart 100 unit/mL injection Commonly known as:  NOVOLOG  
by SubCUTAneous route. 6 units before meals  
  
 insulin glargine 100 unit/mL injection Commonly known as:  LANTUS Take 30 units once daily  Indications: type 2 diabetes mellitus  
  
 levothyroxine 125 mcg tablet Commonly known as:  SYNTHROID Take 1.5 Tabs by mouth Daily (before breakfast). magnesium chloride 64 mg delayed release tablet Commonly known as:  MAG DELAY Take 8 tablets 3 x day metoprolol tartrate 25 mg tablet Commonly known as:  LOPRESSOR Take 1 Tab by mouth two (2) times a day. Omeprazole delayed release 20 mg tablet Commonly known as:  PRILOSEC D/R Take 20 mg by mouth daily. spironolactone 25 mg tablet Commonly known as:  ALDACTONE Take 1 Tab by mouth daily. Indications: EDEMA  
  
 testosterone 20.25 mg/1.25 gram (1.62 %) gel Commonly known as:  ANDROGEL Apply  to affected area as needed. traMADol 50 mg tablet Commonly known as:  ULTRAM  
Take 1 Tab by mouth two (2) times daily as needed for Pain. Max Daily Amount: 100 mg. Indications: NEUROPATHIC PAIN  
  
 VITAMIN B-12 1,000 mcg tablet Generic drug:  cyanocobalamin Take 1,000 mcg by mouth daily. VITAMIN B-6 100 mg tablet Generic drug:  pyridoxine (vitamin B6) Take 100 mg by mouth daily. warfarin 2.5 mg tablet Commonly known as:  COUMADIN Take 2 Tabs by mouth daily. Or as directed. Goal INR 2.5-3.5. Prescriptions Printed Refills  
 levothyroxine (SYNTHROID) 125 mcg tablet 2 Sig: Take 1.5 Tabs by mouth Daily (before breakfast). Class: Print Route: Oral  
 traMADol (ULTRAM) 50 mg tablet 0 Sig: Take 1 Tab by mouth two (2) times daily as needed for Pain. Max Daily Amount: 100 mg. Indications: NEUROPATHIC PAIN Class: Print Route: Oral  
  
Follow-up Instructions Return in about 6 weeks (around 9/22/2017), or if symptoms worsen or fail to improve, for dm2, knee pain. To-Do List   
 08/11/2017   Lab:  COMPLIANCE DRUG SCREEN/PRESCRIPTION MONITORING   
  
 08/11/2017 12:30 PM  
  Appointment with [facility] at [place] [address] ([phone])  
  
 08/14/2017 12:30 PM  
  Appointment with [facility] at [place] [address] ([phone])  
  
 08/16/2017 12:30 PM  
  Appointment with [facility] at [place] [address] ([phone])  
  
 08/18/2017 12:30 PM  
 Appointment with [facility] at [place] [address] ([phone])  
  
 08/21/2017 12:30 PM  
  Appointment with [facility] at [place] [address] ([phone])  
  
 08/23/2017 12:30 PM  
  Appointment with [facility] at [place] [address] ([phone])  
  
 08/25/2017 12:30 PM  
  Appointment with [facility] at [place] [address] ([phone])  
  
 08/28/2017 12:30 PM  
  Appointment with [facility] at [place] [address] ([phone])  
  
 08/30/2017 12:30 PM  
  Appointment with [facility] at [place] [address] ([phone])  
  
 09/01/2017 12:30 PM  
  Appointment with [facility] at [place] [address] ([phone])  
  
 09/06/2017 12:30 PM  
  Appointment with [facility] at [place] [address] ([phone])  
  
 09/08/2017 12:30 PM  
  Appointment with [facility] at [place] [address] ([phone])  
  
 09/11/2017 12:30 PM  
  Appointment with [facility] at [place] [address] ([phone])  
  
 09/13/2017 12:30 PM  
  Appointment with [facility] at [place] [address] ([phone])  
  
 09/15/2017 12:30 PM  
  Appointment with [facility] at [place] [address] ([phone])  
  
 09/18/2017 12:30 PM  
  Appointment with [facility] at [place] [address] ([phone])  
  
 09/20/2017 12:30 PM  
  Appointment with [facility] at [place] [address] ([phone])  
  
 09/22/2017 12:30 PM  
  Appointment with [facility] at [place] [address] ([phone])  
  
 09/25/2017 12:30 PM  
  [facility] at [place] [address] ([phone])  
  
 09/27/2017 12:30 PM  
  Appointment with [facility] at [place] [address] ([phone])  
  
 09/29/2017 12:30 PM  
  Appointment with [facility] at [place] [address] ([phone]) 10/02/2017 12:30 PM  
  Appointment with [facility] at [facility] ([phone]) Patient Instructions Hypothyroidism: Care Instructions Your Care Instructions You have hypothyroidism, which means that your body is not making enough thyroid hormone. This hormone helps your body use energy. If your thyroid level is low, you may feel tired, be constipated, have an increase in your blood pressure, or have dry skin or memory problems. You may also get cold easily, even when it is warm. Women with low thyroid levels may have heavy menstrual periods. A blood test to find your thyroid-stimulating hormone (TSH) level is used to check for hypothyroidism. A high TSH level may mean that you have low thyroid. When your body is not making enough thyroid hormone, TSH levels rise in an effort to make the body produce more. The treatment for hypothyroidism is to take thyroid hormone pills. You should start to feel better in 1 to 2 weeks. But it can take several months to see changes in the TSH level. You will need regular visits with your doctor to make sure you have the right dose of medicine. Most people need treatment for the rest of their lives. You will need to see your doctor regularly to have blood tests and to make sure you are doing well. Follow-up care is a key part of your treatment and safety. Be sure to make and go to all appointments, and call your doctor if you are having problems. Its also a good idea to know your test results and keep a list of the medicines you take. How can you care for yourself at home? · Take your thyroid hormone medicine exactly as prescribed. Call your doctor if you think you are having a problem with your medicine. Most people do not have side effects if they take the right amount of medicine regularly. ¨ Take the medicine 30 minutes before breakfast, and do not take it with calcium, vitamins, or iron. ¨ Do not take extra doses of your thyroid medicine. It will not help you get better any faster, and it may cause side effects. ¨ If you forget to take a dose, do NOT take a double dose of medicine. Take your usual dose the next day. · Tell your doctor about all prescription, herbal, or over-the-counter products you take. · Take care of yourself. Eat a healthy diet, get enough sleep, and get regular exercise. When should you call for help? Call 911 anytime you think you may need emergency care. For example, call if: 
· You passed out (lost consciousness). · You have severe trouble breathing. · You have a very slow heartbeat (less than 60 beats a minute). · You have a low body temperature (95°F or below). Call your doctor now or seek immediate medical care if: 
· You feel tired, sluggish, or weak. · You have trouble remembering things or concentrating. · You do not begin to feel better 2 weeks after starting your medicine. Watch closely for changes in your health, and be sure to contact your doctor if you have any problems. Where can you learn more? Go to http://adal-janes.info/. Enter W396 in the search box to learn more about \"Hypothyroidism: Care Instructions. \" Current as of: July 28, 2016 Content Version: 11.3 © 2305-5645 [employer] Care instructions adapted under license by [employer] (which disclaims liability or warranty for this information). If you have questions about a medical condition or this instruction, always ask your healthcare professional. [place] [address] any warranty or liability for your use of this information. Introducing \[facility] [place] [facility]\"" & HEALTH SERVICES! Dear Quin Cooney: Thank you for requesting a [email or web address] account. Our records indicate that you already have an active [email or web address] account. You can access your account anytime at [email or web address] Did you know that you can access your hospital and ER discharge instructions at any time in [employer]? You can also review all of your test results from your hospital stay or ER visit. Additional Information If you have questions, please visit the Frequently Asked Questions section of the [employer] website at [email or web address] Remember, [employer] is NOT to be used for urgent needs. For medical emergencies, dial 911. Now available from your iPhone and Android! Please provide this summary of care documentation to your next provider. Your primary care clinician is listed as Aroldo Perez. If you have any questions after today's visit, please call [phone].

## 2017-08-16 ENCOUNTER — HOSPITAL ENCOUNTER (OUTPATIENT)
Dept: CARDIAC REHAB | Age: 72
Discharge: HOME | End: 2017-08-16
Payer: MEDICARE

## 2017-08-16 PROCEDURE — 93798 PHYS/QHP OP CAR RHAB W/ECG: CPT

## 2017-08-18 ENCOUNTER — HOSPITAL ENCOUNTER (OUTPATIENT)
Dept: CARDIAC REHAB | Age: 72
Discharge: HOME | End: 2017-08-18
Payer: MEDICARE

## 2017-08-18 PROCEDURE — 93798 PHYS/QHP OP CAR RHAB W/ECG: CPT

## 2017-08-19 LAB
DRUGS UR: NORMAL
PDF, 451356: NORMAL

## 2017-08-21 ENCOUNTER — TELEPHONE (OUTPATIENT)
Dept: CARDIAC REHAB | Age: 72
End: 2017-08-21

## 2017-08-29 ENCOUNTER — PATIENT OUTREACH (OUTPATIENT)
Dept: FAMILY MEDICINE CLINIC | Age: 72
End: 2017-08-29

## 2017-08-29 NOTE — PROGRESS NOTES
NNTOCIP     Patient discharged from [facility] for total right knee replacement, admission 8/22/2017 to 8/28/2017. Patient transferred to [facility] SNF on 8/28/2017. On hold for over 15 minutes waiting on nursing. Then the line was disconnected, will attempt to contact at a later time. Patient presenting symptoms:   Traumatic arthritis of the right knee    Plan of care:  1. Take medications as prescribed  2. Attend all follow up appointments  3. SNF  4. Diabetic diet  5.  Wound care    Appointments:  Need 7 day follow up with Dr. Areli Venegas:  Not on file

## 2017-08-30 ENCOUNTER — PATIENT OUTREACH (OUTPATIENT)
Dept: FAMILY MEDICINE CLINIC | Age: 72
End: 2017-08-30

## 2017-08-30 NOTE — PROGRESS NOTES
[facility]                    Nurse Navigator second attempted to contact [facility]. On hold for over 20 minutes waiting on nursing.

## 2017-09-05 ENCOUNTER — TELEPHONE (OUTPATIENT)
Dept: CARDIAC REHAB | Age: 72
End: 2017-09-05

## 2017-09-05 NOTE — TELEPHONE ENCOUNTER
Cardiac Rehab called patient to check in after his knee replacement surgery. He stated that during the surgery, his femur was fractured and that he's not sure when he will be able to come back to the program. He sees his doctor tomorrow, 9/6/17, and will let us know what he is told at that appointment. Will follow up if I don't hear anything.     Thank you,  Diamante Breen

## 2017-09-08 ENCOUNTER — TELEPHONE (OUTPATIENT)
Dept: CARDIAC REHAB | Age: 72
End: 2017-09-08

## 2017-09-08 NOTE — TELEPHONE ENCOUNTER
[facility]hab called patient and left him a message to return our call at his earliest convenience. He has his office visit on Wednesday and we were calling to see if he was able to come back to the program or if his physician advised him otherwise. Will follow up if I don't hear back.     Thank you,  Diamante Breen

## 2017-09-12 ENCOUNTER — TELEPHONE (OUTPATIENT)
Dept: CARDIAC REHAB | Age: 72
End: 2017-09-12

## 2017-09-12 NOTE — TELEPHONE ENCOUNTER
Cardiac Rehab called patient and left a message to get an update on whether or not he will be returning to the program. Patient was supposed to have an appointment last week with his doctor to determine this but I have not heard anything yet. Will follow up if I don't hear back.

## 2017-09-13 ENCOUNTER — TELEPHONE ANTICOAG (OUTPATIENT)
Dept: CARDIOLOGY CLINIC | Age: 72
End: 2017-09-13

## 2017-09-13 ENCOUNTER — TELEPHONE (OUTPATIENT)
Dept: CARDIOLOGY CLINIC | Age: 72
End: 2017-09-13

## 2017-09-13 NOTE — TELEPHONE ENCOUNTER
Patient called to discuss his discontinuation of coumadin after 3 months. He no longer takes coumadin. He voices understanding and acceptance of this advice and will call back if any further questions or concerns.

## 2017-12-20 ENCOUNTER — HOSPITAL ENCOUNTER (OUTPATIENT)
Dept: LAB | Age: 72
Discharge: HOME | End: 2017-12-20
Payer: MEDICARE

## 2017-12-20 ENCOUNTER — OFFICE VISIT (OUTPATIENT)
Dept: FAMILY MEDICINE CLINIC | Age: 72
End: 2017-12-20

## 2017-12-20 VITALS
OXYGEN SATURATION: 97 % | WEIGHT: 243 LBS | SYSTOLIC BLOOD PRESSURE: 144 MMHG | TEMPERATURE: 98.8 F | HEART RATE: 72 BPM | BODY MASS INDEX: 34.79 KG/M2 | RESPIRATION RATE: 18 BRPM | DIASTOLIC BLOOD PRESSURE: 82 MMHG | HEIGHT: 70 IN

## 2017-12-20 DIAGNOSIS — N18.30 CKD (CHRONIC KIDNEY DISEASE) STAGE 3, GFR 30-59 ML/MIN (HCC): Chronic | ICD-10-CM

## 2017-12-20 DIAGNOSIS — Z95.2 S/P AVR (AORTIC VALVE REPLACEMENT): ICD-10-CM

## 2017-12-20 DIAGNOSIS — M25.562 LEFT KNEE PAIN, UNSPECIFIED CHRONICITY: ICD-10-CM

## 2017-12-20 DIAGNOSIS — E83.42 HYPOMAGNESEMIA: ICD-10-CM

## 2017-12-20 DIAGNOSIS — G62.9 NEUROPATHY: ICD-10-CM

## 2017-12-20 DIAGNOSIS — E03.4 HYPOTHYROIDISM DUE TO ACQUIRED ATROPHY OF THYROID: Chronic | ICD-10-CM

## 2017-12-20 DIAGNOSIS — I10 ESSENTIAL HYPERTENSION: ICD-10-CM

## 2017-12-20 DIAGNOSIS — I35.0 SEVERE AORTIC STENOSIS: Chronic | ICD-10-CM

## 2017-12-20 DIAGNOSIS — M10.9 GOUT, UNSPECIFIED CAUSE, UNSPECIFIED CHRONICITY, UNSPECIFIED SITE: ICD-10-CM

## 2017-12-20 DIAGNOSIS — Z23 ENCOUNTER FOR IMMUNIZATION: ICD-10-CM

## 2017-12-20 DIAGNOSIS — E11.21 TYPE 2 DIABETES MELLITUS WITH DIABETIC NEPHROPATHY, UNSPECIFIED LONG TERM INSULIN USE STATUS: Chronic | ICD-10-CM

## 2017-12-20 DIAGNOSIS — E11.21 TYPE 2 DIABETES MELLITUS WITH DIABETIC NEPHROPATHY, UNSPECIFIED LONG TERM INSULIN USE STATUS: Primary | Chronic | ICD-10-CM

## 2017-12-20 LAB
ALBUMIN SERPL-MCNC: 3.5 G/DL (ref 3.4–5)
ALBUMIN/GLOB SERPL: 1 {RATIO} (ref 0.8–1.7)
ALP SERPL-CCNC: 93 U/L (ref 45–117)
ALT SERPL-CCNC: 28 U/L (ref 16–61)
ANION GAP SERPL CALC-SCNC: 13 MMOL/L (ref 3–18)
AST SERPL-CCNC: 43 U/L (ref 15–37)
BASOPHILS # BLD: 0 K/UL (ref 0–0.06)
BASOPHILS NFR BLD: 0 % (ref 0–2)
BILIRUB SERPL-MCNC: 0.4 MG/DL (ref 0.2–1)
BUN SERPL-MCNC: 21 MG/DL (ref 7–18)
BUN/CREAT SERPL: 19 (ref 12–20)
CALCIUM SERPL-MCNC: 8.7 MG/DL (ref 8.5–10.1)
CHLORIDE SERPL-SCNC: 109 MMOL/L (ref 100–108)
CHOLEST SERPL-MCNC: 124 MG/DL
CO2 SERPL-SCNC: 20 MMOL/L (ref 21–32)
CREAT SERPL-MCNC: 1.13 MG/DL (ref 0.6–1.3)
DIFFERENTIAL METHOD BLD: ABNORMAL
EOSINOPHIL # BLD: 0.2 K/UL (ref 0–0.4)
EOSINOPHIL NFR BLD: 4 % (ref 0–5)
ERYTHROCYTE [DISTWIDTH] IN BLOOD BY AUTOMATED COUNT: 16.6 % (ref 11.6–14.5)
EST. AVERAGE GLUCOSE BLD GHB EST-MCNC: 146 MG/DL
GLOBULIN SER CALC-MCNC: 3.6 G/DL (ref 2–4)
GLUCOSE SERPL-MCNC: 116 MG/DL (ref 74–99)
HBA1C MFR BLD: 6.7 % (ref 4.2–5.6)
HCT VFR BLD AUTO: 33.3 % (ref 36–48)
HDLC SERPL-MCNC: 48 MG/DL (ref 40–60)
HDLC SERPL: 2.6 {RATIO} (ref 0–5)
HGB BLD-MCNC: 10.4 G/DL (ref 13–16)
LDLC SERPL CALC-MCNC: 60.6 MG/DL (ref 0–100)
LIPID PROFILE,FLP: NORMAL
LYMPHOCYTES # BLD: 1.6 K/UL (ref 0.9–3.6)
LYMPHOCYTES NFR BLD: 32 % (ref 21–52)
MAGNESIUM SERPL-MCNC: 1.8 MG/DL (ref 1.6–2.6)
MCH RBC QN AUTO: 22.8 PG (ref 24–34)
MCHC RBC AUTO-ENTMCNC: 31.2 G/DL (ref 31–37)
MCV RBC AUTO: 73 FL (ref 74–97)
MONOCYTES # BLD: 0.4 K/UL (ref 0.05–1.2)
MONOCYTES NFR BLD: 9 % (ref 3–10)
NEUTS SEG # BLD: 2.6 K/UL (ref 1.8–8)
NEUTS SEG NFR BLD: 55 % (ref 40–73)
PLATELET # BLD AUTO: 265 K/UL (ref 135–420)
PMV BLD AUTO: 9.8 FL (ref 9.2–11.8)
POTASSIUM SERPL-SCNC: 4.8 MMOL/L (ref 3.5–5.5)
PROT SERPL-MCNC: 7.1 G/DL (ref 6.4–8.2)
RBC # BLD AUTO: 4.56 M/UL (ref 4.7–5.5)
SODIUM SERPL-SCNC: 142 MMOL/L (ref 136–145)
TRIGL SERPL-MCNC: 77 MG/DL (ref ?–150)
TSH SERPL DL<=0.05 MIU/L-ACNC: 2.15 UIU/ML (ref 0.36–3.74)
URATE SERPL-MCNC: 4.2 MG/DL (ref 2.6–7.2)
VLDLC SERPL CALC-MCNC: 15.4 MG/DL
WBC # BLD AUTO: 4.9 K/UL (ref 4.6–13.2)

## 2017-12-20 PROCEDURE — 80053 COMPREHEN METABOLIC PANEL: CPT | Performed by: FAMILY MEDICINE

## 2017-12-20 PROCEDURE — 83036 HEMOGLOBIN GLYCOSYLATED A1C: CPT | Performed by: FAMILY MEDICINE

## 2017-12-20 PROCEDURE — 83735 ASSAY OF MAGNESIUM: CPT | Performed by: FAMILY MEDICINE

## 2017-12-20 PROCEDURE — 85025 COMPLETE CBC W/AUTO DIFF WBC: CPT | Performed by: FAMILY MEDICINE

## 2017-12-20 PROCEDURE — 84443 ASSAY THYROID STIM HORMONE: CPT | Performed by: FAMILY MEDICINE

## 2017-12-20 PROCEDURE — 80061 LIPID PANEL: CPT | Performed by: FAMILY MEDICINE

## 2017-12-20 PROCEDURE — 84550 ASSAY OF BLOOD/URIC ACID: CPT | Performed by: FAMILY MEDICINE

## 2017-12-20 RX ORDER — TRAMADOL HYDROCHLORIDE 50 MG/1
50 TABLET ORAL
Qty: 60 TAB | Refills: 0 | Status: SHIPPED | OUTPATIENT
Start: 2017-12-20 | End: 2020-03-11 | Stop reason: SDUPTHER

## 2017-12-20 NOTE — PROGRESS NOTES
Francisco Anguiano is a 71 y.o. male who presents for routine immunizations. He denies any symptoms , reactions or allergies that would exclude them from being immunized today. Risks and adverse reactions were discussed and the VIS was given to them. All questions were addressed. He was observed for 15 min post injection. There were no reactions observed.     Pipe Eugene LPN

## 2017-12-20 NOTE — PROGRESS NOTES
Chief Complaint   Patient presents with    Diabetes    Hypertension    Medication Refill     tramadol refill     1. Have you been to the ER, urgent care clinic since your last visit? Hospitalized since your last visit? No    2. Have you seen or consulted any other health care providers outside of the [address] [place] [address] since your last visit? Include any pap smears or colon screening. No     HPI   A [employer]. comes in for follow-up care. Diabetes mellitus type 2: Patient has been checking his blood glucose and that this has been around 140. We will check his labs today. He is on insulin taking glargine and NovoLog. I will follow-up once I get his HbA1c results. Hypertension: Blood pressure is stable. We will recheck labs today. Patient is on amlodipine and Spironolactone and metoprolol. Cardiac: Patient was seen by the cardiologist and has had  Aortic valve replacement done. Currently he has been stable. Denies chest pain or shortness of breath or exertional dyspnea. Patient had been prescribed Eliquis. He will R went for knee replacement and during that time he did have some bleeding. He has since been taking aspirin and discontinued the Eliquis. I discussed the need for anticoagulation and he will contact his cardiologist to discuss this father. He does have an appointment at the end of January but I did explain to him that the he might need to get him sooner. He does state that he will call his cardiologist today and let them know that he is no longer taking the Eliquis then set up an appointment for follow-up. Knee pain: Patient has pain right femur. He did have chronic right knee pain  due to osteoarthritis. He did have right total knee replacement but unfortunately his bone cracked. This had to be fixed since then he was nonweightbearing and eventually has now started bearing weight using a cane and ambulating. He still undergoing physical therapy.   He does need pain medication and he takes tramadol for this. He requests a refill of this. Chronic pain: Patient has chronic knee pain. RTC today. We discussed his osteoarthritis that is affecting his knee. Significant changes since last visit: none. He is  able to do his normal daily activities. He reports the following adverse side effects: none. Least pain over the last week has been 1/10. Worst pain over the last week has been 9/10. Opioid Risk Tool Reviewed: YES    Aberrant behaviors: None. Urine Drug Screen: reviewed and up to date. Controlled substance agreement on file: YES.  reviewed:yes    Pill count is consistent with his prescription: yes    Concomitant use of a benzodiazepine: no    Past Medical History  Past Medical History:   Diagnosis Date    Diabetes ([facility] 1995    Dyslipidemia 3/1/2017    Fractures 1995    R Knee/ Tib fib fracture/surgery    Gout 2010    Graves disease 1/21/2015    High cholesterol 2005    HTN (hypertension) 1990    Osteoarthritis 2013    Severe aortic stenosis 3/29/2017    Spinal stenosis of lumbar region 3/1/2016    Spondylolisthesis 3/1/2016    Thyroid trouble     Type 2 diabetes mellitus with diabetic nephropathy ([facility] 4/28/2015       Surgical History  Past Surgical History:   Procedure Laterality Date    HX HIP REPLACEMENT Left 2009    HX ORTHOPAEDIC Right     R knee/Tibfib surgery        Medications  Current Outpatient Prescriptions   Medication Sig Dispense Refill    traMADol (ULTRAM) 50 mg tablet Take 1 Tab by mouth two (2) times daily as needed for Pain. Max Daily Amount: 100 mg. Indications: NEUROPATHIC PAIN 60 Tab 0    magnesium chloride (MAG DELAY) 64 mg delayed release tablet Take 8 tablets 3 x day      levothyroxine (SYNTHROID) 125 mcg tablet Take 1.5 Tabs by mouth Daily (before breakfast). 45 Tab 2    amLODIPine (NORVASC) 10 mg tablet Take 1 Tab by mouth daily.       pyridoxine, vitamin B6, (VITAMIN B-6) 100 mg tablet Take 100 mg by mouth daily.  metoprolol tartrate (LOPRESSOR) 25 mg tablet Take 1 Tab by mouth two (2) times a day. 180 Tab 3    aspirin delayed-release 81 mg tablet Take 1 Tab by mouth daily. 30 Tab 2    insulin glargine (LANTUS) 100 unit/mL injection Take 30 units once daily  Indications: type 2 diabetes mellitus 3 Vial 3    cyanocobalamin (VITAMIN B-12) 1,000 mcg tablet Take 1,000 mcg by mouth daily.  febuxostat (ULORIC) 40 mg tab tablet Take 1 Tab by mouth daily. Indications: GOUT PREVENTION 90 Tab 1    insulin aspart (NOVOLOG) 100 unit/mL injection by SubCUTAneous route. 6 units before meals      spironolactone (ALDACTONE) 25 mg tablet Take 1 Tab by mouth daily. Indications: EDEMA 30 Tab 3    glucose blood VI test strips (ASCENSIA AUTODISC VI, ONE TOUCH ULTRA TEST VI) strip Dispense precision extra test strips 3 x day to check blood glucose 200 Each 3    Omeprazole delayed release (PRILOSEC D/R) 20 mg tablet Take 20 mg by mouth daily.  atorvastatin (LIPITOR) 40 mg tablet Take 20 mg by mouth daily.  testosterone (ANDROGEL) 20.25 mg/1.25 gram (1.62 %) gel Apply  to affected area as needed.  Cholecalciferol, Vitamin D3, 1,000 unit cap Take 3,000 Units by mouth daily. Allergies  Allergies   Allergen Reactions    Watermelon Swelling    Peach (Prunus Persica) Itching       Family History  Family History   Problem Relation Age of Onset    Hypertension Mother     Thyroid Disease Mother     Hypertension Father     Diabetes Father     Drug Abuse Brother        Social History  Social History     Social History    Marital status:      Spouse name: N/A    Number of children: N/A    Years of education: N/A     Occupational History    Not on file.      Social History Main Topics    Smoking status: Former Smoker     Packs/day: 1.00     Types: Cigarettes     Start date: 1/1/1969     Quit date: 1/1/1995    Smokeless tobacco: Never Used    Alcohol use No    Drug use: No    Sexual activity: Yes     Partners: Female     Other Topics Concern     Service Yes     Served in [email or web address] Blood Transfusions No    Caffeine Concern No    Occupational Exposure No    Hobby Hazards No    Sleep Concern No    Stress Concern No    Weight Concern Yes    Special Diet No    Back Care Yes     Lower Back pain when walking    Exercise No    Bike Helmet No    Seat Belt Yes    Self-Exams Yes     Social History Narrative       Review of Systems  Review of Systems - History obtained from chart review and the patient  General ROS: positive for  - fatigue and malaise  Psychological ROS: negative  Ophthalmic ROS: Patient recently had surgery to his right eye. We will request the records.   ENT ROS: negative  Allergy and Immunology ROS: negative  Hematological and Lymphatic ROS: Negative for bleeding tendencies  Endocrine ROS: dm2, hypothyroidism  Respiratory ROS: no cough, shortness of breath, or wheezing  Cardiovascular ROS: no chest pain or dyspnea on exertion  Gastrointestinal ROS: no abdominal pain, change in bowel habits, or black or bloody stools  Genito-Urinary ROS: negative  Musculoskeletal ROS: positive for - joint pain and pain in knee - right  Neurological ROS: negative  Dermatological ROS: negative    Vital Signs  Visit Vitals    /82 (BP 1 Location: Left arm, BP Patient Position: Sitting)    Pulse 72    Temp 98.8 °F (37.1 °C) (Oral)    Resp 18    Ht 5' 10\" (1.778 m)    Wt 243 lb (110.2 kg)    SpO2 97%    BMI 34.87 kg/m2         Physical Exam  Physical Examination: General appearance - oriented to person, place, and time and acyanotic, in no respiratory distress  Mental status - alert, oriented to person, place, and time, affect appropriate to mood  Mouth - mucous membranes moist, pharynx normal without lesions  Neck - supple, no significant adenopathy  Lymphatics - no palpable lymphadenopathy  Chest - clear to auscultation, no wheezes, rales or rhonchi, symmetric air entry, no tachypnea, retractions or cyanosis  Heart - S1 and S2 normal  Abdomen - no rebound tenderness noted  Back exam - limited range of motion, pain with motion noted during exam  Neurological - neck supple without rigidity  Musculoskeletal - osteoarthritic changes noted in both hands  Extremities -limited range of motion right lower extremity at the knee joint    Results  Results for orders placed or performed during the hospital encounter of 08/11/17   COMPLIANCE DRUG SCREEN/PRESCRIPTION MONITORING   Result Value Ref Range    Summary FINAL     Note . ASSESSMENT and PLAN    ICD-10-CM ICD-9-CM    1. Type 2 diabetes mellitus with diabetic nephropathy, unspecified long term insulin use status (HCC) E11.21 250.40 CBC WITH AUTOMATED DIFF     583.81 HEMOGLOBIN A1C WITH EAG      LIPID PANEL      RI COLLECTION VENOUS BLOOD,VENIPUNCTURE   2. Severe aortic stenosis I35.0 424.1    3. S/P AVR (aortic valve replacement) Z95.2 V43.3 RI COLLECTION VENOUS BLOOD,VENIPUNCTURE   4. Hypothyroidism due to acquired atrophy of thyroid E03.4 244.8 TSH 3RD GENERATION     246.8 RI COLLECTION VENOUS BLOOD,VENIPUNCTURE   5. Hypomagnesemia E83.42 275.2 MAGNESIUM      RI COLLECTION VENOUS BLOOD,VENIPUNCTURE   6. Essential hypertension O24 068.6 METABOLIC PANEL, COMPREHENSIVE      RI COLLECTION VENOUS BLOOD,VENIPUNCTURE   7. CKD (chronic kidney disease) stage 3, GFR 30-59 ml/min N18.3 585.3 RI COLLECTION VENOUS BLOOD,VENIPUNCTURE   8. Gout, unspecified cause, unspecified chronicity, unspecified site M10.9 274.9 URIC ACID      RI COLLECTION VENOUS BLOOD,VENIPUNCTURE   9. Neuropathy G62.9 355.9 traMADol (ULTRAM) 50 mg tablet   10. Left knee pain, unspecified chronicity M25.562 719.46 traMADol (ULTRAM) 50 mg tablet   11.  Encounter for immunization Z23 V03.89 PNEUMOCOCCAL POLYSACCHARIDE VACCINE, 23-VALENT, ADULT OR IMMUNOSUPPRESSED PT DOSE,      RI IMMUNIZ ADMIN,1 SINGLE/COMB VAC/TOXOID     current treatment plan is effective, no change in therapy  lab results and schedule of future lab studies reviewed with patient  reviewed diet, exercise and weight control  cardiovascular risk and specific lipid/LDL goals reviewed  reviewed medications and side effects in detail  specific diabetic recommendations: low cholesterol diet, weight control and daily exercise discussed, home glucose monitoring emphasized, all medications, side effects and compliance discussed carefully, foot care discussed and Podiatry visits discussed, annual eye examinations at Ophthalmology discussed, glycohemoglobin and other lab monitoring discussed and long term diabetic complications discussed  use of aspirin to prevent MI and TIA's discussed    I have discussed the diagnosis with the patient and the intended plan of care as seen in the above orders. The patient has received an after-visit summary and questions were answered concerning future plans. I have discussed medication, side effects, and warnings with the patient in detail. The patient verbalized understanding and is in agreement with the plan of care. The patient will contact the office with any additional concerns.     Rajan Pimentel MD

## 2017-12-20 NOTE — MR AVS SNAPSHOT
Visit Information Date & Time Provider Department Dept. Phone Encounter #  
 12/20/2017 11:30 AM [phone] [id number] Follow-up Instructions Return in about 3 months (around 3/20/2018), or if symptoms worsen or fail to improve, for dm2. Your Appointments 12/20/2017 11:30 AM  
SAME DAY with MD Mahad Rogers ([address]) Appt Note: walk in f/u  
 [address] [facility] [address] [id number] [address]  
  
   
 [address]  
  
    
 1/31/2018  9:45 AM  
ESTABLISHED PATIENT with Josafat Mclean MD  
[facility] ([facility]) Appt Note: 6 month [address] [facility] [address]  
  
   
 [address] [id number] [address] [address] Upcoming Health Maintenance Date Due ZOSTER VACCINE AGE 60> 2/26/2006 EYE EXAM RETINAL OR DILATED Q1 10/14/2016 Pneumococcal 65+ Low/Medium Risk (2 of 2 - PPSV23) 9/15/2017 GLAUCOMA SCREENING Q2Y 10/14/2017 HEMOGLOBIN A1C Q6M 11/18/2017 FOOT EXAM Q1 1/25/2018 MICROALBUMIN Q1 1/25/2018 LIPID PANEL Q1 4/25/2018 MEDICARE YEARLY EXAM 4/26/2018 FOBT Q 1 YEAR AGE 50-75 5/5/2018 DTaP/Tdap/Td series (2 - Td) 4/28/2025 Allergies as of 12/20/2017  Review Complete On: 12/20/2017 By: Rich Mckee MD  
  
 Severity Noted Reaction Type Reactions Watermelon High 01/21/2015    Swelling Peach (Prunus Persica)  01/21/2015    Itching Current Immunizations  Reviewed on 9/15/2016 Name Date Influenza Vaccine (Quad) PF 9/17/2015 Pneumococcal Conjugate (PCV-13) 9/15/2016 Tdap 4/28/2015 Not reviewed this visit You Were Diagnosed With   
  
 Codes Comments  Type 2 diabetes mellitus with diabetic nephropathy, unspecified long term insulin [facility]    -  Primary ICD-10-CM: E11.21 
 ICD-9-CM: 250.40, 583.81 Severe aortic stenosis     ICD-10-CM: I35.0 ICD-9-CM: 424.1 S/P AVR (aortic valve replacement)     ICD-10-CM: Z28.9 ICD-9-CM: V43.3 Hypothyroidism due to acquired atrophy of thyroid     ICD-10-CM: E03.4 ICD-9-CM: 244.8, 246.8 Hypomagnesemia     ICD-10-CM: E67.23 
ICD-9-CM: 275.2 Essential hypertension     ICD-10-CM: I10 
ICD-9-CM: 401.9 CKD (chronic kidney disease) stage 3, GFR 30-59 ml/min     ICD-10-CM: N18.3 ICD-9-CM: 585.3 Gout, unspecified cause, unspecified chronicity, unspecified site     ICD-10-CM: M10.9 ICD-9-CM: 274.9 Neuropathy     ICD-10-CM: G62.9 ICD-9-CM: 355.9 Left knee pain, unspecified chronicity     ICD-10-CM: Z31.952 ICD-9-CM: 719.46 Vitals BP Pulse Temp Resp Height(growth percentile) Weight(growth percentile) 144/82 (BP 1 Location: Left arm, BP Patient Position: Sitting) 72 98.8 °F (37.1 °C) (Oral) 18 5' 10\" (1.778 m) 243 lb (110.2 kg) SpO2 BMI Smoking Status 97% 34.87 kg/m2 Former Smoker BMI and BSA Data Body Mass Index Body Surface Area 34.87 kg/m 2 2.33 m 2 Preferred Pharmacy Pharmacy Name Phone [facility] [place] [address] [facility] [phone] Your Updated Medication List  
  
   
This list is accurate as of: 12/20/17 11:16 AM.  Always use your most recent med list. amLODIPine 10 mg tablet Commonly known as:  Paul Smiths Arnel Take 1 Tab by mouth daily. aspirin delayed-release 81 mg tablet Take 1 Tab by mouth daily. atorvastatin 40 mg tablet Commonly known as:  LIPITOR Take 20 mg by mouth daily. cholecalciferol 1,000 unit Cap Commonly known as:  VITAMIN D3 Take 3,000 Units by mouth daily. febuxostat 40 mg Tab tablet Commonly known as:  Erenest Dimmer Take 1 Tab by mouth daily. Indications: GOUT PREVENTION  
  
 glucose blood VI test strips strip Commonly known as:  ASCENSIA AUTODISC VI, ONE TOUCH ULTRA TEST VI Dispense precision extra test strips 3 x day to check blood glucose  
  
 insulin aspart 100 unit/mL injection Commonly known as:  NOVOLOG  
by SubCUTAneous route. 6 units before meals  
  
 insulin glargine 100 unit/mL injection Commonly known as:  LANTUS Take 30 units once daily  Indications: type 2 diabetes mellitus  
  
 levothyroxine 125 mcg tablet Commonly known as:  SYNTHROID Take 1.5 Tabs by mouth Daily (before breakfast). magnesium chloride 64 mg delayed release tablet Commonly known as:  MAG DELAY Take 8 tablets 3 x day  
  
 metoprolol tartrate 25 mg tablet Commonly known as:  LOPRESSOR Take 1 Tab by mouth two (2) times a day. Omeprazole delayed release 20 mg tablet Commonly known as:  PRILOSEC D/R Take 20 mg by mouth daily. spironolactone 25 mg tablet Commonly known as:  ALDACTONE Take 1 Tab by mouth daily. Indications: EDEMA  
  
 testosterone 20.25 mg/1.25 gram (1.62 %) gel Commonly known as:  ANDROGEL Apply  to affected area as needed. traMADol 50 mg tablet Commonly known as:  ULTRAM  
Take 1 Tab by mouth two (2) times daily as needed for Pain. Max Daily Amount: 100 mg. Indications: NEUROPATHIC PAIN  
  
 VITAMIN B-12 1,000 mcg tablet Generic drug:  cyanocobalamin Take 1,000 mcg by mouth daily. VITAMIN B-6 100 mg tablet Generic drug:  pyridoxine (vitamin B6) Take 100 mg by mouth daily. Prescriptions Printed Refills  
 traMADol (ULTRAM) 50 mg tablet 0 Sig: Take 1 Tab by mouth two (2) times daily as needed for Pain. Max Daily Amount: 100 mg. Indications: NEUROPATHIC PAIN Class: Print Route: Oral  
  
Follow-up Instructions Return in about 3 months (around 3/20/2018), or if symptoms worsen or fail to improve, for dm2. To-Do List   
 12/20/2017 Lab:  CBC WITH AUTOMATED DIFF   
  
 12/20/2017   Lab:  HEMOGLOBIN A1C WITH EAG   
 12/20/2017 Lab:  LIPID PANEL   
  
 12/20/2017 Lab:  MAGNESIUM   
  
 12/20/2017 Lab:  METABOLIC PANEL, COMPREHENSIVE   
  
 12/20/2017 Lab:  TSH 3RD GENERATION   
  
 12/20/2017 Lab:  URIC ACID Introducing [facility] SERVICES! Dear Antonio Gonzalez: Thank you for requesting a [employer] account. Our records indicate that you already have an active [employer] account. You can access your account anytime at [email or web address] Did you know that you can access your hospital and ER discharge instructions at any time in [employer]? You can also review all of your test results from your hospital stay or ER visit. Additional Information If you have questions, please visit the Frequently Asked Questions section of the [employer] website at [email or web address] Remember, [employer] is NOT to be used for urgent needs. For medical emergencies, dial 911. Now available from your iPhone and Android! Please provide this summary of care documentation to your next provider. Your primary care clinician is listed as Aroldo Perez. If you have any questions after today's visit, please call [phone].

## 2017-12-29 NOTE — PROGRESS NOTES
Please let patient know his hba1c is 6.7. I will have him continue with current treatment plan for diabetes.  His magnesium and thyroid tests are normal.  Aroldo Ball MD

## 2018-01-25 NOTE — PROGRESS NOTES
1. Have you been to the ER, urgent care clinic since your last visit? Hospitalized since your last visit? No  2. Have you seen or consulted any other health care providers outside of the [address] [place] [address] since your last visit? Include any pap smears or colon screening. No     3. Since your last visit, have you had any of the following symptoms? no         4. Have you had any blood work, X-rays or cardiac testing? No         5. Where do you normally have your labs drawn? 6. Do you need any refills today?    no normal...

## 2018-01-31 ENCOUNTER — OFFICE VISIT (OUTPATIENT)
Dept: CARDIOLOGY CLINIC | Age: 73
End: 2018-01-31

## 2018-01-31 VITALS
BODY MASS INDEX: 35.5 KG/M2 | HEIGHT: 70 IN | HEART RATE: 54 BPM | WEIGHT: 248 LBS | SYSTOLIC BLOOD PRESSURE: 137 MMHG | DIASTOLIC BLOOD PRESSURE: 58 MMHG

## 2018-01-31 DIAGNOSIS — E11.21 TYPE 2 DIABETES MELLITUS WITH DIABETIC NEPHROPATHY, UNSPECIFIED LONG TERM INSULIN USE STATUS: Chronic | ICD-10-CM

## 2018-01-31 DIAGNOSIS — I10 ESSENTIAL HYPERTENSION: ICD-10-CM

## 2018-01-31 DIAGNOSIS — Z95.2 S/P AVR (AORTIC VALVE REPLACEMENT): Primary | ICD-10-CM

## 2018-01-31 DIAGNOSIS — E78.5 DYSLIPIDEMIA: Chronic | ICD-10-CM

## 2018-01-31 DIAGNOSIS — E03.4 HYPOTHYROIDISM DUE TO ACQUIRED ATROPHY OF THYROID: Chronic | ICD-10-CM

## 2018-01-31 DIAGNOSIS — N18.30 CKD (CHRONIC KIDNEY DISEASE) STAGE 3, GFR 30-59 ML/MIN (HCC): Chronic | ICD-10-CM

## 2018-01-31 RX ORDER — ASPIRIN 325 MG
325 TABLET ORAL DAILY
COMMUNITY
End: 2018-05-29 | Stop reason: SDUPTHER

## 2018-01-31 NOTE — PROGRESS NOTES
1. Have you been to the ER, urgent care clinic since your last visit? Hospitalized since your last visit? Yes [facility]  2. Have you seen or consulted any other health care providers outside of the [address] [place] [address] since your last visit? Include any pap smears or colon screening. Yes Where: pcp     3. Since your last visit, have you had any of the following symptoms? no         4. Have you had any blood work, X-rays or cardiac testing? Yes Where: [facility]       5. Where do you normally have your labs drawn? 6. Do you need any refills today?    no

## 2018-01-31 NOTE — MR AVS SNAPSHOT
[address] [facility] 
 
 
 [address] [facility] 
[phone] Patient: Graham Jessica. MRN: [id number] :1945 Visit Information Date & Time Provider Department Dept. Phone Encounter #  
 2018  9:45 AM Nya Mendoza MD [facility] [id number] Follow-up Instructions Return in about 6 months (around 2018). Your Appointments 2018  9:45 AM  
Follow Up with Nya Mendoza MD  
[facility] ([facility]) Appt Note: 6 month; confirmed appt/dg [address] [facility] [address]  
  
   
 [address] [place] [id number]  
  
    
 3/21/2018  9:00 AM  
ROUTINE CARE with Aroldo Sanchez MD  
[facility] ([facility]) Appt Note: dm2  
 [address] [facility] [address] [place] [address]  
  
   
 [address] 2018  9:30 AM  
Follow Up with Nya Mendoza MD  
[facility] ([facility]) Appt Note: 6 month follow up  
 [address] [facility]  
[phone] Upcoming Health Maintenance Date Due ZOSTER VACCINE AGE 60> 2006 EYE EXAM RETINAL OR DILATED Q1 10/14/2016 GLAUCOMA SCREENING Q2Y 10/14/2017 FOOT EXAM Q1 2018 MICROALBUMIN Q1 2018 MEDICARE YEARLY EXAM 2018 FOBT Q 1 YEAR AGE 50-75 2018 HEMOGLOBIN A1C Q6M 2018 LIPID PANEL Q1 2018 DTaP/Tdap/Td series (2 - Td) 2025 Allergies as of 2018  Review Complete On: 2018 By: Dilcia Calabrese Severity Noted Reaction Type Reactions Watermelon High 2015    Swelling Peach (Prunus Persica)  2015    Itching Current Immunizations  Reviewed on 2017 Name Date Influenza Vaccine (Quad) PF 2015 Pneumococcal Conjugate (PCV-13) 9/15/2016 Pneumococcal Polysaccharide (PPSV-23) 12/20/2017 Tdap 4/28/2015 Not reviewed this visit You Were Diagnosed With   
  
 Codes Comments S/P AVR (aortic valve replacement)    -  Primary ICD-10-CM: B74.3 ICD-9-CM: V43.3 Dyslipidemia     ICD-10-CM: E78.5 ICD-9-CM: 272.4 Essential hypertension     ICD-10-CM: I10 
ICD-9-CM: 401.9 Type 2 diabetes mellitus with diabetic nephropathy, unspecified long term insulin use status (HCC)     ICD-10-CM: E11.21 
ICD-9-CM: 250.40, 583.81   
 CKD (chronic kidney disease) stage 3, GFR 30-59 ml/min     ICD-10-CM: N18.3 ICD-9-CM: 829. 3 Hypothyroidism due to acquired atrophy of thyroid     ICD-10-CM: E03.4 ICD-9-CM: 244.8, 246.8 Vitals BP Pulse Height(growth percentile) Weight(growth percentile) BMI Smoking Status 137/58 (!) 54 5' 10\" (1.778 m) 248 lb (112.5 kg) 35.58 kg/m2 Former Smoker Vitals History BMI and BSA Data Body Mass Index Body Surface Area 35.58 kg/m 2 2.36 m 2 Preferred Pharmacy Pharmacy Name Phone [address] [facility] [phone] Your Updated Medication List  
  
   
This list is accurate as of: 1/31/18  9:41 AM.  Always use your most recent med list. amLODIPine 10 mg tablet Commonly known as:  Renetta Peach Take 5 mg by mouth daily. * aspirin 325 mg tablet Commonly known as:  ASPIRIN Take 325 mg by mouth daily. * aspirin delayed-release 81 mg tablet Take 1 Tab by mouth daily. atorvastatin 40 mg tablet Commonly known as:  LIPITOR Take 20 mg by mouth daily. cholecalciferol 1,000 unit Cap Commonly known as:  VITAMIN D3 Take 3,000 Units by mouth daily. febuxostat 40 mg Tab tablet Commonly known as:  Geniccelena Solian Take 1 Tab by mouth daily. Indications: GOUT PREVENTION  
  
 glucose blood VI test strips strip Commonly known as:  ASCENSIA AUTODISC VI, ONE TOUCH ULTRA TEST VI Dispense precision extra test strips 3 x day to check blood glucose  
  
 insulin aspart 100 unit/mL injection Commonly known as:  NOVOLOG  
by SubCUTAneous route. 6 units before meals  
  
 insulin glargine 100 unit/mL injection Commonly known as:  LANTUS Take 30 units once daily  Indications: type 2 diabetes mellitus  
  
 levothyroxine 125 mcg tablet Commonly known as:  SYNTHROID Take 1.5 Tabs by mouth Daily (before breakfast). magnesium chloride 64 mg delayed release tablet Commonly known as:  MAG DELAY Take 8 tablets 3 x day  
  
 metoprolol tartrate 25 mg tablet Commonly known as:  LOPRESSOR Take 1 Tab by mouth two (2) times a day. Omeprazole delayed release 20 mg tablet Commonly known as:  PRILOSEC D/R Take 20 mg by mouth daily. spironolactone 25 mg tablet Commonly known as:  ALDACTONE Take 1 Tab by mouth daily. Indications: EDEMA  
  
 testosterone 20.25 mg/1.25 gram (1.62 %) gel Commonly known as:  ANDROGEL Apply  to affected area as needed. traMADol 50 mg tablet Commonly known as:  ULTRAM  
Take 1 Tab by mouth two (2) times daily as needed for Pain. Max Daily Amount: 100 mg. Indications: NEUROPATHIC PAIN  
  
 VITAMIN B-12 1,000 mcg tablet Generic drug:  cyanocobalamin Take 1,000 mcg by mouth daily. VITAMIN B-6 100 mg tablet Generic drug:  pyridoxine (vitamin B6) Take 100 mg by mouth daily. * Notice: This list has 2 medication(s) that are the same as other medications prescribed for you. Read the directions carefully, and ask your doctor or other care provider to review them with you. Follow-up Instructions Return in about 6 months (around 7/31/2018). Patient Instructions High Blood Pressure: Care Instructions Your Care Instructions If your blood pressure is usually above 140/90, you have high blood pressure, or hypertension. That means the top number is 140 or higher or the bottom number is 90 or higher, or both. Despite what a lot of people think, high blood pressure usually doesn't cause headaches or make you feel dizzy or lightheaded. It usually has no symptoms. But it does increase your risk for heart attack, stroke, and kidney or eye damage. The higher your blood pressure, the more your risk increases. Your doctor will give you a goal for your blood pressure. Your goal will be based on your health and your age. An example of a goal is to keep your blood pressure below 140/90. Lifestyle changes, such as eating healthy and being active, are always important to help lower blood pressure. You might also take medicine to reach your blood pressure goal. 
Follow-up care is a key part of your treatment and safety. Be sure to make and go to all appointments, and call your doctor if you are having problems. It's also a good idea to know your test results and keep a list of the medicines you take. How can you care for yourself at home? Medical treatment · If you stop taking your medicine, your blood pressure will go back up. You may take one or more types of medicine to lower your blood pressure. Be safe with medicines. Take your medicine exactly as prescribed. Call your doctor if you think you are having a problem with your medicine. · Talk to your doctor before you start taking aspirin every day. Aspirin can help certain people lower their risk of a heart attack or stroke. But taking aspirin isn't right for everyone, because it can cause serious bleeding. · See your doctor regularly. You may need to see the doctor more often at first or until your blood pressure comes down. · If you are taking blood pressure medicine, talk to your doctor before you take decongestants or anti-inflammatory medicine, such as ibuprofen. Some of these medicines can raise blood pressure. · Learn how to check your blood pressure at home. Lifestyle changes · Stay at a healthy weight. This is especially important if you put on weight around the waist. Losing even 10 pounds can help you lower your blood pressure. · If your doctor recommends it, get more exercise. Walking is a good choice. Bit by bit, increase the amount you walk every day. Try for at least 30 minutes on most days of the week. You also may want to swim, bike, or do other activities. · Avoid or limit alcohol. Talk to your doctor about whether you can drink any alcohol. · Try to limit how much sodium you eat to less than 2,300 milligrams (mg) a day. Your doctor may ask you to try to eat less than 1,500 mg a day. · Eat plenty of fruits (such as bananas and oranges), vegetables, legumes, whole grains, and low-fat dairy products. · Lower the amount of saturated fat in your diet. Saturated fat is found in animal products such as milk, cheese, and meat. Limiting these foods may help you lose weight and also lower your risk for heart disease. · Do not smoke. Smoking increases your risk for heart attack and stroke. If you need help quitting, talk to your doctor about stop-smoking programs and medicines. These can increase your chances of quitting for good. When should you call for help? Call 911 anytime you think you may need emergency care. This may mean having symptoms that suggest that your blood pressure is causing a serious heart or blood vessel problem. Your blood pressure may be over 180/110. ? For example, call 911 if: 
? · You have symptoms of a heart attack. These may include: ¨ Chest pain or pressure, or a strange feeling in the chest. 
¨ Sweating. ¨ Shortness of breath. ¨ Nausea or vomiting. ¨ Pain, pressure, or a strange feeling in the back, neck, jaw, or upper belly or in one or both shoulders or arms. ¨ Lightheadedness or sudden weakness. ¨ A fast or irregular heartbeat. ? · You have symptoms of a stroke. These may include: 
¨ Sudden numbness, tingling, weakness, or loss of movement in your face, arm, or leg, especially on only one side of your body. ¨ Sudden vision changes. ¨ Sudden trouble speaking. ¨ Sudden confusion or trouble understanding simple statements. ¨ Sudden problems with walking or balance. ¨ A sudden, severe headache that is different from past headaches. ? · You have severe back or belly pain. ?Do not wait until your blood pressure comes down on its own. Get help right away. ?Call your doctor now or seek immediate care if: 
? · Your blood pressure is much higher than normal (such as 180/110 or higher), but you don't have symptoms. ? · You think high blood pressure is causing symptoms, such as: ¨ Severe headache. ¨ Blurry vision. ? Watch closely for changes in your health, and be sure to contact your doctor if: 
? · Your blood pressure measures 140/90 or higher at least 2 times. That means the top number is 140 or higher or the bottom number is 90 or higher, or both. ? · You think you may be having side effects from your blood pressure medicine. ? · Your blood pressure is usually normal, but it goes above normal at least 2 times. Where can you learn more? Go to http://adal-janes.info/. Enter B048 in the search box to learn more about \"High Blood Pressure: Care Instructions. \" Current as of: September 21, 2016 Content Version: 11.4 © 6941-7985 [employer]. Care instructions adapted under license by [employer] (which disclaims liability or warranty for this information). If you have questions about a medical condition or this instruction, always ask your healthcare professional. [place] [address] any warranty or liability for your use of this information. Introducing [facility]! Dear Jacqueline Manner: Thank you for requesting a "[employer] account.   Our records indicate that you already have an active [employer] account. You can access your account anytime at [email or web address] Did you know that you can access your hospital and ER discharge instructions at any time in [employer]? You can also review all of your test results from your hospital stay or ER visit. Additional Information If you have questions, please visit the Frequently Asked Questions section of the [employer] website at [email or web address] Remember, [employer] is NOT to be used for urgent needs. For medical emergencies, dial 911. Now available from your iPhone and Android! Please provide this summary of care documentation to your next provider. Your primary care clinician is listed as Aroldo Perez. If you have any questions after today's visit, please call [phone].

## 2018-01-31 NOTE — PROGRESS NOTES
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 Hypertension   The history is provided by the patient. This is a chronic problem. The current episode started more than 1 week ago. The problem occurs every several days. The problem has not changed since onset. Associated symptoms include shortness of breath. Pertinent negatives include no chest pain. Shortness of Breath   The history is provided by the patient. This is a chronic problem. The problem occurs intermittently. The current episode started more than 1 week ago. The problem has been gradually improving. Pertinent negatives include no fever, no ear pain, no neck pain, no cough, no sputum production, no hemoptysis, no wheezing, no PND, no orthopnea, no chest pain, no syncope, no vomiting, no rash, no leg pain, no leg swelling and no claudication. Associated medical issues do not include chronic lung disease, CAD or heart failure. Review of Systems   Constitutional: Negative for chills, diaphoresis, fever, malaise/fatigue and weight loss. HENT: Negative for ear discharge, ear pain, hearing loss, nosebleeds and tinnitus. Eyes: Negative for blurred vision. Respiratory: Positive for shortness of breath. Negative for cough, hemoptysis, sputum production, wheezing and stridor. Cardiovascular: Negative for chest pain, palpitations, orthopnea, claudication, leg swelling, syncope and PND. Gastrointestinal: Negative for heartburn, nausea and vomiting. Musculoskeletal: Negative for myalgias and neck pain. Skin: Negative for itching and rash. Neurological: Negative for dizziness, tingling, tremors, focal weakness, loss of consciousness and weakness. Psychiatric/Behavioral: Negative for depression and suicidal ideas.      Family History   Problem Relation Age of Onset    Hypertension Mother     Thyroid Disease Mother     Hypertension Father     Diabetes Father     Drug Abuse Brother        Past Medical History:   Diagnosis Date    [facility]    Dyslipidemia 3/1/2017    Fractures 1995    R Knee/ Tib fib fracture/surgery    Gout 2010    Graves disease 1/21/2015    High cholesterol 2005    HTN (hypertension) 1990    Osteoarthritis 2013    Severe aortic stenosis 3/29/2017    Spinal stenosis of lumbar region 3/1/2016    Spondylolisthesis 3/1/2016    Thyroid trouble     Type 2 diabetes mellitus with diabetic nephropathy ([facility] [address] 4/28/2015       Past Surgical History:   Procedure Laterality Date    HX HIP REPLACEMENT Left 2009    HX ORTHOPAEDIC Right     R knee/Tibfib surgery       Social History   Substance Use Topics    Smoking status: Former Smoker     Packs/day: 1.00     Types: Cigarettes     Start date: 1/1/1969     Quit date: 1/1/1995    Smokeless tobacco: Never Used    Alcohol use No       Allergies   Allergen Reactions    Watermelon Swelling    Peach (Prunus Persica) Itching       Outpatient Prescriptions Marked as Taking for the 1/31/18 encounter (Office Visit) with Bryan Tapia MD   Medication Sig Dispense Refill    aspirin (ASPIRIN) 325 mg tablet Take 325 mg by mouth daily.  traMADol (ULTRAM) 50 mg tablet Take 1 Tab by mouth two (2) times daily as needed for Pain. Max Daily Amount: 100 mg. Indications: NEUROPATHIC PAIN 60 Tab 0    magnesium chloride (MAG DELAY) 64 mg delayed release tablet Take 8 tablets 3 x day      levothyroxine (SYNTHROID) 125 mcg tablet Take 1.5 Tabs by mouth Daily (before breakfast). 45 Tab 2    amLODIPine (NORVASC) 10 mg tablet Take 5 mg by mouth daily.  pyridoxine, vitamin B6, (VITAMIN B-6) 100 mg tablet Take 100 mg by mouth daily.  metoprolol tartrate (LOPRESSOR) 25 mg tablet Take 1 Tab by mouth two (2) times a day. 180 Tab 3    insulin glargine (LANTUS) 100 unit/mL injection Take 30 units once daily  Indications: type 2 diabetes mellitus 3 Vial 3    cyanocobalamin (VITAMIN B-12) 1,000 mcg tablet Take 1,000 mcg by mouth daily.       febuxostat (ULORIC) 40 mg tab tablet Take 1 Tab by mouth daily. Indications: GOUT PREVENTION 90 Tab 1    insulin aspart (NOVOLOG) 100 unit/mL injection by SubCUTAneous route. 6 units before meals      spironolactone (ALDACTONE) 25 mg tablet Take 1 Tab by mouth daily. Indications: EDEMA 30 Tab 3    glucose blood VI test strips (ASCENSIA AUTODISC VI, ONE TOUCH ULTRA TEST VI) strip Dispense precision extra test strips 3 x day to check blood glucose 200 Each 3    Omeprazole delayed release (PRILOSEC D/R) 20 mg tablet Take 20 mg by mouth daily.  atorvastatin (LIPITOR) 40 mg tablet Take 20 mg by mouth daily.  testosterone (ANDROGEL) 20.25 mg/1.25 gram (1.62 %) gel Apply  to affected area as needed.  Cholecalciferol, Vitamin D3, 1,000 unit cap Take 3,000 Units by mouth daily. Visit Vitals    /58    Pulse (!) 54    Ht 5' 10\" (1.778 m)    Wt 112.5 kg (248 lb)    BMI 35.58 kg/m2     Physical Exam   Constitutional: He is oriented to person, place, and time. He appears well-developed and well-nourished. No distress. HENT:   Head: Atraumatic. Mouth/Throat: No oropharyngeal exudate. Eyes: Conjunctivae are normal. Right eye exhibits no discharge. Left eye exhibits no discharge. No scleral icterus. Neck: Normal range of motion. Neck supple. No JVD present. No tracheal deviation present. No thyromegaly present. Cardiovascular: Normal rate and regular rhythm. Exam reveals no gallop. No murmur heard. Surgical sternal scar   Pulmonary/Chest: Effort normal and breath sounds normal. No stridor. No respiratory distress. He has no wheezes. He has no rales. He exhibits no tenderness. Abdominal: Soft. There is no tenderness. There is no rebound and no guarding. Musculoskeletal: Normal range of motion. He exhibits no edema or tenderness. Lymphadenopathy:     He has no cervical adenopathy. Neurological: He is alert and oriented to person, place, and time. He exhibits normal muscle tone. Skin: Skin is warm. He is not diaphoretic. Psychiatric: He has a normal mood and affect. His behavior is normal.     ekg sinus rhythm with PVC, no acute st-t changes  Echo 02/2017:  SUMMARY:  Left ventricle: Systolic function was normal by visual assessment. Ejection fraction was estimated in the range of 60 % to 65 %. No obvious  wall motion abnormalities identified in the views obtained. Wall thickness  was at the upper limits of normal.    Aortic valve: Leaflets exhibited markedly increased thickness and reduced  mobility. There was moderate to severe stenosis. Valve peak gradient was  61 mmHg. Valve mean gradient was 39 mmHg. Estimated aortic valve area (by  VTI) was 0.5 cmï¾². ZACKERY likely overestimates severity of aortic stenosis due  to difficulty in measuring LVOT diameter. Aorta, systemic arteries: The root exhibited dilatation. ASSESSMENT and PLAN    ICD-10-CM ICD-9-CM    1. S/P AVR (aortic valve replacement) Z95.2 V43.3    2. Dyslipidemia E78.5 272.4    3. Essential hypertension I10 401.9    4. Type 2 diabetes mellitus with diabetic nephropathy, unspecified long term insulin use status (HCC) E11.21 250.40      583.81    5. CKD (chronic kidney disease) stage 3, GFR 30-59 ml/min N18.3 585.3    6. Hypothyroidism due to acquired atrophy of thyroid E03.4 244.8      246.8      No orders of the defined types were placed in this encounter. Follow-up Disposition:  Return in about 6 months (around 7/31/2018). reviewed diet, exercise and weight control  cardiovascular risk and specific lipid/LDL goals reviewed  use of aspirin to prevent MI and TIA's discussed. Now S/p AVR. Subsequently underwent knee replacement.   Remains asymptomatic from cardiac standpoint  Recommend to take aspirin 81mg daily

## 2018-01-31 NOTE — PATIENT INSTRUCTIONS
High Blood Pressure: Care Instructions  Your Care Instructions    If your blood pressure is usually above 140/90, you have high blood pressure, or hypertension. That means the top number is 140 or higher or the bottom number is 90 or higher, or both. Despite what a lot of people think, high blood pressure usually doesn't cause headaches or make you feel dizzy or lightheaded. It usually has no symptoms. But it does increase your risk for heart attack, stroke, and kidney or eye damage. The higher your blood pressure, the more your risk increases. Your doctor will give you a goal for your blood pressure. Your goal will be based on your health and your age. An example of a goal is to keep your blood pressure below 140/90. Lifestyle changes, such as eating healthy and being active, are always important to help lower blood pressure. You might also take medicine to reach your blood pressure goal.  Follow-up care is a key part of your treatment and safety. Be sure to make and go to all appointments, and call your doctor if you are having problems. It's also a good idea to know your test results and keep a list of the medicines you take. How can you care for yourself at home? Medical treatment  · If you stop taking your medicine, your blood pressure will go back up. You may take one or more types of medicine to lower your blood pressure. Be safe with medicines. Take your medicine exactly as prescribed. Call your doctor if you think you are having a problem with your medicine. · Talk to your doctor before you start taking aspirin every day. Aspirin can help certain people lower their risk of a heart attack or stroke. But taking aspirin isn't right for everyone, because it can cause serious bleeding. · See your doctor regularly. You may need to see the doctor more often at first or until your blood pressure comes down.   · If you are taking blood pressure medicine, talk to your doctor before you take decongestants or anti-inflammatory medicine, such as ibuprofen. Some of these medicines can raise blood pressure. · Learn how to check your blood pressure at home. Lifestyle changes  · Stay at a healthy weight. This is especially important if you put on weight around the waist. Losing even 10 pounds can help you lower your blood pressure. · If your doctor recommends it, get more exercise. Walking is a good choice. Bit by bit, increase the amount you walk every day. Try for at least 30 minutes on most days of the week. You also may want to swim, bike, or do other activities. · Avoid or limit alcohol. Talk to your doctor about whether you can drink any alcohol. · Try to limit how much sodium you eat to less than 2,300 milligrams (mg) a day. Your doctor may ask you to try to eat less than 1,500 mg a day. · Eat plenty of fruits (such as bananas and oranges), vegetables, legumes, whole grains, and low-fat dairy products. · Lower the amount of saturated fat in your diet. Saturated fat is found in animal products such as milk, cheese, and meat. Limiting these foods may help you lose weight and also lower your risk for heart disease. · Do not smoke. Smoking increases your risk for heart attack and stroke. If you need help quitting, talk to your doctor about stop-smoking programs and medicines. These can increase your chances of quitting for good. When should you call for help? Call 911 anytime you think you may need emergency care. This may mean having symptoms that suggest that your blood pressure is causing a serious heart or blood vessel problem. Your blood pressure may be over 180/110. ? For example, call 911 if:  ? · You have symptoms of a heart attack. These may include:  ¨ Chest pain or pressure, or a strange feeling in the chest.  ¨ Sweating. ¨ Shortness of breath. ¨ Nausea or vomiting.   ¨ Pain, pressure, or a strange feeling in the back, neck, jaw, or upper belly or in one or both shoulders or arms.  ¨ Lightheadedness or sudden weakness. ¨ A fast or irregular heartbeat. ? · You have symptoms of a stroke. These may include:  ¨ Sudden numbness, tingling, weakness, or loss of movement in your face, arm, or leg, especially on only one side of your body. ¨ Sudden vision changes. ¨ Sudden trouble speaking. ¨ Sudden confusion or trouble understanding simple statements. ¨ Sudden problems with walking or balance. ¨ A sudden, severe headache that is different from past headaches. ? · You have severe back or belly pain. ?Do not wait until your blood pressure comes down on its own. Get help right away. ?Call your doctor now or seek immediate care if:  ? · Your blood pressure is much higher than normal (such as 180/110 or higher), but you don't have symptoms. ? · You think high blood pressure is causing symptoms, such as:  ¨ Severe headache. ¨ Blurry vision. ? Watch closely for changes in your health, and be sure to contact your doctor if:  ? · Your blood pressure measures 140/90 or higher at least 2 times. That means the top number is 140 or higher or the bottom number is 90 or higher, or both. ? · You think you may be having side effects from your blood pressure medicine. ? · Your blood pressure is usually normal, but it goes above normal at least 2 times. Where can you learn more? Go to http://adal-janes.info/. Enter E298 in the search box to learn more about \"High Blood Pressure: Care Instructions. \"  Current as of: September 21, 2016  Content Version: 11.4  © 7411-5597 [employer]. Care instructions adapted under license by [employer] (which disclaims liability or warranty for this information). If you have questions about a medical condition or this instruction, always ask your healthcare professional. [place] [address] any warranty or liability for your use of this information.

## 2018-03-21 ENCOUNTER — OFFICE VISIT (OUTPATIENT)
Dept: FAMILY MEDICINE CLINIC | Age: 73
End: 2018-03-21

## 2018-03-21 ENCOUNTER — HOSPITAL ENCOUNTER (OUTPATIENT)
Dept: LAB | Age: 73
Discharge: HOME | End: 2018-03-21
Payer: MEDICARE

## 2018-03-21 VITALS
SYSTOLIC BLOOD PRESSURE: 138 MMHG | RESPIRATION RATE: 18 BRPM | HEART RATE: 47 BPM | TEMPERATURE: 96.8 F | HEIGHT: 70 IN | BODY MASS INDEX: 35.07 KG/M2 | WEIGHT: 245 LBS | DIASTOLIC BLOOD PRESSURE: 62 MMHG | OXYGEN SATURATION: 97 %

## 2018-03-21 DIAGNOSIS — E03.4 HYPOTHYROIDISM DUE TO ACQUIRED ATROPHY OF THYROID: Chronic | ICD-10-CM

## 2018-03-21 DIAGNOSIS — E05.00 GRAVES DISEASE: Chronic | ICD-10-CM

## 2018-03-21 DIAGNOSIS — Z12.11 SCREEN FOR COLON CANCER: ICD-10-CM

## 2018-03-21 DIAGNOSIS — G89.29 CHRONIC MIDLINE LOW BACK PAIN, WITH SCIATICA PRESENCE UNSPECIFIED: Primary | ICD-10-CM

## 2018-03-21 DIAGNOSIS — E11.21 TYPE 2 DIABETES MELLITUS WITH DIABETIC NEPHROPATHY, UNSPECIFIED LONG TERM INSULIN USE STATUS: Chronic | ICD-10-CM

## 2018-03-21 DIAGNOSIS — M54.5 CHRONIC MIDLINE LOW BACK PAIN, WITH SCIATICA PRESENCE UNSPECIFIED: Primary | ICD-10-CM

## 2018-03-21 PROBLEM — E66.01 SEVERE OBESITY (BMI 35.0-39.9) WITH COMORBIDITY (HCC): Status: ACTIVE | Noted: 2018-03-21

## 2018-03-21 LAB
ALBUMIN SERPL-MCNC: 3.6 G/DL (ref 3.4–5)
ALBUMIN/GLOB SERPL: 1 {RATIO} (ref 0.8–1.7)
ALP SERPL-CCNC: 96 U/L (ref 45–117)
ALT SERPL-CCNC: 26 U/L (ref 16–61)
ANION GAP SERPL CALC-SCNC: 8 MMOL/L (ref 3–18)
AST SERPL-CCNC: 11 U/L (ref 15–37)
BASOPHILS # BLD: 0 K/UL (ref 0–0.06)
BASOPHILS NFR BLD: 1 % (ref 0–2)
BILIRUB SERPL-MCNC: 0.3 MG/DL (ref 0.2–1)
BUN SERPL-MCNC: 20 MG/DL (ref 7–18)
BUN/CREAT SERPL: 14 (ref 12–20)
CALCIUM SERPL-MCNC: 8.9 MG/DL (ref 8.5–10.1)
CHLORIDE SERPL-SCNC: 110 MMOL/L (ref 100–108)
CHOLEST SERPL-MCNC: 130 MG/DL
CO2 SERPL-SCNC: 22 MMOL/L (ref 21–32)
CREAT SERPL-MCNC: 1.38 MG/DL (ref 0.6–1.3)
DIFFERENTIAL METHOD BLD: ABNORMAL
EOSINOPHIL # BLD: 0.4 K/UL (ref 0–0.4)
EOSINOPHIL NFR BLD: 6 % (ref 0–5)
ERYTHROCYTE [DISTWIDTH] IN BLOOD BY AUTOMATED COUNT: 18.8 % (ref 11.6–14.5)
EST. AVERAGE GLUCOSE BLD GHB EST-MCNC: 134 MG/DL
GLOBULIN SER CALC-MCNC: 3.5 G/DL (ref 2–4)
GLUCOSE SERPL-MCNC: 107 MG/DL (ref 74–99)
HBA1C MFR BLD: 6.3 % (ref 4.2–5.6)
HCT VFR BLD AUTO: 33.7 % (ref 36–48)
HDLC SERPL-MCNC: 50 MG/DL (ref 40–60)
HDLC SERPL: 2.6 {RATIO} (ref 0–5)
HGB BLD-MCNC: 10.5 G/DL (ref 13–16)
LDLC SERPL CALC-MCNC: 54.2 MG/DL (ref 0–100)
LIPID PROFILE,FLP: NORMAL
LYMPHOCYTES # BLD: 2.3 K/UL (ref 0.9–3.6)
LYMPHOCYTES NFR BLD: 38 % (ref 21–52)
MAGNESIUM SERPL-MCNC: 1.7 MG/DL (ref 1.6–2.6)
MCH RBC QN AUTO: 23.4 PG (ref 24–34)
MCHC RBC AUTO-ENTMCNC: 31.2 G/DL (ref 31–37)
MCV RBC AUTO: 75.1 FL (ref 74–97)
MICROALBUMIN UR TEST STR-MCNC: 80 MG/L
MICROALBUMIN/CREAT RATIO POC: NORMAL MG/G
MONOCYTES # BLD: 0.5 K/UL (ref 0.05–1.2)
MONOCYTES NFR BLD: 8 % (ref 3–10)
NEUTS SEG # BLD: 2.8 K/UL (ref 1.8–8)
NEUTS SEG NFR BLD: 47 % (ref 40–73)
PLATELET # BLD AUTO: 211 K/UL (ref 135–420)
PMV BLD AUTO: 9.4 FL (ref 9.2–11.8)
POTASSIUM SERPL-SCNC: 4.7 MMOL/L (ref 3.5–5.5)
PROT SERPL-MCNC: 7.1 G/DL (ref 6.4–8.2)
RBC # BLD AUTO: 4.49 M/UL (ref 4.7–5.5)
SODIUM SERPL-SCNC: 140 MMOL/L (ref 136–145)
TRIGL SERPL-MCNC: 129 MG/DL (ref ?–150)
TSH SERPL DL<=0.05 MIU/L-ACNC: 5.76 UIU/ML (ref 0.36–3.74)
VLDLC SERPL CALC-MCNC: 25.8 MG/DL
WBC # BLD AUTO: 6.1 K/UL (ref 4.6–13.2)

## 2018-03-21 PROCEDURE — 83735 ASSAY OF MAGNESIUM: CPT | Performed by: FAMILY MEDICINE

## 2018-03-21 PROCEDURE — 83036 HEMOGLOBIN GLYCOSYLATED A1C: CPT | Performed by: FAMILY MEDICINE

## 2018-03-21 PROCEDURE — 84443 ASSAY THYROID STIM HORMONE: CPT | Performed by: FAMILY MEDICINE

## 2018-03-21 PROCEDURE — 80061 LIPID PANEL: CPT | Performed by: FAMILY MEDICINE

## 2018-03-21 PROCEDURE — 85025 COMPLETE CBC W/AUTO DIFF WBC: CPT | Performed by: FAMILY MEDICINE

## 2018-03-21 PROCEDURE — 80053 COMPREHEN METABOLIC PANEL: CPT | Performed by: FAMILY MEDICINE

## 2018-03-21 RX ORDER — CAPSAICIN 0.03 G/100G
CREAM TOPICAL 3 TIMES DAILY
COMMUNITY
End: 2022-07-05

## 2018-03-21 RX ORDER — TIZANIDINE 2 MG/1
TABLET ORAL
COMMUNITY
End: 2018-04-30

## 2018-03-21 RX ORDER — DICLOFENAC SODIUM 10 MG/G
GEL TOPICAL 4 TIMES DAILY
COMMUNITY

## 2018-03-21 NOTE — MR AVS SNAPSHOT
[facility] [address] [facility] 
[phone] Patient: Alena De Anda. MRN: [id number] :1945 Visit Information Date & Time Provider Department Dept. Phone Encounter #  
 3/21/2018  9:00 AM Nikied MD Saima [address] [phone] [id number] Follow-up Instructions Return if symptoms worsen or fail to improve. Your Appointments 2018 10:30 AM  
Office Visit with MD Saima [address] ([facility]) Appt Note:  [facility]  
 [address] [facility] [address]  
  
   
 [address] [facility] 2018  9:30 AM  
Follow Up with Nolene Barthel, MD  
[facility] ([facility]) Appt Note: 6 month follow up  
 [address] [facility] [address]  
  
   
 [address] [place] [id number] Upcoming Health Maintenance Date Due ZOSTER VACCINE AGE 60> 2006 EYE EXAM RETINAL OR DILATED Q1 10/14/2016 GLAUCOMA SCREENING Q2Y 10/14/2017 FOOT EXAM Q1 2018 MICROALBUMIN Q1 2018 FOBT Q 1 YEAR AGE 50-75 2018 MEDICARE YEARLY EXAM 2018 HEMOGLOBIN A1C Q6M 2018 LIPID PANEL Q1 2018 DTaP/Tdap/Td series (2 - Td) 2025 Allergies as of 3/21/2018  Review Complete On: 2018 By: Clarke  Severity Noted Reaction Type Reactions Watermelon High 2015    Swelling Peach (Prunus Persica)  2015    Itching Current Immunizations  Reviewed on 2017 Name Date Influenza Vaccine (Quad) PF 2015 Pneumococcal Conjugate (PCV-13) 9/15/2016 Pneumococcal Polysaccharide (PPSV-23) 2017 Tdap 2015 Not reviewed this visit You Were Diagnosed With   
  
 Codes Comments Chronic midline low back pain, with sciatica presence unspecified    -  Primary ICD-10-CM: M54.5, G89.29 ICD-9-CM: 724.2, 338.29 Type 2 diabetes mellitus with diabetic nephropathy, unspecified long term insulin use status (HCC)     ICD-10-CM: E11.21 
ICD-9-CM: 250.40, 583.81 Graves disease     ICD-10-CM: E05.00 ICD-9-CM: 242.00 Hypothyroidism due to acquired atrophy of thyroid     ICD-10-CM: E03.4 ICD-9-CM: 244.8, 246.8 Screen for colon cancer     ICD-10-CM: Z12.11 ICD-9-CM: V76.51 Vitals BP Pulse Temp Resp Height(growth percentile) Weight(growth percentile) 144/62 (BP 1 Location: Left arm, BP Patient Position: Sitting) (!) 47 96.8 °F (36 °C) (Oral) 18 5' 10\" (1.778 m) 245 lb (111.1 kg) SpO2 BMI Smoking Status 97% 35.15 kg/m2 Former Smoker BMI and BSA Data Body Mass Index Body Surface Area  
 35.15 kg/m 2 2.34 m 2 Preferred Pharmacy Pharmacy Name Phone 500 [facility], [address] [place] [phone] Your Updated Medication List  
  
   
This list is accurate as of 3/21/18  9:59 AM.  Always use your most recent med list. amLODIPine 10 mg tablet Commonly known as:  Marilin Slate Take 5 mg by mouth daily. * aspirin 325 mg tablet Commonly known as:  ASPIRIN Take 325 mg by mouth daily. * aspirin delayed-release 81 mg tablet Take 1 Tab by mouth daily. atorvastatin 40 mg tablet Commonly known as:  LIPITOR Take 20 mg by mouth daily. capsicum oleoresin 0.025 % topical cream  
Apply  to affected area three (3) times daily. cholecalciferol 1,000 unit Cap Commonly known as:  VITAMIN D3 Take 3,000 Units by mouth daily. diclofenac 1 % Gel Commonly known as:  VOLTAREN Apply  to affected area four (4) times daily. febuxostat 40 mg Tab tablet Commonly known as:  Joboris Fosterert Take 1 Tab by mouth daily.  Indications: GOUT PREVENTION  
  
 glucose blood VI test strips strip Commonly known as:  ASCENSIA AUTODISC VI, ONE TOUCH ULTRA TEST VI Dispense precision extra test strips 3 x day to check blood glucose  
  
 insulin aspart U-100 100 unit/mL injection Commonly known as:  NOVOLOG  
by SubCUTAneous route. 6 units before meals  
  
 insulin glargine 100 unit/mL injection Commonly known as:  LANTUS Take 30 units once daily  Indications: type 2 diabetes mellitus  
  
 levothyroxine 125 mcg tablet Commonly known as:  SYNTHROID Take 1.5 Tabs by mouth Daily (before breakfast). magnesium chloride 64 mg delayed release tablet Commonly known as:  MAG DELAY Take 8 tablets 3 x day  
  
 metoprolol tartrate 25 mg tablet Commonly known as:  LOPRESSOR Take 1 Tab by mouth two (2) times a day. Omeprazole delayed release 20 mg tablet Commonly known as:  PRILOSEC D/R Take 20 mg by mouth daily. spironolactone 25 mg tablet Commonly known as:  ALDACTONE Take 1 Tab by mouth daily. Indications: EDEMA  
  
 testosterone 20.25 mg/1.25 gram (1.62 %) gel Commonly known as:  ANDROGEL Apply  to affected area as needed. tiZANidine 2 mg tablet Commonly known as:  Rachid Perch Take  by mouth. traMADol 50 mg tablet Commonly known as:  ULTRAM  
Take 1 Tab by mouth two (2) times daily as needed for Pain. Max Daily Amount: 100 mg. Indications: NEUROPATHIC PAIN  
  
 VITAMIN B-12 1,000 mcg tablet Generic drug:  cyanocobalamin Take 1,000 mcg by mouth daily. VITAMIN B-6 100 mg tablet Generic drug:  pyridoxine (vitamin B6) Take 100 mg by mouth daily. * Notice: This list has 2 medication(s) that are the same as other medications prescribed for you. Read the directions carefully, and ask your doctor or other care provider to review them with you. We Performed the Following AMB POC URINE, MICROALBUMIN, SEMIQUANTITATIVE [96208 CPT(R)] REFERRAL TO ORTHOPEDICS [CER705 Custom] Comments: Low back pain Follow-up Instructions Return if symptoms worsen or fail to improve. To-Do List   
 03/21/2018 Lab:  CBC WITH AUTOMATED DIFF   
  
 03/21/2018 Lab:  HEMOGLOBIN A1C WITH EAG   
  
 03/21/2018 Lab:  LIPID PANEL   
  
 03/21/2018 Lab:  MAGNESIUM   
  
 03/21/2018 Lab:  METABOLIC PANEL, COMPREHENSIVE   
  
 03/21/2018 Lab:  TSH 3RD GENERATION   
  
 04/20/2018 Lab:  OCCULT BLOOD, IMMUNOASSAY (FIT) Referral Information Referral ID Referred By Referred To  
  
 [id number] [address] [place] [address], [address] [place] [address] [address]   
   [place], [address] Phone: [phone] Fax: [phone] Visits Status Start Date End Date 1 New Request 3/21/18 3/21/19 If your referral has a status of pending review or denied, additional information will be sent to support the outcome of this decision. Introducing [facility]! Dear Mayra Murray: Thank you for requesting a [employer] account. Our records indicate that you already have an active My[employer] account. You can access your account anytime at [email or web address] Did you know that you can access your hospital and ER discharge instructions at any time in My[employer]? You can also review all of your test results from your hospital stay or ER visit. Additional Information If you have questions, please visit the Frequently Asked Questions section of the My[employer] website at [email or web address] Remember, MyAcademicProgram is NOT to be used for urgent needs. For medical emergencies, dial 911. Now available from your iPhone and Android! Please provide this summary of care documentation to your next provider. Your primary care clinician is listed as Aroldo Perez. If you have any questions after today's visit, please call [phone].

## 2018-03-21 NOTE — PROGRESS NOTES
Chief Complaint   Patient presents with    Follow-up     DM     1. Have you been to the ER, urgent care clinic since your last visit? Hospitalized since your last visit? No    2. Have you seen or consulted any other health care providers outside of the [address] [place] [address] since your last visit? Include any pap smears or colon screening. No     HPI   [employer]. comes in for follow-up care. Low back pain: Patient has recalcitrant low back pain. Has been seen on several occasions for this. In the past he did have an MRI done for his lower back and has had procedures done. Pain has become worse. States that the pain feels more like a cramp in the lower back and at times radiates proximally and distally to the lower extremities. He denies numbness and tingling of the legs. Denies foot drop. He would like to have an MRI of the back done. Recently had an x-ray that showed degenerative changes with retrolisthesis. I will refer him to the spine clinic for evaluation and management. Diabetes mellitus type 2: Patient has type 2 diabetes mellitus. He is on insulin. He takes glargine and NovoLog. I will check his HbA1c today. I did do a foot exam that is stable. We will continue with the current treatment plan. He has had his eye exam done at the [place]. Hypertension: Patient's blood pressure is stable. He is on amlodipine, Lopressor and spironolactone. We will continue with the current treatment plan. I will check labs today. Gout: Patient has a history of gout arthritis. Currently takes you uloric for gout prophylaxis. He also tries to avoid foods that could trigger a gout attack including purine rich diet. Myalgia: Patient has generalized muscle aches. This affects his shoulders, back and the lower extremities. He does have diclofenac gel to apply. Has also been prescribed Zanaflex as a muscle relaxant.   We will continue with these medications and I will follow-up at next visit.  Thyroid disorder: Patient has a history of hypothyroidism. I will check his TSH today. Orthopedics: Patient had right knee replacement. During the procedure unfortunately he fractured his right femur. He has been on a wheelchair and then later on used a walker and the currently is using a cane. He did have some physical therapy but this was discontinued as he was felt not to have made sufficient improvement. Currently still has occasional discomfort and pain around the lateral aspect of the right hip. He does have some tramadol that he uses for pain as needed. He will also use the diclofenac gel. Cardiac: Patient has a history of valvular heart disease. He had aortic stenosis and has had surgical correction of the same. Currently he is stable. Past Medical History  Past Medical History:   Diagnosis Date    Diabetes ([facility] [address] 1995    Dyslipidemia 3/1/2017    Fractures 1995    R Knee/ Tib fib fracture/surgery    Gout 2010    Graves disease 1/21/2015    High cholesterol 2005    HTN (hypertension) 1990    Osteoarthritis 2013    Severe aortic stenosis 3/29/2017    Spinal stenosis of lumbar region 3/1/2016    Spondylolisthesis 3/1/2016    Thyroid trouble     Type 2 diabetes mellitus with diabetic nephropathy ([facility] [address] 4/28/2015       Surgical History  Past Surgical History:   Procedure Laterality Date    HX HIP REPLACEMENT Left 2009    HX ORTHOPAEDIC Right     R knee/Tibfib surgery        Medications  Current Outpatient Prescriptions   Medication Sig Dispense Refill    tiZANidine (ZANAFLEX) 2 mg tablet Take  by mouth.  capsicum oleoresin 0.025 % topical cream Apply  to affected area three (3) times daily.  diclofenac (VOLTAREN) 1 % gel Apply  to affected area four (4) times daily.  aspirin (ASPIRIN) 325 mg tablet Take 325 mg by mouth daily.  traMADol (ULTRAM) 50 mg tablet Take 1 Tab by mouth two (2) times daily as needed for Pain. Max Daily Amount: 100 mg.  Indications: NEUROPATHIC PAIN 60 Tab 0    magnesium chloride (MAG DELAY) 64 mg delayed release tablet Take 8 tablets 3 x day      levothyroxine (SYNTHROID) 125 mcg tablet Take 1.5 Tabs by mouth Daily (before breakfast). 45 Tab 2    amLODIPine (NORVASC) 10 mg tablet Take 5 mg by mouth daily.  pyridoxine, vitamin B6, (VITAMIN B-6) 100 mg tablet Take 100 mg by mouth daily.  metoprolol tartrate (LOPRESSOR) 25 mg tablet Take 1 Tab by mouth two (2) times a day. 180 Tab 3    aspirin delayed-release 81 mg tablet Take 1 Tab by mouth daily. 30 Tab 2    insulin glargine (LANTUS) 100 unit/mL injection Take 30 units once daily  Indications: type 2 diabetes mellitus 3 Vial 3    cyanocobalamin (VITAMIN B-12) 1,000 mcg tablet Take 1,000 mcg by mouth daily.  febuxostat (ULORIC) 40 mg tab tablet Take 1 Tab by mouth daily. Indications: GOUT PREVENTION 90 Tab 1    insulin aspart (NOVOLOG) 100 unit/mL injection by SubCUTAneous route. 6 units before meals      spironolactone (ALDACTONE) 25 mg tablet Take 1 Tab by mouth daily. Indications: EDEMA 30 Tab 3    glucose blood VI test strips (ASCENSIA AUTODISC VI, ONE TOUCH ULTRA TEST VI) strip Dispense precision extra test strips 3 x day to check blood glucose 200 Each 3    Omeprazole delayed release (PRILOSEC D/R) 20 mg tablet Take 20 mg by mouth daily.  atorvastatin (LIPITOR) 40 mg tablet Take 20 mg by mouth daily.  testosterone (ANDROGEL) 20.25 mg/1.25 gram (1.62 %) gel Apply  to affected area as needed.  Cholecalciferol, Vitamin D3, 1,000 unit cap Take 3,000 Units by mouth daily.          Allergies  Allergies   Allergen Reactions    Watermelon Swelling    Peach (Prunus Persica) Itching       Family History  Family History   Problem Relation Age of Onset    Hypertension Mother     Thyroid Disease Mother     Hypertension Father     Diabetes Father     Drug Abuse Brother        Social History  Social History     Social History    Marital status:  Spouse name: N/A    Number of children: N/A    Years of education: N/A     Occupational History    Not on file.      Social History Main Topics    Smoking status: Former Smoker     Packs/day: 1.00     Types: Cigarettes     Start date: 1/1/1969     Quit date: 1/1/1995    Smokeless tobacco: Never Used    Alcohol use No    Drug use: No    Sexual activity: Yes     Partners: Female     Other Topics Concern     Service Yes     Served in [address] Blood Transfusions No    Caffeine Concern No    Occupational Exposure No    Hobby Hazards No    Sleep Concern No    Stress Concern No    Weight Concern Yes    Special Diet No    Back Care Yes     Lower Back pain when walking    Exercise No    Bike Helmet No    Seat Belt Yes    Self-Exams Yes     Social History Narrative       Review of Systems  Review of Systems - History obtained from chart review and the patient  General ROS: positive for  - fatigue and malaise  negative for - chills or fever  Psychological ROS: negative  Ophthalmic ROS: positive for - uses glasses  ENT ROS: negative  Allergy and Immunology ROS: negative  Hematological and Lymphatic ROS: negative  Endocrine ROS: dm2, hypothyroidism  Respiratory ROS: no cough, shortness of breath, or wheezing  Cardiovascular ROS: no chest pain or dyspnea on exertion  Gastrointestinal ROS: no abdominal pain, change in bowel habits, or black or bloody stools  Genito-Urinary ROS: negative  Musculoskeletal ROS: positive for - joint pain, muscle pain and pain in back - lower, hip - right and knee - right  Neurological ROS: no TIA or stroke symptoms  Dermatological ROS: negative    Vital Signs  Visit Vitals    /62 (BP 1 Location: Left arm, BP Patient Position: Sitting)    Pulse (!) 47    Temp 96.8 °F (36 °C) (Oral)    Resp 18    Ht 5' 10\" (1.778 m)    Wt 245 lb (111.1 kg)    SpO2 97%    BMI 35.15 kg/m2         Physical Exam  Physical Examination: General appearance - oriented to person, place, and time, acyanotic, in no respiratory distress and well hydrated  Mental status - alert, oriented to person, place, and time, affect appropriate to mood  Mouth - mucous membranes moist, pharynx normal without lesions  Neck - supple, no significant adenopathy  Lymphatics - no palpable lymphadenopathy  Chest - clear to auscultation, no wheezes, rales or rhonchi, symmetric air entry  Heart - S1 and S2 normal  Abdomen - no rebound tenderness noted  Back exam - limited range of motion, pain with motion noted during exam paralumbar muscles  Neurological - grossly non focal  Musculoskeletal - osteoarthritic changes noted in both hands  Extremities - no pedal edema noted, intact peripheral pulses    Diabetic foot exam:     Left: Reflexes 2+     Vibratory sensation normal    Proprioception normal   Sharp/dull discrimination normal    Filament test normal sensation with micro filament   Pulse DP: 2+ (normal)   Pulse PT: 2+ (normal)   Deformities: None  Right: Reflexes 2+   Vibratory sensation normal   Proprioception normal   Sharp/dull discrimination normal   Filament test normal sensation with micro filament   Pulse DP: 2+ (normal)   Pulse PT: 2+ (normal)   Deformities: None          Results  Results for orders placed or performed in visit on 03/21/18   AMB POC URINE, MICROALBUMIN, SEMIQUANTITATIVE   Result Value Ref Range    Microalbumin urine (POC) 80 MG/L    Microalbumin/creat ratio (POC)  <30 MG/G       ASSESSMENT and PLAN    ICD-10-CM ICD-9-CM    1. Chronic midline low back pain, with sciatica presence unspecified M54.5 724.2 REFERRAL TO ORTHOPEDICS    G89.29 338.29 COLLECTION VENOUS BLOOD,VENIPUNCTURE   2.  Type 2 diabetes mellitus with diabetic nephropathy, unspecified long term insulin use status ([facility]) E11.21 250.40 CBC WITH AUTOMATED DIFF     583.81 HEMOGLOBIN A1C WITH EAG      LIPID PANEL      METABOLIC PANEL, COMPREHENSIVE      MAGNESIUM      AMB POC URINE, MICROALBUMIN, SEMIQUANTITATIVE COLLECTION VENOUS BLOOD,VENIPUNCTURE   3. Graves disease E05.00 242.00 TSH 3RD GENERATION      COLLECTION VENOUS BLOOD,VENIPUNCTURE   4. Hypothyroidism due to acquired atrophy of thyroid E03.4 244.8 TSH 3RD GENERATION     246.8 COLLECTION VENOUS BLOOD,VENIPUNCTURE   5. Screen for colon cancer Z12.11 V76.51 OCCULT BLOOD, IMMUNOASSAY (FIT)     lab results and schedule of future lab studies reviewed with patient  reviewed diet, exercise and weight control  reviewed medications and side effects in detail  specific diabetic recommendations: low cholesterol diet, weight control and daily exercise discussed, home glucose monitoring emphasized, all medications, side effects and compliance discussed carefully, annual eye examinations at Ophthalmology discussed, glycohemoglobin and other lab monitoring discussed and long term diabetic complications discussed    I have discussed the diagnosis with the patient and the intended plan of care as seen in the above orders. The patient has received an after-visit summary and questions were answered concerning future plans. I have discussed medication, side effects, and warnings with the patient in detail. The patient verbalized understanding and is in agreement with the plan of care. The patient will contact the office with any additional concerns.     Sudha Pina MD

## 2018-03-27 DIAGNOSIS — E03.4 HYPOTHYROIDISM DUE TO ACQUIRED ATROPHY OF THYROID: Primary | Chronic | ICD-10-CM

## 2018-03-27 RX ORDER — LEVOTHYROXINE SODIUM 200 UG/1
200 TABLET ORAL
Qty: 30 TAB | Refills: 2 | Status: SHIPPED | OUTPATIENT
Start: 2018-03-27 | End: 2018-07-11 | Stop reason: SDUPTHER

## 2018-03-28 NOTE — PROGRESS NOTES
Please let patient know his HbA1c 6.3. This shows stable control of his diabetes. His TSH is elevated at 5.76. This does indicate that we do need to increase his levothyroxine. I will send in a prescription for 200 mcg of the levothyroxine to take daily. His blood counts remains similar to his previous counts thus stable. Rest of his lab results are stable.   Rakesh Teague MD

## 2018-03-30 ENCOUNTER — OFFICE VISIT (OUTPATIENT)
Dept: ORTHOPEDIC SURGERY | Age: 73
End: 2018-03-30

## 2018-03-30 VITALS
SYSTOLIC BLOOD PRESSURE: 144 MMHG | RESPIRATION RATE: 16 BRPM | DIASTOLIC BLOOD PRESSURE: 68 MMHG | HEIGHT: 70 IN | WEIGHT: 242.4 LBS | BODY MASS INDEX: 34.7 KG/M2 | HEART RATE: 69 BPM

## 2018-03-30 DIAGNOSIS — M96.1 LUMBAR POSTLAMINECTOMY SYNDROME: Primary | ICD-10-CM

## 2018-03-30 DIAGNOSIS — M54.16 LUMBAR RADICULOPATHY: ICD-10-CM

## 2018-03-30 DIAGNOSIS — M43.10 SPONDYLOLISTHESIS, ACQUIRED: ICD-10-CM

## 2018-03-30 DIAGNOSIS — M51.36 DDD (DEGENERATIVE DISC DISEASE), LUMBAR: ICD-10-CM

## 2018-03-30 RX ORDER — DIAZEPAM 10 MG/1
TABLET ORAL
Qty: 1 TAB | Refills: 0 | Status: SHIPPED | OUTPATIENT
Start: 2018-04-07 | End: 2018-04-27 | Stop reason: ALTCHOICE

## 2018-03-30 RX ORDER — GABAPENTIN 100 MG/1
100 CAPSULE ORAL 3 TIMES DAILY
Qty: 90 CAP | Refills: 1 | Status: SHIPPED | OUTPATIENT
Start: 2018-03-30 | End: 2018-04-30

## 2018-03-30 NOTE — MR AVS SNAPSHOT
[address] [facility] 
 
 
 [address] [facility] 
[phone] Patient: Wanda Wheatley. MRN: [id number] :1945 Visit Information Date & Time Provider Department Dept. Phone Encounter #  
 3/30/2018  1:55 PM Mukesh Bertrand  [facility], [address] [facility] - [place] [phone] Follow-up Instructions Return for following MRI. Your Appointments 2018 10:30 AM  
Office Visit with Sophia Sen MD  
[facility] [facility]) Appt Note:  [facility]  
 [address] [facility] [address] [facility]  
  
   
 [address] [facility] 2018  9:30 AM  
Follow Up with Juan Antonio Stark MD  
[facility] ([place] [facility]) Appt Note: 6 month follow up  
 [address] [facility] [address]  
  
   
 [address] [place] [id number] Upcoming Health Maintenance Date Due ZOSTER VACCINE AGE 60> 2006 EYE EXAM RETINAL OR DILATED Q1 10/14/2016 GLAUCOMA SCREENING Q2Y 10/14/2017 FOBT Q 1 YEAR AGE 50-75 2018 MEDICARE YEARLY EXAM 2018 HEMOGLOBIN A1C Q6M 2018 FOOT EXAM Q1 3/21/2019 MICROALBUMIN Q1 3/21/2019 LIPID PANEL Q1 3/21/2019 DTaP/Tdap/Td series (2 - Td) 2025 Allergies as of 3/30/2018  Review Complete On: 3/30/2018 By: Mukesh Bertrand MD  
  
 Severity Noted Reaction Type Reactions Watermelon High 2015    Swelling Citric Acid Medium 2016    Rash Peach (Prunus Persica)  2015    Itching Current Immunizations  Reviewed on 2017 Name Date Influenza Vaccine (Quad) PF 2015 Pneumococcal Conjugate (PCV-13) 9/15/2016 Pneumococcal Polysaccharide (PPSV-23) 2017 Tdap 2015 Not reviewed this visit You Were Diagnosed With   
  
 Codes Comments Lumbar postlaminectomy syndrome    -  Primary ICD-10-CM: M96.1 ICD-9-CM: 722.83 Spondylolisthesis, acquired     ICD-10-CM: M43.10 ICD-9-CM: 738.4 Lumbar radiculopathy     ICD-10-CM: M54.16 
ICD-9-CM: 724.4 DDD (degenerative disc disease), lumbar     ICD-10-CM: M51.36 
ICD-9-CM: 722.52 Vitals BP Pulse Resp Height(growth percentile) Weight(growth percentile) BMI  
 144/68 69 16 5' 10\" (1.778 m) 242 lb 6.4 oz (110 kg) 34.78 kg/m2 Smoking Status Former Smoker BMI and BSA Data Body Mass Index Body Surface Area 34.78 kg/m 2 2.33 m 2 Preferred Pharmacy Pharmacy Name Phone [address] [place] [phone] Your Updated Medication List  
  
   
This list is accurate as of 3/30/18  3:07 PM.  Always use your most recent med list. amLODIPine 10 mg tablet Commonly known as:  Farnaz Prow Take 5 mg by mouth daily. * aspirin 325 mg tablet Commonly known as:  ASPIRIN Take 325 mg by mouth daily. * aspirin delayed-release 81 mg tablet Take 1 Tab by mouth daily. atorvastatin 40 mg tablet Commonly known as:  LIPITOR Take 20 mg by mouth daily. capsicum oleoresin 0.025 % topical cream  
Apply  to affected area three (3) times daily. cholecalciferol 1,000 unit Cap Commonly known as:  VITAMIN D3 Take 3,000 Units by mouth daily. diclofenac 1 % Gel Commonly known as:  VOLTAREN Apply  to affected area four (4) times daily. febuxostat 40 mg Tab tablet Commonly known as:  Jose Antonio Reich Take 1 Tab by mouth daily. Indications: GOUT PREVENTION  
  
 gabapentin 100 mg capsule Commonly known as:  NEURONTIN Take 1 Cap by mouth three (3) times daily. glucose blood VI test strips strip Commonly known as:  ASCENSIA AUTODISC VI, ONE TOUCH ULTRA TEST VI Dispense precision extra test strips 3 x day to check blood glucose insulin aspart U-100 100 unit/mL injection Commonly known as:  NOVOLOG  
by SubCUTAneous route. 6 units before meals  
  
 insulin glargine 100 unit/mL injection Commonly known as:  LANTUS Take 30 units once daily  Indications: type 2 diabetes mellitus  
  
 levothyroxine 200 mcg tablet Commonly known as:  SYNTHROID Take 1 Tab by mouth Daily (before breakfast). magnesium chloride 64 mg delayed release tablet Commonly known as:  MAG DELAY Take 8 tablets 3 x day  
  
 metoprolol tartrate 25 mg tablet Commonly known as:  LOPRESSOR Take 1 Tab by mouth two (2) times a day. Omeprazole delayed release 20 mg tablet Commonly known as:  PRILOSEC D/R Take 20 mg by mouth daily. spironolactone 25 mg tablet Commonly known as:  ALDACTONE Take 1 Tab by mouth daily. Indications: EDEMA  
  
 testosterone 20.25 mg/1.25 gram (1.62 %) gel Commonly known as:  ANDROGEL Apply  to affected area as needed. tiZANidine 2 mg tablet Commonly known as:  Martha Simper Take  by mouth. traMADol 50 mg tablet Commonly known as:  ULTRAM  
Take 1 Tab by mouth two (2) times daily as needed for Pain. Max Daily Amount: 100 mg. Indications: NEUROPATHIC PAIN  
  
 VITAMIN B-12 1,000 mcg tablet Generic drug:  cyanocobalamin Take 1,000 mcg by mouth daily. VITAMIN B-6 100 mg tablet Generic drug:  pyridoxine (vitamin B6) Take 100 mg by mouth daily. * Notice: This list has 2 medication(s) that are the same as other medications prescribed for you. Read the directions carefully, and ask your doctor or other care provider to review them with you. Prescriptions Printed Refills  
 gabapentin (NEURONTIN) 100 mg capsule 1 Sig: Take 1 Cap by mouth three (3) times daily. Class: Print Route: Oral  
  
Follow-up Instructions Return for following MRI. To-Do List   
 04/06/2018 Imaging:  MRI LUMB SPINE W WO CONT Introducing [facility]! Dear Belinda Méndez: Thank you for requesting a [employer] account. Our records indicate that you already have an active [employer] account. You can access your account anytime at [email or web address] Did you know that you can access your hospital and ER discharge instructions at any time in [employer]? You can also review all of your test results from your hospital stay or ER visit. Additional Information If you have questions, please visit the Frequently Asked Questions section of the [employer] website at [email or web address] Remember, [employer] is NOT to be used for urgent needs. For medical emergencies, dial 911. Now available from your iPhone and Android! Please provide this summary of care documentation to your next provider. Your primary care clinician is listed as Aroldo Perez. If you have any questions after today's visit, please call [phone].

## 2018-03-30 NOTE — PROGRESS NOTES
[facility]  [address], [place]   Phone: [phone]  Fax: [phone]        INITIAL CONSULTATION      HISTORY OF PRESENT ILLNESS:  Tan Barboza is a 71 y.o. male whom is referred from Dr. Wayne Rahman secondary to low back pain extending into the RLE in an L4 distribution to the knee. He rates pain 8/10. Lying down and walking exacerbate his pain. This is a patient with a diagnosis of lumbar postlaminectomy syndrome with previous h/o L4-S1 fusion performed by Dr. Denisha Watson in 2016 (?). Pt states he did not experience much in the way of low back pain prior to his surgery but noted an immediate onset of low back pain postoperatively. He reports his lower extremity pain resolved after his surgery x 3 months until recurrence. Pt underwent lumbar blocks x 2 roughly 7-8 months ago through Dr. Mary Mcneill's practice. Pt also has h/o left THR. He recalls taking Neurontin in the past for a month for paraesthesias of the hands which he tolerated well. Pt was followed by Dr. Eyad Coffey for chiropractic treatment x 6 months without benefit. The patient has a history of DM and reports blood sugars are well controlled, consistently remaining below 200. PMHx includes stage 3 renal disease and Grave's disease. Pt is not actively being treated by nephrology. Pt denies change in bowel or bladder habits. Pt denies fever, weight loss, or skin changes. Lumbar spine XR dated 3/1/2018 reviewed. Per report, post-surgical changes of the lower lumbar spine. Intact hardware. Grade 1 anterolisthesis of L4 on L5, grade I retrolisthesis of L5 on S1, and diffuse mild degenerative disc disease. Mild degenerative changes of the right hip. Left total hip prothesis in good position, without complication. Reviewing films myself, there is a posterior fusion L4-S1. No acute pathology identified. The patient is RHD. Note from Dr. Wayne Rahman dated 3/21/18 reviewed.  reviewed.  Pt reports he is prescribed Tramadol through the [place]. Body mass index is 34.78 kg/(m^2). Past Medical History:   Diagnosis Date    Diabetes ([facility] 1995    Dyslipidemia 3/1/2017    Fractures 1995    R Knee/ Tib fib fracture/surgery    Gout 2010    Graves disease 1/21/2015    High cholesterol 2005    HTN (hypertension) 1990    Osteoarthritis 2013    Severe aortic stenosis 3/29/2017    Spinal stenosis of lumbar region 3/1/2016    Spondylolisthesis 3/1/2016    Thyroid trouble     Type 2 diabetes mellitus with diabetic nephropathy ([facility] 4/28/2015          Past Surgical History:   Procedure Laterality Date    HX HIP REPLACEMENT Left 2009    HX ORTHOPAEDIC Right     R knee/Tibfib surgery         Social History   Substance Use Topics    Smoking status: Former Smoker     Packs/day: 1.00     Types: Cigarettes     Start date: 1/1/1969     Quit date: 1/1/1995    Smokeless tobacco: Never Used    Alcohol use No     Work status: The patient is retired. Marital status: The patient is . Current Outpatient Prescriptions   Medication Sig Dispense Refill    gabapentin (NEURONTIN) 100 mg capsule Take 1 Cap by mouth three (3) times daily. 90 Cap 1    [START ON 4/7/2018] diazePAM (VALIUM) 10 mg tablet 1 tab PO 30 minutes prior to MRI 1 Tab 0    levothyroxine (SYNTHROID) 200 mcg tablet Take 1 Tab by mouth Daily (before breakfast). 30 Tab 2    tiZANidine (ZANAFLEX) 2 mg tablet Take  by mouth.  capsicum oleoresin 0.025 % topical cream Apply  to affected area three (3) times daily.  diclofenac (VOLTAREN) 1 % gel Apply  to affected area four (4) times daily.  aspirin (ASPIRIN) 325 mg tablet Take 325 mg by mouth daily.  traMADol (ULTRAM) 50 mg tablet Take 1 Tab by mouth two (2) times daily as needed for Pain. Max Daily Amount: 100 mg.  Indications: NEUROPATHIC PAIN 60 Tab 0    magnesium chloride (MAG DELAY) 64 mg delayed release tablet Take 8 tablets 3 x day      amLODIPine (NORVASC) 10 mg tablet Take 5 mg by mouth daily.  pyridoxine, vitamin B6, (VITAMIN B-6) 100 mg tablet Take 100 mg by mouth daily.  metoprolol tartrate (LOPRESSOR) 25 mg tablet Take 1 Tab by mouth two (2) times a day. 180 Tab 3    aspirin delayed-release 81 mg tablet Take 1 Tab by mouth daily. 30 Tab 2    insulin glargine (LANTUS) 100 unit/mL injection Take 30 units once daily  Indications: type 2 diabetes mellitus 3 Vial 3    cyanocobalamin (VITAMIN B-12) 1,000 mcg tablet Take 1,000 mcg by mouth daily.  febuxostat (ULORIC) 40 mg tab tablet Take 1 Tab by mouth daily. Indications: GOUT PREVENTION 90 Tab 1    insulin aspart (NOVOLOG) 100 unit/mL injection by SubCUTAneous route. 6 units before meals      spironolactone (ALDACTONE) 25 mg tablet Take 1 Tab by mouth daily. Indications: EDEMA 30 Tab 3    glucose blood VI test strips (ASCENSIA AUTODISC VI, ONE TOUCH ULTRA TEST VI) strip Dispense precision extra test strips 3 x day to check blood glucose 200 Each 3    Omeprazole delayed release (PRILOSEC D/R) 20 mg tablet Take 20 mg by mouth daily.  atorvastatin (LIPITOR) 40 mg tablet Take 20 mg by mouth daily.  testosterone (ANDROGEL) 20.25 mg/1.25 gram (1.62 %) gel Apply  to affected area as needed.  Cholecalciferol, Vitamin D3, 1,000 unit cap Take 3,000 Units by mouth daily. Allergies   Allergen Reactions    Watermelon Swelling    Citric Acid Rash    Peach (Prunus Persica) Itching            Family History   Problem Relation Age of Onset    Hypertension Mother     Thyroid Disease Mother     Hypertension Father     Diabetes Father    Alex Orellana Drug Abuse Brother          REVIEW OF SYSTEMS  Constitutional symptoms: Negative  Eyes: Negative  Ears, Nose, Throat, and Mouth: Negative  Cardiovascular: Negative  Respiratory: Negative  Genitourinary: Negative  Integumentary (Skin and/or breast): Negative  Musculoskeletal: Positive for low back pain into the RLE.    Extremities: Negative for edema.  Endocrine/Rheumatologic: Negative  Hematologic/Lymphatic: Negative  Allergic/Immunologic: Negative  Psychiatric: Negative       Ambulates with a single point cane. PHYSICAL EXAMINATION    Visit Vitals    /68    Pulse 69    Resp 16    Ht 5' 10\" (1.778 m)    Wt 242 lb 6.4 oz (110 kg)    BMI 34.78 kg/m2       CONSTITUTIONAL: NAD, A&O x 3  HEART: Regular rate and rhythm  ABDOMEN: Positive bowel sounds, soft, nontender, and nondistended  LUNGS: Clear to auscultation bilaterally. SKIN: Negative for rash. RANGE OF MOTION: The patient has full passive range of motion in all four extremities. SENSATION: Sensation is intact to light touch throughout. MOTOR:   Straight Leg Raise: Negative, bilateral  [place]: Negative, bilateral  Deep tendon reflexes are 0 at the brachioradialis and triceps, 1+ at the biceps. Deep tendon reflexes are 0 at the knees and ankles bilaterally. Shoulder AB/Flex Elbow Flex Wrist Ext Elbow Ext Wrist Flex Hand Intrin Tone   Right +4/5 +4/5 +4/5 +4/5 +4/5 +4/5 +4/5   Left +4/5 +4/5 +4/5 +4/5 +4/5 +4/5 +4/5              Hip Flex Knee Ext Knee Flex Ankle DF GTE Ankle PF Tone   Right +4/5 +4/5 +4/5 +4/5 +4/5 +4/5 +4/5   Left +4/5 +4/5 +4/5 +4/5 +4/5 +4/5 +4/5         ASSESSMENT   Diagnoses and all orders for this visit:    1. Lumbar postlaminectomy syndrome  -     MRI LUMB SPINE W WO CONT; Future  -     gabapentin (NEURONTIN) 100 mg capsule; Take 1 Cap by mouth three (3) times daily. -     diazePAM (VALIUM) 10 mg tablet; 1 tab PO 30 minutes prior to MRI    2. Spondylolisthesis, acquired  -     MRI LUMB SPINE W WO CONT; Future  -     gabapentin (NEURONTIN) 100 mg capsule; Take 1 Cap by mouth three (3) times daily. -     diazePAM (VALIUM) 10 mg tablet; 1 tab PO 30 minutes prior to MRI    3. Lumbar radiculopathy  -     MRI LUMB SPINE W WO CONT; Future  -     gabapentin (NEURONTIN) 100 mg capsule; Take 1 Cap by mouth three (3) times daily.   -     diazePAM (VALIUM) 10 mg tablet; 1 tab PO 30 minutes prior to MRI    4. DDD (degenerative disc disease), lumbar  -     MRI LUMB SPINE W WO CONT; Future  -     gabapentin (NEURONTIN) 100 mg capsule; Take 1 Cap by mouth three (3) times daily. -     diazePAM (VALIUM) 10 mg tablet; 1 tab PO 30 minutes prior to MRI           IMPRESSIONS/RECOMMENDATIONS:  This is a patient with a previous history of L4-S1 posterior fusion. He describes symptoms consistent with L4 radiculopathy. Patient is neurologically intact. I will set him up for a new lumbar spine MRI. I advised patient to bring copies of films to next visit. I will have patient sign a release of medical information form so I can obtain his records (specifically block notes) from Dr. Heaven Mcneill's office. I will try him on Neurontin 100 mg TID. The risks, benefits, and potential side effects of this medication were discussed. Patient understands and wishes to proceed. Patient advised to call the office if intolerant to new medication. I will see the patient back following MRI. Written by Nicole Yen, as dictated by Eli Sparks MD  I examined the patient, reviewed and agree with the note.

## 2018-04-09 ENCOUNTER — HOSPITAL ENCOUNTER (OUTPATIENT)
Age: 73
Discharge: HOME | End: 2018-04-09
Attending: PHYSICAL MEDICINE & REHABILITATION
Payer: MEDICARE

## 2018-04-09 DIAGNOSIS — M54.16 LUMBAR RADICULOPATHY: ICD-10-CM

## 2018-04-09 DIAGNOSIS — M96.1 LUMBAR POSTLAMINECTOMY SYNDROME: ICD-10-CM

## 2018-04-09 DIAGNOSIS — M43.10 SPONDYLOLISTHESIS, ACQUIRED: ICD-10-CM

## 2018-04-09 DIAGNOSIS — M51.36 DDD (DEGENERATIVE DISC DISEASE), LUMBAR: ICD-10-CM

## 2018-04-09 PROCEDURE — 72158 MRI LUMBAR SPINE W/O & W/DYE: CPT

## 2018-04-09 PROCEDURE — 74011250636 HC RX REV CODE- 250/636: Performed by: PHYSICAL MEDICINE & REHABILITATION

## 2018-04-09 PROCEDURE — A9585 GADOBUTROL INJECTION: HCPCS | Performed by: PHYSICAL MEDICINE & REHABILITATION

## 2018-04-09 RX ADMIN — GADOBUTROL 11 ML: 604.72 INJECTION INTRAVENOUS at 10:00

## 2018-04-27 ENCOUNTER — OFFICE VISIT (OUTPATIENT)
Dept: ORTHOPEDIC SURGERY | Age: 73
End: 2018-04-27

## 2018-04-27 VITALS
DIASTOLIC BLOOD PRESSURE: 73 MMHG | HEART RATE: 63 BPM | HEIGHT: 70 IN | SYSTOLIC BLOOD PRESSURE: 141 MMHG | BODY MASS INDEX: 34.93 KG/M2 | WEIGHT: 244 LBS

## 2018-04-27 DIAGNOSIS — M48.062 SPINAL STENOSIS, LUMBAR REGION WITH NEUROGENIC CLAUDICATION: Primary | ICD-10-CM

## 2018-04-27 DIAGNOSIS — M43.10 SPONDYLOLISTHESIS, ACQUIRED: ICD-10-CM

## 2018-04-27 DIAGNOSIS — M54.16 LUMBAR RADICULOPATHY: ICD-10-CM

## 2018-04-27 DIAGNOSIS — M96.1 LUMBAR POSTLAMINECTOMY SYNDROME: ICD-10-CM

## 2018-04-27 NOTE — PROGRESS NOTES
[facility]  [address], [place]   Phone: [phone]  Fax: [phone]        PROGRESS NOTE      HISTORY OF PRESENT ILLNESS:  The patient is a 68 y.o. male and was seen today for follow up of low back pain extending into the RLE in an L4 distribution to the knee. Lying down and walking exacerbate his pain. This is a patient with a diagnosis of lumbar postlaminectomy syndrome with previous h/o L4-S1 fusion performed by Dr. Rosemary Faulkner on 3/1/2016. Pt states he did not experience much in the way of low back pain prior to his surgery but noted an immediate onset of low back pain postoperatively. He reports his lower extremity pain resolved after his surgery x 3 months until recurrence. Pt underwent lumbar blocks x 2 (Maybe June or July 2017) through Dr. Heike Mcneill's practice. Pt also has h/o left THR. He recalls taking Neurontin in the past for a month for paraesthesias of the hands which he tolerated well. Pt was followed by Dr. Amber Harrington for chiropractic treatment x 6 months without benefit. The patient has a history of DM and reports blood sugars are well controlled, consistently remaining below 200. PMHx includes stage 3 renal disease and Grave's disease. Pt is not actively being followed by nephrology. Pt denies change in bowel or bladder habits. Pt denies fever, weight loss, or skin changes. Lumbar spine XR dated 3/1/2018 reviewed. Per report, post-surgical changes of the lower lumbar spine. Intact hardware. Grade 1 anterolisthesis of L4 on L5, grade I retrolisthesis of L5 on S1, and diffuse mild degenerative disc disease. Mild degenerative changes of the right hip. Left total hip prothesis in good position, without complication. Reviewing films myself, there is a posterior fusion L4-S1. No acute pathology identified. The patient is RHD. Note from Dr. Charley Zuniga dated 3/21/18 reviewed.  reviewed. Pt reports he is prescribed Tramadol through the [place].  At his last clinical appointment, this is a patient with a previous history of L4-S1 posterior fusion. He described symptoms consistent with L4 radiculopathy. Patient was neurologically intact. I set him up for a new lumbar spine MRI. I had patient sign a release of medical information form so I could obtain his records (specifically block notes) from Dr. Evelyn Mcneill's office. I tried him on Neurontin 100 mg TID. The patient returns today with pain location and distribution remain unchanged. He rates pain 7/10, a slight decrease since his last visit (8/10). Pt reports LE edema with NEURONTIN 100 mg TID. Note from Ana Salazar dated 12/6/16 indicates patient underwent an L2/3 epidural as there was scarring at the L3/4 level from previous surgery. Note from Dr. Harsha Bentley dated 4/21/17 indicates patient underwent right SI joint injection #1. Note from Dr. Harsha Bentley dated 8/3/17 indicates patient underwent bilateral SI joint injections. Per patient, SI joint injections only provided relief for his left-sided buttock pain. Lumbar spine MRI dated 4/9/2018 reviewed. Per report, multilevel degenerative findings along postsurgical lumbar spine. Most significant findings are at L3-4 just above the fusion. There is a moderate to severe spinal stenosis and a severe right foraminal stenosis. There is a right foraminal disc extrusion contributing to this.  reviewed. Body mass index is 35.01 kg/(m^2).       PCP: Ludwin Meraz MD      Past Medical History:   Diagnosis Date    Diabetes ([address] 1995    Dyslipidemia 3/1/2017    Fractures 1995    R Knee/ Tib fib fracture/surgery    Gout 2010    Graves disease 1/21/2015    High cholesterol 2005    HTN (hypertension) 1990    Osteoarthritis 2013    Severe aortic stenosis 3/29/2017    Spinal stenosis of lumbar region 3/1/2016    Spondylolisthesis 3/1/2016    Thyroid trouble     Type 2 diabetes mellitus with diabetic nephropathy ([address] 4/28/2015        Social History Social History    Marital status:      Spouse name: N/A    Number of children: N/A    Years of education: N/A     Occupational History    Not on file. Social History Main Topics    Smoking status: Former Smoker     Packs/day: 1.00     Types: Cigarettes     Start date: 1/1/1969     Quit date: 1/1/1995    Smokeless tobacco: Never Used    Alcohol use No    Drug use: No    Sexual activity: Yes     Partners: Female     Other Topics Concern     Service Yes     Served in [address] Blood Transfusions No    Caffeine Concern No    Occupational Exposure No    Hobby Hazards No    Sleep Concern No    Stress Concern No    Weight Concern Yes    Special Diet No    Back Care Yes     Lower Back pain when walking    Exercise No    Bike Helmet No    Seat Belt Yes    Self-Exams Yes     Social History Narrative       Current Outpatient Prescriptions   Medication Sig Dispense Refill    levothyroxine (SYNTHROID) 200 mcg tablet Take 1 Tab by mouth Daily (before breakfast). 30 Tab 2    capsicum oleoresin 0.025 % topical cream Apply  to affected area three (3) times daily.  diclofenac (VOLTAREN) 1 % gel Apply  to affected area four (4) times daily.  aspirin (ASPIRIN) 325 mg tablet Take 325 mg by mouth daily.  traMADol (ULTRAM) 50 mg tablet Take 1 Tab by mouth two (2) times daily as needed for Pain. Max Daily Amount: 100 mg. Indications: NEUROPATHIC PAIN 60 Tab 0    magnesium chloride (MAG DELAY) 64 mg delayed release tablet Take 8 tablets 3 x day      amLODIPine (NORVASC) 10 mg tablet Take 5 mg by mouth daily.  pyridoxine, vitamin B6, (VITAMIN B-6) 100 mg tablet Take 100 mg by mouth daily.  metoprolol tartrate (LOPRESSOR) 25 mg tablet Take 1 Tab by mouth two (2) times a day. 180 Tab 3    aspirin delayed-release 81 mg tablet Take 1 Tab by mouth daily.  30 Tab 2    insulin glargine (LANTUS) 100 unit/mL injection Take 30 units once daily  Indications: type 2 diabetes mellitus 3 Vial 3    cyanocobalamin (VITAMIN B-12) 1,000 mcg tablet Take 1,000 mcg by mouth daily.  febuxostat (ULORIC) 40 mg tab tablet Take 1 Tab by mouth daily. Indications: GOUT PREVENTION 90 Tab 1    insulin aspart (NOVOLOG) 100 unit/mL injection by SubCUTAneous route. 6 units before meals      spironolactone (ALDACTONE) 25 mg tablet Take 1 Tab by mouth daily. Indications: EDEMA 30 Tab 3    glucose blood VI test strips (ASCENSIA AUTODISC VI, ONE TOUCH ULTRA TEST VI) strip Dispense precision extra test strips 3 x day to check blood glucose 200 Each 3    Omeprazole delayed release (PRILOSEC D/R) 20 mg tablet Take 20 mg by mouth daily.  atorvastatin (LIPITOR) 40 mg tablet Take 20 mg by mouth daily.  testosterone (ANDROGEL) 20.25 mg/1.25 gram (1.62 %) gel Apply  to affected area as needed.  Cholecalciferol, Vitamin D3, 1,000 unit cap Take 3,000 Units by mouth daily.  gabapentin (NEURONTIN) 100 mg capsule Take 1 Cap by mouth three (3) times daily. (Patient not taking: Reported on 4/27/2018) 90 Cap 1    tiZANidine (ZANAFLEX) 2 mg tablet Take  by mouth. Allergies   Allergen Reactions    Watermelon Swelling    Citric Acid Rash    Peach (Prunus Persica) Itching        Ambulates with a single point cane. PHYSICAL EXAMINATION    Visit Vitals    /73    Pulse 63    Ht 5' 10\" (1.778 m)    Wt 110.7 kg (244 lb)    BMI 35.01 kg/m2       CONSTITUTIONAL: NAD, A&O x 3  SENSATION: Intact to light touch throughout  RANGE OF MOTION: The patient has full passive range of motion in all four extremities. MOTOR:  Straight Leg Raise: Negative, bilateral               Hip Flex Knee Ext Knee Flex Ankle DF GTE Ankle PF Tone   Right +4/5 +4/5 +4/5 +4/5 +4/5 +4/5 +4/5   Left +4/5 +4/5 +4/5 +4/5 +4/5 +4/5 +4/5       ASSESSMENT   Diagnoses and all orders for this visit:    1.  Spinal stenosis, lumbar region with neurogenic claudication  -     SCHEDULE SURGERY    2. Lumbar postlaminectomy syndrome  -     SCHEDULE SURGERY    3. Spondylolisthesis, acquired  -     SCHEDULE SURGERY    4. Lumbar radiculopathy  -     SCHEDULE SURGERY          IMPRESSION AND PLAN:  His symptoms appear to be stenotic in nature. There appears to also be a sacroiliac component to his pain complaints. Patient is neurologically intact. He elects to proceed with lumbar blocks. Upon approval from Dr. Yesi Dorado concerning patient's DM, I will order a right L3/L4 SNRBs. He declines neuropathic pain medication at this time. I will see the patient back following block. Written by Thang Elliott, as dictated by Shirley Kim MD  I examined the patient, reviewed and agree with the note.

## 2018-04-30 ENCOUNTER — OFFICE VISIT (OUTPATIENT)
Dept: FAMILY MEDICINE CLINIC | Age: 73
End: 2018-04-30

## 2018-04-30 ENCOUNTER — HOSPITAL ENCOUNTER (OUTPATIENT)
Dept: LAB | Age: 73
Discharge: HOME | End: 2018-04-30
Payer: MEDICARE

## 2018-04-30 VITALS
HEART RATE: 53 BPM | DIASTOLIC BLOOD PRESSURE: 76 MMHG | OXYGEN SATURATION: 99 % | BODY MASS INDEX: 34.93 KG/M2 | SYSTOLIC BLOOD PRESSURE: 145 MMHG | RESPIRATION RATE: 18 BRPM | TEMPERATURE: 97.7 F | HEIGHT: 70 IN | WEIGHT: 244 LBS

## 2018-04-30 DIAGNOSIS — E03.4 HYPOTHYROIDISM DUE TO ACQUIRED ATROPHY OF THYROID: Chronic | ICD-10-CM

## 2018-04-30 DIAGNOSIS — I10 ESSENTIAL HYPERTENSION: ICD-10-CM

## 2018-04-30 DIAGNOSIS — E11.21 TYPE 2 DIABETES MELLITUS WITH DIABETIC NEPHROPATHY, UNSPECIFIED WHETHER LONG TERM INSULIN USE (HCC): Primary | Chronic | ICD-10-CM

## 2018-04-30 DIAGNOSIS — N18.30 CKD (CHRONIC KIDNEY DISEASE) STAGE 3, GFR 30-59 ML/MIN (HCC): Chronic | ICD-10-CM

## 2018-04-30 DIAGNOSIS — Z00.00 ENCOUNTER FOR MEDICARE ANNUAL WELLNESS EXAM: ICD-10-CM

## 2018-04-30 DIAGNOSIS — M48.062 SPINAL STENOSIS, LUMBAR REGION WITH NEUROGENIC CLAUDICATION: ICD-10-CM

## 2018-04-30 LAB
ANION GAP SERPL CALC-SCNC: 10 MMOL/L (ref 3–18)
BUN SERPL-MCNC: 21 MG/DL (ref 7–18)
BUN/CREAT SERPL: 14 (ref 12–20)
CALCIUM SERPL-MCNC: 9.1 MG/DL (ref 8.5–10.1)
CHLORIDE SERPL-SCNC: 109 MMOL/L (ref 100–108)
CO2 SERPL-SCNC: 22 MMOL/L (ref 21–32)
CREAT SERPL-MCNC: 1.47 MG/DL (ref 0.6–1.3)
GLUCOSE SERPL-MCNC: 122 MG/DL (ref 74–99)
POTASSIUM SERPL-SCNC: 4.9 MMOL/L (ref 3.5–5.5)
SODIUM SERPL-SCNC: 141 MMOL/L (ref 136–145)

## 2018-04-30 PROCEDURE — 80048 BASIC METABOLIC PNL TOTAL CA: CPT | Performed by: FAMILY MEDICINE

## 2018-04-30 NOTE — PATIENT INSTRUCTIONS
Medicare Part B Preventive Services Limitations Recommendation Scheduled   Bone Mass Measurement  (age 72 & older, biennial) Requires diagnosis related to osteoporosis or estrogen deficiency. Biennial benefit unless patient has history of long-term glucocorticoid tx or baseline is needed because initial test was by other method N/a     Cardiovascular Screening Blood Tests (every 5 years)  Total cholesterol, HDL, Triglycerides Order as a panel if possible LAB Last 03/2018    Colorectal Cancer Screening  -Fecal occult blood test (annual)  -Flexible sigmoidoscopy (5y)  -Screening colonoscopy (10y)  -Barium Enema  Due     Counseling to Prevent Tobacco Use (up to 8 sessions per year)  - Counseling greater than 3 and up to 10 minutes  - Counseling greater than 10 minutes Patients must be asymptomatic of tobacco-related conditions to receive as preventive service N/a     Diabetes Screening Tests (at least every 3 years, Medicare covers annually or at 6-month intervals for prediabetic patients)    Fasting blood sugar (FBS) or glucose tolerance test (GTT) Patient must be diagnosed with one of the following:  -Hypertension, Dyslipidemia, obesity, previous impaired FBS or GTT  Or any two of the following: overweight, FH of diabetes, age ? 72, history of gestational diabetes, birth of baby weighing more than 9 pounds UTD     Diabetes Self-Management Training (DSMT) (no USPSTF recommendation) Requires referral by treating physician for patient with diabetes or renal disease. 10 hours of initial DSMT session of no less than 30 minutes each in a continuous 12-month period. 2 hours of follow-up DSMT in subsequent years.  N/A     Glaucoma Screening (no USPSTF recommendation) Diabetes mellitus, family history, , age 48 or over,  American, age 72 or over due    Human Immunodeficiency Virus (HIV) Screening (annually for increased risk patients)  HIV-1 and HIV-2 by EIA, DEREJE, rapid antibody test, or oral mucosa transudate Patient must be at increased risk for HIV infection per USPSTF guidelines or pregnant. Tests covered annually for patients at increased risk. Pregnant patients may receive up to 3 test during pregnancy. N/a     Medical Nutrition Therapy (MNT) (for diabetes or renal disease not recommended schedule) Requires referral by treating physician for patient with diabetes or renal disease. Can be provided in same year as diabetes self-management training (DSMT), and CMS recommends medical nutrition therapy take place after DSMT. Up to 3 hours for initial year and 2 hours in subsequent years. N/a     Prostate Cancer Screening (annually up to age 76)  - Digital rectal exam (ALTA)  - Prostate specific antigen (PSA) Annually (age 48 or over), ALTA not paid separately when covered E/M service is provided on same date  Men up to age 76 may need a screening blood test for prostate cancer at certain intervals, depending on their personal and family history. This decision is between the patient and his provider. N/a     Seasonal Influenza Vaccination (annually)  utd       Pneumococcal Vaccination (once after 72)  utd     Hepatitis B Vaccinations (if medium/high risk) Medium/high risk factors:  End-stage renal disease,  Hemophiliacs who received Factor VIII or IX concentrates, Clients of institutions for the mentally retarded, Persons who live in the same house as a HepB virus carrier, Homosexual men, Illicit injectable drug abusers. N/a     Shingles Vaccination A shingles vaccine is also recommended once in a lifetime after age 61 Due     Ultrasound Screening for Abdominal Aortic Aneurysm (AAA) (once) Patient must be referred through [facility] and not have had a screening for abdominal aortic aneurysm before under Medicare.   Limited to patients who meet one of the following criteria:  - Men who are 73-68 years old and have smoked more than 100 cigarettes in their lifetime.  -Anyone with a FH of AAA  -Anyone recommended for screening by USPSTF N/a

## 2018-04-30 NOTE — PROGRESS NOTES
JUANITA VIVAS [employer]. comes in for follow-up care. Hypertension: Patient's blood pressure is 145/76. When he checks it at home it is usually lower. Currently he is on spironolactone, amlodipine and metoprolol. We will continue with the current treatment plan. I will recheck his BMP. CKD: Patient has a history of CKD stage III. Previously we checked his creatinine and this was at 1.38. He would like to have this rechecked. We will do labs today. Diabetes mellitus type 2: Patient's last HbA1c was 6.3. He is on insulin. He takes Lantus and NovoLog. Will continue with this medication. We discussed correctional insulin. He does do adjustments and he takes between 6 and 10 units. I will continue to follow up with this. Low back pain: Patient has low back pain. Does have associated sciatica symptoms with compression neuropathy. He is followed up by the spine physician. He has had procedure done to his lower back. Recheck MRI done recently did show 1. Multilevel degenerative findings along postsurgical lumbar spine. -Most significant findings are at L3-4 just above the fusion. There is a moderate to severe spinal stenosis and a severe right foraminal stenosis. There is a right foraminal disc extrusion contributing to this. He has stopped taking the gabapentin and tizanidine as he feels these did not help. He was also getting lower extremity swelling and when he stopped the medication the symptoms have resolved. He will follow-up with  the spine specialist.  Currently he is taking tramadol as needed for pain. Hypothyroidism: Patient has elevated TSH. He has had thyroidectomy. I did send in a prescription for 200 mcg of levothyroxine to take monthly. He will take this and we will recheck labs in the 6-8 weeks. Cardiac: Patient has a prosthetic aortic valve. Denies chest pain, shortness of breath or diaphoresis. Overall doing well on this.     Past Medical History  Past Medical History: Diagnosis Date    Diabetes ([facility] [address] 1995    Dyslipidemia 3/1/2017    Fractures 1995    R Knee/ Tib fib fracture/surgery    Gout 2010    Graves disease 1/21/2015    High cholesterol 2005    HTN (hypertension) 1990    Osteoarthritis 2013    Severe aortic stenosis 3/29/2017    Spinal stenosis of lumbar region 3/1/2016    Spondylolisthesis 3/1/2016    Thyroid trouble     Type 2 diabetes mellitus with diabetic nephropathy ([facility] 4/28/2015       Surgical History  Past Surgical History:   Procedure Laterality Date    HX HIP REPLACEMENT Left 2009    HX LUMBAR FUSION      HX ORTHOPAEDIC Right     R knee/Tibfib surgery        Medications  Current Outpatient Prescriptions   Medication Sig Dispense Refill    levothyroxine (SYNTHROID) 200 mcg tablet Take 1 Tab by mouth Daily (before breakfast). 30 Tab 2    capsicum oleoresin 0.025 % topical cream Apply  to affected area three (3) times daily.  diclofenac (VOLTAREN) 1 % gel Apply  to affected area four (4) times daily.  aspirin (ASPIRIN) 325 mg tablet Take 325 mg by mouth daily.  traMADol (ULTRAM) 50 mg tablet Take 1 Tab by mouth two (2) times daily as needed for Pain. Max Daily Amount: 100 mg. Indications: NEUROPATHIC PAIN 60 Tab 0    magnesium chloride (MAG DELAY) 64 mg delayed release tablet Take 8 tablets 3 x day      amLODIPine (NORVASC) 10 mg tablet Take 5 mg by mouth daily.  pyridoxine, vitamin B6, (VITAMIN B-6) 100 mg tablet Take 100 mg by mouth daily.  metoprolol tartrate (LOPRESSOR) 25 mg tablet Take 1 Tab by mouth two (2) times a day. 180 Tab 3    aspirin delayed-release 81 mg tablet Take 1 Tab by mouth daily. (Patient taking differently: Take 325 mg by mouth daily.) 30 Tab 2    insulin glargine (LANTUS) 100 unit/mL injection Take 30 units once daily  Indications: type 2 diabetes mellitus 3 Vial 3    cyanocobalamin (VITAMIN B-12) 1,000 mcg tablet Take 1,000 mcg by mouth daily.       febuxostat (ULORIC) 40 mg tab tablet Take 1 Tab by mouth daily. Indications: GOUT PREVENTION 90 Tab 1    insulin aspart (NOVOLOG) 100 unit/mL injection by SubCUTAneous route. 6 units before meals      spironolactone (ALDACTONE) 25 mg tablet Take 1 Tab by mouth daily. Indications: EDEMA 30 Tab 3    glucose blood VI test strips (ASCENSIA AUTODISC VI, ONE TOUCH ULTRA TEST VI) strip Dispense precision extra test strips 3 x day to check blood glucose 200 Each 3    Omeprazole delayed release (PRILOSEC D/R) 20 mg tablet Take 20 mg by mouth daily.  atorvastatin (LIPITOR) 40 mg tablet Take 20 mg by mouth daily.  testosterone (ANDROGEL) 20.25 mg/1.25 gram (1.62 %) gel Apply  to affected area as needed.  Cholecalciferol, Vitamin D3, 1,000 unit cap Take 3,000 Units by mouth daily. Allergies  Allergies   Allergen Reactions    Watermelon Swelling    Citric Acid Rash    Peach (Prunus Persica) Itching       Family History  Family History   Problem Relation Age of Onset    Hypertension Mother     Thyroid Disease Mother     Hypertension Father     Diabetes Father     Drug Abuse Brother        Social History  Social History     Social History    Marital status:      Spouse name: N/A    Number of children: N/A    Years of education: N/A     Occupational History    Not on file.      Social History Main Topics    Smoking status: Former Smoker     Packs/day: 1.00     Types: Cigarettes     Start date: 1/1/1969     Quit date: 1/1/1995    Smokeless tobacco: Never Used    Alcohol use No    Drug use: No    Sexual activity: Yes     Partners: Female     Other Topics Concern     Service Yes     Served in [address] Blood Transfusions No    Caffeine Concern No    Occupational Exposure No    Hobby Hazards No    Sleep Concern No    Stress Concern No    Weight Concern Yes    Special Diet No    Back Care Yes     Lower Back pain when walking    Exercise No    Bike Helmet No    Seat Belt Yes    Self-Exams Yes     Social History Narrative       Review of Systems  Review of Systems - History obtained from chart review and the patient  General ROS: positive for  - fatigue and malaise  negative for - chills or fever  Psychological ROS: negative  Ophthalmic ROS: positive for - uses glasses  ENT ROS: negative  Allergy and Immunology ROS: negative  Hematological and Lymphatic ROS: negative  Endocrine ROS: dm2  Respiratory ROS: no cough, shortness of breath, or wheezing  Cardiovascular ROS: no chest pain or dyspnea on exertion  Gastrointestinal ROS: no abdominal pain, change in bowel habits, or black or bloody stools  Genito-Urinary ROS: negative  Musculoskeletal ROS: positive for - joint pain, joint stiffness and muscle pain  Neurological ROS: no TIA or stroke symptoms  Dermatological ROS: negative    Vital Signs  Visit Vitals    /76 (BP 1 Location: Left arm, BP Patient Position: Sitting)    Pulse (!) 53    Temp 97.7 °F (36.5 °C) (Oral)    Resp 18    Ht 5' 10\" (1.778 m)    Wt 244 lb (110.7 kg)    SpO2 99%    BMI 35.01 kg/m2         Physical Exam  Physical Examination: General appearance - oriented to person, place, and time and acyanotic, in no respiratory distress  Mental status - alert, oriented to person, place, and time, affect appropriate to mood  Mouth - mucous membranes moist, pharynx normal without lesions  Neck - supple, no significant adenopathy  Lymphatics - no palpable lymphadenopathy  Chest - clear to auscultation, no wheezes, rales or rhonchi, symmetric air entry, no tachypnea, retractions or cyanosis  Heart - S1 and S2 normal, systolic murmur 2/6 at 2nd left intercostal space  Neurological - motor and sensory grossly normal bilaterally  Musculoskeletal - osteoarthritic changes noted in both hands  Extremities - intact peripheral pulses    Results  Results for orders placed or performed during the hospital encounter of 03/21/18   CBC WITH AUTOMATED DIFF   Result Value Ref Range    WBC 6.1 4.6 - 13.2 K/uL    RBC 4.49 (L) 4.70 - 5.50 M/uL    HGB 10.5 (L) 13.0 - 16.0 g/dL    HCT 33.7 (L) 36.0 - 48.0 %    MCV 75.1 74.0 - 97.0 FL    MCH 23.4 (L) 24.0 - 34.0 PG    MCHC 31.2 31.0 - 37.0 g/dL    RDW 18.8 (H) 11.6 - 14.5 %    PLATELET 200 241 - 239 K/uL    MPV 9.4 9.2 - 11.8 FL    NEUTROPHILS 47 40 - 73 %    LYMPHOCYTES 38 21 - 52 %    MONOCYTES 8 3 - 10 %    EOSINOPHILS 6 (H) 0 - 5 %    BASOPHILS 1 0 - 2 %    ABS. NEUTROPHILS 2.8 1.8 - 8.0 K/UL    ABS. LYMPHOCYTES 2.3 0.9 - 3.6 K/UL    ABS. MONOCYTES 0.5 0.05 - 1.2 K/UL    ABS. EOSINOPHILS 0.4 0.0 - 0.4 K/UL    ABS. BASOPHILS 0.0 0.0 - 0.06 K/UL    DF AUTOMATED     HEMOGLOBIN A1C WITH EAG   Result Value Ref Range    Hemoglobin A1c 6.3 (H) 4.2 - 5.6 %    Est. average glucose 134 mg/dL   LIPID PANEL   Result Value Ref Range    LIPID PROFILE          Cholesterol, total 130 <200 MG/DL    Triglyceride 129 <150 MG/DL    HDL Cholesterol 50 40 - 60 MG/DL    LDL, calculated 54.2 0 - 100 MG/DL    VLDL, calculated 25.8 MG/DL    CHOL/HDL Ratio 2.6 0 - 5.0     METABOLIC PANEL, COMPREHENSIVE   Result Value Ref Range    Sodium 140 136 - 145 mmol/L    Potassium 4.7 3.5 - 5.5 mmol/L    Chloride 110 (H) 100 - 108 mmol/L    CO2 22 21 - 32 mmol/L    Anion gap 8 3.0 - 18 mmol/L    Glucose 107 (H) 74 - 99 mg/dL    BUN 20 (H) 7.0 - 18 MG/DL    Creatinine 1.38 (H) 0.6 - 1.3 MG/DL    BUN/Creatinine ratio 14 12 - 20      GFR est AA >60 >60 ml/min/1.73m2    GFR est non-AA 51 (L) >60 ml/min/1.73m2    Calcium 8.9 8.5 - 10.1 MG/DL    Bilirubin, total 0.3 0.2 - 1.0 MG/DL    ALT (SGPT) 26 16 - 61 U/L    AST (SGOT) 11 (L) 15 - 37 U/L    Alk. phosphatase 96 45 - 117 U/L    Protein, total 7.1 6.4 - 8.2 g/dL    Albumin 3.6 3.4 - 5.0 g/dL    Globulin 3.5 2.0 - 4.0 g/dL    A-G Ratio 1.0 0.8 - 1.7     MAGNESIUM   Result Value Ref Range    Magnesium 1.7 1.6 - 2.6 mg/dL   TSH 3RD GENERATION   Result Value Ref Range    TSH 5.76 (H) 0.36 - 3.74 uIU/mL       ASSESSMENT and PLAN    ICD-10-CM ICD-9-CM    1.  Type 2 diabetes mellitus with diabetic nephropathy, unspecified whether long term insulin use ([facility]) E11.21 250.40 COLLECTION VENOUS BLOOD,VENIPUNCTURE     583.81    2. Essential hypertension F04 875.3 METABOLIC PANEL, BASIC      COLLECTION VENOUS BLOOD,VENIPUNCTURE   3. Hypothyroidism due to acquired atrophy of thyroid E03.4 244.8 COLLECTION VENOUS BLOOD,VENIPUNCTURE     246.8    4. CKD (chronic kidney disease) stage 3, GFR 30-59 ml/min Q96.3 202.3 METABOLIC PANEL, BASIC      COLLECTION VENOUS BLOOD,VENIPUNCTURE   5. Encounter for Medicare annual wellness exam Z00.00 V70.0    6. Spinal stenosis, lumbar region with neurogenic claudication M48.062 724.03      current treatment plan is effective, no change in therapy  lab results and schedule of future lab studies reviewed with patient  reviewed diet, exercise and weight control  reviewed medications and side effects in detail  specific diabetic recommendations: low cholesterol diet, weight control and daily exercise discussed, home glucose monitoring emphasized, all medications, side effects and compliance discussed carefully, glycohemoglobin and other lab monitoring discussed and long term diabetic complications discussed    I have discussed the diagnosis with the patient and the intended plan of care as seen in the above orders. The patient has received an after-visit summary and questions were answered concerning future plans. I have discussed medication, side effects, and warnings with the patient in detail. The patient verbalized understanding and is in agreement with the plan of care. The patient will contact the office with any additional concerns.     José Luis Menjivar MD

## 2018-04-30 NOTE — MR AVS SNAPSHOT
[facility] [address] [id number] Patient: Svetlana Larson. MRN: [id number] :1945 Visit Information Date & Time Provider Department Dept. Phone Encounter #  
 2018 10:30 AM Hesed Amy Tidwell MD [facility] [phone] Follow-up Instructions Return in about 3 months (around 2018), or if symptoms worsen or fail to improve, for htn, dm2. Your Appointments 2018  9:40 AM  
Follow Up with Brandi Evans MD  
[facility] [place] ([facility] Appt Note: fu block  [facility] [id number]  
[address] [facility] 2018  9:30 AM  
Follow Up with Moise Remy MD  
[facility] ([facility]) Appt Note: 6 month follow up  
 [address] [facility] [address]  
  
   
 [address] [id number] [address] [address] Upcoming Health Maintenance Date Due ZOSTER VACCINE AGE 60> 2006 EYE EXAM RETINAL OR DILATED Q1 10/14/2016 GLAUCOMA SCREENING Q2Y 10/14/2017 MEDICARE YEARLY EXAM 2018 FOBT Q 1 YEAR AGE 50-75 2018 Influenza Age 5 to Adult 2018 HEMOGLOBIN A1C Q6M 2018 FOOT EXAM Q1 3/21/2019 MICROALBUMIN Q1 3/21/2019 LIPID PANEL Q1 3/21/2019 DTaP/Tdap/Td series (2 - Td) 2025 Allergies as of 2018  Review Complete On: 2018 By: Agustín Cody MD  
  
 Severity Noted Reaction Type Reactions Watermelon High 2015    Swelling Citric Acid Medium 2016    Rash Peach (Prunus Persica)  2015    Itching Current Immunizations  Reviewed on 2017 Name Date Influenza Vaccine (Quad) PF 2015 Pneumococcal Conjugate (PCV-13) 9/15/2016 Pneumococcal Polysaccharide (PPSV-23) 2017 Tdap 4/28/2015 Not reviewed this visit You Were Diagnosed With   
  
 Codes Comments Type 2 diabetes mellitus with diabetic nephropathy, unspecified whether long term insulin use ([facility]    -  Primary ICD-10-CM: E11.21 
ICD-9-CM: 250.40, 583.81 Essential hypertension     ICD-10-CM: I10 
ICD-9-CM: 401.9 Hypothyroidism due to acquired atrophy of thyroid     ICD-10-CM: E03.4 ICD-9-CM: 244.8, 246.8 CKD (chronic kidney disease) stage 3, GFR 30-59 ml/min     ICD-10-CM: N18.3 ICD-9-CM: 585.3 Encounter for Medicare annual wellness exam     ICD-10-CM: Z00.00 ICD-9-CM: V70.0 Vitals BP Pulse Temp Resp Height(growth percentile) Weight(growth percentile) 145/76 (BP 1 Location: Left arm, BP Patient Position: Sitting) (!) 53 97.7 °F (36.5 °C) (Oral) 18 5' 10\" (1.778 m) 244 lb (110.7 kg) SpO2 BMI Smoking Status 99% 35.01 kg/m2 Former Smoker BMI and BSA Data Body Mass Index Body Surface Area 35.01 kg/m 2 2.34 m 2 Preferred Pharmacy Pharmacy Name Phone [facility] [address] [place] [phone] Your Updated Medication List  
  
   
This list is accurate as of 4/30/18 11:21 AM.  Always use your most recent med list. amLODIPine 10 mg tablet Commonly known as:  Tianna Chimes Take 5 mg by mouth daily. * aspirin 325 mg tablet Commonly known as:  ASPIRIN Take 325 mg by mouth daily. * aspirin delayed-release 81 mg tablet Take 1 Tab by mouth daily. atorvastatin 40 mg tablet Commonly known as:  LIPITOR Take 20 mg by mouth daily. capsicum oleoresin 0.025 % topical cream  
Apply  to affected area three (3) times daily. cholecalciferol 1,000 unit Cap Commonly known as:  VITAMIN D3 Take 3,000 Units by mouth daily. diclofenac 1 % Gel Commonly known as:  VOLTAREN Apply  to affected area four (4) times daily. febuxostat 40 mg Tab tablet Commonly known as:  Junita Opitz Take 1 Tab by mouth daily. Indications: GOUT PREVENTION  
  
 glucose blood VI test strips strip Commonly known as:  ASCENSIA AUTODISC VI, ONE TOUCH ULTRA TEST VI Dispense precision extra test strips 3 x day to check blood glucose  
  
 insulin aspart U-100 100 unit/mL injection Commonly known as:  NOVOLOG  
by SubCUTAneous route. 6 units before meals  
  
 insulin glargine 100 unit/mL injection Commonly known as:  LANTUS Take 30 units once daily  Indications: type 2 diabetes mellitus  
  
 levothyroxine 200 mcg tablet Commonly known as:  SYNTHROID Take 1 Tab by mouth Daily (before breakfast). magnesium chloride 64 mg delayed release tablet Commonly known as:  MAG DELAY Take 8 tablets 3 x day  
  
 metoprolol tartrate 25 mg tablet Commonly known as:  LOPRESSOR Take 1 Tab by mouth two (2) times a day. Omeprazole delayed release 20 mg tablet Commonly known as:  PRILOSEC D/R Take 20 mg by mouth daily. spironolactone 25 mg tablet Commonly known as:  ALDACTONE Take 1 Tab by mouth daily. Indications: EDEMA  
  
 testosterone 20.25 mg/1.25 gram (1.62 %) gel Commonly known as:  ANDROGEL Apply  to affected area as needed. traMADol 50 mg tablet Commonly known as:  ULTRAM  
Take 1 Tab by mouth two (2) times daily as needed for Pain. Max Daily Amount: 100 mg. Indications: NEUROPATHIC PAIN  
  
 VITAMIN B-12 1,000 mcg tablet Generic drug:  cyanocobalamin Take 1,000 mcg by mouth daily. VITAMIN B-6 100 mg tablet Generic drug:  pyridoxine (vitamin B6) Take 100 mg by mouth daily. * Notice: This list has 2 medication(s) that are the same as other medications prescribed for you. Read the directions carefully, and ask your doctor or other care provider to review them with you. Follow-up Instructions Return in about 3 months (around 7/30/2018), or if symptoms worsen or fail to improve, for htn, dm2. To-Do List   
 04/30/2018 Lab:  METABOLIC PANEL, BASIC Patient Instructions Medicare Part B Preventive Services Limitations Recommendation Scheduled Bone Mass Measurement 
(age 72 & older, biennial) Requires diagnosis related to osteoporosis or estrogen deficiency. Biennial benefit unless patient has history of long-term glucocorticoid tx or baseline is needed because initial test was by other method N/a Cardiovascular Screening Blood Tests (every 5 years) Total cholesterol, HDL, Triglycerides Order as a panel if possible LAB Last 03/2018 Colorectal Cancer Screening 
-Fecal occult blood test (annual) -Flexible sigmoidoscopy (5y) 
-Screening colonoscopy (10y) -Barium Enema  Due    
Counseling to Prevent Tobacco Use (up to 8 sessions per year) - Counseling greater than 3 and up to 10 minutes - Counseling greater than 10 minutes Patients must be asymptomatic of tobacco-related conditions to receive as preventive service N/a Diabetes Screening Tests (at least every 3 years, Medicare covers annually or at 6-month intervals for prediabetic patients) Fasting blood sugar (FBS) or glucose tolerance test (GTT) Patient must be diagnosed with one of the following: 
-Hypertension, Dyslipidemia, obesity, previous impaired FBS or GTT 
Or any two of the following: overweight, FH of diabetes, age ? 72, history of gestational diabetes, birth of baby weighing more than 9 pounds UTD Diabetes Self-Management Training (DSMT) (no USPSTF recommendation) Requires referral by treating physician for patient with diabetes or renal disease. 10 hours of initial DSMT session of no less than 30 minutes each in a continuous 12-month period. 2 hours of follow-up DSMT in subsequent years. N/A Glaucoma Screening (no USPSTF recommendation) Diabetes mellitus, family history, , age 48 or over,  American, age 72 or over due Human Immunodeficiency Virus (HIV) Screening (annually for increased risk patients) HIV-1 and HIV-2 by EIA, DEREJE, rapid antibody test, or oral mucosa transudate Patient must be at increased risk for HIV infection per USPSTF guidelines or pregnant. Tests covered annually for patients at increased risk. Pregnant patients may receive up to 3 test during pregnancy. N/a Medical Nutrition Therapy (MNT) (for diabetes or renal disease not recommended schedule) Requires referral by treating physician for patient with diabetes or renal disease. Can be provided in same year as diabetes self-management training (DSMT), and CMS recommends medical nutrition therapy take place after DSMT. Up to 3 hours for initial year and 2 hours in subsequent years. N/a Prostate Cancer Screening (annually up to age 76) - Digital rectal exam (ALTA) - Prostate specific antigen (PSA) Annually (age 48 or over), ALTA not paid separately when covered E/M service is provided on same date Men up to age 76 may need a screening blood test for prostate cancer at certain intervals, depending on their personal and family history. This decision is between the patient and his provider. N/a Seasonal Influenza Vaccination (annually)  utd Pneumococcal Vaccination (once after 65)  utd Hepatitis B Vaccinations (if medium/high risk) Medium/high risk factors:  End-stage renal disease, Hemophiliacs who received Factor VIII or IX concentrates, Clients of institutions for the mentally retarded, Persons who live in the same house as a HepB virus carrier, Homosexual men, Illicit injectable drug abusers. N/a Shingles Vaccination A shingles vaccine is also recommended once in a lifetime after age 61 Due Ultrasound Screening for Abdominal Aortic Aneurysm (AAA) (once) Patient must be referred through IPPE and not have had a screening for abdominal aortic aneurysm before under Medicare.   Limited to patients who meet one of the following criteria: 
- Men who are 73-68 years old and have smoked more than 100 cigarettes in their lifetime. 
-Anyone with a FH of AAA 
-Anyone recommended for screening by USPSTF N/a Introducing hospitals SERVICES! Dear Conchita Vieyra: Thank you for requesting a [employer] account. Our records indicate that you already have an active [employer] account. You can access your account anytime at [email or web address] Did you know that you can access your hospital and ER discharge instructions at any time in [employer]? You can also review all of your test results from your hospital stay or ER visit. Additional Information If you have questions, please visit the Frequently Asked Questions section of the [employer] website at [email or web address] Remember, [employer] is NOT to be used for urgent needs. For medical emergencies, dial 911. Now available from your iPhone and Android! Please provide this summary of care documentation to your next provider. Your primary care clinician is listed as Aroldo Perez. If you have any questions after today's visit, please call [phone].

## 2018-04-30 NOTE — PROGRESS NOTES
Chief Complaint   Patient presents with   [facility] Annual Wellness Visit     1. Have you been to the ER, urgent care clinic since your last visit? Hospitalized since your last visit? No    2. Have you seen or consulted any other health care providers outside of the [facility] since your last visit? Include any pap smears or colon screening. No      Tnohis is the Subsequent Medicare Annual Wellness Exam, performed 12 months or more after the Initial AWV or the last Subsequent AWV    I have reviewed the patient's medical history in detail and updated the computerized patient record. 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 Past Medical History:   Diagnosis Date    Diabetes ([address] 1995    Dyslipidemia 3/1/2017    Fractures 1995    R Knee/ Tib fib fracture/surgery    Gout 2010    Graves disease 1/21/2015    High cholesterol 2005    HTN (hypertension) 1990    Osteoarthritis 2013    Severe aortic stenosis 3/29/2017    Spinal stenosis of lumbar region 3/1/2016    Spondylolisthesis 3/1/2016    Thyroid trouble     Type 2 diabetes mellitus with diabetic nephropathy ([facility] [address] 4/28/2015      Past Surgical History:   Procedure Laterality Date    HX HIP REPLACEMENT Left 2009    HX LUMBAR FUSION      HX ORTHOPAEDIC Right     R knee/Tibfib surgery     Current Outpatient Prescriptions   Medication Sig Dispense Refill    levothyroxine (SYNTHROID) 200 mcg tablet Take 1 Tab by mouth Daily (before breakfast). 30 Tab 2    capsicum oleoresin 0.025 % topical cream Apply  to affected area three (3) times daily.  diclofenac (VOLTAREN) 1 % gel Apply  to affected area four (4) times daily.  aspirin (ASPIRIN) 325 mg tablet Take 325 mg by mouth daily.  traMADol (ULTRAM) 50 mg tablet Take 1 Tab by mouth two (2) times daily as needed for Pain. Max Daily Amount: 100 mg.  Indications: NEUROPATHIC PAIN 60 Tab 0    magnesium chloride (MAG DELAY) 64 mg delayed release tablet Take 8 tablets 3 x day      amLODIPine (NORVASC) 10 mg tablet Take 5 mg by mouth daily.  pyridoxine, vitamin B6, (VITAMIN B-6) 100 mg tablet Take 100 mg by mouth daily.  metoprolol tartrate (LOPRESSOR) 25 mg tablet Take 1 Tab by mouth two (2) times a day. 180 Tab 3    aspirin delayed-release 81 mg tablet Take 1 Tab by mouth daily. (Patient taking differently: Take 325 mg by mouth daily.) 30 Tab 2    insulin glargine (LANTUS) 100 unit/mL injection Take 30 units once daily  Indications: type 2 diabetes mellitus 3 Vial 3    cyanocobalamin (VITAMIN B-12) 1,000 mcg tablet Take 1,000 mcg by mouth daily.  febuxostat (ULORIC) 40 mg tab tablet Take 1 Tab by mouth daily. Indications: GOUT PREVENTION 90 Tab 1    insulin aspart (NOVOLOG) 100 unit/mL injection by SubCUTAneous route. 6 units before meals      spironolactone (ALDACTONE) 25 mg tablet Take 1 Tab by mouth daily. Indications: EDEMA 30 Tab 3    glucose blood VI test strips (ASCENSIA AUTODISC VI, ONE TOUCH ULTRA TEST VI) strip Dispense precision extra test strips 3 x day to check blood glucose 200 Each 3    Omeprazole delayed release (PRILOSEC D/R) 20 mg tablet Take 20 mg by mouth daily.  atorvastatin (LIPITOR) 40 mg tablet Take 20 mg by mouth daily.  testosterone (ANDROGEL) 20.25 mg/1.25 gram (1.62 %) gel Apply  to affected area as needed.  Cholecalciferol, Vitamin D3, 1,000 unit cap Take 3,000 Units by mouth daily.  gabapentin (NEURONTIN) 100 mg capsule Take 1 Cap by mouth three (3) times daily. (Patient not taking: Reported on 4/27/2018) 90 Cap 1    tiZANidine (ZANAFLEX) 2 mg tablet Take  by mouth.        Allergies   Allergen Reactions    Watermelon Swelling    Citric Acid Rash    Peach (Prunus Persica) Itching     Family History   Problem Relation Age of Onset    Hypertension Mother     Thyroid Disease Mother     Hypertension Father     Diabetes Father     Drug Abuse Brother      Social History   Substance Use Topics    Smoking status: Former Smoker     Packs/day: 1.00     Types: Cigarettes     Start date: 1/1/1969     Quit date: 1/1/1995    Smokeless tobacco: Never Used    Alcohol use No     Patient Active Problem List   Diagnosis Code    Graves disease E05.00    Heartburn R12    Gout M10.9    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    Hypomagnesemia E83.42    Renovascular hypertension I15.0    Murmur, cardiac R01.1    CKD (chronic kidney disease) stage 3, GFR 30-59 ml/min N18.3    Hypothyroidism E03.9    Dyslipidemia E78.5    SOB (shortness of breath) R06.02    Pre-op evaluation Z01.818    Severe aortic stenosis I35.0    ACP (advance care planning) Z71.89    Osteoarthritis M19.90    Spinal stenosis of lumbar region M48.061    Spondylolisthesis M43.10    S/P AVR (aortic valve replacement) Z95.2    Essential hypertension I10    Severe obesity (BMI 35.0-39. 9) with comorbidity ([facility] E66.01    DDD (degenerative disc disease), lumbar M51.36    Lumbar radiculopathy M54.16    Spondylolisthesis, acquired M43.10    Lumbar postlaminectomy syndrome M96.1    Spinal stenosis, lumbar region with neurogenic claudication M48.062       Depression Risk Factor Screening:     PHQ over the last two weeks 4/30/2018   Little interest or pleasure in doing things Not at all   Feeling down, depressed or hopeless Not at all   Total Score PHQ 2 0     Alcohol Risk Factor Screening: You do not drink alcohol or very rarely. Functional Ability and Level of Safety:   1. Was the patient's timed Up and GO test unsteady or longer than 30 seconds? No  2. Does the patient need help with the phone, transportation, shopping, preparing meals, housework, laundry, medications or managing money? No  3. Does the patients' home have rugs in the hallway, lack grab bars in the bathroom, lack handrails on the stairs or have poor lighting? No  4. Have you noticed any hearing difficulties?  No  Hearing Evaluation:    Hearing Loss  Hearing is good. Activities of Daily Living  The home contains: grab bars  Patient does total self care    Fall Risk  Fall Risk Assessment, last 12 mths 4/30/2018   Able to walk? Yes   Fall in past 12 months? Yes   Fall with injury? No   Number of falls in past 12 months 1   Fall Risk Score 1       Abuse Screen  Patient is not abused    Cognitive Screening   Evaluation of Cognitive Function:  Has your family/caregiver stated any concerns about your memory: no  Normal    Patient Care Team   Patient Care Team:  Tc Ortega MD as PCP - General (Family Practice)  Kaykay Elliott MD (Nephrology)  Dennis Jones MD (Cardiology)  Zhao Landers MD (Orthopedic Surgery)  Ana Boyle MD as Surgeon (Cardiothoracic Surgery)  Birdie Meckel, RN as Ambulatory Care Navigator    Assessment/Plan   Education and counseling provided:  Colorectal cancer screening tests  Diabetes screening test    1. Encounter for Medicare annual wellness exam      Health Maintenance Due   Topic Date Due    ZOSTER VACCINE AGE 60>  02/26/2006    EYE EXAM RETINAL OR DILATED Q1  10/14/2016    GLAUCOMA SCREENING Q2Y  10/14/2017    MEDICARE YEARLY EXAM  04/26/2018    FOBT Q 1 YEAR AGE 50-75  05/05/2018   I have discussed the diagnosis with the patient and the intended plan of care as seen in the above orders. The patient has received an after-visit summary and questions were answered concerning future plans. I have discussed medication, side effects, and warnings with the patient in detail. The patient verbalized understanding and is in agreement with the plan of care. The patient will contact the office with any additional concerns. Personalized preventative plan of care was discussed, printed and given to patient.     Tc Ortega MD

## 2018-05-03 DIAGNOSIS — N18.30 CKD (CHRONIC KIDNEY DISEASE) STAGE 3, GFR 30-59 ML/MIN (HCC): Chronic | ICD-10-CM

## 2018-05-03 DIAGNOSIS — E11.21 TYPE 2 DIABETES MELLITUS WITH DIABETIC NEPHROPATHY, UNSPECIFIED WHETHER LONG TERM INSULIN USE (HCC): Primary | Chronic | ICD-10-CM

## 2018-05-03 NOTE — PROGRESS NOTES
Please let patient know his creatinine is 1.47. This indicates slight reduction in kidney function. This is likely due to diabetes. He should take adequate fluid. He should ensure his diabetes is under control. He has been seen by a nephrologist in the past and I will place referral to nephrology for evaluation and advice. I will follow-up with him in the clinic at next visit.   Artemio George MD

## 2018-05-03 NOTE — PROGRESS NOTES
Spoke with patient at this time. Informed patient of results at this time. Patient verbalized understanding at this time. Patient states he has an appointment set up with the nephrology and he will call to notify us when the appointment.

## 2018-05-04 ENCOUNTER — TELEPHONE (OUTPATIENT)
Dept: ORTHOPEDIC SURGERY | Age: 73
End: 2018-05-04

## 2018-05-04 NOTE — TELEPHONE ENCOUNTER
Patient is scheduled for a Selective Nerve Root Block on 5/10/2018 and we are requesting DRs okay for him to proceed with the injection to insure that his blood sugar is controlled . This request was faxed to your office on 4/27 with no response so I am checking status on this.  Thank You

## 2018-05-10 ENCOUNTER — HOSPITAL ENCOUNTER (OUTPATIENT)
Age: 73
Setting detail: OUTPATIENT SURGERY
Discharge: HOME | End: 2018-05-10
Attending: PHYSICAL MEDICINE & REHABILITATION | Admitting: PHYSICAL MEDICINE & REHABILITATION
Payer: MEDICARE

## 2018-05-10 ENCOUNTER — APPOINTMENT (OUTPATIENT)
Dept: GENERAL RADIOLOGY | Age: 73
End: 2018-05-10
Attending: PHYSICAL MEDICINE & REHABILITATION
Payer: MEDICARE

## 2018-05-10 VITALS
RESPIRATION RATE: 18 BRPM | BODY MASS INDEX: 34.93 KG/M2 | DIASTOLIC BLOOD PRESSURE: 81 MMHG | SYSTOLIC BLOOD PRESSURE: 141 MMHG | TEMPERATURE: 98.4 F | HEIGHT: 70 IN | OXYGEN SATURATION: 99 % | HEART RATE: 80 BPM | WEIGHT: 244 LBS

## 2018-05-10 LAB — GLUCOSE BLD STRIP.AUTO-MCNC: 144 MG/DL (ref 70–110)

## 2018-05-10 PROCEDURE — 77030003676 HC NDL SPN MPRI -A: Performed by: PHYSICAL MEDICINE & REHABILITATION

## 2018-05-10 PROCEDURE — 74011636320 HC RX REV CODE- 636/320

## 2018-05-10 PROCEDURE — 82962 GLUCOSE BLOOD TEST: CPT

## 2018-05-10 PROCEDURE — 74011250636 HC RX REV CODE- 250/636: Performed by: PHYSICAL MEDICINE & REHABILITATION

## 2018-05-10 PROCEDURE — 77030003669 HC NDL SPN COOK -B: Performed by: PHYSICAL MEDICINE & REHABILITATION

## 2018-05-10 PROCEDURE — 74011250636 HC RX REV CODE- 250/636

## 2018-05-10 PROCEDURE — 76010000009 HC PAIN MGT 0 TO 30 MIN PROC: Performed by: PHYSICAL MEDICINE & REHABILITATION

## 2018-05-10 PROCEDURE — 74011000250 HC RX REV CODE- 250

## 2018-05-10 RX ORDER — LIDOCAINE HYDROCHLORIDE 10 MG/ML
INJECTION, SOLUTION EPIDURAL; INFILTRATION; INTRACAUDAL; PERINEURAL AS NEEDED
Status: DISCONTINUED | OUTPATIENT
Start: 2018-05-10 | End: 2018-05-10 | Stop reason: HOSPADM

## 2018-05-10 RX ORDER — DIAZEPAM 5 MG/1
5-20 TABLET ORAL ONCE
Status: DISCONTINUED | OUTPATIENT
Start: 2018-05-10 | End: 2018-05-10 | Stop reason: HOSPADM

## 2018-05-10 RX ORDER — DEXAMETHASONE SODIUM PHOSPHATE 100 MG/10ML
INJECTION INTRAMUSCULAR; INTRAVENOUS AS NEEDED
Status: DISCONTINUED | OUTPATIENT
Start: 2018-05-10 | End: 2018-05-10 | Stop reason: HOSPADM

## 2018-05-10 NOTE — PROCEDURES
H&P is on paper and was reviewed on 5/10/2018 before preforming the procedure. PROCEDURE NOTE      Patient Name: Kadie Jones. Date of Procedure: May 10, 2018    Preoperative Diagnosis:  Spinal stenosis, lumbar region, with neurogenic claudication [M48.062]  Lumbar post-laminectomy syndrome [M96.1]    Post Operative Diagnosis:  Spinal stenosis, lumbar region, with neurogenic claudication [M48.062]  Lumbar post-laminectomy syndrome [M96.1]    Procedure :    right L4 Selective Nerve Root Block  right L3 Selective Nerve Root Block      Consent:  Informed consent was obtained prior to the procedure. The patient was given the opportunity to ask questions regarding the procedure and its associated risks. In addition to the potential risks associated with the procedure itself, the patient was informed both verbally and in writing of the potential side effects of the use of glucocorticoid. The patient appeared to comprehend the informed consent and desired to have the procedure performed. Procedure: The patient was placed in the prone position on the fluoroscopy table and the back was prepped and draped in the usual sterile manner. The exact spinal level was  identified using fluoroscopy, and Lidocaine 1 % was injected locally, a # 22 gauge spinal needle was passed to the transverse process. The depth was noted and the needle redirected to pass inferior and approximately one cm anterior to the transverse process. NO  1 cc of Isovue M-200 was used to verify positioning in the epidural and paravertebral space and outlined the course of the spinal nerve into the epidural space. The same procedure was repeated at each spinal level indicated above. A total of 30 mg of preservative free Dexamethasone and 4 cc of Lidocaine was slowly injected. The patient tolerated the procedure well. The injection area was cleaned and bandaids applied. Not excessive bleeding was noted.    Patient dressed and discharged to home with instructions. Discussion: The patient tolerated the procedure well.                                               Nick Schmitz MD  May 10, 2018

## 2018-05-10 NOTE — DISCHARGE INSTRUCTIONS
[facility]   (OTTO)  Dr. Jamison Gustafson, Dr. Trisha Griffiths, Dr. Isabela Serrano not drive a car, operate heavy machinery or dangerous equipment for 24 hours. * Activity as tolerated; rest for the remainder of the day. * Resume pre-block medications including those for your family doctor. * Do not drink alcoholic beverages for 24 hours. Alcohol and the medications you have received may interact and cause an adverse reaction. * You may feel better this evening and worse tomorrow, as the numbing medications wears off and the steroid has yet to begin to work. After 48 hrs the steroid should begin to release bringing you relief. * You may shower this evening and remove any bandages. * Avoid hot tubs and heating pads for 24 hours. You may use cold packs on the procedure site as tolerated for the first 24 hours. * If a headache develops, drink plenty of fluids and rest.  Take over the counter medications for headache if needed. If the headache continues longer than 24 hours, call MD at the [address] [place] [address]. [phone]    * Continue taking pain medications as needed. * You may resume your regular diet if tolerated. Otherwise, start with sips of water and advance slowly. * If Diabetic: check your blood sugar three times a day for the next 3 days. If your sugar is greater than 300 call your family doctor. If your sugar is greater than 400, have someone transport you to the nearest Emergency Room. * If you experience any of the following problems, Please Call the [address] [place] [address] at [phone].         * Shortness of Breath    * Fever of 101 or higher    * Nausea / Vomiting    * Severe Headache    * Weakness or numbness in arms or legs that is not      resolving    * Prolonged increase in pain greater than 4 days      DISCHARGE SUMMARY from Nurse      PATIENT INSTRUCTIONS:    After oral sedation, for 24 hours or while taking prescription Narcotics:  · Limit your activities  · Do not drive and operate hazardous machinery  · Do not make important personal or business decisions  · Do  not drink alcoholic beverages  · If you have not urinated within 8 hours after discharge, please contact your surgeon on call. Report the following to your surgeon:  · Excessive pain, swelling, redness or odor of or around the surgical area  · Temperature over 101  · Nausea and vomiting lasting longer than 4 hours or if unable to take medications  · Any signs of decreased circulation or nerve impairment to extremity: change in color, persistent  numbness, tingling, coldness or increase pain  · Any questions            What to do at Home:  Recommended activity: Activity as tolerated, NO DRIVING FOR 12 Hours post injection          *  Please give a list of your current medications to your Primary Care Provider. *  Please update this list whenever your medications are discontinued, doses are      changed, or new medications (including over-the-counter products) are added. *  Please carry medication information at all times in case of emergency situations. These are general instructions for a healthy lifestyle:    No smoking/ No tobacco products/ Avoid exposure to second hand smoke    Surgeon General's Warning:  Quitting smoking now greatly reduces serious risk to your health. Obesity, smoking, and sedentary lifestyle greatly increases your risk for illness    A healthy diet, regular physical exercise & weight monitoring are important for maintaining a healthy lifestyle    You may be retaining fluid if you have a history of heart failure or if you experience any of the following symptoms:  Weight gain of 3 pounds or more overnight or 5 pounds in a week, increased swelling in our hands or feet or shortness of breath while lying flat in bed.   Please call your doctor as soon as you notice any of these symptoms; do not wait until your next office visit. Recognize signs and symptoms of STROKE:    F-face looks uneven    A-arms unable to move or move unevenly    S-speech slurred or non-existent    T-time-call 911 as soon as signs and symptoms begin-DO NOT go       Back to bed or wait to see if you get better-TIME IS BRAIN. "[employer] Activation    Thank you for requesting access to "[employer]. Please follow the instructions below to securely access and download your online medical record. "[employer] allows you to send messages to your doctor, view your test results, renew your prescriptions, schedule appointments, and more. How Do I Sign Up? 1. In your internet browser, go to [email or web address]  2. Click on the First Time User? Click Here link in the Sign In box. You will be redirect to the New Member Sign Up page. 3. Enter your "[employer] Access Code exactly as it appears below. You will not need to use this code after youve completed the sign-up process. If you do not sign up before the expiration date, you must request a new code. "[employer] Access Code: Activation code not generated  Current "[employer] Status: Active (This is the date your "[employer] access code will )    4. Enter the last four digits of your Social Security Number (xxxx) and Date of Birth (mm/dd/yyyy) as indicated and click Submit. You will be taken to the next sign-up page. 5. Create a "[employer] ID. This will be your "[employer] login ID and cannot be changed, so think of one that is secure and easy to remember. 6. Create a "[employer] password. You can change your password at any time. 7. Enter your Password Reset Question and Answer. This can be used at a later time if you forget your password. 8. Enter your e-mail address. You will receive e-mail notification when new information is available in [address] 9. Click Sign Up. You can now view and download portions of your medical record.   10. Click the Download Summary menu link to download a portable copy of your medical information. Additional Information    If you have questions, please visit the Frequently Asked Questions section of the [employer] website at [email or web address] Remember, [employer] is NOT to be used for urgent needs. For medical emergencies, dial 911.

## 2018-05-16 ENCOUNTER — TELEPHONE (OUTPATIENT)
Dept: FAMILY MEDICINE CLINIC | Age: 73
End: 2018-05-16

## 2018-05-16 NOTE — TELEPHONE ENCOUNTER
Socorro from [facility] stated the patient sent a empty envelope in for a stool kit specimen. Patient will need another kit to redo the speciman.  Contach Socorro if you have any questions concerning this matter

## 2018-05-16 NOTE — TELEPHONE ENCOUNTER
Called patient at this time. Notified patient at this time to come by the office to  another stool kit. Patient verbalized understanding at this time.

## 2018-05-29 ENCOUNTER — OFFICE VISIT (OUTPATIENT)
Dept: ORTHOPEDIC SURGERY | Age: 73
End: 2018-05-29

## 2018-05-29 VITALS
OXYGEN SATURATION: 98 % | TEMPERATURE: 98.8 F | DIASTOLIC BLOOD PRESSURE: 59 MMHG | SYSTOLIC BLOOD PRESSURE: 142 MMHG | HEIGHT: 70 IN | BODY MASS INDEX: 35.22 KG/M2 | HEART RATE: 60 BPM | WEIGHT: 246 LBS | RESPIRATION RATE: 16 BRPM

## 2018-05-29 DIAGNOSIS — M51.36 DDD (DEGENERATIVE DISC DISEASE), LUMBAR: ICD-10-CM

## 2018-05-29 DIAGNOSIS — M46.1 SACROILIITIS (HCC): ICD-10-CM

## 2018-05-29 DIAGNOSIS — M48.062 SPINAL STENOSIS, LUMBAR REGION WITH NEUROGENIC CLAUDICATION: ICD-10-CM

## 2018-05-29 DIAGNOSIS — M54.16 LUMBAR RADICULOPATHY: ICD-10-CM

## 2018-05-29 DIAGNOSIS — M43.10 SPONDYLOLISTHESIS, ACQUIRED: ICD-10-CM

## 2018-05-29 DIAGNOSIS — M96.1 LUMBAR POSTLAMINECTOMY SYNDROME: Primary | ICD-10-CM

## 2018-05-29 RX ORDER — LANOLIN ALCOHOL/MO/W.PET/CERES
325 CREAM (GRAM) TOPICAL 2 TIMES DAILY
COMMUNITY
End: 2019-06-10

## 2018-05-29 RX ORDER — ASPIRIN 325 MG
325 TABLET ORAL DAILY
COMMUNITY

## 2018-05-29 RX ORDER — ASCORBIC ACID 250 MG
250 TABLET ORAL
COMMUNITY

## 2018-05-29 RX ORDER — DULOXETIN HYDROCHLORIDE 60 MG/1
60 CAPSULE, DELAYED RELEASE ORAL DAILY
Qty: 30 CAP | Refills: 1 | Status: SHIPPED | OUTPATIENT
Start: 2018-06-08

## 2018-05-29 RX ORDER — DULOXETIN HYDROCHLORIDE 30 MG/1
30 CAPSULE, DELAYED RELEASE ORAL DAILY
COMMUNITY
End: 2018-07-18 | Stop reason: SDUPTHER

## 2018-05-29 NOTE — PROGRESS NOTES
[facility]  [address], [place]   Phone: [phone]  Fax: [phone]        PROGRESS NOTE      HISTORY OF PRESENT ILLNESS:  The patient is a 68 y.o. male and was seen today for follow up of low back pain extending into the RLE in an L4 distribution to the knee. Lying down and walking exacerbate his pain. This is a patient with a diagnosis of lumbar postlaminectomy syndrome with previous h/o L4-S1 fusion performed by Dr. Oral Callaway on 3/1/2016. Pt states he did not experience much in the way of low back pain prior to his surgery but noted an immediate onset of low back pain postoperatively. He reports his lower extremity pain resolved after his surgery x 3 months until recurrence. Note from Tristan Salazar dated 12/6/16 indicates patient underwent an L2/3 epidural as there was scarring at the L3/4 level from previous surgery. Note from Dr. Oral Callaway dated 4/21/17 indicates patient underwent right SI joint injection #1. Note from Dr. Oral Callaway dated 8/3/17 indicates patient underwent bilateral SI joint injections. Per patient, SI joint injections only provided relief for his left-sided buttock pain. Pt also has h/o left THR. Pt was followed by Dr. Linda Martel for chiropractic treatment x 6 months without benefit. The patient has a history of DM and reports blood sugars are well controlled, consistently remaining below 200. PMHx includes stage 3 renal disease and Grave's disease. Pt is not actively being followed by nephrology. Pt reports he is prescribed Tramadol through the [place]. Pt reports LE edema with NEURONTIN 100 mg TID. Pt denies change in bowel or bladder habits. Pt denies fever, weight loss, or skin changes. Lumbar spine XR dated 3/1/2018 reviewed. Per report, post-surgical changes of the lower lumbar spine. Intact hardware. Grade 1 anterolisthesis of L4 on L5, grade I retrolisthesis of L5 on S1, and diffuse mild degenerative disc disease.  Mild degenerative changes of the right hip. Left total hip prothesis in good position, without complication. Reviewing films myself, there is a posterior fusion L4-S1. No acute pathology identified. Lumbar spine MRI dated 4/9/2018 reviewed. Per report, multilevel degenerative findings along postsurgical lumbar spine. Most significant findings are at L3-4 just above the fusion. There is a moderate to severe spinal stenosis and a severe right foraminal stenosis. There is a right foraminal disc extrusion contributing to this. The patient is RHD. At his last clinical appointment, his symptoms appeared to be stenotic in nature. There appeared to also be a sacroiliac component to his pain complaints. Patient was neurologically intact. He elected to proceed with lumbar blocks. Upon approval from Dr. Keron Alanis concerning patient's DM, I ordered a right L3/L4 SNRBs. He declined neuropathic pain medication at this time. The patient returns today with low back and right buttock pain. Pt states his RLE radicular pain extending in an L4 distribution to the knee has resolved. He rates pain 5-8/10, elevated since his ast visit (7/10). Pt underwent right L3/L4 SNRBs on 5/10/18 with good relief for his right-sided radicular sxs. Per patient, he was started on Cymbalta 30 mg for his low back pain complaints roughly two weeks ago by a physician at the [address] [place] [address] reviewed. Body mass index is 35.3 kg/(m^2).       PCP: Jessica Guillen MD      Past Medical History:   Diagnosis Date    Diabetes ([address] 1995    Dyslipidemia 3/1/2017    Fractures 1995    R Knee/ Tib fib fracture/surgery    Gout 2010    Graves disease 1/21/2015    High cholesterol 2005    HTN (hypertension) 1990    Osteoarthritis 2013    Severe aortic stenosis 3/29/2017    Spinal stenosis of lumbar region 3/1/2016    Spondylolisthesis 3/1/2016    Thyroid trouble     Type 2 diabetes mellitus with diabetic nephropathy ([address] 4/28/2015        Social History     Social History    Marital status:      Spouse name: N/A    Number of children: N/A    Years of education: N/A     Occupational History    Not on file. Social History Main Topics    Smoking status: Former Smoker     Packs/day: 1.00     Types: Cigarettes     Start date: 1/1/1969     Quit date: 1/1/1995    Smokeless tobacco: Never Used    Alcohol use No    Drug use: No    Sexual activity: Yes     Partners: Female     Other Topics Concern     Service Yes     Served in [address] Blood Transfusions No    Caffeine Concern No    Occupational Exposure No    Hobby Hazards No    Sleep Concern No    Stress Concern No    Weight Concern Yes    Special Diet No    Back Care Yes     Lower Back pain when walking    Exercise No    Bike Helmet No    Seat Belt Yes    Self-Exams Yes     Social History Narrative       Current Outpatient Prescriptions   Medication Sig Dispense Refill    aspirin (ASPIRIN) 325 mg tablet Take 325 mg by mouth daily.  DULoxetine (CYMBALTA) 30 mg capsule Take 30 mg by mouth daily.  ascorbic acid, vitamin C, (VITAMIN C) 250 mg tablet Take  by mouth.  ferrous sulfate 325 mg (65 mg iron) tablet Take 325 mg by mouth two (2) times a day.  [START ON 6/8/2018] DULoxetine (CYMBALTA) 60 mg capsule Take 1 Cap by mouth daily. 30 Cap 1    levothyroxine (SYNTHROID) 200 mcg tablet Take 1 Tab by mouth Daily (before breakfast). 30 Tab 2    capsicum oleoresin 0.025 % topical cream Apply  to affected area three (3) times daily.  diclofenac (VOLTAREN) 1 % gel Apply  to affected area four (4) times daily.  traMADol (ULTRAM) 50 mg tablet Take 1 Tab by mouth two (2) times daily as needed for Pain. Max Daily Amount: 100 mg. Indications: NEUROPATHIC PAIN 60 Tab 0    magnesium chloride (MAG DELAY) 64 mg delayed release tablet Take 8 tablets 3 x day      amLODIPine (NORVASC) 10 mg tablet Take 5 mg by mouth daily.       pyridoxine, vitamin B6, (VITAMIN B-6) 100 mg tablet Take 100 mg by mouth daily.  metoprolol tartrate (LOPRESSOR) 25 mg tablet Take 1 Tab by mouth two (2) times a day. (Patient taking differently: Take 12.5 mg by mouth two (2) times a day.) 180 Tab 3    insulin glargine (LANTUS) 100 unit/mL injection Take 30 units once daily  Indications: type 2 diabetes mellitus 3 Vial 3    cyanocobalamin (VITAMIN B-12) 1,000 mcg tablet Take 1,000 mcg by mouth daily.  febuxostat (ULORIC) 40 mg tab tablet Take 1 Tab by mouth daily. Indications: GOUT PREVENTION 90 Tab 1    insulin aspart (NOVOLOG) 100 unit/mL injection by SubCUTAneous route. 6 units before meals      spironolactone (ALDACTONE) 25 mg tablet Take 1 Tab by mouth daily. Indications: EDEMA 30 Tab 3    glucose blood VI test strips (ASCENSIA AUTODISC VI, ONE TOUCH ULTRA TEST VI) strip Dispense precision extra test strips 3 x day to check blood glucose 200 Each 3    Omeprazole delayed release (PRILOSEC D/R) 20 mg tablet Take 20 mg by mouth daily.  atorvastatin (LIPITOR) 40 mg tablet Take 20 mg by mouth daily.  testosterone (ANDROGEL) 20.25 mg/1.25 gram (1.62 %) gel Apply  to affected area as needed.  Cholecalciferol, Vitamin D3, 1,000 unit cap Take 3,000 Units by mouth daily. Allergies   Allergen Reactions    Watermelon Swelling    Citric Acid Rash    Peach (Prunus Persica) Itching        Ambulates with a single point cane. PHYSICAL EXAMINATION    Visit Vitals    /59    Pulse 60    Temp 98.8 °F (37.1 °C) (Oral)    Resp 16    Ht 5' 10\" (1.778 m)    Wt 111.6 kg (246 lb)    SpO2 98%    BMI 35.3 kg/m2       CONSTITUTIONAL: NAD, A&O x 3  SENSATION: Increased tenderness to palpation of right SI joint. Intact to light touch throughout  RANGE OF MOTION: The patient has full passive range of motion in all four extremities.   MOTOR:  Straight Leg Raise: Negative, bilateral   KEVIN SIGN: Negative, right               Hip Flex Knee Ext Knee Flex Ankle DF GTE Ankle PF Tone Right +4/5 +4/5 +4/5 +4/5 +4/5 +4/5 +4/5   Left +4/5 +4/5 +4/5 +4/5 +4/5 +4/5 +4/5       ASSESSMENT   Diagnoses and all orders for this visit:    1. Lumbar postlaminectomy syndrome    2. Sacroiliitis ([address]    3. Spinal stenosis, lumbar region with neurogenic claudication    4. Spondylolisthesis, acquired    5. Lumbar radiculopathy    6. DDD (degenerative disc disease), lumbar    Other orders  -     SCHEDULE SURGERY  -     DULoxetine (CYMBALTA) 60 mg capsule; Take 1 Cap by mouth daily. IMPRESSION AND PLAN:  The patient is s/p right L3/4 SNRBs from 5/10/18 noting good relief for his RLE radicular symptoms. There appear to be a sacroiliac component to his remaining pain complaints. I will order a right SI joint injection. Patient has been instructed to complete current Rx for Cymbalta 30 mg and I will give him a Rx for Cymbalta to 60 mg to start after. I will see the patient back following injection. Written by Bebeto Escobar, as dictated by Didi Frazier MD  I examined the patient, reviewed and agree with the note.

## 2018-05-29 NOTE — MR AVS SNAPSHOT
[facility] 
 
 
 [address] [facility] [address] 
[phone] Patient: Quinton Weiss. MRN: [id number] :1945 Visit Information Date & Time Provider Department Dept. Phone Encounter #  
 2018  9:40 AM Sindhu Ochoa  [facility] [address] [facility] [place] [phone] [id number] Follow-up Instructions Return for following injection. Your Appointments 2018  9:30 AM  
Follow Up with Kendal Blancas MD  
[facility] ([facility]) Appt Note: 6 month follow up  
 [address] [facility] [address]  
  
   
 [address] [id number] [address] [address] 2018 11:00 AM  
ROUTINE CARE with Aroldo Styles MD  
[facility] ([facility]) Appt Note: htn, dm2  
 [address] [facility] [address] [facility] [address]  
[phone]  
  
   
 [address]  
  
    
 2019 10:30 AM  
Office Visit with Pieter Pelaez MD  
[facility] ([facility]) Appt Note:   
 [address] [facility] 107 [id number] [address]  
  
   
 [address] Upcoming Health Maintenance Date Due ZOSTER VACCINE AGE 60> 2006 EYE EXAM RETINAL OR DILATED Q1 10/14/2016 GLAUCOMA SCREENING Q2Y 10/14/2017 FOBT Q 1 YEAR AGE 50-75 2018 Influenza Age 5 to Adult 2018 HEMOGLOBIN A1C Q6M 2018 FOOT EXAM Q1 3/21/2019 MICROALBUMIN Q1 3/21/2019 LIPID PANEL Q1 3/21/2019 MEDICARE YEARLY EXAM 2019 DTaP/Tdap/Td series (2 - Td) 2025 Allergies as of 2018  Review Complete On: 2018 By: Sindhu Ochoa MD  
  
 Severity Noted Reaction Type Reactions Watermelon High 2015    Swelling Citric Acid Medium 2016    Rash Peach (Prunus Persica)  2015    Itching Current Immunizations  Reviewed on 12/20/2017 Name Date Influenza Vaccine (Quad) PF 9/17/2015 Pneumococcal Conjugate (PCV-13) 9/15/2016 Pneumococcal Polysaccharide (PPSV-23) 12/20/2017 Tdap 4/28/2015 Not reviewed this visit You Were Diagnosed With   
  
 Codes Comments Lumbar postlaminectomy syndrome    -  Primary ICD-10-CM: M96.1 ICD-9-CM: 722.83 Sacroiliitis ([facility] [address]     ICD-10-CM: M46.1 ICD-9-CM: 720.2 Spinal stenosis, lumbar region with neurogenic claudication     ICD-10-CM: M48.062 
ICD-9-CM: 724.03 Spondylolisthesis, acquired     ICD-10-CM: M43.10 ICD-9-CM: 738.4 Lumbar radiculopathy     ICD-10-CM: M54.16 
ICD-9-CM: 724.4 DDD (degenerative disc disease), lumbar     ICD-10-CM: M51.36 
ICD-9-CM: 722.52 Vitals BP Pulse Temp Resp Height(growth percentile) Weight(growth percentile) 142/59 60 98.8 °F (37.1 °C) (Oral) 16 5' 10\" (1.778 m) 246 lb (111.6 kg) SpO2 BMI Smoking Status 98% 35.3 kg/m2 Former Smoker BMI and BSA Data Body Mass Index Body Surface Area  
 35.3 kg/m 2 2.35 m 2 Preferred Pharmacy Pharmacy Name Phone [facility], [address] [place] [phone] Your Updated Medication List  
  
   
This list is accurate as of 5/29/18 10:43 AM.  Always use your most recent med list. amLODIPine 10 mg tablet Commonly known as:  Delcie Burt Take 5 mg by mouth daily. ascorbic acid (vitamin C) 250 mg tablet Commonly known as:  VITAMIN C Take  by mouth. aspirin 325 mg tablet Commonly known as:  ASPIRIN Take 325 mg by mouth daily. atorvastatin 40 mg tablet Commonly known as:  LIPITOR Take 20 mg by mouth daily. capsicum oleoresin 0.025 % topical cream  
Apply  to affected area three (3) times daily. cholecalciferol 1,000 unit Cap Commonly known as:  VITAMIN D3 Take 3,000 Units by mouth daily. diclofenac 1 % Gel Commonly known as:  VOLTAREN Apply  to affected area four (4) times daily. * DULoxetine 30 mg capsule Commonly known as:  CYMBALTA Take 30 mg by mouth daily. * DULoxetine 60 mg capsule Commonly known as:  CYMBALTA Take 1 Cap by mouth daily. Start taking on:  6/8/2018  
  
 febuxostat 40 mg Tab tablet Commonly known as:  Taqueria Fernandez Take 1 Tab by mouth daily. Indications: GOUT PREVENTION  
  
 ferrous sulfate 325 mg (65 mg iron) tablet Take 325 mg by mouth two (2) times a day. glucose blood VI test strips strip Commonly known as:  ASCENSIA AUTODISC VI, ONE TOUCH ULTRA TEST VI Dispense precision extra test strips 3 x day to check blood glucose  
  
 insulin aspart U-100 100 unit/mL injection Commonly known as:  NOVOLOG  
by SubCUTAneous route. 6 units before meals  
  
 insulin glargine 100 unit/mL injection Commonly known as:  LANTUS Take 30 units once daily  Indications: type 2 diabetes mellitus  
  
 levothyroxine 200 mcg tablet Commonly known as:  SYNTHROID Take 1 Tab by mouth Daily (before breakfast). magnesium chloride 64 mg delayed release tablet Commonly known as:  MAG DELAY Take 8 tablets 3 x day  
  
 metoprolol tartrate 25 mg tablet Commonly known as:  LOPRESSOR Take 1 Tab by mouth two (2) times a day. Omeprazole delayed release 20 mg tablet Commonly known as:  PRILOSEC D/R Take 20 mg by mouth daily. spironolactone 25 mg tablet Commonly known as:  ALDACTONE Take 1 Tab by mouth daily. Indications: EDEMA  
  
 testosterone 20.25 mg/1.25 gram (1.62 %) gel Commonly known as:  ANDROGEL Apply  to affected area as needed. traMADol 50 mg tablet Commonly known as:  ULTRAM  
Take 1 Tab by mouth two (2) times daily as needed for Pain. Max Daily Amount: 100 mg. Indications: NEUROPATHIC PAIN  
  
 VITAMIN B-12 1,000 mcg tablet Generic drug:  cyanocobalamin Take 1,000 mcg by mouth daily. VITAMIN B-6 100 mg tablet Generic drug:  pyridoxine (vitamin B6) Take 100 mg by mouth daily. * Notice: This list has 2 medication(s) that are the same as other medications prescribed for you. Read the directions carefully, and ask your doctor or other care provider to review them with you. Prescriptions Printed Refills DULoxetine (CYMBALTA) 60 mg capsule 1 Starting on: 6/8/2018 Sig: Take 1 Cap by mouth daily. Class: Print Route: Oral  
  
Follow-up Instructions Return for following injection. Introducing [facility] SERVICES! Dear Katarzyna Rasmussen: Thank you for requesting a [employer] account. Our records indicate that you already have an active [employer] account. You can access your account anytime at [email or web address] Did you know that you can access your hospital and ER discharge instructions at any time in [employer]? You can also review all of your test results from your hospital stay or ER visit. Additional Information If you have questions, please visit the Frequently Asked Questions section of the [employer] website at [email or web address] Remember, [employer] is NOT to be used for urgent needs. For medical emergencies, dial 911. Now available from your iPhone and Android! Please provide this summary of care documentation to your next provider. Your primary care clinician is listed as Aroldo Perez. If you have any questions after today's visit, please call [phone].

## 2018-06-07 ENCOUNTER — HOSPITAL ENCOUNTER (OUTPATIENT)
Age: 73
Setting detail: OUTPATIENT SURGERY
Discharge: HOME | End: 2018-06-07
Attending: PHYSICAL MEDICINE & REHABILITATION | Admitting: PHYSICAL MEDICINE & REHABILITATION
Payer: MEDICARE

## 2018-06-07 ENCOUNTER — APPOINTMENT (OUTPATIENT)
Dept: GENERAL RADIOLOGY | Age: 73
End: 2018-06-07
Attending: PHYSICAL MEDICINE & REHABILITATION
Payer: MEDICARE

## 2018-06-07 ENCOUNTER — TELEPHONE (OUTPATIENT)
Dept: ORTHOPEDIC SURGERY | Age: 73
End: 2018-06-07

## 2018-06-07 VITALS
HEART RATE: 83 BPM | BODY MASS INDEX: 35.22 KG/M2 | TEMPERATURE: 98.5 F | WEIGHT: 246 LBS | DIASTOLIC BLOOD PRESSURE: 86 MMHG | RESPIRATION RATE: 18 BRPM | SYSTOLIC BLOOD PRESSURE: 125 MMHG | HEIGHT: 70 IN | OXYGEN SATURATION: 97 %

## 2018-06-07 LAB — GLUCOSE BLD STRIP.AUTO-MCNC: 98 MG/DL (ref 70–110)

## 2018-06-07 PROCEDURE — 74011636320 HC RX REV CODE- 636/320

## 2018-06-07 PROCEDURE — 74011250636 HC RX REV CODE- 250/636: Performed by: PHYSICAL MEDICINE & REHABILITATION

## 2018-06-07 PROCEDURE — 76010000009 HC PAIN MGT 0 TO 30 MIN PROC: Performed by: PHYSICAL MEDICINE & REHABILITATION

## 2018-06-07 PROCEDURE — 82962 GLUCOSE BLOOD TEST: CPT

## 2018-06-07 PROCEDURE — 74011250636 HC RX REV CODE- 250/636

## 2018-06-07 PROCEDURE — 77030003676 HC NDL SPN MPRI -A: Performed by: PHYSICAL MEDICINE & REHABILITATION

## 2018-06-07 PROCEDURE — 74011000250 HC RX REV CODE- 250

## 2018-06-07 RX ORDER — DIAZEPAM 5 MG/1
5-20 TABLET ORAL ONCE
Status: DISCONTINUED | OUTPATIENT
Start: 2018-06-07 | End: 2018-06-07 | Stop reason: HOSPADM

## 2018-06-07 RX ORDER — DEXAMETHASONE SODIUM PHOSPHATE 100 MG/10ML
INJECTION INTRAMUSCULAR; INTRAVENOUS AS NEEDED
Status: DISCONTINUED | OUTPATIENT
Start: 2018-06-07 | End: 2018-06-07 | Stop reason: HOSPADM

## 2018-06-07 RX ORDER — LIDOCAINE HYDROCHLORIDE 10 MG/ML
INJECTION, SOLUTION EPIDURAL; INFILTRATION; INTRACAUDAL; PERINEURAL AS NEEDED
Status: DISCONTINUED | OUTPATIENT
Start: 2018-06-07 | End: 2018-06-07 | Stop reason: HOSPADM

## 2018-06-07 NOTE — DISCHARGE INSTRUCTIONS
[facility]   (OTTO)  Dr. Yen Peña, Dr. Trisha Remy, Dr. Rocio Noble not drive a car, operate heavy machinery or dangerous equipment for 24 hours. * Activity as tolerated; rest for the remainder of the day. * Resume pre-block medications including those for your family doctor. * Do not drink alcoholic beverages for 24 hours. Alcohol and the medications you have received may interact and cause an adverse reaction. * You may feel better this evening and worse tomorrow, as the numbing medications wears off and the steroid has yet to begin to work. After 48 hrs the steroid should begin to release bringing you relief. * You may shower this evening and remove any bandages. * Avoid hot tubs and heating pads for 24 hours. You may use cold packs on the procedure site as tolerated for the first 24 hours. * If a headache develops, drink plenty of fluids and rest.  Take over the counter medications for headache if needed. If the headache continues longer than 24 hours, call MD at the [address] [place] [address]. [phone]    * Continue taking pain medications as needed. * You may resume your regular diet if tolerated. Otherwise, start with sips of water and advance slowly. * If Diabetic: check your blood sugar three times a day for the next 3 days. If your sugar is greater than 300 call your family doctor. If your sugar is greater than 400, have someone transport you to the nearest Emergency Room. * If you experience any of the following problems, Please Call the [address] [place] [address] at [phone].         * Shortness of Breath    * Fever of 101 or higher    * Nausea / Vomiting    * Severe Headache    * Weakness or numbness in arms or legs that is not      resolving    * Prolonged increase in pain greater than 4 days      DISCHARGE SUMMARY from Nurse      PATIENT INSTRUCTIONS:    After oral sedation, for 24 hours or while taking prescription Narcotics:  · Limit your activities  · Do not drive and operate hazardous machinery  · Do not make important personal or business decisions  · Do  not drink alcoholic beverages  · If you have not urinated within 8 hours after discharge, please contact your surgeon on call. Report the following to your surgeon:  · Excessive pain, swelling, redness or odor of or around the surgical area  · Temperature over 101  · Nausea and vomiting lasting longer than 4 hours or if unable to take medications  · Any signs of decreased circulation or nerve impairment to extremity: change in color, persistent  numbness, tingling, coldness or increase pain  · Any questions            What to do at Home:  Recommended activity: Activity as tolerated, NO DRIVING FOR 12 Hours post injection          *  Please give a list of your current medications to your Primary Care Provider. *  Please update this list whenever your medications are discontinued, doses are      changed, or new medications (including over-the-counter products) are added. *  Please carry medication information at all times in case of emergency situations. These are general instructions for a healthy lifestyle:    No smoking/ No tobacco products/ Avoid exposure to second hand smoke    Surgeon General's Warning:  Quitting smoking now greatly reduces serious risk to your health. Obesity, smoking, and sedentary lifestyle greatly increases your risk for illness    A healthy diet, regular physical exercise & weight monitoring are important for maintaining a healthy lifestyle    You may be retaining fluid if you have a history of heart failure or if you experience any of the following symptoms:  Weight gain of 3 pounds or more overnight or 5 pounds in a week, increased swelling in our hands or feet or shortness of breath while lying flat in bed.   Please call your doctor as soon as you notice any of these symptoms; do not wait until your next office visit. Recognize signs and symptoms of STROKE:    F-face looks uneven    A-arms unable to move or move unevenly    S-speech slurred or non-existent    T-time-call 911 as soon as signs and symptoms begin-DO NOT go       Back to bed or wait to see if you get better-TIME IS BRAIN.

## 2018-06-07 NOTE — TELEPHONE ENCOUNTER
Mr. Miles Ortiz came in to the office today inquiring about his rx that was printed on 5-29-18. His rx is at the office and is ready for . He left on the day of his appointment prior to getting his rx.  Called patient and left him a VM

## 2018-06-07 NOTE — PROCEDURES
Procedure Note    Patient Name: Alka Tinoco. Date of Procedure: June 7, 2018    Preoperative Diagnosis: Sacrilitis    Post Operative Diagnosis: same    Procedure: SI Joint Injection right     Consent: Informed consent was obtained prior to the procedure. The patient was given the opportunity to ask questions regarding the procedure and its associated risks. In addition to the potential risks associated with the procedure itself, the patient was informed both verbally and in writing of potential side effects of the use of glucocorticoids. The patient appeared to comprehend the informed consent and desired to have the procedure perfored. Procedure: The patient was placed in the prone position on the flouroscopy table and the back was prepped and draped in the usual sterile manner. A #22 gauge spinal needle was then advanced to lie within the SI joint after local Lidocaine 1% injection. A total of 30 mg of preservative free Dexamethasone and 4 cc of Lidocaine was introduced into the SI joint. The injection area was cleaned and bandaids applied. No excessive bleeding was noted. Patient dressed and was discharged to home with instructions. Discussion: The patient tolerated the procedure well.      Eileen Norman MD  June 7, 2018

## 2018-06-26 ENCOUNTER — OFFICE VISIT (OUTPATIENT)
Dept: ORTHOPEDIC SURGERY | Age: 73
End: 2018-06-26

## 2018-06-26 VITALS
DIASTOLIC BLOOD PRESSURE: 67 MMHG | HEART RATE: 48 BPM | WEIGHT: 240.6 LBS | HEIGHT: 70 IN | SYSTOLIC BLOOD PRESSURE: 143 MMHG | BODY MASS INDEX: 34.44 KG/M2 | RESPIRATION RATE: 16 BRPM

## 2018-06-26 DIAGNOSIS — M48.061 SPINAL STENOSIS OF LUMBAR REGION, UNSPECIFIED WHETHER NEUROGENIC CLAUDICATION PRESENT: Primary | ICD-10-CM

## 2018-06-26 DIAGNOSIS — M54.16 LUMBAR NEURITIS: ICD-10-CM

## 2018-06-26 NOTE — PROGRESS NOTES
[facility]  [address] [facility]  [address], [address]   Phone: [phone]  Fax: [phone]        PROGRESS NOTE      HISTORY OF PRESENT ILLNESS:  The patient is a 68 y.o. male and was seen today for follow up of low back and right buttock pain. At prior visits, he presented with c/o low back pain extending into the RLE in an L4 distribution to the knee. These have since resolved. Lying down and walking exacerbate his pain. This is a patient with a diagnosis of lumbar postlaminectomy syndrome with previous h/o L4-S1 fusion performed by Dr. Sudhakar Lancaster on 3/1/2016. Pt states he did not experience much in the way of low back pain prior to his surgery but noted an immediate onset of low back pain postoperatively. He reports his lower extremity pain resolved after his surgery x 3 months until recurrence. Note from Lorena Salazar dated 12/6/16 indicates patient underwent an L2/3 epidural as there was scarring at the L3/4 level from previous surgery. Note from Dr. Sudhakar Lancaster dated 4/21/17 indicates patient underwent right SI joint injection #1. Note from Dr. Sudhakar Lancaster dated 8/3/17 indicates patient underwent bilateral SI joint injections. Per patient, SI joint injections only provided relief for his left-sided buttock pain. Pt also has h/o left THR. Pt was followed by Dr. Adwoa Bryant for chiropractic treatment x 6 months without benefit. The patient has a history of DM and reports blood sugars are well controlled, consistently remaining below 200. PMHx includes stage 3 renal disease and Grave's disease. Pt is not actively being followed by nephrology. Pt reports he is prescribed Tramadol through the [place]. Pt reports LE edema with NEURONTIN 100 mg TID. Pt denies change in bowel or bladder habits. Pt denies fever, weight loss, or skin changes. Lumbar spine XR dated 3/1/2018 reviewed. Per report, post-surgical changes of the lower lumbar spine. Intact hardware.  Grade 1 anterolisthesis of L4 on L5, grade I retrolisthesis of L5 on S1, and diffuse mild degenerative disc disease. Mild degenerative changes of the right hip. Left total hip prothesis in good position, without complication. Reviewing films myself, there is a posterior fusion L4-S1. No acute pathology identified. Lumbar spine MRI dated 4/9/2018 reviewed. Per report, multilevel degenerative findings along postsurgical lumbar spine. Most significant findings are at L3-4 just above the fusion. There is a moderate to severe spinal stenosis and a severe right foraminal stenosis. There is a right foraminal disc extrusion contributing to this. The patient is RHD. Pt underwent right L3/L4 SNRBs on 5/10/18 with good relief  At his last clinical appointment, the patient was s/p right L3/4 SNRBs from 5/10/18 noting good relief for his RLE radicular symptoms. There appeared to be a sacroiliac component to his remaining pain complaints. I ordered a right SI joint injection. Patient has been instructed to complete current Rx for Cymbalta 30 mg and I gave him a Rx for Cymbalta to 60 mg to start after. The patient returns today with his radicular RLE pain symptoms somewhat resolved, although he continues to have low back and right buttock pain. He rates his pain 3-4/10, a decrease from his last visit (5-8/10) . He reports his pain is slightly more tolerable overall. Patient is tolerating Cymbalta 60 mg with minimal relief. Pt underwent right SI joint injection on 6/7/18 with some relief. Pt reports movement exacerbates his pain.  reviewed. Body mass index is 34.52 kg/(m^2).       PCP: Jeovany Couch MD      Past Medical History:   Diagnosis Date    Diabetes ([facility] [address] 1995    Dyslipidemia 3/1/2017    Fractures 1995    R Knee/ Tib fib fracture/surgery    Gout 2010    Graves disease 1/21/2015    High cholesterol 2005    HTN (hypertension) 1990    Osteoarthritis 2013    Severe aortic stenosis 3/29/2017    Spinal stenosis of lumbar region 3/1/2016  Spondylolisthesis 3/1/2016    Thyroid trouble     Type 2 diabetes mellitus with diabetic nephropathy ([facility] [address] 4/28/2015        Social History     Social History    Marital status:      Spouse name: N/A    Number of children: N/A    Years of education: N/A     Occupational History    Not on file. Social History Main Topics    Smoking status: Former Smoker     Packs/day: 1.00     Types: Cigarettes     Start date: 1/1/1969     Quit date: 1/1/1995    Smokeless tobacco: Never Used    Alcohol use No    Drug use: No    Sexual activity: Yes     Partners: Female     Other Topics Concern     Service Yes     Served in [address] Blood Transfusions No    Caffeine Concern No    Occupational Exposure No    Hobby Hazards No    Sleep Concern No    Stress Concern No    Weight Concern Yes    Special Diet No    Back Care Yes     Lower Back pain when walking    Exercise No    Bike Helmet No    Seat Belt Yes    Self-Exams Yes     Social History Narrative       Current Outpatient Prescriptions   Medication Sig Dispense Refill    aspirin (ASPIRIN) 325 mg tablet Take 325 mg by mouth daily.  ascorbic acid, vitamin C, (VITAMIN C) 250 mg tablet Take  by mouth.  ferrous sulfate 325 mg (65 mg iron) tablet Take 325 mg by mouth two (2) times a day.  DULoxetine (CYMBALTA) 60 mg capsule Take 1 Cap by mouth daily. 30 Cap 1    levothyroxine (SYNTHROID) 200 mcg tablet Take 1 Tab by mouth Daily (before breakfast). 30 Tab 2    capsicum oleoresin 0.025 % topical cream Apply  to affected area three (3) times daily.  diclofenac (VOLTAREN) 1 % gel Apply  to affected area four (4) times daily.  traMADol (ULTRAM) 50 mg tablet Take 1 Tab by mouth two (2) times daily as needed for Pain. Max Daily Amount: 100 mg.  Indications: NEUROPATHIC PAIN 60 Tab 0    magnesium chloride (MAG DELAY) 64 mg delayed release tablet Take 8 tablets 3 x day      amLODIPine (NORVASC) 10 mg tablet Take 5 mg by mouth daily.  pyridoxine, vitamin B6, (VITAMIN B-6) 100 mg tablet Take 100 mg by mouth daily.  metoprolol tartrate (LOPRESSOR) 25 mg tablet Take 1 Tab by mouth two (2) times a day. (Patient taking differently: Take 12.5 mg by mouth two (2) times a day.) 180 Tab 3    insulin glargine (LANTUS) 100 unit/mL injection Take 30 units once daily  Indications: type 2 diabetes mellitus 3 Vial 3    cyanocobalamin (VITAMIN B-12) 1,000 mcg tablet Take 1,000 mcg by mouth daily.  febuxostat (ULORIC) 40 mg tab tablet Take 1 Tab by mouth daily. Indications: GOUT PREVENTION 90 Tab 1    insulin aspart (NOVOLOG) 100 unit/mL injection by SubCUTAneous route. 6 units before meals      spironolactone (ALDACTONE) 25 mg tablet Take 1 Tab by mouth daily. Indications: EDEMA 30 Tab 3    glucose blood VI test strips (ASCENSIA AUTODISC VI, ONE TOUCH ULTRA TEST VI) strip Dispense precision extra test strips 3 x day to check blood glucose 200 Each 3    Omeprazole delayed release (PRILOSEC D/R) 20 mg tablet Take 20 mg by mouth daily.  atorvastatin (LIPITOR) 40 mg tablet Take 20 mg by mouth daily.  testosterone (ANDROGEL) 20.25 mg/1.25 gram (1.62 %) gel Apply  to affected area as needed.  Cholecalciferol, Vitamin D3, 1,000 unit cap Take 3,000 Units by mouth daily.  DULoxetine (CYMBALTA) 30 mg capsule Take 30 mg by mouth daily. Allergies   Allergen Reactions    Watermelon Anaphylaxis and Swelling    Citric Acid Rash    Peach (Prunus Persica) Itching          PHYSICAL EXAMINATION    Visit Vitals    /67    Pulse (!) 48    Resp 16    Ht 5' 10\" (1.778 m)    Wt 109.1 kg (240 lb 9.6 oz)    BMI 34.52 kg/m2       CONSTITUTIONAL: NAD, A&O x 3  SENSATION: Intact to light touch throughout  RANGE OF MOTION: The patient has full passive range of motion in all four extremities.   MOTOR:  Straight Leg Raise: Negative, bilateral               Hip Flex Knee Ext Knee Flex Ankle DF GTE Ankle PF Tone   Right +4/5 +4/5 +4/5 +4/5 +4/5 +4/5 +4/5   Left +4/5 +4/5 +4/5 +4/5 +4/5 +4/5 +4/5       ASSESSMENT   Diagnoses and all orders for this visit:    1. Spinal stenosis of lumbar region, unspecified whether neurogenic claudication present    2. Lumbar neuritis          IMPRESSION AND PLAN:  Patient has a history of spinal fusion at the L4-S1 levels. Patient symptoms appear to be junctional in area from the areas above and below his fusion. He had some relief with a SI joint block as well as some decrease in his RLE radicular sxs. A L3/L4 SNRB also provided some relief. He additionally continues to describe some symptoms consistent with spinal stenosis. Patient wished to continue his current treatment. He did not require refills of his Cymbalta 60 mg daily at this time. Patient is neurologically intact. I will see the patient back in 1 month's time or earlier if needed. Written by Jana Nick, as dictated by Jaden Jimenez MD  I examined the patient, reviewed and agree with the note.

## 2018-06-26 NOTE — MR AVS SNAPSHOT
[address] [facility] 
 
 
 [address] [facility] [address] 
[phone] Patient: Gordon Torrez. MRN: [id number] :1945 Visit Information Date & Time Provider Department Dept. Phone Encounter #  
 2018  9:05 AM Ana Maria Baeza, [address] [facility] [place] [phone] [id number] Follow-up Instructions Return in about 4 weeks (around 2018). Your Appointments 2018  9:30 AM  
Follow Up with Elvira Smith MD  
[facility] [place] ([facility]) Appt Note: 6 month follow up  
 [address] [place]  [address] [facility] [address]  
  
   
 [address] [address] [address] [address] 2018 11:00 AM  
ROUTINE CARE with MD Mahad Dumont ([facility]) Appt Note: htn, dm2  
 [address] [facility] [address] [facility] [address]  
[phone]  
  
   
 [address]  
  
    
 2019 10:30 AM  
Office Visit with MD Mahad Lopez ([facility]) Appt Note:   
 [address] [facility] 107 [id number] [address]  
  
   
 [address] Upcoming Health Maintenance Date Due ZOSTER VACCINE AGE 60> 2006 EYE EXAM RETINAL OR DILATED Q1 10/14/2016 GLAUCOMA SCREENING Q2Y 10/14/2017 FOBT Q 1 YEAR AGE 50-75 2018 Influenza Age 5 to Adult 2018 HEMOGLOBIN A1C Q6M 2018 FOOT EXAM Q1 3/21/2019 MICROALBUMIN Q1 3/21/2019 LIPID PANEL Q1 3/21/2019 MEDICARE YEARLY EXAM 2019 DTaP/Tdap/Td series (2 - Td) 2025 Allergies as of 2018  Review Complete On: 2018 By: Ana Maria Baeza MD  
  
 Severity Noted Reaction Type Reactions Watermelon High 2015    Anaphylaxis, Swelling Citric Acid Medium 2016    Rash Peach (Prunus Persica)  01/21/2015    Itching Current Immunizations  Reviewed on 12/20/2017 Name Date Influenza Vaccine (Quad) PF 9/17/2015 Pneumococcal Conjugate (PCV-13) 9/15/2016 Pneumococcal Polysaccharide (PPSV-23) 12/20/2017 Tdap 4/28/2015 Not reviewed this visit You Were Diagnosed With   
  
 Codes Comments Spinal stenosis of lumbar region, unspecified whether neurogenic claudication present    -  Primary ICD-10-CM: M48.061 
ICD-9-CM: 724.02 Lumbar neuritis     ICD-10-CM: M54.16 
ICD-9-CM: 724.4 Vitals BP Pulse Resp Height(growth percentile) Weight(growth percentile) BMI  
 143/67 (!) 48 16 5' 10\" (1.778 m) 240 lb 9.6 oz (109.1 kg) 34.52 kg/m2 Smoking Status Former Smoker Vitals History BMI and BSA Data Body Mass Index Body Surface Area 34.52 kg/m 2 2.32 m 2 Preferred Pharmacy Pharmacy Name Phone [address] [facility], [address] [place] [phone] Your Updated Medication List  
  
   
This list is accurate as of 6/26/18  9:30 AM.  Always use your most recent med list. amLODIPine 10 mg tablet Commonly known as:  Edin Judy Take 5 mg by mouth daily. ascorbic acid (vitamin C) 250 mg tablet Commonly known as:  VITAMIN C Take  by mouth. aspirin 325 mg tablet Commonly known as:  ASPIRIN Take 325 mg by mouth daily. atorvastatin 40 mg tablet Commonly known as:  LIPITOR Take 20 mg by mouth daily. capsicum oleoresin 0.025 % topical cream  
Apply  to affected area three (3) times daily. cholecalciferol 1,000 unit Cap Commonly known as:  VITAMIN D3 Take 3,000 Units by mouth daily. diclofenac 1 % Gel Commonly known as:  VOLTAREN Apply  to affected area four (4) times daily. * DULoxetine 30 mg capsule Commonly known as:  CYMBALTA Take 30 mg by mouth daily. * DULoxetine 60 mg capsule Commonly known as:  CYMBALTA Take 1 Cap by mouth daily. febuxostat 40 mg Tab tablet Commonly known as:  Mckenna Boyle Take 1 Tab by mouth daily. Indications: GOUT PREVENTION  
  
 ferrous sulfate 325 mg (65 mg iron) tablet Take 325 mg by mouth two (2) times a day. glucose blood VI test strips strip Commonly known as:  ASCENSIA AUTODISC VI, ONE TOUCH ULTRA TEST VI Dispense precision extra test strips 3 x day to check blood glucose  
  
 insulin aspart U-100 100 unit/mL injection Commonly known as:  NOVOLOG  
by SubCUTAneous route. 6 units before meals  
  
 insulin glargine 100 unit/mL injection Commonly known as:  LANTUS Take 30 units once daily  Indications: type 2 diabetes mellitus  
  
 levothyroxine 200 mcg tablet Commonly known as:  SYNTHROID Take 1 Tab by mouth Daily (before breakfast). magnesium chloride 64 mg delayed release tablet Commonly known as:  MAG DELAY Take 8 tablets 3 x day  
  
 metoprolol tartrate 25 mg tablet Commonly known as:  LOPRESSOR Take 1 Tab by mouth two (2) times a day. Omeprazole delayed release 20 mg tablet Commonly known as:  PRILOSEC D/R Take 20 mg by mouth daily. spironolactone 25 mg tablet Commonly known as:  ALDACTONE Take 1 Tab by mouth daily. Indications: EDEMA  
  
 testosterone 20.25 mg/1.25 gram (1.62 %) gel Commonly known as:  ANDROGEL Apply  to affected area as needed. traMADol 50 mg tablet Commonly known as:  ULTRAM  
Take 1 Tab by mouth two (2) times daily as needed for Pain. Max Daily Amount: 100 mg. Indications: NEUROPATHIC PAIN  
  
 VITAMIN B-12 1,000 mcg tablet Generic drug:  cyanocobalamin Take 1,000 mcg by mouth daily. VITAMIN B-6 100 mg tablet Generic drug:  pyridoxine (vitamin B6) Take 100 mg by mouth daily. * Notice: This list has 2 medication(s) that are the same as other medications prescribed for you.  Read the directions carefully, and ask your doctor or other care provider to review them with you. Follow-up Instructions Return in about 4 weeks (around 7/24/2018). Introducing [facility]! Dear Indy Pena: Thank you for requesting a [employer] account. Our records indicate that you already have an active [employer] account. You can access your account anytime at [email or web address] Did you know that you can access your hospital and ER discharge instructions at any time in [employer]? You can also review all of your test results from your hospital stay or ER visit. Additional Information If you have questions, please visit the Frequently Asked Questions section of the [employer] website at [email or web address] Remember, [employer] is NOT to be used for urgent needs. For medical emergencies, dial 911. Now available from your iPhone and Android! Please provide this summary of care documentation to your next provider. Your primary care clinician is listed as Aroldo Perez. If you have any questions after today's visit, please call [phone].

## 2018-07-11 DIAGNOSIS — E03.4 HYPOTHYROIDISM DUE TO ACQUIRED ATROPHY OF THYROID: Chronic | ICD-10-CM

## 2018-07-11 RX ORDER — LEVOTHYROXINE SODIUM 200 UG/1
200 TABLET ORAL
Qty: 30 TAB | Refills: 2 | Status: SHIPPED | OUTPATIENT
Start: 2018-07-11 | End: 2020-06-30

## 2018-07-11 NOTE — TELEPHONE ENCOUNTER
Requested Prescriptions     Pending Prescriptions Disp Refills    levothyroxine (SYNTHROID) 200 mcg tablet 30 Tab 2     Sig: Take 1 Tab by mouth Daily (before breakfast). Patient confirms pharmacy: [facility]  Patient aware of 24-48 hour turn around.

## 2018-07-18 ENCOUNTER — OFFICE VISIT (OUTPATIENT)
Dept: FAMILY MEDICINE CLINIC | Age: 73
End: 2018-07-18

## 2018-07-18 VITALS
OXYGEN SATURATION: 95 % | SYSTOLIC BLOOD PRESSURE: 132 MMHG | WEIGHT: 239 LBS | BODY MASS INDEX: 34.22 KG/M2 | RESPIRATION RATE: 17 BRPM | DIASTOLIC BLOOD PRESSURE: 66 MMHG | HEART RATE: 61 BPM | HEIGHT: 70 IN | TEMPERATURE: 97.2 F

## 2018-07-18 DIAGNOSIS — E03.4 HYPOTHYROIDISM DUE TO ACQUIRED ATROPHY OF THYROID: Chronic | ICD-10-CM

## 2018-07-18 DIAGNOSIS — D50.9 IRON DEFICIENCY ANEMIA, UNSPECIFIED IRON DEFICIENCY ANEMIA TYPE: ICD-10-CM

## 2018-07-18 DIAGNOSIS — E11.21 TYPE 2 DIABETES MELLITUS WITH DIABETIC NEPHROPATHY, UNSPECIFIED WHETHER LONG TERM INSULIN USE (HCC): Primary | Chronic | ICD-10-CM

## 2018-07-18 DIAGNOSIS — M43.10 SPONDYLOLISTHESIS, UNSPECIFIED SPINAL REGION: ICD-10-CM

## 2018-07-18 DIAGNOSIS — K21.9 GASTROESOPHAGEAL REFLUX DISEASE, ESOPHAGITIS PRESENCE NOT SPECIFIED: ICD-10-CM

## 2018-07-18 DIAGNOSIS — I15.0 RENOVASCULAR HYPERTENSION: Chronic | ICD-10-CM

## 2018-07-18 DIAGNOSIS — Z95.2 S/P AVR (AORTIC VALVE REPLACEMENT): ICD-10-CM

## 2018-07-18 DIAGNOSIS — E78.5 DYSLIPIDEMIA: Chronic | ICD-10-CM

## 2018-07-18 NOTE — MR AVS SNAPSHOT
[facility] 107 200 [facility] 
[phone] Patient: Tony Sotelo. MRN: [id number] :1945 Visit Information Date & Time Provider Department Dept. Phone Encounter #  
 2018 11:00 AM Hesed MD Mahad Bradley [phone] [id number] Follow-up Instructions Return in about 3 months (around 10/18/2018), or if symptoms worsen or fail to improve, for htn, dm2. Your Appointments 2018  9:05 AM  
Follow Up with Nakita Brownlee MD  
914 [facility], [address] [facility] - SPECIALTY [facility] ([facility] Appt Note: LOWER BACK 4 WK FU  
  [facility] 350 [facility]  
  
    
 2018 10:00 AM  
Follow Up with Brandi Roy MD  
[facility] ([facility]) Appt Note: 6 month follow up; 6 month follow up; 6 month follow up  
 [address] [facility] [address]  
  
   
 [address] [address] [address] [address]  
  
    
 2019 10:30 AM  
Office Visit with MD Mahad Lucas ([facility]) Appt Note:   
 148 [facility] 107 [address] [address]  
  
   
 [address] 700 [facility] Upcoming Health Maintenance Date Due ZOSTER VACCINE AGE 60> 2006 EYE EXAM RETINAL OR DILATED Q1 10/14/2016 GLAUCOMA SCREENING Q2Y 10/14/2017 FOBT Q 1 YEAR AGE 50-75 2018 Influenza Age 5 to Adult 2018 HEMOGLOBIN A1C Q6M 2018 FOOT EXAM Q1 3/21/2019 MICROALBUMIN Q1 3/21/2019 LIPID PANEL Q1 3/21/2019 MEDICARE YEARLY EXAM 2019 DTaP/Tdap/Td series (2 - Td) 2025 Allergies as of 2018  Review Complete On: 2018 By: Elizabeth Cody MD  
  
 Severity Noted Reaction Type Reactions Watermelon High 01/21/2015    Anaphylaxis, Swelling Citric Acid Medium 02/17/2016    Rash Peach (Prunus Persica)  01/21/2015    Itching Current Immunizations  Reviewed on 12/20/2017 Name Date Influenza Vaccine (Quad) PF 9/17/2015 Pneumococcal Conjugate (PCV-13) 9/15/2016 Pneumococcal Polysaccharide (PPSV-23) 12/20/2017 Tdap 4/28/2015 Not reviewed this visit You Were Diagnosed With   
  
 Codes Comments Type 2 diabetes mellitus with diabetic nephropathy, unspecified whether long term insulin use ([facility]    -  Primary ICD-10-CM: E11.21 
ICD-9-CM: 250.40, 583.81 Renovascular hypertension     ICD-10-CM: I15.0 ICD-9-CM: 405.91 Hypothyroidism due to acquired atrophy of thyroid     ICD-10-CM: E03.4 ICD-9-CM: 244.8, 246.8 Dyslipidemia     ICD-10-CM: E78.5 ICD-9-CM: 272.4 Vitals BP Pulse Temp Resp Height(growth percentile) Weight(growth percentile) 132/66 (BP 1 Location: Left arm, BP Patient Position: Sitting) 61 97.2 °F (36.2 °C) (Oral) 17 5' 10\" (1.778 m) 239 lb (108.4 kg) SpO2 BMI Smoking Status 95% 34.29 kg/m2 Former Smoker BMI and BSA Data Body Mass Index Body Surface Area  
 34.29 kg/m 2 2.31 m 2 Preferred Pharmacy Pharmacy Name Phone [address] [facility], [address] [place] [phone] Your Updated Medication List  
  
   
This list is accurate as of 7/18/18 11:21 AM.  Always use your most recent med list. amLODIPine 10 mg tablet Commonly known as:  Dayna Spruce Take 5 mg by mouth daily. ascorbic acid (vitamin C) 250 mg tablet Commonly known as:  VITAMIN C Take  by mouth. aspirin 325 mg tablet Commonly known as:  ASPIRIN Take 325 mg by mouth daily. atorvastatin 40 mg tablet Commonly known as:  LIPITOR Take 20 mg by mouth daily. capsicum oleoresin 0.025 % topical cream  
Apply  to affected area three (3) times daily. cholecalciferol 1,000 unit Cap Commonly known as:  VITAMIN D3 Take 3,000 Units by mouth daily. diclofenac 1 % Gel Commonly known as:  VOLTAREN Apply  to affected area four (4) times daily. DULoxetine 60 mg capsule Commonly known as:  CYMBALTA Take 1 Cap by mouth daily. febuxostat 40 mg Tab tablet Commonly known as:  John Lantigua Take 1 Tab by mouth daily. Indications: GOUT PREVENTION  
  
 ferrous sulfate 325 mg (65 mg iron) tablet Take 325 mg by mouth two (2) times a day. glucose blood VI test strips strip Commonly known as:  ASCENSIA AUTODISC VI, ONE TOUCH ULTRA TEST VI Dispense precision extra test strips 3 x day to check blood glucose  
  
 insulin aspart U-100 100 unit/mL injection Commonly known as:  NOVOLOG  
by SubCUTAneous route. 6 units before meals  
  
 insulin glargine 100 unit/mL injection Commonly known as:  LANTUS Take 30 units once daily  Indications: type 2 diabetes mellitus  
  
 levothyroxine 200 mcg tablet Commonly known as:  SYNTHROID Take 1 Tab by mouth Daily (before breakfast). magnesium chloride 64 mg delayed release tablet Commonly known as:  MAG DELAY Take 8 tablets 3 x day  
  
 metoprolol tartrate 25 mg tablet Commonly known as:  LOPRESSOR Take 1 Tab by mouth two (2) times a day. Omeprazole delayed release 20 mg tablet Commonly known as:  PRILOSEC D/R Take 20 mg by mouth daily. spironolactone 25 mg tablet Commonly known as:  ALDACTONE Take 1 Tab by mouth daily. Indications: EDEMA  
  
 testosterone 20.25 mg/1.25 gram (1.62 %) gel Commonly known as:  ANDROGEL Apply  to affected area as needed. traMADol 50 mg tablet Commonly known as:  ULTRAM  
Take 1 Tab by mouth two (2) times daily as needed for Pain. Max Daily Amount: 100 mg. Indications: NEUROPATHIC PAIN  
  
 VITAMIN B-12 1,000 mcg tablet Generic drug:  cyanocobalamin Take 1,000 mcg by mouth daily. VITAMIN B-6 100 mg tablet Generic drug:  pyridoxine (vitamin B6) Take 100 mg by mouth daily. Follow-up Instructions Return in about 3 months (around 10/18/2018), or if symptoms worsen or fail to improve, for htn, dm2. Introducing [facility] SERVICES! Dear Natty Poole: Thank you for requesting a [employer] account. Our records indicate that you already have an active [employer] account. You can access your account anytime at [email or web address]. "[email or web address] Did you know that you can access your hospital and ER discharge instructions at any time in [employer]? You can also review all of your test results from your hospital stay or ER visit. Additional Information If you have questions, please visit the Frequently Asked Questions section of the [employer] website at [email or web address] Remember, [employer] is NOT to be used for urgent needs. For medical emergencies, dial 911. Now available from your iPhone and Android! Please provide this summary of care documentation to your next provider. Your primary care clinician is listed as Aroldo Perez. If you have any questions after today's visit, please call [phone].

## 2018-07-18 NOTE — PROGRESS NOTES
Chief Complaint   Patient presents with    Hypertension    Diabetes     1. Have you been to the ER, urgent care clinic since your last visit? Hospitalized since your last visit? No    2. Have you seen or consulted any other health care providers outside of the [facility] since your last visit? Include any pap smears or colon screening. 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 Hypertension: Patient's blood pressure is controlled. He is on Aldactone, Lopressor and Norvasc. Diabetes mellitus type 2: Patient did have labs done at his [place] clinic and his HbA1c is 6.8. He is on insulin. He takes Lantus and NovoLog. We will continue with the current treatment plan. Anemia: Patient's hemoglobin has been low and he also does have iron deficiency. He has been referred to the gastroenterologist in the [place] for colon cancer screening. He was due for colonoscopy. If this is stable I would recommend referral to the hematologist.  She was also given iron pills and vitamin C to take. Cardiac: Patient has a history of valvular heart disease. Has had repair of his severe aortic stenosis. He is stable and not in failure. Denies chest pain, shortness of breath or diaphoresis. Patient is on Lopressor, Aldactone and Norvasc. He is also on Lipitor. We will continue with the current treatment plan. Thyroid disorder: Patient has a history of thyroid disorder and currently has hypothyroidism. He is on thyroid replacement therapy. Lab results are stable. We will continue to take 200 mcg of levothyroxine. GERD: Patient is on omeprazole daily. Stable on this medication. Continue current treatment plan. Back pain: Patient has long-standing back pain. Currently he is on Cymbalta to help with this. He has been various spine specialist.  Cymbalta does seem to help on and off. I will encourage him to continue with this medication.     Past Medical History  Past Medical History:   Diagnosis Date    Diabetes ([facility] 1995    Dyslipidemia 3/1/2017    Fractures 1995    R Knee/ Tib fib fracture/surgery    Gout 2010    Graves disease 1/21/2015    High cholesterol 2005    HTN (hypertension) 1990    Osteoarthritis 2013    Severe aortic stenosis 3/29/2017    Spinal stenosis of lumbar region 3/1/2016    Spondylolisthesis 3/1/2016    Thyroid trouble     Type 2 diabetes mellitus with diabetic nephropathy ([facility] 4/28/2015       Surgical History  Past Surgical History:   Procedure Laterality Date    HX HIP REPLACEMENT Left 2009    HX LUMBAR FUSION      HX ORTHOPAEDIC Right     R knee/Tibfib surgery        Medications  Current Outpatient Prescriptions   Medication Sig Dispense Refill    levothyroxine (SYNTHROID) 200 mcg tablet Take 1 Tab by mouth Daily (before breakfast). 30 Tab 2    aspirin (ASPIRIN) 325 mg tablet Take 325 mg by mouth daily.  ascorbic acid, vitamin C, (VITAMIN C) 250 mg tablet Take  by mouth.  ferrous sulfate 325 mg (65 mg iron) tablet Take 325 mg by mouth two (2) times a day.  DULoxetine (CYMBALTA) 60 mg capsule Take 1 Cap by mouth daily. 30 Cap 1    capsicum oleoresin 0.025 % topical cream Apply  to affected area three (3) times daily.  diclofenac (VOLTAREN) 1 % gel Apply  to affected area four (4) times daily.  traMADol (ULTRAM) 50 mg tablet Take 1 Tab by mouth two (2) times daily as needed for Pain. Max Daily Amount: 100 mg. Indications: NEUROPATHIC PAIN 60 Tab 0    magnesium chloride (MAG DELAY) 64 mg delayed release tablet Take 8 tablets 3 x day      amLODIPine (NORVASC) 10 mg tablet Take 5 mg by mouth daily.  pyridoxine, vitamin B6, (VITAMIN B-6) 100 mg tablet Take 100 mg by mouth daily.  metoprolol tartrate (LOPRESSOR) 25 mg tablet Take 1 Tab by mouth two (2) times a day.  (Patient taking differently: Take 12.5 mg by mouth two (2) times a day.) 180 Tab 3    insulin glargine (LANTUS) 100 unit/mL injection Take 30 units once daily Indications: type 2 diabetes mellitus 3 Vial 3    cyanocobalamin (VITAMIN B-12) 1,000 mcg tablet Take 1,000 mcg by mouth daily.  febuxostat (ULORIC) 40 mg tab tablet Take 1 Tab by mouth daily. Indications: GOUT PREVENTION 90 Tab 1    insulin aspart (NOVOLOG) 100 unit/mL injection by SubCUTAneous route. 6 units before meals      spironolactone (ALDACTONE) 25 mg tablet Take 1 Tab by mouth daily. Indications: EDEMA 30 Tab 3    glucose blood VI test strips (ASCENSIA AUTODISC VI, ONE TOUCH ULTRA TEST VI) strip Dispense precision extra test strips 3 x day to check blood glucose 200 Each 3    Omeprazole delayed release (PRILOSEC D/R) 20 mg tablet Take 20 mg by mouth daily.  atorvastatin (LIPITOR) 40 mg tablet Take 20 mg by mouth daily.  testosterone (ANDROGEL) 20.25 mg/1.25 gram (1.62 %) gel Apply  to affected area as needed.  Cholecalciferol, Vitamin D3, 1,000 unit cap Take 3,000 Units by mouth daily. Allergies  Allergies   Allergen Reactions    Watermelon Anaphylaxis and Swelling    Citric Acid Rash    Peach (Prunus Persica) Itching       Family History  Family History   Problem Relation Age of Onset    Hypertension Mother     Thyroid Disease Mother     Hypertension Father     Diabetes Father     Drug Abuse Brother        Social History  Social History     Social History    Marital status:      Spouse name: N/A    Number of children: N/A    Years of education: N/A     Occupational History    Not on file.      Social History Main Topics    Smoking status: Former Smoker     Packs/day: 1.00     Types: Cigarettes     Start date: 1/1/1969     Quit date: 1/1/1995    Smokeless tobacco: Never Used    Alcohol use No    Drug use: No    Sexual activity: Yes     Partners: Female     Other Topics Concern     Service Yes     Served in [address] Blood Transfusions No    Caffeine Concern No    Occupational Exposure No    Hobby Hazards No    Sleep Concern No    Stress Concern No    Weight Concern Yes    Special Diet No    Back Care Yes     Lower Back pain when walking    Exercise No    Bike Helmet No    Seat Belt Yes    Self-Exams Yes     Social History Narrative       Review of Systems  Review of Systems - History obtained from chart review and the patient  General ROS: positive for  - fatigue and malaise  negative for - chills or fever  Psychological ROS: negative  Ophthalmic ROS: positive for - uses glasses  ENT ROS: negative  Allergy and Immunology ROS: negative  Hematological and Lymphatic ROS: negative  Endocrine ROS: dm2, hypothyroidism  Respiratory ROS: no cough, shortness of breath, or wheezing  Cardiovascular ROS: no chest pain or dyspnea on exertion  Gastrointestinal ROS: positive for - heartburn  negative for - abdominal pain, blood in stools or change in bowel habits  Genito-Urinary ROS: negative  Musculoskeletal ROS: positive for - joint pain and muscle pain  Neurological ROS: no TIA or stroke symptoms    Vital Signs  Visit Vitals    /66 (BP 1 Location: Left arm, BP Patient Position: Sitting)    Pulse 61    Temp 97.2 °F (36.2 °C) (Oral)    Resp 17    Ht 5' 10\" (1.778 m)    Wt 239 lb (108.4 kg)    SpO2 95%    BMI 34.29 kg/m2         Physical Exam  Physical Examination: General appearance - oriented to person, place, and time, acyanotic, in no respiratory distress and well hydrated  Mental status - alert, oriented to person, place, and time, affect appropriate to mood  Nose - normal and patent, no erythema, discharge or polyps  Mouth - mucous membranes moist, pharynx normal without lesions  Neck - supple, no significant adenopathy  Lymphatics - no palpable lymphadenopathy  Chest - clear to auscultation, no wheezes, rales or rhonchi, symmetric air entry  Heart - S1 and S2 normal, systolic murmur 3/6 at 2nd right intercostal space  Abdomen - no rebound tenderness noted  Back exam - limited range of motion, pain with motion noted during exam  Neurological - abnormal neurological exam unchanged from prior examinations  Musculoskeletal - osteoarthritic changes noted in both hands  Extremities - intact peripheral pulses    Results  Results for orders placed or performed during the hospital encounter of 06/07/18   GLUCOSE, POC   Result Value Ref Range    Glucose (POC) 98 70 - 110 mg/dL       ASSESSMENT and PLAN    ICD-10-CM ICD-9-CM    1. Type 2 diabetes mellitus with diabetic nephropathy, unspecified whether long term insulin use (HCC) E11.21 250.40      583.81    2. Renovascular hypertension I15.0 405.91    3. Hypothyroidism due to acquired atrophy of thyroid E03.4 244.8      246.8    4. Dyslipidemia E78.5 272.4    5. Gastroesophageal reflux disease, esophagitis presence not specified K21.9 530.81    6. S/P AVR (aortic valve replacement) Z95.2 V43.3    7. Iron deficiency anemia, unspecified iron deficiency anemia type D50.9 280.9    8. Spondylolisthesis, unspecified spinal region M43.10 756.12      reviewed diet, exercise and weight control  reviewed medications and side effects in detail  specific diabetic recommendations: low cholesterol diet, weight control and daily exercise discussed, home glucose monitoring emphasized, all medications, side effects and compliance discussed carefully, annual eye examinations at Ophthalmology discussed, glycohemoglobin and other lab monitoring discussed and long term diabetic complications discussed      I have discussed the diagnosis with the patient and the intended plan of care as seen in the above orders. The patient has received an after-visit summary and questions were answered concerning future plans. I have discussed medication, side effects, and warnings with the patient in detail. The patient verbalized understanding and is in agreement with the plan of care. The patient will contact the office with any additional concerns.     Karen Albarado MD

## 2018-07-24 ENCOUNTER — OFFICE VISIT (OUTPATIENT)
Dept: CARDIOLOGY CLINIC | Age: 73
End: 2018-07-24

## 2018-07-24 ENCOUNTER — OFFICE VISIT (OUTPATIENT)
Dept: ORTHOPEDIC SURGERY | Age: 73
End: 2018-07-24

## 2018-07-24 ENCOUNTER — TELEPHONE (OUTPATIENT)
Dept: ORTHOPEDIC SURGERY | Age: 73
End: 2018-07-24

## 2018-07-24 VITALS
OXYGEN SATURATION: 96 % | BODY MASS INDEX: 34.5 KG/M2 | TEMPERATURE: 97.7 F | SYSTOLIC BLOOD PRESSURE: 148 MMHG | HEART RATE: 53 BPM | WEIGHT: 241 LBS | DIASTOLIC BLOOD PRESSURE: 69 MMHG | HEIGHT: 70 IN

## 2018-07-24 VITALS
HEART RATE: 52 BPM | DIASTOLIC BLOOD PRESSURE: 69 MMHG | HEIGHT: 70 IN | WEIGHT: 241 LBS | SYSTOLIC BLOOD PRESSURE: 139 MMHG | BODY MASS INDEX: 34.5 KG/M2

## 2018-07-24 DIAGNOSIS — E66.01 SEVERE OBESITY (BMI 35.0-39.9) WITH COMORBIDITY (HCC): ICD-10-CM

## 2018-07-24 DIAGNOSIS — E11.21 TYPE 2 DIABETES MELLITUS WITH DIABETIC NEPHROPATHY, UNSPECIFIED WHETHER LONG TERM INSULIN USE (HCC): Chronic | ICD-10-CM

## 2018-07-24 DIAGNOSIS — I10 ESSENTIAL HYPERTENSION: ICD-10-CM

## 2018-07-24 DIAGNOSIS — Z95.2 S/P AVR (AORTIC VALVE REPLACEMENT): Primary | ICD-10-CM

## 2018-07-24 DIAGNOSIS — E78.5 DYSLIPIDEMIA: Chronic | ICD-10-CM

## 2018-07-24 DIAGNOSIS — N18.30 CKD (CHRONIC KIDNEY DISEASE) STAGE 3, GFR 30-59 ML/MIN (HCC): Chronic | ICD-10-CM

## 2018-07-24 DIAGNOSIS — M51.36 DDD (DEGENERATIVE DISC DISEASE), LUMBAR: ICD-10-CM

## 2018-07-24 DIAGNOSIS — E03.4 HYPOTHYROIDISM DUE TO ACQUIRED ATROPHY OF THYROID: Chronic | ICD-10-CM

## 2018-07-24 DIAGNOSIS — M48.061 SPINAL STENOSIS OF LUMBAR REGION, UNSPECIFIED WHETHER NEUROGENIC CLAUDICATION PRESENT: Primary | ICD-10-CM

## 2018-07-24 DIAGNOSIS — M54.16 LUMBAR RADICULOPATHY: ICD-10-CM

## 2018-07-24 NOTE — PATIENT INSTRUCTIONS

## 2018-07-24 NOTE — PROGRESS NOTES
1. Have you been to the ER, urgent care clinic since your last visit? Hospitalized since your last visit? No    2. Have you seen or consulted any other health care providers outside of the [address] [place] [address] since your last visit? Include any pap smears or colon screening. Yes Where: PCP     3. Since your last visit, have you had any of the following symptoms? .          4. Have you had any blood work, X-rays or cardiac testing? Yes Where: [facility] Reason for visit: Labs         5. Where do you normally have your labs drawn? [facility]/Dr Perez    6. Do you need any refills today?    No

## 2018-07-24 NOTE — TELEPHONE ENCOUNTER
Called Dr. Joycelyn Barahona office. Left a message with Renard Burks regarding the maximum safest dose of topamax.

## 2018-07-24 NOTE — PROGRESS NOTES
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 Shortness of Breath   The history is provided by the patient. This is a chronic problem. The problem occurs intermittently. The current episode started more than 1 week ago. The problem has been gradually improving. Pertinent negatives include no fever, no ear pain, no neck pain, no cough, no sputum production, no hemoptysis, no wheezing, no PND, no orthopnea, no chest pain, no syncope, no vomiting, no rash, no leg pain, no leg swelling and no claudication. Associated medical issues do not include chronic lung disease, CAD or heart failure. Hypertension   The history is provided by the patient. This is a chronic problem. The current episode started more than 1 week ago. The problem occurs every several days. The problem has not changed since onset. Associated symptoms include shortness of breath. Pertinent negatives include no chest pain. Review of Systems   Constitutional: Negative for chills, diaphoresis, fever, malaise/fatigue and weight loss. HENT: Negative for ear discharge, ear pain, hearing loss, nosebleeds and tinnitus. Eyes: Negative for blurred vision. Respiratory: Positive for shortness of breath. Negative for cough, hemoptysis, sputum production, wheezing and stridor. Cardiovascular: Negative for chest pain, palpitations, orthopnea, claudication, leg swelling, syncope and PND. Gastrointestinal: Negative for heartburn, nausea and vomiting. Musculoskeletal: Negative for myalgias and neck pain. Skin: Negative for itching and rash. Neurological: Negative for dizziness, tingling, tremors, focal weakness, loss of consciousness and weakness. Psychiatric/Behavioral: Negative for depression and suicidal ideas.      Family History   Problem Relation Age of Onset    Hypertension Mother     Thyroid Disease Mother     Hypertension Father     Diabetes Father     Drug Abuse Brother        Past Medical History:   Diagnosis Date    Diabetes ([facility] [address] 1995    Dyslipidemia 3/1/2017    Fractures 1995    R Knee/ Tib fib fracture/surgery    Gout 2010    Graves disease 1/21/2015    High cholesterol 2005    HTN (hypertension) 1990    Osteoarthritis 2013    Severe aortic stenosis 3/29/2017    Spinal stenosis of lumbar region 3/1/2016    Spondylolisthesis 3/1/2016    Thyroid trouble     Type 2 diabetes mellitus with diabetic nephropathy ([facility] [address] 4/28/2015       Past Surgical History:   Procedure Laterality Date    HX HIP REPLACEMENT Left 2009    HX LUMBAR FUSION      HX ORTHOPAEDIC Right     R knee/Tibfib surgery       Social History   Substance Use Topics    Smoking status: Former Smoker     Packs/day: 1.00     Types: Cigarettes     Start date: 1/1/1969     Quit date: 1/1/1995    Smokeless tobacco: Never Used    Alcohol use No       Allergies   Allergen Reactions    Watermelon Anaphylaxis and Swelling    Citric Acid Rash    Peach (Prunus Persica) Itching       Outpatient Prescriptions Marked as Taking for the 7/24/18 encounter (Office Visit) with Enriqueta Odom MD   Medication Sig Dispense Refill    levothyroxine (SYNTHROID) 200 mcg tablet Take 1 Tab by mouth Daily (before breakfast). 30 Tab 2    aspirin (ASPIRIN) 325 mg tablet Take 325 mg by mouth daily.  ascorbic acid, vitamin C, (VITAMIN C) 250 mg tablet Take  by mouth.  ferrous sulfate 325 mg (65 mg iron) tablet Take 325 mg by mouth two (2) times a day.  DULoxetine (CYMBALTA) 60 mg capsule Take 1 Cap by mouth daily. 30 Cap 1    capsicum oleoresin 0.025 % topical cream Apply  to affected area three (3) times daily.  diclofenac (VOLTAREN) 1 % gel Apply  to affected area four (4) times daily.  traMADol (ULTRAM) 50 mg tablet Take 1 Tab by mouth two (2) times daily as needed for Pain. Max Daily Amount: 100 mg.  Indications: NEUROPATHIC PAIN 60 Tab 0    magnesium chloride (MAG DELAY) 64 mg delayed release tablet Take 8 tablets 3 x day      amLODIPine (NORVASC) 10 mg tablet Take 5 mg by mouth daily.  pyridoxine, vitamin B6, (VITAMIN B-6) 100 mg tablet Take 100 mg by mouth daily.  metoprolol tartrate (LOPRESSOR) 25 mg tablet Take 1 Tab by mouth two (2) times a day. (Patient taking differently: Take 12.5 mg by mouth two (2) times a day.) 180 Tab 3    insulin glargine (LANTUS) 100 unit/mL injection Take 30 units once daily  Indications: type 2 diabetes mellitus 3 Vial 3    cyanocobalamin (VITAMIN B-12) 1,000 mcg tablet Take 1,000 mcg by mouth daily.  febuxostat (ULORIC) 40 mg tab tablet Take 1 Tab by mouth daily. Indications: GOUT PREVENTION 90 Tab 1    insulin aspart (NOVOLOG) 100 unit/mL injection by SubCUTAneous route. 6 units before meals      spironolactone (ALDACTONE) 25 mg tablet Take 1 Tab by mouth daily. Indications: EDEMA 30 Tab 3    glucose blood VI test strips (ASCENSIA AUTODISC VI, ONE TOUCH ULTRA TEST VI) strip Dispense precision extra test strips 3 x day to check blood glucose 200 Each 3    Omeprazole delayed release (PRILOSEC D/R) 20 mg tablet Take 20 mg by mouth daily.  atorvastatin (LIPITOR) 40 mg tablet Take 20 mg by mouth daily.  testosterone (ANDROGEL) 20.25 mg/1.25 gram (1.62 %) gel Apply  to affected area as needed.  Cholecalciferol, Vitamin D3, 1,000 unit cap Take 3,000 Units by mouth daily. Visit Vitals    /69    Pulse (!) 52    Ht 5' 10\" (1.778 m)    Wt 109.3 kg (241 lb)    BMI 34.58 kg/m2     Physical Exam   Constitutional: He is oriented to person, place, and time. He appears well-developed and well-nourished. No distress. HENT:   Head: Atraumatic. Mouth/Throat: No oropharyngeal exudate. Eyes: Conjunctivae are normal. Right eye exhibits no discharge. Left eye exhibits no discharge. No scleral icterus. Neck: Normal range of motion. Neck supple. No JVD present. No tracheal deviation present. No thyromegaly present. Cardiovascular: Normal rate and regular rhythm. Exam reveals no gallop. No murmur heard. Surgical sternal scar   Pulmonary/Chest: Effort normal and breath sounds normal. No stridor. No respiratory distress. He has no wheezes. He has no rales. He exhibits no tenderness. Abdominal: Soft. There is no tenderness. There is no rebound and no guarding. Musculoskeletal: Normal range of motion. He exhibits no edema or tenderness. Lymphadenopathy:     He has no cervical adenopathy. Neurological: He is alert and oriented to person, place, and time. He exhibits normal muscle tone. Skin: Skin is warm. He is not diaphoretic. Psychiatric: He has a normal mood and affect. His behavior is normal.     ekg sinus rhythm with PVC, no acute st-t changes  Echo 02/2017:  SUMMARY:  Left ventricle: Systolic function was normal by visual assessment. Ejection fraction was estimated in the range of 60 % to 65 %. No obvious  wall motion abnormalities identified in the views obtained. Wall thickness  was at the upper limits of normal.    Aortic valve: Leaflets exhibited markedly increased thickness and reduced  mobility. There was moderate to severe stenosis. Valve peak gradient was  61 mmHg. Valve mean gradient was 39 mmHg. Estimated aortic valve area (by  VTI) was 0.5 cmï¾². ZACKERY likely overestimates severity of aortic stenosis due  to difficulty in measuring LVOT diameter. Aorta, systemic arteries: The root exhibited dilatation. ASSESSMENT and PLAN    ICD-10-CM ICD-9-CM    1. S/P AVR (aortic valve replacement) Z95.2 V43.3    2. Dyslipidemia E78.5 272.4    3. Type 2 diabetes mellitus with diabetic nephropathy, unspecified whether long term insulin use (HCC) E11.21 250.40      583.81    4. CKD (chronic kidney disease) stage 3, GFR 30-59 ml/min N18.3 585.3    5. Hypothyroidism due to acquired atrophy of thyroid E03.4 244.8      246.8    6. Essential hypertension I10 401.9      No orders of the defined types were placed in this encounter.     Follow-up Disposition:  Return in about 6 months (around 1/24/2019). reviewed diet, exercise and weight control  cardiovascular risk and specific lipid/LDL goals reviewed  use of aspirin to prevent MI and TIA's discussed. Now S/p AVR.   Remains asymptomatic from cardiac standpoint  Recommend to take aspirin daily  Continue salt restriction and weight reduction

## 2018-07-24 NOTE — PROGRESS NOTES
[facility] 
[address] [facility] [address], [place]  Phone: [phone] Fax: [phone] PROGRESS NOTE HISTORY OF PRESENT ILLNESS: 
The patient is a 68 y.o. male and was seen today for follow up of his radicular RLE pain symptoms somewhat resolved, although he continues to have low back and right buttock pain. At prior visits, he presented with c/o low back pain extending into the RLE in an L4 distribution to the knee. These have since resolved. Lying down and walking exacerbate his pain. Pt reports movement exacerbates his pain. This is a patient with a diagnosis of lumbar postlaminectomy syndrome with previous h/o L4-S1 fusion performed by Dr. Alirio Carlin on 3/1/2016. Pt states he did not experience much in the way of low back pain prior to his surgery but noted an immediate onset of low back pain postoperatively. He reports his lower extremity pain resolved after his surgery x 3 months until recurrence. Note from Kaylen Salazar dated 12/6/16 indicates patient underwent an L2/3 epidural as there was scarring at the L3/4 level from previous surgery. Pt underwent right SI joint injection on 6/7/18 with some relief. Note from Dr. Alirio Carlin dated 4/21/17 indicates patient underwent right SI joint injection #1. Note from Dr. Alirio Carlin dated 8/3/17 indicates patient underwent bilateral SI joint injections. Per patient, SI joint injections only provided relief for his left-sided buttock pain. Pt also has h/o left THR. Pt was followed by Dr. Cory Vogel for chiropractic treatment x 6 months without benefit. The patient has a history of DM and reports blood sugars are well controlled, consistently remaining below 200. PMHx includes stage 3 renal disease and Grave's disease. Pt is not actively being followed by nephrology. Pt reports he is prescribed Tramadol through the [place]. Pt reports LE edema with NEURONTIN 100 mg TID. Pt denies change in bowel or bladder habits.  Pt denies fever, weight loss, or skin changes. Lumbar spine XR dated 3/1/2018 reviewed. Per report, post-surgical changes of the lower lumbar spine. Intact hardware. Grade 1 anterolisthesis of L4 on L5, grade I retrolisthesis of L5 on S1, and diffuse mild degenerative disc disease. Mild degenerative changes of the right hip. Left total hip prothesis in good position, without complication. Reviewing films myself, there is a posterior fusion L4-S1. No acute pathology identified. Lumbar spine MRI dated 4/9/2018 reviewed. Per report, multilevel degenerative findings along postsurgical lumbar spine. Most significant findings are at L3-4 just above the fusion. There is a moderate to severe spinal stenosis and a severe right foraminal stenosis. There is a right foraminal disc extrusion contributing to this. The patient is RHD. Pt underwent right L3/L4 SNRBs on 5/10/18 with good relief  At his last clinical appointment, patient had a history of spinal fusion at the L4-S1 levels. Patient symptoms appeared to be junctional in area from the areas above and below his fusion. He had some relief with a SI joint block as well as some decrease in his RLE radicular sxs. A L3/L4 SNRB also provided some relief. He additionally continued to describe some symptoms consistent with spinal stenosis. Patient wished to continue his current treatment. He did not require refills of his Cymbalta 60 mg daily at that time. The patient returns today with pain location and distribution remain unchanged. He rates his pain 4/10, consistent with his last visit (3-4/10). Patient is compliant with Cymbalta 60 mg daily. Patient continues to describe symptoms consistent with spinal stenosis. He reports that his SI joint pain is more severe than his L3/L4 pain at this time.  reviewed. Body mass index is 34.58 kg/(m^2). PCP: Ines Bettencourt MD 
 
 
Past Medical History:  
Diagnosis Date  Diabetes ([facility] [address] 1995  Dyslipidemia 3/1/2017  Fractures 1995 R Knee/ Tib fib fracture/surgery  Gout 2010  Graves disease 1/21/2015  High cholesterol 2005  
 HTN (hypertension) 1990  Osteoarthritis 2013  Severe aortic stenosis 3/29/2017  Spinal stenosis of lumbar region 3/1/2016  Spondylolisthesis 3/1/2016  Thyroid trouble  Type 2 diabetes mellitus with diabetic nephropathy ([facility] [address] 4/28/2015 Social History Social History  Marital status:  Spouse name: N/A  
 Number of children: N/A  
 Years of education: N/A Occupational History  Not on file. Social History Main Topics  Smoking status: Former Smoker Packs/day: 1.00 Types: Cigarettes Start date: 1/1/1969 Quit date: 1/1/1995  Smokeless tobacco: Never Used  Alcohol use No  
 Drug use: No  
 Sexual activity: Yes  
  Partners: Female Other Topics Concern [address] Yes Served in [employer]  Blood Transfusions No  
 Caffeine Concern No  
 Occupational Exposure No  
 Hobby Hazards No  
 Sleep Concern No  
 Stress Concern No  
 Weight Concern Yes  Special Diet No  
 Back Care Yes Lower Back pain when walking  Exercise No  
 Bike Helmet No  
 Seat Belt Yes  Self-Exams Yes Social History Narrative Current Outpatient Prescriptions Medication Sig Dispense Refill  levothyroxine (SYNTHROID) 200 mcg tablet Take 1 Tab by mouth Daily (before breakfast). 30 Tab 2  
 aspirin (ASPIRIN) 325 mg tablet Take 325 mg by mouth daily.  ascorbic acid, vitamin C, (VITAMIN C) 250 mg tablet Take  by mouth.  ferrous sulfate 325 mg (65 mg iron) tablet Take 325 mg by mouth two (2) times a day.  DULoxetine (CYMBALTA) 60 mg capsule Take 1 Cap by mouth daily. 30 Cap 1  capsicum oleoresin 0.025 % topical cream Apply  to affected area three (3) times daily.  diclofenac (VOLTAREN) 1 % gel Apply  to affected area four (4) times daily.     
 traMADol (ULTRAM) 50 mg tablet Take 1 Tab by mouth two (2) times daily as needed for Pain. Max Daily Amount: 100 mg. Indications: NEUROPATHIC PAIN 60 Tab 0  
 magnesium chloride (MAG DELAY) 64 mg delayed release tablet Take 8 tablets 3 x day  amLODIPine (NORVASC) 10 mg tablet Take 5 mg by mouth daily.  pyridoxine, vitamin B6, (VITAMIN B-6) 100 mg tablet Take 100 mg by mouth daily.  metoprolol tartrate (LOPRESSOR) 25 mg tablet Take 1 Tab by mouth two (2) times a day. (Patient taking differently: Take 12.5 mg by mouth two (2) times a day.) 180 Tab 3  
 insulin glargine (LANTUS) 100 unit/mL injection Take 30 units once daily  Indications: type 2 diabetes mellitus 3 Vial 3  cyanocobalamin (VITAMIN B-12) 1,000 mcg tablet Take 1,000 mcg by mouth daily.  febuxostat (ULORIC) 40 mg tab tablet Take 1 Tab by mouth daily. Indications: GOUT PREVENTION 90 Tab 1  
 insulin aspart (NOVOLOG) 100 unit/mL injection by SubCUTAneous route. 6 units before meals  spironolactone (ALDACTONE) 25 mg tablet Take 1 Tab by mouth daily. Indications: EDEMA 30 Tab 3  
 glucose blood VI test strips (ASCENSIA AUTODISC VI, ONE TOUCH ULTRA TEST VI) strip Dispense precision extra test strips 3 x day to check blood glucose 200 Each 3  
 Omeprazole delayed release (PRILOSEC D/R) 20 mg tablet Take 20 mg by mouth daily.  atorvastatin (LIPITOR) 40 mg tablet Take 20 mg by mouth daily.  testosterone (ANDROGEL) 20.25 mg/1.25 gram (1.62 %) gel Apply  to affected area as needed.  Cholecalciferol, Vitamin D3, 1,000 unit cap Take 3,000 Units by mouth daily. Allergies Allergen Reactions  Watermelon Anaphylaxis and Swelling  Citric Acid Rash  Peach (Prunus Persica) Itching Ambulates with a single point cane. PHYSICAL EXAMINATION Visit Vitals  /69  Pulse (!) 53  Temp 97.7 °F (36.5 °C) (Oral)  Ht 5' 10\" (1.778 m)  Wt 109.3 kg (241 lb)  SpO2 96%  BMI 34.58 kg/m2 CONSTITUTIONAL: NAD, A&O x 3 SENSATION: Intact to light touch throughout RANGE OF MOTION: The patient has full passive range of motion in all four extremities. MOTOR: 
Straight Leg Raise: Negative, bilateral 
 
         
 Hip Flex Knee Ext Knee Flex Ankle DF GTE Ankle PF Tone Right +4/5 +4/5 +4/5 +4/5 +4/5 +4/5 +4/5 Left +4/5 +4/5 +4/5 +4/5 +4/5 +4/5 +4/5 ASSESSMENT Diagnoses and all orders for this visit: 1. Spinal stenosis of lumbar region, unspecified whether neurogenic claudication present 2. DDD (degenerative disc disease), lumbar 3. Lumbar radiculopathy 4. Severe obesity (BMI 35.0-39. 9) with comorbidity ([address] IMPRESSION AND PLAN: 
I will contact his nephrologist about the maximum safe dose. Following approval, I will try him on Topamax 25 mg qhs. The risks, benefits, and potential side effects of this medication were discussed. Patient understands and wishes to proceed. Patient advised to call the office if intolerant to new medication. He did not require refills of his Cymbalta 60 mg daily at this time. Patient is not interested in surgical intervention or lumbar blocks at this time. I will see the patient back in 1 month's time or earlier if needed. Written by Parisa Orr, as dictated by Binu Edwards MD 
I examined the patient, reviewed and agree with the note.

## 2018-07-24 NOTE — MR AVS SNAPSHOT
[address] [facility] 
 
 
 [address] [facility] [address] 
[phone] Patient: Alicia George. MRN: [id number] :1945 Visit Information Date & Time Provider Department Dept. Phone Encounter #  
 2018 10:00 AM Willa Aponte MD [facility] [id number] Follow-up Instructions Return in about 6 months (around 2019). Follow-up and Disposition History Your Appointments 2018  9:15 AM  
Follow Up with Mariola Bowden MD  
914 [facility], [address] [facility] - SPECIALTY [facility] ([address]) Appt Note: 4 WEEK FOLLOW LOWER BACK  
 [address] [place] [address] 350 [facility]  
  
    
 10/18/2018 11:00 AM  
ROUTINE CARE with Aroldo Hamlin MD  
[address] ([address] [facility]) Appt Note: htn, dm2  
 148 [facility] 107 200 [facility] [address]  
[phone]  
  
   
 [address]  
  
    
 2019 10:30 AM  
Office Visit with Prasanna Goins MD  
[address] ([address]) Appt Note:   
 148 [facility] 107 [address] 49  
  
   
 [address] 700 [facility] Upcoming Health Maintenance Date Due ZOSTER VACCINE AGE 60> 2006 EYE EXAM RETINAL OR DILATED Q1 10/14/2016 GLAUCOMA SCREENING Q2Y 10/14/2017 FOBT Q 1 YEAR AGE 50-75 2018 Influenza Age 5 to Adult 2018 HEMOGLOBIN A1C Q6M 2018 FOOT EXAM Q1 3/21/2019 MICROALBUMIN Q1 3/21/2019 LIPID PANEL Q1 3/21/2019 MEDICARE YEARLY EXAM 2019 DTaP/Tdap/Td series (2 - Td) 2025 Allergies as of 2018  Review Complete On: 2018 By: Vaughan Dubin Severity Noted Reaction Type Reactions Watermelon High 2015    Anaphylaxis, Swelling Citric Acid Medium 02/17/2016    Rash Peach (Prunus Persica)  01/21/2015    Itching Current Immunizations  Reviewed on 12/20/2017 Name Date Influenza Vaccine (Quad) PF 9/17/2015 Pneumococcal Conjugate (PCV-13) 9/15/2016 Pneumococcal Polysaccharide (PPSV-23) 12/20/2017 Tdap 4/28/2015 Not reviewed this visit You Were Diagnosed With   
  
 Codes Comments S/P AVR (aortic valve replacement)    -  Primary ICD-10-CM: M17.1 ICD-9-CM: V43.3 Dyslipidemia     ICD-10-CM: E78.5 ICD-9-CM: 272.4 Type 2 diabetes mellitus with diabetic nephropathy, unspecified whether long term insulin use (HCC)     ICD-10-CM: E11.21 
ICD-9-CM: 250.40, 583.81   
 CKD (chronic kidney disease) stage 3, GFR 30-59 ml/min     ICD-10-CM: N18.3 ICD-9-CM: 865. 3 Hypothyroidism due to acquired atrophy of thyroid     ICD-10-CM: E03.4 ICD-9-CM: 244.8, 246.8 Essential hypertension     ICD-10-CM: I10 
ICD-9-CM: 401.9 Vitals BP Pulse Height(growth percentile) Weight(growth percentile) BMI Smoking Status 139/69 (!) 52 5' 10\" (1.778 m) 241 lb (109.3 kg) 34.58 kg/m2 Former Smoker Vitals History BMI and BSA Data Body Mass Index Body Surface Area 34.58 kg/m 2 2.32 m 2 Preferred Pharmacy Pharmacy Name Phone 500 [facility] 67, 593 [facility] [phone] Your Updated Medication List  
  
   
This list is accurate as of 7/24/18 10:54 AM.  Always use your most recent med list. amLODIPine 10 mg tablet Commonly known as:  Love Breach Take 5 mg by mouth daily. ascorbic acid (vitamin C) 250 mg tablet Commonly known as:  VITAMIN C Take  by mouth. aspirin 325 mg tablet Commonly known as:  ASPIRIN Take 325 mg by mouth daily. atorvastatin 40 mg tablet Commonly known as:  LIPITOR Take 20 mg by mouth daily. capsicum oleoresin 0.025 % topical cream  
Apply  to affected area three (3) times daily. cholecalciferol 1,000 unit Cap Commonly known as:  VITAMIN D3 Take 3,000 Units by mouth daily. diclofenac 1 % Gel Commonly known as:  VOLTAREN Apply  to affected area four (4) times daily. DULoxetine 60 mg capsule Commonly known as:  CYMBALTA Take 1 Cap by mouth daily. febuxostat 40 mg Tab tablet Commonly known as:  Usman Shannan Take 1 Tab by mouth daily. Indications: GOUT PREVENTION  
  
 ferrous sulfate 325 mg (65 mg iron) tablet Take 325 mg by mouth two (2) times a day. glucose blood VI test strips strip Commonly known as:  ASCENSIA AUTODISC VI, ONE TOUCH ULTRA TEST VI Dispense precision extra test strips 3 x day to check blood glucose  
  
 insulin aspart U-100 100 unit/mL injection Commonly known as:  NOVOLOG  
by SubCUTAneous route. 6 units before meals  
  
 insulin glargine 100 unit/mL injection Commonly known as:  LANTUS Take 30 units once daily  Indications: type 2 diabetes mellitus  
  
 levothyroxine 200 mcg tablet Commonly known as:  SYNTHROID Take 1 Tab by mouth Daily (before breakfast). magnesium chloride 64 mg delayed release tablet Commonly known as:  MAG DELAY Take 8 tablets 3 x day  
  
 metoprolol tartrate 25 mg tablet Commonly known as:  LOPRESSOR Take 1 Tab by mouth two (2) times a day. Omeprazole delayed release 20 mg tablet Commonly known as:  PRILOSEC D/R Take 20 mg by mouth daily. spironolactone 25 mg tablet Commonly known as:  ALDACTONE Take 1 Tab by mouth daily. Indications: EDEMA  
  
 testosterone 20.25 mg/1.25 gram (1.62 %) gel Commonly known as:  ANDROGEL Apply  to affected area as needed. traMADol 50 mg tablet Commonly known as:  ULTRAM  
Take 1 Tab by mouth two (2) times daily as needed for Pain. Max Daily Amount: 100 mg. Indications: NEUROPATHIC PAIN  
  
 VITAMIN B-12 1,000 mcg tablet Generic drug:  cyanocobalamin Take 1,000 mcg by mouth daily. VITAMIN B-6 100 mg tablet Generic drug:  pyridoxine (vitamin B6) Take 100 mg by mouth daily. Follow-up Instructions Return in about 6 months (around 1/24/2019). Patient Instructions High Blood Pressure: Care Instructions Your Care Instructions If your blood pressure is usually above 130/80, you have high blood pressure, or hypertension. That means the top number is 130 or higher or the bottom number is 80 or higher, or both. Despite what a lot of people think, high blood pressure usually doesn't cause headaches or make you feel dizzy or lightheaded. It usually has no symptoms. But it does increase your risk for heart attack, stroke, and kidney or eye damage. The higher your blood pressure, the more your risk increases. Your doctor will give you a goal for your blood pressure. Your goal will be based on your health and your age. Lifestyle changes, such as eating healthy and being active, are always important to help lower blood pressure. You might also take medicine to reach your blood pressure goal. 
Follow-up care is a key part of your treatment and safety. Be sure to make and go to all appointments, and call your doctor if you are having problems. It's also a good idea to know your test results and keep a list of the medicines you take. How can you care for yourself at home? Medical treatment · If you stop taking your medicine, your blood pressure will go back up. You may take one or more types of medicine to lower your blood pressure. Be safe with medicines. Take your medicine exactly as prescribed. Call your doctor if you think you are having a problem with your medicine. · Talk to your doctor before you start taking aspirin every day. Aspirin can help certain people lower their risk of a heart attack or stroke. But taking aspirin isn't right for everyone, because it can cause serious bleeding. · See your doctor regularly.  You may need to see the doctor more often at first or until your blood pressure comes down. · If you are taking blood pressure medicine, talk to your doctor before you take decongestants or anti-inflammatory medicine, such as ibuprofen. Some of these medicines can raise blood pressure. · Learn how to check your blood pressure at home. Lifestyle changes · Stay at a healthy weight. This is especially important if you put on weight around the waist. Losing even 10 pounds can help you lower your blood pressure. · If your doctor recommends it, get more exercise. Walking is a good choice. Bit by bit, increase the amount you walk every day. Try for at least 30 minutes on most days of the week. You also may want to swim, bike, or do other activities. · Avoid or limit alcohol. Talk to your doctor about whether you can drink any alcohol. · Try to limit how much sodium you eat to less than 2,300 milligrams (mg) a day. Your doctor may ask you to try to eat less than 1,500 mg a day. · Eat plenty of fruits (such as bananas and oranges), vegetables, legumes, whole grains, and low-fat dairy products. · Lower the amount of saturated fat in your diet. Saturated fat is found in animal products such as milk, cheese, and meat. Limiting these foods may help you lose weight and also lower your risk for heart disease. · Do not smoke. Smoking increases your risk for heart attack and stroke. If you need help quitting, talk to your doctor about stop-smoking programs and medicines. These can increase your chances of quitting for good. When should you call for help? Call 911 anytime you think you may need emergency care. This may mean having symptoms that suggest that your blood pressure is causing a serious heart or blood vessel problem. Your blood pressure may be over 180/110. 
 For example, call 911 if: 
  · You have symptoms of a heart attack. These may include: ¨ Chest pain or pressure, or a strange feeling in the chest. 
¨ Sweating. ¨ Shortness of breath. ¨ Nausea or vomiting. ¨ Pain, pressure, or a strange feeling in the back, neck, jaw, or upper belly or in one or both shoulders or arms. ¨ Lightheadedness or sudden weakness. ¨ A fast or irregular heartbeat.  
  · You have symptoms of a stroke. These may include: 
¨ Sudden numbness, tingling, weakness, or loss of movement in your face, arm, or leg, especially on only one side of your body. ¨ Sudden vision changes. ¨ Sudden trouble speaking. ¨ Sudden confusion or trouble understanding simple statements. ¨ Sudden problems with walking or balance. ¨ A sudden, severe headache that is different from past headaches.  
  · You have severe back or belly pain.  
 Do not wait until your blood pressure comes down on its own. Get help right away. 
 Call your doctor now or seek immediate care if: 
  · Your blood pressure is much higher than normal (such as 180/110 or higher), but you don't have symptoms.  
  · You think high blood pressure is causing symptoms, such as: ¨ Severe headache. ¨ Blurry vision.  
 Watch closely for changes in your health, and be sure to contact your doctor if: 
  · Your blood pressure measures 140/90 or higher at least 2 times. That means the top number is 140 or higher or the bottom number is 90 or higher, or both.  
  · You think you may be having side effects from your blood pressure medicine.  
  · Your blood pressure is usually normal, but it goes above normal at least 2 times. Where can you learn more? Go to http://adal-janes.info/. Enter Q401 in the search box to learn more about \"High Blood Pressure: Care Instructions. \" Current as of: December 6, 2017 Content Version: 11.7 © 2665-9157 [employer]. Care instructions adapted under license by [employer] (which disclaims liability or warranty for this information).  If you have questions about a medical condition or this instruction, always ask your healthcare professional. [employer] disclaims any warranty or liability for your use of this information. Patient Instructions History Introducing [facility]! Dear Charan Hough: Thank you for requesting a [employer] account. Our records indicate that you already have an active Comic Wonder account. You can access your account anytime at [email or web address] Did you know that you can access your hospital and ER discharge instructions at any time in Comic Wonder? You can also review all of your test results from your hospital stay or ER visit. Additional Information If you have questions, please visit the Frequently Asked Questions section of the Comic Wonder website at [email or web address] Remember, Comic Wonder is NOT to be used for urgent needs. For medical emergencies, dial 911. Now available from your iPhone and Android! Please provide this summary of care documentation to your next provider. Your primary care clinician is listed as Aroldo Perez. If you have any questions after today's visit, please call [phone].

## 2018-07-25 RX ORDER — TOPIRAMATE 25 MG/1
TABLET ORAL
Qty: 30 TAB | Refills: 1 | Status: SHIPPED | OUTPATIENT
Start: 2018-07-25 | End: 2018-07-25 | Stop reason: CLARIF

## 2018-07-25 RX ORDER — TOPIRAMATE 25 MG/1
TABLET ORAL
Qty: 30 TAB | Refills: 1 | Status: SHIPPED | OUTPATIENT
Start: 2018-07-25 | End: 2018-12-13

## 2018-07-25 NOTE — TELEPHONE ENCOUNTER
Dr. Maite Rodney would like to start the patient on Topamax 25mg QHS. Patient is aware and would like to have the rx printed and he will pick it up. Patient is aware that his rx will be available after 2pm tomorrow.

## 2018-07-25 NOTE — PROGRESS NOTES
A user error has taken place: encounter opened in error, closed for administrative reasons. ambulatory

## 2018-07-25 NOTE — TELEPHONE ENCOUNTER
Spoke with Dr. Trina rodriguez. His nurse stated that the patient's GFR is above 60 and his renal function has improved , so the dosage of Topamax is up to Dr. Gume Gordon. I will discuss with him.

## 2018-07-27 ENCOUNTER — TELEPHONE (OUTPATIENT)
Dept: ORTHOPEDIC SURGERY | Age: 73
End: 2018-07-27

## 2018-08-21 ENCOUNTER — OFFICE VISIT (OUTPATIENT)
Dept: ORTHOPEDIC SURGERY | Age: 73
End: 2018-08-21

## 2018-08-21 VITALS
HEIGHT: 70 IN | WEIGHT: 246 LBS | BODY MASS INDEX: 35.22 KG/M2 | DIASTOLIC BLOOD PRESSURE: 65 MMHG | OXYGEN SATURATION: 99 % | HEART RATE: 61 BPM | SYSTOLIC BLOOD PRESSURE: 140 MMHG

## 2018-08-21 DIAGNOSIS — M51.36 DDD (DEGENERATIVE DISC DISEASE), LUMBAR: ICD-10-CM

## 2018-08-21 DIAGNOSIS — M48.062 SPINAL STENOSIS, LUMBAR REGION WITH NEUROGENIC CLAUDICATION: Primary | ICD-10-CM

## 2018-08-21 DIAGNOSIS — M54.16 LUMBAR RADICULOPATHY: ICD-10-CM

## 2018-08-21 DIAGNOSIS — E66.01 SEVERE OBESITY (BMI 35.0-39.9) WITH COMORBIDITY (HCC): ICD-10-CM

## 2018-08-21 NOTE — PROGRESS NOTES
[facility]  [address]  [place], [address] [place]   Phone: [phone]  Fax: [phone]        PROGRESS NOTE      HISTORY OF PRESENT ILLNESS:  The patient is a 68 y.o. male and was seen today for follow up of radicular RLE pain symptoms somewhat resolved, although he continues to have low back and right buttock pain. At prior visits, he presented with c/o low back pain extending into the RLE in an L4 distribution to the knee. These have since resolved. Lying down and walking exacerbate his pain. Pt reports movement exacerbates his pain. This is a patient with a diagnosis of lumbar postlaminectomy syndrome with previous h/o L4-S1 fusion performed by Dr. Bradly Carrizales on 3/1/2016. Pt states he did not experience much in the way of low back pain prior to his surgery but noted an immediate onset of low back pain postoperatively. He reports his lower extremity pain resolved after his surgery x 3 months until recurrence. Note from Mariana Salazar dated 12/6/16 indicates patient underwent an L2/3 epidural as there was scarring at the L3/4 level from previous surgery. Pt underwent right SI joint injection on 6/7/18 with some relief. Note from Dr. Bradly Carrizales dated 4/21/17 indicates patient underwent right SI joint injection #1. Note from Dr. Bradly Carrizales dated 8/3/17 indicates patient underwent bilateral SI joint injections. Per patient, SI joint injections only provided relief for his left-sided buttock pain. Pt underwent right L3/L4 SNRBs on 5/10/18 with good relief. Pt also has h/o left THR. Pt was followed by Dr. Rhoda Douglas for chiropractic treatment x 6 months without benefit. The patient has a history of DM and reports blood sugars are well controlled, consistently remaining below 200. PMHx includes stage 3 renal disease and Grave's disease. Pt is not actively being followed by nephrology. Pt reports he is prescribed Tramadol through the [place]. Pt reports LE edema with NEURONTIN 100 mg TID. Pt denies change in bowel or bladder habits. Pt denies fever, weight loss, or skin changes. Lumbar spine XR dated 3/1/2018 reviewed. Per report, post-surgical changes of the lower lumbar spine. Intact hardware. Grade 1 anterolisthesis of L4 on L5, grade I retrolisthesis of L5 on S1, and diffuse mild degenerative disc disease. Mild degenerative changes of the right hip. Left total hip prothesis in good position, without complication. Reviewing films myself, there is a posterior fusion L4-S1. No acute pathology identified. Lumbar spine MRI dated 4/9/2018 reviewed. Per report, multilevel degenerative findings along postsurgical lumbar spine. Most significant findings are at L3-4 just above the fusion. There is a moderate to severe spinal stenosis and a severe right foraminal stenosis. There is a right foraminal disc extrusion contributing to this. The patient is RHD. At his last clinical appointment, I contacted his nephrologist about the maximum safe dose. Following approval, I tried him on Topamax 25 mg qhs. He did not require refills of his Cymbalta 60 mg daily at that time. The patient returns today with pain location and distribution remain unchanged. He continues to rate his pain 4/10. Patient is compliant with Cymbalta 60 mg daily. He self d/c his Topamax without calling the office a few days prior. He did not tolerate Topamax 25 mg qhs due to lethargy. Pt denies change in bowel or bladder habits.  reviewed. Body mass index is 35.3 kg/(m^2).       PCP: Ines Bettencourt MD      Past Medical History:   Diagnosis Date    Diabetes ([facility] [address] 1995    Dyslipidemia 3/1/2017    Fractures 1995    R Knee/ Tib fib fracture/surgery    Gout 2010    Graves disease 1/21/2015    High cholesterol 2005    HTN (hypertension) 1990    Osteoarthritis 2013    Severe aortic stenosis 3/29/2017    Spinal stenosis of lumbar region 3/1/2016    Spondylolisthesis 3/1/2016    Thyroid trouble     Type 2 diabetes mellitus with diabetic nephropathy ([facility] 4/28/2015        Social History     Social History    Marital status:      Spouse name: N/A    Number of children: N/A    Years of education: N/A     Occupational History    Not on file. Social History Main Topics    Smoking status: Former Smoker     Packs/day: 1.00     Types: Cigarettes     Start date: 1/1/1969     Quit date: 1/1/1995    Smokeless tobacco: Never Used    Alcohol use No    Drug use: No    Sexual activity: Yes     Partners: Female     Other Topics Concern     Service Yes     Served in [address] Blood Transfusions No    Caffeine Concern No    Occupational Exposure No    Hobby Hazards No    Sleep Concern No    Stress Concern No    Weight Concern Yes    Special Diet No    Back Care Yes     Lower Back pain when walking    Exercise No    Bike Helmet No    Seat Belt Yes    Self-Exams Yes     Social History Narrative       Current Outpatient Prescriptions   Medication Sig Dispense Refill    levothyroxine (SYNTHROID) 200 mcg tablet Take 1 Tab by mouth Daily (before breakfast). 30 Tab 2    aspirin (ASPIRIN) 325 mg tablet Take 325 mg by mouth daily.  ascorbic acid, vitamin C, (VITAMIN C) 250 mg tablet Take  by mouth.  ferrous sulfate 325 mg (65 mg iron) tablet Take 325 mg by mouth two (2) times a day.  DULoxetine (CYMBALTA) 60 mg capsule Take 1 Cap by mouth daily. 30 Cap 1    capsicum oleoresin 0.025 % topical cream Apply  to affected area three (3) times daily.  diclofenac (VOLTAREN) 1 % gel Apply  to affected area four (4) times daily.  traMADol (ULTRAM) 50 mg tablet Take 1 Tab by mouth two (2) times daily as needed for Pain. Max Daily Amount: 100 mg. Indications: NEUROPATHIC PAIN 60 Tab 0    magnesium chloride (MAG DELAY) 64 mg delayed release tablet Take 8 tablets 3 x day      amLODIPine (NORVASC) 10 mg tablet Take 5 mg by mouth daily.       pyridoxine, vitamin B6, (VITAMIN B-6) 100 mg tablet Take 100 mg by mouth daily.  metoprolol tartrate (LOPRESSOR) 25 mg tablet Take 1 Tab by mouth two (2) times a day. (Patient taking differently: Take 12.5 mg by mouth two (2) times a day.) 180 Tab 3    insulin glargine (LANTUS) 100 unit/mL injection Take 30 units once daily  Indications: type 2 diabetes mellitus 3 Vial 3    cyanocobalamin (VITAMIN B-12) 1,000 mcg tablet Take 1,000 mcg by mouth daily.  febuxostat (ULORIC) 40 mg tab tablet Take 1 Tab by mouth daily. Indications: GOUT PREVENTION 90 Tab 1    insulin aspart (NOVOLOG) 100 unit/mL injection by SubCUTAneous route. 6 units before meals      spironolactone (ALDACTONE) 25 mg tablet Take 1 Tab by mouth daily. Indications: EDEMA 30 Tab 3    glucose blood VI test strips (ASCENSIA AUTODISC VI, ONE TOUCH ULTRA TEST VI) strip Dispense precision extra test strips 3 x day to check blood glucose 200 Each 3    Omeprazole delayed release (PRILOSEC D/R) 20 mg tablet Take 20 mg by mouth daily.  atorvastatin (LIPITOR) 40 mg tablet Take 20 mg by mouth daily.  testosterone (ANDROGEL) 20.25 mg/1.25 gram (1.62 %) gel Apply  to affected area as needed.  Cholecalciferol, Vitamin D3, 1,000 unit cap Take 3,000 Units by mouth daily.  topiramate (TOPAMAX) 25 mg tablet 1 tab PO QHS 30 Tab 1       Allergies   Allergen Reactions    Watermelon Anaphylaxis and Swelling    Citric Acid Rash    Peach (Prunus Persica) Itching          PHYSICAL EXAMINATION    Visit Vitals    /65    Pulse 61    Ht 5' 10\" (1.778 m)    Wt 111.6 kg (246 lb)    SpO2 99%    BMI 35.3 kg/m2       CONSTITUTIONAL: NAD, A&O x 3  SENSATION: Intact to light touch throughout. Increased tender  RANGE OF MOTION: The patient has full passive range of motion in all four extremities.   MOTOR:  Straight Leg Raise: Negative, bilateral   KEVIN SIGN: Negative, right               Hip Flex Knee Ext Knee Flex Ankle DF GTE Ankle PF Tone   Right +4/5 +4/5 +4/5 +4/5 +4/5 +4/5 +4/5   Left +4/5 +4/5 +4/5 +4/5 +4/5 +4/5 +4/5       ASSESSMENT   Diagnoses and all orders for this visit:    1. Spinal stenosis, lumbar region with neurogenic claudication    2. DDD (degenerative disc disease), lumbar    3. Lumbar radiculopathy    4. Severe obesity (BMI 35.0-39. 9) with comorbidity ([facility] [address]          IMPRESSION AND PLAN:  Patient wished to continue his current treatment. He receives refills of his Cymbalta 60 mg daily through the [address] I offered to try him on another neuropathic pain medication but he declined. Patient is not interested in surgical intervention or additional blocks at this time. I will see the patient back in 3 month's time or earlier if needed. Written by Martha Hancock, as dictated by Osmany Tucker MD  I examined the patient, reviewed and agree with the note.

## 2018-08-21 NOTE — MR AVS SNAPSHOT
303 [facility], [place], operated by [facility] 
 
 
 [address] 200 [facility] 
[phone] Patient: Elaine Escobar. MRN: [id number] :1945 Visit Information Date & Time Provider Department Dept. Phone Encounter #  
 2018  9:15 AM William Guidry  [facility], [address] and [facility] [place] [phone] Follow-up Instructions Return in about 3 months (around 2018). Your Appointments 10/18/2018 11:00 AM  
ROUTINE CARE with MD Saima Parekh Dr (Matt Ruiz) Appt Note: htn, dm2  
 148 [facility] 107 200 [facility]  
[phone]  
  
   
 [address]  
  
    
 2019 10:30 AM  
Office Visit with MD Saima Collazo Sa, Dr (Matt Ruiz) Appt Note:   
 148 [facility] 107 [id number] [address] 23. 700 [facility] Upcoming Health Maintenance Date Due ZOSTER VACCINE AGE 60> 2006 EYE EXAM RETINAL OR DILATED Q1 10/14/2016 GLAUCOMA SCREENING Q2Y 10/14/2017 FOBT Q 1 YEAR AGE 50-75 2018 Influenza Age 5 to Adult 2018 HEMOGLOBIN A1C Q6M 2018 FOOT EXAM Q1 3/21/2019 MICROALBUMIN Q1 3/21/2019 MEDICARE YEARLY EXAM 2019 LIPID PANEL Q1 2019 DTaP/Tdap/Td series (2 - Td) 2025 Allergies as of 2018  Review Complete On: 2018 By: William Guidry MD  
  
 Severity Noted Reaction Type Reactions Watermelon High 2015    Anaphylaxis, Swelling Citric Acid Medium 2016    Rash Peach (Prunus Persica)  2015    Itching Current Immunizations  Reviewed on 2017 Name Date Influenza Vaccine (Quad) PF 2015 Pneumococcal Conjugate (PCV-13) 9/15/2016 Pneumococcal Polysaccharide (PPSV-23) 2017 Tdap 2015 Not reviewed this visit You Were Diagnosed With   
  
 Codes Comments Spinal stenosis, lumbar region with neurogenic claudication    -  Primary ICD-10-CM: M48.062 
ICD-9-CM: 724.03   
 DDD (degenerative disc disease), lumbar     ICD-10-CM: M51.36 
ICD-9-CM: 722.52 Lumbar radiculopathy     ICD-10-CM: M54.16 
ICD-9-CM: 724.4 Severe obesity (BMI 35.0-39. 9) with comorbidity ([facility] [address]     ICD-10-CM: E66.01 
ICD-9-CM: 278.01 Vitals BP Pulse Height(growth percentile) Weight(growth percentile) SpO2 BMI  
 140/65 61 5' 10\" (1.778 m) 246 lb (111.6 kg) 99% 35.3 kg/m2 Smoking Status Former Smoker BMI and BSA Data Body Mass Index Body Surface Area  
 35.3 kg/m 2 2.35 m 2 Preferred Pharmacy Pharm[facility] Name Phone [address] [facility] [address] [place] [phone] Your Updated Medication List  
  
   
This list is accurate as of 8/21/18 11:10 AM.  Always use your most recent med list. amLODIPine 10 mg tablet Commonly known as:  Griffiths Fanti Take 5 mg by mouth daily. ascorbic acid (vitamin C) 250 mg tablet Commonly known as:  VITAMIN C Take  by mouth. aspirin 325 mg tablet Commonly known as:  ASPIRIN Take 325 mg by mouth daily. atorvastatin 40 mg tablet Commonly known as:  LIPITOR Take 20 mg by mouth daily. capsicum oleoresin 0.025 % topical cream  
Apply  to affected area three (3) times daily. cholecalciferol 1,000 unit Cap Commonly known as:  VITAMIN D3 Take 3,000 Units by mouth daily. diclofenac 1 % Gel Commonly known as:  VOLTAREN Apply  to affected area four (4) times daily. DULoxetine 60 mg capsule Commonly known as:  CYMBALTA Take 1 Cap by mouth daily. febuxostat 40 mg Tab tablet Commonly known as:  Exjam Castaneda Take 1 Tab by mouth daily. Indications: GOUT PREVENTION  
  
 ferrous sulfate 325 mg (65 mg iron) tablet Take 325 mg by mouth two (2) times a day. glucose blood VI test strips strip Commonly known as:  ASCENSIA AUTODISC VI, ONE TOUCH ULTRA TEST VI Dispense precision extra test strips 3 x day to check blood glucose  
  
 insulin aspart U-100 100 unit/mL injection Commonly known as:  NOVOLOG  
by SubCUTAneous route. 6 units before meals  
  
 insulin glargine 100 unit/mL injection Commonly known as:  LANTUS Take 30 units once daily  Indications: type 2 diabetes mellitus  
  
 levothyroxine 200 mcg tablet Commonly known as:  SYNTHROID Take 1 Tab by mouth Daily (before breakfast). magnesium chloride 64 mg delayed release tablet Commonly known as:  MAG DELAY Take 8 tablets 3 x day  
  
 metoprolol tartrate 25 mg tablet Commonly known as:  LOPRESSOR Take 1 Tab by mouth two (2) times a day. Omeprazole delayed release 20 mg tablet Commonly known as:  PRILOSEC D/R Take 20 mg by mouth daily. spironolactone 25 mg tablet Commonly known as:  ALDACTONE Take 1 Tab by mouth daily. Indications: EDEMA  
  
 testosterone 20.25 mg/1.25 gram (1.62 %) gel Commonly known as:  ANDROGEL Apply  to affected area as needed. topiramate 25 mg tablet Commonly known as:  TOPAMAX  
1 tab PO QHS  
  
 traMADol 50 mg tablet Commonly known as:  ULTRAM  
Take 1 Tab by mouth two (2) times daily as needed for Pain. Max Daily Amount: 100 mg. Indications: NEUROPATHIC PAIN  
  
 VITAMIN B-12 1,000 mcg tablet Generic drug:  cyanocobalamin Take 1,000 mcg by mouth daily. VITAMIN B-6 100 mg tablet Generic drug:  pyridoxine (vitamin B6) Take 100 mg by mouth daily. Follow-up Instructions Return in about 3 months (around 11/21/2018). Introducing [facility]! Dear Roberto Jensen: Thank you for requesting a [employer] account. Our records indicate that you already have an active TenTwenty[employer] account.   You can access your account anytime at [email or web address] Did you know that you can access your hospital and ER discharge instructions at any time in [employer]stream? You can also review all of your test results from your hospital stay or ER visit. Additional Information If you have questions, please visit the Frequently Asked Questions section of the [employer] website at [email or web address] Remember, The Jetstream is NOT to be used for urgent needs. For medical emergencies, dial 911. Now available from your iPhone and Android! Please provide this summary of care documentation to your next provider. Your primary care clinician is listed as Aroldo Perez. If you have any questions after today's visit, please call [phone].

## 2018-09-04 ENCOUNTER — PATIENT OUTREACH (OUTPATIENT)
Dept: FAMILY MEDICINE CLINIC | Age: 73
End: 2018-09-04

## 2018-09-05 ENCOUNTER — TELEPHONE (OUTPATIENT)
Dept: FAMILY MEDICINE CLINIC | Age: 73
End: 2018-09-05

## 2018-09-05 ENCOUNTER — PATIENT OUTREACH (OUTPATIENT)
Dept: FAMILY MEDICINE CLINIC | Age: 73
End: 2018-09-05

## 2018-09-05 NOTE — TELEPHONE ENCOUNTER
Patient has question regarding a test. Uncertain of what patient was asking he sound unclear. Patient asked to speak with Dr Derrick Sorensen nurse. Please contact patient when available

## 2018-09-05 NOTE — PROGRESS NOTES
NN health screening: Mr. Meg Fournier stated \"I didn't do that in home screening for CRC because I just had a colonoscopy done @ the [facility] yesterday. I hope to watch it come across my chart on the computer and when I see I was going to print a copy and [facility] [place] [address]. \" I thanked Mr. Ely for the update and let him know I would pass this onto the office staff.

## 2018-10-11 ENCOUNTER — TELEPHONE (OUTPATIENT)
Dept: ORTHOPEDIC SURGERY | Age: 73
End: 2018-10-11

## 2018-10-11 DIAGNOSIS — M51.36 DDD (DEGENERATIVE DISC DISEASE), LUMBAR: ICD-10-CM

## 2018-10-11 DIAGNOSIS — M48.062 SPINAL STENOSIS, LUMBAR REGION WITH NEUROGENIC CLAUDICATION: ICD-10-CM

## 2018-10-11 DIAGNOSIS — M54.16 LUMBAR NEURITIS: ICD-10-CM

## 2018-10-11 DIAGNOSIS — M54.16 LUMBAR RADICULOPATHY: Primary | ICD-10-CM

## 2018-10-11 DIAGNOSIS — M43.10 SPONDYLOLISTHESIS, ACQUIRED: ICD-10-CM

## 2018-10-11 NOTE — TELEPHONE ENCOUNTER
PATIENT CAME INTO THE OFFICE TODAY AND STATED THAT HE WOULD LIKE TO GET ANOTHER INJECTION. HE WAS HERE ON 8/21/2018  HIS PAIN IS IN THE SAME AREA AS WHEN HE WAS HERE. HE HAS HAD A RIGHT SI JOINT INJ ON 6/7/2018 AND HE HAD A RT SNRB 3/4 ON 5/10/2018. PLEASE ADVISE IF THE PATIENT CAN BE SCHEDULED FOR ANOTHER BLOCK W/O BEING SEEN FIRST.

## 2018-10-17 ENCOUNTER — TELEPHONE (OUTPATIENT)
Dept: CARDIOLOGY CLINIC | Age: 73
End: 2018-10-17

## 2018-10-17 ENCOUNTER — TELEPHONE (OUTPATIENT)
Dept: ORTHOPEDIC SURGERY | Age: 73
End: 2018-10-17

## 2018-10-17 NOTE — TELEPHONE ENCOUNTER
[facility]iology associates are calling to find out when the pt needs to stop taking asprin before spinal block. Please advise at [phone].

## 2018-10-18 ENCOUNTER — DOCUMENTATION ONLY (OUTPATIENT)
Dept: ORTHOPEDIC SURGERY | Age: 73
End: 2018-10-18

## 2018-10-18 ENCOUNTER — TELEPHONE (OUTPATIENT)
Dept: CARDIOLOGY CLINIC | Age: 73
End: 2018-10-18

## 2018-10-18 NOTE — TELEPHONE ENCOUNTER
Ross Rose, DELILAH Borrero  
Caller: Unspecified Joel Escobar,  4:07 PM)  
  
   
  
May hold aspirin for injection and resume after injection. Called and left message with Yenny Harvey @ Dr Odette Roth office.

## 2018-10-18 NOTE — TELEPHONE ENCOUNTER
Call received from the nurse at [facility] [place] in concern to patient. Inquiring to what type of antibiotics the patient needs to be on prior to any dental procedure/ work (none is scheduled at this time). Please advise.

## 2018-10-25 ENCOUNTER — HOSPITAL ENCOUNTER (OUTPATIENT)
Dept: LAB | Age: 73
Discharge: HOME | End: 2018-10-25
Payer: MEDICARE

## 2018-10-25 ENCOUNTER — OFFICE VISIT (OUTPATIENT)
Dept: FAMILY MEDICINE CLINIC | Age: 73
End: 2018-10-25

## 2018-10-25 VITALS
RESPIRATION RATE: 18 BRPM | TEMPERATURE: 97.6 F | BODY MASS INDEX: 35.79 KG/M2 | HEART RATE: 65 BPM | WEIGHT: 250 LBS | OXYGEN SATURATION: 94 % | DIASTOLIC BLOOD PRESSURE: 72 MMHG | HEIGHT: 70 IN | SYSTOLIC BLOOD PRESSURE: 133 MMHG

## 2018-10-25 DIAGNOSIS — E11.21 TYPE 2 DIABETES MELLITUS WITH DIABETIC NEPHROPATHY, UNSPECIFIED WHETHER LONG TERM INSULIN USE (HCC): Primary | Chronic | ICD-10-CM

## 2018-10-25 DIAGNOSIS — E03.4 HYPOTHYROIDISM DUE TO ACQUIRED ATROPHY OF THYROID: Chronic | ICD-10-CM

## 2018-10-25 DIAGNOSIS — E11.21 TYPE 2 DIABETES MELLITUS WITH DIABETIC NEPHROPATHY, UNSPECIFIED WHETHER LONG TERM INSULIN USE (HCC): Chronic | ICD-10-CM

## 2018-10-25 DIAGNOSIS — Z23 ENCOUNTER FOR IMMUNIZATION: ICD-10-CM

## 2018-10-25 DIAGNOSIS — R21 RASH: ICD-10-CM

## 2018-10-25 DIAGNOSIS — E66.9 OBESITY (BMI 30-39.9): ICD-10-CM

## 2018-10-25 DIAGNOSIS — N18.30 CKD (CHRONIC KIDNEY DISEASE) STAGE 3, GFR 30-59 ML/MIN (HCC): ICD-10-CM

## 2018-10-25 DIAGNOSIS — R25.1 TREMOR: ICD-10-CM

## 2018-10-25 DIAGNOSIS — E83.42 HYPOMAGNESEMIA: ICD-10-CM

## 2018-10-25 LAB — HBA1C MFR BLD HPLC: 6.2 %

## 2018-10-25 PROCEDURE — 85025 COMPLETE CBC W/AUTO DIFF WBC: CPT | Performed by: FAMILY MEDICINE

## 2018-10-25 PROCEDURE — 84443 ASSAY THYROID STIM HORMONE: CPT | Performed by: FAMILY MEDICINE

## 2018-10-25 PROCEDURE — 83735 ASSAY OF MAGNESIUM: CPT | Performed by: FAMILY MEDICINE

## 2018-10-25 PROCEDURE — 80053 COMPREHEN METABOLIC PANEL: CPT | Performed by: FAMILY MEDICINE

## 2018-10-25 PROCEDURE — 80061 LIPID PANEL: CPT | Performed by: FAMILY MEDICINE

## 2018-10-25 RX ORDER — NYSTATIN AND TRIAMCINOLONE ACETONIDE 100000; 1 [USP'U]/G; MG/G
CREAM TOPICAL 2 TIMES DAILY
Qty: 60 G | Refills: 0 | Status: SHIPPED | OUTPATIENT
Start: 2018-10-25 | End: 2018-12-13

## 2018-10-25 NOTE — PATIENT INSTRUCTIONS
Body Mass Index: Care Instructions Your Care Instructions Body mass index (BMI) can help you see if your weight is raising your risk for health problems. It uses a formula to compare how much you weigh with how tall you are. · A BMI lower than 18.5 is considered underweight. · A BMI between 18.5 and 24.9 is considered healthy. · A BMI between 25 and 29.9 is considered overweight. A BMI of 30 or higher is considered obese. If your BMI is in the normal range, it means that you have a lower risk for weight-related health problems. If your BMI is in the overweight or obese range, you may be at increased risk for weight-related health problems, such as high blood pressure, heart disease, stroke, arthritis or joint pain, and diabetes. If your BMI is in the underweight range, you may be at increased risk for health problems such as fatigue, lower protection (immunity) against illness, muscle loss, bone loss, hair loss, and hormone problems. BMI is just one measure of your risk for weight-related health problems. You may be at higher risk for health problems if you are not active, you eat an unhealthy diet, or you drink too much alcohol or use tobacco products. Follow-up care is a key part of your treatment and safety. Be sure to make and go to all appointments, and call your doctor if you are having problems. It's also a good idea to know your test results and keep a list of the medicines you take. How can you care for yourself at home? · Practice healthy eating habits. This includes eating plenty of fruits, vegetables, whole grains, lean protein, and low-fat dairy. · If your doctor recommends it, get more exercise. Walking is a good choice. Bit by bit, increase the amount you walk every day. Try for at least 30 minutes on most days of the week. · Do not smoke. Smoking can increase your risk for health problems.  If you need help quitting, talk to your doctor about stop-smoking programs and medicines. These can increase your chances of quitting for good. · Limit alcohol to 2 drinks a day for men and 1 drink a day for women. Too much alcohol can cause health problems. If you have a BMI higher than 25 · Your doctor may do other tests to check your risk for weight-related health problems. This may include measuring the distance around your waist. A waist measurement of more than 40 inches in men or 35 inches in women can increase the risk of weight-related health problems. · Talk with your doctor about steps you can take to stay healthy or improve your health. You may need to make lifestyle changes to lose weight and stay healthy, such as changing your diet and getting regular exercise. If you have a BMI lower than 18.5 · Your doctor may do other tests to check your risk for health problems. · Talk with your doctor about steps you can take to stay healthy or improve your health. You may need to make lifestyle changes to gain or maintain weight and stay healthy, such as getting more healthy foods in your diet and doing exercises to build muscle. Where can you learn more? Go to http://adal-janes.info/. Enter S176 in the search box to learn more about \"Body Mass Index: Care Instructions. \" Current as of: October 13, 2016 Content Version: 11.4 © 9447-2033 Healthwise, [employer]orporated. Care instructions adapted under license by [employer] (which disclaims liability or warranty for this information). If you have questions about a medical condition or this instruction, always ask your healthcare professional. [address] any warranty or liability for your use of this information.

## 2018-10-25 NOTE — PROGRESS NOTES
Chief Complaint Patient presents with  Follow-up HTN and DM2  
 
1. Have you been to the ER, urgent care clinic since your last visit? Hospitalized since your last visit? No 
 
2. Have you seen or consulted any other health care providers outside of the [address] [place] [address] since your last visit? Include any pap smears or colon screening. No  
 
HPI Hardin Simmonds. comes in for follow-up care. Patient has type 2 diabetes mellitus. He is seen by the endocrinologist.  He is on insulin. Takes Lantus and NovoLog. His blood glucose numbers have been stable. Did check an HbA1c and this is 6.2. He does check his blood glucose at home and numbers have been good. We will continue with the current treatment plan. Patient has dermatitis. Does get a rash on his extremities. Also on his back. He uses Mycolog cream.  He would like a refill of the medication. Prescription will be sent in. Patient has hypertension. Blood pressure has been stable. He does have a history of renal artery stenosis. He is on Spironolactone, Norvasc and Lopressor. We will continue with the current treatment plan. Patient also has a history of gout arthritis. He is on Uloric. He denies joint aches, swelling or redness. Currently no active synovitis. Patient has a history of hypomagnesemia and takes magnesium replacement. I will check his magnesium today. He has a history of chronic back pain due to spinal stenosis and post lumbar laminectomy syndrome. He has been followed up by the spine specialist.  He does have tramadol that he takes as needed for severe pain. He may take Tylenol but the pain is not very severe. Patient also has dyslipidemia. He is on Lipitor 20 mg daily. Patient has a history of valvular heart disease and has had surgery for the same. He has a prosthetic aortic valve. Currently stable. Denies chest pain, shortness of breath, edema or diaphoresis. Denies exertional dyspnea.   He is weight has gone up slightly. BMI is 35.87. I discussed normal BMI and healthy lifestyles. He will do dietary and lifestyle modification in an effort to bring down his weight. Patient has noticed tremor of his right upper extremity. Occasionally feels this in the left. Tremor comes when he is holding objects or moving his hands to touch something. He denies headache or changes in vision. At times object have fallen from his hand. No numbness or tingling. I will refer him to neurology for evaluation. Suspect essential tremor. He denies shuffling gait or loss of balance. Patient has chronic kidney disease. He is followed up by the nephrologist.  Try to avoid any nephrotoxic medication. Patient will get the flu vaccine today. Past Medical History Past Medical History:  
Diagnosis Date  Diabetes ([facility] [address] 1995  Dyslipidemia 3/1/2017 [place] 36 R Knee/ Tib fib fracture/surgery  Gout 2010  Graves disease 1/21/2015  High cholesterol 2005  
 HTN (hypertension) 1990  Osteoarthritis 2013  Severe aortic stenosis 3/29/2017  Spinal stenosis of lumbar region 3/1/2016  Spondylolisthesis 3/1/2016  Thyroid trouble  Type 2 diabetes mellitus with diabetic nephropathy ([facility] [address] 4/28/2015 Surgical History Past Surgical History:  
Procedure Laterality Date  HX HIP REPLACEMENT Left 2009  HX LUMBAR FUSION    
 HX ORTHOPAEDIC Right R knee/Tibfib surgery Medications Current Outpatient Medications Medication Sig Dispense Refill  levothyroxine (SYNTHROID) 200 mcg tablet Take 1 Tab by mouth Daily (before breakfast). 30 Tab 2  
 aspirin (ASPIRIN) 325 mg tablet Take 325 mg by mouth daily.  ascorbic acid, vitamin C, (VITAMIN C) 250 mg tablet Take  by mouth.  ferrous sulfate 325 mg (65 mg iron) tablet Take 325 mg by mouth two (2) times a day.  DULoxetine (CYMBALTA) 60 mg capsule Take 1 Cap by mouth daily.  30 Cap 1  
  capsicum oleoresin 0.025 % topical cream Apply  to affected area three (3) times daily.  diclofenac (VOLTAREN) 1 % gel Apply  to affected area four (4) times daily.  traMADol (ULTRAM) 50 mg tablet Take 1 Tab by mouth two (2) times daily as needed for Pain. Max Daily Amount: 100 mg. Indications: NEUROPATHIC PAIN 60 Tab 0  
 magnesium chloride (MAG DELAY) 64 mg delayed release tablet Take 8 tablets 3 x day  amLODIPine (NORVASC) 10 mg tablet Take 5 mg by mouth daily.  pyridoxine, vitamin B6, (VITAMIN B-6) 100 mg tablet Take 100 mg by mouth daily.  metoprolol tartrate (LOPRESSOR) 25 mg tablet Take 1 Tab by mouth two (2) times a day. (Patient taking differently: Take 12.5 mg by mouth two (2) times a day.) 180 Tab 3  
 insulin glargine (LANTUS) 100 unit/mL injection Take 30 units once daily  Indications: type 2 diabetes mellitus 3 Vial 3  cyanocobalamin (VITAMIN B-12) 1,000 mcg tablet Take 1,000 mcg by mouth daily.  febuxostat (ULORIC) 40 mg tab tablet Take 1 Tab by mouth daily. Indications: GOUT PREVENTION 90 Tab 1  
 insulin aspart (NOVOLOG) 100 unit/mL injection by SubCUTAneous route. 6 units before meals  spironolactone (ALDACTONE) 25 mg tablet Take 1 Tab by mouth daily. Indications: EDEMA 30 Tab 3  
 glucose blood VI test strips (ASCENSIA AUTODISC VI, ONE TOUCH ULTRA TEST VI) strip Dispense precision extra test strips 3 x day to check blood glucose 200 Each 3  
 Omeprazole delayed release (PRILOSEC D/R) 20 mg tablet Take 20 mg by mouth daily.  atorvastatin (LIPITOR) 40 mg tablet Take 20 mg by mouth daily.  testosterone (ANDROGEL) 20.25 mg/1.25 gram (1.62 %) gel Apply  to affected area as needed.  Cholecalciferol, Vitamin D3, 1,000 unit cap Take 3,000 Units by mouth daily.  topiramate (TOPAMAX) 25 mg tablet 1 tab PO QHS 30 Tab 1 Allergies Allergies Allergen Reactions  Watermelon Anaphylaxis and Swelling  Citric Acid Rash  Peach (Prunus Persica) Itching Family History Family History Problem Relation Age of Onset  Hypertension Mother  Thyroid Disease Mother  Hypertension Father  Diabetes Father  Drug Abuse Brother Social History Social History Socioeconomic History  Marital status:  Spouse name: Not on file  Number of children: Not on file  Years of education: Not on file  Highest education level: Not on file Social Needs  Financial resource strain: Not on file  Food insecurity - worry: Not on file  Food insecurity - inability: Not on file  Transportation needs - medical: Not on file  Transportation needs - non-medical: Not on file Occupational History  Not on file Tobacco Use  Smoking status: Former Smoker Packs/day: 1.00 Types: Cigarettes Start date: 1969 Last attempt to quit: 1995 Years since quittin.8  Smokeless tobacco: Never Used Substance and Sexual Activity  Alcohol use: No  
 Drug use: No  
 Sexual activity: Yes  
  Partners: Female Other Topics Concern [address] Yes Comment: Served in [employer]  Blood Transfusions No  
 Caffeine Concern No  
 Occupational Exposure No  
 Hobby Hazards No  
 Sleep Concern No  
 Stress Concern No  
 Weight Concern Yes  Special Diet No  
 Back Care Yes Comment: Lower Back pain when walking  Exercise No  
 Bike Helmet No  
 Seat Belt Yes  Self-Exams Yes Social History Narrative  Not on file Review of Systems Review of Systems - History obtained from chart review and the patient General ROS: positive for  - fatigue, malaise and weight gain Psychological ROS: negative Ophthalmic ROS: positive for - uses glasses ENT ROS: negative Allergy and Immunology ROS: negative Hematological and Lymphatic ROS: negative Endocrine ROS: Diabetes mellitus type 2, hypothyroidism Respiratory ROS: no cough, shortness of breath, or wheezing Cardiovascular ROS: no chest pain or dyspnea on exertion Gastrointestinal ROS: no abdominal pain, change in bowel habits, or black or bloody stools Genito-Urinary ROS: L UTS Musculoskeletal ROS: positive for - joint pain, joint stiffness and muscle pain Neurological ROS: positive for - tremors Dermatological ROS: positive for - pruritus and rash Vital Signs Visit Vitals /72 (BP 1 Location: Left arm, BP Patient Position: Sitting) Pulse 65 Temp 97.6 °F (36.4 °C) (Oral) Resp 18 Ht 5' 10\" (1.778 m) Wt 250 lb (113.4 kg) SpO2 94% BMI 35.87 kg/m² Physical Exam 
Physical Examination: General appearance - oriented to person, place, and time, acyanotic, in no respiratory distress and well hydrated Mental status - alert, oriented to person, place, and time, affect appropriate to mood Nose - normal and patent, no erythema, discharge or polyps and normal nontender sinuses Mouth - mucous membranes moist, pharynx normal without lesions Neck - supple, no significant adenopathy Lymphatics - no palpable lymphadenopathy, no hepatosplenomegaly Chest - clear to auscultation, no wheezes, rales or rhonchi, symmetric air entry, no tachypnea, retractions or cyanosis Heart - systolic murmur 3/6 at 2nd left intercostal space Abdomen - no rebound tenderness noted Back exam - limited range of motion, pain with motion noted during exam, tenderness noted midline lumbar spine and paralumbar muscles Neurological -intentional tremors of his right, no muscle wasting. Musculoskeletal - osteoarthritic changes noted in both hands Extremities - intact peripheral pulses Results Results for orders placed or performed during the hospital encounter of 06/07/18 GLUCOSE, POC Result Value Ref Range Glucose (POC) 98 70 - 110 mg/dL ASSESSMENT and PLAN 
  ICD-10-CM ICD-9-CM 1. Type 2 diabetes mellitus with diabetic nephropathy, unspecified whether long term insulin use (HCC) E11.21 250.40 AMB POC HEMOGLOBIN A1C  
  583.81 CBC WITH AUTOMATED DIFF  
   METABOLIC PANEL, COMPREHENSIVE  
   LIPID PANEL  
   COLLECTION VENOUS BLOOD,VENIPUNCTURE  
2. Hypothyroidism due to acquired atrophy of thyroid E03.4 244.8 TSH 3RD GENERATION  
  246.8 COLLECTION VENOUS BLOOD,VENIPUNCTURE 3. Rash R21 782.1 nystatin-triamcinolone (MYCOLOG II) topical cream  
4. Tremor R25.1 781.0 REFERRAL TO NEUROLOGY 5. CKD (chronic kidney disease) stage 3, GFR 30-59 ml/min ([facility]) N18.3 585.3 MAGNESIUM 6. Hypomagnesemia E83.42 275.2 MAGNESIUM  
   COLLECTION VENOUS BLOOD,VENIPUNCTURE  
7. Obesity (BMI 30-39. 9) E66.9 278.00   
8. Encounter for immunization Z23 V03.89 INFLUENZA VACCINE INACTIVATED (IIV), SUBUNIT, ADJUVANTED, IM  
   ADMIN INFLUENZA VIRUS VAC Discussed the patient's BMI with him. The BMI follow up plan is as follows:  
 
dietary management education, guidance, and counseling 
encourage exercise 
monitor weight 
lab results and schedule of future lab studies reviewed with patient 
reviewed diet, exercise and weight control 
cardiovascular risk and specific lipid/LDL goals reviewed 
reviewed medications and side effects in detail 
specific diabetic recommendations: low cholesterol diet, weight control and daily exercise discussed, home glucose monitoring emphasized, all medications, side effects and compliance discussed carefully, annual eye examinations at Ophthalmology discussed, glycohemoglobin and other lab monitoring discussed and long term diabetic complications discussed I have discussed the diagnosis with the patient and the intended plan of care as seen in the above orders. The patient has received an after-visit summary and questions were answered concerning future plans.  I have discussed medication, side effects, and warnings with the patient in detail. The patient verbalized understanding and is in agreement with the plan of care. The patient will contact the office with any additional concerns.  
 
Maximino Colin MD

## 2018-10-26 LAB
ALBUMIN SERPL-MCNC: 3.8 G/DL (ref 3.4–5)
ALBUMIN/GLOB SERPL: 1.2 {RATIO} (ref 0.8–1.7)
ALP SERPL-CCNC: 75 U/L (ref 45–117)
ALT SERPL-CCNC: 36 U/L (ref 16–61)
ANION GAP SERPL CALC-SCNC: 9 MMOL/L (ref 3–18)
AST SERPL-CCNC: 20 U/L (ref 15–37)
BASOPHILS # BLD: 0 K/UL (ref 0–0.1)
BASOPHILS NFR BLD: 0 % (ref 0–2)
BILIRUB SERPL-MCNC: 0.4 MG/DL (ref 0.2–1)
BUN SERPL-MCNC: 30 MG/DL (ref 7–18)
BUN/CREAT SERPL: 27 (ref 12–20)
CALCIUM SERPL-MCNC: 9.2 MG/DL (ref 8.5–10.1)
CHLORIDE SERPL-SCNC: 107 MMOL/L (ref 100–108)
CHOLEST SERPL-MCNC: 160 MG/DL
CO2 SERPL-SCNC: 24 MMOL/L (ref 21–32)
CREAT SERPL-MCNC: 1.1 MG/DL (ref 0.6–1.3)
DIFFERENTIAL METHOD BLD: ABNORMAL
EOSINOPHIL # BLD: 0 K/UL (ref 0–0.4)
EOSINOPHIL NFR BLD: 0 % (ref 0–5)
ERYTHROCYTE [DISTWIDTH] IN BLOOD BY AUTOMATED COUNT: 14 % (ref 11.6–14.5)
GLOBULIN SER CALC-MCNC: 3.3 G/DL (ref 2–4)
GLUCOSE SERPL-MCNC: 115 MG/DL (ref 74–99)
HCT VFR BLD AUTO: 44.2 % (ref 36–48)
HDLC SERPL-MCNC: 51 MG/DL (ref 40–60)
HDLC SERPL: 3.1 {RATIO} (ref 0–5)
HGB BLD-MCNC: 14.7 G/DL (ref 13–16)
LDLC SERPL CALC-MCNC: 86 MG/DL (ref 0–100)
LIPID PROFILE,FLP: NORMAL
LYMPHOCYTES # BLD: 2.1 K/UL (ref 0.9–3.6)
LYMPHOCYTES NFR BLD: 16 % (ref 21–52)
MAGNESIUM SERPL-MCNC: 1.8 MG/DL (ref 1.6–2.6)
MCH RBC QN AUTO: 31.5 PG (ref 24–34)
MCHC RBC AUTO-ENTMCNC: 33.3 G/DL (ref 31–37)
MCV RBC AUTO: 94.8 FL (ref 74–97)
MONOCYTES # BLD: 0.7 K/UL (ref 0.05–1.2)
MONOCYTES NFR BLD: 5 % (ref 3–10)
NEUTS SEG # BLD: 10.7 K/UL (ref 1.8–8)
NEUTS SEG NFR BLD: 79 % (ref 40–73)
PLATELET # BLD AUTO: 188 K/UL (ref 135–420)
PMV BLD AUTO: 10.2 FL (ref 9.2–11.8)
POTASSIUM SERPL-SCNC: 4.4 MMOL/L (ref 3.5–5.5)
PROT SERPL-MCNC: 7.1 G/DL (ref 6.4–8.2)
RBC # BLD AUTO: 4.66 M/UL (ref 4.7–5.5)
SODIUM SERPL-SCNC: 140 MMOL/L (ref 136–145)
TRIGL SERPL-MCNC: 115 MG/DL (ref ?–150)
TSH SERPL DL<=0.05 MIU/L-ACNC: 0.49 UIU/ML (ref 0.36–3.74)
VLDLC SERPL CALC-MCNC: 23 MG/DL
WBC # BLD AUTO: 13.6 K/UL (ref 4.6–13.2)

## 2018-10-30 ENCOUNTER — HOSPITAL ENCOUNTER (OUTPATIENT)
Dept: LAB | Age: 73
Discharge: HOME | End: 2018-10-30
Payer: MEDICARE

## 2018-10-30 ENCOUNTER — LAB ONLY (OUTPATIENT)
Dept: FAMILY MEDICINE CLINIC | Age: 73
End: 2018-10-30

## 2018-10-30 DIAGNOSIS — N18.30 CKD (CHRONIC KIDNEY DISEASE) STAGE 3, GFR 30-59 ML/MIN (HCC): ICD-10-CM

## 2018-10-30 DIAGNOSIS — N25.81 SECONDARY HYPERPARATHYROIDISM OF RENAL ORIGIN (HCC): Primary | ICD-10-CM

## 2018-10-30 DIAGNOSIS — E55.9 HYPOVITAMINOSIS D: ICD-10-CM

## 2018-10-30 DIAGNOSIS — N25.81 SECONDARY HYPERPARATHYROIDISM OF RENAL ORIGIN (HCC): ICD-10-CM

## 2018-10-30 LAB
CALCIUM SERPL-MCNC: 9.5 MG/DL (ref 8.5–10.1)
PTH-INTACT SERPL-MCNC: 20.9 PG/ML (ref 18.4–88)

## 2018-10-30 PROCEDURE — 83970 ASSAY OF PARATHORMONE: CPT | Performed by: FAMILY MEDICINE

## 2018-10-30 PROCEDURE — 82043 UR ALBUMIN QUANTITATIVE: CPT | Performed by: FAMILY MEDICINE

## 2018-10-30 PROCEDURE — 82306 VITAMIN D 25 HYDROXY: CPT | Performed by: FAMILY MEDICINE

## 2018-10-30 NOTE — PROGRESS NOTES
Patient presents for lab draw ordered by: 
Jaguar Cardenas Ordering Provider:  same Ordering Department/Practice:  [facility] Phone:  [phone] Date Ordered:  10/30/2018 The following labs were drawn and sent to [facility] lab at [facility] by Beryl Ayala LPN: 
 
Vit D level,PTH,Microalbumin urine with creatine ratio The following tubes were sent: 
 
 
Gel   1 Plasma Transfer tube  1 Pt in for lab visit. Venipuncture done in left arm. Only able to obtain Gel tube. Venipuncture done in right arm. Blood specimen obtained. Pt tolerated well. No comps.

## 2018-10-30 NOTE — TELEPHONE ENCOUNTER
Spoke with nurse at [facility] dentist and she want to know if you have and preference for antibiotics for patient. Please advise

## 2018-10-30 NOTE — TELEPHONE ENCOUNTER
Called dentist office and spoke to nurse regarding antibiotics for patient, per Dr. Moy Ray standard antibiotics prophylaxis per their office protocol. She voices understanding and acceptance of this advice and will call back if any further questions or concerns.

## 2018-10-31 LAB
25(OH)D3 SERPL-MCNC: 48.1 NG/ML (ref 30–100)
CREAT UR-MCNC: 85 MG/DL (ref 30–125)
MICROALBUMIN UR-MCNC: 0.86 MG/DL (ref 0–3)
MICROALBUMIN/CREAT UR-RTO: 10 MG/G (ref 0–30)

## 2018-11-01 NOTE — PROGRESS NOTES
Please let patient know his white cell count  was elevated. This would indicate infection or use of medication like prednisone. For now I would recommend rechecking it in about 2-3 weeks. Rest of his labs are reassuring. He should continue with the current treatment plan.  
Khushi Mancilla MD

## 2018-11-05 NOTE — PROGRESS NOTES
Patient was called and verified by Name and . Patient was informed of lab results and verified understanding. Patient will have labs rechecked in 2-3 weeks as recommended.

## 2018-11-12 ENCOUNTER — OFFICE VISIT (OUTPATIENT)
Dept: ORTHOPEDIC SURGERY | Age: 73
End: 2018-11-12

## 2018-11-12 ENCOUNTER — HOSPITAL ENCOUNTER (OUTPATIENT)
Dept: LAB | Age: 73
Discharge: HOME | End: 2018-11-12
Payer: MEDICARE

## 2018-11-12 ENCOUNTER — LAB ONLY (OUTPATIENT)
Dept: FAMILY MEDICINE CLINIC | Age: 73
End: 2018-11-12

## 2018-11-12 VITALS
RESPIRATION RATE: 18 BRPM | HEIGHT: 70 IN | OXYGEN SATURATION: 96 % | HEART RATE: 63 BPM | BODY MASS INDEX: 36.62 KG/M2 | WEIGHT: 255.8 LBS | SYSTOLIC BLOOD PRESSURE: 142 MMHG | DIASTOLIC BLOOD PRESSURE: 76 MMHG

## 2018-11-12 DIAGNOSIS — D72.829 LEUKOCYTOSIS, UNSPECIFIED TYPE: ICD-10-CM

## 2018-11-12 DIAGNOSIS — M46.1 SACROILIITIS (HCC): ICD-10-CM

## 2018-11-12 DIAGNOSIS — D72.829 LEUKOCYTOSIS, UNSPECIFIED TYPE: Primary | ICD-10-CM

## 2018-11-12 DIAGNOSIS — M96.1 LUMBAR POSTLAMINECTOMY SYNDROME: ICD-10-CM

## 2018-11-12 DIAGNOSIS — M54.16 LUMBAR NEURITIS: ICD-10-CM

## 2018-11-12 DIAGNOSIS — M54.16 LUMBAR RADICULOPATHY: Primary | ICD-10-CM

## 2018-11-12 DIAGNOSIS — M48.062 SPINAL STENOSIS, LUMBAR REGION WITH NEUROGENIC CLAUDICATION: ICD-10-CM

## 2018-11-12 LAB
BASOPHILS # BLD: 0 K/UL (ref 0–0.1)
BASOPHILS NFR BLD: 0 % (ref 0–2)
DIFFERENTIAL METHOD BLD: ABNORMAL
EOSINOPHIL # BLD: 0.3 K/UL (ref 0–0.4)
EOSINOPHIL NFR BLD: 6 % (ref 0–5)
ERYTHROCYTE [DISTWIDTH] IN BLOOD BY AUTOMATED COUNT: 12.9 % (ref 11.6–14.5)
HCT VFR BLD AUTO: 43 % (ref 36–48)
HGB BLD-MCNC: 14.9 G/DL (ref 13–16)
LYMPHOCYTES # BLD: 2 K/UL (ref 0.9–3.6)
LYMPHOCYTES NFR BLD: 35 % (ref 21–52)
MCH RBC QN AUTO: 31.8 PG (ref 24–34)
MCHC RBC AUTO-ENTMCNC: 34.7 G/DL (ref 31–37)
MCV RBC AUTO: 91.9 FL (ref 74–97)
MONOCYTES # BLD: 0.4 K/UL (ref 0.05–1.2)
MONOCYTES NFR BLD: 7 % (ref 3–10)
NEUTS SEG # BLD: 2.9 K/UL (ref 1.8–8)
NEUTS SEG NFR BLD: 52 % (ref 40–73)
PLATELET # BLD AUTO: 176 K/UL (ref 135–420)
PMV BLD AUTO: 10 FL (ref 9.2–11.8)
RBC # BLD AUTO: 4.68 M/UL (ref 4.7–5.5)
WBC # BLD AUTO: 5.7 K/UL (ref 4.6–13.2)

## 2018-11-12 PROCEDURE — 85025 COMPLETE CBC W/AUTO DIFF WBC: CPT

## 2018-11-12 RX ORDER — PREGABALIN 25 MG/1
25 CAPSULE ORAL 3 TIMES DAILY
Qty: 42 CAP | Refills: 0 | Status: SHIPPED | OUTPATIENT
Start: 2018-11-12 | End: 2018-12-13

## 2018-11-12 RX ORDER — PREGABALIN 25 MG/1
25 CAPSULE ORAL 2 TIMES DAILY
Qty: 60 CAP | Refills: 1 | Status: SHIPPED | OUTPATIENT
Start: 2018-11-12 | End: 2018-12-13

## 2018-11-12 NOTE — PROGRESS NOTES
Patient came in for lab draw only. Venipuncture done via left arm without difficulty. No distress noted or voiced.

## 2018-11-12 NOTE — PROGRESS NOTES
[facility] 
[address] [facility] [address], [address] [place]  Phone: [phone] Fax: [phone] PROGRESS NOTE HISTORY OF PRESENT ILLNESS: 
The patient is a 68 y.o. male and was seen today for follow up of radicular RLE pain symptoms somewhat resolved, although he continues to have low back and right buttock pain. At prior visits, he presented with c/o low back pain extending into the RLE in an L4 distribution to the knee. These have since resolved. Lying down and walking exacerbate his pain. Pt reports movement exacerbates his pain. This is a patient with a diagnosis of lumbar postlaminectomy syndrome with previous h/o L4-S1 fusion performed by Dr. Kinjal Olivarez on 3/1/2016. Pt states he did not experience much in the way of low back pain prior to his surgery but noted an immediate onset of low back pain postoperatively. He reports his lower extremity pain resolved after his surgery x 3 months until recurrence. Note from Gricel Salazar dated 12/6/16 indicates patient underwent an L2/3 epidural as there was scarring at the L3/4 level from previous surgery. Pt underwent right SI joint injection on 6/7/18 with some relief. Note from Dr. Kinjal Olivarez dated 4/21/17 indicates patient underwent right SI joint injection #1. Note from Dr. Kinjal Olivarez dated 8/3/17 indicates patient underwent bilateral SI joint injections. Per patient, SI joint injections only provided relief for his left-sided buttock pain. Pt underwent right L3/L4 SNRBs on 5/10/18 with good relief. Pt also has h/o left THR. Pt was followed by Dr. Daniel Busch for chiropractic treatment x 6 months without benefit. The patient has a history of DM and reports blood sugars are well controlled, consistently remaining below 200. PMHx includes stage 3 renal disease and Grave's disease. Pt is followed by Dr. Kaykay Pickett. Pt reports he is prescribed Tramadol through the [facility].  Pt reports LE edema with NEURONTIN 100 mg TID. He self d/c his Topamax without calling the office a few days prior. He did not tolerate Topamax 25 mg qhs due to lethargy. Pt denies change in bowel or bladder habits. Pt denies fever, weight loss, or skin changes. Lumbar spine XR dated 3/1/2018 reviewed. Per report, post-surgical changes of the lower lumbar spine. Intact hardware. Grade 1 anterolisthesis of L4 on L5, grade I retrolisthesis of L5 on S1, and diffuse mild degenerative disc disease. Mild degenerative changes of the right hip. Left total hip prothesis in good position, without complication. Reviewing films myself, there is a posterior fusion L4-S1. No acute pathology identified. Lumbar spine MRI dated 4/9/2018 reviewed. Per report, multilevel degenerative findings along postsurgical lumbar spine. Most significant findings are at L3-4 just above the fusion. There is a moderate to severe spinal stenosis and a severe right foraminal stenosis. There is a right foraminal disc extrusion contributing to this. The patient is RHD. At his last clinical appointment, patient wished to continue his current treatment. He received refills of his Cymbalta 60 mg daily through the [place]. I offered to try him on another neuropathic pain medication but he declined. The patient returns today with pain location and distribution remain unchanged. He rates his pain 5/10, a slight increase from his last visit (4/10). Patient is compliant with Cymbalta 60 mg daily, which he receives through the [place]. He describes symptoms consistent with spinal stenosis. He reports inconsistency with his HEP, and reports he does not know many of the excercises. Pt underwent right-sided L3/4 SNRBs on 10/23/18 with minimal relief.  reviewed. Body mass index is 36.7 kg/m². PCP: Yajaira Woodruff MD 
 
 
Past Medical History:  
Diagnosis Date  Diabetes ([facility] 1995  Dyslipidemia 3/1/2017 [place] [address] R Knee/ Tib fib fracture/surgery  Gout 2010  Graves disease 2015  High cholesterol   
 HTN (hypertension)   Osteoarthritis   Severe aortic stenosis 3/29/2017  Spinal stenosis of lumbar region 3/1/2016  Spondylolisthesis 3/1/2016  Thyroid trouble  Type 2 diabetes mellitus with diabetic nephropathy [facility] 2015 Social History Socioeconomic History  Marital status:  Spouse name: Not on file  Number of children: Not on file  Years of education: Not on file  Highest education level: Not on file Social Needs  Financial resource strain: Not on file  Food insecurity - worry: Not on file  Food insecurity - inability: Not on file  Transportation needs - medical: Not on file  Transportation needs - non-medical: Not on file Occupational History  Not on file Tobacco Use  Smoking status: Former Smoker Packs/day: 1.00 Types: Cigarettes Start date: 1969 Last attempt to quit: 1995 Years since quittin.8  Smokeless tobacco: Never Used Substance and Sexual Activity  Alcohol use: No  
 Drug use: No  
 Sexual activity: Yes  
  Partners: Female Other Topics Concern [address] Yes Comment: Served in [employer]  Blood Transfusions No  
 Caffeine Concern No  
 Occupational Exposure No  
 Hobby Hazards No  
 Sleep Concern No  
 Stress Concern No  
 Weight Concern Yes  Special Diet No  
 Back Care Yes Comment: Lower Back pain when walking  Exercise No  
 Bike Helmet No  
 Seat Belt Yes  Self-Exams Yes Social History Narrative  Not on file Current Outpatient Medications Medication Sig Dispense Refill  nystatin-triamcinolone (MYCOLOG II) topical cream Apply  to affected area two (2) times a day. 60 g 0  
 topiramate (TOPAMAX) 25 mg tablet 1 tab PO QHS 30 Tab 1  
 levothyroxine (SYNTHROID) 200 mcg tablet Take 1 Tab by mouth Daily (before breakfast).  30 Tab 2  
  aspirin (ASPIRIN) 325 mg tablet Take 325 mg by mouth daily.  ascorbic acid, vitamin C, (VITAMIN C) 250 mg tablet Take  by mouth.  ferrous sulfate 325 mg (65 mg iron) tablet Take 325 mg by mouth two (2) times a day.  DULoxetine (CYMBALTA) 60 mg capsule Take 1 Cap by mouth daily. 30 Cap 1  capsicum oleoresin 0.025 % topical cream Apply  to affected area three (3) times daily.  diclofenac (VOLTAREN) 1 % gel Apply  to affected area four (4) times daily.  traMADol (ULTRAM) 50 mg tablet Take 1 Tab by mouth two (2) times daily as needed for Pain. Max Daily Amount: 100 mg. Indications: NEUROPATHIC PAIN 60 Tab 0  
 magnesium chloride (MAG DELAY) 64 mg delayed release tablet Take 8 tablets 3 x day  amLODIPine (NORVASC) 10 mg tablet Take 5 mg by mouth daily.  pyridoxine, vitamin B6, (VITAMIN B-6) 100 mg tablet Take 100 mg by mouth daily.  metoprolol tartrate (LOPRESSOR) 25 mg tablet Take 1 Tab by mouth two (2) times a day. (Patient taking differently: Take 12.5 mg by mouth two (2) times a day.) 180 Tab 3  
 insulin glargine (LANTUS) 100 unit/mL injection Take 30 units once daily  Indications: type 2 diabetes mellitus 3 Vial 3  cyanocobalamin (VITAMIN B-12) 1,000 mcg tablet Take 1,000 mcg by mouth daily.  febuxostat (ULORIC) 40 mg tab tablet Take 1 Tab by mouth daily. Indications: GOUT PREVENTION 90 Tab 1  
 insulin aspart (NOVOLOG) 100 unit/mL injection by SubCUTAneous route. 6 units before meals  spironolactone (ALDACTONE) 25 mg tablet Take 1 Tab by mouth daily. Indications: EDEMA 30 Tab 3  
 glucose blood VI test strips (ASCENSIA AUTODISC VI, ONE TOUCH ULTRA TEST VI) strip Dispense precision extra test strips 3 x day to check blood glucose 200 Each 3  
 Omeprazole delayed release (PRILOSEC D/R) 20 mg tablet Take 20 mg by mouth daily.  atorvastatin (LIPITOR) 40 mg tablet Take 20 mg by mouth daily.  testosterone (ANDROGEL) 20.25 mg/1.25 gram (1.62 %) gel Apply  to affected area as needed.  Cholecalciferol, Vitamin D3, 1,000 unit cap Take 3,000 Units by mouth daily. Allergies Allergen Reactions  Watermelon Anaphylaxis and Swelling  Citric Acid Rash  Peach (Prunus Persica) Itching Ambulates with a single point cane. PHYSICAL EXAMINATION Visit Vitals /76 Pulse 63 Resp 18 Ht 5' 10\" (1.778 m) Wt 255 lb 12.8 oz (116 kg) SpO2 96% BMI 36.70 kg/m² CONSTITUTIONAL: NAD, A&O x 3 SENSATION: Intact to light touch throughout RANGE OF MOTION: The patient has full passive range of motion in all four extremities. MOTOR: 
Straight Leg Raise: Negative, bilateral 
 
         
 Hip Flex Knee Ext Knee Flex Ankle DF GTE Ankle PF Tone Right +4/5 +4/5 +4/5 +4/5 +4/5 +4/5 +4/5 Left +4/5 +4/5 +4/5 +4/5 +4/5 +4/5 +4/5 ASSESSMENT Diagnoses and all orders for this visit: 1. Lumbar radiculopathy 2. Spinal stenosis, lumbar region with neurogenic claudication 3. Lumbar postlaminectomy syndrome 4. Lumbar neuritis 5. Sacroiliitis ([address] IMPRESSION AND PLAN: 
Patient is s/p right-sided L3/4 SNRBs on 10/23/18 noting minimal relief. Patient is not a candidate for additional blocks until May 2019. I will check with Dr. Anitha Morales about his renal function. Following approval, I will try him on Lyrica 25 mg BID. The risks, benefits, and potential side effects of this medication were discussed. Patient understands and wishes to proceed. Patient advised to call the office if intolerant to new medication. Patient is neurologically intact. I recommend he increase the frequency of his HEP to daily. I will see the patient back in 1 month's time or earlier if needed. Written by Reshma Ferguson, as dictated by Greg Smith MD 
I examined the patient, reviewed and agree with the note.

## 2018-11-14 NOTE — PROGRESS NOTES
Spoke with patient and verified by name and . Advised of previous message and he expresses understanding.

## 2018-11-26 ENCOUNTER — PATIENT OUTREACH (OUTPATIENT)
Dept: FAMILY MEDICINE CLINIC | Age: 73
End: 2018-11-26

## 2018-11-30 ENCOUNTER — OFFICE VISIT (OUTPATIENT)
Dept: FAMILY MEDICINE CLINIC | Age: 73
End: 2018-11-30

## 2018-11-30 VITALS
DIASTOLIC BLOOD PRESSURE: 76 MMHG | SYSTOLIC BLOOD PRESSURE: 133 MMHG | HEIGHT: 70 IN | TEMPERATURE: 96.5 F | HEART RATE: 60 BPM | WEIGHT: 255 LBS | OXYGEN SATURATION: 96 % | BODY MASS INDEX: 36.51 KG/M2 | RESPIRATION RATE: 20 BRPM

## 2018-11-30 DIAGNOSIS — B02.30 HERPES ZOSTER WITH OPHTHALMIC COMPLICATION, UNSPECIFIED HERPES ZOSTER EYE DISEASE: Primary | ICD-10-CM

## 2018-11-30 RX ORDER — VALACYCLOVIR HYDROCHLORIDE 1 G/1
1000 TABLET, FILM COATED ORAL 3 TIMES DAILY
Qty: 21 TAB | Refills: 0 | Status: SHIPPED | OUTPATIENT
Start: 2018-11-30 | End: 2018-12-07

## 2018-11-30 NOTE — PROGRESS NOTES
OFFICE NOTE Jose Cruz Esquivel is a 68 y.o. male presenting today for office visit. 11/30/2018 
2:10 PM 
 
 
Chief Complaint Patient presents with  Facial Swelling  
  pt here with c/o swelling to his face, rash noted to forehead ,pt c/o pain to area HPI: Here today for complaints of facial swelling, pain, and rash. PCP Marylene Buhl, MD. He states that on Sunday, he began to have a right sided headache that was persistent in nature- has continued to occur. On Tuesday, he noticed some blisters starting to form and now has a rash with some eye swelling. He has not been putting anything on the area or taking anything other than his chronic medications. Rates pain a 10/10 at this time but states that this has been slightly better than it was earlier this week. He has never received the shingles vaccine that he is aware of. He denies any blurred vision or eye pain but states that it is just hard to see because of eyelid swelling. No fevers or chills. Review of Systems Constitutional: Negative for chills, fatigue and fever. HENT: Positive for facial swelling. Negative for ear pain and sore throat. Eyes: Positive for visual disturbance. Negative for pain, discharge and itching. Respiratory: Negative for cough, shortness of breath and wheezing. Cardiovascular: Negative for chest pain. Gastrointestinal: Negative for abdominal pain, diarrhea, nausea and vomiting. Musculoskeletal: Negative for neck pain. Skin: Positive for rash. Allergic/Immunologic: Negative for environmental allergies. Neurological: Positive for headaches. Negative for dizziness, seizures, syncope, speech difficulty and weakness. PHQ Screening PHQ over the last two weeks 10/25/2018 Little interest or pleasure in doing things Not at all Feeling down, depressed, irritable, or hopeless Not at all Total Score PHQ 2 0 History Past Medical History:  
Diagnosis Date  Diabetes ([facility]   Dyslipidemia 3/1/2017 [place] [address] R Knee/ Tib fib fracture/surgery  Gout 2010  Graves disease 2015  High cholesterol   
 HTN (hypertension)   Osteoarthritis   Severe aortic stenosis 3/29/2017  Spinal stenosis of lumbar region 3/1/2016  Spondylolisthesis 3/1/2016  Thyroid trouble  Type 2 diabetes mellitus with diabetic nephropathy ([facility] Past Surgical History:  
Procedure Laterality Date  HX HIP REPLACEMENT Left 2009  HX LUMBAR FUSION    
 HX ORTHOPAEDIC Right R knee/Tibfib surgery Social History Socioeconomic History  Marital status:  Spouse name: Not on file  Number of children: Not on file  Years of education: Not on file  Highest education level: Not on file Social Needs  Financial resource strain: Not on file  Food insecurity - worry: Not on file  Food insecurity - inability: Not on file  Transportation needs - medical: Not on file  Transportation needs - non-medical: Not on file Occupational History  Not on file Tobacco Use  Smoking status: Former Smoker Packs/day: 1.00 Types: Cigarettes Start date: 1969 Last attempt to quit: 1995 Years since quittin.9  Smokeless tobacco: Never Used Substance and Sexual Activity  Alcohol use: No  
 Drug use: No  
 Sexual activity: Yes  
  Partners: Female Other Topics Concern [address] Yes Comment: Served in [employer]  Blood Transfusions No  
 Caffeine Concern No  
 Occupational Exposure No  
 Hobby Hazards No  
 Sleep Concern No  
 Stress Concern No  
 Weight Concern Yes  Special Diet No  
 Back Care Yes Comment: Lower Back pain when walking  Exercise No  
 Bike Helmet No  
 Seat Belt Yes  Self-Exams Yes Social History Narrative  Not on file Allergies Allergen Reactions  Watermelon Anaphylaxis and Swelling  Citric Acid Rash  Peach (Prunus Persica) Itching Current Outpatient Medications Medication Sig Dispense Refill  valACYclovir (VALTREX) 1 gram tablet Take 1 Tab by mouth three (3) times daily for 7 days. 21 Tab 0  
 nystatin-triamcinolone (MYCOLOG II) topical cream Apply  to affected area two (2) times a day. 60 g 0  
 topiramate (TOPAMAX) 25 mg tablet 1 tab PO QHS 30 Tab 1  
 levothyroxine (SYNTHROID) 200 mcg tablet Take 1 Tab by mouth Daily (before breakfast). 30 Tab 2  
 aspirin (ASPIRIN) 325 mg tablet Take 325 mg by mouth daily.  ascorbic acid, vitamin C, (VITAMIN C) 250 mg tablet Take  by mouth.  ferrous sulfate 325 mg (65 mg iron) tablet Take 325 mg by mouth two (2) times a day.  DULoxetine (CYMBALTA) 60 mg capsule Take 1 Cap by mouth daily. 30 Cap 1  capsicum oleoresin 0.025 % topical cream Apply  to affected area three (3) times daily.  diclofenac (VOLTAREN) 1 % gel Apply  to affected area four (4) times daily.  traMADol (ULTRAM) 50 mg tablet Take 1 Tab by mouth two (2) times daily as needed for Pain. Max Daily Amount: 100 mg. Indications: NEUROPATHIC PAIN 60 Tab 0  
 magnesium chloride (MAG DELAY) 64 mg delayed release tablet Take 8 tablets 3 x day  amLODIPine (NORVASC) 10 mg tablet Take 5 mg by mouth daily.  pyridoxine, vitamin B6, (VITAMIN B-6) 100 mg tablet Take 100 mg by mouth daily.  metoprolol tartrate (LOPRESSOR) 25 mg tablet Take 1 Tab by mouth two (2) times a day. (Patient taking differently: Take 12.5 mg by mouth two (2) times a day.) 180 Tab 3  
 insulin glargine (LANTUS) 100 unit/mL injection Take 30 units once daily  Indications: type 2 diabetes mellitus 3 Vial 3  cyanocobalamin (VITAMIN B-12) 1,000 mcg tablet Take 1,000 mcg by mouth daily.  febuxostat (ULORIC) 40 mg tab tablet Take 1 Tab by mouth daily. Indications: GOUT PREVENTION 90 Tab 1  
 insulin aspart (NOVOLOG) 100 unit/mL injection by SubCUTAneous route.  6 units before meals  spironolactone (ALDACTONE) 25 mg tablet Take 1 Tab by mouth daily. Indications: EDEMA (Patient taking differently: Take 25 mg by mouth daily.) 30 Tab 3  
 glucose blood VI test strips (ASCENSIA AUTODISC VI, ONE TOUCH ULTRA TEST VI) strip Dispense precision extra test strips 3 x day to check blood glucose 200 Each 3  
 Omeprazole delayed release (PRILOSEC D/R) 20 mg tablet Take 20 mg by mouth daily.  atorvastatin (LIPITOR) 40 mg tablet Take 20 mg by mouth daily.  testosterone (ANDROGEL) 20.25 mg/1.25 gram (1.62 %) gel Apply  to affected area as needed.  Cholecalciferol, Vitamin D3, 1,000 unit cap Take 3,000 Units by mouth daily.  pregabalin (LYRICA) 25 mg capsule Take 1 Cap by mouth three (3) times daily. Max Daily Amount: 75 mg. 42 Cap 0  pregabalin (LYRICA) 25 mg capsule Take 1 Cap by mouth two (2) times a day. Max Daily Amount: 50 mg. 60 Cap 1 Patient Care Team: 
Patient Care Team: 
Jackeline Bennett MD as PCP - General (Family Practice) Gus Sotelo MD (Nephrology) Jorge Person MD (Cardiology) Fariba Ortega MD (Orthopedic Surgery) Ck Gallagher MD as Surgeon (Cardiothoracic Surgery) Shira Jaeger MD (Physical Medicine and Rehab) LABS: 
None new to review RADIOLOGY: 
None new to review Physical Exam  
Constitutional: He is oriented to person, place, and time. He appears well-developed and well-nourished. HENT:  
Head:  
 
 
Right Ear: Tympanic membrane, external ear and ear canal normal.  
Left Ear: External ear normal.  
Nose: Nose normal. No mucosal edema or rhinorrhea. Right sinus exhibits no maxillary sinus tenderness and no frontal sinus tenderness. Left sinus exhibits no maxillary sinus tenderness and no frontal sinus tenderness. Mouth/Throat: Uvula is midline, oropharynx is clear and moist and mucous membranes are normal. No oropharyngeal exudate or posterior oropharyngeal erythema. Eyes: EOM are normal. Pupils are equal, round, and reactive to light. Right eye exhibits no discharge. Left eye exhibits no discharge. Neck: Normal range of motion. Neck supple. Pulmonary/Chest: Effort normal. No respiratory distress. Musculoskeletal:  
+walks with cane Lymphadenopathy:  
  He has no cervical adenopathy. Neurological: He is alert and oriented to person, place, and time. Skin: Skin is warm and dry. Rash noted. Rash is vesicular. Vitals:  
 11/30/18 1410 BP: 133/76 Pulse: 60 Resp: 20 Temp: 96.5 °F (35.8 °C) TempSrc: Oral  
SpO2: 96% Weight: 255 lb (115.7 kg) Height: 5' 10\" (1.778 m) PainSc:  10 - Worst pain ever PainLoc: Face Assessment and Plan Herpes zoster with ophthalmic complication, unspecified herpes zoster eye disease *Called [facility] and made appointment for patient- scheduled for Monday but advised patient to report to ER if vision changes or eye pain occurs. Start on Valtrex although very close or at the 72 hour ana. *Advised on contact precautions, symptom management, and progression of condition. Follow up with PCP in about 1-2 weeks due to area of outbreak, age, and chronic conditions. - valACYclovir (VALTREX) 1 gram tablet; Take 1 Tab by mouth three (3) times daily for 7 days. Dispense: 21 Tab; Refill: 0 
- REFERRAL TO OPHTHALMOLOGY 
 
 
 
*Plan of care reviewed with patient. Patient in agreement with plan and expresses understanding. All questions answered and patient encouraged to call or RTO if further questions or concerns. *A total of 25 minutes was spent with the patient of which >50% was spent in counseling/coordinating care regarding changes in patient's medical condition, new medications, need for further examination, discussion of referrals and coordinating appointment, importance of compliance with treatment, and risk factor reduction. Follow-up Disposition: Return in about 2 weeks (around 12/14/2018) for follow up with your PCP on shingles. Susan Sauceda

## 2018-11-30 NOTE — PATIENT INSTRUCTIONS
[address] [place], [place]  Phone: [phone] Fax: [phone] Appointment: 12/3 Monday at 9:20 AM 
  

## 2018-12-13 ENCOUNTER — OFFICE VISIT (OUTPATIENT)
Dept: FAMILY MEDICINE CLINIC | Age: 73
End: 2018-12-13

## 2018-12-13 VITALS
OXYGEN SATURATION: 98 % | SYSTOLIC BLOOD PRESSURE: 121 MMHG | TEMPERATURE: 96.6 F | WEIGHT: 253 LBS | RESPIRATION RATE: 18 BRPM | HEART RATE: 67 BPM | BODY MASS INDEX: 36.22 KG/M2 | DIASTOLIC BLOOD PRESSURE: 76 MMHG | HEIGHT: 70 IN

## 2018-12-13 DIAGNOSIS — N18.30 CKD (CHRONIC KIDNEY DISEASE) STAGE 3, GFR 30-59 ML/MIN (HCC): ICD-10-CM

## 2018-12-13 DIAGNOSIS — B02.30 HERPES ZOSTER WITH OPHTHALMIC COMPLICATION, UNSPECIFIED HERPES ZOSTER EYE DISEASE: ICD-10-CM

## 2018-12-13 DIAGNOSIS — E11.21 TYPE 2 DIABETES MELLITUS WITH DIABETIC NEPHROPATHY, UNSPECIFIED WHETHER LONG TERM INSULIN USE (HCC): ICD-10-CM

## 2018-12-13 DIAGNOSIS — I15.0 RENOVASCULAR HYPERTENSION: ICD-10-CM

## 2018-12-13 DIAGNOSIS — T14.8XXA OPEN WOUND: Primary | ICD-10-CM

## 2018-12-13 RX ORDER — MUPIROCIN 20 MG/G
OINTMENT TOPICAL DAILY
Qty: 30 G | Refills: 0 | Status: SHIPPED | OUTPATIENT
Start: 2018-12-13 | End: 2019-06-10

## 2018-12-13 NOTE — PROGRESS NOTES
Chief Complaint   Patient presents with    Follow-up     Routine Care       1. Have you been to the ER, urgent care clinic since your last visit? Hospitalized since your last visit? No    2. Have you seen or consulted any other health care providers outside of the [address] [place] [address] since your last visit? Include any pap smears or colon screening. 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 Patient was recently seen with shingles affecting the ophthalmic branch right trigeminal nerve. The lesions have healed for most part. He has no vesicles. Continues to have some numbness and tingling with neuropathic pain. States that he will try and get some cannabinoid oils to help with the pain. He has in the past been on Lyrica but he has discontinued using the medication. States that that this he read somewhere may cause lowered immunity. He wonders whether this may have been what resulted in the shingles. I did try to reassure him on this. Did state that I did not think it was the Lyrica that would have triggered that shingles. Patient has a wound right leg on the shin area. He has been applying Mycolog. This is a small open wound. Started off as a blister that opened up. It has taken weeks to heal.  Did advised that he stop using the Mycolog on the wound. I will give him mupirocin cream to apply. I will refer him to the wound clinic given the chronicity of the wound. It is not draining or discharging. It is not bleeding. He is however diabetic and this can cause delay in the healing process. Patient has diabetes mellitus type 2. His most recent HbA1c was 6.2. He is on insulin. He takes Lantus and NovoLog. He is followed up by the endocrinologist.  Patient has hypertension. Blood pressure is stable. Patient is on amlodipine, spironolactone, Lopressor. We will continue with the current treatment plan. Patient has chronic kidney disease.   He is also followed up by the nephrologist.  We do avoid any nephrotoxic medications. We will continue with the current management plan. Past Medical History  Past Medical History:   Diagnosis Date    Diabetes ([facility] 1995    Dyslipidemia 3/1/2017    Fractures 1995    R Knee/ Tib fib fracture/surgery    Gout 2010    Graves disease 1/21/2015    High cholesterol 2005    HTN (hypertension) 1990    Osteoarthritis 2013    Severe aortic stenosis 3/29/2017    Spinal stenosis of lumbar region 3/1/2016    Spondylolisthesis 3/1/2016    Thyroid trouble     Type 2 diabetes mellitus with diabetic nephropathy ([facility] 4/28/2015       Surgical History  Past Surgical History:   Procedure Laterality Date    HX HIP REPLACEMENT Left 2009    HX LUMBAR FUSION      HX ORTHOPAEDIC Right     R knee/Tibfib surgery        Medications  Current Outpatient Medications   Medication Sig Dispense Refill    mupirocin (BACTROBAN) 2 % ointment Apply  to affected area daily. 30 g 0    levothyroxine (SYNTHROID) 200 mcg tablet Take 1 Tab by mouth Daily (before breakfast). 30 Tab 2    aspirin (ASPIRIN) 325 mg tablet Take 325 mg by mouth daily.  ascorbic acid, vitamin C, (VITAMIN C) 250 mg tablet Take  by mouth.  ferrous sulfate 325 mg (65 mg iron) tablet Take 325 mg by mouth two (2) times a day.  DULoxetine (CYMBALTA) 60 mg capsule Take 1 Cap by mouth daily. 30 Cap 1    capsicum oleoresin 0.025 % topical cream Apply  to affected area three (3) times daily.  diclofenac (VOLTAREN) 1 % gel Apply  to affected area four (4) times daily.  traMADol (ULTRAM) 50 mg tablet Take 1 Tab by mouth two (2) times daily as needed for Pain. Max Daily Amount: 100 mg. Indications: NEUROPATHIC PAIN 60 Tab 0    magnesium chloride (MAG DELAY) 64 mg delayed release tablet Take 8 tablets 3 x day      amLODIPine (NORVASC) 10 mg tablet Take 5 mg by mouth daily.  pyridoxine, vitamin B6, (VITAMIN B-6) 100 mg tablet Take 100 mg by mouth daily.       metoprolol tartrate (LOPRESSOR) 25 mg tablet Take 1 Tab by mouth two (2) times a day. (Patient taking differently: Take 12.5 mg by mouth two (2) times a day.) 180 Tab 3    insulin glargine (LANTUS) 100 unit/mL injection Take 30 units once daily  Indications: type 2 diabetes mellitus 3 Vial 3    cyanocobalamin (VITAMIN B-12) 1,000 mcg tablet Take 1,000 mcg by mouth daily.  febuxostat (ULORIC) 40 mg tab tablet Take 1 Tab by mouth daily. Indications: GOUT PREVENTION 90 Tab 1    insulin aspart (NOVOLOG) 100 unit/mL injection by SubCUTAneous route. 6 units before meals      spironolactone (ALDACTONE) 25 mg tablet Take 1 Tab by mouth daily. Indications: EDEMA (Patient taking differently: Take 25 mg by mouth daily.) 30 Tab 3    glucose blood VI test strips (ASCENSIA AUTODISC VI, ONE TOUCH ULTRA TEST VI) strip Dispense precision extra test strips 3 x day to check blood glucose 200 Each 3    Omeprazole delayed release (PRILOSEC D/R) 20 mg tablet Take 20 mg by mouth daily.  atorvastatin (LIPITOR) 40 mg tablet Take 20 mg by mouth daily.  testosterone (ANDROGEL) 20.25 mg/1.25 gram (1.62 %) gel Apply  to affected area as needed.  Cholecalciferol, Vitamin D3, 1,000 unit cap Take 3,000 Units by mouth daily.          Allergies  Allergies   Allergen Reactions    Watermelon Anaphylaxis and Swelling    Citric Acid Rash    Peach (Prunus Persica) Itching       Family History  Family History   Problem Relation Age of Onset    Hypertension Mother     Thyroid Disease Mother     Hypertension Father     Diabetes Father     Drug Abuse Brother        Social History  Social History     Socioeconomic History    Marital status:      Spouse name: Not on file    Number of children: Not on file    Years of education: Not on file    Highest education level: Not on file   Social Needs    Financial resource strain: Not on file    Food insecurity - worry: Not on file    Food insecurity - inability: Not on file   [employer] needs - medical: Not on file   [employer] needs - non-medical: Not on file   Occupational History    Not on file   Tobacco Use    Smoking status: Former Smoker     Packs/day: 1.00     Types: Cigarettes     Start date: 1969     Last attempt to quit: 1995     Years since quittin.9    Smokeless tobacco: Never Used   Substance and Sexual Activity    Alcohol use: No    Drug use: No    Sexual activity: Yes     Partners: Female   Other Topics Concern     Service Yes     Comment: Served in [address] Blood Transfusions No    Caffeine Concern No    Occupational Exposure No    Hobby Hazards No    Sleep Concern No    Stress Concern No    Weight Concern Yes    Special Diet No    Back Care Yes     Comment: Lower Back pain when walking    Exercise No    Bike Helmet No    Seat Belt Yes    Self-Exams Yes   Social History Narrative    Not on file       Review of Systems  Review of Systems -10 point review of systems is negative except as noted above in the HPI.     Vital Signs  Visit Vitals  /76 (BP 1 Location: Left arm, BP Patient Position: Sitting)   Pulse 67   Temp 96.6 °F (35.9 °C) (Oral)   Resp 18   Ht 5' 10\" (1.778 m)   Wt 253 lb (114.8 kg)   SpO2 98%   BMI 36.30 kg/m²         Physical Exam  Physical Examination: General appearance - oriented to person, place, and time, overweight, acyanotic, in no respiratory distress and well hydrated  Mental status - alert, oriented to person, place, and time, affect appropriate to mood  Neck - supple, no significant adenopathy  Lymphatics - no palpable lymphadenopathy  Chest - clear to auscultation, no wheezes, rales or rhonchi, symmetric air entry  Heart - S1 and S2 normal, systolic murmur 2/6 at 2nd right intercostal space  Back exam - limited range of motion  Neurological - neck supple without rigidity  Musculoskeletal - osteoarthritic changes noted in both hands  Extremities - intact peripheral pulses  Skin -patient has wound measures 2 x 2 cm mid shin right leg. Not bleeding and no discharge. Results  Results for orders placed or performed during the hospital encounter of 11/12/18   CBC WITH AUTOMATED DIFF   Result Value Ref Range    WBC 5.7 4.6 - 13.2 K/uL    RBC 4.68 (L) 4.70 - 5.50 M/uL    HGB 14.9 13.0 - 16.0 g/dL    HCT 43.0 36.0 - 48.0 %    MCV 91.9 74.0 - 97.0 FL    MCH 31.8 24.0 - 34.0 PG    MCHC 34.7 31.0 - 37.0 g/dL    RDW 12.9 11.6 - 14.5 %    PLATELET 618 787 - 933 K/uL    MPV 10.0 9.2 - 11.8 FL    NEUTROPHILS 52 40 - 73 %    LYMPHOCYTES 35 21 - 52 %    MONOCYTES 7 3 - 10 %    EOSINOPHILS 6 (H) 0 - 5 %    BASOPHILS 0 0 - 2 %    ABS. NEUTROPHILS 2.9 1.8 - 8.0 K/UL    ABS. LYMPHOCYTES 2.0 0.9 - 3.6 K/UL    ABS. MONOCYTES 0.4 0.05 - 1.2 K/UL    ABS. EOSINOPHILS 0.3 0.0 - 0.4 K/UL    ABS. BASOPHILS 0.0 0.0 - 0.1 K/UL    DF AUTOMATED         ASSESSMENT and PLAN    ICD-10-CM ICD-9-CM    1. Open wound T14. 8XXA 879.8 mupirocin (BACTROBAN) 2 % ointment      REFERRAL TO WOUND CARE   2. Herpes zoster with ophthalmic complication, unspecified herpes zoster eye disease B02.30 053.29    3. Type 2 diabetes mellitus with diabetic nephropathy, unspecified whether long term insulin use ([facility]) E11.21 250.40      583.81    4. CKD (chronic kidney disease) stage 3, GFR 30-59 ml/min ([facility]) N18.3 585.3    5.  Renovascular hypertension I15.0 405.91      lab results and schedule of future lab studies reviewed with patient  reviewed diet, exercise and weight control  cardiovascular risk and specific lipid/LDL goals reviewed  reviewed medications and side effects in detail  specific diabetic recommendations: low cholesterol diet, weight control and daily exercise discussed, home glucose monitoring emphasized, all medications, side effects and compliance discussed carefully, glycohemoglobin and other lab monitoring discussed and long term diabetic complications discussed    I have discussed the diagnosis with the patient and the intended plan of care as seen in the above orders. The patient has received an after-visit summary and questions were answered concerning future plans. I have discussed medication, side effects, and warnings with the patient in detail. The patient verbalized understanding and is in agreement with the plan of care. The patient will contact the office with any additional concerns.     Merrill Ramsey MD

## 2018-12-26 ENCOUNTER — PATIENT OUTREACH (OUTPATIENT)
Dept: FAMILY MEDICINE CLINIC | Age: 73
End: 2018-12-26

## 2018-12-26 NOTE — PROGRESS NOTES
NN health screening:    Noted faxed request for colonoscopy report done earlier this year sent to [facility]. Will await report.

## 2019-02-06 ENCOUNTER — OFFICE VISIT (OUTPATIENT)
Dept: CARDIOLOGY CLINIC | Age: 74
End: 2019-02-06

## 2019-02-06 VITALS
HEART RATE: 52 BPM | WEIGHT: 257 LBS | HEIGHT: 70 IN | SYSTOLIC BLOOD PRESSURE: 150 MMHG | BODY MASS INDEX: 36.79 KG/M2 | DIASTOLIC BLOOD PRESSURE: 68 MMHG

## 2019-02-06 DIAGNOSIS — E11.21 TYPE 2 DIABETES MELLITUS WITH DIABETIC NEPHROPATHY, UNSPECIFIED WHETHER LONG TERM INSULIN USE (HCC): ICD-10-CM

## 2019-02-06 DIAGNOSIS — E03.4 HYPOTHYROIDISM DUE TO ACQUIRED ATROPHY OF THYROID: ICD-10-CM

## 2019-02-06 DIAGNOSIS — R01.1 MURMUR, CARDIAC: Primary | ICD-10-CM

## 2019-02-06 DIAGNOSIS — E78.5 DYSLIPIDEMIA: ICD-10-CM

## 2019-02-06 DIAGNOSIS — N18.30 CKD (CHRONIC KIDNEY DISEASE) STAGE 3, GFR 30-59 ML/MIN (HCC): ICD-10-CM

## 2019-02-06 DIAGNOSIS — I10 ESSENTIAL HYPERTENSION: ICD-10-CM

## 2019-02-06 NOTE — PROGRESS NOTES
1. Have you been to the ER, urgent care clinic since your last visit? Hospitalized since your last visit? No  
 
2. Have you seen or consulted any other health care providers outside of the [address] [place] [address] since your last visit? Include any pap smears or colon screening. Yes [facility] 3. Since your last visit, have you had any of the following symptoms? shortness of breath. 4.  Have you had any blood work, X-rays or cardiac testing? Yes Where: [address] 
 
 
 
5. Where do you normally have your labs drawn? PCP/Amanuel [facility]LAND 6. Do you need any refills today?    No

## 2019-02-06 NOTE — PROGRESS NOTES
HISTORY OF PRESENT ILLNESS Taylor Lai is a 68 y.o. male. Hypertension The history is provided by the patient. This is a chronic problem. The current episode started more than 1 week ago. The problem occurs every several days. The problem has not changed since onset. Associated symptoms include shortness of breath. Pertinent negatives include no chest pain. Shortness of Breath The history is provided by the patient. This is a chronic problem. The problem occurs intermittently. The current episode started more than 1 week ago. The problem has been gradually improving. Pertinent negatives include no fever, no ear pain, no neck pain, no cough, no sputum production, no hemoptysis, no wheezing, no PND, no orthopnea, no chest pain, no syncope, no vomiting, no rash, no leg pain, no leg swelling and no claudication. Associated medical issues do not include chronic lung disease, CAD or heart failure. Review of Systems Constitutional: Negative for chills, diaphoresis, fever, malaise/fatigue and weight loss. HENT: Negative for ear discharge, ear pain, hearing loss, nosebleeds and tinnitus. Eyes: Negative for blurred vision. Respiratory: Positive for shortness of breath. Negative for cough, hemoptysis, sputum production, wheezing and stridor. Cardiovascular: Negative for chest pain, palpitations, orthopnea, claudication, leg swelling, syncope and PND. Gastrointestinal: Negative for heartburn, nausea and vomiting. Musculoskeletal: Negative for myalgias and neck pain. Skin: Negative for itching and rash. Neurological: Negative for dizziness, tingling, tremors, focal weakness, loss of consciousness and weakness. Psychiatric/Behavioral: Negative for depression and suicidal ideas. Family History Problem Relation Age of Onset  Hypertension Mother  Thyroid Disease Mother  Hypertension Father  Diabetes Father  Drug Abuse Brother Past Medical History: Diagnosis Date  Diabetes ([facility]   Dyslipidemia 3/1/2017 [place] 37 R Knee/ Tib fib fracture/surgery  Gout 2010  Graves disease 2015  High cholesterol   
 HTN (hypertension)   Osteoarthritis   Severe aortic stenosis 3/29/2017  Spinal stenosis of lumbar region 3/1/2016  Spondylolisthesis 3/1/2016  Thyroid trouble  Type 2 diabetes mellitus with diabetic nephropathy ([facility] 2015 Past Surgical History:  
Procedure Laterality Date  HX HIP REPLACEMENT Left 2009  HX LUMBAR FUSION    
 HX ORTHOPAEDIC Right R knee/Tibfib surgery Social History Tobacco Use  Smoking status: Former Smoker Packs/day: 1.00 Types: Cigarettes Start date: 1969 Last attempt to quit: 1995 Years since quittin.1  Smokeless tobacco: Never Used Substance Use Topics  Alcohol use: No  
 
 
Allergies Allergen Reactions  Watermelon Anaphylaxis and Swelling  Citric Acid Rash  Peach (Prunus Persica) Itching Outpatient Medications Marked as Taking for the 19 encounter (Office Visit) with Chris Schwab MD  
Medication Sig Dispense Refill  CANNABIDIOL, CBD, EXTRACT PO Take  by mouth.  mupirocin (BACTROBAN) 2 % ointment Apply  to affected area daily. 30 g 0  
 levothyroxine (SYNTHROID) 200 mcg tablet Take 1 Tab by mouth Daily (before breakfast). 30 Tab 2  
 aspirin (ASPIRIN) 325 mg tablet Take 325 mg by mouth daily.  ascorbic acid, vitamin C, (VITAMIN C) 250 mg tablet Take  by mouth.  ferrous sulfate 325 mg (65 mg iron) tablet Take 325 mg by mouth two (2) times a day.  DULoxetine (CYMBALTA) 60 mg capsule Take 1 Cap by mouth daily. 30 Cap 1  capsicum oleoresin 0.025 % topical cream Apply  to affected area three (3) times daily.  diclofenac (VOLTAREN) 1 % gel Apply  to affected area four (4) times daily.  traMADol (ULTRAM) 50 mg tablet Take 1 Tab by mouth two (2) times daily as needed for Pain. Max Daily Amount: 100 mg. Indications: NEUROPATHIC PAIN 60 Tab 0  
 magnesium chloride (MAG DELAY) 64 mg delayed release tablet Take 8 tablets 3 x day  amLODIPine (NORVASC) 10 mg tablet Take 5 mg by mouth daily.  pyridoxine, vitamin B6, (VITAMIN B-6) 100 mg tablet Take 100 mg by mouth daily.  metoprolol tartrate (LOPRESSOR) 25 mg tablet Take 1 Tab by mouth two (2) times a day. (Patient taking differently: Take 12.5 mg by mouth two (2) times a day.) 180 Tab 3  
 insulin glargine (LANTUS) 100 unit/mL injection Take 30 units once daily  Indications: type 2 diabetes mellitus 3 Vial 3  cyanocobalamin (VITAMIN B-12) 1,000 mcg tablet Take 1,000 mcg by mouth daily.  febuxostat (ULORIC) 40 mg tab tablet Take 1 Tab by mouth daily. Indications: GOUT PREVENTION 90 Tab 1  
 insulin aspart (NOVOLOG) 100 unit/mL injection by SubCUTAneous route. 6 units before meals  spironolactone (ALDACTONE) 25 mg tablet Take 1 Tab by mouth daily. Indications: EDEMA (Patient taking differently: Take 25 mg by mouth daily.) 30 Tab 3  
 glucose blood VI test strips (ASCENSIA AUTODISC VI, ONE TOUCH ULTRA TEST VI) strip Dispense precision extra test strips 3 x day to check blood glucose 200 Each 3  
 Omeprazole delayed release (PRILOSEC D/R) 20 mg tablet Take 20 mg by mouth daily.  atorvastatin (LIPITOR) 40 mg tablet Take 20 mg by mouth daily.  testosterone (ANDROGEL) 20.25 mg/1.25 gram (1.62 %) gel Apply  to affected area as needed.  Cholecalciferol, Vitamin D3, 1,000 unit cap Take 3,000 Units by mouth daily. Visit Vitals /68 Pulse (!) 52 Ht 5' 10\" (1.778 m) Wt 116.6 kg (257 lb) BMI 36.88 kg/m² Physical Exam  
Constitutional: He is oriented to person, place, and time. He appears well-developed and well-nourished. No distress. HENT:  
Head: Atraumatic. Mouth/Throat: No oropharyngeal exudate. Eyes: Conjunctivae are normal. Right eye exhibits no discharge. Left eye exhibits no discharge. No scleral icterus. Neck: Normal range of motion. Neck supple. No JVD present. No tracheal deviation present. No thyromegaly present. Cardiovascular: Normal rate and regular rhythm. Exam reveals no gallop. No murmur heard. Surgical sternal scar Pulmonary/Chest: Effort normal and breath sounds normal. No stridor. No respiratory distress. He has no wheezes. He has no rales. He exhibits no tenderness. Abdominal: Soft. There is no tenderness. There is no rebound and no guarding. Musculoskeletal: Normal range of motion. He exhibits no edema or tenderness. Lymphadenopathy:  
  He has no cervical adenopathy. Neurological: He is alert and oriented to person, place, and time. He exhibits normal muscle tone. Skin: Skin is warm. He is not diaphoretic. Psychiatric: He has a normal mood and affect. His behavior is normal.  
 
ekg sinus rhythm with PVC, no acute st-t changes Echo 02/2017: 
SUMMARY: 
Left ventricle: Systolic function was normal by visual assessment. Ejection fraction was estimated in the range of 60 % to 65 %. No obvious 
wall motion abnormalities identified in the views obtained. Wall thickness 
was at the upper limits of normal. 
 
Aortic valve: Leaflets exhibited markedly increased thickness and reduced 
mobility. There was moderate to severe stenosis. Valve peak gradient was 61 mmHg. Valve mean gradient was 39 mmHg. Estimated aortic valve area (by 
VTI) was 0.5 cmï¾². ZACKERY likely overestimates severity of aortic stenosis due 
to difficulty in measuring LVOT diameter. Aorta, systemic arteries: The root exhibited dilatation. ASSESSMENT and PLAN 
  ICD-10-CM ICD-9-CM 1. s/p AVR R01.1 785.2 AMB POC EKG ROUTINE W/ 12 LEADS, INTER & REP 2. Dyslipidemia E78.5 272.4 3.  Type 2 diabetes mellitus with diabetic nephropathy, unspecified whether long term insulin use (H[facility]) E11.21 250.40   
  583.81 4. CKD (chronic kidney disease) stage 3, GFR 30-59 ml/min ([facility]) N18.3 585.3 5. Hypothyroidism due to acquired atrophy of thyroid E03.4 244.8   
  246.8 6. Essential hypertension I10 401.9 Orders Placed This Encounter  AMB POC EKG ROUTINE W/ 12 LEADS, INTER & REP Order Specific Question:   Reason for Exam: Answer:   htn Follow-up Disposition: 
Return in about 6 months (around 8/6/2019). reviewed diet, exercise and weight control 
cardiovascular risk and specific lipid/LDL goals reviewed 
use of aspirin to prevent MI and TIA's discussed. Now S/p AVR. Remains asymptomatic from cardiac standpoint Recommend to take aspirin daily Continue salt restriction and weight reduction. F/u in 6 months

## 2019-02-06 NOTE — PATIENT INSTRUCTIONS
High Blood Pressure: Care Instructions Overview It's normal for blood pressure to go up and down throughout the day. But if it stays up, you have high blood pressure. Another name for high blood pressure is hypertension. Despite what a lot of people think, high blood pressure usually doesn't cause headaches or make you feel dizzy or lightheaded. It usually has no symptoms. But it does increase your risk of stroke, heart attack, and other problems. You and your doctor will talk about your risks of these problems based on your blood pressure. Your doctor will give you a goal for your blood pressure. Your goal will be based on your health and your age. Lifestyle changes, such as eating healthy and being active, are always important to help lower blood pressure. You might also take medicine to reach your blood pressure goal. 
Follow-up care is a key part of your treatment and safety. Be sure to make and go to all appointments, and call your doctor if you are having problems. It's also a good idea to know your test results and keep a list of the medicines you take. How can you care for yourself at home? Medical treatment · If you stop taking your medicine, your blood pressure will go back up. You may take one or more types of medicine to lower your blood pressure. Be safe with medicines. Take your medicine exactly as prescribed. Call your doctor if you think you are having a problem with your medicine. · Talk to your doctor before you start taking aspirin every day. Aspirin can help certain people lower their risk of a heart attack or stroke. But taking aspirin isn't right for everyone, because it can cause serious bleeding. · See your doctor regularly. You may need to see the doctor more often at first or until your blood pressure comes down. · If you are taking blood pressure medicine, talk to your doctor before you take decongestants or anti-inflammatory medicine, such as ibuprofen. Some of these medicines can raise blood pressure. · Learn how to check your blood pressure at home. Lifestyle changes · Stay at a healthy weight. This is especially important if you put on weight around the waist. Losing even 10 pounds can help you lower your blood pressure. · If your doctor recommends it, get more exercise. Walking is a good choice. Bit by bit, increase the amount you walk every day. Try for at least 30 minutes on most days of the week. You also may want to swim, bike, or do other activities. · Avoid or limit alcohol. Talk to your doctor about whether you can drink any alcohol. · Try to limit how much sodium you eat to less than 2,300 milligrams (mg) a day. Your doctor may ask you to try to eat less than 1,500 mg a day. · Eat plenty of fruits (such as bananas and oranges), vegetables, legumes, whole grains, and low-fat dairy products. · Lower the amount of saturated fat in your diet. Saturated fat is found in animal products such as milk, cheese, and meat. Limiting these foods may help you lose weight and also lower your risk for heart disease. · Do not smoke. Smoking increases your risk for heart attack and stroke. If you need help quitting, talk to your doctor about stop-smoking programs and medicines. These can increase your chances of quitting for good. When should you call for help? Call 911 anytime you think you may need emergency care. This may mean having symptoms that suggest that your blood pressure is causing a serious heart or blood vessel problem. Your blood pressure may be over 180/120. 
 For example, call 911 if: 
  · You have symptoms of a heart attack. These may include: 
? Chest pain or pressure, or a strange feeling in the chest. 
? Sweating. ? Shortness of breath. ? Nausea or vomiting. ? Pain, pressure, or a strange feeling in the back, neck, jaw, or upper belly or in one or both shoulders or arms. ? Lightheadedness or sudden weakness. ? A fast or irregular heartbeat.  
  · You have symptoms of a stroke. These may include: 
? Sudden numbness, tingling, weakness, or loss of movement in your face, arm, or leg, especially on only one side of your body. ? Sudden vision changes. ? Sudden trouble speaking. ? Sudden confusion or trouble understanding simple statements. ? Sudden problems with walking or balance. ? A sudden, severe headache that is different from past headaches.  
  · You have severe back or belly pain.  
 Do not wait until your blood pressure comes down on its own. Get help right away. 
 Call your doctor now or seek immediate care if: 
  · Your blood pressure is much higher than normal (such as 180/120 or higher), but you don't have symptoms.  
  · You think high blood pressure is causing symptoms, such as: 
? Severe headache. 
? Blurry vision.  
 Watch closely for changes in your health, and be sure to contact your doctor if: 
  · Your blood pressure measures higher than your doctor recommends at least 2 times. That means the top number is higher or the bottom number is higher, or both.  
  · You think you may be having side effects from your blood pressure medicine. Where can you learn more? Go to http://adal-janes.info/. Enter A081 in the search box to learn more about \"High Blood Pressure: Care Instructions. \" Current as of: July 22, 2018 Content Version: 11.9 © 6647-6483 [employer]. Care instructions adapted under license by [employer] (which disclaims liability or warranty for this information). If you have questions about a medical condition or this instruction, always ask your healthcare professional. [place] [address] any warranty or liability for your use of this information.

## 2019-02-27 ENCOUNTER — PATIENT OUTREACH (OUTPATIENT)
Dept: FAMILY MEDICINE CLINIC | Age: 74
End: 2019-02-27

## 2019-02-27 NOTE — PROGRESS NOTES
health screening: 
 
Colonoscopy report can be found under media, dated 10/10/18 with dictation by Dr. Peg Liu indicating polyp was negative for cancer, benign hyperplasia. Upon hyperlinking the report in [facility] computer entered the wrong year it was completed. Surveillance recommendation is 10 yrs which should be 2028 not 2026. I've asked [place] office staff to correct  Error please. Closed this episode of care.

## 2019-02-27 NOTE — PROGRESS NOTES
health screening: 
 
Noted colonoscopy report is now on chart under media.  Completed 10/10/18 @ [facility] by

## 2019-06-10 ENCOUNTER — OFFICE VISIT (OUTPATIENT)
Dept: FAMILY MEDICINE CLINIC | Age: 74
End: 2019-06-10

## 2019-06-10 VITALS
HEART RATE: 57 BPM | WEIGHT: 258 LBS | SYSTOLIC BLOOD PRESSURE: 140 MMHG | DIASTOLIC BLOOD PRESSURE: 78 MMHG | TEMPERATURE: 97 F | HEIGHT: 70 IN | BODY MASS INDEX: 36.94 KG/M2 | OXYGEN SATURATION: 97 % | RESPIRATION RATE: 18 BRPM

## 2019-06-10 DIAGNOSIS — E78.5 DYSLIPIDEMIA: ICD-10-CM

## 2019-06-10 DIAGNOSIS — E66.01 SEVERE OBESITY (BMI 35.0-39.9) WITH COMORBIDITY (HCC): ICD-10-CM

## 2019-06-10 DIAGNOSIS — R21 RASH: ICD-10-CM

## 2019-06-10 DIAGNOSIS — E11.9 COMPREHENSIVE DIABETIC FOOT EXAMINATION, TYPE 2 DM, ENCOUNTER FOR (HCC): ICD-10-CM

## 2019-06-10 DIAGNOSIS — I15.0 RENOVASCULAR HYPERTENSION: ICD-10-CM

## 2019-06-10 DIAGNOSIS — E11.21 TYPE 2 DIABETES MELLITUS WITH DIABETIC NEPHROPATHY, UNSPECIFIED WHETHER LONG TERM INSULIN USE (HCC): Primary | ICD-10-CM

## 2019-06-10 DIAGNOSIS — Z71.89 ACP (ADVANCE CARE PLANNING): ICD-10-CM

## 2019-06-10 DIAGNOSIS — G62.9 NEUROPATHY: ICD-10-CM

## 2019-06-10 DIAGNOSIS — Z00.00 ENCOUNTER FOR MEDICARE ANNUAL WELLNESS EXAM: ICD-10-CM

## 2019-06-10 DIAGNOSIS — N18.30 CKD (CHRONIC KIDNEY DISEASE) STAGE 3, GFR 30-59 ML/MIN (HCC): ICD-10-CM

## 2019-06-10 DIAGNOSIS — E03.4 HYPOTHYROIDISM DUE TO ACQUIRED ATROPHY OF THYROID: ICD-10-CM

## 2019-06-10 LAB
GLUCOSE POC: 159 MG/DL
HBA1C MFR BLD HPLC: 7.1 %

## 2019-06-10 NOTE — PATIENT INSTRUCTIONS
Medicare Part B Preventive Services Limitations Recommendation Scheduled Bone Mass Measurement 
(age 72 & older, biennial) Requires diagnosis related to osteoporosis or estrogen deficiency. Biennial benefit unless patient has history of long-term glucocorticoid tx or baseline is needed because initial test was by other method Done per patient Cardiovascular Screening Blood Tests (every 5 years) Total cholesterol, HDL, Triglycerides and ECG Order blood work  as a panel if possible and  for adults with routine risk  an electrocardiogram (ECG) at intervals determined by the provider. Colorectal Cancer Screening 
-Fecal occult blood test (annual) -Flexible sigmoidoscopy (5y) 
-Screening colonoscopy (10y) -Barium Enema Colorectal cancer screening should be done for adults age 54-65 with no increased risk factors for colorectal cancer. There are a number of acceptable methods of screening for this type of cancer. Each test has its own benefits and drawbacks. Discuss with your provider what is most appropriate for you during your annual wellness visit. The different tests include: colonoscopy (considered the best screening method), a fecal occult blood test, a fecal DNA test, and sigmoidoscopy Done per patient, 2018 Counseling to Prevent Tobacco Use (up to 8 sessions per year) - Counseling greater than 3 and up to 10 minutes - Counseling greater than 10 minutes Patients must be asymptomatic of tobacco-related conditions to receive as preventive service NA Diabetes Screening Tests (at least every 3 years, Medicare covers annually or at 6-month intervals for prediabetic patients) Fasting blood sugar (FBS) or glucose tolerance test (GTT) All adults age 38-68 who are overweight should have a diabetes screening test once every three years.  
-Other screening tests & preventive services for persons with diabetes include: an eye exam to screen for diabetic retinopathy, a kidney function test, a foot exam, and stricter control over your cholesterol. Due   
Diabetes Self-Management Training (DSMT) (no USPSTF recommendation) Requires referral by treating physician for patient with diabetes or renal disease. 10 hours of initial DSMT session of no less than 30 minutes each in a continuous 12-month period. 2 hours of follow-up DSMT in subsequent years. Glaucoma Screening (no USPSTF recommendation) Diabetes mellitus, family history, , age 48 or over,  American, age 72 or over Due November 2019 Human Immunodeficiency Virus (HIV) Screening (annually for increased risk patients) HIV-1 and HIV-2 by EIA, DEREJE, rapid antibody test, or oral mucosa transudate Patient must be at increased risk for HIV infection per USPSTF guidelines or pregnant. Tests covered annually for patients at increased risk. Pregnant patients may receive up to 3 test during pregnancy. Medical Nutrition Therapy (MNT) (for diabetes or renal disease not recommended schedule) Requires referral by treating physician for patient with diabetes or renal disease. Can be provided in same year as diabetes self-management training (DSMT), and CMS recommends medical nutrition therapy take place after DSMT. Up to 3 hours for initial year and 2 hours in subsequent years. Prostate Cancer Screening (annually up to age 76) - Digital rectal exam (ALTA) - Prostate specific antigen (PSA) Annually (age 48 or over), ALTA not paid separately when covered E/M service is provided on same date Men up to age 76 may need a screening blood test for prostate cancer at certain intervals, depending on their personal and family history. This decision is between the patient and his provider. Seasonal Influenza Vaccination (annually) All adults should have a flu vaccine yearly  Done last season Pneumococcal Vaccination (once after 72) All adults  over age 72 should receive the recommended pneumonia vaccines. Current USPSTF guidelines recommend a series of two vaccines for the best pneumonia protection. Done per patient Hepatitis B Vaccinations (if medium/high risk) Medium/high risk factors:  End-stage renal disease, Hemophiliacs who received Factor VIII or IX concentrates, Clients of institutions for the mentally retarded, Persons who live in the same house as a HepB virus carrier, Homosexual men, Illicit injectable drug abusers. Shingles Vaccination A shingles vaccine is also recommended once in a lifetime after age 61 Ultrasound Screening for Abdominal Aortic Aneurysm (AAA) (once) An Abdominal Aortic Aneurysm (AAA) Screening is recommended for men age 73-68 who has ever smoked in their lifetime. of the following criteria: 
- Men who are 73-68 years old and have smoked more than 100 cigarettes in their lifetime. 
-Anyone with a FH of AAA 
-Anyone recommended for screening by USPSTF Hep C All adults born between 80 and 1965 should be screened once for Hepatitis C Tetanus  All adults should have a tetanus vaccine every 10 years Done per patient Body Mass Index: Care Instructions Your Care Instructions Body mass index (BMI) can help you see if your weight is raising your risk for health problems. It uses a formula to compare how much you weigh with how tall you are. · A BMI lower than 18.5 is considered underweight. · A BMI between 18.5 and 24.9 is considered healthy. · A BMI between 25 and 29.9 is considered overweight. A BMI of 30 or higher is considered obese. If your BMI is in the normal range, it means that you have a lower risk for weight-related health problems. If your BMI is in the overweight or obese range, you may be at increased risk for weight-related health problems, such as high blood pressure, heart disease, stroke, arthritis or joint pain, and diabetes.  If your BMI is in the underweight range, you may be at increased risk for health problems such as fatigue, lower protection (immunity) against illness, muscle loss, bone loss, hair loss, and hormone problems. BMI is just one measure of your risk for weight-related health problems. You may be at higher risk for health problems if you are not active, you eat an unhealthy diet, or you drink too much alcohol or use tobacco products. Follow-up care is a key part of your treatment and safety. Be sure to make and go to all appointments, and call your doctor if you are having problems. It's also a good idea to know your test results and keep a list of the medicines you take. How can you care for yourself at home? · Practice healthy eating habits. This includes eating plenty of fruits, vegetables, whole grains, lean protein, and low-fat dairy. · If your doctor recommends it, get more exercise. Walking is a good choice. Bit by bit, increase the amount you walk every day. Try for at least 30 minutes on most days of the week. · Do not smoke. Smoking can increase your risk for health problems. If you need help quitting, talk to your doctor about stop-smoking programs and medicines. These can increase your chances of quitting for good. · Limit alcohol to 2 drinks a day for men and 1 drink a day for women. Too much alcohol can cause health problems. If you have a BMI higher than 25 · Your doctor may do other tests to check your risk for weight-related health problems. This may include measuring the distance around your waist. A waist measurement of more than 40 inches in men or 35 inches in women can increase the risk of weight-related health problems. · Talk with your doctor about steps you can take to stay healthy or improve your health. You may need to make lifestyle changes to lose weight and stay healthy, such as changing your diet and getting regular exercise. If you have a BMI lower than 18.5 · Your doctor may do other tests to check your risk for health problems. · Talk with your doctor about steps you can take to stay healthy or improve your health. You may need to make lifestyle changes to gain or maintain weight and stay healthy, such as getting more healthy foods in your diet and doing exercises to build muscle. Where can you learn more? Go to http://adal-janes.info/. Enter S176 in the search box to learn more about \"Body Mass Index: Care Instructions. \" Current as of: October 13, 2016 Content Version: 11.4 © 0010-6108 [employer]. Care instructions adapted under license by [employer] (which disclaims liability or warranty for this information). If you have questions about a medical condition or this instruction, always ask your healthcare professional. [address] any warranty or liability for your use of this information.

## 2019-06-10 NOTE — ACP (ADVANCE CARE PLANNING)
Advance Care Planning (ACP) Provider Note - Comprehensive     Date of ACP Conversation: 06/10/19  Persons included in Conversation:  patient  Length of ACP Conversation in minutes:  16 minutes    Authorized Decision Maker (if patient is incapable of making informed decisions): This person is:  Patient's wife would be authorize decision-maker. He will fill out forms given and bring these back in for scanning into his EMR chart. General ACP for ALL Patients with Decision Making Capacity:   Importance of advance care planning, including choosing a healthcare agent to communicate patient's healthcare decisions if patient lost the ability to make decisions, such as after a sudden illness or accident  Understanding of the healthcare agent role was assessed and information provided  Exploration of values, goals, and preferences if recovery is not expected, even with continued medical treatment in the event of: Imminent death  Severe, permanent brain injury  \"In these circumstances, what matters most to you? \"  Care focused more on comfort or quality of life.   Opportunity offered to explore how cultural, Jew, spiritual, or personal beliefs would affect decisions for future care     Interventions Provided:  Recommended completion of Advance Directive form after review of ACP materials and conversation with prospective healthcare agent      Aroldo Roman MD

## 2019-06-10 NOTE — PROGRESS NOTES
Chief Complaint   Patient presents with   [facility] Annual Wellness Visit         Hypertension    Diabetes     1. Have you been to the ER, urgent care clinic since your last visit? Hospitalized since your last visit? No    2. Have you seen or consulted any other health care providers outside of the [address] [place] [address] since your last visit? Include any pap smears or colon screening. yes wound care clinic for left lower extremity wound. This is the Subsequent Medicare Annual Wellness Exam, performed 12 months or more after the Initial AWV or the last Subsequent AWV    I have reviewed the patient's medical history in detail and updated the computerized patient record. 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    Past Medical History:   Diagnosis Date    Diabetes ([facility] [address] 1995    Dyslipidemia 3/1/2017    Fractures 1995    R Knee/ Tib fib fracture/surgery    Gout 2010    Graves disease 1/21/2015    High cholesterol 2005    HTN (hypertension) 1990    Osteoarthritis 2013    Severe aortic stenosis 3/29/2017    Spinal stenosis of lumbar region 3/1/2016    Spondylolisthesis 3/1/2016    Thyroid trouble     Type 2 diabetes mellitus with diabetic nephropathy ([facility] [address] 4/28/2015      Past Surgical History:   Procedure Laterality Date    HX HIP REPLACEMENT Left 2009    HX LUMBAR FUSION      HX ORTHOPAEDIC Right     R knee/Tibfib surgery     Current Outpatient Medications   Medication Sig Dispense Refill    CANNABIDIOL, CBD, EXTRACT PO Take  by mouth.  levothyroxine (SYNTHROID) 200 mcg tablet Take 1 Tab by mouth Daily (before breakfast). 30 Tab 2    aspirin (ASPIRIN) 325 mg tablet Take 325 mg by mouth daily.  ascorbic acid, vitamin C, (VITAMIN C) 250 mg tablet Take  by mouth.  DULoxetine (CYMBALTA) 60 mg capsule Take 1 Cap by mouth daily. 30 Cap 1    capsicum oleoresin 0.025 % topical cream Apply  to affected area three (3) times daily.       diclofenac (VOLTAREN) 1 % gel Apply  to affected area four (4) times daily.  traMADol (ULTRAM) 50 mg tablet Take 1 Tab by mouth two (2) times daily as needed for Pain. Max Daily Amount: 100 mg. Indications: NEUROPATHIC PAIN 60 Tab 0    magnesium chloride (MAG DELAY) 64 mg delayed release tablet Take 8 tablets 3 x day      amLODIPine (NORVASC) 10 mg tablet Take 5 mg by mouth daily.  pyridoxine, vitamin B6, (VITAMIN B-6) 100 mg tablet Take 100 mg by mouth daily.  metoprolol tartrate (LOPRESSOR) 25 mg tablet Take 1 Tab by mouth two (2) times a day. (Patient taking differently: Take 12.5 mg by mouth two (2) times a day.) 180 Tab 3    insulin glargine (LANTUS) 100 unit/mL injection Take 30 units once daily  Indications: type 2 diabetes mellitus 3 Vial 3    cyanocobalamin (VITAMIN B-12) 1,000 mcg tablet Take 1,000 mcg by mouth daily.  febuxostat (ULORIC) 40 mg tab tablet Take 1 Tab by mouth daily. Indications: GOUT PREVENTION 90 Tab 1    insulin aspart (NOVOLOG) 100 unit/mL injection by SubCUTAneous route. 6 units before meals      spironolactone (ALDACTONE) 25 mg tablet Take 1 Tab by mouth daily. Indications: EDEMA (Patient taking differently: Take 25 mg by mouth daily.) 30 Tab 3    glucose blood VI test strips (ASCENSIA AUTODISC VI, ONE TOUCH ULTRA TEST VI) strip Dispense precision extra test strips 3 x day to check blood glucose 200 Each 3    Omeprazole delayed release (PRILOSEC D/R) 20 mg tablet Take 20 mg by mouth daily.  atorvastatin (LIPITOR) 40 mg tablet Take 20 mg by mouth daily.  testosterone (ANDROGEL) 20.25 mg/1.25 gram (1.62 %) gel Apply  to affected area as needed.  Cholecalciferol, Vitamin D3, 1,000 unit cap Take 3,000 Units by mouth daily.  mupirocin (BACTROBAN) 2 % ointment Apply  to affected area daily. 30 g 0    ferrous sulfate 325 mg (65 mg iron) tablet Take 325 mg by mouth two (2) times a day.        Allergies   Allergen Reactions    Watermelon Anaphylaxis and Swelling    Citric Acid Rash    Peach (Prunus Persica) Itching     Family History   Problem Relation Age of Onset    Hypertension Mother     Thyroid Disease Mother     Hypertension Father     Diabetes Father     Drug Abuse Brother      Social History     Tobacco Use    Smoking status: Former Smoker     Packs/day: 1.00     Types: Cigarettes     Start date: 1969     Last attempt to quit: 1995     Years since quittin.4    Smokeless tobacco: Never Used   Substance Use Topics    Alcohol use: No     Patient Active Problem List   Diagnosis Code    Graves disease E05.00    Heartburn R12    Gout M10.9    Type 2 diabetes mellitus with diabetic nephropathy ([facility] [address] E11.21    Hypomagnesemia E83.42    Renovascular hypertension I15.0    Murmur, cardiac R01.1    CKD (chronic kidney disease) stage 3, GFR 30-59 ml/min ([facility]) N18.3    Hypothyroidism E03.9    Dyslipidemia E78.5    SOB (shortness of breath) R06.02    Pre-op evaluation Z01.818    Severe aortic stenosis I35.0    ACP (advance care planning) Z71.89    Osteoarthritis M19.90    Spinal stenosis of lumbar region M48.061    Spondylolisthesis M43.10    S/P AVR (aortic valve replacement) Z95.2    Essential hypertension I10    Severe obesity (BMI 35.0-39. 9) with comorbidity ([facility] [address] E66.01    DDD (degenerative disc disease), lumbar M51.36    Lumbar radiculopathy M54.16    Spondylolisthesis, acquired M43.10    Lumbar postlaminectomy syndrome M96.1    Spinal stenosis, lumbar region with neurogenic claudication M48.062    Sacroiliitis ([facility]) M46.1    Lumbar neuritis M54.16    Gastroesophageal reflux disease K21.9       Depression Risk Factor Screening:     3 most recent PHQ Screens 6/10/2019   Little interest or pleasure in doing things Not at all   Feeling down, depressed, irritable, or hopeless Not at all   Total Score PHQ 2 0     Alcohol Risk Factor Screening: You do not drink alcohol or very rarely.     Functional Ability and Level of Safety: Hearing Loss  Hearing is good. Activities of Daily Living  The home contains: no safety equipment. Patient does total self care    Fall Risk  Fall Risk Assessment, last 12 mths 6/10/2019   Able to walk? Yes   Fall in past 12 months? No   Fall with injury? -   Number of falls in past 12 months -   Fall Risk Score -       Abuse Screen  Patient is not abused    Cognitive Screening   Evaluation of Cognitive Function:  Has your family/caregiver stated any concerns about your memory: no  Normal     Patient Care Team   Patient Care Team:  Silas Grubbs MD as PCP - General (Family Practice)  Fuad Mccormick MD (Nephrology)  Lopez Marquez MD (Cardiology)  Monique Dawson MD (Orthopedic Surgery)  Fred Swann MD as Surgeon (Cardiothoracic Surgery)  Beena Lanier MD (Physical Medicine and Rehab)    Assessment/Plan   Education and counseling provided:  Are appropriate based on today's review and evaluation  Screening for glaucoma  foot exam and shingles vaccine if applicable. 1. Encounter for Medicare annual wellness exam    2. ACP (advance care planning)    Health Maintenance Due   Topic Date Due    Shingrix Vaccine Age 49> (1 of 2) 04/26/1996    EYE EXAM RETINAL OR DILATED  10/14/2017    FOOT EXAM Q1  03/22/2019    HEMOGLOBIN A1C Q6M  04/25/2019    MEDICARE YEARLY EXAM  05/01/2019     I have discussed the diagnosis with the patient and the intended plan of care as seen in the above orders. The patient has received an after-visit summary and questions were answered concerning future plans. I have discussed medication, side effects, and warnings with the patient in detail. The patient verbalized understanding and is in agreement with the plan of care. The patient will contact the office with any additional concerns. Personalized preventative plan of care was discussed, printed and given to patient.     Juarez Grubbs MD

## 2019-06-10 NOTE — PROGRESS NOTES
[facility]  [employer] comes in for f/u care. HTN: Patient has hypertension. He is on Spironolactone, Norvasc and Lopressor. Blood pressure is 140/78. He denies headache or changes in vision. DM2: Patient has type 2 diabetes mellitus. He is on insulin. He takes Lantus and Humalog. Blood glucose numbers have been stable. We did a check of his HbA1c and it is 7.1. We will continue with the current treatment plan. I did do a foot exam today. He does have neuropathy with callus right heel area. Dyslipidemia: Patient has dyslipidemia. He is on Lipitor 40 mg daily. Continue current treatment plan. Gout: Patient has gout arthritis. He is on Uloric daily. No gout attacks. Continue current treatment plan. Levothyroxine: Patient is on Synthroid. Plan to recheck his TSH at next visit. Neuropathy: Patient does have numbness and tingling of the extremities. Also has numbness and tingling left shoulder area and upper extremity. This comes when he turns his head in a certain way. This is likely compressive neuropathy. Will do supportive care. If symptoms worsen then with he will come back in for reevaluation. Denies any weakness in  strength or limitation in range of motion left upper extremity. Back pain: Patient has chronic low back pain. Has a history of spondylolisthesis. Has been followed up in the spine clinic. Currently taking pain medications and preparations. Stable. Obesity: Patient has a BMI of 37.02. Discussed normal BMI. He will do lifestyle and dietary modification. CKD: Patient has a history of chronic kidney disease. This likely related to diabetes. He also has renal artery stenosis. Continue to avoid any nephrotoxic medications. Will check labs at next visit. Rash: Patient has rash left leg. This is erythematous and there is some slight excoriation of the skin. It is healing. He is applying topical ointment.   Has been seen for ulcerated wound around the same area in the wound clinic. Dressings were done with good result. We will have him apply mupirocin cream to the area. Past Medical History  Past Medical History:   Diagnosis Date    Diabetes ([facility] [address] 1995    Dyslipidemia 3/1/2017    Fractures 1995    R Knee/ Tib fib fracture/surgery    Gout 2010    Graves disease 1/21/2015    High cholesterol 2005    HTN (hypertension) 1990    Osteoarthritis 2013    Severe aortic stenosis 3/29/2017    Spinal stenosis of lumbar region 3/1/2016    Spondylolisthesis 3/1/2016    Thyroid trouble     Type 2 diabetes mellitus with diabetic nephropathy ([facility] [address] 4/28/2015       Surgical History  Past Surgical History:   Procedure Laterality Date    HX HIP REPLACEMENT Left 2009    HX LUMBAR FUSION      HX ORTHOPAEDIC Right     R knee/Tibfib surgery        Medications  Current Outpatient Medications   Medication Sig Dispense Refill    CANNABIDIOL, CBD, EXTRACT PO Take  by mouth.  levothyroxine (SYNTHROID) 200 mcg tablet Take 1 Tab by mouth Daily (before breakfast). 30 Tab 2    aspirin (ASPIRIN) 325 mg tablet Take 325 mg by mouth daily.  ascorbic acid, vitamin C, (VITAMIN C) 250 mg tablet Take  by mouth.  DULoxetine (CYMBALTA) 60 mg capsule Take 1 Cap by mouth daily. 30 Cap 1    capsicum oleoresin 0.025 % topical cream Apply  to affected area three (3) times daily.  diclofenac (VOLTAREN) 1 % gel Apply  to affected area four (4) times daily.  traMADol (ULTRAM) 50 mg tablet Take 1 Tab by mouth two (2) times daily as needed for Pain. Max Daily Amount: 100 mg. Indications: NEUROPATHIC PAIN 60 Tab 0    magnesium chloride (MAG DELAY) 64 mg delayed release tablet Take 8 tablets 3 x day      amLODIPine (NORVASC) 10 mg tablet Take 5 mg by mouth daily.  pyridoxine, vitamin B6, (VITAMIN B-6) 100 mg tablet Take 100 mg by mouth daily.  metoprolol tartrate (LOPRESSOR) 25 mg tablet Take 1 Tab by mouth two (2) times a day.  (Patient taking differently: Take 12.5 mg by mouth two (2) times a day.) 180 Tab 3    insulin glargine (LANTUS) 100 unit/mL injection Take 30 units once daily  Indications: type 2 diabetes mellitus 3 Vial 3    cyanocobalamin (VITAMIN B-12) 1,000 mcg tablet Take 1,000 mcg by mouth daily.  febuxostat (ULORIC) 40 mg tab tablet Take 1 Tab by mouth daily. Indications: GOUT PREVENTION 90 Tab 1    insulin aspart (NOVOLOG) 100 unit/mL injection by SubCUTAneous route. 6 units before meals      spironolactone (ALDACTONE) 25 mg tablet Take 1 Tab by mouth daily. Indications: EDEMA (Patient taking differently: Take 25 mg by mouth daily.) 30 Tab 3    glucose blood VI test strips (ASCENSIA AUTODISC VI, ONE TOUCH ULTRA TEST VI) strip Dispense precision extra test strips 3 x day to check blood glucose 200 Each 3    Omeprazole delayed release (PRILOSEC D/R) 20 mg tablet Take 20 mg by mouth daily.  atorvastatin (LIPITOR) 40 mg tablet Take 20 mg by mouth daily.  testosterone (ANDROGEL) 20.25 mg/1.25 gram (1.62 %) gel Apply  to affected area as needed.  Cholecalciferol, Vitamin D3, 1,000 unit cap Take 3,000 Units by mouth daily.  mupirocin (BACTROBAN) 2 % ointment Apply  to affected area daily. 30 g 0    ferrous sulfate 325 mg (65 mg iron) tablet Take 325 mg by mouth two (2) times a day.          Allergies  Allergies   Allergen Reactions    Watermelon Anaphylaxis and Swelling    Citric Acid Rash    Peach (Prunus Persica) Itching       Family History  Family History   Problem Relation Age of Onset    Hypertension Mother     Thyroid Disease Mother     Hypertension Father     Diabetes Father     Drug Abuse Brother        Social History  Social History     Socioeconomic History    Marital status:      Spouse name: Not on file    Number of children: Not on file    Years of education: Not on file    Highest education level: Not on file   Occupational History    Not on file   Social Needs    Financial resource strain: Not on file  Food insecurity:     Worry: Not on file     Inability: Not on file    Transportation needs:     Medical: Not on file     Non-medical: Not on file   Tobacco Use    Smoking status: Former Smoker     Packs/day: 1.00     Types: Cigarettes     Start date: 1969     Last attempt to quit: 1995     Years since quittin.4    Smokeless tobacco: Never Used   Substance and Sexual Activity    Alcohol use: No    Drug use: No    Sexual activity: Yes     Partners: Female   Lifestyle    Physical activity:     Days per week: Not on file     Minutes per session: Not on file    Stress: Not on file   Relationships    Social connections:     Talks on phone: Not on file     Gets together: Not on file     Attends Religion service: Not on file     Active member of club or organization: Not on file     Attends meetings of clubs or organizations: Not on file     Relationship status: Not on file    Intimate partner violence:     Fear of current or ex partner: Not on file     Emotionally abused: Not on file     Physically abused: Not on file     Forced sexual activity: Not on file   Other Topics Concern     Service Yes     Comment: Served in [address] Blood Transfusions No    Caffeine Concern No    Occupational Exposure No    Hobby Hazards No    Sleep Concern No    Stress Concern No    Weight Concern Yes    Special Diet No    Back Care Yes     Comment: Lower Back pain when walking    Exercise No    Bike Helmet No    Seat Belt Yes    Self-Exams Yes   Social History Narrative    Not on file       Review of Systems  Review of Systems - Review of all systems is negative except as noted above in the HPI.     Vital Signs  Visit Vitals  /78 (BP 1 Location: Right arm, BP Patient Position: Sitting)   Pulse (!) 57   Temp 97 °F (36.1 °C) (Oral)   Resp 18   Ht 5' 10\" (1.778 m)   Wt 258 lb (117 kg)   SpO2 97%   BMI 37.02 kg/m²         Physical Exam  Physical Examination: General appearance - oriented to person, place, and time, overweight, acyanotic, in no respiratory distress and well hydrated  Mental status - alert, oriented to person, place, and time, affect appropriate to mood  Neck - supple, no significant adenopathy  Lymphatics - no palpable lymphadenopathy, no hepatosplenomegaly  Chest - clear to auscultation, no wheezes, rales or rhonchi, symmetric air entry  Heart - systolic murmur 2/6 at lower left sternal border  Abdomen - no rebound tenderness noted  Back exam - limited range of motion, pain with motion noted during exam  Neurological - abnormal neurological exam unchanged from prior examinations  Musculoskeletal - osteoarthritic changes noted in both hands  Extremities - intact peripheral pulses, has area of excoriation of skin left shin and slight erythema surrounding. No discharge or drainage. No streaking or tenderness. No warmth. Diabetic foot exam:     Left Foot:   Visual Exam: normal    Pulse DP: 2+ (normal)   Filament test: normal sensation        Right Foot:   Visual Exam: normal    Pulse DP: 2+ (normal)   Filament test: Do sensation with early callus formation he will     Results  Results for orders placed or performed during the hospital encounter of 11/12/18   CBC WITH AUTOMATED DIFF   Result Value Ref Range    WBC 5.7 4.6 - 13.2 K/uL    RBC 4.68 (L) 4.70 - 5.50 M/uL    HGB 14.9 13.0 - 16.0 g/dL    HCT 43.0 36.0 - 48.0 %    MCV 91.9 74.0 - 97.0 FL    MCH 31.8 24.0 - 34.0 PG    MCHC 34.7 31.0 - 37.0 g/dL    RDW 12.9 11.6 - 14.5 %    PLATELET 135 611 - 640 K/uL    MPV 10.0 9.2 - 11.8 FL    NEUTROPHILS 52 40 - 73 %    LYMPHOCYTES 35 21 - 52 %    MONOCYTES 7 3 - 10 %    EOSINOPHILS 6 (H) 0 - 5 %    BASOPHILS 0 0 - 2 %    ABS. NEUTROPHILS 2.9 1.8 - 8.0 K/UL    ABS. LYMPHOCYTES 2.0 0.9 - 3.6 K/UL    ABS. MONOCYTES 0.4 0.05 - 1.2 K/UL    ABS. EOSINOPHILS 0.3 0.0 - 0.4 K/UL    ABS. BASOPHILS 0.0 0.0 - 0.1 K/UL    DF AUTOMATED         ASSESSMENT and PLAN    ICD-10-CM ICD-9-CM    1.  Type 2 diabetes mellitus with diabetic nephropathy, unspecified whether long term insulin use ([facility]) E11.21 250.40 AMB POC HEMOGLOBIN A1C     583.81    2. Comprehensive diabetic foot examination, type 2 DM, encounter for ([facility] E11.9 250.00  DIABETES FOOT EXAM      AMB POC GLUCOSE BLOOD, BY GLUCOSE MONITORING DEVICE      REFERRAL TO PODIATRY   3. CKD (chronic kidney disease) stage 3, GFR 30-59 ml/min ([facility]) N18.3 585.3    4. Renovascular hypertension I15.0 405.91    5. Severe obesity (BMI 35.0-39. 9) with comorbidity ([facility] E66.01 278.01    6. Dyslipidemia E78.5 272.4    7. Hypothyroidism due to acquired atrophy of thyroid E03.4 244.8      246.8    8. Rash R21 782.1    9. Neuropathy G62.9 355.9      lab results and schedule of future lab studies reviewed with patient  reviewed diet, exercise and weight control  cardiovascular risk and specific lipid/LDL goals reviewed  reviewed medications and side effects in detail  specific diabetic recommendations: low cholesterol diet, weight control and daily exercise discussed, home glucose monitoring emphasized, all medications, side effects and compliance discussed carefully, annual eye examinations at Ophthalmology discussed, glycohemoglobin and other lab monitoring discussed and long term diabetic complications discussed  Discussed the patient's BMI with him. The BMI follow up plan is as follows:     dietary management education, guidance, and counseling  encourage exercise  monitor weight    I have discussed the diagnosis with the patient and the intended plan of care as seen in the above orders. The patient has received an after-visit summary and questions were answered concerning future plans. I have discussed medication, side effects, and warnings with the patient in detail. The patient verbalized understanding and is in agreement with the plan of care. The patient will contact the office with any additional concerns.     Aroldo Daigle MD    PLEASE NOTE:   This document has been produced using voice recognition software.  Unrecognized errors in transcription may be present

## 2019-07-08 ENCOUNTER — OFFICE VISIT (OUTPATIENT)
Dept: FAMILY MEDICINE CLINIC | Age: 74
End: 2019-07-08

## 2019-07-08 VITALS
HEIGHT: 70 IN | OXYGEN SATURATION: 99 % | HEART RATE: 57 BPM | WEIGHT: 263 LBS | RESPIRATION RATE: 16 BRPM | SYSTOLIC BLOOD PRESSURE: 139 MMHG | DIASTOLIC BLOOD PRESSURE: 72 MMHG | TEMPERATURE: 97.2 F | BODY MASS INDEX: 37.65 KG/M2

## 2019-07-08 DIAGNOSIS — M70.21 OLECRANON BURSITIS OF RIGHT ELBOW: Primary | ICD-10-CM

## 2019-07-08 DIAGNOSIS — I10 ESSENTIAL HYPERTENSION: ICD-10-CM

## 2019-07-08 RX ORDER — PERPHENAZINE 16 MG
1 TABLET ORAL DAILY
COMMUNITY
End: 2022-10-10 | Stop reason: ALTCHOICE

## 2019-07-08 NOTE — PROGRESS NOTES
Chief Complaint   Patient presents with    Elbow Pain     right elbow pain      1. Have you been to the ER, urgent care clinic since your last visit? Hospitalized since your last visit? No    2. Have you seen or consulted any other health care providers outside of the [address] [place] [address] since your last visit? Include any pap smears or colon screening. No     HPI   [employer]. comes in for acute care. Right elbow swelling: patient has swelling right elbow swelling. This ongoing for weeks. Feels mushy. Had some pain and discomfort and this has improved. Not taken medication for this. He is unable to taken NSAIDs  Will have him take tylenol arthritis for pain. F/u at next visit. This is olecranon bursitis. Did give him right up on the same and the supportive care measures. Discussed follow-up by the orthopedist.  We will hold off on this for now. Patient has hypertension. Blood pressure initially was slightly elevated but this is now stable. He is taking medication. We will continue with the current treatment plan. Past Medical History  Past Medical History:   Diagnosis Date    Diabetes ([address] 1995    Dyslipidemia 3/1/2017    Fractures 1995    R Knee/ Tib fib fracture/surgery    Gout 2010    Graves disease 1/21/2015    High cholesterol 2005    HTN (hypertension) 1990    Osteoarthritis 2013    Severe aortic stenosis 3/29/2017    Spinal stenosis of lumbar region 3/1/2016    Spondylolisthesis 3/1/2016    Thyroid trouble     Type 2 diabetes mellitus with diabetic nephropathy ([address] 4/28/2015       Surgical History  Past Surgical History:   Procedure Laterality Date    HX HIP REPLACEMENT Left 2009    HX LUMBAR FUSION      HX ORTHOPAEDIC Right     R knee/Tibfib surgery        Medications  Current Outpatient Medications   Medication Sig Dispense Refill    Alpha Lipoic Acid 600 mg cap Take  by mouth.  CANNABIDIOL, CBD, EXTRACT PO Take  by mouth.       aspirin (ASPIRIN) 325 mg tablet Take 325 mg by mouth daily.  ascorbic acid, vitamin C, (VITAMIN C) 250 mg tablet Take  by mouth.  DULoxetine (CYMBALTA) 60 mg capsule Take 1 Cap by mouth daily. 30 Cap 1    capsicum oleoresin 0.025 % topical cream Apply  to affected area three (3) times daily.  diclofenac (VOLTAREN) 1 % gel Apply  to affected area four (4) times daily.  traMADol (ULTRAM) 50 mg tablet Take 1 Tab by mouth two (2) times daily as needed for Pain. Max Daily Amount: 100 mg. Indications: NEUROPATHIC PAIN 60 Tab 0    magnesium chloride (MAG DELAY) 64 mg delayed release tablet Take 8 tablets 3 x day      amLODIPine (NORVASC) 10 mg tablet Take 5 mg by mouth daily.  pyridoxine, vitamin B6, (VITAMIN B-6) 100 mg tablet Take 100 mg by mouth daily.  metoprolol tartrate (LOPRESSOR) 25 mg tablet Take 1 Tab by mouth two (2) times a day. (Patient taking differently: Take 12.5 mg by mouth two (2) times a day.) 180 Tab 3    insulin glargine (LANTUS) 100 unit/mL injection 40 Units. Take 30 units once daily  Indications: type 2 diabetes mellitus 3 Vial 3    cyanocobalamin (VITAMIN B-12) 1,000 mcg tablet Take 1,000 mcg by mouth daily.  febuxostat (ULORIC) 40 mg tab tablet Take 1 Tab by mouth daily. Indications: GOUT PREVENTION 90 Tab 1    insulin aspart (NOVOLOG) 100 unit/mL injection by SubCUTAneous route. 6 units before meals      spironolactone (ALDACTONE) 25 mg tablet Take 1 Tab by mouth daily. Indications: EDEMA (Patient taking differently: Take 25 mg by mouth daily.) 30 Tab 3    glucose blood VI test strips (ASCENSIA AUTODISC VI, ONE TOUCH ULTRA TEST VI) strip Dispense precision extra test strips 3 x day to check blood glucose 200 Each 3    Omeprazole delayed release (PRILOSEC D/R) 20 mg tablet Take 20 mg by mouth daily.  atorvastatin (LIPITOR) 40 mg tablet Take 20 mg by mouth daily.  testosterone (ANDROGEL) 20.25 mg/1.25 gram (1.62 %) gel Apply  to affected area as needed.       Cholecalciferol, Vitamin D3, 1,000 unit cap Take 3,000 Units by mouth daily.  levothyroxine (SYNTHROID) 200 mcg tablet Take 1 Tab by mouth Daily (before breakfast).  30 Tab 2       Allergies  Allergies   Allergen Reactions    Watermelon Anaphylaxis and Swelling    Citric Acid Rash    Peach (Prunus Persica) Itching       Family History  Family History   Problem Relation Age of Onset    Hypertension Mother     Thyroid Disease Mother     Hypertension Father     Diabetes Father     Drug Abuse Brother        Social History  Social History     Socioeconomic History    Marital status:      Spouse name: Not on file    Number of children: Not on file    Years of education: Not on file    Highest education level: Not on file   Occupational History    Not on file   Social Needs    Financial resource strain: Not on file    Food insecurity:     Worry: Not on file     Inability: Not on file    Transportation needs:     Medical: Not on file     Non-medical: Not on file   Tobacco Use    Smoking status: Former Smoker     Packs/day: 1.00     Types: Cigarettes     Start date: 1969     Last attempt to quit: 1995     Years since quittin.5    Smokeless tobacco: Never Used   Substance and Sexual Activity    Alcohol use: No    Drug use: No    Sexual activity: Yes     Partners: Female   Lifestyle    Physical activity:     Days per week: Not on file     Minutes per session: Not on file    Stress: Not on file   Relationships    Social connections:     Talks on phone: Not on file     Gets together: Not on file     Attends Confucianism service: Not on file     Active member of club or organization: Not on file     Attends meetings of clubs or organizations: Not on file     Relationship status: Not on file    Intimate partner violence:     Fear of current or ex partner: Not on file     Emotionally abused: Not on file     Physically abused: Not on file     Forced sexual activity: Not on file   Other Topics Concern     Service Yes     Comment: Served in [address] Blood Transfusions No    Caffeine Concern No    Occupational Exposure No    Hobby Hazards No    Sleep Concern No    Stress Concern No    Weight Concern Yes    Special Diet No    Back Care Yes     Comment: Lower Back pain when walking    Exercise No    Bike Helmet No    Seat Belt Yes    Self-Exams Yes   Social History Narrative    Not on file       Review of Systems  Review of Systems - Review of all systems is negative except as noted above in the HPI. Vital Signs  Visit Vitals  /72 (BP 1 Location: Left arm, BP Patient Position: Sitting)   Pulse (!) 57   Temp 97.2 °F (36.2 °C) (Oral)   Resp 16   Ht 5' 10\" (1.778 m)   Wt 263 lb (119.3 kg)   SpO2 99%   BMI 37.74 kg/m²     Physical Exam  Physical Examination: General appearance - oriented to person, place, and time, overweight and acyanotic, in no respiratory distress  Mental status - alert, oriented to person, place, and time, affect appropriate to mood  Chest - clear to auscultation, no wheezes, rales or rhonchi, symmetric air entry  Heart - S1 and S2 normal, systolic murmur 2/6 at lower left sternal border  Neurological - abnormal neurological exam unchanged from prior examinations  Musculoskeletal -right elbow swelling, full range of motion of the right upper extremity at the elbow joint  Extremities - intact peripheral pulses    Results  Results for orders placed or performed in visit on 06/10/19   AMB POC HEMOGLOBIN A1C   Result Value Ref Range    Hemoglobin A1c (POC) 7.1 %   AMB POC GLUCOSE BLOOD, BY GLUCOSE MONITORING DEVICE   Result Value Ref Range    Glucose  mg/dL       ASSESSMENT and PLAN    ICD-10-CM ICD-9-CM    1. Olecranon bursitis of right elbow M70.21 726.33    2.  Essential hypertension I10 401.9      reviewed diet, exercise and weight control  reviewed medications and side effects in detail    I have discussed the diagnosis with the patient and the intended plan of care as seen in the above orders. The patient has received an after-visit summary and questions were answered concerning future plans. I have discussed medication, side effects, and warnings with the patient in detail. The patient verbalized understanding and is in agreement with the plan of care. The patient will contact the office with any additional concerns. Nalini Phan MD    PLEASE NOTE:   This document has been produced using voice recognition software.  Unrecognized errors in transcription may be present

## 2019-07-08 NOTE — PATIENT INSTRUCTIONS
Bursitis of the Elbow: Care Instructions Your Care Instructions Bursitis is pain and swelling of the bursae. These are sacs of fluid that help your joints move smoothly. Olecranon bursitis is a type of bursitis that affects the back of the elbow. This is sometimes called Igor elbow because the bump that develops looks like the cartoon character Igor's elbow. Injury, overuse, or prolonged pressure on your elbow can cause this form of bursitis. Sometimes it happens when people have arthritis. It also can occur for unknown reasons. Treatment may include draining fluid from the bursa with a needle. If your doctor thought there was infection, he or she may have prescribed antibiotics. You also may get shots of medicine into the bursa to help the swelling go down. Your elbow should get better in a few days or weeks. Follow-up care is a key part of your treatment and safety. Be sure to make and go to all appointments, and call your doctor if you are having problems. It's also a good idea to know your test results and keep a list of the medicines you take. How can you care for yourself at home? · Take pain medicines exactly as directed. ? If the doctor gave you a prescription medicine for pain, take it as prescribed. ? If you are not taking a prescription pain medicine, ask your doctor if you can take an over-the-counter medicine. ? Do not take two or more pain medicines at the same time unless the doctor told you to. Many pain medicines have acetaminophen, which is Tylenol. Too much acetaminophen (Tylenol) can be harmful. · If your doctor prescribed antibiotics, take them as directed. Do not stop taking them just because you feel better. You need to take the full course of antibiotics. · If your doctor gave you a sling, an elastic bandage, or a compression sleeve, wear it exactly as instructed. · Put ice or a cold pack on your elbow for 10 to 20 minutes at a time.  Try to do this every 1 to 2 hours for the next 3 days (when you are awake) or until the swelling goes down. Put a thin cloth between the ice and your skin. · After 3 days, you can try heat, or alternate heat and ice. · Rest your elbow. Try to stop or reduce any activity that causes pain. · Wear elbow pads during physical activity to prevent injury. · Do not lean your elbows on tables or armrests. When should you call for help? Call your doctor now or seek immediate medical care if: 
  · You have new or worse symptoms of infection, such as: 
? Increased pain, swelling, warmth, or redness. ? Red streaks leading from the area. ? Pus draining from the area. ? A fever.  
 Watch closely for changes in your health, and be sure to contact your doctor if: 
  · You do not get better as expected. Where can you learn more? Go to http://adal-janes.info/. Enter  in the search box to learn more about \"Bursitis of the Elbow: Care Instructions. \" Current as of: September 20, 2018 Content Version: 11.9 © 3571-0532 [employer]. Care instructions adapted under license by [employer] (which disclaims liability or warranty for this information). If you have questions about a medical condition or this instruction, always ask your healthcare professional. [address] any warranty or liability for your use of this information. Elbow Bursitis: Exercises Your Care Instructions Here are some examples of typical rehabilitation exercises for your condition. Start each exercise slowly. Ease off the exercise if you start to have pain. Your doctor or physical therapist will tell you when you can start these exercises and which ones will work best for you. How to do the exercises Elbow flexion stretch 1. Lift the arm that bothers you, and bend the elbow. Your palm should face toward you. 2. With your other hand, gently push on the back of your affected forearm. Press your hand toward your shoulder until you feel a stretch in the back of your upper arm. 3. Hold for at least 15 to 30 seconds. 4. Repeat 2 to 4 times. Elbow extension stretch 1. Extend your affected arm in front of you with your palm facing away from you. 2. Bend back your wrist, pointing your hand up toward the ceiling. 3. With your other hand, gently bend your wrist farther until you feel a mild to moderate stretch in your forearm. 4. Hold for at least 15 to 30 seconds. 5. Repeat 2 to 4 times. 6. Repeat steps 1 through 5. But this time extend your affected arm in front of you with your palm facing up. Then bend back your wrist, pointing your hand toward the floor. Pronation and supination stretch 1. Keep your affected elbow at your side, bent at about 90 degrees. Grasp a pen, pencil, or stick, and wrap your hand around it. If you don't have something to hold on to, make a fist instead. 2. Slowly turn your forearm as far as you can back and forth in each direction. Your hand should face up and then down. 3. Hold each position for about 6 seconds. 4. Relax for up to 10 seconds between repetitions. 5. Repeat 8 to 12 times. Hand flips 1. While seated, place your affected forearm on your thigh. Your palm should face down. 2. Flip your hand over so the back of your hand rests on your thigh and your palm is up. Alternate between palm up and palm down while keeping your forearm on your thigh. 3. Repeat 8 to 12 times. Follow-up care is a key part of your treatment and safety. Be sure to make and go to all appointments, and call your doctor if you are having problems. It's also a good idea to know your test results and keep a list of the medicines you take. Where can you learn more? Go to http://adal-janes.info/.  
Enter Q047 in the search box to learn more about \"Elbow Bursitis: Exercises. \" Current as of: September 20, 2018 Content Version: 11.9 © 9470-7500 [employer]. Care instructions adapted under license by [employer] (which disclaims liability or warranty for this information). If you have questions about a medical condition or this instruction, always ask your healthcare professional. [place] [address] any warranty or liability for your use of this information.

## 2019-07-31 ENCOUNTER — TELEPHONE (OUTPATIENT)
Dept: FAMILY MEDICINE CLINIC | Age: 74
End: 2019-07-31

## 2019-07-31 DIAGNOSIS — M70.31 BURSITIS OF RIGHT ELBOW, UNSPECIFIED BURSA: Primary | ICD-10-CM

## 2019-07-31 NOTE — TELEPHONE ENCOUNTER
Patient called requesting referral to Ortho based on his last office visit 7/8/19 (bursitis of right elbow)

## 2019-08-21 ENCOUNTER — OFFICE VISIT (OUTPATIENT)
Dept: CARDIOLOGY CLINIC | Age: 74
End: 2019-08-21

## 2019-08-21 VITALS
WEIGHT: 263 LBS | DIASTOLIC BLOOD PRESSURE: 74 MMHG | SYSTOLIC BLOOD PRESSURE: 152 MMHG | BODY MASS INDEX: 37.65 KG/M2 | HEIGHT: 70 IN | HEART RATE: 67 BPM

## 2019-08-21 DIAGNOSIS — E78.5 DYSLIPIDEMIA: ICD-10-CM

## 2019-08-21 DIAGNOSIS — N18.30 CKD (CHRONIC KIDNEY DISEASE) STAGE 3, GFR 30-59 ML/MIN (HCC): ICD-10-CM

## 2019-08-21 DIAGNOSIS — R01.1 MURMUR, CARDIAC: Primary | ICD-10-CM

## 2019-08-21 DIAGNOSIS — E03.4 HYPOTHYROIDISM DUE TO ACQUIRED ATROPHY OF THYROID: ICD-10-CM

## 2019-08-21 DIAGNOSIS — E11.21 TYPE 2 DIABETES MELLITUS WITH DIABETIC NEPHROPATHY, UNSPECIFIED WHETHER LONG TERM INSULIN USE (HCC): ICD-10-CM

## 2019-08-21 DIAGNOSIS — I10 ESSENTIAL HYPERTENSION: ICD-10-CM

## 2019-08-21 NOTE — PATIENT INSTRUCTIONS
High Blood Pressure: Care Instructions  Overview    It's normal for blood pressure to go up and down throughout the day. But if it stays up, you have high blood pressure. Another name for high blood pressure is hypertension. Despite what a lot of people think, high blood pressure usually doesn't cause headaches or make you feel dizzy or lightheaded. It usually has no symptoms. But it does increase your risk of stroke, heart attack, and other problems. You and your doctor will talk about your risks of these problems based on your blood pressure. Your doctor will give you a goal for your blood pressure. Your goal will be based on your health and your age. Lifestyle changes, such as eating healthy and being active, are always important to help lower blood pressure. You might also take medicine to reach your blood pressure goal.  Follow-up care is a key part of your treatment and safety. Be sure to make and go to all appointments, and call your doctor if you are having problems. It's also a good idea to know your test results and keep a list of the medicines you take. How can you care for yourself at home? Medical treatment  · If you stop taking your medicine, your blood pressure will go back up. You may take one or more types of medicine to lower your blood pressure. Be safe with medicines. Take your medicine exactly as prescribed. Call your doctor if you think you are having a problem with your medicine. · Talk to your doctor before you start taking aspirin every day. Aspirin can help certain people lower their risk of a heart attack or stroke. But taking aspirin isn't right for everyone, because it can cause serious bleeding. · See your doctor regularly. You may need to see the doctor more often at first or until your blood pressure comes down. · If you are taking blood pressure medicine, talk to your doctor before you take decongestants or anti-inflammatory medicine, such as ibuprofen.  Some of these medicines can raise blood pressure. · Learn how to check your blood pressure at home. Lifestyle changes  · Stay at a healthy weight. This is especially important if you put on weight around the waist. Losing even 10 pounds can help you lower your blood pressure. · If your doctor recommends it, get more exercise. Walking is a good choice. Bit by bit, increase the amount you walk every day. Try for at least 30 minutes on most days of the week. You also may want to swim, bike, or do other activities. · Avoid or limit alcohol. Talk to your doctor about whether you can drink any alcohol. · Try to limit how much sodium you eat to less than 2,300 milligrams (mg) a day. Your doctor may ask you to try to eat less than 1,500 mg a day. · Eat plenty of fruits (such as bananas and oranges), vegetables, legumes, whole grains, and low-fat dairy products. · Lower the amount of saturated fat in your diet. Saturated fat is found in animal products such as milk, cheese, and meat. Limiting these foods may help you lose weight and also lower your risk for heart disease. · Do not smoke. Smoking increases your risk for heart attack and stroke. If you need help quitting, talk to your doctor about stop-smoking programs and medicines. These can increase your chances of quitting for good. When should you call for help? Call 911 anytime you think you may need emergency care. This may mean having symptoms that suggest that your blood pressure is causing a serious heart or blood vessel problem. Your blood pressure may be over 180/120.   For example, call 911 if:    · You have symptoms of a heart attack. These may include:  ? Chest pain or pressure, or a strange feeling in the chest.  ? Sweating. ? Shortness of breath. ? Nausea or vomiting. ? Pain, pressure, or a strange feeling in the back, neck, jaw, or upper belly or in one or both shoulders or arms. ? Lightheadedness or sudden weakness.   ? A fast or irregular heartbeat.     · You have symptoms of a stroke. These may include:  ? Sudden numbness, tingling, weakness, or loss of movement in your face, arm, or leg, especially on only one side of your body. ? Sudden vision changes. ? Sudden trouble speaking. ? Sudden confusion or trouble understanding simple statements. ? Sudden problems with walking or balance. ? A sudden, severe headache that is different from past headaches.     · You have severe back or belly pain.    Do not wait until your blood pressure comes down on its own. Get help right away.   Call your doctor now or seek immediate care if:    · Your blood pressure is much higher than normal (such as 180/120 or higher), but you don't have symptoms.     · You think high blood pressure is causing symptoms, such as:  ? Severe headache.  ? Blurry vision.    Watch closely for changes in your health, and be sure to contact your doctor if:    · Your blood pressure measures higher than your doctor recommends at least 2 times. That means the top number is higher or the bottom number is higher, or both.     · You think you may be having side effects from your blood pressure medicine. Where can you learn more? Go to http://adal-janes.info/. Enter Q003 in the search box to learn more about \"High Blood Pressure: Care Instructions. \"  Current as of: July 22, 2018  Content Version: 12.1  © 4697-9617 [employer]. Care instructions adapted under license by [employer] (which disclaims liability or warranty for this information). If you have questions about a medical condition or this instruction, always ask your healthcare professional. [address] any warranty or liability for your use of this information.

## 2019-08-21 NOTE — PROGRESS NOTES
1. Have you been to the ER, urgent care clinic since your last visit? Hospitalized since your last visit? No        2. Have you seen or consulted any other health care providers outside of the [address] [place] [address] since your last visit? Include any pap smears or colon screening. Yes pcp ,      3. Since your last visit, have you had any of the following symptoms? Swelling everyday but goes away, sob on exertion     4. Have you had any blood work, X-rays or cardiac testing?    blood work at [facility],              5.  Where do you normally have your labs drawn?   pcp    6. Do you need any refills today?    no

## 2019-08-21 NOTE — PROGRESS NOTES
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 Hypertension   The history is provided by the patient. This is a chronic problem. The current episode started more than 1 week ago. The problem occurs every several days. The problem has not changed since onset. Associated symptoms include shortness of breath. Pertinent negatives include no chest pain. Shortness of Breath   The history is provided by the patient. This is a chronic problem. The problem occurs intermittently. The current episode started more than 1 week ago. The problem has been gradually improving. Pertinent negatives include no fever, no ear pain, no neck pain, no cough, no sputum production, no hemoptysis, no wheezing, no PND, no orthopnea, no chest pain, no syncope, no vomiting, no rash, no leg pain, no leg swelling and no claudication. Associated medical issues do not include chronic lung disease, CAD or heart failure. Review of Systems   Constitutional: Negative for chills, diaphoresis, fever, malaise/fatigue and weight loss. HENT: Negative for ear discharge, ear pain, hearing loss, nosebleeds and tinnitus. Eyes: Negative for blurred vision. Respiratory: Positive for shortness of breath. Negative for cough, hemoptysis, sputum production, wheezing and stridor. Cardiovascular: Negative for chest pain, palpitations, orthopnea, claudication, leg swelling, syncope and PND. Gastrointestinal: Negative for heartburn, nausea and vomiting. Musculoskeletal: Negative for myalgias and neck pain. Skin: Negative for itching and rash. Neurological: Negative for dizziness, tingling, tremors, focal weakness, loss of consciousness and weakness. Psychiatric/Behavioral: Negative for depression and suicidal ideas.      Family History   Problem Relation Age of Onset    Hypertension Mother     Thyroid Disease Mother     Hypertension Father     Diabetes Father     Drug Abuse Brother        Past Medical History: Diagnosis Date    [facility] [address]     Dyslipidemia 3/1/2017    Fractures     R Knee/ Tib fib fracture/surgery    Gout 2010    Graves disease 2015    High cholesterol     HTN (hypertension)     Osteoarthritis     Severe aortic stenosis 3/29/2017    Spinal stenosis of lumbar region 3/1/2016    Spondylolisthesis 3/1/2016    Thyroid trouble     Type 2 diabetes mellitus with diabetic nephropathy ([facility] [address] 2015       Past Surgical History:   Procedure Laterality Date    HX HIP REPLACEMENT Left 2009    HX LUMBAR FUSION      HX ORTHOPAEDIC Right     R knee/Tibfib surgery       Social History     Tobacco Use    Smoking status: Former Smoker     Packs/day: 1.00     Types: Cigarettes     Start date: 1969     Last attempt to quit: 1995     Years since quittin.6    Smokeless tobacco: Never Used   Substance Use Topics    Alcohol use: No       Allergies   Allergen Reactions    Watermelon Anaphylaxis and Swelling    Citric Acid Rash    Peach (Prunus Persica) Itching       Outpatient Medications Marked as Taking for the 19 encounter (Office Visit) with Corrinne Gearing, MD   Medication Sig Dispense Refill    Alpha Lipoic Acid 600 mg cap Take  by mouth.  CANNABIDIOL, CBD, EXTRACT PO Take  by mouth.  levothyroxine (SYNTHROID) 200 mcg tablet Take 1 Tab by mouth Daily (before breakfast). 30 Tab 2    aspirin (ASPIRIN) 325 mg tablet Take 325 mg by mouth daily.  ascorbic acid, vitamin C, (VITAMIN C) 250 mg tablet Take  by mouth.  DULoxetine (CYMBALTA) 60 mg capsule Take 1 Cap by mouth daily. 30 Cap 1    capsicum oleoresin 0.025 % topical cream Apply  to affected area three (3) times daily.  diclofenac (VOLTAREN) 1 % gel Apply  to affected area four (4) times daily.  magnesium chloride (MAG DELAY) 64 mg delayed release tablet Take 8 tablets 3 x day      amLODIPine (NORVASC) 10 mg tablet Take 5 mg by mouth daily.       pyridoxine, vitamin B6, (VITAMIN B-6) 100 mg tablet Take 100 mg by mouth daily.  metoprolol tartrate (LOPRESSOR) 25 mg tablet Take 1 Tab by mouth two (2) times a day. (Patient taking differently: Take 12.5 mg by mouth two (2) times a day.) 180 Tab 3    insulin glargine (LANTUS) 100 unit/mL injection 40 Units. Take 30 units once daily  Indications: type 2 diabetes mellitus 3 Vial 3    cyanocobalamin (VITAMIN B-12) 1,000 mcg tablet Take 1,000 mcg by mouth daily.  febuxostat (ULORIC) 40 mg tab tablet Take 1 Tab by mouth daily. Indications: GOUT PREVENTION 90 Tab 1    insulin aspart (NOVOLOG) 100 unit/mL injection by SubCUTAneous route. 6 units before meals      spironolactone (ALDACTONE) 25 mg tablet Take 1 Tab by mouth daily. Indications: EDEMA (Patient taking differently: Take 25 mg by mouth daily.) 30 Tab 3    glucose blood VI test strips (ASCENSIA AUTODISC VI, ONE TOUCH ULTRA TEST VI) strip Dispense precision extra test strips 3 x day to check blood glucose 200 Each 3    Omeprazole delayed release (PRILOSEC D/R) 20 mg tablet Take 20 mg by mouth daily.  atorvastatin (LIPITOR) 40 mg tablet Take 20 mg by mouth daily.  testosterone (ANDROGEL) 20.25 mg/1.25 gram (1.62 %) gel Apply  to affected area as needed.  Cholecalciferol, Vitamin D3, 1,000 unit cap Take 3,000 Units by mouth daily. Visit Vitals  /74   Pulse 67   Ht 5' 10\" (1.778 m)   Wt 119.3 kg (263 lb)   BMI 37.74 kg/m²     Physical Exam   Constitutional: He is oriented to person, place, and time. He appears well-developed and well-nourished. No distress. HENT:   Head: Atraumatic. Mouth/Throat: No oropharyngeal exudate. Eyes: Conjunctivae are normal. Right eye exhibits no discharge. Left eye exhibits no discharge. No scleral icterus. Neck: Normal range of motion. Neck supple. No JVD present. No tracheal deviation present. No thyromegaly present. Cardiovascular: Normal rate and regular rhythm. Exam reveals no gallop.    No murmur heard. Surgical sternal scar   Pulmonary/Chest: Effort normal and breath sounds normal. No stridor. No respiratory distress. He has no wheezes. He has no rales. He exhibits no tenderness. Abdominal: Soft. There is no tenderness. There is no rebound and no guarding. Musculoskeletal: Normal range of motion. He exhibits no edema or tenderness. Lymphadenopathy:     He has no cervical adenopathy. Neurological: He is alert and oriented to person, place, and time. He exhibits normal muscle tone. Skin: Skin is warm. He is not diaphoretic. Psychiatric: He has a normal mood and affect. His behavior is normal.     ekg sinus rhythm with PVC, no acute st-t changes  Echo 02/2017:  SUMMARY:  Left ventricle: Systolic function was normal by visual assessment. Ejection fraction was estimated in the range of 60 % to 65 %. No obvious  wall motion abnormalities identified in the views obtained. Wall thickness  was at the upper limits of normal.    Aortic valve: Leaflets exhibited markedly increased thickness and reduced  mobility. There was moderate to severe stenosis. Valve peak gradient was  61 mmHg. Valve mean gradient was 39 mmHg. Estimated aortic valve area (by  VTI) was 0.5 cmï¾². ZACKERY likely overestimates severity of aortic stenosis due  to difficulty in measuring LVOT diameter. Aorta, systemic arteries: The root exhibited dilatation. ASSESSMENT and PLAN    ICD-10-CM ICD-9-CM    1. s/p AVR R01.1 785.2 ECHO ADULT COMPLETE   2. Dyslipidemia E78.5 272.4    3. Type 2 diabetes mellitus with diabetic nephropathy, unspecified whether long term insulin use ([facility]) E11.21 250.40      583.81    4. CKD (chronic kidney disease) stage 3, GFR 30-59 ml/min ([facility]) N18.3 585.3    5. Hypothyroidism due to acquired atrophy of thyroid E03.4 244.8      246.8    6. Essential hypertension I10 401.9      No orders of the defined types were placed in this encounter.         reviewed diet, exercise and weight control  cardiovascular risk and specific lipid/LDL goals reviewed  use of aspirin to prevent MI and TIA's discussed. Now S/p AVR. Remains asymptomatic from cardiac standpoint  Recommend to take aspirin daily  Continue salt restriction and weight reduction.   F/u in 6 months with repeat echo to assess VHD

## 2019-09-11 ENCOUNTER — OFFICE VISIT (OUTPATIENT)
Dept: FAMILY MEDICINE CLINIC | Age: 74
End: 2019-09-11

## 2019-09-11 ENCOUNTER — HOSPITAL ENCOUNTER (OUTPATIENT)
Dept: LAB | Age: 74
Discharge: HOME | End: 2019-09-11
Payer: MEDICARE

## 2019-09-11 VITALS
HEIGHT: 70 IN | DIASTOLIC BLOOD PRESSURE: 85 MMHG | RESPIRATION RATE: 16 BRPM | SYSTOLIC BLOOD PRESSURE: 166 MMHG | OXYGEN SATURATION: 99 % | TEMPERATURE: 96.3 F | BODY MASS INDEX: 37.22 KG/M2 | WEIGHT: 260 LBS | HEART RATE: 51 BPM

## 2019-09-11 DIAGNOSIS — E78.5 DYSLIPIDEMIA: ICD-10-CM

## 2019-09-11 DIAGNOSIS — E11.21 TYPE 2 DIABETES MELLITUS WITH DIABETIC NEPHROPATHY, UNSPECIFIED WHETHER LONG TERM INSULIN USE (HCC): ICD-10-CM

## 2019-09-11 DIAGNOSIS — Z23 ENCOUNTER FOR IMMUNIZATION: ICD-10-CM

## 2019-09-11 DIAGNOSIS — E03.4 HYPOTHYROIDISM DUE TO ACQUIRED ATROPHY OF THYROID: ICD-10-CM

## 2019-09-11 DIAGNOSIS — M10.9 GOUT, UNSPECIFIED CAUSE, UNSPECIFIED CHRONICITY, UNSPECIFIED SITE: ICD-10-CM

## 2019-09-11 DIAGNOSIS — E55.9 HYPOVITAMINOSIS D: ICD-10-CM

## 2019-09-11 DIAGNOSIS — I10 ESSENTIAL HYPERTENSION: ICD-10-CM

## 2019-09-11 DIAGNOSIS — I10 ESSENTIAL HYPERTENSION: Primary | ICD-10-CM

## 2019-09-11 DIAGNOSIS — N18.30 CKD (CHRONIC KIDNEY DISEASE) STAGE 3, GFR 30-59 ML/MIN (HCC): Chronic | ICD-10-CM

## 2019-09-11 LAB
BASOPHILS # BLD: 0 K/UL (ref 0–0.1)
BASOPHILS NFR BLD: 0 % (ref 0–2)
DIFFERENTIAL METHOD BLD: ABNORMAL
EOSINOPHIL # BLD: 0.4 K/UL (ref 0–0.4)
EOSINOPHIL NFR BLD: 8 % (ref 0–5)
ERYTHROCYTE [DISTWIDTH] IN BLOOD BY AUTOMATED COUNT: 12.9 % (ref 11.6–14.5)
HCT VFR BLD AUTO: 43.7 % (ref 36–48)
HGB BLD-MCNC: 14.7 G/DL (ref 13–16)
LYMPHOCYTES # BLD: 2.1 K/UL (ref 0.9–3.6)
LYMPHOCYTES NFR BLD: 42 % (ref 21–52)
MAGNESIUM SERPL-MCNC: 1.8 MG/DL (ref 1.6–2.6)
MCH RBC QN AUTO: 30.6 PG (ref 24–34)
MCHC RBC AUTO-ENTMCNC: 33.6 G/DL (ref 31–37)
MCV RBC AUTO: 91 FL (ref 74–97)
MONOCYTES # BLD: 0.3 K/UL (ref 0.05–1.2)
MONOCYTES NFR BLD: 5 % (ref 3–10)
NEUTS SEG # BLD: 2.3 K/UL (ref 1.8–8)
NEUTS SEG NFR BLD: 45 % (ref 40–73)
PLATELET # BLD AUTO: 189 K/UL (ref 135–420)
PMV BLD AUTO: 9.7 FL (ref 9.2–11.8)
RBC # BLD AUTO: 4.8 M/UL (ref 4.7–5.5)
WBC # BLD AUTO: 5.1 K/UL (ref 4.6–13.2)

## 2019-09-11 PROCEDURE — 85025 COMPLETE CBC W/AUTO DIFF WBC: CPT

## 2019-09-11 PROCEDURE — 84550 ASSAY OF BLOOD/URIC ACID: CPT

## 2019-09-11 PROCEDURE — 80061 LIPID PANEL: CPT

## 2019-09-11 PROCEDURE — 82306 VITAMIN D 25 HYDROXY: CPT

## 2019-09-11 PROCEDURE — 83735 ASSAY OF MAGNESIUM: CPT

## 2019-09-11 RX ORDER — MULTIVIT WITH MINERALS/HERBS
1 TABLET ORAL DAILY
COMMUNITY

## 2019-09-11 NOTE — PROGRESS NOTES
Chief Complaint   Patient presents with    Diabetes     1. Have you been to the ER, urgent care clinic since your last visit? Hospitalized since your last visit? No    2. Have you seen or consulted any other health care providers outside of the [address] [place] [address] since your last visit? Include any pap smears or colon screening. No     HPI  Mariah Garcia. comes in for f/u care. Hypertension: Patient's blood pressure is elevated. He is on Spironolactone, Norvasc and metoprolol. Discussed medication adjustments. He states that at home his blood pressure is usually normal.  He will keep a blood pressure log and will let us know about this in the next 2 weeks. If elevated we will go up on the Spironolactone to 50 mg daily. Currently takes 25 mg daily of the spironolactone. Diabetes mellitus type 2: Patient has type 2 diabetes mellitus and is on insulin. We will recheck his labs. I will check his HbA1c and microalbumin. Hypothyroidism: Patient has history of hypothyroidism. He has been on thyroid replacement therapy and recently medication was adjusted. Most recent his TSH was elevated. I will recheck labs today. The patient has a history of gout arthritis. He takes Uloric. Continue current treatment plan. I will check his uric acid level. He has a history of hypomagnesemia and takes magnesium replacement therapy. I will recheck magnesium levels. He denies muscle aches or cramps. Patient does have GERD. He is on omeprazole daily. Continue current treatment plan. He also has dyslipidemia and takes Lipitor daily. I will recheck his lipid panel. I will follow-up with the results.       Past Medical History  Past Medical History:   Diagnosis Date    Diabetes ([facility] [address] 1995    Dyslipidemia 3/1/2017    Fractures 1995    R Knee/ Tib fib fracture/surgery    Gout 2010    Graves disease 1/21/2015    High cholesterol 2005    HTN (hypertension) 1990    Osteoarthritis 2013    Severe aortic stenosis 3/29/2017    Spinal stenosis of lumbar region 3/1/2016    Spondylolisthesis 3/1/2016    Thyroid trouble     Type 2 diabetes mellitus with diabetic nephropathy ([facility] [address] 4/28/2015       Surgical History  Past Surgical History:   Procedure Laterality Date    HX HIP REPLACEMENT Left 2009    HX LUMBAR FUSION      HX ORTHOPAEDIC Right     R knee/Tibfib surgery        Medications  Current Outpatient Medications   Medication Sig Dispense Refill    b complex vitamins tablet Take 1 Tab by mouth daily.  Alpha Lipoic Acid 600 mg cap Take  by mouth.  CANNABIDIOL, CBD, EXTRACT PO Take  by mouth.  aspirin (ASPIRIN) 325 mg tablet Take 325 mg by mouth daily.  ascorbic acid, vitamin C, (VITAMIN C) 250 mg tablet Take  by mouth.  DULoxetine (CYMBALTA) 60 mg capsule Take 1 Cap by mouth daily. 30 Cap 1    capsicum oleoresin 0.025 % topical cream Apply  to affected area three (3) times daily.  diclofenac (VOLTAREN) 1 % gel Apply  to affected area four (4) times daily.  traMADol (ULTRAM) 50 mg tablet Take 1 Tab by mouth two (2) times daily as needed for Pain. Max Daily Amount: 100 mg. Indications: NEUROPATHIC PAIN 60 Tab 0    magnesium chloride (MAG DELAY) 64 mg delayed release tablet Take 8 tablets 3 x day      amLODIPine (NORVASC) 10 mg tablet Take 5 mg by mouth daily.  metoprolol tartrate (LOPRESSOR) 25 mg tablet Take 1 Tab by mouth two (2) times a day. (Patient taking differently: Take 12.5 mg by mouth two (2) times a day.) 180 Tab 3    insulin glargine (LANTUS) 100 unit/mL injection 40 Units. Take 30 units once daily  Indications: type 2 diabetes mellitus 3 Vial 3    febuxostat (ULORIC) 40 mg tab tablet Take 1 Tab by mouth daily. Indications: GOUT PREVENTION 90 Tab 1    insulin aspart (NOVOLOG) 100 unit/mL injection by SubCUTAneous route. 6 units before meals      spironolactone (ALDACTONE) 25 mg tablet Take 1 Tab by mouth daily.  Indications: EDEMA (Patient taking differently: Take 25 mg by mouth daily.) 30 Tab 3    glucose blood VI test strips (ASCENSIA AUTODISC VI, ONE TOUCH ULTRA TEST VI) strip Dispense precision extra test strips 3 x day to check blood glucose 200 Each 3    Omeprazole delayed release (PRILOSEC D/R) 20 mg tablet Take 20 mg by mouth daily.  atorvastatin (LIPITOR) 40 mg tablet Take 20 mg by mouth daily.  testosterone (ANDROGEL) 20.25 mg/1.25 gram (1.62 %) gel Apply  to affected area as needed.  Cholecalciferol, Vitamin D3, 1,000 unit cap Take 3,000 Units by mouth daily.  levothyroxine (SYNTHROID) 200 mcg tablet Take 1 Tab by mouth Daily (before breakfast). (Patient taking differently: Take 200 mcg by mouth Daily (before breakfast).  Medication dosage has been changed to 88mcg) 30 Tab 2       Allergies  Allergies   Allergen Reactions    Watermelon Anaphylaxis and Swelling    Citric Acid Rash    Peach (Prunus Persica) Itching       Family History  Family History   Problem Relation Age of Onset    Hypertension Mother     Thyroid Disease Mother     Hypertension Father     Diabetes Father     Drug Abuse Brother        Social History  Social History     Socioeconomic History    Marital status:      Spouse name: Not on file    Number of children: Not on file    Years of education: Not on file    Highest education level: Not on file   Occupational History    Not on file   Social Needs    Financial resource strain: Not on file    Food insecurity:     Worry: Not on file     Inability: Not on file    Transportation needs:     Medical: Not on file     Non-medical: Not on file   Tobacco Use    Smoking status: Former Smoker     Packs/day: 1.00     Types: Cigarettes     Start date: 1969     Last attempt to quit: 1995     Years since quittin.7    Smokeless tobacco: Never Used   Substance and Sexual Activity    Alcohol use: No    Drug use: No    Sexual activity: Yes     Partners: Female   Lifestyle    Physical activity:     Days per week: Not on file     Minutes per session: Not on file    Stress: Not on file   Relationships    Social connections:     Talks on phone: Not on file     Gets together: Not on file     Attends Restorationism service: Not on file     Active member of club or organization: Not on file     Attends meetings of clubs or organizations: Not on file     Relationship status: Not on file    Intimate partner violence:     Fear of current or ex partner: Not on file     Emotionally abused: Not on file     Physically abused: Not on file     Forced sexual activity: Not on file   Other Topics Concern     Service Yes     Comment: Served in [address] Blood Transfusions No    Caffeine Concern No    Occupational Exposure No    Hobby Hazards No    Sleep Concern No    Stress Concern No    Weight Concern Yes    Special Diet No    Back Care Yes     Comment: Lower Back pain when walking    Exercise No    Bike Helmet No    Seat Belt Yes    Self-Exams Yes   Social History Narrative    Not on file       Review of Systems  Review of Systems - Review of all systems is negative except as noted above in the HPI. Vital Signs  Visit Vitals  /85 (BP 1 Location: Left arm, BP Patient Position: Sitting)   Pulse (!) 51   Temp 96.3 °F (35.7 °C) (Oral)   Resp 16   Ht 5' 10\" (1.778 m)   Wt 260 lb (117.9 kg)   SpO2 99%   BMI 37.31 kg/m²         Physical Exam  Physical Examination: General appearance - oriented to person, place, and time, overweight and acyanotic, in no respiratory distress  Mental status - alert, oriented to person, place, and time, affect appropriate to mood  Mouth - mucous membranes moist, pharynx normal without lesions  Neck - supple, no significant adenopathy  Lymphatics - no palpable lymphadenopathy  Chest - no tachypnea, retractions or cyanosis, decreased air entry noted lateral lung bases.   Heart - S1 and S2 normal, systolic murmur 2/6 at lower left sternal border  Abdomen - no rebound tenderness noted  Back exam - limited range of motion  Neurological - neck supple without rigidity  Musculoskeletal - osteoarthritic changes noted in both hands  Extremities - intact peripheral pulses          Results  Results for orders placed or performed in visit on 06/10/19   AMB POC HEMOGLOBIN A1C   Result Value Ref Range    Hemoglobin A1c (POC) 7.1 %   AMB POC GLUCOSE BLOOD, BY GLUCOSE MONITORING DEVICE   Result Value Ref Range    Glucose  mg/dL       ASSESSMENT and PLAN    ICD-10-CM ICD-9-CM    1. Essential hypertension I10 401.9 CBC WITH AUTOMATED DIFF      MAGNESIUM   2. Type 2 diabetes mellitus with diabetic nephropathy, unspecified whether long term insulin use (HCC) E11.21 250.40      583.81    3. Hypothyroidism due to acquired atrophy of thyroid E03.4 244.8      246.8    4. Dyslipidemia E78.5 272.4 LIPID PANEL   5. CKD (chronic kidney disease) stage 3, GFR 30-59 ml/min ([facility]) N18.3 585.3    6. Gout, unspecified cause, unspecified chronicity, unspecified site M10.9 274.9 URIC ACID   7. Hypovitaminosis D E55.9 268.9 VITAMIN D, 25 HYDROXY   8. Encounter for immunization Z23 V03.89 INFLUENZA VACCINE INACTIVATED (IIV), SUBUNIT, ADJUVANTED, IM      ADMIN INFLUENZA VIRUS VAC      COLLECTION VENOUS BLOOD,VENIPUNCTURE     lab results and schedule of future lab studies reviewed with patient  reviewed diet, exercise and weight control  cardiovascular risk and specific lipid/LDL goals reviewed  reviewed medications and side effects in detail  specific diabetic recommendations: low cholesterol diet, weight control and daily exercise discussed, home glucose monitoring emphasized, all medications, side effects and compliance discussed carefully, annual eye examinations at Ophthalmology discussed, glycohemoglobin and other lab monitoring discussed and long term diabetic complications discussed      I have discussed the diagnosis with the patient and the intended plan of care as seen in the above orders. The patient has received an after-visit summary and questions were answered concerning future plans. I have discussed medication, side effects, and warnings with the patient in detail. The patient verbalized understanding and is in agreement with the plan of care. The patient will contact the office with any additional concerns. Ally Mayo MD    PLEASE NOTE:   This document has been produced using voice recognition software.  Unrecognized errors in transcription may be present

## 2019-09-12 LAB
CHOLEST SERPL-MCNC: 172 MG/DL
HDLC SERPL-MCNC: 43 MG/DL (ref 40–60)
HDLC SERPL: 4 {RATIO} (ref 0–5)
LDLC SERPL CALC-MCNC: 90.2 MG/DL (ref 0–100)
LIPID PROFILE,FLP: ABNORMAL
TRIGL SERPL-MCNC: 194 MG/DL (ref ?–150)
URATE SERPL-MCNC: 4.9 MG/DL (ref 2.6–7.2)
VLDLC SERPL CALC-MCNC: 38.8 MG/DL

## 2019-09-12 NOTE — PROGRESS NOTES
Please let patient know his triglyceride is slightly elevated at 194. He can lower this by exercise and take fish oil omega-3 thousand milligrams daily. He can get this over-the-counter. Rest of his labs are stable. Continue current treatment plan.   Gila Flood MD

## 2019-09-13 LAB — 25(OH)D3 SERPL-MCNC: 35.7 NG/ML (ref 30–100)

## 2019-09-18 NOTE — PROGRESS NOTES
Spoke with patient at this time. Informed patient of lab results at this time.  Patient verbalized understanding at this time

## 2019-12-11 ENCOUNTER — OFFICE VISIT (OUTPATIENT)
Dept: FAMILY MEDICINE CLINIC | Age: 74
End: 2019-12-11

## 2019-12-11 VITALS
RESPIRATION RATE: 16 BRPM | HEIGHT: 70 IN | OXYGEN SATURATION: 99 % | TEMPERATURE: 97.3 F | BODY MASS INDEX: 38.65 KG/M2 | DIASTOLIC BLOOD PRESSURE: 67 MMHG | HEART RATE: 42 BPM | SYSTOLIC BLOOD PRESSURE: 144 MMHG | WEIGHT: 270 LBS

## 2019-12-11 DIAGNOSIS — E78.5 DYSLIPIDEMIA: ICD-10-CM

## 2019-12-11 DIAGNOSIS — E11.21 TYPE 2 DIABETES MELLITUS WITH DIABETIC NEPHROPATHY, UNSPECIFIED WHETHER LONG TERM INSULIN USE (HCC): ICD-10-CM

## 2019-12-11 DIAGNOSIS — I10 ESSENTIAL HYPERTENSION: Primary | ICD-10-CM

## 2019-12-11 DIAGNOSIS — E03.4 HYPOTHYROIDISM DUE TO ACQUIRED ATROPHY OF THYROID: ICD-10-CM

## 2019-12-11 DIAGNOSIS — N18.30 CKD (CHRONIC KIDNEY DISEASE) STAGE 3, GFR 30-59 ML/MIN (HCC): ICD-10-CM

## 2019-12-11 DIAGNOSIS — J41.1 PURULENT BRONCHITIS (HCC): ICD-10-CM

## 2019-12-11 LAB
CREAT URIN, 17566: 200 MG/DL
HBA1C MFR BLD HPLC: 7.6 %
MICROALBUMIN UR TEST STR-MCNC: 150 MG/L
MICROALBUMIN/CREAT RATIO POC: NORMAL MG/G

## 2019-12-11 RX ORDER — AMOXICILLIN 500 MG/1
500 CAPSULE ORAL 3 TIMES DAILY
Qty: 30 CAP | Refills: 0 | Status: SHIPPED | OUTPATIENT
Start: 2019-12-11 | End: 2019-12-21

## 2019-12-11 RX ORDER — GUAIFENESIN DEXTROMETHORPHAN HYDROBROMIDE ORAL SOLUTION 10; 100 MG/5ML; MG/5ML
10 SOLUTION ORAL
Qty: 236 ML | Refills: 0 | Status: SHIPPED | OUTPATIENT
Start: 2019-12-11 | End: 2020-06-11

## 2019-12-11 RX ORDER — SPIRONOLACTONE 50 MG/1
50 TABLET, FILM COATED ORAL DAILY
Qty: 90 TAB | Refills: 1 | Status: SHIPPED | OUTPATIENT
Start: 2019-12-11 | End: 2020-03-11 | Stop reason: SDUPTHER

## 2019-12-11 NOTE — PROGRESS NOTES
Chief Complaint   Patient presents with    Hypertension    Diabetes     1. Have you been to the ER, urgent care clinic since your last visit? Hospitalized since your last visit? No    2. Have you seen or consulted any other health care providers outside of the [address] [place] [address] since your last visit? Include any pap smears or colon screening. No     HPI  Homer Sep. comes in for f/u care. HTN: BP has been elevated, will increase spironolactone to 50 mg. He is also on amlodipine and metoprolol. He denies headache, changes in vision or focal weakness. DM2: He has DM2, he is on insulin, will check labs today. Blood glucose numbers are stable. Hypothyroidism: on levothyroxine, stable labs. We will continue with the current treatment plan. Cough: he has cough, ongoing for a month, productive of mucopurulent phlegm, associated pleuritic chest pain, no wheeze. Has fever, chills and malaise. This is purulent bronchitis. Will treat with amoxicillin and guaifenesin DM. I will follow-up at next visit. Cardiac: has h/o AVR. He has been followed up with a cardiologist.  He denies syncope or palpitations. Occasionally does get shortness of breath. Denies chest tightness or diaphoresis. CKD: patient has chronic kidney disease, he is f/u by nephrologist.  His creatinine has been stable. Gout:he is on uloric. Denies joint swelling or aches. No active gout arthritis at the moment. Dyslipidemia: patient is on statin medication. He is tolerating the medication.       Past Medical History  Past Medical History:   Diagnosis Date    Diabetes ([facility] [address] 1995    Dyslipidemia 3/1/2017    Fractures 1995    R Knee/ Tib fib fracture/surgery    Gout 2010    Graves disease 1/21/2015    High cholesterol 2005    HTN (hypertension) 1990    Osteoarthritis 2013    Severe aortic stenosis 3/29/2017    Spinal stenosis of lumbar region 3/1/2016    Spondylolisthesis 3/1/2016    Thyroid trouble     [facility] 4/28/2015       Surgical History  Past Surgical History:   Procedure Laterality Date    HX HIP REPLACEMENT Left 2009    HX LUMBAR FUSION      HX ORTHOPAEDIC Right     R knee/Tibfib surgery        Medications  Current Outpatient Medications   Medication Sig Dispense Refill    spironolactone (ALDACTONE) 50 mg tablet Take 1 Tab by mouth daily. 90 Tab 1    amoxicillin (AMOXIL) 500 mg capsule Take 1 Cap by mouth three (3) times daily for 10 days. 30 Cap 0    guaiFENesin-dextromethorphan (GUAIFENESIN-DM)  mg/5 mL liqd Take 10 mL by mouth every eight (8) hours as needed for Cough. 236 mL 0    b complex vitamins tablet Take 1 Tab by mouth daily.  Alpha Lipoic Acid 600 mg cap Take  by mouth.  CANNABIDIOL, CBD, EXTRACT PO Take  by mouth.  levothyroxine (SYNTHROID) 200 mcg tablet Take 1 Tab by mouth Daily (before breakfast). (Patient taking differently: Take 200 mcg by mouth Daily (before breakfast). Medication dosage has been changed to 88mcg) 30 Tab 2    aspirin (ASPIRIN) 325 mg tablet Take 325 mg by mouth daily.  ascorbic acid, vitamin C, (VITAMIN C) 250 mg tablet Take  by mouth.  DULoxetine (CYMBALTA) 60 mg capsule Take 1 Cap by mouth daily. 30 Cap 1    capsicum oleoresin 0.025 % topical cream Apply  to affected area three (3) times daily.  diclofenac (VOLTAREN) 1 % gel Apply  to affected area four (4) times daily.  traMADol (ULTRAM) 50 mg tablet Take 1 Tab by mouth two (2) times daily as needed for Pain. Max Daily Amount: 100 mg. Indications: NEUROPATHIC PAIN 60 Tab 0    magnesium chloride (MAG DELAY) 64 mg delayed release tablet Take 8 tablets 3 x day      amLODIPine (NORVASC) 10 mg tablet Take 5 mg by mouth daily.  metoprolol tartrate (LOPRESSOR) 25 mg tablet Take 1 Tab by mouth two (2) times a day.  (Patient taking differently: Take 12.5 mg by mouth two (2) times a day.) 180 Tab 3    insulin glargine (LANTUS) 100 unit/mL injection 40 Units. Take 30 units once daily  Indications: type 2 diabetes mellitus 3 Vial 3    febuxostat (ULORIC) 40 mg tab tablet Take 1 Tab by mouth daily. Indications: GOUT PREVENTION 90 Tab 1    insulin aspart (NOVOLOG) 100 unit/mL injection by SubCUTAneous route. 6 units before meals      glucose blood VI test strips (ASCENSIA AUTODISC VI, ONE TOUCH ULTRA TEST VI) strip Dispense precision extra test strips 3 x day to check blood glucose 200 Each 3    Omeprazole delayed release (PRILOSEC D/R) 20 mg tablet Take 20 mg by mouth daily.  testosterone (ANDROGEL) 20.25 mg/1.25 gram (1.62 %) gel Apply  to affected area as needed.  Cholecalciferol, Vitamin D3, 1,000 unit cap Take 3,000 Units by mouth daily.  atorvastatin (LIPITOR) 40 mg tablet Take 20 mg by mouth daily.          Allergies  Allergies   Allergen Reactions    Watermelon Anaphylaxis and Swelling    Citric Acid Rash    Peach (Prunus Persica) Itching       Family History  Family History   Problem Relation Age of Onset    Hypertension Mother     Thyroid Disease Mother     Hypertension Father     Diabetes Father     Drug Abuse Brother        Social History  Social History     Socioeconomic History    Marital status:      Spouse name: Not on file    Number of children: Not on file    Years of education: Not on file    Highest education level: Not on file   Occupational History    Not on file   Social Needs    Financial resource strain: Not on file    Food insecurity:     Worry: Not on file     Inability: Not on file    Transportation needs:     Medical: Not on file     Non-medical: Not on file   Tobacco Use    Smoking status: Former Smoker     Packs/day: 1.00     Types: Cigarettes     Start date: 1969     Last attempt to quit: 1995     Years since quittin.9    Smokeless tobacco: Never Used   Substance and Sexual Activity    Alcohol use: No    Drug use: No    Sexual activity: Yes     Partners: Female   Lifestyle    Physical activity:     Days per week: Not on file     Minutes per session: Not on file    Stress: Not on file   Relationships    Social connections:     Talks on phone: Not on file     Gets together: Not on file     Attends Judaism service: Not on file     Active member of club or organization: Not on file     Attends meetings of clubs or organizations: Not on file     Relationship status: Not on file    Intimate partner violence:     Fear of current or ex partner: Not on file     Emotionally abused: Not on file     Physically abused: Not on file     Forced sexual activity: Not on file   Other Topics Concern     Service Yes     Comment: Served in [address] Blood Transfusions No    Caffeine Concern No    Occupational Exposure No    Hobby Hazards No    Sleep Concern No    Stress Concern No    Weight Concern Yes    Special Diet No    Back Care Yes     Comment: Lower Back pain when walking    Exercise No    Bike Helmet No    Seat Belt Yes    Self-Exams Yes   Social History Narrative    Not on file       Review of Systems  Review of Systems - Review of all systems is negative except as noted above in the HPI.     Vital Signs  Visit Vitals  /67 (BP 1 Location: Left arm, BP Patient Position: Sitting)   Pulse (!) 42   Temp 97.3 °F (36.3 °C) (Oral)   Resp 16   Ht 5' 10\" (1.778 m)   Wt 270 lb (122.5 kg)   SpO2 99%   BMI 38.74 kg/m²         Physical Exam  Physical Examination: General appearance - oriented to person, place, and time, overweight and acyanotic, in no respiratory distress  Mental status - alert, oriented to person, place, and time, affect appropriate to mood  Mouth - mucous membranes moist, pharynx normal without lesions  Neck - supple, no significant adenopathy  Lymphatics - no palpable lymphadenopathy, no hepatosplenomegaly  Chest - no tachypnea, retractions or cyanosis, decreased air entry noted bilateral lung bases  Heart - S1 and S2 normal, systolic murmur 3/6 at 2nd right intercostal space  Abdomen - no rebound tenderness noted  Back exam - limited range of motion  Neurological - normal muscle tone, no tremors, strength 5/5  Musculoskeletal - osteoarthritic changes noted in both hands  Extremities - intact peripheral pulses    Results  Results for orders placed or performed during the hospital encounter of 09/11/19   CBC WITH AUTOMATED DIFF   Result Value Ref Range    WBC 5.1 4.6 - 13.2 K/uL    RBC 4.80 4.70 - 5.50 M/uL    HGB 14.7 13.0 - 16.0 g/dL    HCT 43.7 36.0 - 48.0 %    MCV 91.0 74.0 - 97.0 FL    MCH 30.6 24.0 - 34.0 PG    MCHC 33.6 31.0 - 37.0 g/dL    RDW 12.9 11.6 - 14.5 %    PLATELET 569 687 - 661 K/uL    MPV 9.7 9.2 - 11.8 FL    NEUTROPHILS 45 40 - 73 %    LYMPHOCYTES 42 21 - 52 %    MONOCYTES 5 3 - 10 %    EOSINOPHILS 8 (H) 0 - 5 %    BASOPHILS 0 0 - 2 %    ABS. NEUTROPHILS 2.3 1.8 - 8.0 K/UL    ABS. LYMPHOCYTES 2.1 0.9 - 3.6 K/UL    ABS. MONOCYTES 0.3 0.05 - 1.2 K/UL    ABS. EOSINOPHILS 0.4 0.0 - 0.4 K/UL    ABS. BASOPHILS 0.0 0.0 - 0.1 K/UL    DF AUTOMATED     LIPID PANEL   Result Value Ref Range    LIPID PROFILE          Cholesterol, total 172 <200 MG/DL    Triglyceride 194 (H) <150 MG/DL    HDL Cholesterol 43 40 - 60 MG/DL    LDL, calculated 90.2 0 - 100 MG/DL    VLDL, calculated 38.8 MG/DL    CHOL/HDL Ratio 4.0 0 - 5.0     MAGNESIUM   Result Value Ref Range    Magnesium 1.8 1.6 - 2.6 mg/dL   URIC ACID   Result Value Ref Range    Uric acid 4.9 2.6 - 7.2 MG/DL   VITAMIN D, 25 HYDROXY   Result Value Ref Range    Vitamin D 25-Hydroxy 35.7 30 - 100 ng/mL       ASSESSMENT and PLAN    ICD-10-CM ICD-9-CM    1. Essential hypertension I10 401.9 spironolactone (ALDACTONE) 50 mg tablet   2. CKD (chronic kidney disease) stage 3, GFR 30-59 ml/min ([facility]) N18.3 585.3    3. Hypothyroidism due to acquired atrophy of thyroid E03.4 244.8      246.8    4.  Type 2 diabetes mellitus with diabetic nephropathy, unspecified whether long term insulin use ([facility]) E11.21 250.40 AMB POC HEMOGLOBIN A1C     583.81 AMB POC URINE, MICROALBUMIN, SEMIQUANTITATIVE   5. Dyslipidemia E78.5 272.4    6. Purulent bronchitis (HCC) J41.1 491.1 amoxicillin (AMOXIL) 500 mg capsule      guaiFENesin-dextromethorphan (GUAIFENESIN-DM)  mg/5 mL liqd     lab results and schedule of future lab studies reviewed with patient  reviewed diet, exercise and weight control  cardiovascular risk and specific lipid/LDL goals reviewed  reviewed medications and side effects in detail  specific diabetic recommendations: low cholesterol diet, weight control and daily exercise discussed, home glucose monitoring emphasized, all medications, side effects and compliance discussed carefully, annual eye examinations at Ophthalmology discussed, glycohemoglobin and other lab monitoring discussed and long term diabetic complications discussed      I have discussed the diagnosis with the patient and the intended plan of care as seen in the above orders. The patient has received an after-visit summary and questions were answered concerning future plans. I have discussed medication, side effects, and warnings with the patient in detail. The patient verbalized understanding and is in agreement with the plan of care. The patient will contact the office with any additional concerns. More than 50% of 45-minute visit spent counseling and coordinating care with patient face to face on hypertension, diabetes mellitus type 2, bronchitis and management options. Discussed need for compliance with treatment regimen, follow-up, and taking responsibility for his disease process. Bernardo Perez MD    PLEASE NOTE:   This document has been produced using voice recognition software.  Unrecognized errors in transcription may be present

## 2020-02-26 ENCOUNTER — OFFICE VISIT (OUTPATIENT)
Dept: CARDIOLOGY CLINIC | Age: 75
End: 2020-02-26

## 2020-02-26 VITALS
HEART RATE: 56 BPM | DIASTOLIC BLOOD PRESSURE: 71 MMHG | BODY MASS INDEX: 38.65 KG/M2 | WEIGHT: 270 LBS | HEIGHT: 70 IN | SYSTOLIC BLOOD PRESSURE: 133 MMHG

## 2020-02-26 DIAGNOSIS — E78.5 DYSLIPIDEMIA: ICD-10-CM

## 2020-02-26 DIAGNOSIS — E03.4 HYPOTHYROIDISM DUE TO ACQUIRED ATROPHY OF THYROID: ICD-10-CM

## 2020-02-26 DIAGNOSIS — R01.1 MURMUR, CARDIAC: Primary | ICD-10-CM

## 2020-02-26 DIAGNOSIS — N18.30 CKD (CHRONIC KIDNEY DISEASE) STAGE 3, GFR 30-59 ML/MIN (HCC): ICD-10-CM

## 2020-02-26 DIAGNOSIS — E11.21 TYPE 2 DIABETES MELLITUS WITH DIABETIC NEPHROPATHY, UNSPECIFIED WHETHER LONG TERM INSULIN USE (HCC): ICD-10-CM

## 2020-02-26 DIAGNOSIS — I10 ESSENTIAL HYPERTENSION: ICD-10-CM

## 2020-02-26 NOTE — PATIENT INSTRUCTIONS
High Blood Pressure: Care Instructions Overview It's normal for blood pressure to go up and down throughout the day. But if it stays up, you have high blood pressure. Another name for high blood pressure is hypertension. Despite what a lot of people think, high blood pressure usually doesn't cause headaches or make you feel dizzy or lightheaded. It usually has no symptoms. But it does increase your risk of stroke, heart attack, and other problems. You and your doctor will talk about your risks of these problems based on your blood pressure. Your doctor will give you a goal for your blood pressure. Your goal will be based on your health and your age. Lifestyle changes, such as eating healthy and being active, are always important to help lower blood pressure. You might also take medicine to reach your blood pressure goal. 
Follow-up care is a key part of your treatment and safety. Be sure to make and go to all appointments, and call your doctor if you are having problems. It's also a good idea to know your test results and keep a list of the medicines you take. How can you care for yourself at home? Medical treatment · If you stop taking your medicine, your blood pressure will go back up. You may take one or more types of medicine to lower your blood pressure. Be safe with medicines. Take your medicine exactly as prescribed. Call your doctor if you think you are having a problem with your medicine. · Talk to your doctor before you start taking aspirin every day. Aspirin can help certain people lower their risk of a heart attack or stroke. But taking aspirin isn't right for everyone, because it can cause serious bleeding. · See your doctor regularly. You may need to see the doctor more often at first or until your blood pressure comes down. · If you are taking blood pressure medicine, talk to your doctor before you take decongestants or anti-inflammatory medicine, such as ibuprofen. Some of these medicines can raise blood pressure. · Learn how to check your blood pressure at home. Lifestyle changes · Stay at a healthy weight. This is especially important if you put on weight around the waist. Losing even 10 pounds can help you lower your blood pressure. · If your doctor recommends it, get more exercise. Walking is a good choice. Bit by bit, increase the amount you walk every day. Try for at least 30 minutes on most days of the week. You also may want to swim, bike, or do other activities. · Avoid or limit alcohol. Talk to your doctor about whether you can drink any alcohol. · Try to limit how much sodium you eat to less than 2,300 milligrams (mg) a day. Your doctor may ask you to try to eat less than 1,500 mg a day. · Eat plenty of fruits (such as bananas and oranges), vegetables, legumes, whole grains, and low-fat dairy products. · Lower the amount of saturated fat in your diet. Saturated fat is found in animal products such as milk, cheese, and meat. Limiting these foods may help you lose weight and also lower your risk for heart disease. · Do not smoke. Smoking increases your risk for heart attack and stroke. If you need help quitting, talk to your doctor about stop-smoking programs and medicines. These can increase your chances of quitting for good. When should you call for help? Call  911 anytime you think you may need emergency care. This may mean having symptoms that suggest that your blood pressure is causing a serious heart or blood vessel problem. Your blood pressure may be over 180/120. 
 For example, call  911 if: 
  · You have symptoms of a heart attack. These may include: 
? Chest pain or pressure, or a strange feeling in the chest. 
? Sweating. ? Shortness of breath. ? Nausea or vomiting. ? Pain, pressure, or a strange feeling in the back, neck, jaw, or upper belly or in one or both shoulders or arms. ? Lightheadedness or sudden weakness. ? A fast or irregular heartbeat.  
  · You have symptoms of a stroke. These may include: 
? Sudden numbness, tingling, weakness, or loss of movement in your face, arm, or leg, especially on only one side of your body. ? Sudden vision changes. ? Sudden trouble speaking. ? Sudden confusion or trouble understanding simple statements. ? Sudden problems with walking or balance. ? A sudden, severe headache that is different from past headaches.  
  · You have severe back or belly pain.  
 Do not wait until your blood pressure comes down on its own. Get help right away. 
 Call your doctor now or seek immediate care if: 
  · Your blood pressure is much higher than normal (such as 180/120 or higher), but you don't have symptoms.  
  · You think high blood pressure is causing symptoms, such as: 
? Severe headache. 
? Blurry vision.  
 Watch closely for changes in your health, and be sure to contact your doctor if: 
  · Your blood pressure measures higher than your doctor recommends at least 2 times. That means the top number is higher or the bottom number is higher, or both.  
  · You think you may be having side effects from your blood pressure medicine. Where can you learn more? Go to http://adal-janes.info/. Enter Z564 in the search box to learn more about \"High Blood Pressure: Care Instructions. \" Current as of: April 9, 2019 Content Version: 12.2 © 0037-8696 [employer]. Care instructions adapted under license by [employer] (which disclaims liability or warranty for this information). If you have questions about a medical condition or this instruction, always ask your healthcare professional. [place] [address] any warranty or liability for your use of this information.

## 2020-02-26 NOTE — PROGRESS NOTES
1. Have you been to the ER, urgent care clinic since your last visit? Hospitalized since your last visit?  no  2. Have you seen or consulted any other health care providers outside of the [address] [place] [address] since your last visit? Include any pap smears or colon screening. No     3. Since your last visit, have you had any of the following symptoms?  no

## 2020-02-26 NOTE — PROGRESS NOTES
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 Hypertension   The history is provided by the patient. This is a chronic problem. The current episode started more than 1 week ago. The problem occurs every several days. The problem has not changed since onset. Associated symptoms include shortness of breath. Pertinent negatives include no chest pain. Shortness of Breath   The history is provided by the patient. This is a chronic problem. The problem occurs intermittently. The current episode started more than 1 week ago. The problem has been gradually improving. Pertinent negatives include no fever, no ear pain, no neck pain, no cough, no sputum production, no hemoptysis, no wheezing, no PND, no orthopnea, no chest pain, no syncope, no vomiting, no rash, no leg pain, no leg swelling and no claudication. Associated medical issues do not include chronic lung disease, CAD or heart failure. Review of Systems   Constitutional: Negative for chills, diaphoresis, fever, malaise/fatigue and weight loss. HENT: Negative for ear discharge, ear pain, hearing loss, nosebleeds and tinnitus. Eyes: Negative for blurred vision. Respiratory: Positive for shortness of breath. Negative for cough, hemoptysis, sputum production, wheezing and stridor. Cardiovascular: Negative for chest pain, palpitations, orthopnea, claudication, leg swelling, syncope and PND. Gastrointestinal: Negative for heartburn, nausea and vomiting. Musculoskeletal: Negative for myalgias and neck pain. Skin: Negative for itching and rash. Neurological: Negative for dizziness, tingling, tremors, focal weakness, loss of consciousness and weakness. Psychiatric/Behavioral: Negative for depression and suicidal ideas.      Family History   Problem Relation Age of Onset    Hypertension Mother     Thyroid Disease Mother     Hypertension Father     Diabetes Father     Drug Abuse Brother        Past Medical History: Diagnosis Date    Diabetes ([facility]     Dyslipidemia 3/1/2017    Fractures     R Knee/ Tib fib fracture/surgery    Gout 2010    Graves disease 2015    High cholesterol     HTN (hypertension)     Osteoarthritis     Severe aortic stenosis 3/29/2017    Spinal stenosis of lumbar region 3/1/2016    Spondylolisthesis 3/1/2016    Thyroid trouble     Type 2 diabetes mellitus with diabetic nephropathy ([facility] 2015       Past Surgical History:   Procedure Laterality Date    HX HIP REPLACEMENT Left 2009    HX LUMBAR FUSION      HX ORTHOPAEDIC Right     R knee/Tibfib surgery       Social History     Tobacco Use    Smoking status: Former Smoker     Packs/day: 1.00     Types: Cigarettes     Start date: 1969     Last attempt to quit: 1995     Years since quittin.1    Smokeless tobacco: Never Used   Substance Use Topics    Alcohol use: No       Allergies   Allergen Reactions    Watermelon Anaphylaxis and Swelling    Citric Acid Rash    Peach (Prunus Persica) Itching       Outpatient Medications Marked as Taking for the 20 encounter (Office Visit) with Daniel Fiore MD   Medication Sig Dispense Refill    spironolactone (ALDACTONE) 50 mg tablet Take 1 Tab by mouth daily. 90 Tab 1    guaiFENesin-dextromethorphan (GUAIFENESIN-DM)  mg/5 mL liqd Take 10 mL by mouth every eight (8) hours as needed for Cough. 236 mL 0    b complex vitamins tablet Take 1 Tab by mouth daily.  Alpha Lipoic Acid 600 mg cap Take  by mouth.  CANNABIDIOL, CBD, EXTRACT PO Take  by mouth.  levothyroxine (SYNTHROID) 200 mcg tablet Take 1 Tab by mouth Daily (before breakfast). (Patient taking differently: Take 200 mcg by mouth Daily (before breakfast). Medication dosage has been changed to 88mcg) 30 Tab 2    aspirin (ASPIRIN) 325 mg tablet Take 325 mg by mouth daily.  ascorbic acid, vitamin C, (VITAMIN C) 250 mg tablet Take  by mouth.       DULoxetine (CYMBALTA) 60 mg capsule Take 1 Cap by mouth daily. 30 Cap 1    diclofenac (VOLTAREN) 1 % gel Apply  to affected area four (4) times daily.  traMADol (ULTRAM) 50 mg tablet Take 1 Tab by mouth two (2) times daily as needed for Pain. Max Daily Amount: 100 mg. Indications: NEUROPATHIC PAIN 60 Tab 0    magnesium chloride (MAG DELAY) 64 mg delayed release tablet Take 8 tablets 3 x day      amLODIPine (NORVASC) 10 mg tablet Take 5 mg by mouth daily.  metoprolol tartrate (LOPRESSOR) 25 mg tablet Take 1 Tab by mouth two (2) times a day. (Patient taking differently: Take 12.5 mg by mouth two (2) times a day.) 180 Tab 3    insulin glargine (LANTUS) 100 unit/mL injection 40 Units. Take 30 units once daily  Indications: type 2 diabetes mellitus 3 Vial 3    febuxostat (ULORIC) 40 mg tab tablet Take 1 Tab by mouth daily. Indications: GOUT PREVENTION 90 Tab 1    insulin aspart (NOVOLOG) 100 unit/mL injection by SubCUTAneous route. 6 units before meals      glucose blood VI test strips (ASCENSIA AUTODISC VI, ONE TOUCH ULTRA TEST VI) strip Dispense precision extra test strips 3 x day to check blood glucose 200 Each 3    Omeprazole delayed release (PRILOSEC D/R) 20 mg tablet Take 20 mg by mouth daily.  atorvastatin (LIPITOR) 40 mg tablet Take 20 mg by mouth daily.  testosterone (ANDROGEL) 20.25 mg/1.25 gram (1.62 %) gel Apply  to affected area as needed. Visit Vitals  /71   Pulse (!) 56   Ht 5' 10\" (1.778 m)   Wt 122.5 kg (270 lb)   BMI 38.74 kg/m²     Physical Exam   Constitutional: He is oriented to person, place, and time. He appears well-developed and well-nourished. No distress. HENT:   Head: Atraumatic. Mouth/Throat: No oropharyngeal exudate. Eyes: Conjunctivae are normal. Right eye exhibits no discharge. Left eye exhibits no discharge. No scleral icterus. Neck: Normal range of motion. Neck supple. No JVD present. No tracheal deviation present. No thyromegaly present.    Cardiovascular: Normal rate and regular rhythm. Exam reveals no gallop. No murmur heard. Surgical sternal scar   Pulmonary/Chest: Effort normal and breath sounds normal. No stridor. No respiratory distress. He has no wheezes. He has no rales. He exhibits no tenderness. Abdominal: Soft. There is no abdominal tenderness. There is no rebound and no guarding. Musculoskeletal: Normal range of motion. General: No tenderness or edema. Lymphadenopathy:     He has no cervical adenopathy. Neurological: He is alert and oriented to person, place, and time. He exhibits normal muscle tone. Skin: Skin is warm. He is not diaphoretic. Psychiatric: He has a normal mood and affect. His behavior is normal.     ekg sinus rhythm with PVC, no acute st-t changes  Echo 02/2017:  SUMMARY:  Left ventricle: Systolic function was normal by visual assessment. Ejection fraction was estimated in the range of 60 % to 65 %. No obvious  wall motion abnormalities identified in the views obtained. Wall thickness  was at the upper limits of normal.    Aortic valve: Leaflets exhibited markedly increased thickness and reduced  mobility. There was moderate to severe stenosis. Valve peak gradient was  61 mmHg. Valve mean gradient was 39 mmHg. Estimated aortic valve area (by  VTI) was 0.5 cmï¾². ZACKERY likely overestimates severity of aortic stenosis due  to difficulty in measuring LVOT diameter. Aorta, systemic arteries: The root exhibited dilatation. 02/17/20   ECHO ADULT COMPLETE 02/19/2020 2/19/2020    Narrative · Normal cavity size and systolic function (ejection fraction normal). Mild concentric hypertrophy. Estimated left ventricular ejection fraction   is 55 - 60%. No regional wall motion abnormality noted. Mild (grade 1)   left ventricular diastolic dysfunction. · Mildly dilated right atrium. · There is a 25 mm [employer] Judes bioprosthetic aortic valve. Prosthesis is   normal.  · Mitral valve thickening.  Mild mitral valve regurgitation is present. · Mild tricuspid valve regurgitation is present. · There is no evidence of pulmonary hypertension. · Mild aortic root dilatation. Signed by: Nela Grover MD     ASSESSMENT and PLAN    ICD-10-CM ICD-9-CM    1. s/p AVR R01.1 785.2    2. Dyslipidemia E78.5 272.4    3. Type 2 diabetes mellitus with diabetic nephropathy, unspecified whether long term insulin use ([facility]) E11.21 250.40      583.81    4. CKD (chronic kidney disease) stage 3, GFR 30-59 ml/min ([facility]) N18.3 585.3    5. Hypothyroidism due to acquired atrophy of thyroid E03.4 244.8      246.8    6. Essential hypertension I10 401.9      No orders of the defined types were placed in this encounter. Follow-up and Dispositions    · Return in about 9 months (around 11/26/2020). reviewed diet, exercise and weight control  cardiovascular risk and specific lipid/LDL goals reviewed  use of aspirin to prevent MI and TIA's discussed. Now S/p AVR. Remains asymptomatic from cardiac standpoint  Effort tolerance is stable. Walks with the help of a cane. Echo report reviewed with patient. Normally functioning bioprosthetic aortic valve. Last LDL in 2019 on target. Continue current dose of aspirin. Follow-up in 9 months.

## 2020-03-11 ENCOUNTER — OFFICE VISIT (OUTPATIENT)
Dept: FAMILY MEDICINE CLINIC | Age: 75
End: 2020-03-11

## 2020-03-11 VITALS
HEIGHT: 70 IN | TEMPERATURE: 97 F | SYSTOLIC BLOOD PRESSURE: 132 MMHG | HEART RATE: 59 BPM | BODY MASS INDEX: 38.08 KG/M2 | RESPIRATION RATE: 18 BRPM | DIASTOLIC BLOOD PRESSURE: 73 MMHG | OXYGEN SATURATION: 98 % | WEIGHT: 266 LBS

## 2020-03-11 DIAGNOSIS — M10.9 GOUT, UNSPECIFIED CAUSE, UNSPECIFIED CHRONICITY, UNSPECIFIED SITE: ICD-10-CM

## 2020-03-11 DIAGNOSIS — G62.9 NEUROPATHY: ICD-10-CM

## 2020-03-11 DIAGNOSIS — I10 ESSENTIAL HYPERTENSION: ICD-10-CM

## 2020-03-11 DIAGNOSIS — E78.5 DYSLIPIDEMIA: ICD-10-CM

## 2020-03-11 DIAGNOSIS — E66.01 SEVERE OBESITY (BMI 35.0-39.9) WITH COMORBIDITY (HCC): ICD-10-CM

## 2020-03-11 DIAGNOSIS — M48.062 SPINAL STENOSIS, LUMBAR REGION WITH NEUROGENIC CLAUDICATION: ICD-10-CM

## 2020-03-11 DIAGNOSIS — M25.562 LEFT KNEE PAIN, UNSPECIFIED CHRONICITY: ICD-10-CM

## 2020-03-11 DIAGNOSIS — N18.30 CKD (CHRONIC KIDNEY DISEASE) STAGE 3, GFR 30-59 ML/MIN (HCC): ICD-10-CM

## 2020-03-11 DIAGNOSIS — E11.21 TYPE 2 DIABETES MELLITUS WITH DIABETIC NEPHROPATHY, UNSPECIFIED WHETHER LONG TERM INSULIN USE (HCC): Primary | ICD-10-CM

## 2020-03-11 RX ORDER — AMOXICILLIN 500 MG/1
500 CAPSULE ORAL 3 TIMES DAILY
Qty: 30 CAP | Refills: 0 | Status: SHIPPED | OUTPATIENT
Start: 2020-03-11 | End: 2020-03-21

## 2020-03-11 RX ORDER — TRAMADOL HYDROCHLORIDE 50 MG/1
50 TABLET ORAL
Qty: 60 TAB | Refills: 0 | Status: SHIPPED | OUTPATIENT
Start: 2020-03-11 | End: 2020-04-10

## 2020-03-11 RX ORDER — SPIRONOLACTONE 50 MG/1
50 TABLET, FILM COATED ORAL DAILY
Qty: 90 TAB | Refills: 1 | Status: SHIPPED | OUTPATIENT
Start: 2020-03-11 | End: 2020-06-11 | Stop reason: SDUPTHER

## 2020-03-11 NOTE — PROGRESS NOTES
Chief Complaint   Patient presents with    Diabetes    Chronic Kidney Disease     1. Have you been to the ER, urgent care clinic since your last visit? Hospitalized since your last visit? No    2. Have you seen or consulted any other health care providers outside of the [address] [place] [address] since your last visit? Include any pap smears or colon screening. No     HPI  Deanne Wade. comes in for f/u care. Purulent bronchitis: Patient has cough that is productive of purulent phlegm, fever and chills. He has taken over-the-counter medication without much relief. States that in the past has used an antibiotic with good result. Would like to get antibiotic. This is purulent bronchitis. I will give amoxicillin. We will continue with supportive care measures. DM2: he is on insulin, blood glucose numbers have been stable. His last HbA1c was 7.6. He will intensify lifestyle and dietary modification. I will follow-up at next visit. We will check labs then. HTN: BP is stable, denies headache, changes in vision or focal weakness. He is on Norvasc, Lopressor and spironolactone. We will continue with the current treatment plan. Chronic pain: he has been on tramadol for chronic back pain, uses as needed, would richie refill of medication. Back pain: Patient has lumbar stenosis with chronic back pain and lumbar neuropathy. Does have a history of spondylolisthesis. He is on cymbalta. This helps with the pain on and off. In the past he has had tramadol when pain is intractable. He would like to have this medication to use only as needed. I will give some tramadol for this. If it persists he may have to see the spine specialist.  He denies bladder or bowel dysfunction. Denies numbness or tingling lower extremities. She can take Tylenol arthritis for pain that is not very severe. Cardiac: Patient has a history of severe aortic stenosis and has had aortic valve replacement.   Denies chest pain, diaphoresis or syncope. Occasionally has palpitations. He is on medical management and is stable. He is followed up by the cardiologist.  Dyslipidemia: he is on lipitor, stable on medication. Plan to recheck lipid panel at next visit. GERD: On omeprazole, has occasional heartburn. No hematemesis or dark stools. Hypothyroidism: Patient is on levothyroxine 200 mcg daily. We will recheck labs at next visit. Obesity: Patient has a BMI of 38. 17. We discussed lifestyle dietary modification. Chronic kidney disease: Patient has CKD stage III. Currently stable. Plan to recheck labs at next visit. Will also avoid any nephrotoxic medications. He has been seen by the nephrologist in the past.  Gout arthritis: No active gout arthritis at the moment. He takes Uloric. Continue with the current treatment plan. Past Medical History  Past Medical History:   Diagnosis Date    Diabetes ([facility] 1995    Dyslipidemia 3/1/2017    Fractures 1995    R Knee/ Tib fib fracture/surgery    Gout 2010    Graves disease 1/21/2015    High cholesterol 2005    HTN (hypertension) 1990    Osteoarthritis 2013    Severe aortic stenosis 3/29/2017    Spinal stenosis of lumbar region 3/1/2016    Spondylolisthesis 3/1/2016    Thyroid trouble     Type 2 diabetes mellitus with diabetic nephropathy ([facility] 4/28/2015       Surgical History  Past Surgical History:   Procedure Laterality Date    HX HIP REPLACEMENT Left 2009    HX LUMBAR FUSION      HX ORTHOPAEDIC Right     R knee/Tibfib surgery        Medications  Current Outpatient Medications   Medication Sig Dispense Refill    spironolactone (ALDACTONE) 50 mg tablet Take 1 Tab by mouth daily. 90 Tab 1    b complex vitamins tablet Take 1 Tab by mouth daily.  Alpha Lipoic Acid 600 mg cap Take  by mouth.  CANNABIDIOL, CBD, EXTRACT PO Take  by mouth.  levothyroxine (SYNTHROID) 200 mcg tablet Take 1 Tab by mouth Daily (before breakfast).  (Patient taking differently: Take 200 mcg by mouth Daily (before breakfast). Medication dosage has been changed to 88mcg) 30 Tab 2    aspirin (ASPIRIN) 325 mg tablet Take 325 mg by mouth daily.  ascorbic acid, vitamin C, (VITAMIN C) 250 mg tablet Take  by mouth.  DULoxetine (CYMBALTA) 60 mg capsule Take 1 Cap by mouth daily. 30 Cap 1    capsicum oleoresin 0.025 % topical cream Apply  to affected area three (3) times daily.  diclofenac (VOLTAREN) 1 % gel Apply  to affected area four (4) times daily.  traMADol (ULTRAM) 50 mg tablet Take 1 Tab by mouth two (2) times daily as needed for Pain. Max Daily Amount: 100 mg. Indications: NEUROPATHIC PAIN 60 Tab 0    magnesium chloride (MAG DELAY) 64 mg delayed release tablet Take 8 tablets 3 x day      amLODIPine (NORVASC) 10 mg tablet Take 5 mg by mouth daily.  metoprolol tartrate (LOPRESSOR) 25 mg tablet Take 1 Tab by mouth two (2) times a day. (Patient taking differently: Take 12.5 mg by mouth two (2) times a day.) 180 Tab 3    insulin glargine (LANTUS) 100 unit/mL injection 40 Units. Take 30 units once daily  Indications: type 2 diabetes mellitus 3 Vial 3    febuxostat (ULORIC) 40 mg tab tablet Take 1 Tab by mouth daily. Indications: GOUT PREVENTION 90 Tab 1    insulin aspart (NOVOLOG) 100 unit/mL injection by SubCUTAneous route. 6 units before meals      glucose blood VI test strips (ASCENSIA AUTODISC VI, ONE TOUCH ULTRA TEST VI) strip Dispense precision extra test strips 3 x day to check blood glucose 200 Each 3    Omeprazole delayed release (PRILOSEC D/R) 20 mg tablet Take 20 mg by mouth daily.  atorvastatin (LIPITOR) 40 mg tablet Take 20 mg by mouth daily.  testosterone (ANDROGEL) 20.25 mg/1.25 gram (1.62 %) gel Apply  to affected area as needed.  Cholecalciferol, Vitamin D3, 1,000 unit cap Take 3,000 Units by mouth daily.       guaiFENesin-dextromethorphan (GUAIFENESIN-DM)  mg/5 mL liqd Take 10 mL by mouth every eight (8) hours as needed for Cough. 236 mL 0       Allergies  Allergies   Allergen Reactions    Watermelon Anaphylaxis and Swelling    Citric Acid Rash    Peach (Prunus Persica) Itching       Family History  Family History   Problem Relation Age of Onset    Hypertension Mother     Thyroid Disease Mother     Hypertension Father     Diabetes Father     Drug Abuse Brother        Social History  Social History     Socioeconomic History    Marital status:      Spouse name: Not on file    Number of children: Not on file    Years of education: Not on file    Highest education level: Not on file   Occupational History    Not on file   Social Needs    Financial resource strain: Not on file    Food insecurity     Worry: Not on file     Inability: Not on file    Transportation needs     Medical: Not on file     Non-medical: Not on file   Tobacco Use    Smoking status: Former Smoker     Packs/day: 1.00     Types: Cigarettes     Start date: 1969     Last attempt to quit: 1995     Years since quittin.2    Smokeless tobacco: Never Used   Substance and Sexual Activity    Alcohol use: No    Drug use: No    Sexual activity: Yes     Partners: Female   Lifestyle    Physical activity     Days per week: Not on file     Minutes per session: Not on file    Stress: Not on file   Relationships    Social connections     Talks on phone: Not on file     Gets together: Not on file     Attends Taoism service: Not on file     Active member of club or organization: Not on file     Attends meetings of clubs or organizations: Not on file     Relationship status: Not on file    Intimate partner violence     Fear of current or ex partner: Not on file     Emotionally abused: Not on file     Physically abused: Not on file     Forced sexual activity: Not on file   Other Topics Concern     Service Yes     Comment: Served in [address] Blood Transfusions No    Caffeine Concern No    Occupational Exposure No    Hobby Hazards No    Sleep Concern No    Stress Concern No    Weight Concern Yes    Special Diet No    Back Care Yes     Comment: Lower Back pain when walking    Exercise No    Bike Helmet No    Seat Belt Yes    Self-Exams Yes   Social History Narrative    Not on file       Review of Systems  Review of Systems - Review of all systems is negative except as noted above in the HPI.     Vital Signs  Visit Vitals  /73 (BP 1 Location: Left arm, BP Patient Position: Sitting)   Pulse (!) 59   Temp 97 °F (36.1 °C) (Oral)   Resp 18   Ht 5' 10\" (1.778 m)   Wt 266 lb (120.7 kg)   SpO2 98%   BMI 38.17 kg/m²         Physical Exam  Physical Examination: General appearance - alert, well appearing, and in no distress, oriented to person, place, and time, overweight and acyanotic, in no respiratory distress  Mental status - alert, oriented to person, place, and time, affect appropriate to mood  Mouth - mucous membranes moist, pharynx normal without lesions  Neck - supple, no significant adenopathy  Lymphatics - no palpable lymphadenopathy  Chest - clear to auscultation, no wheezes, rales or rhonchi, symmetric air entry  Heart - S1 and S2 normal, systolic murmur 3/6 at lower left sternal border  Abdomen - no rebound tenderness noted  Back exam - limited range of motion  Neurological - neck supple without rigidity  Musculoskeletal - osteoarthritic changes noted in both hands  Extremities - intact peripheral pulses    Results  Results for orders placed or performed in visit on 02/17/20   ECHO ADULT COMPLETE   Result Value Ref Range    LA Volume 73.42 18 - 58 mL    Ao Root D 3.74 cm    AO ASC D 3.52 cm    Aortic Valve Systolic Peak Velocity 003.51 cm/s    AoV VTI 32.47 cm    Aortic Valve Area by Continuity of Peak Velocity 1.3 cm2    Aortic Valve Area by Continuity of VTI 1.5 cm2    AoV PG 12.9 mmHg    LVIDd 5.53 4.2 - 5.9 cm    LVPWd 1.38 (A) 0.6 - 1.0 cm    LVIDs 3.86 cm    IVSd 1.18 (A) 0.6 - 1.0 cm    LVOT d 1.99 cm    LVOT Peak Velocity 74.01 cm/s    LVOT Peak Gradient 2.2 mmHg    LVOT VTI 15.77 cm    LV E' Septal Velocity 5.41 cm/s    LV E' Lateral Velocity 9.43 cm/s    MVA (PHT) 1.5 cm2    MV A Ananda 58.96 cm/s    MV E Ananda 38.79 cm/s    MV E/A 0.66     RVIDd 4.27 cm    Aortic Valve Systolic Mean Gradient 7.3 mmHg    LA Vol 4C 67.61 (A) 18 - 58 mL    LA Vol 2C 72.96 (A) 18 - 58 mL    LA Area 2C 26.25 cm2    LA Area 4C 22.8 cm2    LV Mass .4 (A) 88 - 224 g    LV Mass AL Index 153.3 (A) 49 - 115 g/m2    E/E' lateral 4.11     E/E' septal 7.17     RV Longitudinal Dimension 18.7 cm    MV \"A\" wave duration 129.4 msec    E/E' ratio (averaged) 5.64     Est. RA Pressure 3.0 mmHg    Mitral Valve E Wave Deceleration Time 509.9 ms    Mitral Valve Pressure Half-time 147.9 ms    Left Atrium Major Axis 4.29 cm    Tapse 1.89 1.5 - 2.0 cm    Triscuspid Valve Regurgitation Peak Gradient 15.7 mmHg    Pulmonic Valve Max Velocity 83.57 cm/s    TR Max Velocity 198.43 cm/s    LA Vol Index 30.96 16 - 28 ml/m2    PASP 19.0 mmHg    LA Vol Index 30.77 16 - 28 ml/m2    LA Vol Index 28.51 16 - 28 ml/m2    ZACKERY/BSA Pk Ananda 0.5 cm2/m2    ZACKERY/BSA VTI 0.6 cm2/m2    Left Ventricular Fractional Shortening by 2D 18.086407818 %    Left Ventricular Outflow Tract Mean Gradient 2.7315923630 mmHg    Left Ventricular Outflow Tract Mean Velocity 1.21557969887 cm/s    Mitral Valve Deceleration Saunders 3.36423788938     AV Velocity Ratio 0.41     AV VTI Ratio 0.5     Aortic valve mean velocity 3.8723528606 m/s    Left Ventricular End Diastolic Volume by Teichholz Method 19.696500976 mL    Left Ventricular End Systolic Volume by Teichholz Method 44.423050381 mL    Left Ventricular Stroke Volume by Teichholz Method 44.852894657 mL    PV peak gradient 2.8 mmHg       ASSESSMENT and PLAN    ICD-10-CM ICD-9-CM    1. Type 2 diabetes mellitus with diabetic nephropathy, unspecified whether long term insulin use (HCC) E11.21 250.40      583.81    2.  Essential hypertension I10 401.9 spironolactone (ALDACTONE) 50 mg tablet   3. Neuropathy G62.9 355.9 traMADoL (ULTRAM) 50 mg tablet   4. Left knee pain, unspecified chronicity M25.562 719.46 traMADoL (ULTRAM) 50 mg tablet   5. CKD (chronic kidney disease) stage 3, GFR 30-59 ml/min (HCC) N18.3 585.3    6. Dyslipidemia E78.5 272.4    7. Gout, unspecified cause, unspecified chronicity, unspecified site M10.9 274.9    8. Severe obesity (BMI 35.0-39. 9) with comorbidity ([facility] [address] E66.01 278.01    9. Spinal stenosis, lumbar region with neurogenic claudication M48.062 724.03    Discussed the patient's BMI with him. The BMI follow up plan is as follows:     dietary management education, guidance, and counseling  encourage exercise  monitor weight  lab results and schedule of future lab studies reviewed with patient  reviewed diet, exercise and weight control  cardiovascular risk and specific lipid/LDL goals reviewed  reviewed medications and side effects in detail  specific diabetic recommendations: low cholesterol diet, weight control and daily exercise discussed, home glucose monitoring emphasized, all medications, side effects and compliance discussed carefully, annual eye examinations at Ophthalmology discussed, glycohemoglobin and other lab monitoring discussed and long term diabetic complications discussed  use of aspirin to prevent MI and TIA's discussed      I have discussed the diagnosis with the patient and the intended plan of care as seen in the above orders. The patient has received an after-visit summary and questions were answered concerning future plans. I have discussed medication, side effects, and warnings with the patient in detail. The patient verbalized understanding and is in agreement with the plan of care. The patient will contact the office with any additional concerns. Mindy Menjivar MD    PLEASE NOTE:   This document has been produced using voice recognition software.  Unrecognized errors in transcription may be present

## 2020-03-11 NOTE — PATIENT INSTRUCTIONS
Body Mass Index: Care Instructions Your Care Instructions Body mass index (BMI) can help you see if your weight is raising your risk for health problems. It uses a formula to compare how much you weigh with how tall you are. · A BMI lower than 18.5 is considered underweight. · A BMI between 18.5 and 24.9 is considered healthy. · A BMI between 25 and 29.9 is considered overweight. A BMI of 30 or higher is considered obese. If your BMI is in the normal range, it means that you have a lower risk for weight-related health problems. If your BMI is in the overweight or obese range, you may be at increased risk for weight-related health problems, such as high blood pressure, heart disease, stroke, arthritis or joint pain, and diabetes. If your BMI is in the underweight range, you may be at increased risk for health problems such as fatigue, lower protection (immunity) against illness, muscle loss, bone loss, hair loss, and hormone problems. BMI is just one measure of your risk for weight-related health problems. You may be at higher risk for health problems if you are not active, you eat an unhealthy diet, or you drink too much alcohol or use tobacco products. Follow-up care is a key part of your treatment and safety. Be sure to make and go to all appointments, and call your doctor if you are having problems. It's also a good idea to know your test results and keep a list of the medicines you take. How can you care for yourself at home? · Practice healthy eating habits. This includes eating plenty of fruits, vegetables, whole grains, lean protein, and low-fat dairy. · If your doctor recommends it, get more exercise. Walking is a good choice. Bit by bit, increase the amount you walk every day. Try for at least 30 minutes on most days of the week. · Do not smoke. Smoking can increase your risk for health problems.  If you need help quitting, talk to your doctor about stop-smoking programs and medicines. These can increase your chances of quitting for good. · Limit alcohol to 2 drinks a day for men and 1 drink a day for women. Too much alcohol can cause health problems. If you have a BMI higher than 25 · Your doctor may do other tests to check your risk for weight-related health problems. This may include measuring the distance around your waist. A waist measurement of more than 40 inches in men or 35 inches in women can increase the risk of weight-related health problems. · Talk with your doctor about steps you can take to stay healthy or improve your health. You may need to make lifestyle changes to lose weight and stay healthy, such as changing your diet and getting regular exercise. If you have a BMI lower than 18.5 · Your doctor may do other tests to check your risk for health problems. · Talk with your doctor about steps you can take to stay healthy or improve your health. You may need to make lifestyle changes to gain or maintain weight and stay healthy, such as getting more healthy foods in your diet and doing exercises to build muscle. Where can you learn more? Go to http://adal-janes.info/. Enter S176 in the search box to learn more about \"Body Mass Index: Care Instructions. \" Current as of: October 13, 2016 Content Version: 11.4 © 6726-9112 [employer]. Care instructions adapted under license by [employer] (which disclaims liability or warranty for this information). If you have questions about a medical condition or this instruction, always ask your healthcare professional. [address] any warranty or liability for your use of this information.

## 2020-06-11 ENCOUNTER — HOSPITAL ENCOUNTER (OUTPATIENT)
Dept: LAB | Age: 75
Discharge: HOME | End: 2020-06-11
Payer: MEDICARE

## 2020-06-11 ENCOUNTER — OFFICE VISIT (OUTPATIENT)
Dept: FAMILY MEDICINE CLINIC | Age: 75
End: 2020-06-11

## 2020-06-11 VITALS
HEIGHT: 70 IN | WEIGHT: 270 LBS | RESPIRATION RATE: 18 BRPM | OXYGEN SATURATION: 98 % | HEART RATE: 67 BPM | DIASTOLIC BLOOD PRESSURE: 73 MMHG | TEMPERATURE: 97.9 F | SYSTOLIC BLOOD PRESSURE: 130 MMHG | BODY MASS INDEX: 38.65 KG/M2

## 2020-06-11 DIAGNOSIS — E83.42 HYPOMAGNESEMIA: ICD-10-CM

## 2020-06-11 DIAGNOSIS — M10.9 GOUT, UNSPECIFIED CAUSE, UNSPECIFIED CHRONICITY, UNSPECIFIED SITE: ICD-10-CM

## 2020-06-11 DIAGNOSIS — E66.01 SEVERE OBESITY (BMI 35.0-39.9) WITH COMORBIDITY (HCC): ICD-10-CM

## 2020-06-11 DIAGNOSIS — E03.4 HYPOTHYROIDISM DUE TO ACQUIRED ATROPHY OF THYROID: ICD-10-CM

## 2020-06-11 DIAGNOSIS — Z00.00 ENCOUNTER FOR MEDICARE ANNUAL WELLNESS EXAM: ICD-10-CM

## 2020-06-11 DIAGNOSIS — Z71.89 ACP (ADVANCE CARE PLANNING): ICD-10-CM

## 2020-06-11 DIAGNOSIS — Z12.5 SCREENING FOR MALIGNANT NEOPLASM OF PROSTATE: ICD-10-CM

## 2020-06-11 DIAGNOSIS — E55.9 HYPOVITAMINOSIS D: ICD-10-CM

## 2020-06-11 DIAGNOSIS — M48.062 SPINAL STENOSIS, LUMBAR REGION WITH NEUROGENIC CLAUDICATION: ICD-10-CM

## 2020-06-11 DIAGNOSIS — Z01.89 ENCOUNTER FOR LABORATORY EXAMINATION: ICD-10-CM

## 2020-06-11 DIAGNOSIS — E11.9 COMPREHENSIVE DIABETIC FOOT EXAMINATION, TYPE 2 DM, ENCOUNTER FOR (HCC): ICD-10-CM

## 2020-06-11 DIAGNOSIS — I10 ESSENTIAL HYPERTENSION: ICD-10-CM

## 2020-06-11 DIAGNOSIS — Z95.2 S/P AVR (AORTIC VALVE REPLACEMENT): ICD-10-CM

## 2020-06-11 DIAGNOSIS — Z13.31 SCREENING FOR DEPRESSION: ICD-10-CM

## 2020-06-11 DIAGNOSIS — G62.9 NEUROPATHY: ICD-10-CM

## 2020-06-11 DIAGNOSIS — N18.30 CKD (CHRONIC KIDNEY DISEASE) STAGE 3, GFR 30-59 ML/MIN (HCC): ICD-10-CM

## 2020-06-11 DIAGNOSIS — E11.21 TYPE 2 DIABETES MELLITUS WITH DIABETIC NEPHROPATHY, UNSPECIFIED WHETHER LONG TERM INSULIN USE (HCC): Primary | ICD-10-CM

## 2020-06-11 DIAGNOSIS — E78.5 DYSLIPIDEMIA: ICD-10-CM

## 2020-06-11 DIAGNOSIS — E11.21 TYPE 2 DIABETES MELLITUS WITH DIABETIC NEPHROPATHY, UNSPECIFIED WHETHER LONG TERM INSULIN USE (HCC): ICD-10-CM

## 2020-06-11 LAB
25(OH)D3 SERPL-MCNC: 42.8 NG/ML (ref 30–100)
ALBUMIN SERPL-MCNC: 3.7 G/DL (ref 3.4–5)
ALBUMIN/GLOB SERPL: 0.9 {RATIO} (ref 0.8–1.7)
ALP SERPL-CCNC: 68 U/L (ref 45–117)
ALT SERPL-CCNC: 48 U/L (ref 16–61)
ANION GAP SERPL CALC-SCNC: 8 MMOL/L (ref 3–18)
AST SERPL-CCNC: 23 U/L (ref 10–38)
BASOPHILS # BLD: 0 K/UL (ref 0–0.1)
BASOPHILS NFR BLD: 0 % (ref 0–2)
BILIRUB SERPL-MCNC: 0.4 MG/DL (ref 0.2–1)
BUN SERPL-MCNC: 36 MG/DL (ref 7–18)
BUN/CREAT SERPL: 24 (ref 12–20)
CALCIUM SERPL-MCNC: 9.3 MG/DL (ref 8.5–10.1)
CHLORIDE SERPL-SCNC: 112 MMOL/L (ref 100–111)
CHOLEST SERPL-MCNC: 149 MG/DL
CO2 SERPL-SCNC: 22 MMOL/L (ref 21–32)
CREAT SERPL-MCNC: 1.51 MG/DL (ref 0.6–1.3)
DIFFERENTIAL METHOD BLD: ABNORMAL
EOSINOPHIL # BLD: 0.4 K/UL (ref 0–0.4)
EOSINOPHIL NFR BLD: 8 % (ref 0–5)
ERYTHROCYTE [DISTWIDTH] IN BLOOD BY AUTOMATED COUNT: 13 % (ref 11.6–14.5)
EST. AVERAGE GLUCOSE BLD GHB EST-MCNC: 163 MG/DL
GLOBULIN SER CALC-MCNC: 3.9 G/DL (ref 2–4)
GLUCOSE SERPL-MCNC: 189 MG/DL (ref 74–99)
HBA1C MFR BLD: 7.3 % (ref 4.2–5.6)
HCT VFR BLD AUTO: 43.2 % (ref 36–48)
HDLC SERPL-MCNC: 45 MG/DL (ref 40–60)
HDLC SERPL: 3.3 {RATIO} (ref 0–5)
HGB BLD-MCNC: 14.7 G/DL (ref 13–16)
LDLC SERPL CALC-MCNC: 76.6 MG/DL (ref 0–100)
LIPID PROFILE,FLP: NORMAL
LYMPHOCYTES # BLD: 1.9 K/UL (ref 0.9–3.6)
LYMPHOCYTES NFR BLD: 35 % (ref 21–52)
MAGNESIUM SERPL-MCNC: 1.7 MG/DL (ref 1.6–2.6)
MCH RBC QN AUTO: 31.1 PG (ref 24–34)
MCHC RBC AUTO-ENTMCNC: 34 G/DL (ref 31–37)
MCV RBC AUTO: 91.3 FL (ref 74–97)
MONOCYTES # BLD: 0.3 K/UL (ref 0.05–1.2)
MONOCYTES NFR BLD: 6 % (ref 3–10)
NEUTS SEG # BLD: 2.8 K/UL (ref 1.8–8)
NEUTS SEG NFR BLD: 51 % (ref 40–73)
PLATELET # BLD AUTO: 200 K/UL (ref 135–420)
PMV BLD AUTO: 10.1 FL (ref 9.2–11.8)
POTASSIUM SERPL-SCNC: 4.3 MMOL/L (ref 3.5–5.5)
PROT SERPL-MCNC: 7.6 G/DL (ref 6.4–8.2)
PSA SERPL-MCNC: 2.2 NG/ML (ref 0–4)
RBC # BLD AUTO: 4.73 M/UL (ref 4.7–5.5)
SODIUM SERPL-SCNC: 142 MMOL/L (ref 136–145)
TRIGL SERPL-MCNC: 137 MG/DL (ref ?–150)
TSH SERPL DL<=0.05 MIU/L-ACNC: 23.8 UIU/ML (ref 0.36–3.74)
URATE SERPL-MCNC: 5.2 MG/DL (ref 2.6–7.2)
VLDLC SERPL CALC-MCNC: 27.4 MG/DL
WBC # BLD AUTO: 5.5 K/UL (ref 4.6–13.2)

## 2020-06-11 PROCEDURE — 82306 VITAMIN D 25 HYDROXY: CPT

## 2020-06-11 PROCEDURE — 80053 COMPREHEN METABOLIC PANEL: CPT

## 2020-06-11 PROCEDURE — 84443 ASSAY THYROID STIM HORMONE: CPT

## 2020-06-11 PROCEDURE — 84153 ASSAY OF PSA TOTAL: CPT

## 2020-06-11 PROCEDURE — 83036 HEMOGLOBIN GLYCOSYLATED A1C: CPT

## 2020-06-11 PROCEDURE — 83735 ASSAY OF MAGNESIUM: CPT

## 2020-06-11 PROCEDURE — 85025 COMPLETE CBC W/AUTO DIFF WBC: CPT

## 2020-06-11 PROCEDURE — 80061 LIPID PANEL: CPT

## 2020-06-11 PROCEDURE — 84550 ASSAY OF BLOOD/URIC ACID: CPT

## 2020-06-11 RX ORDER — TRAMADOL HYDROCHLORIDE 50 MG/1
50 TABLET ORAL
Qty: 60 TAB | Refills: 0 | Status: SHIPPED | OUTPATIENT
Start: 2020-06-11 | End: 2020-07-11

## 2020-06-11 RX ORDER — SPIRONOLACTONE 25 MG/1
25 TABLET ORAL DAILY
COMMUNITY
Start: 2020-06-11

## 2020-06-11 NOTE — PATIENT INSTRUCTIONS
Medicare Wellness Visit, Male The best way to live healthy is to have a lifestyle where you eat a well-balanced diet, exercise regularly, limit alcohol use, and quit all forms of tobacco/nicotine, if applicable. Regular preventive services are another way to keep healthy. Preventive services (vaccines, screening tests, monitoring & exams) can help personalize your care plan, which helps you manage your own care. Screening tests can find health problems at the earliest stages, when they are easiest to treat. [facility] follows the current, evidence-based guidelines published by the [facility]ier Yonathan ([facility]) when recommending preventive services for our patients. Because we follow these guidelines, sometimes recommendations change over time as research supports it. (For example, a prostate screening blood test is no longer routinely recommended for men with no symptoms). Of course, you and your doctor may decide to screen more often for some diseases, based on your risk and co-morbidities (chronic disease you are already diagnosed with). Preventive services for you include: - Medicare offers their members a free annual wellness visit, which is time for you and your primary care provider to discuss and plan for your preventive service needs. Take advantage of this benefit every year! 
-All adults over age 72 should receive the recommended pneumonia vaccines. Current USPSTF guidelines recommend a series of two vaccines for the best pneumonia protection.  
-All adults should have a flu vaccine yearly and tetanus vaccine every 10 years. 
-All adults age 48 and older should receive the shingles vaccines (series of two vaccines).       
-All adults age 38-68 who are overweight should have a diabetes screening test once every three years.  
-Other screening tests & preventive services for persons with diabetes include: an eye exam to screen for diabetic retinopathy, a kidney function test, a foot exam, and stricter control over your cholesterol.  
-Cardiovascular screening for adults with routine risk involves an electrocardiogram (ECG) at intervals determined by the provider.  
-Colorectal cancer screening should be done for adults age 54-65 with no increased risk factors for colorectal cancer. There are a number of acceptable methods of screening for this type of cancer. Each test has its own benefits and drawbacks. Discuss with your provider what is most appropriate for you during your annual wellness visit. The different tests include: colonoscopy (considered the best screening method), a fecal occult blood test, a fecal DNA test, and sigmoidoscopy. 
-All adults born between [facility] should be screened once for Hepatitis C. 
-An Abdominal Aortic Aneurysm (AAA) Screening is recommended for men age 73-68 who has ever smoked in their lifetime. Here is a list of your current Health Maintenance items (your personalized list of preventive services) with a due date: 
Health Maintenance Due Topic Date Due  Eye Exam  10/14/2017 [address] [place] [address] Diabetic Foot Care  06/10/2020 [address] [place] [address] Annual Well Visit  06/10/2020

## 2020-06-11 NOTE — PROGRESS NOTES
Patient presents for lab draw ordered by Dr Laurie Chance  Ordering Department/Practice:  [facility]  Date Ordered:  6-     The following labs were drawn and sent to [facility]     CBC, Lipid Profile, CMP, TSH, 3rd Generation, HgA1C and Vitamin D Hydroxy, Magnisium, Uric Acid, PSA    The following tubes were sent:    Gold  ( 3) and Lavender  ( 1)    Draw site:  LAC  Pain Level:0  Needle Gauge23  Aseptic technique used  Blood thinners:no  Band-Aid applied  Draw fee added   Procedure tolerated well, patient voiced no complaints.

## 2020-06-11 NOTE — PROGRESS NOTES
Chief Complaint   Patient presents with   [address] [place] [address] Annual Wellness Visit     1. Have you been to the ER, urgent care clinic since your last visit? Hospitalized since your last visit? No    2. Have you seen or consulted any other health care providers outside of the [address] [place] [address] since your last visit? Include any pap smears or colon screening. No  This is the Subsequent Medicare Annual Wellness Exam, performed 12 months or more after the Initial AWV or the last Subsequent AWV    I have reviewed the patient's medical history in detail and updated the computerized patient record. History   Christian Deluna. comes in for Medicare wellness exam.    Patient Active Problem List   Diagnosis Code    Graves disease E05.00    Heartburn R12    Gout M10.9    Type 2 diabetes mellitus with diabetic nephropathy ([facility] E11.21    Hypomagnesemia E83.42    Renovascular hypertension I15.0    Murmur, cardiac R01.1    CKD (chronic kidney disease) stage 3, GFR 30-59 ml/min ([facility]) N18.3    Hypothyroidism E03.9    Dyslipidemia E78.5    SOB (shortness of breath) R06.02    Pre-op evaluation Z01.818    Severe aortic stenosis I35.0    ACP (advance care planning) Z71.89    Osteoarthritis M19.90    Spinal stenosis of lumbar region M48.061    Spondylolisthesis M43.10    S/P AVR (aortic valve replacement) Z95.2    Essential hypertension I10    Severe obesity (BMI 35.0-39. 9) with comorbidity ([facility] E66.01    DDD (degenerative disc disease), lumbar M51.36    Lumbar radiculopathy M54.16    Spondylolisthesis, acquired M43.10    Lumbar postlaminectomy syndrome M96.1    Spinal stenosis, lumbar region with neurogenic claudication M48.062    Sacroiliitis ([facility]) M46.1    Lumbar neuritis M54.16    Gastroesophageal reflux disease K21.9     Past Medical History:   Diagnosis Date    Diabetes ([facility] 1995    Dyslipidemia 3/1/2017    Fractures 1995    R Knee/ Tib fib fracture/surgery    Gout 2010    Graves disease 1/21/2015    High cholesterol 2005    HTN (hypertension) 1990    Osteoarthritis 2013    Severe aortic stenosis 3/29/2017    Spinal stenosis of lumbar region 3/1/2016    Spondylolisthesis 3/1/2016    Thyroid trouble     Type 2 diabetes mellitus with diabetic nephropathy ([facility] 4/28/2015      Past Surgical History:   Procedure Laterality Date    HX HIP REPLACEMENT Left 2009    HX LUMBAR FUSION      HX ORTHOPAEDIC Right     R knee/Tibfib surgery     Current Outpatient Medications   Medication Sig Dispense Refill    spironolactone (ALDACTONE) 50 mg tablet Take 1 Tab by mouth daily. 90 Tab 1    guaiFENesin-dextromethorphan (GUAIFENESIN-DM)  mg/5 mL liqd Take 10 mL by mouth every eight (8) hours as needed for Cough. 236 mL 0    b complex vitamins tablet Take 1 Tab by mouth daily.  Alpha Lipoic Acid 600 mg cap Take  by mouth.  CANNABIDIOL, CBD, EXTRACT PO Take  by mouth.  levothyroxine (SYNTHROID) 200 mcg tablet Take 1 Tab by mouth Daily (before breakfast). (Patient taking differently: Take 200 mcg by mouth Daily (before breakfast). Medication dosage has been changed to 88mcg) 30 Tab 2    aspirin (ASPIRIN) 325 mg tablet Take 325 mg by mouth daily.  ascorbic acid, vitamin C, (VITAMIN C) 250 mg tablet Take  by mouth.  DULoxetine (CYMBALTA) 60 mg capsule Take 1 Cap by mouth daily. 30 Cap 1    capsicum oleoresin 0.025 % topical cream Apply  to affected area three (3) times daily.  diclofenac (VOLTAREN) 1 % gel Apply  to affected area four (4) times daily.  magnesium chloride (MAG DELAY) 64 mg delayed release tablet Take 8 tablets 3 x day      amLODIPine (NORVASC) 10 mg tablet Take 5 mg by mouth daily.  metoprolol tartrate (LOPRESSOR) 25 mg tablet Take 1 Tab by mouth two (2) times a day. (Patient taking differently: Take 12.5 mg by mouth two (2) times a day.) 180 Tab 3    insulin glargine (LANTUS) 100 unit/mL injection 40 Units.  Take 30 units once daily Indications: type 2 diabetes mellitus 3 Vial 3    febuxostat (ULORIC) 40 mg tab tablet Take 1 Tab by mouth daily. Indications: GOUT PREVENTION 90 Tab 1    insulin aspart (NOVOLOG) 100 unit/mL injection by SubCUTAneous route. 6 units before meals      glucose blood VI test strips (ASCENSIA AUTODISC VI, ONE TOUCH ULTRA TEST VI) strip Dispense precision extra test strips 3 x day to check blood glucose 200 Each 3    Omeprazole delayed release (PRILOSEC D/R) 20 mg tablet Take 20 mg by mouth daily.  atorvastatin (LIPITOR) 40 mg tablet Take 20 mg by mouth daily.  testosterone (ANDROGEL) 20.25 mg/1.25 gram (1.62 %) gel Apply  to affected area as needed.  Cholecalciferol, Vitamin D3, 1,000 unit cap Take 3,000 Units by mouth daily. Allergies   Allergen Reactions    Watermelon Anaphylaxis and Swelling    Citric Acid Rash    Peach (Prunus Persica) Itching       Family History   Problem Relation Age of Onset    Hypertension Mother     Thyroid Disease Mother     Hypertension Father     Diabetes Father     Drug Abuse Brother      Social History     Tobacco Use    Smoking status: Former Smoker     Packs/day: 1.00     Types: Cigarettes     Start date: 1969     Last attempt to quit: 1995     Years since quittin.4    Smokeless tobacco: Never Used   Substance Use Topics    Alcohol use: No       Depression Risk Factor Screening:     3 most recent PHQ Screens 2020   Little interest or pleasure in doing things Not at all   Feeling down, depressed, irritable, or hopeless Not at all   Total Score PHQ 2 0       Alcohol Risk Factor Screening (MALE > 65): Do you average more 1 drink per night or more than 7 drinks a week: No    In the past three months have you have had more than 4 drinks containing alcohol on one occasion: No      Functional Ability and Level of Safety:   Hearing: Hearing is good. Activities of Daily Living:   The home contains: handrails  Patient does total self care    Ambulation: with difficulty, uses a cane and walker    Fall Risk:  Fall Risk Assessment, last 12 mths 6/11/2020   Able to walk? Yes   Fall in past 12 months? No   Fall with injury? -   Number of falls in past 12 months -   Fall Risk Score -       Abuse Screen:  Patient is not abused    Cognitive Screening   Has your family/caregiver stated any concerns about your memory: no  Cognitive Screening: Normal - Verbal Fluency Test    Patient Care Team   Patient Care Team:  Minh Pearson MD as PCP - General (Family Practice)  Minh Pearson MD as PCP - [facility]  Selam Olguin MD (Nephrology)  Josh Singh MD (Cardiology)  Jocelynn Hirsch MD (Orthopedic Surgery)  Lori Quispe MD as Surgeon (Cardiothoracic Surgery)  Megan Lowe MD (Physical Medicine and Rehab)    Assessment/Plan   Education and counseling provided:  Are appropriate based on today's review and evaluation    1. Encounter for Medicare annual wellness exam  - VISUAL SCREENING TEST, BILAT  - MA PATIENT SCREENED FOR DEPRESSION  - MA ANNUAL ALCOHOL SCREEN 15 MIN    2. ACP (advance care planning)    3. Screening for malignant neoplasm of prostate  - PSA SCREENING (SCREENING); Future  - COLLECTION VENOUS BLOOD,VENIPUNCTURE    4. Screening for depression  - MA PATIENT SCREENED FOR DEPRESSION    Health Maintenance Due   Topic Date Due    Eye Exam Retinal or Dilated  10/14/2017    Foot Exam Q1  06/10/2020    Medicare Yearly Exam  06/10/2020     I have discussed the diagnosis with the patient and the intended plan of care as seen in the above orders. The patient has received an after-visit summary and questions were answered concerning future plans. I have discussed medication, side effects, and warnings with the patient in detail. The patient verbalized understanding and is in agreement with the plan of care. The patient will contact the office with any additional concerns.   Personalized preventative plan of care was discussed, printed and given to patient.     Charity Calvo MD

## 2020-06-11 NOTE — PROGRESS NOTES
JUANITA Huerta is seen for follow-up care. Diabetes mellitus type 2: Patient has type 2 diabetes mellitus. We will recheck labs today. He states that his blood glucose numbers have been stable. He is on insulin. He takes Lantus and NovoLog. States that his glucose numbers range between 110-160. This morning however it was high at 219. We discussed lifestyle and dietary modification. Hypertension: Patient has a history of hypertension. He is on spironolactone 25 mg daily. Previously was on 50 mg but this caused him to feel dehydrated and constipated. He has cut back to 25 and is stable. Blood pressure is normal.  He denies headache, changes in vision or focal weakness. He is also on amlodipine and metoprolol. We will continue current treatment plan. Hypomagnesemia: Patient has a history of hypomagnesemia. This has caused muscle cramps on and off. He is on potassium replacement therapy. I will recheck magnesium levels today. Hypothyroidism: Patient has hypothyroidism. He is on thyroid replacement therapy. I will recheck TSH levels today. He takes levothyroxine 200 mcg daily. Continue current treatment plan. Chronic pain: Patient has spinal stenosis of the lumbar region and chronic pain. Gout: Patient has a history of gout arthritis. He takes Uloric daily. Denies any gout attacks recently. We will continue current treatment plan. Dyslipidemia: Patient has a history of dyslipidemia. He is on atorvastatin 40 mg daily. We will recheck his lipid panel today. Obesity: Patient has a history of obesity. BMI 38.74. Has gained about 4 pounds since he was last here. We discussed lifestyle and dietary modification. He will intensify this. Hypogonadism: Patient has hypogonadism with low testosterone. He is on testosterone replacement therapy. He has been stable. We will continue with the current treatment plan.     Past Medical History  Past Medical History:   Diagnosis Date  Diabetes ([facility] 1995    Dyslipidemia 3/1/2017    Fractures 1995    R Knee/ Tib fib fracture/surgery    Gout 2010    Graves disease 1/21/2015    High cholesterol 2005    HTN (hypertension) 1990    Osteoarthritis 2013    Severe aortic stenosis 3/29/2017    Spinal stenosis of lumbar region 3/1/2016    Spondylolisthesis 3/1/2016    Thyroid trouble     Type 2 diabetes mellitus with diabetic nephropathy ([facility] 4/28/2015       Surgical History  Past Surgical History:   Procedure Laterality Date    HX HIP REPLACEMENT Left 2009    HX LUMBAR FUSION      HX ORTHOPAEDIC Right     R knee/Tibfib surgery        Medications  Current Outpatient Medications   Medication Sig Dispense Refill    spironolactone (ALDACTONE) 25 mg tablet Take 1 Tab by mouth daily.  traMADoL (ULTRAM) 50 mg tablet Take 1 Tab by mouth two (2) times daily as needed for Pain for up to 30 days. Max Daily Amount: 100 mg. 60 Tab 0    b complex vitamins tablet Take 1 Tab by mouth daily.  Alpha Lipoic Acid 600 mg cap Take  by mouth.  CANNABIDIOL, CBD, EXTRACT PO Take  by mouth.  levothyroxine (SYNTHROID) 200 mcg tablet Take 1 Tab by mouth Daily (before breakfast). (Patient taking differently: Take 200 mcg by mouth Daily (before breakfast). Medication dosage has been changed to 88mcg) 30 Tab 2    aspirin (ASPIRIN) 325 mg tablet Take 325 mg by mouth daily.  ascorbic acid, vitamin C, (VITAMIN C) 250 mg tablet Take  by mouth.  DULoxetine (CYMBALTA) 60 mg capsule Take 1 Cap by mouth daily. 30 Cap 1    capsicum oleoresin 0.025 % topical cream Apply  to affected area three (3) times daily.  diclofenac (VOLTAREN) 1 % gel Apply  to affected area four (4) times daily.  magnesium chloride (MAG DELAY) 64 mg delayed release tablet Take 8 tablets 3 x day      amLODIPine (NORVASC) 10 mg tablet Take 5 mg by mouth daily.  metoprolol tartrate (LOPRESSOR) 25 mg tablet Take 1 Tab by mouth two (2) times a day.  (Patient taking differently: Take 12.5 mg by mouth two (2) times a day.) 180 Tab 3    insulin glargine (LANTUS) 100 unit/mL injection 40 Units. Take 30 units once daily  Indications: type 2 diabetes mellitus 3 Vial 3    febuxostat (ULORIC) 40 mg tab tablet Take 1 Tab by mouth daily. Indications: GOUT PREVENTION 90 Tab 1    insulin aspart (NOVOLOG) 100 unit/mL injection by SubCUTAneous route. 6 units before meals      glucose blood VI test strips (ASCENSIA AUTODISC VI, ONE TOUCH ULTRA TEST VI) strip Dispense precision extra test strips 3 x day to check blood glucose 200 Each 3    Omeprazole delayed release (PRILOSEC D/R) 20 mg tablet Take 20 mg by mouth daily.  atorvastatin (LIPITOR) 40 mg tablet Take 20 mg by mouth daily.  testosterone (ANDROGEL) 20.25 mg/1.25 gram (1.62 %) gel Apply  to affected area as needed.  Cholecalciferol, Vitamin D3, 1,000 unit cap Take 3,000 Units by mouth daily.          Allergies  Allergies   Allergen Reactions    Watermelon Anaphylaxis and Swelling    Citric Acid Rash    Peach (Prunus Persica) Itching       Family History  Family History   Problem Relation Age of Onset    Hypertension Mother     Thyroid Disease Mother     Hypertension Father     Diabetes Father     Drug Abuse Brother        Social History  Social History     Socioeconomic History    Marital status:      Spouse name: Not on file    Number of children: Not on file    Years of education: Not on file    Highest education level: Not on file   Occupational History    Not on file   Social Needs    Financial resource strain: Not on file    Food insecurity     Worry: Not on file     Inability: Not on file    Transportation needs     Medical: Not on file     Non-medical: Not on file   Tobacco Use    Smoking status: Former Smoker     Packs/day: 1.00     Types: Cigarettes     Start date: 1969     Last attempt to quit: 1995     Years since quittin.4    Smokeless tobacco: Never Used Substance and Sexual Activity    Alcohol use: No    Drug use: No    Sexual activity: Yes     Partners: Female   Lifestyle    Physical activity     Days per week: Not on file     Minutes per session: Not on file    Stress: Not on file   Relationships    Social connections     Talks on phone: Not on file     Gets together: Not on file     Attends Nondenominational service: Not on file     Active member of club or organization: Not on file     Attends meetings of clubs or organizations: Not on file     Relationship status: Not on file    Intimate partner violence     Fear of current or ex partner: Not on file     Emotionally abused: Not on file     Physically abused: Not on file     Forced sexual activity: Not on file   Other Topics Concern     Service Yes     Comment: Served in [address] Blood Transfusions No    Caffeine Concern No    Occupational Exposure No    Hobby Hazards No    Sleep Concern No    Stress Concern No    Weight Concern Yes    Special Diet No    Back Care Yes     Comment: Lower Back pain when walking    Exercise No    Bike Helmet No    Seat Belt Yes    Self-Exams Yes   Social History Narrative    Not on file       Review of Systems  Review of Systems - Review of all systems is negative except as noted above in the HPI.     Vital Signs  Visit Vitals  /73 (BP 1 Location: Left arm, BP Patient Position: Sitting)   Pulse 67   Temp 97.9 °F (36.6 °C) (Oral)   Resp 18   Ht 5' 10\" (1.778 m)   Wt 270 lb (122.5 kg)   SpO2 98%   BMI 38.74 kg/m²         Physical Exam  Physical Examination: General appearance - oriented to person, place, and time, overweight and acyanotic, in no respiratory distress  Mental status - alert, oriented to person, place, and time, affect appropriate to mood  Mouth - mucous membranes moist, pharynx normal without lesions  Neck - supple, no significant adenopathy  Lymphatics - no palpable lymphadenopathy  Chest - clear to auscultation, no wheezes, rales or rhonchi, symmetric air entry  Heart - systolic murmur 3/6 at 2nd right intercostal space  Abdomen - no rebound tenderness noted  Back exam - limited range of motion  Neurological - abnormal neurological exam unchanged from prior examinations  Musculoskeletal - osteoarthritic changes noted in both hands  Extremities - pedal edema 1 +, intact peripheral pulses    Diabetic foot exam:     Left Foot:   Visual Exam: normal    Pulse DP: 2+ (normal)   Filament test: normal sensation        Right Foot:   Visual Exam: normal    Pulse DP: 2+ (normal)   Filament test: normal sensation      Results  Results for orders placed or performed in visit on 02/17/20   ECHO ADULT COMPLETE   Result Value Ref Range    LA Volume 73.42 18 - 58 mL    Ao Root D 3.74 cm    AO ASC D 3.52 cm    Aortic Valve Systolic Peak Velocity 927.35 cm/s    AoV VTI 32.47 cm    Aortic Valve Area by Continuity of Peak Velocity 1.3 cm2    Aortic Valve Area by Continuity of VTI 1.5 cm2    AoV PG 12.9 mmHg    LVIDd 5.53 4.2 - 5.9 cm    LVPWd 1.38 (A) 0.6 - 1.0 cm    LVIDs 3.86 cm    IVSd 1.18 (A) 0.6 - 1.0 cm    LVOT d 1.99 cm    LVOT Peak Velocity 74.01 cm/s    LVOT Peak Gradient 2.2 mmHg    LVOT VTI 15.77 cm    LV E' Septal Velocity 5.41 cm/s    LV E' Lateral Velocity 9.43 cm/s    MVA (PHT) 1.5 cm2    MV A Ananda 58.96 cm/s    MV E Ananda 38.79 cm/s    MV E/A 0.66     RVIDd 4.27 cm    Aortic Valve Systolic Mean Gradient 7.3 mmHg    LA Vol 4C 67.61 (A) 18 - 58 mL    LA Vol 2C 72.96 (A) 18 - 58 mL    LA Area 2C 26.25 cm2    LA Area 4C 22.8 cm2    LV Mass .4 (A) 88 - 224 g    LV Mass AL Index 153.3 (A) 49 - 115 g/m2    E/E' lateral 4.11     E/E' septal 7.17     RV Longitudinal Dimension 18.7 cm    MV \"A\" wave duration 129.4 msec    E/E' ratio (averaged) 5.64     Est. RA Pressure 3.0 mmHg    Mitral Valve E Wave Deceleration Time 509.9 ms    Mitral Valve Pressure Half-time 147.9 ms    Left Atrium Major Axis 4.29 cm    Tapse 1.89 1.5 - 2.0 cm    Triscuspid Valve Regurgitation Peak Gradient 15.7 mmHg    Pulmonic Valve Max Velocity 83.57 cm/s    TR Max Velocity 198.43 cm/s    LA Vol Index 30.96 16 - 28 ml/m2    PASP 19.0 mmHg    LA Vol Index 30.77 16 - 28 ml/m2    LA Vol Index 28.51 16 - 28 ml/m2    ZACKERY/BSA Pk Ananda 0.5 cm2/m2    ZACKERY/BSA VTI 0.6 cm2/m2    Left Ventricular Fractional Shortening by 2D 86.021307867 %    Left Ventricular Outflow Tract Mean Gradient 8.6727030987 mmHg    Left Ventricular Outflow Tract Mean Velocity 7.55244058126 cm/s    Mitral Valve Deceleration Barry 6.13899713111     AV Velocity Ratio 0.41     AV VTI Ratio 0.5     Aortic valve mean velocity 8.8395283468 m/s    Left Ventricular End Diastolic Volume by Teichholz Method 71.675878305 mL    Left Ventricular End Systolic Volume by Teichholz Method 54.962593079 mL    Left Ventricular Stroke Volume by Teichholz Method 99.279039976 mL    PV peak gradient 2.8 mmHg       ASSESSMENT and PLAN  1. ICD-10-CM ICD-9-CM    1. Type 2 diabetes mellitus with diabetic nephropathy, unspecified whether long term insulin use ([facility]) E11.21 250.40 HEMOGLOBIN A1C WITH EAG     583.81  DIABETES FOOT EXAM      COLLECTION VENOUS BLOOD,VENIPUNCTURE   2. Neuropathy G62.9 355.9 COLLECTION VENOUS BLOOD,VENIPUNCTURE   3. Essential hypertension I10 401.9 CBC WITH AUTOMATED DIFF      METABOLIC PANEL, COMPREHENSIVE      COLLECTION VENOUS BLOOD,VENIPUNCTURE   4. CKD (chronic kidney disease) stage 3, GFR 30-59 ml/min ([facility]) N18.3 585.3 COLLECTION VENOUS BLOOD,VENIPUNCTURE   5. Dyslipidemia E78.5 272.4 LIPID PANEL      COLLECTION VENOUS BLOOD,VENIPUNCTURE   6. Gout, unspecified cause, unspecified chronicity, unspecified site M10.9 274.9 URIC ACID      COLLECTION VENOUS BLOOD,VENIPUNCTURE   7. Severe obesity (BMI 35.0-39. 9) with comorbidity ([facility] E66.01 278.01 COLLECTION VENOUS BLOOD,VENIPUNCTURE   8. Hypovitaminosis D E55.9 268.9 VITAMIN D, 25 HYDROXY      COLLECTION VENOUS BLOOD,VENIPUNCTURE   9.  Hypothyroidism due to acquired atrophy of thyroid E03.4 244.8 TSH 3RD GENERATION     246.8 COLLECTION VENOUS BLOOD,VENIPUNCTURE   10. Hypomagnesemia E83.42 275.2 MAGNESIUM      COLLECTION VENOUS BLOOD,VENIPUNCTURE   11. Screening for malignant neoplasm of prostate Z12.5 V76.44 PSA SCREENING (SCREENING)      COLLECTION VENOUS BLOOD,VENIPUNCTURE   12. Screening for depression Z13.31 V79.0 [place] PATIENT SCREENED FOR DEPRESSION   13. Comprehensive diabetic foot examination, type 2 DM, encounter for ([facility] E11.9 250.00  DIABETES FOOT EXAM   14. Spinal stenosis, lumbar region with neurogenic claudication M48.062 724.03 traMADoL (ULTRAM) 50 mg tablet   15. S/P AVR (aortic valve replacement) Z95.2 V43.3    16. Encounter for Medicare annual wellness exam Z00.00 V70.0 VISUAL SCREENING TEST, BILAT      WI PATIENT SCREENED FOR DEPRESSION      [place] ANNUAL ALCOHOL SCREEN 15 MIN   17. ACP (advance care planning) Z71.89 V65.49    18. Encounter for laboratory examination Z01.89 V72.60 COLLECTION VENOUS BLOOD,VENIPUNCTURE     lab results and schedule of future lab studies reviewed with patient  reviewed diet, exercise and weight control  cardiovascular risk and specific lipid/LDL goals reviewed  reviewed medications and side effects in detail  specific diabetic recommendations: low cholesterol diet, weight control and daily exercise discussed, home glucose monitoring emphasized, all medications, side effects and compliance discussed carefully, foot care discussed and Podiatry visits discussed, annual eye examinations at Ophthalmology discussed, glycohemoglobin and other lab monitoring discussed and long term diabetic complications discussed  use of aspirin to prevent MI and TIA's discussed      I have discussed the diagnosis with the patient and the intended plan of care as seen in the above orders. The patient has received an after-visit summary and questions were answered concerning future plans.  I have discussed medication, side effects, and warnings with the patient in detail. The patient verbalized understanding and is in agreement with the plan of care. The patient will contact the office with any additional concerns. Pamla Kawasaki, MD    PLEASE NOTE:   This document has been produced using voice recognition software.  Unrecognized errors in transcription may be present

## 2020-06-11 NOTE — ACP (ADVANCE CARE PLANNING)
Advance Care Planning       Advance Care Planning (ACP) Physician/NP/PA (Provider) Conversation        Date of ACP Conversation: 6/11/2020    Conversation Conducted with:   Patient with Decision Making [address] Maker:    Current Designated Health Care Decision Maker:   If no Authorized Decision Maker has previously been identified, then patient chooses [place]:  \"Who would you like to name as your primary health care decision-maker? \"    Name: Mireya Valero   Relationship: Wife Phone number: [phone]  Garland Sigala this person be reached easily? \" YES  Care Preferences:    Hospitalization: \"If your health worsens and it becomes clear that your chance of recovery is unlikely, what would your preference be regarding hospitalization? \"  If the patient would want hospitalization, answer \"yes\". If the patient would prefer comfort-focused treatment without hospitalization, answer \"no\". yes      Ventilation: \"If you were in your present state of health and suddenly became very ill and were unable to breathe on your own, what would your preference be about the use of a ventilator (breathing machine) if it was available to you? \"    If patient would desire the use of a ventilator (breathing machine), answer \"yes\", if not answer \"no\":yes    \"If your health worsens and it becomes clear that your chance of recovery is unlikely, what would your preference be about the use of a ventilator (breathing machine) if it was available to you? \"   yes      Resuscitation:  \"CPR works best to restart the heart when there is a sudden event, like a heart attack, in someone who is otherwise healthy. Unfortunately, CPR does not typically restart the heart for people who have serious health conditions or who are very sick. \"    \"In the event your heart stopped as a result of an underlying serious health condition, would you want attempts to be made to restart your heart (answer \"yes\" for attempt to resuscitate) or would you prefer a natural death (answer \"no\" for do not attempt to resuscitate)? \"   yes    Conversation Outcomes / Follow-Up Plan:   Recommended completion of Advance Directive      Length of Voluntary ACP Conversation in minutes:  16 minutes      Aroldo Blanco MD

## 2020-06-19 ENCOUNTER — TELEPHONE (OUTPATIENT)
Dept: FAMILY MEDICINE CLINIC | Age: 75
End: 2020-06-19

## 2020-06-19 NOTE — TELEPHONE ENCOUNTER
Patient called stating he saw his lab results on [facility]. He wants to know when someone is going to call him about this. I explained that Dr. Angelo Natarajan and his nurse are out of the office on Fridays and they will return on Monday.

## 2020-06-22 DIAGNOSIS — E03.4 HYPOTHYROIDISM DUE TO ACQUIRED ATROPHY OF THYROID: ICD-10-CM

## 2020-06-22 DIAGNOSIS — E55.9 HYPOVITAMINOSIS D: Primary | ICD-10-CM

## 2020-06-22 DIAGNOSIS — N18.30 CKD (CHRONIC KIDNEY DISEASE) STAGE 3, GFR 30-59 ML/MIN (HCC): ICD-10-CM

## 2020-06-22 RX ORDER — LEVOTHYROXINE SODIUM 50 UG/1
50 TABLET ORAL
Qty: 30 TAB | Refills: 3 | Status: SHIPPED | OUTPATIENT
Start: 2020-06-22 | End: 2020-06-30

## 2020-06-22 NOTE — PROGRESS NOTES
Please let patient know his HbA1c 7.3. This is slightly increased from the previous 6.3. He should intensify lifestyle and dietary modification. He should take his diabetes medication as prescribed. His creatinine is increased 1.51 with GFR low at 55. This indicates reduction in renal function. This is likely due to diabetes and because he was dehydrated. He should maintain good fluid hydration status and we will recheck labs at next visit. His TSH is elevated at 23.80. This indicates that his thyroid level is low. He is on 200 mcg of levothyroxine. I will increase this to 50 mcg and we will recheck labs in 6 to 8 weeks. I have printed out a prescription for 50 mcg levothyroxine which she will take in addition to the 200 mcg of levothyroxine. He should come and  the prescription or let us know what pharmacy he would like us to call the prescription in to.   Junaid Brandon MD

## 2020-06-23 NOTE — PROGRESS NOTES
Spoke with patient at this time. Patient states he is not taking 200 mcg he has been taking 88 mcg and he want clarification at this time. Patient also states he had other elevated levels in his cbc and he want clarification at this time. Patient ask if I can clarify his lab results and give him a call back

## 2020-06-30 RX ORDER — LEVOTHYROXINE SODIUM 100 UG/1
100 TABLET ORAL
Qty: 30 TAB | Refills: 2 | Status: SHIPPED | OUTPATIENT
Start: 2020-06-30 | End: 2020-07-09 | Stop reason: SDUPTHER

## 2020-06-30 NOTE — PROGRESS NOTES
Please let patient know I will increase his levothyroxine to 100 mcg daily. This is because he has only been taking 88 and he is thyroid level is low given the elevated TSH. Recheck labs in 6 to 8 weeks. I have looked at his other labs. His metabolic panel derangement is basically due to his kidney function. That is why his chloride is elevated. The BUN is elevated likely due to some dehydration which also causes the chronic kidney disease. His glucose is elevated because of diabetes  and his HbA1c as noted above is 7.3. His CBC is basically stable. He does have a slight elevation of the eosinophil percentage of 8%. This may be related to allergies. His test result however is stable and reassuring.   Ayesha Vargas MD

## 2020-06-30 NOTE — PROGRESS NOTES
Please let patient know we do not have a pharmacy to send in the levothyroxine thus prescription has been printed.   Ayesha Vargas MD

## 2020-07-09 RX ORDER — LEVOTHYROXINE SODIUM 100 UG/1
100 TABLET ORAL
Qty: 90 TAB | Refills: 1 | Status: SHIPPED | OUTPATIENT
Start: 2020-07-09 | End: 2020-09-11 | Stop reason: DRUGHIGH

## 2020-07-13 ENCOUNTER — TELEPHONE (OUTPATIENT)
Dept: FAMILY MEDICINE CLINIC | Age: 75
End: 2020-07-13

## 2020-07-13 NOTE — TELEPHONE ENCOUNTER
Patient called stating his synthroid script was not received at the [address] in [address] [place] [address]. He is asking it to be sent again and for someone to call him to let him know its been sent. He will then call the pharmacy

## 2020-07-13 NOTE — TELEPHONE ENCOUNTER
Made contact with the patient after checking with the [facility] in [place]. They advised they have it ready for him to . Patient was informed to contact the pharmacy it was ready. Patient verbalized understanding

## 2020-09-09 ENCOUNTER — HOSPITAL ENCOUNTER (OUTPATIENT)
Dept: LAB | Age: 75
Discharge: HOME | End: 2020-09-09
Payer: MEDICARE

## 2020-09-09 ENCOUNTER — OFFICE VISIT (OUTPATIENT)
Dept: FAMILY MEDICINE CLINIC | Age: 75
End: 2020-09-09

## 2020-09-09 VITALS
TEMPERATURE: 98.2 F | WEIGHT: 263 LBS | RESPIRATION RATE: 18 BRPM | DIASTOLIC BLOOD PRESSURE: 82 MMHG | OXYGEN SATURATION: 99 % | HEIGHT: 70 IN | HEART RATE: 70 BPM | SYSTOLIC BLOOD PRESSURE: 137 MMHG | BODY MASS INDEX: 37.65 KG/M2

## 2020-09-09 DIAGNOSIS — G62.9 NEUROPATHY: ICD-10-CM

## 2020-09-09 DIAGNOSIS — E03.4 HYPOTHYROIDISM DUE TO ACQUIRED ATROPHY OF THYROID: ICD-10-CM

## 2020-09-09 DIAGNOSIS — M48.062 SPINAL STENOSIS, LUMBAR REGION WITH NEUROGENIC CLAUDICATION: ICD-10-CM

## 2020-09-09 DIAGNOSIS — Z23 ENCOUNTER FOR IMMUNIZATION: ICD-10-CM

## 2020-09-09 DIAGNOSIS — N32.81 OAB (OVERACTIVE BLADDER): ICD-10-CM

## 2020-09-09 DIAGNOSIS — E11.21 TYPE 2 DIABETES MELLITUS WITH DIABETIC NEPHROPATHY, UNSPECIFIED WHETHER LONG TERM INSULIN USE (HCC): Primary | ICD-10-CM

## 2020-09-09 DIAGNOSIS — M10.9 GOUT, UNSPECIFIED CAUSE, UNSPECIFIED CHRONICITY, UNSPECIFIED SITE: ICD-10-CM

## 2020-09-09 DIAGNOSIS — I10 ESSENTIAL HYPERTENSION: ICD-10-CM

## 2020-09-09 DIAGNOSIS — E78.5 DYSLIPIDEMIA: ICD-10-CM

## 2020-09-09 DIAGNOSIS — E66.01 SEVERE OBESITY (BMI 35.0-39.9) WITH COMORBIDITY (HCC): ICD-10-CM

## 2020-09-09 DIAGNOSIS — N18.30 CKD (CHRONIC KIDNEY DISEASE) STAGE 3, GFR 30-59 ML/MIN (HCC): ICD-10-CM

## 2020-09-09 DIAGNOSIS — R39.9 LOWER URINARY TRACT SYMPTOMS (LUTS): ICD-10-CM

## 2020-09-09 LAB
ANION GAP SERPL CALC-SCNC: 7 MMOL/L (ref 3–18)
BUN SERPL-MCNC: 35 MG/DL (ref 7–18)
BUN/CREAT SERPL: 23 (ref 12–20)
CALCIUM SERPL-MCNC: 10 MG/DL (ref 8.5–10.1)
CHLORIDE SERPL-SCNC: 109 MMOL/L (ref 100–111)
CO2 SERPL-SCNC: 24 MMOL/L (ref 21–32)
CREAT SERPL-MCNC: 1.5 MG/DL (ref 0.6–1.3)
GLUCOSE SERPL-MCNC: 176 MG/DL (ref 74–99)
POTASSIUM SERPL-SCNC: 4.2 MMOL/L (ref 3.5–5.5)
SODIUM SERPL-SCNC: 140 MMOL/L (ref 136–145)
T4 FREE SERPL-MCNC: 0.9 NG/DL (ref 0.7–1.5)
TSH SERPL DL<=0.05 MIU/L-ACNC: 15.4 UIU/ML (ref 0.36–3.74)

## 2020-09-09 PROCEDURE — 80048 BASIC METABOLIC PNL TOTAL CA: CPT

## 2020-09-09 PROCEDURE — 84439 ASSAY OF FREE THYROXINE: CPT

## 2020-09-09 PROCEDURE — 36415 COLL VENOUS BLD VENIPUNCTURE: CPT

## 2020-09-09 PROCEDURE — 84443 ASSAY THYROID STIM HORMONE: CPT

## 2020-09-09 RX ORDER — TRAMADOL HYDROCHLORIDE 50 MG/1
50 TABLET ORAL
Qty: 60 TAB | Refills: 0 | Status: SHIPPED | OUTPATIENT
Start: 2020-09-09 | End: 2020-09-12

## 2020-09-09 RX ORDER — TAMSULOSIN HYDROCHLORIDE 0.4 MG/1
0.4 CAPSULE ORAL DAILY
Qty: 30 CAP | Refills: 2 | Status: SHIPPED | OUTPATIENT
Start: 2020-09-09 | End: 2021-01-28 | Stop reason: SDUPTHER

## 2020-09-09 NOTE — PROGRESS NOTES
Venipuncture to patient left forearm at this time. Patient tolerated well at this time. Labs ordered by PCP in 06/20. Flu vaccine given to patient left deltoid. Patient tolerated well. No other concerns noted

## 2020-09-09 NOTE — PROGRESS NOTES
Chief Complaint   Patient presents with    Follow Up Chronic Condition     1. Have you been to the ER, urgent care clinic since your last visit? Hospitalized since your last visit? No    2. Have you seen or consulted any other health care providers outside of the [address] [place] [address] since your last visit? Include any pap smears or colon screening. No     HPI   Rossarchana Rodas. comes in for follow-up care. Lumbar stenosis: Patient has a history of lumbar stenosis and radiculopathy. He does have occasional numbness and tingling radiating down to his lower extremities. When he stands up he has poor balance initially and the at times has to support himself using a walker or a cane. When he starts walking things get better. He has not fallen. He does have a history of lumbar laminectomy. He has had physical therapy done in the past.  He is able to control his urine. We discussed supportive care measures. He would prefer to hold off from getting physical therapy. He has been seen by the spine specialist in the past and was given some pain medication. He does get pain on and off. Tramadol did help in the past.  He would like a refill of this and prescription is given. He will continue to take the pain medication just as needed for severe pain. If symptoms worsen he will come back in for evaluation. Patient takes Cymbalta and this helps with the neuropathy and lumbar radiculopathy. Diabetes mellitus type 2: Patient has type 2 diabetes mellitus. He is on insulin. Currently takes Lantus 45 units twice a day. He also has correctional insulin. He is followed up through the [place] clinic by the diabetes specialist.  His last HbA1c was 7.3. He will continue with current treatment plan. He should intensify lifestyle and dietary modification. Gout arthritis: Patient is on Uloric for this. I did emphasize the need to take a purine restricted diet.   Hypertension: He is on Aldactone, metoprolol and amlodipine. States that at home his blood pressure is usually stable in the 130s. He will keep a log and I will follow-up at next visit. Hypothyroidism: Patient has a history of hypothyroidism. We recently adjusted his levothyroxine to 100 mcg daily. I will recheck labs today. Chronic kidney disease: Patient has a history of chronic kidney disease. We will recheck his BMP today. We will follow-up with the results. Plan is to avoid any nephrotoxic medications. GERD: Patient has gastroesophageal reflux disease. He gets heartburn on and off. He is on Prilosec. Denies dark stools or hematemesis. We will continue with the current treatment plan. Dyslipidemia: Patient has a history of dyslipidemia. He is on Lipitor 40 mg daily. We will continue with this medication. I will follow-up at next visit. LUTS: Patient has urinary hesitancy and frequency at night. This has been ongoing for days. He is passes urine but then has to wake up up to 3 times at night to go to the bathroom. He denies dysuria or hematuria. PSA test result has been stable. This is overactive bladder. I will treat with Flomax. I will follow-up at next visit. If persistent may need to see the urologist.  Obesity: Patient has a BMI of 37.74. He has lost 7 pounds since I last saw him. He will intensify lifestyle and dietary modification.     Past Medical History  Past Medical History:   Diagnosis Date    Diabetes ([address] 1995    Dyslipidemia 3/1/2017    Fractures 1995    R Knee/ Tib fib fracture/surgery    Gout 2010    Graves disease 1/21/2015    High cholesterol 2005    HTN (hypertension) 1990    Osteoarthritis 2013    Severe aortic stenosis 3/29/2017    Spinal stenosis of lumbar region 3/1/2016    Spondylolisthesis 3/1/2016    Thyroid trouble     Type 2 diabetes mellitus with diabetic nephropathy ([address] 4/28/2015       Surgical History  Past Surgical History:   Procedure Laterality Date    HX HIP REPLACEMENT Left 2009  HX LUMBAR FUSION      HX ORTHOPAEDIC Right     R knee/Tibfib surgery        Medications  Current Outpatient Medications   Medication Sig Dispense Refill    levothyroxine (SYNTHROID) 100 mcg tablet Take 1 Tab by mouth Daily (before breakfast). 90 Tab 1    spironolactone (ALDACTONE) 25 mg tablet Take 1 Tab by mouth daily.  b complex vitamins tablet Take 1 Tab by mouth daily.  Alpha Lipoic Acid 600 mg cap Take  by mouth.  CANNABIDIOL, CBD, EXTRACT PO Take  by mouth.  aspirin (ASPIRIN) 325 mg tablet Take 325 mg by mouth daily.  ascorbic acid, vitamin C, (VITAMIN C) 250 mg tablet Take  by mouth.  DULoxetine (CYMBALTA) 60 mg capsule Take 1 Cap by mouth daily. 30 Cap 1    capsicum oleoresin 0.025 % topical cream Apply  to affected area three (3) times daily.  diclofenac (VOLTAREN) 1 % gel Apply  to affected area four (4) times daily.  magnesium chloride (MAG DELAY) 64 mg delayed release tablet Take 8 tablets 3 x day      amLODIPine (NORVASC) 10 mg tablet Take 5 mg by mouth daily.  metoprolol tartrate (LOPRESSOR) 25 mg tablet Take 1 Tab by mouth two (2) times a day. (Patient taking differently: Take 12.5 mg by mouth two (2) times a day.) 180 Tab 3    insulin glargine (LANTUS) 100 unit/mL injection 40 Units. Take 30 units once daily  Indications: type 2 diabetes mellitus 3 Vial 3    febuxostat (ULORIC) 40 mg tab tablet Take 1 Tab by mouth daily. Indications: GOUT PREVENTION 90 Tab 1    insulin aspart (NOVOLOG) 100 unit/mL injection by SubCUTAneous route. 6 units before meals      glucose blood VI test strips (ASCENSIA AUTODISC VI, ONE TOUCH ULTRA TEST VI) strip Dispense precision extra test strips 3 x day to check blood glucose 200 Each 3    Omeprazole delayed release (PRILOSEC D/R) 20 mg tablet Take 20 mg by mouth daily.  atorvastatin (LIPITOR) 40 mg tablet Take 20 mg by mouth daily.       testosterone (ANDROGEL) 20.25 mg/1.25 gram (1.62 %) gel Apply  to affected area as needed.  Cholecalciferol, Vitamin D3, 1,000 unit cap Take 3,000 Units by mouth daily.          Allergies  Allergies   Allergen Reactions    Watermelon Anaphylaxis and Swelling    Citric Acid Rash    Peach (Prunus Persica) Itching       Family History  Family History   Problem Relation Age of Onset    Hypertension Mother     Thyroid Disease Mother     Hypertension Father     Diabetes Father     Drug Abuse Brother        Social History  Social History     Socioeconomic History    Marital status:      Spouse name: Not on file    Number of children: Not on file    Years of education: Not on file    Highest education level: Not on file   Occupational History    Not on file   Social Needs    Financial resource strain: Not on file    Food insecurity     Worry: Not on file     Inability: Not on file    Transportation needs     Medical: Not on file     Non-medical: Not on file   Tobacco Use    Smoking status: Former Smoker     Packs/day: 1.00     Types: Cigarettes     Start date: 1969     Last attempt to quit: 1995     Years since quittin.7    Smokeless tobacco: Never Used   Substance and Sexual Activity    Alcohol use: No    Drug use: No    Sexual activity: Yes     Partners: Female   Lifestyle    Physical activity     Days per week: Not on file     Minutes per session: Not on file    Stress: Not on file   Relationships    Social connections     Talks on phone: Not on file     Gets together: Not on file     Attends Hindu service: Not on file     Active member of club or organization: Not on file     Attends meetings of clubs or organizations: Not on file     Relationship status: Not on file    Intimate partner violence     Fear of current or ex partner: Not on file     Emotionally abused: Not on file     Physically abused: Not on file     Forced sexual activity: Not on file   Other Topics Concern     Service Yes     Comment: Served in [employer]  Blood Transfusions No    Caffeine Concern No    Occupational Exposure No    Hobby Hazards No    Sleep Concern No    Stress Concern No    Weight Concern Yes    Special Diet No    Back Care Yes     Comment: Lower Back pain when walking    Exercise No    Bike Helmet No    Seat Belt Yes    Self-Exams Yes   Social History Narrative    Not on file       Review of Systems  Review of Systems - Review of all systems is negative except as noted above in the HPI.     Vital Signs  Visit Vitals  /82 (BP 1 Location: Left arm, BP Patient Position: Sitting)   Pulse 70   Temp 98.2 °F (36.8 °C) (Oral)   Resp 18   Ht 5' 10\" (1.778 m)   Wt 263 lb (119.3 kg)   SpO2 99%   BMI 37.74 kg/m²         Physical Exam  Physical Examination: General appearance - alert, well appearing, and in no distress, oriented to person, place, and time, overweight, acyanotic, in no respiratory distress and well hydrated  Mental status - alert, oriented to person, place, and time, affect appropriate to mood  Neck - supple, no significant adenopathy  Lymphatics - no palpable lymphadenopathy  Chest - clear to auscultation, no wheezes, rales or rhonchi, symmetric air entry  Heart - normal rate and regular rhythm, systolic murmur 2/6 at lower left sternal border  Back exam - limited range of motion  Neurological - abnormal neurological exam unchanged from prior examinations  Musculoskeletal - osteoarthritic changes noted in both hands  Extremities - no pedal edema noted, intact peripheral pulses      Results  Results for orders placed or performed during the hospital encounter of 06/11/20   CBC WITH AUTOMATED DIFF   Result Value Ref Range    WBC 5.5 4.6 - 13.2 K/uL    RBC 4.73 4.70 - 5.50 M/uL    HGB 14.7 13.0 - 16.0 g/dL    HCT 43.2 36.0 - 48.0 %    MCV 91.3 74.0 - 97.0 FL    MCH 31.1 24.0 - 34.0 PG    MCHC 34.0 31.0 - 37.0 g/dL    RDW 13.0 11.6 - 14.5 %    PLATELET 143 882 - 777 K/uL    MPV 10.1 9.2 - 11.8 FL    NEUTROPHILS 51 40 - 73 % LYMPHOCYTES 35 21 - 52 %    MONOCYTES 6 3 - 10 %    EOSINOPHILS 8 (H) 0 - 5 %    BASOPHILS 0 0 - 2 %    ABS. NEUTROPHILS 2.8 1.8 - 8.0 K/UL    ABS. LYMPHOCYTES 1.9 0.9 - 3.6 K/UL    ABS. MONOCYTES 0.3 0.05 - 1.2 K/UL    ABS. EOSINOPHILS 0.4 0.0 - 0.4 K/UL    ABS. BASOPHILS 0.0 0.0 - 0.1 K/UL    DF AUTOMATED     METABOLIC PANEL, COMPREHENSIVE   Result Value Ref Range    Sodium 142 136 - 145 mmol/L    Potassium 4.3 3.5 - 5.5 mmol/L    Chloride 112 (H) 100 - 111 mmol/L    CO2 22 21 - 32 mmol/L    Anion gap 8 3.0 - 18 mmol/L    Glucose 189 (H) 74 - 99 mg/dL    BUN 36 (H) 7.0 - 18 MG/DL    Creatinine 1.51 (H) 0.6 - 1.3 MG/DL    BUN/Creatinine ratio 24 (H) 12 - 20      GFR est AA 55 (L) >60 ml/min/1.73m2    GFR est non-AA 45 (L) >60 ml/min/1.73m2    Calcium 9.3 8.5 - 10.1 MG/DL    Bilirubin, total 0.4 0.2 - 1.0 MG/DL    ALT (SGPT) 48 16 - 61 U/L    AST (SGOT) 23 10 - 38 U/L    Alk. phosphatase 68 45 - 117 U/L    Protein, total 7.6 6.4 - 8.2 g/dL    Albumin 3.7 3.4 - 5.0 g/dL    Globulin 3.9 2.0 - 4.0 g/dL    A-G Ratio 0.9 0.8 - 1.7     HEMOGLOBIN A1C WITH EAG   Result Value Ref Range    Hemoglobin A1c 7.3 (H) 4.2 - 5.6 %    Est. average glucose 163 mg/dL   LIPID PANEL   Result Value Ref Range    LIPID PROFILE          Cholesterol, total 149 <200 MG/DL    Triglyceride 137 <150 MG/DL    HDL Cholesterol 45 40 - 60 MG/DL    LDL, calculated 76.6 0 - 100 MG/DL    VLDL, calculated 27.4 MG/DL    CHOL/HDL Ratio 3.3 0 - 5.0     MAGNESIUM   Result Value Ref Range    Magnesium 1.7 1.6 - 2.6 mg/dL   TSH 3RD GENERATION   Result Value Ref Range    TSH 23.80 (H) 0.36 - 3.74 uIU/mL   VITAMIN D, 25 HYDROXY   Result Value Ref Range    Vitamin D 25-Hydroxy 42.8 30 - 100 ng/mL   URIC ACID   Result Value Ref Range    Uric acid 5.2 2.6 - 7.2 MG/DL   PSA SCREENING (SCREENING)   Result Value Ref Range    Prostate Specific Ag 2.2 0.0 - 4.0 ng/mL       ASSESSMENT and PLAN    ICD-10-CM ICD-9-CM    1.  Type 2 diabetes mellitus with diabetic nephropathy, unspecified whether long term insulin use ([facility])  E11.21 250.40      583.81    2. Encounter for immunization  Z23 V03.89 FLU (FLUAD QUAD INFLUENZA VACCINE,QUAD,ADJUVANTED)      ADMIN INFLUENZA VIRUS VAC   3. Spinal stenosis, lumbar region with neurogenic claudication  M48.062 724.03 traMADoL (ULTRAM) 50 mg tablet   4. Lower urinary tract symptoms (LUTS)  R39.9 788.99 tamsulosin (FLOMAX) 0.4 mg capsule      COLLECTION VENOUS BLOOD,VENIPUNCTURE   5. OAB (overactive bladder)  N32.81 596.51    6. Neuropathy  G62.9 355.9    7. Essential hypertension  I10 401.9    8. CKD (chronic kidney disease) stage 3, GFR 30-59 ml/min ([facility])  N18.3 585.3    9. Gout, unspecified cause, unspecified chronicity, unspecified site  M10.9 274.9    10. Hypothyroidism due to acquired atrophy of thyroid  E03.4 244.8      246.8    11. Dyslipidemia  E78.5 272.4    12. Severe obesity (BMI 35.0-39. 9) with comorbidity ([facility]  E66.01 278.01      lab results and schedule of future lab studies reviewed with patient  reviewed diet, exercise and weight control  cardiovascular risk and specific lipid/LDL goals reviewed  reviewed medications and side effects in detail  specific diabetic recommendations: low cholesterol diet, weight control and daily exercise discussed, home glucose monitoring emphasized, all medications, side effects and compliance discussed carefully, annual eye examinations at Ophthalmology discussed, glycohemoglobin and other lab monitoring discussed and long term diabetic complications discussed      I have discussed the diagnosis with the patient and the intended plan of care as seen in the above orders. The patient has received an after-visit summary and questions were answered concerning future plans. I have discussed medication, side effects, and warnings with the patient in detail. The patient verbalized understanding and is in agreement with the plan of care.  The patient will contact the office with any additional concerns. I spent at least 53 minutes on this visit with this established patient. Matteo Torres MD    PLEASE NOTE:   This document has been produced using voice recognition software.  Unrecognized errors in transcription may be present

## 2020-09-11 RX ORDER — LEVOTHYROXINE SODIUM 137 UG/1
137 TABLET ORAL
Qty: 30 TAB | Refills: 2 | Status: SHIPPED | OUTPATIENT
Start: 2020-09-11 | End: 2020-11-04 | Stop reason: DRUGHIGH

## 2020-09-11 NOTE — PROGRESS NOTES
Please let patient know his TSH is still elevated at 15.40. This indicates hypothyroidism. It is improved from the previous 23.80 but we do need to go up on his thyroid medication. I will send in a prescription of 137 mcg levothyroxine to take daily. Recheck labs in 6 to 8 weeks. His creatinine is 1.5. This is stable and similar to the previous range. He still has a chronic kidney disease.   Elif Diaz MD

## 2020-09-16 NOTE — PROGRESS NOTES
Spoke with patient at this time. Patient verbalized understanding at this time. No other concerns noted

## 2020-11-03 ENCOUNTER — HOSPITAL ENCOUNTER (OUTPATIENT)
Dept: LAB | Age: 75
Discharge: HOME | End: 2020-11-03
Payer: MEDICARE

## 2020-11-03 ENCOUNTER — LAB ONLY (OUTPATIENT)
Dept: FAMILY MEDICINE CLINIC | Age: 75
End: 2020-11-03
Payer: MEDICARE

## 2020-11-03 DIAGNOSIS — E03.4 HYPOTHYROIDISM DUE TO ACQUIRED ATROPHY OF THYROID: Chronic | ICD-10-CM

## 2020-11-03 DIAGNOSIS — Z01.89 ENCOUNTER FOR LABORATORY TEST: Primary | ICD-10-CM

## 2020-11-03 LAB — TSH SERPL DL<=0.05 MIU/L-ACNC: 10.8 UIU/ML (ref 0.36–3.74)

## 2020-11-03 PROCEDURE — 84443 ASSAY THYROID STIM HORMONE: CPT

## 2020-11-03 PROCEDURE — 36415 COLL VENOUS BLD VENIPUNCTURE: CPT

## 2020-11-03 PROCEDURE — 36415 COLL VENOUS BLD VENIPUNCTURE: CPT | Performed by: FAMILY MEDICINE

## 2020-11-03 NOTE — PROGRESS NOTES
Patient in today for lab visit at this time. Venipunture to patient left forearm at this time. Patient tolerated well. No other concerns noted at this time.

## 2020-11-04 DIAGNOSIS — E03.4 HYPOTHYROIDISM DUE TO ACQUIRED ATROPHY OF THYROID: Primary | ICD-10-CM

## 2020-11-04 RX ORDER — LEVOTHYROXINE SODIUM 150 UG/1
150 TABLET ORAL
Qty: 30 TAB | Refills: 2 | Status: SHIPPED | OUTPATIENT
Start: 2020-11-04

## 2020-11-04 NOTE — PROGRESS NOTES
Please let patient know his TSH level is still elevated at 10.8. This is improved from the previous 15.4 but still indicates low thyroid hormone. I will increase the levothyroxine to 150 mcg daily. Recheck labs in 6 to 8 weeks.   Reid Kimball MD

## 2020-11-11 ENCOUNTER — OFFICE VISIT (OUTPATIENT)
Dept: CARDIOLOGY CLINIC | Age: 75
End: 2020-11-11
Payer: MEDICARE

## 2020-11-11 VITALS
TEMPERATURE: 98.2 F | HEIGHT: 70 IN | BODY MASS INDEX: 39.22 KG/M2 | HEART RATE: 50 BPM | DIASTOLIC BLOOD PRESSURE: 63 MMHG | WEIGHT: 274 LBS | SYSTOLIC BLOOD PRESSURE: 143 MMHG

## 2020-11-11 DIAGNOSIS — E03.4 HYPOTHYROIDISM DUE TO ACQUIRED ATROPHY OF THYROID: ICD-10-CM

## 2020-11-11 DIAGNOSIS — E11.21 TYPE 2 DIABETES MELLITUS WITH DIABETIC NEPHROPATHY, UNSPECIFIED WHETHER LONG TERM INSULIN USE (HCC): ICD-10-CM

## 2020-11-11 DIAGNOSIS — I10 ESSENTIAL HYPERTENSION: ICD-10-CM

## 2020-11-11 DIAGNOSIS — E78.5 DYSLIPIDEMIA: ICD-10-CM

## 2020-11-11 DIAGNOSIS — R01.1 MURMUR, CARDIAC: Primary | ICD-10-CM

## 2020-11-11 DIAGNOSIS — N18.30 STAGE 3 CHRONIC KIDNEY DISEASE, UNSPECIFIED WHETHER STAGE 3A OR 3B CKD (HCC): ICD-10-CM

## 2020-11-11 PROCEDURE — 3017F COLORECTAL CA SCREEN DOC REV: CPT | Performed by: INTERNAL MEDICINE

## 2020-11-11 PROCEDURE — G8753 SYS BP > OR = 140: HCPCS | Performed by: INTERNAL MEDICINE

## 2020-11-11 PROCEDURE — 93000 ELECTROCARDIOGRAM COMPLETE: CPT | Performed by: INTERNAL MEDICINE

## 2020-11-11 PROCEDURE — G8428 CUR MEDS NOT DOCUMENT: HCPCS | Performed by: INTERNAL MEDICINE

## 2020-11-11 PROCEDURE — 99214 OFFICE O/P EST MOD 30 MIN: CPT | Performed by: INTERNAL MEDICINE

## 2020-11-11 PROCEDURE — G8432 DEP SCR NOT DOC, RNG: HCPCS | Performed by: INTERNAL MEDICINE

## 2020-11-11 PROCEDURE — G8536 NO DOC ELDER MAL SCRN: HCPCS | Performed by: INTERNAL MEDICINE

## 2020-11-11 PROCEDURE — 1101F PT FALLS ASSESS-DOCD LE1/YR: CPT | Performed by: INTERNAL MEDICINE

## 2020-11-11 PROCEDURE — 2022F DILAT RTA XM EVC RTNOPTHY: CPT | Performed by: INTERNAL MEDICINE

## 2020-11-11 PROCEDURE — G8754 DIAS BP LESS 90: HCPCS | Performed by: INTERNAL MEDICINE

## 2020-11-11 PROCEDURE — G8417 CALC BMI ABV UP PARAM F/U: HCPCS | Performed by: INTERNAL MEDICINE

## 2020-11-11 NOTE — PROGRESS NOTES
HISTORY OF PRESENT ILLNESS   Kae Cabrera. is a 77 y.o. male. Hypertension   The history is provided by the patient. This is a chronic problem. The current episode started more than 1 week ago. The problem occurs every several days. The problem has not changed since onset. Shortness of Breath   The history is provided by the patient. This is a chronic problem. The problem occurs intermittently. The current episode started more than 1 week ago. The problem has been gradually improving. Pertinent negatives include no fever, no ear pain, no neck pain, no cough, no sputum production, no hemoptysis, no wheezing, no PND, no orthopnea, no syncope, no vomiting, no rash, no leg pain, no leg swelling and no claudication. Associated medical issues do not include chronic lung disease, CAD or heart failure. Review of Systems   Constitutional: Negative for chills, diaphoresis, fever, malaise/fatigue and weight loss. HENT: Negative for ear discharge, ear pain, hearing loss, nosebleeds and tinnitus. Eyes: Negative for blurred vision. Respiratory: Negative for cough, hemoptysis, sputum production, wheezing and stridor. Cardiovascular: Negative for palpitations, orthopnea, claudication, leg swelling, syncope and PND. Gastrointestinal: Negative for heartburn, nausea and vomiting. Musculoskeletal: Negative for myalgias and neck pain. Skin: Negative for itching and rash. Neurological: Negative for dizziness, tingling, tremors, focal weakness, loss of consciousness and weakness. Psychiatric/Behavioral: Negative for depression and suicidal ideas.      Family History   Problem Relation Age of Onset    Hypertension Mother     Thyroid Disease Mother     Hypertension Father     Diabetes Father     Drug Abuse Brother        Past Medical History:   Diagnosis Date    Diabetes ([facility] [address] 1995    Dyslipidemia 3/1/2017    Fractures 1995    R Knee/ Tib fib fracture/surgery    Gout 2010    Graves disease 2015    High cholesterol     HTN (hypertension)     Osteoarthritis     Severe aortic stenosis 3/29/2017    Spinal stenosis of lumbar region 3/1/2016    Spondylolisthesis 3/1/2016    Thyroid trouble     Type 2 diabetes mellitus with diabetic nephropathy ([facility] [address] 2015       Past Surgical History:   Procedure Laterality Date    HX HIP REPLACEMENT Left 2009    HX LUMBAR FUSION      HX ORTHOPAEDIC Right     R knee/Tibfib surgery       Social History     Tobacco Use    Smoking status: Former Smoker     Packs/day: 1.00     Types: Cigarettes     Start date: 1969     Last attempt to quit: 1995     Years since quittin.8    Smokeless tobacco: Never Used   Substance Use Topics    Alcohol use: No       Allergies   Allergen Reactions    Watermelon Anaphylaxis and Swelling    Citric Acid Rash    Peach (Prunus Persica) Itching       Outpatient Medications Marked as Taking for the 20 encounter (Office Visit) with Dorota Rodriguez MD   Medication Sig Dispense Refill    levothyroxine (SYNTHROID) 150 mcg tablet Take 1 Tab by mouth Daily (before breakfast). (Patient taking differently: Take 125 mcg by mouth Daily (before breakfast). ) 30 Tab 2    tamsulosin (FLOMAX) 0.4 mg capsule Take 1 Cap by mouth daily. 30 Cap 2    spironolactone (ALDACTONE) 25 mg tablet Take 1 Tab by mouth daily.  b complex vitamins tablet Take 1 Tab by mouth daily.  Alpha Lipoic Acid 600 mg cap Take  by mouth.  aspirin (ASPIRIN) 325 mg tablet Take 325 mg by mouth daily.  ascorbic acid, vitamin C, (VITAMIN C) 250 mg tablet Take  by mouth.  DULoxetine (CYMBALTA) 60 mg capsule Take 1 Cap by mouth daily. 30 Cap 1    capsicum oleoresin 0.025 % topical cream Apply  to affected area three (3) times daily.  diclofenac (VOLTAREN) 1 % gel Apply  to affected area four (4) times daily.       magnesium chloride (MAG DELAY) 64 mg delayed release tablet Take 8 tablets 3 x day      amLODIPine (NORVASC) 10 mg tablet Take 5 mg by mouth daily.  metoprolol tartrate (LOPRESSOR) 25 mg tablet Take 1 Tab by mouth two (2) times a day. (Patient taking differently: Take 12.5 mg by mouth two (2) times a day.) 180 Tab 3    insulin glargine (LANTUS) 100 unit/mL injection 40 Units. Take 30 units once daily  Indications: type 2 diabetes mellitus 3 Vial 3    febuxostat (ULORIC) 40 mg tab tablet Take 1 Tab by mouth daily. Indications: GOUT PREVENTION 90 Tab 1    insulin aspart (NOVOLOG) 100 unit/mL injection by SubCUTAneous route. 6 units before meals      glucose blood VI test strips (ASCENSIA AUTODISC VI, ONE TOUCH ULTRA TEST VI) strip Dispense precision extra test strips 3 x day to check blood glucose 200 Each 3    Omeprazole delayed release (PRILOSEC D/R) 20 mg tablet Take 20 mg by mouth daily.  atorvastatin (LIPITOR) 40 mg tablet Take 20 mg by mouth daily.  testosterone (ANDROGEL) 20.25 mg/1.25 gram (1.62 %) gel Apply  to affected area as needed.  Cholecalciferol, Vitamin D3, 1,000 unit cap Take 3,000 Units by mouth daily. Visit Vitals  BP (!) 143/63   Pulse (!) 50   Temp 98.2 °F (36.8 °C)   Ht 5' 10\" (1.778 m)   Wt 124.3 kg (274 lb)   BMI 39.31 kg/m²     Physical Exam   Constitutional: He is oriented to person, place, and time. He appears well-developed and well-nourished. No distress. HENT:   Head: Atraumatic. Mouth/Throat: No oropharyngeal exudate. Eyes: Conjunctivae are normal. Right eye exhibits no discharge. Left eye exhibits no discharge. No scleral icterus. Neck: Normal range of motion. Neck supple. No JVD present. No tracheal deviation present. No thyromegaly present. Cardiovascular: Normal rate and regular rhythm. Exam reveals no gallop. No murmur heard. Surgical sternal scar   Pulmonary/Chest: Effort normal and breath sounds normal. No stridor. No respiratory distress. He has no wheezes. He has no rales. He exhibits no tenderness. Abdominal: Soft. There is no abdominal tenderness. There is no rebound and no guarding. Musculoskeletal: Normal range of motion. General: No tenderness or edema. Lymphadenopathy:     He has no cervical adenopathy. Neurological: He is alert and oriented to person, place, and time. He exhibits normal muscle tone. Skin: Skin is warm. He is not diaphoretic. Psychiatric: He has a normal mood and affect. His behavior is normal.     ekg sinus rhythm with PVC, no acute st-t changes  Echo 02/2017:  SUMMARY:  Left ventricle: Systolic function was normal by visual assessment. Ejection fraction was estimated in the range of 60 % to 65 %. No obvious  wall motion abnormalities identified in the views obtained. Wall thickness  was at the upper limits of normal.    Aortic valve: Leaflets exhibited markedly increased thickness and reduced  mobility. There was moderate to severe stenosis. Valve peak gradient was  61 mmHg. Valve mean gradient was 39 mmHg. Estimated aortic valve area (by  VTI) was 0.5 cmï¾². ZACKERY likely overestimates severity of aortic stenosis due  to difficulty in measuring LVOT diameter. Aorta, systemic arteries: The root exhibited dilatation. 02/17/20   ECHO ADULT COMPLETE 02/19/2020 2/19/2020    Narrative · Normal cavity size and systolic function (ejection fraction normal). Mild concentric hypertrophy. Estimated left ventricular ejection fraction   is 55 - 60%. No regional wall motion abnormality noted. Mild (grade 1)   left ventricular diastolic dysfunction. · Mildly dilated right atrium. · There is a 25 mm [employer]. Judes bioprosthetic aortic valve. Prosthesis is   normal.  · Mitral valve thickening. Mild mitral valve regurgitation is present. · Mild tricuspid valve regurgitation is present. · There is no evidence of pulmonary hypertension. · Mild aortic root dilatation.         Signed by: Kofi Gomez MD     ASSESSMENT and PLAN    ICD-10-CM ICD-9-CM    1. s/p AVR  R01.1 785.2 AMB POC EKG ROUTINE W/ 12 LEADS, INTER & REP      ECHO ADULT COMPLETE   2. Dyslipidemia  E78.5 272.4    3. Type 2 diabetes mellitus with diabetic nephropathy, unspecified whether long term insulin use (HCC)  E11.21 250.40      583.81    4. Stage 3 chronic kidney disease, unspecified whether stage 3a or 3b CKD  N18.30 585.3    5. Hypothyroidism due to acquired atrophy of thyroid  E03.4 244.8      246.8    6. Essential hypertension  I10 401.9      Orders Placed This Encounter    AMB POC EKG ROUTINE W/ 12 LEADS, INTER & REP     Order Specific Question:   Reason for Exam:     Answer:   heart mumur    ECHO ADULT COMPLETE     Standing Status:   Future     Standing Expiration Date:   5/14/2021     Order Specific Question:   Contrast Enhancement (Bubble Study, Definity, Optison) may be used if criteria listed in established evidence-based protocol has been identified. Answer:   Yes     Follow-up and Dispositions    · Return in about 6 months (around 5/11/2021). reviewed diet, exercise and weight control  cardiovascular risk and specific lipid/LDL goals reviewed  use of aspirin to prevent MI and TIA's discussed. Now S/p AVR. Remains asymptomatic from cardiac standpoint  Effort tolerance is stable. LDL 74 on statin. Continue current meds. F/u in 6 months with repeat echo to assess LV function.

## 2020-11-11 NOTE — PROGRESS NOTES
1. Have you been to the ER, urgent care clinic since your last visit? Hospitalized since your last visit?     no  2. Have you seen or consulted any other health care providers outside of the [address] [place] [address] since your last visit? Include any pap smears or colon screening.       No

## 2020-11-23 NOTE — PROGRESS NOTES
CARDIAC SURGERY PROGRESS NOTE    2017, 9:12 AM     Post Operative Day # 2     Chart reviewed. Interval History/Events of Past 24 hours:   Ambulating in halls on RA    Subjective:  Patient seen and examined on rounds today. Pain level: controlled  Ambulating: well   Sleeping: well  Eating:  well  Sternal pain: NO     BM: no    Objective:  Vital signs:   Visit Vitals    /68    Pulse 70    Temp 98.6 °F (37 °C)    Resp 15    Ht 5' 10\" (1.778 m)    Wt 114.2 kg (251 lb 12.3 oz)    SpO2 94%    BMI 36.12 kg/m2     Temp (24hrs), Av.5 °F (36.9 °C), Min:98 °F (36.7 °C), Max:98.6 °F (37 °C)    Admission Weight: Last Weight   Weight: 111.1 kg (245 lb) Weight: 114.2 kg (251 lb 12.3 oz)     Last 3 Recorded Weights in this Encounter    17 0605 17 0628 17 0600   Weight: 111.1 kg (245 lb) 112.2 kg (247 lb 4.8 oz) 114.2 kg (251 lb 12.3 oz)       Telemetry: SR 75    Physical Examination:     General:  Alert, oriented  Lungs: Clear to ascultation without rales, wheezes or rhonchi. Chest: Dressings clean and dry. Midline incision healing well. Sternum stable. Heart: regular rate and rhythm, No murmur. Abdomen: Soft and non-tender without masses. Bowel sounds present. Extremities: Warm and well perfused. Edema absent. Incisions: clean and dry  Neuro: No deficit. Beta-blocker:  Yes  ASA: Yes   Statin: Yes  ACE-I: No  Diuretic: No     SUP Prophylaxis: Pepcid  DVT Prophylaxis:   pneumatic compression boots: Yes  Compression stockings:  Yes  Enoxaparin:  Yes and warfarin    Chest tubes:  present 230/24h    Pacing wires:  present, will not Apace    DME needs:  TBD          Labs:  Lab Results   Component Value Date/Time    WBC 9.9 2017 04:00 AM    HCT 29.2 (L) 2017 04:00 AM    PLT 99 (L) 2017 04:00 AM      Lab Results   Component Value Date/Time     (H) 2017 04:00 AM    K 3.8 2017 04:00 AM     (H) 2017 04:00 AM    CO2 25 2017 04:00 AM Quality 110: Preventive Care And Screening: Influenza Immunization: Influenza Immunization previously received during influenza season Detail Level: Detailed GLU 86 05/17/2017 04:00 AM    BUN 20 (H) 05/17/2017 04:00 AM    CREA 1.17 05/17/2017 04:00 AM    CREA 1.15 05/16/2017 01:05 PM    CREA 1.27 05/16/2017 06:31 AM     Lab Results   Component Value Date/Time    HBA1C 6.2 (H) 05/18/2017 05:47 AM     Assessment:  AS S/P  AVR 25mm - BB, ASA, statin  Respiratory parameters stable  Cardiac status stable   DM2 continue Lantus    Plans:  1. D/c chest tubes - done without difficulty  2. Warfarin 2.5 mg  3. Continue cathartics    Heart Hugger use encouraged to mitigate pain and will also assist with deep breathing and incentive spirometry goals. Possible discharge 3 days. Kim Connelly PA-C [facility]  05/18/17, 9:12 AM      ATTENDING SURGEON NOTE    I saw and evaluated Lawyer ORTEGA Bragg. on rounds today. He is resting comfortably in a chair and is feeling well. His incisional pain is under good control. He did sleep well last night. He is is hungry and is starting to eat. Bowels are not moving yet. He is ambulating well. VS stable. Lungs are clear to auscultation bilaterally, without wheezes and without rhonchi. The heart rate and rhythm were regular. The abdomen was soft, with a few bowel sounds. The sternal wound dressing was dry and intact. Pedal edema was absent. The oxygen saturation was 94% on room air. INR 1.3    He is progressing well. Plans for today include:  Ambulate in jones. Advance oral intake. Remove epicardial wires, chest tubes. Warfarin 5 mg today. Increase enoxaparin to full dose once wires out. Patient verbalizes understanding and agreement with the plan. I spoke with his spouse yesterday to provide an update on his condition.     Sheyla Michel MD

## 2021-01-27 ENCOUNTER — TELEPHONE (OUTPATIENT)
Dept: FAMILY MEDICINE CLINIC | Age: 76
End: 2021-01-27

## 2021-01-27 DIAGNOSIS — R39.9 LOWER URINARY TRACT SYMPTOMS (LUTS): ICD-10-CM

## 2021-01-27 NOTE — TELEPHONE ENCOUNTER
Patient requesting the following medication refill  
 
 
Date last prescribed: 11/04/20 
 
Date patient last seen: 09/09/20 
 
Follow up appointment scheduled: 02/11/2021 
 
Please Advise

## 2021-01-28 DIAGNOSIS — R39.9 LOWER URINARY TRACT SYMPTOMS (LUTS): ICD-10-CM

## 2021-01-28 DIAGNOSIS — M48.062 SPINAL STENOSIS, LUMBAR REGION WITH NEUROGENIC CLAUDICATION: Primary | ICD-10-CM

## 2021-01-28 RX ORDER — TRAMADOL HYDROCHLORIDE 50 MG/1
50 TABLET ORAL 2 TIMES DAILY
Qty: 60 TAB | Refills: 0 | Status: SHIPPED | OUTPATIENT
Start: 2021-01-28 | End: 2021-02-27

## 2021-01-28 RX ORDER — TAMSULOSIN HYDROCHLORIDE 0.4 MG/1
0.4 CAPSULE ORAL DAILY
Qty: 30 CAP | Refills: 2 | Status: SHIPPED | OUTPATIENT
Start: 2021-01-28 | End: 2021-03-02

## 2021-03-02 ENCOUNTER — OFFICE VISIT (OUTPATIENT)
Dept: FAMILY MEDICINE CLINIC | Age: 76
End: 2021-03-02
Payer: MEDICARE

## 2021-03-02 VITALS
RESPIRATION RATE: 16 BRPM | TEMPERATURE: 98.7 F | DIASTOLIC BLOOD PRESSURE: 84 MMHG | HEART RATE: 79 BPM | HEIGHT: 70 IN | WEIGHT: 278 LBS | BODY MASS INDEX: 39.8 KG/M2 | SYSTOLIC BLOOD PRESSURE: 138 MMHG | OXYGEN SATURATION: 97 %

## 2021-03-02 DIAGNOSIS — M46.1 SACROILIITIS (HCC): ICD-10-CM

## 2021-03-02 DIAGNOSIS — R39.9 LOWER URINARY TRACT SYMPTOMS (LUTS): ICD-10-CM

## 2021-03-02 DIAGNOSIS — Z95.2 S/P AVR (AORTIC VALVE REPLACEMENT): ICD-10-CM

## 2021-03-02 DIAGNOSIS — E03.4 HYPOTHYROIDISM DUE TO ACQUIRED ATROPHY OF THYROID: ICD-10-CM

## 2021-03-02 DIAGNOSIS — E66.01 SEVERE OBESITY (BMI 35.0-39.9) WITH COMORBIDITY (HCC): ICD-10-CM

## 2021-03-02 DIAGNOSIS — N18.30 STAGE 3 CHRONIC KIDNEY DISEASE, UNSPECIFIED WHETHER STAGE 3A OR 3B CKD (HCC): ICD-10-CM

## 2021-03-02 DIAGNOSIS — N39.0 URINARY TRACT INFECTION WITHOUT HEMATURIA, SITE UNSPECIFIED: ICD-10-CM

## 2021-03-02 DIAGNOSIS — M10.9 GOUT, UNSPECIFIED CAUSE, UNSPECIFIED CHRONICITY, UNSPECIFIED SITE: ICD-10-CM

## 2021-03-02 DIAGNOSIS — E11.51 TYPE 2 DIABETES MELLITUS WITH DIABETIC PERIPHERAL ANGIOPATHY WITHOUT GANGRENE, UNSPECIFIED WHETHER LONG TERM INSULIN USE (HCC): ICD-10-CM

## 2021-03-02 DIAGNOSIS — I50.9 CONGESTIVE HEART FAILURE, UNSPECIFIED HF CHRONICITY, UNSPECIFIED HEART FAILURE TYPE (HCC): ICD-10-CM

## 2021-03-02 DIAGNOSIS — E11.21 TYPE 2 DIABETES MELLITUS WITH DIABETIC NEPHROPATHY, UNSPECIFIED WHETHER LONG TERM INSULIN USE (HCC): ICD-10-CM

## 2021-03-02 DIAGNOSIS — J41.1 PURULENT BRONCHITIS (HCC): Primary | ICD-10-CM

## 2021-03-02 DIAGNOSIS — I10 ESSENTIAL HYPERTENSION: ICD-10-CM

## 2021-03-02 DIAGNOSIS — E83.42 HYPOMAGNESEMIA: ICD-10-CM

## 2021-03-02 LAB
BILIRUB UR QL STRIP: NEGATIVE
CREAT SERPL-MCNC: 300 MG/DL
GLUCOSE UR-MCNC: NEGATIVE MG/DL
KETONES P FAST UR STRIP-MCNC: NEGATIVE MG/DL
MICROALBUMIN UR TEST STR-MCNC: 150 MG/L
MICROALBUMIN/CREAT RATIO POC: NORMAL MG/G
PH UR STRIP: 5.5 [PH] (ref 4.6–8)
PROT UR QL STRIP: NORMAL
SP GR UR STRIP: 1.02 (ref 1–1.03)
UA UROBILINOGEN AMB POC: NORMAL (ref 0.2–1)
URINALYSIS CLARITY POC: CLEAR
URINALYSIS COLOR POC: YELLOW
URINE BLOOD POC: NEGATIVE
URINE LEUKOCYTES POC: NEGATIVE
URINE NITRITES POC: NEGATIVE

## 2021-03-02 PROCEDURE — 3017F COLORECTAL CA SCREEN DOC REV: CPT | Performed by: FAMILY MEDICINE

## 2021-03-02 PROCEDURE — G8417 CALC BMI ABV UP PARAM F/U: HCPCS | Performed by: FAMILY MEDICINE

## 2021-03-02 PROCEDURE — G8427 DOCREV CUR MEDS BY ELIG CLIN: HCPCS | Performed by: FAMILY MEDICINE

## 2021-03-02 PROCEDURE — G8754 DIAS BP LESS 90: HCPCS | Performed by: FAMILY MEDICINE

## 2021-03-02 PROCEDURE — 3046F HEMOGLOBIN A1C LEVEL >9.0%: CPT | Performed by: FAMILY MEDICINE

## 2021-03-02 PROCEDURE — 1101F PT FALLS ASSESS-DOCD LE1/YR: CPT | Performed by: FAMILY MEDICINE

## 2021-03-02 PROCEDURE — 81003 URINALYSIS AUTO W/O SCOPE: CPT | Performed by: FAMILY MEDICINE

## 2021-03-02 PROCEDURE — 2022F DILAT RTA XM EVC RTNOPTHY: CPT | Performed by: FAMILY MEDICINE

## 2021-03-02 PROCEDURE — G8536 NO DOC ELDER MAL SCRN: HCPCS | Performed by: FAMILY MEDICINE

## 2021-03-02 PROCEDURE — 99215 OFFICE O/P EST HI 40 MIN: CPT | Performed by: FAMILY MEDICINE

## 2021-03-02 PROCEDURE — G8510 SCR DEP NEG, NO PLAN REQD: HCPCS | Performed by: FAMILY MEDICINE

## 2021-03-02 PROCEDURE — G8752 SYS BP LESS 140: HCPCS | Performed by: FAMILY MEDICINE

## 2021-03-02 PROCEDURE — 82044 UR ALBUMIN SEMIQUANTITATIVE: CPT | Performed by: FAMILY MEDICINE

## 2021-03-02 RX ORDER — FUROSEMIDE 20 MG/1
20 TABLET ORAL DAILY
Qty: 30 TAB | Refills: 0 | Status: SHIPPED | OUTPATIENT
Start: 2021-03-02 | End: 2021-12-15 | Stop reason: SDUPTHER

## 2021-03-02 NOTE — PROGRESS NOTES
HPI  Rossana Stas. comes in for f/u care. Cough/purulent bronchitis: Patient has cough productive of purulent phlegm, has pleuritic chest pain, no hemoptysis. He has SOBOE that comes on and off. Most recently was seen in the emergency room and a chest x-ray was negative for acute abnormality. He denies fever or chills. He has been on an antibiotic. He was on cephalexin. He denies wheeze or chest tightness. ProBNP done was slightly elevated. He is on [employer] and the cough has diminished. We discussed other causes that may result in a cough including CHF. If he starts wheezing then he will need to take an albuterol inhaler. In the meantime he will continue with the [employer]. I will send in some furosemide. LUTS/UTI: Patient has lower urinary tract symptoms. He gets post micturition dribbling and straining to initiate with urination. He was recently seen in the emergency room and put on cephalexin for cystitis. He is to take this medication to completion. He denies flank pain, hematuria or pyuria. We will check his urinalysis today. His previous PSA test results have been reassuring. Dyslipidemia: Patient has dyslipidemia. He takes Lipitor 40 mg daily. We will recheck lipid panel at next visit. Hypomagnesemia: Patient has a history of hypomagnesemia. He is on magnesium replacement therapy. We will recheck magnesium at next visit. Cardiac: Patient was recently seen in the emergency room and noted to have an elevated troponin. This was coming down on serial evaluation. EKG was negative for acute abnormality or ischemic changes. Patient did not have any chest pain. He has been followed up by the cardiologist and will need follow-up care. His proBNP was elevated. Patient has a history of CHF. Currently is not in failure. He is onspironolactone and metoprolol. I will add furosemide to take 20 mg daily.   He has a history of severe aortic stenosis and has had surgery for this. Denies chest pain or diaphoresis. We will continue with the current medical management. He takes aspirin 325 mg daily. Obesity: Patient has a BMI of 39.89. He will intensify lifestyle and dietary modification  DM2: Patient has type 2 diabetes mellitus. He takes insulin. He is on Lantus 30 units daily and takes NovoLog for correctional insulin. He has been stable on this medication. We will recheck his HbA1c. His last HbA1c was 7.3. GERD: Patient has gastroesophageal reflux disease. He gets heartburn on and off. Denies hematemesis or dark stools. He will continue with Prilosec. CKD: Patient has a history of chronic kidney disease. This is acute to renal artery stenosis. He also has diabetic nephropathy. He is on spironolactone. Blood pressure is being controlled. Plan is to avoid any nephrotoxic medications. Hypogonadism: Patient has a history of hypogonadism. He is on testosterone replacement therapy. Continue current treatment plan. HTN: Patient has hypertension. Blood pressure is stable. He is on spironolactone, amlodipine and metoprolol. He denies headache, changes in vision or focal weakness. We will continue with the current treatment plan. Gout: Patient has a history of gout arthritis. Denies any acute gout symptoms at the moment. He is on [employer]. Plan is to take purine-restricted diet. Hypothyroidism: Patient has hypothyroidism. He is on levothyroxine. We will recheck TSH at next visit. Currently takes 150 mcg daily. Sacroiliitis: Patient has a history of low back pain. He has been treated for sacroiliitis before. He continues to have sacral pain on and off. He can take Tylenol arthritis for the pain. Patient also takes Cymbalta for low back pain. This helps with the sacroiliitis.       Past Medical History  Past Medical History:   Diagnosis Date    Diabetes ([facility] [address] 1995    Dyslipidemia 3/1/2017    Fractures 1995    R Knee/ Tib fib fracture/surgery  Gout 2010    Graves disease 1/21/2015    High cholesterol 2005    HTN (hypertension) 1990    Osteoarthritis 2013    Severe aortic stenosis 3/29/2017    Spinal stenosis of lumbar region 3/1/2016    Spondylolisthesis 3/1/2016    Thyroid trouble     Type 2 diabetes mellitus with diabetic nephropathy ([facility] [address] 4/28/2015       Surgical History  Past Surgical History:   Procedure Laterality Date    HX HIP REPLACEMENT Left 2009    HX LUMBAR FUSION      HX ORTHOPAEDIC Right     R knee/Tibfib surgery        Medications  Current Outpatient Medications   Medication Sig Dispense Refill    tamsulosin (FLOMAX) 0.4 mg capsule Take 1 Cap by mouth daily. 30 Cap 2    levothyroxine (SYNTHROID) 150 mcg tablet Take 1 Tab by mouth Daily (before breakfast). (Patient taking differently: Take 125 mcg by mouth Daily (before breakfast). ) 30 Tab 2    spironolactone (ALDACTONE) 25 mg tablet Take 1 Tab by mouth daily.  b complex vitamins tablet Take 1 Tab by mouth daily.  Alpha Lipoic Acid 600 mg cap Take  by mouth.  aspirin (ASPIRIN) 325 mg tablet Take 325 mg by mouth daily.  ascorbic acid, vitamin C, (VITAMIN C) 250 mg tablet Take  by mouth.  DULoxetine (CYMBALTA) 60 mg capsule Take 1 Cap by mouth daily. 30 Cap 1    capsicum oleoresin 0.025 % topical cream Apply  to affected area three (3) times daily.  diclofenac (VOLTAREN) 1 % gel Apply  to affected area four (4) times daily.  magnesium chloride (MAG DELAY) 64 mg delayed release tablet Take 8 tablets 3 x day      amLODIPine (NORVASC) 10 mg tablet Take 5 mg by mouth daily.  metoprolol tartrate (LOPRESSOR) 25 mg tablet Take 1 Tab by mouth two (2) times a day. (Patient taking differently: Take 12.5 mg by mouth two (2) times a day.) 180 Tab 3    insulin glargine (LANTUS) 100 unit/mL injection 40 Units.  Take 30 units once daily  Indications: type 2 diabetes mellitus 3 Vial 3    febuxostat (ULORIC) 40 mg tab tablet Take 1 Tab by mouth daily. Indications: GOUT PREVENTION 90 Tab 1    insulin aspart (NOVOLOG) 100 unit/mL injection by SubCUTAneous route. 6 units before meals      glucose blood VI test strips (ASCENSIA AUTODISC VI, ONE TOUCH ULTRA TEST VI) strip Dispense precision extra test strips 3 x day to check blood glucose 200 Each 3    Omeprazole delayed release (PRILOSEC D/R) 20 mg tablet Take 20 mg by mouth daily.  testosterone (ANDROGEL) 20.25 mg/1.25 gram (1.62 %) gel Apply  to affected area as needed.  Cholecalciferol, Vitamin D3, 1,000 unit cap Take 3,000 Units by mouth daily.  atorvastatin (LIPITOR) 40 mg tablet Take 20 mg by mouth daily.          Allergies  Allergies   Allergen Reactions    Watermelon Anaphylaxis and Swelling    Citric Acid Rash    Peach (Prunus Persica) Itching       Family History  Family History   Problem Relation Age of Onset    Hypertension Mother     Thyroid Disease Mother     Hypertension Father     Diabetes Father     Drug Abuse Brother        Social History  Social History     Socioeconomic History    Marital status:      Spouse name: Not on file    Number of children: Not on file    Years of education: Not on file    Highest education level: Not on file   Occupational History    Not on file   Social Needs    Financial resource strain: Not on file    Food insecurity     Worry: Not on file     Inability: Not on file    Transportation needs     Medical: Not on file     Non-medical: Not on file   Tobacco Use    Smoking status: Former Smoker     Packs/day: 1.00     Types: Cigarettes     Start date: 1969     Quit date: 1995     Years since quittin.1    Smokeless tobacco: Never Used   Substance and Sexual Activity    Alcohol use: No    Drug use: No    Sexual activity: Yes     Partners: Female   Lifestyle    Physical activity     Days per week: Not on file     Minutes per session: Not on file    Stress: Not on file   Relationships    Social connections Talks on phone: Not on file     Gets together: Not on file     Attends Advent service: Not on file     Active member of club or organization: Not on file     Attends meetings of clubs or organizations: Not on file     Relationship status: Not on file    Intimate partner violence     Fear of current or ex partner: Not on file     Emotionally abused: Not on file     Physically abused: Not on file     Forced sexual activity: Not on file   Other Topics Concern     Service Yes     Comment: Served in [address] Blood Transfusions No    Caffeine Concern No    Occupational Exposure No    Hobby Hazards No    Sleep Concern No    Stress Concern No    Weight Concern Yes    Special Diet No    Back Care Yes     Comment: Lower Back pain when walking    Exercise No    Bike Helmet No    Seat Belt Yes    Self-Exams Yes   Social History Narrative    Not on file       Review of Systems  Review of Systems - Review of all systems is negative except as noted above in the HPI.     Vital Signs  Visit Vitals  /84 (BP 1 Location: Left upper arm, BP Patient Position: Sitting, BP Cuff Size: Adult)   Pulse 79   Temp 98.7 °F (37.1 °C) (Temporal)   Resp 16   Ht 5' 10\" (1.778 m)   Wt 278 lb (126.1 kg)   SpO2 97%   BMI 39.89 kg/m²         Physical Exam  Physical Examination: General appearance - oriented to person, place, and time, overweight and acyanotic, in no respiratory distress  Mental status - alert, oriented to person, place, and time, affect appropriate to mood  Neck - supple, no significant adenopathy  Lymphatics - no palpable lymphadenopathy  Chest - no tachypnea, retractions or cyanosis, decreased air entry noted bilateral lung bases  Heart - systolic murmur 3/6 at 2nd right intercostal space  Abdomen - no rebound tenderness noted  Back exam - limited range of motion, pain with motion noted during exam, tenderness noted midline lumbar spine and sacral area  Neurological - abnormal neurological exam unchanged from prior examinations  Musculoskeletal - osteoarthritic changes noted in both hands  Extremities - intact peripheral pulses      Results  Results for orders placed or performed during the hospital encounter of 11/03/20   TSH 3RD GENERATION   Result Value Ref Range    TSH 10.80 (H) 0.36 - 3.74 uIU/mL       ASSESSMENT and PLAN    ICD-10-CM ICD-9-CM    1. Purulent bronchitis ([facility])  J41.1 491.1    2. Urinary tract infection without hematuria, site unspecified  N39.0 599.0    3. Type 2 diabetes mellitus with diabetic nephropathy, unspecified whether long term insulin use ([facility])  E11.21 250.40      583.81    4. Type 2 diabetes mellitus with diabetic peripheral angiopathy without gangrene, unspecified whether long term insulin use ([facility])  E11.51 250.70 AMB POC URINE, MICROALBUMIN, SEMIQUANTITATIVE     443.81    5. Lower urinary tract symptoms (LUTS)  R39.9 788.99 AMB POC URINALYSIS DIP STICK AUTO W/O MICRO   6. Sacroiliitis ([facility])  M46.1 720.2    7. Severe obesity (BMI 35.0-39. 9) with comorbidity ([facility] [address]  E66.01 278.01    8. S/P AVR (aortic valve replacement)  Z95.2 V43.3    9. Congestive heart failure, unspecified HF chronicity, unspecified heart failure type ([facility])  I50.9 428.0 furosemide (LASIX) 20 mg tablet   10. Hypothyroidism due to acquired atrophy of thyroid  E03.4 244.8      246.8    11. Gout, unspecified cause, unspecified chronicity, unspecified site  M10.9 274.9    12. Stage 3 chronic kidney disease, unspecified whether stage 3a or 3b CKD  N18.30 585.3    13. Essential hypertension  I10 401.9    14.  Hypomagnesemia  E83.42 275.2      lab results and schedule of future lab studies reviewed with patient  reviewed diet, exercise and weight control  cardiovascular risk and specific lipid/LDL goals reviewed  reviewed medications and side effects in detail  specific diabetic recommendations: low cholesterol diet, weight control and daily exercise discussed, home glucose monitoring emphasized, all medications, side effects and compliance discussed carefully, annual eye examinations at Ophthalmology discussed, glycohemoglobin and other lab monitoring discussed and long term diabetic complications discussed  radiology results and schedule of future radiology studies reviewed with patient      I have discussed the diagnosis with the patient and the intended plan of care as seen in the above orders. The patient has received an after-visit summary and questions were answered concerning future plans. I have discussed medication, side effects, and warnings with the patient in detail. The patient verbalized understanding and is in agreement with the plan of care. The patient will contact the office with any additional concerns. I spent at least 45 minutes on this visit with this established patient. Guadalupe Apley, MD    PLEASE NOTE:   This document has been produced using voice recognition software.  Unrecognized errors in transcription may be present

## 2021-03-02 NOTE — PROGRESS NOTES
Chief Complaint   Patient presents with    Follow Up Chronic Condition     cough     1. Have you been to the ER, urgent care clinic since your last visit? Hospitalized since your last visit? Yes When: 01- Where: [facility] Reason for visit: cough    2. Have you seen or consulted any other health care providers outside of the [address] [place] [address] since your last visit? Include any pap smears or colon screening.  No

## 2021-03-09 ENCOUNTER — OFFICE VISIT (OUTPATIENT)
Dept: FAMILY MEDICINE CLINIC | Age: 76
End: 2021-03-09
Payer: MEDICARE

## 2021-03-09 ENCOUNTER — HOSPITAL ENCOUNTER (OUTPATIENT)
Dept: LAB | Age: 76
Discharge: HOME | End: 2021-03-09
Payer: MEDICARE

## 2021-03-09 VITALS
OXYGEN SATURATION: 98 % | BODY MASS INDEX: 38.51 KG/M2 | RESPIRATION RATE: 18 BRPM | HEART RATE: 75 BPM | SYSTOLIC BLOOD PRESSURE: 133 MMHG | TEMPERATURE: 98.6 F | WEIGHT: 269 LBS | HEIGHT: 70 IN | DIASTOLIC BLOOD PRESSURE: 81 MMHG

## 2021-03-09 DIAGNOSIS — E03.9 HYPOTHYROIDISM, UNSPECIFIED TYPE: ICD-10-CM

## 2021-03-09 DIAGNOSIS — R05.3 CHRONIC COUGH: Primary | ICD-10-CM

## 2021-03-09 DIAGNOSIS — I50.9 CONGESTIVE HEART FAILURE, UNSPECIFIED HF CHRONICITY, UNSPECIFIED HEART FAILURE TYPE (HCC): ICD-10-CM

## 2021-03-09 DIAGNOSIS — T78.40XA ALLERGY, INITIAL ENCOUNTER: ICD-10-CM

## 2021-03-09 PROCEDURE — G8427 DOCREV CUR MEDS BY ELIG CLIN: HCPCS | Performed by: FAMILY MEDICINE

## 2021-03-09 PROCEDURE — G8536 NO DOC ELDER MAL SCRN: HCPCS | Performed by: FAMILY MEDICINE

## 2021-03-09 PROCEDURE — 99213 OFFICE O/P EST LOW 20 MIN: CPT | Performed by: FAMILY MEDICINE

## 2021-03-09 PROCEDURE — G8752 SYS BP LESS 140: HCPCS | Performed by: FAMILY MEDICINE

## 2021-03-09 PROCEDURE — 3017F COLORECTAL CA SCREEN DOC REV: CPT | Performed by: FAMILY MEDICINE

## 2021-03-09 PROCEDURE — 1101F PT FALLS ASSESS-DOCD LE1/YR: CPT | Performed by: FAMILY MEDICINE

## 2021-03-09 PROCEDURE — 84439 ASSAY OF FREE THYROXINE: CPT

## 2021-03-09 PROCEDURE — G8754 DIAS BP LESS 90: HCPCS | Performed by: FAMILY MEDICINE

## 2021-03-09 PROCEDURE — G8417 CALC BMI ABV UP PARAM F/U: HCPCS | Performed by: FAMILY MEDICINE

## 2021-03-09 PROCEDURE — 36415 COLL VENOUS BLD VENIPUNCTURE: CPT | Performed by: FAMILY MEDICINE

## 2021-03-09 PROCEDURE — G8510 SCR DEP NEG, NO PLAN REQD: HCPCS | Performed by: FAMILY MEDICINE

## 2021-03-09 PROCEDURE — 84443 ASSAY THYROID STIM HORMONE: CPT

## 2021-03-09 RX ORDER — ROSUVASTATIN CALCIUM 20 MG/1
20 TABLET, COATED ORAL
COMMUNITY
End: 2022-07-05 | Stop reason: SDUPTHER

## 2021-03-09 RX ORDER — MONTELUKAST SODIUM 10 MG/1
10 TABLET ORAL DAILY
Qty: 90 TAB | Refills: 1 | Status: SHIPPED | OUTPATIENT
Start: 2021-03-09 | End: 2021-08-24

## 2021-03-09 RX ORDER — FLUTICASONE PROPIONATE 50 MCG
2 SPRAY, SUSPENSION (ML) NASAL DAILY
Qty: 1 BOTTLE | Refills: 1 | Status: SHIPPED | OUTPATIENT
Start: 2021-03-09 | End: 2021-08-24 | Stop reason: SDUPTHER

## 2021-03-09 NOTE — PROGRESS NOTES
Chief Complaint   Patient presents with    Cough    Follow-up     1 week follow up    1. Have you been to the ER, urgent care clinic since your last visit? Hospitalized since your last visit? No    2. Have you seen or consulted any other health care providers outside of the [address] [place] [address] since your last visit? Include any pap smears or colon screening. No     HPI  Rossana Mcclelland. comes in for f/u care. Chronic cough: Patient continues to have a chronic cough. He denies wheeze, fever or chills. Previously it was productive of thick phlegm but now it is dry. It has been ongoing for months. I will refer him to the pulmonologist for further evaluation. Previous chest x-ray has been stable. Cardiac: Patient has a history of severe aortic stenosis. Most recently had labs done that showed an elevated proBNP. He has a history of severe aortic stenosis and has had surgery for this. He did have some lower extremity edema. I put patient on furosemide. The lower extremity edema has resolved. Continues to have a cough. He denies chest pain or diaphoresis. I have ordered an echocardiogram.  We will have him continue with the furosemide. He is also on spironolactone, Lopressor, Lipitor and aspirin. Hypothyroidism: Patient has hypothyroidism. He is on levothyroxine. We will recently adjusted his dosage to 150 mcg. We will recheck TSH levels today.         Past Medical History  Past Medical History:   Diagnosis Date    Diabetes ([facility] [address] 1995    Dyslipidemia 3/1/2017    Fractures 1995    R Knee/ Tib fib fracture/surgery    Gout 2010    Graves disease 1/21/2015    High cholesterol 2005    HTN (hypertension) 1990    Osteoarthritis 2013    Severe aortic stenosis 3/29/2017    Spinal stenosis of lumbar region 3/1/2016    Spondylolisthesis 3/1/2016    Thyroid trouble     Type 2 diabetes mellitus with diabetic nephropathy ([address] 4/28/2015       Surgical History  Past Surgical History: Procedure Laterality Date    HX HIP REPLACEMENT Left 2009    HX LUMBAR FUSION      HX ORTHOPAEDIC Right     R knee/Tibfib surgery        Medications  Current Outpatient Medications   Medication Sig Dispense Refill    rosuvastatin (Crestor) 20 mg tablet Take 20 mg by mouth nightly.  furosemide (LASIX) 20 mg tablet Take 1 Tab by mouth daily. 30 Tab 0    levothyroxine (SYNTHROID) 150 mcg tablet Take 1 Tab by mouth Daily (before breakfast). (Patient taking differently: Take 125 mcg by mouth Daily (before breakfast). ) 30 Tab 2    spironolactone (ALDACTONE) 25 mg tablet Take 1 Tab by mouth daily.  b complex vitamins tablet Take 1 Tab by mouth daily.  Alpha Lipoic Acid 600 mg cap Take  by mouth.  aspirin (ASPIRIN) 325 mg tablet Take 325 mg by mouth daily.  ascorbic acid, vitamin C, (VITAMIN C) 250 mg tablet Take  by mouth.  DULoxetine (CYMBALTA) 60 mg capsule Take 1 Cap by mouth daily. 30 Cap 1    capsicum oleoresin 0.025 % topical cream Apply  to affected area three (3) times daily.  diclofenac (VOLTAREN) 1 % gel Apply  to affected area four (4) times daily.  magnesium chloride (MAG DELAY) 64 mg delayed release tablet Take 8 tablets 3 x day      amLODIPine (NORVASC) 10 mg tablet Take 5 mg by mouth daily.  metoprolol tartrate (LOPRESSOR) 25 mg tablet Take 1 Tab by mouth two (2) times a day. (Patient taking differently: Take 12.5 mg by mouth two (2) times a day.) 180 Tab 3    insulin glargine (LANTUS) 100 unit/mL injection 40 Units. Take 30 units once daily  Indications: type 2 diabetes mellitus 3 Vial 3    febuxostat (ULORIC) 40 mg tab tablet Take 1 Tab by mouth daily. Indications: GOUT PREVENTION 90 Tab 1    insulin aspart (NOVOLOG) 100 unit/mL injection by SubCUTAneous route.  6 units before meals      glucose blood VI test strips (ASCENSIA AUTODISC VI, ONE TOUCH ULTRA TEST VI) strip Dispense precision extra test strips 3 x day to check blood glucose 200 Each 3  Omeprazole delayed release (PRILOSEC D/R) 20 mg tablet Take 20 mg by mouth daily.  testosterone (ANDROGEL) 20.25 mg/1.25 gram (1.62 %) gel Apply  to affected area as needed.  Cholecalciferol, Vitamin D3, 1,000 unit cap Take 3,000 Units by mouth daily.  atorvastatin (LIPITOR) 40 mg tablet Take 20 mg by mouth daily.          Allergies  Allergies   Allergen Reactions    Watermelon Anaphylaxis and Swelling    Citric Acid Rash    Peach (Prunus Persica) Itching       Family History  Family History   Problem Relation Age of Onset    Hypertension Mother     Thyroid Disease Mother     Hypertension Father     Diabetes Father     Drug Abuse Brother        Social History  Social History     Socioeconomic History    Marital status:      Spouse name: Not on file    Number of children: Not on file    Years of education: Not on file    Highest education level: Not on file   Occupational History    Not on file   Social Needs    Financial resource strain: Not on file    Food insecurity     Worry: Not on file     Inability: Not on file    Transportation needs     Medical: Not on file     Non-medical: Not on file   Tobacco Use    Smoking status: Former Smoker     Packs/day: 1.00     Types: Cigarettes     Start date: 1969     Quit date: 1995     Years since quittin.2    Smokeless tobacco: Never Used   Substance and Sexual Activity    Alcohol use: No    Drug use: No    Sexual activity: Yes     Partners: Female   Lifestyle    Physical activity     Days per week: Not on file     Minutes per session: Not on file    Stress: Not on file   Relationships    Social connections     Talks on phone: Not on file     Gets together: Not on file     Attends Sikhism service: Not on file     Active member of club or organization: Not on file     Attends meetings of clubs or organizations: Not on file     Relationship status: Not on file    Intimate partner violence     Fear of current or ex partner: Not on file     Emotionally abused: Not on file     Physically abused: Not on file     Forced sexual activity: Not on file   Other Topics Concern     Service Yes     Comment: Served in [address] Blood Transfusions No    Caffeine Concern No    Occupational Exposure No    Hobby Hazards No    Sleep Concern No    Stress Concern No    Weight Concern Yes    Special Diet No    Back Care Yes     Comment: Lower Back pain when walking    Exercise No    Bike Helmet No    Seat Belt Yes    Self-Exams Yes   Social History Narrative    Not on file       Review of Systems  Review of Systems - Review of all systems is negative except as noted above in the HPI.     Vital Signs  Visit Vitals  /81 (BP 1 Location: Left upper arm, BP Patient Position: Sitting, BP Cuff Size: Adult)   Pulse 75   Temp 98.6 °F (37 °C) (Temporal)   Resp 18   Ht 5' 10\" (1.778 m)   Wt 269 lb (122 kg)   SpO2 98%   BMI 38.60 kg/m²         Physical Exam  Physical Examination: General appearance - oriented to person, place, and time, overweight and acyanotic, in no respiratory distress  Mental status - alert, oriented to person, place, and time, affect appropriate to mood  Lymphatics - no palpable lymphadenopathy  Chest - no tachypnea, retractions or cyanosis  Heart - S1 and S2 normal  Abdomen - no rebound tenderness noted  Back exam - limited range of motion  Neurological - abnormal neurological exam unchanged from prior examinations  Musculoskeletal - osteoarthritic changes noted in both hands  Extremities - intact peripheral pulses        Results  Results for orders placed or performed in visit on 03/02/21   AMB POC URINALYSIS DIP STICK AUTO W/O MICRO   Result Value Ref Range    Color (UA POC) Yellow     Clarity (UA POC) Clear     Glucose (UA POC) Negative Negative    Bilirubin (UA POC) Negative Negative    Ketones (UA POC) Negative Negative    Specific gravity (UA POC) 1.020 1.001 - 1.035    Blood (UA POC) Negative Negative pH (UA POC) 5.5 4.6 - 8.0    Protein (UA POC) 2+ Negative    Urobilinogen (UA POC) 0.2 mg/dL 0.2 - 1    Nitrites (UA POC) Negative Negative    Leukocyte esterase (UA POC) Negative Negative   AMB POC URINE, MICROALBUMIN, SEMIQUANTITATIVE   Result Value Ref Range    Microalbumin urine (POC) 150 MG/L    Microalbumin/creat ratio (POC)  <30 MG/G    Creatinine 300 MG/DL       ASSESSMENT and PLAN    ICD-10-CM ICD-9-CM    1. Chronic cough  R05 786.2 REFERRAL TO PULMONARY DISEASE   2. Hypothyroidism, unspecified type  E03.9 244.9 TSH 3RD GENERATION      T4, FREE      COLLECTION VENOUS BLOOD,VENIPUNCTURE   3. Congestive heart failure, unspecified HF chronicity, unspecified heart failure type (HCC)  I50.9 428.0    4. Allergy, initial encounter  T78.40XA 995.3 montelukast (SINGULAIR) 10 mg tablet      fluticasone propionate (FLONASE) 50 mcg/actuation nasal spray     lab results and schedule of future lab studies reviewed with patient  reviewed diet, exercise and weight control  cardiovascular risk and specific lipid/LDL goals reviewed  reviewed medications and side effects in detail  specific diabetic recommendations: low cholesterol diet, weight control and daily exercise discussed, home glucose monitoring emphasized, all medications, side effects and compliance discussed carefully, annual eye examinations at Ophthalmology discussed, glycohemoglobin and other lab monitoring discussed and long term diabetic complications discussed  radiology results and schedule of future radiology studies reviewed with patient      I have discussed the diagnosis with the patient and the intended plan of care as seen in the above orders. The patient has received an after-visit summary and questions were answered concerning future plans. I have discussed medication, side effects, and warnings with the patient in detail. The patient verbalized understanding and is in agreement with the plan of care.  The patient will contact the office with any additional concerns. Abhijit Dubois MD    PLEASE NOTE:   This document has been produced using voice recognition software.  Unrecognized errors in transcription may be present

## 2021-03-10 LAB
T4 FREE SERPL-MCNC: 1.4 NG/DL (ref 0.7–1.5)
TSH SERPL DL<=0.05 MIU/L-ACNC: 3.22 UIU/ML (ref 0.36–3.74)

## 2021-03-15 NOTE — PROGRESS NOTES
Please let patient know thyroid test results are normal.  Continue current treatment plan.   Abhijit Dubois MD

## 2021-03-17 NOTE — PROGRESS NOTES
Spoke with patient. Patient was given lab results. Patient stated that he is on levothryroxine and wanted to know do he stay on the current dose or what being that fact that his test results was normal. Patient didn't have any other concerns at this time.

## 2021-05-19 ENCOUNTER — OFFICE VISIT (OUTPATIENT)
Dept: CARDIOLOGY CLINIC | Age: 76
End: 2021-05-19
Payer: MEDICARE

## 2021-05-19 VITALS
DIASTOLIC BLOOD PRESSURE: 63 MMHG | BODY MASS INDEX: 38.51 KG/M2 | SYSTOLIC BLOOD PRESSURE: 133 MMHG | WEIGHT: 269 LBS | HEIGHT: 70 IN | HEART RATE: 53 BPM

## 2021-05-19 DIAGNOSIS — E11.21 TYPE 2 DIABETES MELLITUS WITH DIABETIC NEPHROPATHY, UNSPECIFIED WHETHER LONG TERM INSULIN USE (HCC): ICD-10-CM

## 2021-05-19 DIAGNOSIS — R01.1 MURMUR, CARDIAC: Primary | ICD-10-CM

## 2021-05-19 DIAGNOSIS — N18.30 STAGE 3 CHRONIC KIDNEY DISEASE, UNSPECIFIED WHETHER STAGE 3A OR 3B CKD (HCC): ICD-10-CM

## 2021-05-19 DIAGNOSIS — I10 ESSENTIAL HYPERTENSION: ICD-10-CM

## 2021-05-19 DIAGNOSIS — E78.5 DYSLIPIDEMIA: ICD-10-CM

## 2021-05-19 DIAGNOSIS — E03.4 HYPOTHYROIDISM DUE TO ACQUIRED ATROPHY OF THYROID: ICD-10-CM

## 2021-05-19 PROCEDURE — G8428 CUR MEDS NOT DOCUMENT: HCPCS | Performed by: INTERNAL MEDICINE

## 2021-05-19 PROCEDURE — G8752 SYS BP LESS 140: HCPCS | Performed by: INTERNAL MEDICINE

## 2021-05-19 PROCEDURE — 1101F PT FALLS ASSESS-DOCD LE1/YR: CPT | Performed by: INTERNAL MEDICINE

## 2021-05-19 PROCEDURE — G8510 SCR DEP NEG, NO PLAN REQD: HCPCS | Performed by: INTERNAL MEDICINE

## 2021-05-19 PROCEDURE — 3046F HEMOGLOBIN A1C LEVEL >9.0%: CPT | Performed by: INTERNAL MEDICINE

## 2021-05-19 PROCEDURE — G8536 NO DOC ELDER MAL SCRN: HCPCS | Performed by: INTERNAL MEDICINE

## 2021-05-19 PROCEDURE — G8417 CALC BMI ABV UP PARAM F/U: HCPCS | Performed by: INTERNAL MEDICINE

## 2021-05-19 PROCEDURE — 99214 OFFICE O/P EST MOD 30 MIN: CPT | Performed by: INTERNAL MEDICINE

## 2021-05-19 PROCEDURE — 2022F DILAT RTA XM EVC RTNOPTHY: CPT | Performed by: INTERNAL MEDICINE

## 2021-05-19 PROCEDURE — 3017F COLORECTAL CA SCREEN DOC REV: CPT | Performed by: INTERNAL MEDICINE

## 2021-05-19 PROCEDURE — G8754 DIAS BP LESS 90: HCPCS | Performed by: INTERNAL MEDICINE

## 2021-05-19 RX ORDER — METOPROLOL TARTRATE 25 MG/1
12.5 TABLET, FILM COATED ORAL 2 TIMES DAILY
COMMUNITY
Start: 2021-08-24 | End: 2021-08-24

## 2021-05-19 NOTE — PROGRESS NOTES
1. Have you been to the ER, urgent care clinic since your last visit? Hospitalized since your last visit? Yes [facility] for cough    2. Have you seen or consulted any other health care providers outside of the [address] [place] [address] since your last visit? Include any pap smears or colon screening. no    3. Since your last visit, have you had any of the following symptoms? No cardiac symptoms    4. Have you had any blood work, X-rays or cardiac testing? Yes ekg, chest xray             5.  Where do you normally have your labs drawn?   [facility]    6. Do you need any refills today?   no

## 2021-05-26 LAB
HBA1C MFR BLD HPLC: 7.3 %
LDL-C, EXTERNAL: 66.8
PSA, EXTERNAL: 1.73

## 2021-06-14 ENCOUNTER — OFFICE VISIT (OUTPATIENT)
Dept: FAMILY MEDICINE CLINIC | Age: 76
End: 2021-06-14
Payer: MEDICARE

## 2021-06-14 VITALS
OXYGEN SATURATION: 97 % | SYSTOLIC BLOOD PRESSURE: 150 MMHG | HEIGHT: 70 IN | TEMPERATURE: 97.8 F | BODY MASS INDEX: 38.51 KG/M2 | HEART RATE: 57 BPM | WEIGHT: 269 LBS | DIASTOLIC BLOOD PRESSURE: 86 MMHG | RESPIRATION RATE: 18 BRPM

## 2021-06-14 DIAGNOSIS — E11.21 TYPE 2 DIABETES MELLITUS WITH DIABETIC NEPHROPATHY, UNSPECIFIED WHETHER LONG TERM INSULIN USE (HCC): Primary | ICD-10-CM

## 2021-06-14 DIAGNOSIS — E03.9 HYPOTHYROIDISM, UNSPECIFIED TYPE: ICD-10-CM

## 2021-06-14 DIAGNOSIS — N32.81 OAB (OVERACTIVE BLADDER): ICD-10-CM

## 2021-06-14 DIAGNOSIS — E11.9 COMPREHENSIVE DIABETIC FOOT EXAMINATION, TYPE 2 DM, ENCOUNTER FOR (HCC): ICD-10-CM

## 2021-06-14 DIAGNOSIS — M19.90 OSTEOARTHRITIS, UNSPECIFIED OSTEOARTHRITIS TYPE, UNSPECIFIED SITE: ICD-10-CM

## 2021-06-14 DIAGNOSIS — M46.1 SACROILIITIS (HCC): ICD-10-CM

## 2021-06-14 DIAGNOSIS — E03.4 HYPOTHYROIDISM DUE TO ACQUIRED ATROPHY OF THYROID: ICD-10-CM

## 2021-06-14 DIAGNOSIS — Z13.31 SCREENING FOR DEPRESSION: ICD-10-CM

## 2021-06-14 DIAGNOSIS — Z71.89 ADVANCED DIRECTIVES, COUNSELING/DISCUSSION: ICD-10-CM

## 2021-06-14 DIAGNOSIS — Z00.00 MEDICARE ANNUAL WELLNESS VISIT, SUBSEQUENT: ICD-10-CM

## 2021-06-14 DIAGNOSIS — M10.9 GOUT, UNSPECIFIED CAUSE, UNSPECIFIED CHRONICITY, UNSPECIFIED SITE: ICD-10-CM

## 2021-06-14 DIAGNOSIS — E66.01 SEVERE OBESITY (BMI 35.0-39.9) WITH COMORBIDITY (HCC): ICD-10-CM

## 2021-06-14 DIAGNOSIS — N18.30 STAGE 3 CHRONIC KIDNEY DISEASE, UNSPECIFIED WHETHER STAGE 3A OR 3B CKD (HCC): ICD-10-CM

## 2021-06-14 PROCEDURE — G8427 DOCREV CUR MEDS BY ELIG CLIN: HCPCS | Performed by: FAMILY MEDICINE

## 2021-06-14 PROCEDURE — 3046F HEMOGLOBIN A1C LEVEL >9.0%: CPT | Performed by: FAMILY MEDICINE

## 2021-06-14 PROCEDURE — G8754 DIAS BP LESS 90: HCPCS | Performed by: FAMILY MEDICINE

## 2021-06-14 PROCEDURE — G8753 SYS BP > OR = 140: HCPCS | Performed by: FAMILY MEDICINE

## 2021-06-14 PROCEDURE — 99214 OFFICE O/P EST MOD 30 MIN: CPT | Performed by: FAMILY MEDICINE

## 2021-06-14 PROCEDURE — 1101F PT FALLS ASSESS-DOCD LE1/YR: CPT | Performed by: FAMILY MEDICINE

## 2021-06-14 PROCEDURE — 3017F COLORECTAL CA SCREEN DOC REV: CPT | Performed by: FAMILY MEDICINE

## 2021-06-14 PROCEDURE — G8510 SCR DEP NEG, NO PLAN REQD: HCPCS | Performed by: FAMILY MEDICINE

## 2021-06-14 PROCEDURE — 2022F DILAT RTA XM EVC RTNOPTHY: CPT | Performed by: FAMILY MEDICINE

## 2021-06-14 PROCEDURE — G8417 CALC BMI ABV UP PARAM F/U: HCPCS | Performed by: FAMILY MEDICINE

## 2021-06-14 PROCEDURE — G0439 PPPS, SUBSEQ VISIT: HCPCS | Performed by: FAMILY MEDICINE

## 2021-06-14 PROCEDURE — G8536 NO DOC ELDER MAL SCRN: HCPCS | Performed by: FAMILY MEDICINE

## 2021-06-14 RX ORDER — TRAMADOL HYDROCHLORIDE 50 MG/1
50 TABLET ORAL
Qty: 60 TABLET | Refills: 0 | Status: SHIPPED | OUTPATIENT
Start: 2021-06-14 | End: 2021-07-14

## 2021-06-14 NOTE — PROGRESS NOTES
JUANITA Serrano. comes in for f/u care. DM2: Patient has type 2 diabetes mellitus. He is on insulin. Takes glargine and NovoLog. He is also doing lifestyle and dietary modification. I checked a foot exam that is stable. We will continue current management plan. His last HbA1c done through the [place] was 7.3. We will upload his results to his records. HTN: Patient has hypertension. Blood pressure today slightly elevated. He states it is usually stable at home. She denies headache, changes in vision or focal weakness. He is on Lopressor, spironolactone and amlodipine. We will continue with these medications. He will keep a blood pressure log and I will follow-up at next visit. If still elevated we will go up with the Lopressor. Dyslipidemia: Patient has dyslipidemia. He is on Crestor 20 mg daily. Stable on the medication. Lipid panel is stable. Gout: Patient has a history of gout arthritis. He is on Uloric. He denies any gout arthropathy at the moment. Hypothyroidism: Patient has hypothyroidism. He takes levothyroxine. I will continue with the current treatment plan. Obesity: Patient has a BMI of 38.60. He will intensify lifestyle and dietary modification. GERD: Patient has gastroesophageal reflux disease. Denies hematemesis or dark stools. He is on omeprazole. Chronic pain: Patient has chronic back pain and sacroiliitis that has been longstanding. He does supportive care measures. Occasionally he uses tramadol for the pain. Would like a refill of this. Prescription is given. CKD: Patient has chronic kidney disease stage III. Plan is to avoid any nephrotoxic medications. Health maintenance: Patient had PSA done recently.   This was within normal.      Past Medical History  Past Medical History:   Diagnosis Date    Diabetes ([facility] [address] 1995    Dyslipidemia 3/1/2017    Fractures 1995    R Knee/ Tib fib fracture/surgery    Gout 2010    Graves disease 1/21/2015    High cholesterol 2005    HTN (hypertension) 1990    Osteoarthritis 2013    Severe aortic stenosis 3/29/2017    Spinal stenosis of lumbar region 3/1/2016    Spondylolisthesis 3/1/2016    Thyroid trouble     [facility]       Surgical History  Past Surgical History:   Procedure Laterality Date    HX HIP REPLACEMENT Left 2009    HX LUMBAR FUSION      HX ORTHOPAEDIC Right     R knee/Tibfib surgery        Medications  Current Outpatient Medications   Medication Sig Dispense Refill    metoprolol tartrate (LOPRESSOR) 25 mg tablet Take 12.5 mg by mouth two (2) times a day.  rosuvastatin (Crestor) 20 mg tablet Take 20 mg by mouth nightly.  fluticasone propionate (FLONASE) 50 mcg/actuation nasal spray 2 Sprays by Both Nostrils route daily. 1 Bottle 1    furosemide (LASIX) 20 mg tablet Take 1 Tab by mouth daily. 30 Tab 0    levothyroxine (SYNTHROID) 150 mcg tablet Take 1 Tab by mouth Daily (before breakfast). (Patient taking differently: Take 125 mcg by mouth Daily (before breakfast). ) 30 Tab 2    spironolactone (ALDACTONE) 25 mg tablet Take 1 Tab by mouth daily.  b complex vitamins tablet Take 1 Tab by mouth daily.  Alpha Lipoic Acid 600 mg cap Take  by mouth.  aspirin (ASPIRIN) 325 mg tablet Take 325 mg by mouth daily.  ascorbic acid, vitamin C, (VITAMIN C) 250 mg tablet Take  by mouth.  DULoxetine (CYMBALTA) 60 mg capsule Take 1 Cap by mouth daily. 30 Cap 1    capsicum oleoresin 0.025 % topical cream Apply  to affected area three (3) times daily.  diclofenac (VOLTAREN) 1 % gel Apply  to affected area four (4) times daily.  magnesium chloride (MAG DELAY) 64 mg delayed release tablet Take 8 tablets 3 x day      amLODIPine (NORVASC) 10 mg tablet Take 5 mg by mouth daily.  insulin glargine (LANTUS) 100 unit/mL injection 40 Units.  Take 30 units once daily  Indications: type 2 diabetes mellitus 3 Vial 3    febuxostat (ULORIC) 40 mg tab tablet Take 1 Tab by mouth daily. Indications: GOUT PREVENTION 90 Tab 1    insulin aspart (NOVOLOG) 100 unit/mL injection by SubCUTAneous route. 6 units before meals      glucose blood VI test strips (ASCENSIA AUTODISC VI, ONE TOUCH ULTRA TEST VI) strip Dispense precision extra test strips 3 x day to check blood glucose 200 Each 3    Omeprazole delayed release (PRILOSEC D/R) 20 mg tablet Take 20 mg by mouth daily.  atorvastatin (LIPITOR) 40 mg tablet Take 20 mg by mouth daily.  testosterone (ANDROGEL) 20.25 mg/1.25 gram (1.62 %) gel Apply  to affected area as needed.  Cholecalciferol, Vitamin D3, 1,000 unit cap Take 3,000 Units by mouth daily.  montelukast (SINGULAIR) 10 mg tablet Take 1 Tab by mouth daily.  (Patient not taking: Reported on 2021) 90 Tab 1       Allergies  Allergies   Allergen Reactions    Watermelon Anaphylaxis and Swelling    Citric Acid Rash    Peach (Prunus Persica) Itching       Family History  Family History   Problem Relation Age of Onset    Hypertension Mother     Thyroid Disease Mother     Hypertension Father     Diabetes Father     Drug Abuse Brother        Social History  Social History     Socioeconomic History    Marital status:      Spouse name: Not on file    Number of children: Not on file    Years of education: Not on file    Highest education level: Not on file   Occupational History    Not on file   Tobacco Use    Smoking status: Former Smoker     Packs/day: 1.00     Types: Cigarettes     Start date: 1969     Quit date: 1995     Years since quittin.4    Smokeless tobacco: Never Used   Substance and Sexual Activity    Alcohol use: No    Drug use: No    Sexual activity: Yes     Partners: Female   Other Topics Concern     Service Yes     Comment: Served in [address] Blood Transfusions No    Caffeine Concern No    Occupational Exposure No    Hobby Hazards No    Sleep Concern No  Stress Concern No    Weight Concern Yes    Special Diet No    Back Care Yes     Comment: Lower Back pain when walking    Exercise No    Bike Helmet No    Seat Belt Yes    Self-Exams Yes   Social History Narrative    Not on file     Social Determinants of Health     Financial Resource Strain:     Difficulty of Paying Living Expenses:    Food Insecurity:     Worried About Running Out of Food in the Last Year:     Ran Out of Food in the Last Year:    Transportation Needs:     Lack of Transportation (Medical):  Lack of Transportation (Non-Medical):    Physical Activity:     Days of Exercise per Week:     Minutes of Exercise per Session:    Stress:     Feeling of Stress :    Social Connections:     Frequency of Communication with Friends and Family:     Frequency of Social Gatherings with Friends and Family:     Attends Holiness Services:     Active Member of Clubs or Organizations:     Attends Club or Organization Meetings:     Marital Status:    Intimate Partner Violence:     Fear of Current or Ex-Partner:     Emotionally Abused:     Physically Abused:     Sexually Abused:        Review of Systems  Review of Systems - Review of all systems is negative except as noted above in the HPI.     Vital Signs  Visit Vitals  BP (!) 150/86   Pulse (!) 57   Temp 97.8 °F (36.6 °C) (Oral)   Resp 18   Ht 5' 10\" (1.778 m)   Wt 269 lb (122 kg)   SpO2 97%   BMI 38.60 kg/m²         Physical Exam  Physical Examination: General appearance - alert, well appearing, and in no distress, oriented to person, place, and time, overweight and acyanotic, in no respiratory distress  Mental status - alert, oriented to person, place, and time, affect appropriate to mood  Neck - supple, no significant adenopathy  Lymphatics - no palpable lymphadenopathy  Chest - no tachypnea, retractions or cyanosis  Heart - systolic murmur 2/6 at lower left sternal border  Abdomen - no rebound tenderness noted  Back exam - limited range of motion, pain with motion noted during exam, tenderness noted to paralumbar muscles  Neurological - motor and sensory grossly normal bilaterally  Musculoskeletal - osteoarthritic changes noted in both hands  Extremities - no pedal edema noted, intact peripheral pulses  Diabetic foot exam:     Left Foot:   Visual Exam: normal    Pulse DP: 2+ (normal)   Filament test: normal sensation          Right Foot:   Visual Exam: normal    Pulse DP: 2+ (normal)   Filament test: normal sensation      Results  Results for orders placed or performed in visit on 04/06/21   ECHO ADULT COMPLETE   Result Value Ref Range    IVSd 1.27 (A) 0.60 - 1.00 cm    LVIDd 5.43 4.20 - 5.90 cm    LVIDs 4.23 cm    LVOT d 2.14 cm    LVPWd 1.29 (A) 0.60 - 1.00 cm    LVOT Peak Gradient 6.18 mmHg    Left Ventricular Outflow Tract Mean Gradient 2.93 mmHg    LVOT SV 93.6 mL    LVOT Peak Velocity 124.33 cm/s    LVOT VTI 25.92 cm    RVIDd 4.70 cm    Left Atrium Major Axis 3.93 cm    LA Volume 91.86 18.0 - 58.0 mL    LA Area 4C 26.95 cm2    LA Vol 2C 80.21 (A) 18.00 - 58.00 mL    LA Vol 4C 90.65 (A) 18.00 - 58.00 mL    Aortic Valve Area by Continuity of Peak Velocity 2.21 cm2    Aortic Valve Area by Continuity of VTI 2.32 cm2    AoV PG 16.53 mmHg    Aortic Valve Systolic Mean Gradient 1.04 mmHg    Aortic Valve Systolic Peak Velocity 716.38 cm/s    AoV VTI 40.27 cm    MV \"A\" wave duration 144.18 ms    MV A Ananda 75.83 cm/s    Mitral Valve E Wave Deceleration Time 357.22 ms    MV E Ananda 67.00 cm/s    Mitral Valve Pressure Half-time 103.59 ms    MVA (PHT) 2.12 cm2    Pulmonic Valve Systolic Peak Instantaneous Gradient 3.19 mmHg    Pulmonic Valve Max Velocity 89.24 cm/s    Triscuspid Valve Regurgitation Peak Gradient 23.20 mmHg    TR Max Velocity 240.84 cm/s    AO ASC D 3.71 cm    Ao Root D 3.28 cm    LV E' Septal Velocity 5.59 cm/s    LV E' Lateral Velocity 11.02 cm/s    MV E/A 0.88     LV Mass .8 88.0 - 224.0 g    LV Mass AL Index 123.2 49.0 - 115.0 g/m2 E/E' lateral 6.08     E/E' septal 11.99     Right Atrial Area 4C 19.3 cm2    E/E' ratio (averaged) 9.03     Left Atrium Minor Axis 1.66 cm    Tapse 2.00 1.50 - 2.00 cm    LA Vol Index 38.80 16.00 - 28.00 ml/m2    LA Vol Index 33.88 16.00 - 28.00 ml/m2    LA Vol Index 38.29 16.00 - 28.00 ml/m2    ZACKERY/BSA Pk Ananda 0.9 cm2/m2    ZACKERY/BSA VTI 1.0 cm2/m2       ASSESSMENT and PLAN    ICD-10-CM ICD-9-CM    1. Type 2 diabetes mellitus with diabetic nephropathy, unspecified whether long term insulin use ([facility])  E11.21 250.40  DIABETES FOOT EXAM     583.81    2. Comprehensive diabetic foot examination, type 2 DM, encounter for ([facility]  E11.9 250.00  DIABETES FOOT EXAM   3. Hypothyroidism, unspecified type  E03.9 244.9    4. Stage 3 chronic kidney disease, unspecified whether stage 3a or 3b CKD ([facility])  N18.30 585.3    5. Hypothyroidism due to acquired atrophy of thyroid  E03.4 244.8      246.8    6. Gout, unspecified cause, unspecified chronicity, unspecified site  M10.9 274.9    7. OAB (overactive bladder)  N32.81 596.51    8. Severe obesity (BMI 35.0-39. 9) with comorbidity ([facility]  E66.01 278.01    9. Sacroiliitis ([facility])  M46.1 720.2 traMADoL (ULTRAM) 50 mg tablet   10. Osteoarthritis, unspecified osteoarthritis type, unspecified site  M19.90 715.90 traMADoL (ULTRAM) 50 mg tablet   11. Medicare annual wellness visit, subsequent  Z00.00 V70.0    12. Advanced directives, counseling/discussion  Z71.89 V65.49 FULL CODE   13.  Screening for depression  Z13.31 V79.0 [facility] [address]     current treatment plan is effective, no change in therapy  reviewed diet, exercise and weight control  cardiovascular risk and specific lipid/LDL goals reviewed  reviewed medications and side effects in detail  specific diabetic recommendations: low cholesterol diet, weight control and daily exercise discussed, home glucose monitoring emphasized, all medications, side effects and compliance discussed carefully, foot care discussed and Podiatry visits discussed, annual eye examinations at Ophthalmology discussed, glycohemoglobin and other lab monitoring discussed and long term diabetic complications discussed  use of aspirin to prevent MI and TIA's discussed      I have discussed the diagnosis with the patient and the intended plan of care as seen in the above orders. The patient has received an after-visit summary and questions were answered concerning future plans. I have discussed medication, side effects, and warnings with the patient in detail. The patient verbalized understanding and is in agreement with the plan of care. The patient will contact the office with any additional concerns. Mirtha Argueta MD    PLEASE NOTE:   This document has been produced using voice recognition software.  Unrecognized errors in transcription may be present

## 2021-06-14 NOTE — PATIENT INSTRUCTIONS
Medicare Wellness Visit, Male The best way to live healthy is to have a lifestyle where you eat a well-balanced diet, exercise regularly, limit alcohol use, and quit all forms of tobacco/nicotine, if applicable. Regular preventive services are another way to keep healthy. Preventive services (vaccines, screening tests, monitoring & exams) can help personalize your care plan, which helps you manage your own care. Screening tests can find health problems at the earliest stages, when they are easiest to treat. [facility] follows the current, evidence-based guidelines published by the [facility] ([facility]) when recommending preventive services for our patients. Because we follow these guidelines, sometimes recommendations change over time as research supports it. (For example, a prostate screening blood test is no longer routinely recommended for men with no symptoms). Of course, you and your doctor may decide to screen more often for some diseases, based on your risk and co-morbidities (chronic disease you are already diagnosed with). Preventive services for you include: - Medicare offers their members a free annual wellness visit, which is time for you and your primary care provider to discuss and plan for your preventive service needs. Take advantage of this benefit every year! 
-All adults over age 72 should receive the recommended pneumonia vaccines. Current USPSTF guidelines recommend a series of two vaccines for the best pneumonia protection.  
-All adults should have a flu vaccine yearly and tetanus vaccine every 10 years. 
-All adults age 48 and older should receive the shingles vaccines (series of two vaccines).       
-All adults age 38-68 who are overweight should have a diabetes screening test once every three years.  
-Other screening tests & preventive services for persons with diabetes include: an eye exam to screen for diabetic retinopathy, a kidney function test, a foot exam, and stricter control over your cholesterol.  
-Cardiovascular screening for adults with routine risk involves an electrocardiogram (ECG) at intervals determined by the provider.  
-Colorectal cancer screening should be done for adults age 54-65 with no increased risk factors for colorectal cancer. There are a number of acceptable methods of screening for this type of cancer. Each test has its own benefits and drawbacks. Discuss with your provider what is most appropriate for you during your annual wellness visit. The different tests include: colonoscopy (considered the best screening method), a fecal occult blood test, a fecal DNA test, and sigmoidoscopy. 
-All adults born between [facility] should be screened once for Hepatitis C. 
-An Abdominal Aortic Aneurysm (AAA) Screening is recommended for men age 73-68 who has ever smoked in their lifetime. Here is a list of your current Health Maintenance items (your personalized list of preventive services) with a due date: 
Health Maintenance Due Topic Date Due  
 COVID-19 Vaccine (1) Never done  Eye Exam  10/14/2017  Hemoglobin A1C    12/11/2020 [facility] Diabetic Foot Care  06/11/2021  Cholesterol Test   06/11/2021 [facility] Annual Well Visit  06/12/2021 Medicare Wellness Visit, Male The best way to live healthy is to have a lifestyle where you eat a well-balanced diet, exercise regularly, limit alcohol use, and quit all forms of tobacco/nicotine, if applicable. Regular preventive services are another way to keep healthy. Preventive services (vaccines, screening tests, monitoring & exams) can help personalize your care plan, which helps you manage your own care. Screening tests can find health problems at the earliest stages, when they are easiest to treat.   
[facility] follows the current, evidence-based guidelines published by the [employer] (USPSTF) when recommending preventive services for our patients. Because we follow these guidelines, sometimes recommendations change over time as research supports it. (For example, a prostate screening blood test is no longer routinely recommended for men with no symptoms). Of course, you and your doctor may decide to screen more often for some diseases, based on your risk and co-morbidities (chronic disease you are already diagnosed with). Preventive services for you include: - Medicare offers their members a free annual wellness visit, which is time for you and your primary care provider to discuss and plan for your preventive service needs. Take advantage of this benefit every year! 
-All adults over age 72 should receive the recommended pneumonia vaccines. Current USPSTF guidelines recommend a series of two vaccines for the best pneumonia protection.  
-All adults should have a flu vaccine yearly and tetanus vaccine every 10 years. 
-All adults age 48 and older should receive the shingles vaccines (series of two vaccines). -All adults age 38-68 who are overweight should have a diabetes screening test once every three years.  
-Other screening tests & preventive services for persons with diabetes include: an eye exam to screen for diabetic retinopathy, a kidney function test, a foot exam, and stricter control over your cholesterol.  
-Cardiovascular screening for adults with routine risk involves an electrocardiogram (ECG) at intervals determined by the provider.  
-Colorectal cancer screening should be done for adults age 54-65 with no increased risk factors for colorectal cancer. There are a number of acceptable methods of screening for this type of cancer. Each test has its own benefits and drawbacks. Discuss with your provider what is most appropriate for you during your annual wellness visit.  The different tests include: colonoscopy (considered the best screening method), a fecal occult blood test, a fecal DNA test, and sigmoidoscopy. 
-All adults born between [facility] should be screened once for Hepatitis C. 
-An Abdominal Aortic Aneurysm (AAA) Screening is recommended for men age 73-68 who has ever smoked in their lifetime. Here is a list of your current Health Maintenance items (your personalized list of preventive services) with a due date: 
Health Maintenance Due Topic Date Due  
 COVID-19 Vaccine (1) Never done  Eye Exam  10/14/2017  Hemoglobin A1C    12/11/2020  Cholesterol Test   06/11/2021

## 2021-06-14 NOTE — ACP (ADVANCE CARE PLANNING)
Advance Care Planning     Advance Care Planning (ACP) Physician/NP/PA Conversation      Date of Conversation: 6/14/2021  Conducted with: Patient with Decision Making Capacity    Healthcare Decision Maker:     Primary Decision Maker (Active): Patience Ely - Spouse - [phone]  Click here to complete [address] including selection of the Healthcare Decision Maker Relationship (ie \"Primary\")  Today we documented Decision Maker(s). The patient will provide ACP documents. Care Preferences:    Hospitalization: \"If your health worsens and it becomes clear that your chance of recovery is unlikely, what would be your preference regarding hospitalization? \"  The patient would prefer hospitalization. Ventilation: \"If you were unable to breathe on your own and your chance of recovery was unlikely, what would be your preference about the use of a ventilator (breathing machine) if it was available to you? \"   The patient would desire the use of a ventilator. Resuscitation: \"In the event your heart stopped as a result of an underlying serious health condition, would you want attempts to be made to restart your heart, or would you prefer a natural death? \"   Yes, attempt to resuscitate.     Additional topics discussed: treatment goals, ventilation preferences, hospitalization preferences and resuscitation preferences    Conversation Outcomes / Follow-Up Plan:   ACP in process - completing/providing documents   Reviewed DNR/DNI and patient elects Full Code (Attempt Resuscitation)     Length of Voluntary ACP Conversation in minutes:  16 minutes    Aroldo Peña MD

## 2021-06-14 NOTE — PROGRESS NOTES
Chief Complaint   Patient presents with   tna Annual Wellness Visit     1. Have you been to the ER, urgent care clinic since your last visit? Hospitalized since your last visit? No    2. Have you seen or consulted any other health care providers outside of the [address] [place] [address] since your last visit? Include any pap smears or colon screening. No  This is the Subsequent Medicare Annual Wellness Exam, performed 12 months or more after the Initial AWV or the last Subsequent AWV    I have reviewed the patient's medical history in detail and updated the computerized patient record. Assessment/Plan   Education and counseling provided:  Are appropriate based on today's review and evaluation    1. Medicare annual wellness visit, subsequent    2. Advanced directives, counseling/discussion  - FULL CODE    3. Screening for depression  - DEPRESSION SCREEN ANNUAL  - ND DEPRESSION SCREEN ANNUAL       Depression Risk Factor Screening     3 most recent PHQ Screens 6/14/2021   Little interest or pleasure in doing things Not at all   Feeling down, depressed, irritable, or hopeless Not at all   Total Score PHQ 2 0   Trouble falling or staying asleep, or sleeping too much Not at all   Feeling tired or having little energy Not at all   Poor appetite, weight loss, or overeating Not at all   Feeling bad about yourself - or that you are a failure or have let yourself or your family down Not at all   Trouble concentrating on things such as school, work, reading, or watching TV Not at all   Moving or speaking so slowly that other people could have noticed; or the opposite being so fidgety that others notice Not at all   Thoughts of being better off dead, or hurting yourself in some way Not at all   PHQ 9 Score 0   How difficult have these problems made it for you to do your work, take care of your home and get along with others Not difficult at all   8 minutes taken on depression screening.     Alcohol Risk Screen    Do you average more than 1 drink per night or more than 7 drinks a week: No    In the past three months have you have had more than 4 drinks containing alcohol on one occasion: No        Functional Ability and Level of Safety     1. Was the patient's timed Up and GO test unsteady or longer than 30 seconds? No  2. Does the patient need help with the phone, transportation, shopping, preparing meals, housework, laundry, medications or managing money? No  3. Does the patients' home have rugs in the hallway, lack grab bars in the bathroom, lack handrails on the stairs or have poor lighting? No  4. Have you noticed any hearing difficulties? No  Hearing Evaluation:    Hearing: Hearing is good. Activities of Daily Living: The home contains: grab bars  Patient does total self care      Ambulation: with difficulty, uses a cane     Fall Risk:  Fall Risk Assessment, last 12 mths 6/14/2021   Able to walk? Yes   Fall in past 12 months? 0   Do you feel unsteady? 1   Are you worried about falling 0   Is TUG test greater than 12 seconds? 0   Is the gait abnormal? 1   Number of falls in past 12 months 0   Fall with injury?  -      Abuse Screen:  Patient is not abused       Cognitive Screening    Has your family/caregiver stated any concerns about your memory: no     Cognitive Screening: Normal - Verbal Fluency Test    Health Maintenance Due     Health Maintenance Due   Topic Date Due    COVID-19 Vaccine (1) Never done    Eye Exam Retinal or Dilated  10/14/2017    A1C test (Diabetic or Prediabetic)  12/11/2020    Foot Exam Q1  06/11/2021    Lipid Screen  06/11/2021    Medicare Yearly Exam  06/12/2021       Patient Care Team   Patient Care Team:  Almas Pimentel MD as PCP - General (Family Medicine)  Almas Pimentel MD as PCP [facility] Empaneled Provider  Ольга Chau MD (Nephrology)  Senthil Meyers MD (Cardiology)  Danny Allen MD (Orthopedic Surgery)  Erin Alatorre MD as Surgeon (Cardiothoracic Surgery)  Robinson Weir MD (Physical Medicine and Rehabilitation)    History   Abdias Wiggins. comes in for Medicare wellness exam.    Patient Active Problem List   Diagnosis Code    Graves disease E05.00    Heartburn R12    Gout M10.9    Type 2 diabetes mellitus with diabetic nephropathy ([facility] E11.21    Hypomagnesemia E83.42    Renovascular hypertension I15.0    Murmur, cardiac R01.1    CKD (chronic kidney disease) stage 3, GFR 30-59 ml/min ([facility]) N18.30    Hypothyroidism E03.9    Dyslipidemia E78.5    SOB (shortness of breath) R06.02    Pre-op evaluation Z01.818    Severe aortic stenosis I35.0    ACP (advance care planning) Z71.89    Osteoarthritis M19.90    Spinal stenosis of lumbar region M48.061    Spondylolisthesis M43.10    S/P AVR (aortic valve replacement) Z95.2    Essential hypertension I10    Severe obesity (BMI 35.0-39. 9) with comorbidity ([facility] E66.01    DDD (degenerative disc disease), lumbar M51.36    Lumbar radiculopathy M54.16    Spondylolisthesis, acquired M43.10    Lumbar postlaminectomy syndrome M96.1    Spinal stenosis, lumbar region with neurogenic claudication M48.062    Sacroiliitis ([facility]) M46.1    Lumbar neuritis M54.16    Gastroesophageal reflux disease K21.9     Past Medical History:   Diagnosis Date    Diabetes ([facility] 1995    Dyslipidemia 3/1/2017    Fractures 1995    R Knee/ Tib fib fracture/surgery    Gout 2010    Graves disease 1/21/2015    High cholesterol 2005    HTN (hypertension) 1990    Osteoarthritis 2013    Severe aortic stenosis 3/29/2017    Spinal stenosis of lumbar region 3/1/2016    Spondylolisthesis 3/1/2016    Thyroid trouble     Type 2 diabetes mellitus with diabetic nephropathy ([address] 4/28/2015      Past Surgical History:   Procedure Laterality Date    HX HIP REPLACEMENT Left 2009    HX LUMBAR FUSION      HX ORTHOPAEDIC Right     R knee/Tibfib surgery     Current Outpatient Medications   Medication Sig Dispense Refill    metoprolol tartrate (LOPRESSOR) 25 mg tablet Take 12.5 mg by mouth two (2) times a day.  rosuvastatin (Crestor) 20 mg tablet Take 20 mg by mouth nightly.  fluticasone propionate (FLONASE) 50 mcg/actuation nasal spray 2 Sprays by Both Nostrils route daily. 1 Bottle 1    furosemide (LASIX) 20 mg tablet Take 1 Tab by mouth daily. 30 Tab 0    levothyroxine (SYNTHROID) 150 mcg tablet Take 1 Tab by mouth Daily (before breakfast). (Patient taking differently: Take 125 mcg by mouth Daily (before breakfast). ) 30 Tab 2    spironolactone (ALDACTONE) 25 mg tablet Take 1 Tab by mouth daily.  b complex vitamins tablet Take 1 Tab by mouth daily.  Alpha Lipoic Acid 600 mg cap Take  by mouth.  aspirin (ASPIRIN) 325 mg tablet Take 325 mg by mouth daily.  ascorbic acid, vitamin C, (VITAMIN C) 250 mg tablet Take  by mouth.  DULoxetine (CYMBALTA) 60 mg capsule Take 1 Cap by mouth daily. 30 Cap 1    capsicum oleoresin 0.025 % topical cream Apply  to affected area three (3) times daily.  diclofenac (VOLTAREN) 1 % gel Apply  to affected area four (4) times daily.  magnesium chloride (MAG DELAY) 64 mg delayed release tablet Take 8 tablets 3 x day      amLODIPine (NORVASC) 10 mg tablet Take 5 mg by mouth daily.  insulin glargine (LANTUS) 100 unit/mL injection 40 Units. Take 30 units once daily  Indications: type 2 diabetes mellitus 3 Vial 3    febuxostat (ULORIC) 40 mg tab tablet Take 1 Tab by mouth daily. Indications: GOUT PREVENTION 90 Tab 1    insulin aspart (NOVOLOG) 100 unit/mL injection by SubCUTAneous route. 6 units before meals      glucose blood VI test strips (ASCENSIA AUTODISC VI, ONE TOUCH ULTRA TEST VI) strip Dispense precision extra test strips 3 x day to check blood glucose 200 Each 3    Omeprazole delayed release (PRILOSEC D/R) 20 mg tablet Take 20 mg by mouth daily.  atorvastatin (LIPITOR) 40 mg tablet Take 20 mg by mouth daily.       testosterone (ANDROGEL) 20.25 mg/1.25 gram (1.62 %) gel Apply  to affected area as needed.  Cholecalciferol, Vitamin D3, 1,000 unit cap Take 3,000 Units by mouth daily.  montelukast (SINGULAIR) 10 mg tablet Take 1 Tab by mouth daily. (Patient not taking: Reported on 2021) 90 Tab 1     Allergies   Allergen Reactions    Watermelon Anaphylaxis and Swelling    Citric Acid Rash    Peach (Prunus Persica) Itching       Family History   Problem Relation Age of Onset    Hypertension Mother     Thyroid Disease Mother     Hypertension Father     Diabetes Father     Drug Abuse Brother      Social History     Tobacco Use    Smoking status: Former Smoker     Packs/day: 1.00     Types: Cigarettes     Start date: 1969     Quit date: 1995     Years since quittin.4    Smokeless tobacco: Never Used   Substance Use Topics    Alcohol use: No     I have discussed the diagnosis with the patient and the intended plan of care as seen in the above orders. The patient has received an after-visit summary and questions were answered concerning future plans. I have discussed medication, side effects, and warnings with the patient in detail. The patient verbalized understanding and is in agreement with the plan of care. The patient will contact the office with any additional concerns. Personalized preventative plan of care was discussed, printed and given to patient.     Sonny Rubio MD

## 2021-08-17 PROBLEM — R05.9 COUGH: Status: ACTIVE | Noted: 2021-02-20

## 2021-08-17 PROBLEM — M12.561 TRAUMATIC ARTHRITIS OF RIGHT KNEE: Status: ACTIVE | Noted: 2017-08-22

## 2021-08-17 PROBLEM — R77.8 ELEVATED TROPONIN: Status: ACTIVE | Noted: 2021-02-20

## 2021-08-17 RX ORDER — BENZONATATE 100 MG/1
CAPSULE ORAL
COMMUNITY
Start: 2021-02-20 | End: 2021-08-24

## 2021-08-24 ENCOUNTER — OFFICE VISIT (OUTPATIENT)
Dept: PULMONOLOGY | Age: 76
End: 2021-08-24
Payer: MEDICARE

## 2021-08-24 ENCOUNTER — OFFICE VISIT (OUTPATIENT)
Dept: FAMILY MEDICINE CLINIC | Age: 76
End: 2021-08-24

## 2021-08-24 ENCOUNTER — HOSPITAL ENCOUNTER (OUTPATIENT)
Dept: LAB | Age: 76
Discharge: HOME | End: 2021-08-24
Payer: MEDICARE

## 2021-08-24 VITALS
OXYGEN SATURATION: 97 % | HEIGHT: 70 IN | WEIGHT: 267 LBS | HEART RATE: 61 BPM | SYSTOLIC BLOOD PRESSURE: 119 MMHG | TEMPERATURE: 97 F | BODY MASS INDEX: 38.22 KG/M2 | RESPIRATION RATE: 16 BRPM | DIASTOLIC BLOOD PRESSURE: 72 MMHG

## 2021-08-24 VITALS
BODY MASS INDEX: 38.51 KG/M2 | SYSTOLIC BLOOD PRESSURE: 111 MMHG | RESPIRATION RATE: 18 BRPM | TEMPERATURE: 97.8 F | DIASTOLIC BLOOD PRESSURE: 54 MMHG | OXYGEN SATURATION: 99 % | HEART RATE: 45 BPM | HEIGHT: 70 IN | WEIGHT: 269 LBS

## 2021-08-24 DIAGNOSIS — Z13.6 SCREENING FOR AAA (AORTIC ABDOMINAL ANEURYSM): ICD-10-CM

## 2021-08-24 DIAGNOSIS — K21.9 GASTROESOPHAGEAL REFLUX DISEASE, UNSPECIFIED WHETHER ESOPHAGITIS PRESENT: ICD-10-CM

## 2021-08-24 DIAGNOSIS — Z87.891 FORMER SMOKER: ICD-10-CM

## 2021-08-24 DIAGNOSIS — E78.5 DYSLIPIDEMIA: ICD-10-CM

## 2021-08-24 DIAGNOSIS — I10 ESSENTIAL HYPERTENSION: Primary | ICD-10-CM

## 2021-08-24 DIAGNOSIS — R05.3 CHRONIC COUGH: Primary | ICD-10-CM

## 2021-08-24 DIAGNOSIS — N18.30 STAGE 3 CHRONIC KIDNEY DISEASE, UNSPECIFIED WHETHER STAGE 3A OR 3B CKD (HCC): ICD-10-CM

## 2021-08-24 DIAGNOSIS — E03.9 HYPOTHYROIDISM, UNSPECIFIED TYPE: ICD-10-CM

## 2021-08-24 DIAGNOSIS — E66.9 OBESITY (BMI 30-39.9): ICD-10-CM

## 2021-08-24 DIAGNOSIS — E11.21 TYPE 2 DIABETES MELLITUS WITH DIABETIC NEPHROPATHY, UNSPECIFIED WHETHER LONG TERM INSULIN USE (HCC): ICD-10-CM

## 2021-08-24 LAB
CHOLEST SERPL-MCNC: 143 MG/DL
HBA1C MFR BLD HPLC: 6.8 %
HDLC SERPL-MCNC: 45 MG/DL (ref 40–60)
HDLC SERPL: 3.2 {RATIO} (ref 0–5)
LDLC SERPL CALC-MCNC: 74.2 MG/DL (ref 0–100)
LIPID PROFILE,FLP: NORMAL
TRIGL SERPL-MCNC: 119 MG/DL (ref ?–150)
VLDLC SERPL CALC-MCNC: 23.8 MG/DL

## 2021-08-24 PROCEDURE — 36415 COLL VENOUS BLD VENIPUNCTURE: CPT | Performed by: FAMILY MEDICINE

## 2021-08-24 PROCEDURE — G8510 SCR DEP NEG, NO PLAN REQD: HCPCS | Performed by: INTERNAL MEDICINE

## 2021-08-24 PROCEDURE — G8427 DOCREV CUR MEDS BY ELIG CLIN: HCPCS | Performed by: FAMILY MEDICINE

## 2021-08-24 PROCEDURE — G8417 CALC BMI ABV UP PARAM F/U: HCPCS | Performed by: FAMILY MEDICINE

## 2021-08-24 PROCEDURE — 80061 LIPID PANEL: CPT

## 2021-08-24 PROCEDURE — 3017F COLORECTAL CA SCREEN DOC REV: CPT | Performed by: INTERNAL MEDICINE

## 2021-08-24 PROCEDURE — 83036 HEMOGLOBIN GLYCOSYLATED A1C: CPT | Performed by: FAMILY MEDICINE

## 2021-08-24 PROCEDURE — G8754 DIAS BP LESS 90: HCPCS | Performed by: FAMILY MEDICINE

## 2021-08-24 PROCEDURE — G8427 DOCREV CUR MEDS BY ELIG CLIN: HCPCS | Performed by: INTERNAL MEDICINE

## 2021-08-24 PROCEDURE — 1101F PT FALLS ASSESS-DOCD LE1/YR: CPT | Performed by: INTERNAL MEDICINE

## 2021-08-24 PROCEDURE — 99214 OFFICE O/P EST MOD 30 MIN: CPT | Performed by: FAMILY MEDICINE

## 2021-08-24 PROCEDURE — 3017F COLORECTAL CA SCREEN DOC REV: CPT | Performed by: FAMILY MEDICINE

## 2021-08-24 PROCEDURE — 1101F PT FALLS ASSESS-DOCD LE1/YR: CPT | Performed by: FAMILY MEDICINE

## 2021-08-24 PROCEDURE — G8417 CALC BMI ABV UP PARAM F/U: HCPCS | Performed by: INTERNAL MEDICINE

## 2021-08-24 PROCEDURE — 2022F DILAT RTA XM EVC RTNOPTHY: CPT | Performed by: FAMILY MEDICINE

## 2021-08-24 PROCEDURE — G8754 DIAS BP LESS 90: HCPCS | Performed by: INTERNAL MEDICINE

## 2021-08-24 PROCEDURE — G8510 SCR DEP NEG, NO PLAN REQD: HCPCS | Performed by: FAMILY MEDICINE

## 2021-08-24 PROCEDURE — 99204 OFFICE O/P NEW MOD 45 MIN: CPT | Performed by: INTERNAL MEDICINE

## 2021-08-24 PROCEDURE — G8536 NO DOC ELDER MAL SCRN: HCPCS | Performed by: FAMILY MEDICINE

## 2021-08-24 PROCEDURE — G8536 NO DOC ELDER MAL SCRN: HCPCS | Performed by: INTERNAL MEDICINE

## 2021-08-24 PROCEDURE — 3044F HG A1C LEVEL LT 7.0%: CPT | Performed by: FAMILY MEDICINE

## 2021-08-24 PROCEDURE — G8752 SYS BP LESS 140: HCPCS | Performed by: FAMILY MEDICINE

## 2021-08-24 PROCEDURE — G8752 SYS BP LESS 140: HCPCS | Performed by: INTERNAL MEDICINE

## 2021-08-24 RX ORDER — FLUTICASONE PROPIONATE 50 MCG
2 SPRAY, SUSPENSION (ML) NASAL DAILY
Qty: 1 BOTTLE | Refills: 3 | Status: SHIPPED | OUTPATIENT
Start: 2021-08-24 | End: 2022-03-13

## 2021-08-24 RX ORDER — OMEPRAZOLE 40 MG/1
40 CAPSULE, DELAYED RELEASE ORAL 2 TIMES DAILY
Qty: 60 CAPSULE | Refills: 1 | Status: SHIPPED | OUTPATIENT
Start: 2021-08-24 | End: 2022-06-22 | Stop reason: SDUPTHER

## 2021-08-24 RX ORDER — AMLODIPINE BESYLATE 10 MG/1
10 TABLET ORAL DAILY
Qty: 90 TABLET | Refills: 1 | Status: SHIPPED | OUTPATIENT
Start: 2021-08-24 | End: 2022-06-19

## 2021-08-24 NOTE — PROGRESS NOTES
Kaylynn Terry. presents today for   Chief Complaint   Patient presents with   Trevon Del Valle Referral / Consult     referred by Dr. Ellyn Whitney for chronic cough    Results     Echo 4/6/2021       Is someone accompanying this pt? No    Is the patient using any DME equipment during OV? Yes. rollator    -DME Company N/A    Depression Screening:  3 most recent PHQ Screens 8/24/2021   Little interest or pleasure in doing things Not at all   Feeling down, depressed, irritable, or hopeless Not at all   Total Score PHQ 2 0   Trouble falling or staying asleep, or sleeping too much -   Feeling tired or having little energy -   Poor appetite, weight loss, or overeating -   Feeling bad about yourself - or that you are a failure or have let yourself or your family down -   Trouble concentrating on things such as school, work, reading, or watching TV -   Moving or speaking so slowly that other people could have noticed; or the opposite being so fidgety that others notice -   Thoughts of being better off dead, or hurting yourself in some way -   PHQ 9 Score -   How difficult have these problems made it for you to do your work, take care of your home and get along with others -       Learning Assessment:  Learning Assessment 5/30/2017   PRIMARY LEARNER Patient   HIGHEST LEVEL OF EDUCATION - PRIMARY LEARNER  -   BARRIERS PRIMARY LEARNER -   CO-LEARNER CAREGIVER -   PRIMARY LANGUAGE ENGLISH   LEARNER PREFERENCE PRIMARY OTHER (COMMENT)   ANSWERED BY patient   RELATIONSHIP SELF       Abuse Screening:  Abuse Screening Questionnaire 3/2/2021   Do you ever feel afraid of your partner? N   Are you in a relationship with someone who physically or mentally threatens you? N   Is it safe for you to go home? Y       Fall Risk  Fall Risk Assessment, last 12 mths 6/14/2021   Able to walk? Yes   Fall in past 12 months? 0   Do you feel unsteady? 1   Are you worried about falling 0   Is TUG test greater than 12 seconds?  0   Is the gait abnormal? 1   Number of falls in past 12 months 0   Fall with injury? -         Coordination of Care:  1. Have you been to the ER, urgent care clinic since your last visit? Hospitalized since your last visit? Yes; Where: [facility], When: 2/20/2021-cough, 8/12/2021-ventricular begiminy seen on cardiac monitor & elevated systolic BP     2. Have you seen or consulted any other health care providers outside of the [address] [place] [address] [place] since your last visit? Include any pap smears or colon screening. No    COVID vaccine (Moderna) received on 2/2/2021 (1st dose) and 3/2/2021 (2nd dose) per vaccine card. Card scanned into patient's chart. Immunization record updated.

## 2021-08-24 NOTE — PROGRESS NOTES
Venipuncture to patient left forearm at this time. Patient tolerated well at this time. No other concerns noted

## 2021-08-24 NOTE — PROGRESS NOTES
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 Pt with complaint of chronic cough with onset Jan 2021, with partial improvement since. Pt still has a less frequent cough but now produces a clear mucoid phlegm which he describes as Juancarlos Shook" sometimes. Cough is more prominent in the mornings and is sometimes associated with an occasional rattle in the chest. Outside chest xrays done in Feb and last week demonstrate post sternotomy changes and underinflated lungs but otherwise no parenchymal changes. Pt endorses nasal congestion with occasional rhinorrhea and allergic salute. PMH includes Aortic stenosis S/p bioprosthetic valve placement, GERD on Omeprazole. Pt also with history of anaphylaxis form watermelon and milder allergies to peaches. He smoked briefly while in the [employer] but quit over 40 years ago. He is on disability after a work injury resulting in RLE crush injury and multiple surgeries to repair this. He used to work in construction and as an . Review of Systems   Constitutional: Negative for chills, diaphoresis, fever, malaise/fatigue and weight loss. HENT: Negative for congestion, ear discharge, ear pain, hearing loss, nosebleeds, sinus pain, sore throat and tinnitus. Eyes: Negative for blurred vision, double vision, photophobia, pain, discharge and redness. Respiratory: Positive for cough. Negative for hemoptysis, shortness of breath, wheezing and stridor. Sputum production: clear mucoid. Cardiovascular: Negative for chest pain, palpitations, orthopnea, claudication, leg swelling and PND. Gastrointestinal: Negative for abdominal pain, blood in stool, constipation, diarrhea, heartburn, melena, nausea and vomiting. Genitourinary: Negative for dysuria, flank pain, frequency, hematuria and urgency. Musculoskeletal: Positive for back pain and joint pain. Negative for falls, myalgias and neck pain.    Skin: Negative for itching and rash. Neurological: Negative for dizziness, tingling, tremors, sensory change, speech change, focal weakness, seizures, loss of consciousness, weakness and headaches. Endo/Heme/Allergies: Positive for environmental allergies. Negative for polydipsia. Does not bruise/bleed easily. Psychiatric/Behavioral: Negative for depression, hallucinations, memory loss, substance abuse and suicidal ideas. The patient is not nervous/anxious and does not have insomnia. Past Medical History:   Diagnosis Date    Diabetes ([facility] [address] 1995    Dyslipidemia 3/1/2017    Fractures 1995    R Knee/ Tib fib fracture/surgery    Gout 2010    Graves disease 1/21/2015    High cholesterol 2005    HTN (hypertension) 1990    Osteoarthritis 2013    Severe aortic stenosis 3/29/2017    Spinal stenosis of lumbar region 3/1/2016    Spondylolisthesis 3/1/2016    Thyroid trouble     Type 2 diabetes mellitus with diabetic nephropathy ([facility] [address] 4/28/2015     Past Surgical History:   Procedure Laterality Date    HX HIP REPLACEMENT Left 2009    HX LUMBAR FUSION      HX ORTHOPAEDIC Right     R knee/Tibfib surgery     Current Outpatient Medications on File Prior to Visit   Medication Sig Dispense Refill    dextran 70/hypromellose (ARTIFICIAL TEARS, PF, OP) Apply 1 Drop to eye six (6) times daily.  magnesium (MAGINEX) 61 mg (615 mg) TbEC Take 615 mg by mouth.  rosuvastatin (Crestor) 20 mg tablet Take 20 mg by mouth nightly.  levothyroxine (SYNTHROID) 150 mcg tablet Take 1 Tab by mouth Daily (before breakfast). (Patient taking differently: Take 125 mcg by mouth Daily (before breakfast). ) 30 Tab 2    spironolactone (ALDACTONE) 25 mg tablet Take 1 Tab by mouth daily.  b complex vitamins tablet Take 1 Tab by mouth daily.  Alpha Lipoic Acid 600 mg cap Take 1 Capsule by mouth daily.  aspirin (ASPIRIN) 325 mg tablet Take 325 mg by mouth daily.       ascorbic acid, vitamin C, (VITAMIN C) 250 mg tablet Take 250 mg by mouth.  DULoxetine (CYMBALTA) 60 mg capsule Take 1 Cap by mouth daily. 30 Cap 1    capsicum oleoresin 0.025 % topical cream Apply  to affected area three (3) times daily.  diclofenac (VOLTAREN) 1 % gel Apply  to affected area four (4) times daily.  magnesium chloride (MAG DELAY) 64 mg delayed release tablet Take 8 tablets 3 x day      amLODIPine (NORVASC) 10 mg tablet Take 5 mg by mouth daily.  insulin glargine (LANTUS) 100 unit/mL injection 30 Units by SubCUTAneous route daily. Take 30 units once daily  Indications: type 2 diabetes mellitus 3 Vial 3    febuxostat (ULORIC) 40 mg tab tablet Take 1 Tab by mouth daily. Indications: GOUT PREVENTION 90 Tab 1    insulin aspart (NOVOLOG) 100 unit/mL injection 6 Units by SubCUTAneous route Before breakfast, lunch, and dinner. 6 units before meals       glucose blood VI test strips (ASCENSIA AUTODISC VI, ONE TOUCH ULTRA TEST VI) strip Dispense precision extra test strips 3 x day to check blood glucose 200 Each 3    testosterone (ANDROGEL) 20.25 mg/1.25 gram (1.62 %) gel Apply  to affected area as needed.  Cholecalciferol, Vitamin D3, 1,000 unit cap Take 3,000 Units by mouth daily.  benzonatate (TESSALON) 100 mg capsule Take 1 capsule 3 times daily as needed for coughing, swallow capsules whole. (Patient not taking: Reported on 8/24/2021)      metoprolol tartrate (LOPRESSOR) 25 mg tablet Take 12.5 mg by mouth two (2) times a day. (Patient not taking: Reported on 8/24/2021)      montelukast (SINGULAIR) 10 mg tablet Take 1 Tab by mouth daily. (Patient not taking: Reported on 6/14/2021) 90 Tab 1    furosemide (LASIX) 20 mg tablet Take 1 Tab by mouth daily. (Patient not taking: Reported on 8/24/2021) 30 Tab 0    atorvastatin (LIPITOR) 40 mg tablet Take 20 mg by mouth daily. (Patient not taking: Reported on 8/24/2021)       No current facility-administered medications on file prior to visit.      Allergies   Allergen Reactions    Watermelon Anaphylaxis and Swelling    Citric Acid Rash    Peach (Prunus Persica) Itching     Family History   Problem Relation Age of Onset    Hypertension Mother     Thyroid Disease Mother     Hypertension Father     Diabetes Father     Drug Abuse Brother      Social History     Socioeconomic History    Marital status:      Spouse name: Not on file    Number of children: Not on file    Years of education: Not on file    Highest education level: Not on file   Occupational History    Not on file   Tobacco Use    Smoking status: Former Smoker     Packs/day: 1.00     Years: 7.00     Pack years: 7.00     Types: Cigarettes     Start date: 1969     Quit date: 1995     Years since quittin.6    Smokeless tobacco: Never Used   Vaping Use    Vaping Use: Never used   Substance and Sexual Activity    Alcohol use: No    Drug use: No    Sexual activity: Yes     Partners: Female   Other Topics Concern     Service Yes     Comment: Served in [address] Blood Transfusions No    Caffeine Concern No    Occupational Exposure No    Hobby Hazards No    Sleep Concern No    Stress Concern No    Weight Concern Yes    Special Diet No    Back Care Yes     Comment: Lower Back pain when walking    Exercise No    Bike Helmet No    Seat Belt Yes    Self-Exams Yes   Social History Narrative    Not on file     Social Determinants of Health     Financial Resource Strain:     Difficulty of Paying Living Expenses:    Food Insecurity:     Worried About Running Out of Food in the Last Year:     Ran Out of Food in the Last Year:    Transportation Needs:     Lack of Transportation (Medical):      Lack of Transportation (Non-Medical):    Physical Activity:     Days of Exercise per Week:     Minutes of Exercise per Session:    Stress:     Feeling of Stress :    Social Connections:     Frequency of Communication with Friends and Family:     Frequency of Social Gatherings with Friends and Family:     Attends Spiritism Services:     Active Member of Clubs or Organizations:     Attends Club or Organization Meetings:     Marital Status:    Intimate Partner Violence:     Fear of Current or Ex-Partner:     Emotionally Abused:     Physically Abused:     Sexually Abused:      Blood pressure (!) 111/54, pulse (!) 45, temperature 97.8 °F (36.6 °C), temperature source Temporal, resp. rate 18, height 5' 10\" (1.778 m), weight 122 kg (269 lb), SpO2 99 %. Physical Exam  Constitutional:       General: He is not in acute distress. Appearance: He is obese. He is not ill-appearing, toxic-appearing or diaphoretic. Comments: Uses a high walker   HENT:      Head: Normocephalic and atraumatic. Right Ear: External ear normal.      Left Ear: External ear normal.      Nose:      Comments: Deferred      Mouth/Throat:      Comments: Deferred   Eyes:      General: No scleral icterus. Right eye: No discharge. Left eye: No discharge. Extraocular Movements: Extraocular movements intact. Conjunctiva/sclera: Conjunctivae normal.      Pupils: Pupils are equal, round, and reactive to light. Neck:      Vascular: No carotid bruit. Cardiovascular:      Rate and Rhythm: Normal rate and regular rhythm. Pulses: Normal pulses. Heart sounds: Normal heart sounds. Murmur: 2/6 blowing systolic murmur. No gallop. Pulmonary:      Effort: Pulmonary effort is normal. No respiratory distress. Breath sounds: Normal breath sounds. No stridor. No wheezing, rhonchi or rales. Chest:      Chest wall: No tenderness. Abdominal:      General: There is no distension. Palpations: Abdomen is soft. There is no mass. Tenderness: There is no abdominal tenderness. Musculoskeletal:         General: No swelling or tenderness. Cervical back: No rigidity or tenderness.       Comments: Multiple old surgical scars R leg   Lymphadenopathy:      Cervical: No cervical adenopathy. Skin:     General: Skin is warm and dry. Coloration: Skin is not jaundiced or pale. Findings: No bruising, erythema, lesion or rash. Neurological:      General: No focal deficit present. Mental Status: He is alert and oriented to person, place, and time. Coordination: Coordination normal.   Psychiatric:         Mood and Affect: Mood normal.         Behavior: Behavior normal.         Thought Content: Thought content normal.         Judgment: Judgment normal.       CHEST PORTABLE    Impression      No acute process     Signed By: Andreina Dhaliwal MD on 2/20/2021 2:23 PM  Narrative    EXAM:  AP Portable Chest X-ray 1 view     INDICATION: Cough     COMPARISON: March 1, 2017     _______________     FINDINGS: Sternotomy wires and sternal cortical plate from prior thoracotomy. Heart and mediastinal contours are within normal limits for portable radiograph. Lungs are clear of active disease. There are no pleural effusions. No acute osseous findings. Right humeral arthroplasty. ________________  Other Result Text    Interface, [facility] - 02/20/2021  2:25 PM EST   EXAM:  AP Portable Chest X-ray 1 view     INDICATION: Cough     COMPARISON: March 1, 2017     _______________     FINDINGS: Sternotomy wires and sternal cortical plate from prior thoracotomy. Heart and mediastinal contours are within normal limits for portable radiograph. Lungs are clear of active disease. There are no pleural effusions. No acute osseous findings. Right humeral arthroplasty. ________________       IMPRESSION  No acute process     Signed By: Andreina Dhaliwal MD on 2/20/2021 2:23 PM     Date: 02/20/21   Received From: [address]     Encounter Summary     04/06/21    ECHO ADULT COMPLETE 04/06/2021 4/6/2021    Interpretation Summary  · LV: Estimated LVEF is 55 - 60%. Normal cavity size and systolic function (ejection fraction normal). Mild concentric hypertrophy.  Wall motion: normal. Mild (grade 1) left ventricular diastolic dysfunction. · LA: Mildly dilated left atrium. Left Atrium volume index is 38.76 mL/m2. · RV: Mildly dilated right ventricle. · RA: Mildly dilated right atrium. · AV: Prosthetic aortic valve. Aortic valve mean gradient is 8.1 mmHg. There is a 25 mm [facility] Judes bioprosthetic aortic valve. Prosthesis is normal.  · MV: Mitral annular calcification. Mild mitral valve regurgitation is present. · TV: Mild tricuspid valve regurgitation is present. · AO: Mild ascending aorta dilatation. Ascending aorta diameter = 3.7 cm. · PA: Pulmonary arterial systolic pressure is 26 mmHg. Signed by: Juvenal Tapia MD on 4/6/2021 12:26 PM      ASSESSMENT and PLAN  Encounter Diagnoses   Name Primary?  Chronic cough Yes    Obesity (BMI 30-39. 5)     Former smoker     Gastroesophageal reflux disease, unspecified whether esophagitis present      Pt with chronic cough with no apparent lung pathology on recent chest xray. Differentials include GERD (pt with known diagnosis), Upper airway cough syndrome, and less likely bronchospasm. Doubt that cough ir related to valvular heart disease jony as this has been replaced. Will start a therapeutic trial of Flonase spray, pt taught proper technique. Increase Omeprazole dose for 1 month and monitor symptoms. Should cough persist will plan on high resolution CT and PFT's. Pt with significant allergy to watermelon, will need to consider the possibility of cross reaction/Latex allergy on future visits. RTC 4-6 weeks.

## 2021-08-24 NOTE — PROGRESS NOTES
Chief Complaint   Patient presents with    Follow Up Chronic Condition     1. Have you been to the ER, urgent care clinic since your last visit? Hospitalized since your last visit? Yes When: 8- Where: [facility] Reason for visit: blood pressure    2. Have you seen or consulted any other health care providers outside of the [address] [place] [address] since your last visit? Include any pap smears or colon screening.  No

## 2021-08-24 NOTE — LETTER
8/24/2021    Patient: Val Snyder. YOB: 1945   Date of Visit: 8/24/2021     Sarita Leonardo MD  [facility]    Dear Sarita Leonardo MD,      Thank you for referring Mr. Eugenie Merrill to [address] [place] [address] for evaluation. My notes for this consultation are attached. If you have questions, please do not hesitate to call me. I look forward to following your patient along with you.       Sincerely,    Usman Keller MD

## 2021-08-31 ENCOUNTER — HOSPITAL ENCOUNTER (OUTPATIENT)
Dept: ULTRASOUND IMAGING | Age: 76
Discharge: HOME | End: 2021-08-31
Attending: FAMILY MEDICINE
Payer: MEDICARE

## 2021-08-31 DIAGNOSIS — Z13.6 SCREENING FOR AAA (AORTIC ABDOMINAL ANEURYSM): ICD-10-CM

## 2021-08-31 PROCEDURE — 76706 US ABDL AORTA SCREEN AAA: CPT

## 2021-10-19 ENCOUNTER — OFFICE VISIT (OUTPATIENT)
Dept: FAMILY MEDICINE CLINIC | Age: 76
End: 2021-10-19
Payer: MEDICARE

## 2021-10-19 VITALS
HEIGHT: 70 IN | HEART RATE: 64 BPM | TEMPERATURE: 97.6 F | OXYGEN SATURATION: 97 % | SYSTOLIC BLOOD PRESSURE: 142 MMHG | BODY MASS INDEX: 45.1 KG/M2 | DIASTOLIC BLOOD PRESSURE: 77 MMHG | RESPIRATION RATE: 16 BRPM | WEIGHT: 315 LBS

## 2021-10-19 DIAGNOSIS — R05.3 CHRONIC COUGH: ICD-10-CM

## 2021-10-19 DIAGNOSIS — E03.4 HYPOTHYROIDISM DUE TO ACQUIRED ATROPHY OF THYROID: ICD-10-CM

## 2021-10-19 DIAGNOSIS — I10 ESSENTIAL HYPERTENSION: Primary | ICD-10-CM

## 2021-10-19 DIAGNOSIS — M10.9 GOUT, UNSPECIFIED CAUSE, UNSPECIFIED CHRONICITY, UNSPECIFIED SITE: ICD-10-CM

## 2021-10-19 DIAGNOSIS — Z95.2 S/P AVR (AORTIC VALVE REPLACEMENT): ICD-10-CM

## 2021-10-19 DIAGNOSIS — E11.21 TYPE 2 DIABETES MELLITUS WITH DIABETIC NEPHROPATHY, UNSPECIFIED WHETHER LONG TERM INSULIN USE (HCC): ICD-10-CM

## 2021-10-19 DIAGNOSIS — E03.9 HYPOTHYROIDISM, UNSPECIFIED TYPE: ICD-10-CM

## 2021-10-19 DIAGNOSIS — E78.5 DYSLIPIDEMIA: ICD-10-CM

## 2021-10-19 PROCEDURE — G8754 DIAS BP LESS 90: HCPCS | Performed by: FAMILY MEDICINE

## 2021-10-19 PROCEDURE — G8510 SCR DEP NEG, NO PLAN REQD: HCPCS | Performed by: FAMILY MEDICINE

## 2021-10-19 PROCEDURE — G8753 SYS BP > OR = 140: HCPCS | Performed by: FAMILY MEDICINE

## 2021-10-19 PROCEDURE — G8536 NO DOC ELDER MAL SCRN: HCPCS | Performed by: FAMILY MEDICINE

## 2021-10-19 PROCEDURE — 1101F PT FALLS ASSESS-DOCD LE1/YR: CPT | Performed by: FAMILY MEDICINE

## 2021-10-19 PROCEDURE — 2022F DILAT RTA XM EVC RTNOPTHY: CPT | Performed by: FAMILY MEDICINE

## 2021-10-19 PROCEDURE — G8417 CALC BMI ABV UP PARAM F/U: HCPCS | Performed by: FAMILY MEDICINE

## 2021-10-19 PROCEDURE — 99214 OFFICE O/P EST MOD 30 MIN: CPT | Performed by: FAMILY MEDICINE

## 2021-10-19 PROCEDURE — 3044F HG A1C LEVEL LT 7.0%: CPT | Performed by: FAMILY MEDICINE

## 2021-10-19 PROCEDURE — 3017F COLORECTAL CA SCREEN DOC REV: CPT | Performed by: FAMILY MEDICINE

## 2021-10-19 PROCEDURE — G8427 DOCREV CUR MEDS BY ELIG CLIN: HCPCS | Performed by: FAMILY MEDICINE

## 2021-10-19 RX ORDER — HYDRALAZINE HYDROCHLORIDE 50 MG/1
50 TABLET, FILM COATED ORAL 3 TIMES DAILY
COMMUNITY
Start: 2021-10-19 | End: 2022-10-17

## 2021-10-19 RX ORDER — MONTELUKAST SODIUM 10 MG/1
10 TABLET ORAL DAILY
COMMUNITY
End: 2022-07-05

## 2021-10-19 RX ORDER — HYDRALAZINE HYDROCHLORIDE 25 MG/1
25 TABLET, FILM COATED ORAL 3 TIMES DAILY
COMMUNITY
End: 2021-10-19 | Stop reason: SDUPTHER

## 2021-10-19 NOTE — PROGRESS NOTES
Chief Complaint   Patient presents with    Follow Up Chronic Condition     1. Have you been to the ER, urgent care clinic since your last visit? Hospitalized since your last visit? No    2. Have you seen or consulted any other health care providers outside of the [address] [place] [address] since your last visit? Include any pap smears or colon screening.  No

## 2021-10-19 NOTE — PROGRESS NOTES
HPI  Palmira Retana. comes in for f/u care. HTN: Patient has hypertension. Blood pressure has been elevated lately. He is also seen through the [facility]. His provider there noticed his blood pressure was elevated. He was started on hydralazine 25 mg 3 times a day. Blood pressure still elevated. He denies headache, changes in vision or focal weakness. I will have him increase hydralazine to 50 mg 3 times a day. He is also on spironolactone and amlodipine. We will continue with these medications. He will keep a blood pressure log and I will follow-up at next visit. Chronic cough: Patient has chronic cough that has been ongoing for months. He has been seen by the pulmonologist.  Was advised to use nasal spray. There was a question of gastroesophageal reflux disease causing the cough. He is yet to make a follow-up appointment with the pulmonologist.  Has been advised to follow-up in about 6 weeks. Patient will call for follow-up care. Denies fever, chills, hemoptysis, pleuritic chest pain, shortness of breath or wheeze. DM2: Patient has type 2 diabetes mellitus. He is on insulin. His last HbA1c was 6.8. He is followed up with endocrinologist.  I will continue with the current treatment plan. Hypothyroidism: Patient has hypothyroidism. Seen by the endocrinologist.  He is on 50 mcg of Synthroid. We will continue with this medication. Recheck labs at next visit. Gout: Patient has gout arthritis. He is on Uloric. Tolerating medication. Recheck uric acid at next visit. Hypogonadism: Patient has a history of low testosterone. He is on AndroGel. Currently feels stable. GERD: Patient has gastroesophageal reflux disease. Gets heartburn on and off. He takes Prilosec. Denies hematemesis or dark stools. We will continue with this medication. Dyslipidemia: Patient is on Crestor. Lipid panel has been stable. He will continue to take a diet low in polysaturated fats.   Recheck labs next visit. Morbid obesity: Patient has morbid obesity with a BMI of 53.52. He will intensify lifestyle and dietary modification. Cardiac: Patient has a history of valvular heart disease with severe aortic stenosis. Has had aortic valve replacement. He has been stable. Denies chest pain, shortness of breath or diaphoresis. He takes Lasix, aspirin Aldactone hydralazine. Past Medical History  Past Medical History:   Diagnosis Date    Diabetes ([facility] [address] 1995    Dyslipidemia 3/1/2017    Fractures 1995    R Knee/ Tib fib fracture/surgery    Gout 2010    Graves disease 1/21/2015    High cholesterol 2005    HTN (hypertension) 1990    Osteoarthritis 2013    Severe aortic stenosis 3/29/2017    Spinal stenosis of lumbar region 3/1/2016    Spondylolisthesis 3/1/2016    Thyroid trouble     Type 2 diabetes mellitus with diabetic nephropathy ([facility] [address] 4/28/2015       Surgical History  Past Surgical History:   Procedure Laterality Date    HX HIP REPLACEMENT Left 2009    HX LUMBAR FUSION      HX ORTHOPAEDIC Right     R knee/Tibfib surgery        Medications  Current Outpatient Medications   Medication Sig Dispense Refill    hydrALAZINE (APRESOLINE) 25 mg tablet Take 25 mg by mouth three (3) times daily.  montelukast (SINGULAIR) 10 mg tablet Take 10 mg by mouth daily.  dextran 70/hypromellose (ARTIFICIAL TEARS, PF, OP) Apply 1 Drop to eye six (6) times daily.  omeprazole (PRILOSEC) 40 mg capsule Take 1 Capsule by mouth two (2) times a day. 60 Capsule 1    fluticasone propionate (FLONASE) 50 mcg/actuation nasal spray 2 Sprays by Both Nostrils route daily. 1 Bottle 3    amLODIPine (NORVASC) 10 mg tablet Take 1 Tablet by mouth daily. 90 Tablet 1    magnesium (MAGINEX) 61 mg (615 mg) TbEC Take 615 mg by mouth.  rosuvastatin (Crestor) 20 mg tablet Take 20 mg by mouth nightly.  levothyroxine (SYNTHROID) 150 mcg tablet Take 1 Tab by mouth Daily (before breakfast).  30 Tab 2    spironolactone (ALDACTONE) 25 mg tablet Take 1 Tab by mouth daily.  b complex vitamins tablet Take 1 Tab by mouth daily.  Alpha Lipoic Acid 600 mg cap Take 1 Capsule by mouth daily.  aspirin (ASPIRIN) 325 mg tablet Take 325 mg by mouth daily.  ascorbic acid, vitamin C, (VITAMIN C) 250 mg tablet Take 250 mg by mouth.  DULoxetine (CYMBALTA) 60 mg capsule Take 1 Cap by mouth daily. 30 Cap 1    capsicum oleoresin 0.025 % topical cream Apply  to affected area three (3) times daily.  diclofenac (VOLTAREN) 1 % gel Apply  to affected area four (4) times daily.  insulin glargine (LANTUS) 100 unit/mL injection 30 Units by SubCUTAneous route daily. Take 30 units once daily  Indications: type 2 diabetes mellitus 3 Vial 3    febuxostat (ULORIC) 40 mg tab tablet Take 1 Tab by mouth daily. Indications: GOUT PREVENTION 90 Tab 1    insulin aspart (NOVOLOG) 100 unit/mL injection 6 Units by SubCUTAneous route Before breakfast, lunch, and dinner. 6 units before meals       glucose blood VI test strips (ASCENSIA AUTODISC VI, ONE TOUCH ULTRA TEST VI) strip Dispense precision extra test strips 3 x day to check blood glucose 200 Each 3    testosterone (ANDROGEL) 20.25 mg/1.25 gram (1.62 %) gel Apply  to affected area as needed.  Cholecalciferol, Vitamin D3, 1,000 unit cap Take 3,000 Units by mouth daily.  furosemide (LASIX) 20 mg tablet Take 1 Tab by mouth daily.  (Patient not taking: Reported on 8/24/2021) 30 Tab 0    magnesium chloride (MAG DELAY) 64 mg delayed release tablet Take 8 tablets 3 x day (Patient not taking: Reported on 8/24/2021)         Allergies  Allergies   Allergen Reactions    Watermelon Anaphylaxis and Swelling    Citric Acid Rash    Peach (Prunus Persica) Itching       Family History  Family History   Problem Relation Age of Onset    Hypertension Mother     Thyroid Disease Mother     Hypertension Father     Diabetes Father     Drug Abuse Brother        Social History  Social History     Socioeconomic History    Marital status:      Spouse name: Not on file    Number of children: Not on file    Years of education: Not on file    Highest education level: Not on file   Occupational History    Not on file   Tobacco Use    Smoking status: Former Smoker     Packs/day: 1.00     Years: 7.00     Pack years: 7.00     Types: Cigarettes     Start date: 1969     Quit date: 1995     Years since quittin.8    Smokeless tobacco: Never Used   Vaping Use    Vaping Use: Never used   Substance and Sexual Activity    Alcohol use: No    Drug use: No    Sexual activity: Yes     Partners: Female   Other Topics Concern     Service Yes     Comment: Served in [address] Blood Transfusions No    Caffeine Concern No    Occupational Exposure No    Hobby Hazards No    Sleep Concern No    Stress Concern No    Weight Concern Yes    Special Diet No    Back Care Yes     Comment: Lower Back pain when walking    Exercise No    Bike Helmet No    Seat Belt Yes    Self-Exams Yes   Social History Narrative    Not on file     Social Determinants of Health     Financial Resource Strain:     Difficulty of Paying Living Expenses:    Food Insecurity:     Worried About Running Out of Food in the Last Year:     Ran Out of Food in the Last Year:    Transportation Needs:     Lack of Transportation (Medical):      Lack of Transportation (Non-Medical):    Physical Activity:     Days of Exercise per Week:     Minutes of Exercise per Session:    Stress:     Feeling of Stress :    Social Connections:     Frequency of Communication with Friends and Family:     Frequency of Social Gatherings with Friends and Family:     Attends Rastafari Services:     Active Member of Clubs or Organizations:     Attends Club or Organization Meetings:     Marital Status:    Intimate Partner Violence:     Fear of Current or Ex-Partner:     Emotionally Abused:     Physically Abused:     Sexually Abused:        Review of Systems  Review of Systems - Review of all systems is negative except as noted above in the HPI. Vital Signs  Visit Vitals  BP (!) 142/77 (BP 1 Location: Left upper arm, BP Patient Position: Sitting, BP Cuff Size: Adult)   Pulse 64   Temp 97.6 °F (36.4 °C) (Oral)   Resp 16   Ht 5' 10\" (1.778 m)   Wt (!) 373 lb (169.2 kg)   SpO2 97%   BMI 53.52 kg/m²         Physical Exam  Physical Examination: General appearance - oriented to person, place, and time, overweight, acyanotic, in no respiratory distress and well hydrated  Mental status - alert, oriented to person, place, and time, affect appropriate to mood  Neck - supple, no significant adenopathy  Lymphatics - no palpable lymphadenopathy  Chest - clear to auscultation, no wheezes, rales or rhonchi, symmetric air entry, no tachypnea, retractions or cyanosis  Heart - systolic murmur 3/6 at lower left sternal border  Abdomen - no rebound tenderness noted  Back exam - limited range of motion  Neurological - abnormal neurological exam unchanged from prior examinations  Musculoskeletal - osteoarthritic changes noted in both hands  Extremities - intact peripheral pulses      Results  Results for orders placed or performed in visit on 09/20/21   AMB EXT LDL-C   Result Value Ref Range    LDL-C, External 66.8    AMB EXT PSA   Result Value Ref Range    PSA, External 1.73    AMB EXT HGBA1C   Result Value Ref Range    Hemoglobin A1c, External 7.3 %       ASSESSMENT and PLAN    ICD-10-CM ICD-9-CM    1. Essential hypertension  I10 401.9    2. Type 2 diabetes mellitus with diabetic nephropathy, unspecified whether long term insulin use (HCC)  E11.21 250.40      583.81    3. Chronic cough  R05.3 786.2    4. Hypothyroidism, unspecified type  E03.9 244.9    5. Dyslipidemia  E78.5 272.4    6. Hypothyroidism due to acquired atrophy of thyroid  E03.4 244.8      246.8    7.  Gout, unspecified cause, unspecified chronicity, unspecified site  M10.9 274.9 8. S/P AVR (aortic valve replacement)  Z95.2 V43.3      lab results and schedule of future lab studies reviewed with patient  reviewed diet, exercise and weight control  cardiovascular risk and specific lipid/LDL goals reviewed  reviewed medications and side effects in detail  specific diabetic recommendations: low cholesterol diet, weight control and daily exercise discussed, home glucose monitoring emphasized, all medications, side effects and compliance discussed carefully, annual eye examinations at Ophthalmology discussed, glycohemoglobin and other lab monitoring discussed and long term diabetic complications discussed  use of aspirin to prevent MI and TIA's discussed  radiology results and schedule of future radiology studies reviewed with patient      I have discussed the diagnosis with the patient and the intended plan of care as seen in the above orders. The patient has received an after-visit summary and questions were answered concerning future plans. I have discussed medication, side effects, and warnings with the patient in detail. The patient verbalized understanding and is in agreement with the plan of care. The patient will contact the office with any additional concerns. Moira Abdi MD    PLEASE NOTE:   This document has been produced using voice recognition software.  Unrecognized errors in transcription may be present

## 2021-11-17 ENCOUNTER — OFFICE VISIT (OUTPATIENT)
Dept: CARDIOLOGY CLINIC | Age: 76
End: 2021-11-17
Payer: MEDICARE

## 2021-11-17 VITALS
SYSTOLIC BLOOD PRESSURE: 132 MMHG | WEIGHT: 273 LBS | DIASTOLIC BLOOD PRESSURE: 51 MMHG | HEIGHT: 70 IN | HEART RATE: 61 BPM | BODY MASS INDEX: 39.08 KG/M2

## 2021-11-17 DIAGNOSIS — R01.1 MURMUR, CARDIAC: Primary | ICD-10-CM

## 2021-11-17 DIAGNOSIS — I10 ESSENTIAL HYPERTENSION: ICD-10-CM

## 2021-11-17 DIAGNOSIS — E03.4 HYPOTHYROIDISM DUE TO ACQUIRED ATROPHY OF THYROID: ICD-10-CM

## 2021-11-17 DIAGNOSIS — N18.30 STAGE 3 CHRONIC KIDNEY DISEASE, UNSPECIFIED WHETHER STAGE 3A OR 3B CKD (HCC): ICD-10-CM

## 2021-11-17 DIAGNOSIS — E11.21 TYPE 2 DIABETES MELLITUS WITH DIABETIC NEPHROPATHY, UNSPECIFIED WHETHER LONG TERM INSULIN USE (HCC): ICD-10-CM

## 2021-11-17 DIAGNOSIS — E78.5 DYSLIPIDEMIA: ICD-10-CM

## 2021-11-17 PROCEDURE — 99214 OFFICE O/P EST MOD 30 MIN: CPT | Performed by: INTERNAL MEDICINE

## 2021-11-17 PROCEDURE — G8752 SYS BP LESS 140: HCPCS | Performed by: INTERNAL MEDICINE

## 2021-11-17 PROCEDURE — G8754 DIAS BP LESS 90: HCPCS | Performed by: INTERNAL MEDICINE

## 2021-11-17 PROCEDURE — 3044F HG A1C LEVEL LT 7.0%: CPT | Performed by: INTERNAL MEDICINE

## 2021-11-17 PROCEDURE — 1101F PT FALLS ASSESS-DOCD LE1/YR: CPT | Performed by: INTERNAL MEDICINE

## 2021-11-17 PROCEDURE — 3017F COLORECTAL CA SCREEN DOC REV: CPT | Performed by: INTERNAL MEDICINE

## 2021-11-17 PROCEDURE — G8536 NO DOC ELDER MAL SCRN: HCPCS | Performed by: INTERNAL MEDICINE

## 2021-11-17 PROCEDURE — G8417 CALC BMI ABV UP PARAM F/U: HCPCS | Performed by: INTERNAL MEDICINE

## 2021-11-17 PROCEDURE — 2022F DILAT RTA XM EVC RTNOPTHY: CPT | Performed by: INTERNAL MEDICINE

## 2021-11-17 PROCEDURE — G8428 CUR MEDS NOT DOCUMENT: HCPCS | Performed by: INTERNAL MEDICINE

## 2021-11-17 PROCEDURE — G8510 SCR DEP NEG, NO PLAN REQD: HCPCS | Performed by: INTERNAL MEDICINE

## 2021-11-17 NOTE — PROGRESS NOTES
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 Hypertension  The history is provided by the patient. This is a chronic problem. The current episode started more than 1 week ago. The problem occurs every several days. The problem has not changed since onset. Associated symptoms include shortness of breath. Shortness of Breath  The history is provided by the patient. This is a chronic problem. The problem occurs intermittently. The current episode started more than 1 week ago. The problem has been gradually improving. Pertinent negatives include no fever, no ear pain, no neck pain, no cough, no sputum production, no hemoptysis, no wheezing, no PND, no orthopnea, no syncope, no vomiting, no rash, no leg pain, no leg swelling and no claudication. Associated medical issues do not include chronic lung disease, CAD or heart failure. Review of Systems   Constitutional: Negative for chills, diaphoresis, fever, malaise/fatigue and weight loss. HENT: Negative for ear discharge, ear pain, hearing loss, nosebleeds and tinnitus. Eyes: Negative for blurred vision. Respiratory: Positive for shortness of breath. Negative for cough, hemoptysis, sputum production, wheezing and stridor. Cardiovascular: Negative for palpitations, orthopnea, claudication, leg swelling, syncope and PND. Gastrointestinal: Negative for heartburn, nausea and vomiting. Musculoskeletal: Negative for myalgias and neck pain. Skin: Negative for itching and rash. Neurological: Negative for dizziness, tingling, tremors, focal weakness, loss of consciousness and weakness. Psychiatric/Behavioral: Negative for depression and suicidal ideas.      Family History   Problem Relation Age of Onset    Hypertension Mother     Thyroid Disease Mother     Hypertension Father     Diabetes Father     Drug Abuse Brother        Past Medical History:   Diagnosis Date    Diabetes ([facility] [address] 1995    Dyslipidemia 3/1/2017    Fractures     R Knee/ Tib fib fracture/surgery    Gout 2010    Graves disease 2015    High cholesterol     HTN (hypertension)     Osteoarthritis     Severe aortic stenosis 3/29/2017    Spinal stenosis of lumbar region 3/1/2016    Spondylolisthesis 3/1/2016    Thyroid trouble     Type 2 diabetes mellitus with diabetic nephropathy ([facility] [address] 2015       Past Surgical History:   Procedure Laterality Date    HX HIP REPLACEMENT Left 2009    HX LUMBAR FUSION      HX ORTHOPAEDIC Right     R knee/Tibfib surgery       Social History     Tobacco Use    Smoking status: Former Smoker     Packs/day: 1.00     Years: 7.00     Pack years: 7.00     Types: Cigarettes     Start date: 1969     Quit date: 1995     Years since quittin.8    Smokeless tobacco: Never Used   Substance Use Topics    Alcohol use: No       Allergies   Allergen Reactions    Watermelon Anaphylaxis and Swelling    Citric Acid Rash    Peach (Prunus Persica) Itching       Outpatient Medications Marked as Taking for the 21 encounter (Office Visit) with Ron Ramirez MD   Medication Sig Dispense Refill    montelukast (SINGULAIR) 10 mg tablet Take 10 mg by mouth daily.  hydrALAZINE (APRESOLINE) 25 mg tablet Take 2 Tablets by mouth three (3) times daily.  dextran 70/hypromellose (ARTIFICIAL TEARS, PF, OP) Apply 1 Drop to eye six (6) times daily.  omeprazole (PRILOSEC) 40 mg capsule Take 1 Capsule by mouth two (2) times a day. 60 Capsule 1    fluticasone propionate (FLONASE) 50 mcg/actuation nasal spray 2 Sprays by Both Nostrils route daily. 1 Bottle 3    amLODIPine (NORVASC) 10 mg tablet Take 1 Tablet by mouth daily. 90 Tablet 1    rosuvastatin (Crestor) 20 mg tablet Take 20 mg by mouth nightly.  levothyroxine (SYNTHROID) 150 mcg tablet Take 1 Tab by mouth Daily (before breakfast). 30 Tab 2    spironolactone (ALDACTONE) 25 mg tablet Take 1 Tab by mouth daily.       b complex vitamins tablet Take 1 Tab by mouth daily.  Alpha Lipoic Acid 600 mg cap Take 1 Capsule by mouth daily.  aspirin (ASPIRIN) 325 mg tablet Take 325 mg by mouth daily.  ascorbic acid, vitamin C, (VITAMIN C) 250 mg tablet Take 250 mg by mouth.  DULoxetine (CYMBALTA) 60 mg capsule Take 1 Cap by mouth daily. 30 Cap 1    capsicum oleoresin 0.025 % topical cream Apply  to affected area three (3) times daily.  diclofenac (VOLTAREN) 1 % gel Apply  to affected area four (4) times daily.  insulin glargine (LANTUS) 100 unit/mL injection 30 Units by SubCUTAneous route daily. Take 30 units once daily  Indications: type 2 diabetes mellitus 3 Vial 3    febuxostat (ULORIC) 40 mg tab tablet Take 1 Tab by mouth daily. Indications: GOUT PREVENTION 90 Tab 1    insulin aspart (NOVOLOG) 100 unit/mL injection 6 Units by SubCUTAneous route Before breakfast, lunch, and dinner. 6 units before meals       glucose blood VI test strips (ASCENSIA AUTODISC VI, ONE TOUCH ULTRA TEST VI) strip Dispense precision extra test strips 3 x day to check blood glucose 200 Each 3    Cholecalciferol, Vitamin D3, 1,000 unit cap Take 3,000 Units by mouth daily. Visit Vitals  BP (!) 132/51   Pulse 61   Ht 5' 10\" (1.778 m)   Wt 123.8 kg (273 lb)   BMI 39.17 kg/m²     Physical Exam  Constitutional:       General: He is not in acute distress. Appearance: He is well-developed. He is not diaphoretic. HENT:      Head: Atraumatic. Mouth/Throat:      Pharynx: No oropharyngeal exudate. Eyes:      General: No scleral icterus. Right eye: No discharge. Left eye: No discharge. Conjunctiva/sclera: Conjunctivae normal.   Neck:      Thyroid: No thyromegaly. Vascular: No JVD. Trachea: No tracheal deviation. Cardiovascular:      Rate and Rhythm: Normal rate and regular rhythm. Heart sounds: Murmur (2/6 ejection systolic murmur best heard at right parasternal area) heard. No gallop. Comments: Surgical sternal scar  Pulmonary:      Effort: Pulmonary effort is normal. No respiratory distress. Breath sounds: No stridor. Rhonchi (mild diffuse rhonchi) present. No wheezing or rales. Chest:      Chest wall: No tenderness. Abdominal:      Palpations: Abdomen is soft. Tenderness: There is no abdominal tenderness. There is no guarding or rebound. Musculoskeletal:         General: No tenderness. Normal range of motion. Cervical back: Normal range of motion and neck supple. Lymphadenopathy:      Cervical: No cervical adenopathy. Skin:     General: Skin is warm. Neurological:      Mental Status: He is alert and oriented to person, place, and time. Motor: No abnormal muscle tone. Psychiatric:         Behavior: Behavior normal.       ekg sinus rhythm with PVC, no acute st-t changes  Echo 02/2017:  SUMMARY:  Left ventricle: Systolic function was normal by visual assessment. Ejection fraction was estimated in the range of 60 % to 65 %. No obvious  wall motion abnormalities identified in the views obtained. Wall thickness  was at the upper limits of normal.    Aortic valve: Leaflets exhibited markedly increased thickness and reduced  mobility. There was moderate to severe stenosis. Valve peak gradient was  61 mmHg. Valve mean gradient was 39 mmHg. Estimated aortic valve area (by  VTI) was 0.5 cmï¾². ZACKERY likely overestimates severity of aortic stenosis due  to difficulty in measuring LVOT diameter. Aorta, systemic arteries: The root exhibited dilatation. 02/17/20   ECHO ADULT COMPLETE 02/19/2020 2/19/2020    Narrative · Normal cavity size and systolic function (ejection fraction normal). Mild concentric hypertrophy. Estimated left ventricular ejection fraction   is 55 - 60%. No regional wall motion abnormality noted. Mild (grade 1)   left ventricular diastolic dysfunction. · Mildly dilated right atrium. · There is a 25 mm St. Judes bioprosthetic aortic valve.  Prosthesis is   normal.  · Mitral valve thickening. Mild mitral valve regurgitation is present. · Mild tricuspid valve regurgitation is present. · There is no evidence of pulmonary hypertension. · Mild aortic root dilatation. Signed by: Noam Cuevas MD   04/06/21    ECHO ADULT COMPLETE 04/06/2021 4/6/2021    Interpretation Summary  · LV: Estimated LVEF is 55 - 60%. Normal cavity size and systolic function (ejection fraction normal). Mild concentric hypertrophy. Wall motion: normal. Mild (grade 1) left ventricular diastolic dysfunction. · LA: Mildly dilated left atrium. Left Atrium volume index is 38.76 mL/m2. · RV: Mildly dilated right ventricle. · RA: Mildly dilated right atrium. · AV: Prosthetic aortic valve. Aortic valve mean gradient is 8.1 mmHg. There is a 25 mm St. Judes bioprosthetic aortic valve. Prosthesis is normal.  · MV: Mitral annular calcification. Mild mitral valve regurgitation is present. · TV: Mild tricuspid valve regurgitation is present. · AO: Mild ascending aorta dilatation. Ascending aorta diameter = 3.7 cm. · PA: Pulmonary arterial systolic pressure is 26 mmHg. Signed by: Noam Cuevas MD on 4/6/2021 12:26 PM    ASSESSMENT and PLAN    ICD-10-CM ICD-9-CM    1. s/p AVR  R01.1 785.2    2. Dyslipidemia  E78.5 272.4    3. Type 2 diabetes mellitus with diabetic nephropathy, unspecified whether long term insulin use (HCC)  E11.21 250.40      583.81    4. Stage 3 chronic kidney disease, unspecified whether stage 3a or 3b CKD ([facility])  N18.30 585.3    5. Hypothyroidism due to acquired atrophy of thyroid  E03.4 244.8      246.8    6. Essential hypertension  I10 401.9      No orders of the defined types were placed in this encounter. Follow-up and Dispositions    · Return in about 1 year (around 11/17/2022). reviewed diet, exercise and weight control  cardiovascular risk and specific lipid/LDL goals reviewed  use of aspirin to prevent MI and TIA's discussed.     Now S/p AVR. Recent Echo report - normal valve function. Remains asymptomatic from cardiac standpoint. Has mild diffuse chronic bronchospasm- likely from recent URI. Recommend f/u with PCP  Effort tolerance is stable. Continue current meds.

## 2021-11-17 NOTE — PROGRESS NOTES
1. Have you been to the ER, urgent care clinic since your last visit? Hospitalized since your last visit? No    2. Have you seen or consulted any other health care providers outside of the [address] [place] [address] since your last visit? Include any pap smears or colon screening.       [facility]

## 2021-12-07 ENCOUNTER — OFFICE VISIT (OUTPATIENT)
Dept: FAMILY MEDICINE CLINIC | Age: 76
End: 2021-12-07
Payer: MEDICARE

## 2021-12-07 VITALS
WEIGHT: 278 LBS | TEMPERATURE: 97.5 F | OXYGEN SATURATION: 95 % | SYSTOLIC BLOOD PRESSURE: 133 MMHG | BODY MASS INDEX: 39.8 KG/M2 | RESPIRATION RATE: 18 BRPM | HEIGHT: 70 IN | DIASTOLIC BLOOD PRESSURE: 77 MMHG | HEART RATE: 62 BPM

## 2021-12-07 DIAGNOSIS — J45.30 MILD PERSISTENT ASTHMATIC BRONCHITIS WITHOUT COMPLICATION: Primary | ICD-10-CM

## 2021-12-07 DIAGNOSIS — M46.1 SACROILIITIS (HCC): ICD-10-CM

## 2021-12-07 DIAGNOSIS — N18.30 STAGE 3 CHRONIC KIDNEY DISEASE, UNSPECIFIED WHETHER STAGE 3A OR 3B CKD (HCC): ICD-10-CM

## 2021-12-07 DIAGNOSIS — R60.0 PEDAL EDEMA: ICD-10-CM

## 2021-12-07 DIAGNOSIS — E11.21 TYPE 2 DIABETES MELLITUS WITH DIABETIC NEPHROPATHY, UNSPECIFIED WHETHER LONG TERM INSULIN USE (HCC): ICD-10-CM

## 2021-12-07 DIAGNOSIS — I10 ESSENTIAL HYPERTENSION: ICD-10-CM

## 2021-12-07 DIAGNOSIS — E78.5 DYSLIPIDEMIA: ICD-10-CM

## 2021-12-07 DIAGNOSIS — E03.9 HYPOTHYROIDISM, UNSPECIFIED TYPE: ICD-10-CM

## 2021-12-07 DIAGNOSIS — Z95.2 S/P AVR (AORTIC VALVE REPLACEMENT): ICD-10-CM

## 2021-12-07 DIAGNOSIS — M10.9 GOUT, UNSPECIFIED CAUSE, UNSPECIFIED CHRONICITY, UNSPECIFIED SITE: ICD-10-CM

## 2021-12-07 PROCEDURE — G8536 NO DOC ELDER MAL SCRN: HCPCS | Performed by: FAMILY MEDICINE

## 2021-12-07 PROCEDURE — G8752 SYS BP LESS 140: HCPCS | Performed by: FAMILY MEDICINE

## 2021-12-07 PROCEDURE — G8427 DOCREV CUR MEDS BY ELIG CLIN: HCPCS | Performed by: FAMILY MEDICINE

## 2021-12-07 PROCEDURE — 3017F COLORECTAL CA SCREEN DOC REV: CPT | Performed by: FAMILY MEDICINE

## 2021-12-07 PROCEDURE — 1101F PT FALLS ASSESS-DOCD LE1/YR: CPT | Performed by: FAMILY MEDICINE

## 2021-12-07 PROCEDURE — 2022F DILAT RTA XM EVC RTNOPTHY: CPT | Performed by: FAMILY MEDICINE

## 2021-12-07 PROCEDURE — G8510 SCR DEP NEG, NO PLAN REQD: HCPCS | Performed by: FAMILY MEDICINE

## 2021-12-07 PROCEDURE — 3044F HG A1C LEVEL LT 7.0%: CPT | Performed by: FAMILY MEDICINE

## 2021-12-07 PROCEDURE — G8417 CALC BMI ABV UP PARAM F/U: HCPCS | Performed by: FAMILY MEDICINE

## 2021-12-07 PROCEDURE — G8754 DIAS BP LESS 90: HCPCS | Performed by: FAMILY MEDICINE

## 2021-12-07 PROCEDURE — 99215 OFFICE O/P EST HI 40 MIN: CPT | Performed by: FAMILY MEDICINE

## 2021-12-07 RX ORDER — BUDESONIDE AND FORMOTEROL FUMARATE DIHYDRATE 160; 4.5 UG/1; UG/1
2 AEROSOL RESPIRATORY (INHALATION) 2 TIMES DAILY
Qty: 10.2 G | Refills: 1 | Status: SHIPPED | OUTPATIENT
Start: 2021-12-07 | End: 2021-12-14 | Stop reason: SDUPTHER

## 2021-12-07 RX ORDER — TRAMADOL HYDROCHLORIDE 50 MG/1
50 TABLET ORAL
Qty: 30 TABLET | Refills: 0 | Status: SHIPPED | OUTPATIENT
Start: 2021-12-07 | End: 2021-12-17

## 2021-12-07 NOTE — PROGRESS NOTES
Chief Complaint   Patient presents with   [facility] Follow Up     1. \"Have you been to the ER, urgent care clinic since your last visit? Hospitalized since your last visit? \" Yes When: 11- Where: urgent care Reason for visit: bronchitis    2. \"Have you seen or consulted any other health care providers outside of the [address] [place] [address] since your last visit? \" No     3. For patients aged 39-70: Has the patient had a colonoscopy / FIT/ Cologuard? Yes, HM satisfied with blue hyperlink     If the patient is female:    4. For patients aged 41-77: Has the patient had a mammogram within the past 2 years? NA based on age or sex    11. For patients aged 21-65: Has the patient had a pap smear?  NA based on age or sex

## 2021-12-07 NOTE — PROGRESS NOTES
JUANTIA Reese. comes in for f/u care. Asthmatic bronchitis: Patient recently treated for bronchitis. He has been having chest tightness and wheeze. Seen in urgent care and was put on azithromycin. Was also given albuterol inhaler and Medrol Dosepak. Continues to have wheeze. We discussed the use of albuterol. He will take this as needed for her wheezing. I will add Symbicort to take twice a day. He has been seen by the pulmonologist.  He has a follow-up appointment next week. May need to have PFTs done. He is also on Singulair daily. He denies wheeze, chest pain or hemoptysis. He has cough that is productive of clear phlegm. DM2: Patient has type 2 diabetes mellitus. He has been seen by the endocrinologist.  He is on insulin. Blood glucose numbers have been stable. His last HbA1c was 6.8. We will continue with the current treatment plan. Pedal edema: Patient has a history of pedal edema. This was noted again last week when he had a bronchitis. He was on prednisone. This could have caused some slight swelling of the lower extremities. The swelling has gone down since he completed the Medrol Dosepak. I will check labs however. We will follow up with the results. Patient has furosemide that he uses. He is also on spironolactone. Dyslipidemia: Patient has dyslipidemia. He is on rosuvastatin. Tolerating medication. We will recheck lipid panel. Hypothyroidism: Patient has hypothyroidism. I will check TSH levels given pedal edema. He is on thyroid replacement therapy. Takes 150 mcg of levothyroxine. HTN: Patient has hypertension. Blood pressure stable. Denies headache, changes in vision or focal weakness. He is on hydralazine, amlodipine and spironolactone. Continue current treatment plan. CKD III: Patient has chronic kidney disease stage III. Has been seen by nephrologist in the past.  Plan is to avoid nephrotoxic medications. We will recheck labs.   Chronic pain: Patient has chronic low back pain. He takes tramadol as needed for severe pain. Has run out of medication. Usually takes Tylenol for the pain. Would like a refill of tramadol. Prescription is sent in. He will only use this as needed. Patient is also on Cymbalta for the back pain. GERD: Patient has gastroesophageal reflux disease. He is on Prilosec 40 mg daily. Denies hematemesis or dark stools. I will send in a refill of medication. Gout: Patient has a history of gout arthritis. He is on Uloric. Denies acute gout symptoms. Continue current treatment plan. Hypomagnesemia: Patient has a history of hypomagnesemia and is on replacement therapy. I will recheck labs. Low testosterone: Patient has history of low testosterone. He is on AndroGel. We will recheck labs at next visit. Sacroiliitis/chronic pain: Patient has a history of sacroiliitis with chronic pain. He takes Tylenol for the pain. Also on tramadol. We will send in refill of medications. Past Medical History  Past Medical History:   Diagnosis Date    Diabetes ([address] 1995    Dyslipidemia 3/1/2017    Fractures 1995    R Knee/ Tib fib fracture/surgery    Gout 2010    Graves disease 1/21/2015    High cholesterol 2005    HTN (hypertension) 1990    Osteoarthritis 2013    Severe aortic stenosis 3/29/2017    Spinal stenosis of lumbar region 3/1/2016    Spondylolisthesis 3/1/2016    Thyroid trouble     Type 2 diabetes mellitus with diabetic nephropathy ([address] 4/28/2015       Surgical History  Past Surgical History:   Procedure Laterality Date    HX HIP REPLACEMENT Left 2009    HX LUMBAR FUSION      HX ORTHOPAEDIC Right     R knee/Tibfib surgery        Medications  Current Outpatient Medications   Medication Sig Dispense Refill    montelukast (SINGULAIR) 10 mg tablet Take 10 mg by mouth daily.  hydrALAZINE (APRESOLINE) 25 mg tablet Take 2 Tablets by mouth three (3) times daily.       dextran 70/hypromellose (ARTIFICIAL TEARS, PF, OP) Apply 1 Drop to eye six (6) times daily.  fluticasone propionate (FLONASE) 50 mcg/actuation nasal spray 2 Sprays by Both Nostrils route daily. 1 Bottle 3    amLODIPine (NORVASC) 10 mg tablet Take 1 Tablet by mouth daily. 90 Tablet 1    magnesium (MAGINEX) 61 mg (615 mg) TbEC Take 615 mg by mouth.  rosuvastatin (Crestor) 20 mg tablet Take 20 mg by mouth nightly.  levothyroxine (SYNTHROID) 150 mcg tablet Take 1 Tab by mouth Daily (before breakfast). 30 Tab 2    spironolactone (ALDACTONE) 25 mg tablet Take 1 Tab by mouth daily.  b complex vitamins tablet Take 1 Tab by mouth daily.  Alpha Lipoic Acid 600 mg cap Take 1 Capsule by mouth daily.  aspirin (ASPIRIN) 325 mg tablet Take 325 mg by mouth daily.  ascorbic acid, vitamin C, (VITAMIN C) 250 mg tablet Take 250 mg by mouth.  DULoxetine (CYMBALTA) 60 mg capsule Take 1 Cap by mouth daily. 30 Cap 1    capsicum oleoresin 0.025 % topical cream Apply  to affected area three (3) times daily.  diclofenac (VOLTAREN) 1 % gel Apply  to affected area four (4) times daily.  insulin glargine (LANTUS) 100 unit/mL injection 30 Units by SubCUTAneous route daily. Take 30 units once daily  Indications: type 2 diabetes mellitus 3 Vial 3    febuxostat (ULORIC) 40 mg tab tablet Take 1 Tab by mouth daily. Indications: GOUT PREVENTION 90 Tab 1    insulin aspart (NOVOLOG) 100 unit/mL injection 6 Units by SubCUTAneous route Before breakfast, lunch, and dinner. 6 units before meals       glucose blood VI test strips (ASCENSIA AUTODISC VI, ONE TOUCH ULTRA TEST VI) strip Dispense precision extra test strips 3 x day to check blood glucose 200 Each 3    Cholecalciferol, Vitamin D3, 1,000 unit cap Take 3,000 Units by mouth daily.  omeprazole (PRILOSEC) 40 mg capsule Take 1 Capsule by mouth two (2) times a day. 60 Capsule 1    furosemide (LASIX) 20 mg tablet Take 1 Tab by mouth daily.  (Patient not taking: Reported on 2021) 30 Tab 0    magnesium chloride (MAG DELAY) 64 mg delayed release tablet Take 8 tablets 3 x day (Patient not taking: Reported on 2021)      testosterone (ANDROGEL) 20.25 mg/1.25 gram (1.62 %) gel Apply  to affected area as needed.  (Patient not taking: Reported on 2021)         Allergies  Allergies   Allergen Reactions    Watermelon Anaphylaxis and Swelling    Citric Acid Rash    Peach (Prunus Persica) Itching       Family History  Family History   Problem Relation Age of Onset    Hypertension Mother     Thyroid Disease Mother     Hypertension Father     Diabetes Father     Drug Abuse Brother        Social History  Social History     Socioeconomic History    Marital status:      Spouse name: Not on file    Number of children: Not on file    Years of education: Not on file    Highest education level: Not on file   Occupational History    Not on file   Tobacco Use    Smoking status: Former Smoker     Packs/day: 1.00     Years: 7.00     Pack years: 7.00     Types: Cigarettes     Start date: 1969     Quit date: 1995     Years since quittin.9    Smokeless tobacco: Never Used   Vaping Use    Vaping Use: Never used   Substance and Sexual Activity    Alcohol use: No    Drug use: No    Sexual activity: Yes     Partners: Female   Other Topics Concern     Service Yes     Comment: Served in [address] Blood Transfusions No    Caffeine Concern No    Occupational Exposure No    Hobby Hazards No    Sleep Concern No    Stress Concern No    Weight Concern Yes    Special Diet No    Back Care Yes     Comment: Lower Back pain when walking    Exercise No    Bike Helmet No    Seat Belt Yes    Self-Exams Yes   Social History Narrative    Not on file     Social Determinants of Health     Financial Resource Strain:     Difficulty of Paying Living Expenses: Not on file   Food Insecurity:     Worried About Running Out of Food in the Last Year: Not on file  Ran Out of Food in the Last Year: Not on file   Transportation Needs:     Lack of Transportation (Medical): Not on file    Lack of Transportation (Non-Medical): Not on file   Physical Activity:     Days of Exercise per Week: Not on file    Minutes of Exercise per Session: Not on file   Stress:     Feeling of Stress : Not on file   Social Connections:     Frequency of Communication with Friends and Family: Not on file    Frequency of Social Gatherings with Friends and Family: Not on file    Attends Gnosticism Services: Not on file    Active Member of [address] [place] [address] or Organizations: Not on file    Attends Club or Organization Meetings: Not on file    Marital Status: Not on file   Intimate Partner Violence:     Fear of Current or Ex-Partner: Not on file    Emotionally Abused: Not on file    Physically Abused: Not on file    Sexually Abused: Not on file   Housing Stability:     Unable to Pay for Housing in the Last Year: Not on file    Number of [place] in the Last Year: Not on file    Unstable Housing in the Last Year: Not on file       Review of Systems  Review of Systems - Review of all systems is negative except as noted above in the HPI.     Vital Signs  Visit Vitals  /77 (BP 1 Location: Left upper arm, BP Patient Position: Sitting, BP Cuff Size: Adult)   Pulse 62   Temp 97.5 °F (36.4 °C) (Oral)   Resp 18   Ht 5' 10\" (1.778 m)   Wt 278 lb (126.1 kg)   SpO2 95%   BMI 39.89 kg/m²         Physical Exam  Physical Examination: General appearance - oriented to person, place, and time, overweight and acyanotic, in no respiratory distress  Mental status - alert, oriented to person, place, and time, affect appropriate to mood  Mouth - mucous membranes moist, pharynx normal without lesions  Neck - supple, no significant adenopathy  Lymphatics - no palpable lymphadenopathy, no hepatosplenomegaly  Chest - decreased air entry noted bilateral lung bases  Heart - S1 and S2 normal  Abdomen - no rebound tenderness noted  Back exam - limited range of motion  Neurological - abnormal neurological exam unchanged from prior examinations  Musculoskeletal - osteoarthritic changes noted in both hands  Extremities - pedal edema 1 +, intact peripheral pulses        Results  Results for orders placed or performed in visit on 09/20/21   AMB EXT LDL-C   Result Value Ref Range    LDL-C, External 66.8    AMB EXT PSA   Result Value Ref Range    PSA, External 1.73    AMB EXT HGBA1C   Result Value Ref Range    Hemoglobin A1c, External 7.3 %       ASSESSMENT and PLAN    ICD-10-CM ICD-9-CM    1. Mild persistent asthmatic bronchitis without complication  J07.83 315.21 budesonide-formoteroL (SYMBICORT) 160-4.5 mcg/actuation HFAA   2. Type 2 diabetes mellitus with diabetic nephropathy, unspecified whether long term insulin use ([facility])  E11.21 250.40      583.81    3. Essential hypertension  I10 401.9 MAGNESIUM   4. Hypothyroidism, unspecified type  E03.9 244.9    5. Dyslipidemia  E78.5 272.4    6. Gout, unspecified cause, unspecified chronicity, unspecified site  M10.9 274.9    7. Stage 3 chronic kidney disease, unspecified whether stage 3a or 3b CKD ([facility])  I29.47 105.4 METABOLIC PANEL, COMPREHENSIVE      CBC WITH AUTOMATED DIFF      MAGNESIUM   8. S/P AVR (aortic valve replacement)  Z95.2 V43.3    9. Sacroiliitis ([facility])  M46.1 720.2 traMADoL (ULTRAM) 50 mg tablet   10.  Pedal edema  R60.0 782.3 TSH 3RD GENERATION     lab results and schedule of future lab studies reviewed with patient  reviewed diet, exercise and weight control  cardiovascular risk and specific lipid/LDL goals reviewed  reviewed medications and side effects in detail  specific diabetic recommendations: low cholesterol diet, weight control and daily exercise discussed, home glucose monitoring emphasized, all medications, side effects and compliance discussed carefully, annual eye examinations at Ophthalmology discussed, glycohemoglobin and other lab monitoring discussed and long term diabetic complications discussed      I have discussed the diagnosis with the patient and the intended plan of care as seen in the above orders. The patient has received an after-visit summary and questions were answered concerning future plans. I have discussed medication, side effects, and warnings with the patient in detail. The patient verbalized understanding and is in agreement with the plan of care. The patient will contact the office with any additional concerns. I spent at least 40 minutes on this visit with this established patient. Davian Argueta MD    PLEASE NOTE:   This document has been produced using voice recognition software.  Unrecognized errors in transcription may be present

## 2021-12-08 ENCOUNTER — LAB ONLY (OUTPATIENT)
Dept: FAMILY MEDICINE CLINIC | Age: 76
End: 2021-12-08
Payer: MEDICARE

## 2021-12-08 ENCOUNTER — HOSPITAL ENCOUNTER (OUTPATIENT)
Dept: LAB | Age: 76
Discharge: HOME | End: 2021-12-08
Payer: MEDICARE

## 2021-12-08 DIAGNOSIS — R60.0 PEDAL EDEMA: ICD-10-CM

## 2021-12-08 DIAGNOSIS — Z01.89 ENCOUNTER FOR LABORATORY TEST: Primary | ICD-10-CM

## 2021-12-08 DIAGNOSIS — I10 ESSENTIAL HYPERTENSION: ICD-10-CM

## 2021-12-08 DIAGNOSIS — N18.30 STAGE 3 CHRONIC KIDNEY DISEASE, UNSPECIFIED WHETHER STAGE 3A OR 3B CKD (HCC): ICD-10-CM

## 2021-12-08 LAB
ALBUMIN SERPL-MCNC: 3 G/DL (ref 3.4–5)
ALBUMIN/GLOB SERPL: 0.9 {RATIO} (ref 0.8–1.7)
ALP SERPL-CCNC: 82 U/L (ref 45–117)
ALT SERPL-CCNC: 47 U/L (ref 16–61)
ANION GAP SERPL CALC-SCNC: 7 MMOL/L (ref 3–18)
AST SERPL-CCNC: 32 U/L (ref 10–38)
BASOPHILS # BLD: 0 K/UL (ref 0–0.1)
BASOPHILS NFR BLD: 1 % (ref 0–2)
BILIRUB SERPL-MCNC: 0.3 MG/DL (ref 0.2–1)
BUN SERPL-MCNC: 20 MG/DL (ref 7–18)
BUN/CREAT SERPL: 15 (ref 12–20)
CALCIUM SERPL-MCNC: 9 MG/DL (ref 8.5–10.1)
CHLORIDE SERPL-SCNC: 110 MMOL/L (ref 100–111)
CO2 SERPL-SCNC: 24 MMOL/L (ref 21–32)
CREAT SERPL-MCNC: 1.36 MG/DL (ref 0.6–1.3)
DIFFERENTIAL METHOD BLD: ABNORMAL
EOSINOPHIL # BLD: 0.4 K/UL (ref 0–0.4)
EOSINOPHIL NFR BLD: 8 % (ref 0–5)
ERYTHROCYTE [DISTWIDTH] IN BLOOD BY AUTOMATED COUNT: 13.5 % (ref 11.6–14.5)
GLOBULIN SER CALC-MCNC: 3.3 G/DL (ref 2–4)
GLUCOSE SERPL-MCNC: 157 MG/DL (ref 74–99)
HCT VFR BLD AUTO: 39 % (ref 36–48)
HGB BLD-MCNC: 13.1 G/DL (ref 13–16)
IMM GRANULOCYTES # BLD AUTO: 0 K/UL (ref 0–0.04)
IMM GRANULOCYTES NFR BLD AUTO: 0 % (ref 0–0.5)
LYMPHOCYTES # BLD: 1.8 K/UL (ref 0.9–3.6)
LYMPHOCYTES NFR BLD: 37 % (ref 21–52)
MAGNESIUM SERPL-MCNC: 1.7 MG/DL (ref 1.6–2.6)
MCH RBC QN AUTO: 30 PG (ref 24–34)
MCHC RBC AUTO-ENTMCNC: 33.6 G/DL (ref 31–37)
MCV RBC AUTO: 89.4 FL (ref 78–100)
MONOCYTES # BLD: 0.4 K/UL (ref 0.05–1.2)
MONOCYTES NFR BLD: 9 % (ref 3–10)
NEUTS SEG # BLD: 2.3 K/UL (ref 1.8–8)
NEUTS SEG NFR BLD: 46 % (ref 40–73)
NRBC # BLD: 0 K/UL (ref 0–0.01)
NRBC BLD-RTO: 0 PER 100 WBC
PLATELET # BLD AUTO: 171 K/UL (ref 135–420)
PMV BLD AUTO: 10.1 FL (ref 9.2–11.8)
POTASSIUM SERPL-SCNC: 4.3 MMOL/L (ref 3.5–5.5)
PROT SERPL-MCNC: 6.3 G/DL (ref 6.4–8.2)
RBC # BLD AUTO: 4.36 M/UL (ref 4.35–5.65)
SODIUM SERPL-SCNC: 141 MMOL/L (ref 136–145)
TSH SERPL DL<=0.05 MIU/L-ACNC: 2.42 UIU/ML (ref 0.36–3.74)
WBC # BLD AUTO: 5 K/UL (ref 4.6–13.2)

## 2021-12-08 PROCEDURE — 80053 COMPREHEN METABOLIC PANEL: CPT

## 2021-12-08 PROCEDURE — 83735 ASSAY OF MAGNESIUM: CPT

## 2021-12-08 PROCEDURE — 36415 COLL VENOUS BLD VENIPUNCTURE: CPT | Performed by: FAMILY MEDICINE

## 2021-12-08 PROCEDURE — 85025 COMPLETE CBC W/AUTO DIFF WBC: CPT

## 2021-12-08 PROCEDURE — 84443 ASSAY THYROID STIM HORMONE: CPT

## 2021-12-08 PROCEDURE — 36415 COLL VENOUS BLD VENIPUNCTURE: CPT

## 2021-12-08 NOTE — PROGRESS NOTES
Patient in for labs. Labs ordered by PCP. Patient tolerated well and there are no concerns. Venipuncture to the left arm.

## 2021-12-13 NOTE — PROGRESS NOTES
Please let patient know thyroid lab is stable. His creatinine is slightly elevated at 1.36 but this is improved from previous. Shows improvement in renal function. He should continue with the current management plan.   Regi Peters MD

## 2021-12-14 ENCOUNTER — OFFICE VISIT (OUTPATIENT)
Dept: PULMONOLOGY | Age: 76
End: 2021-12-14
Payer: MEDICARE

## 2021-12-14 DIAGNOSIS — Z87.891 FORMER SMOKER: ICD-10-CM

## 2021-12-14 DIAGNOSIS — E66.9 OBESITY (BMI 30-39.9): ICD-10-CM

## 2021-12-14 DIAGNOSIS — R05.3 CHRONIC COUGH: ICD-10-CM

## 2021-12-14 DIAGNOSIS — J45.30 MILD PERSISTENT ASTHMATIC BRONCHITIS WITHOUT COMPLICATION: Primary | ICD-10-CM

## 2021-12-14 PROCEDURE — G8536 NO DOC ELDER MAL SCRN: HCPCS | Performed by: INTERNAL MEDICINE

## 2021-12-14 PROCEDURE — 3017F COLORECTAL CA SCREEN DOC REV: CPT | Performed by: INTERNAL MEDICINE

## 2021-12-14 PROCEDURE — G8417 CALC BMI ABV UP PARAM F/U: HCPCS | Performed by: INTERNAL MEDICINE

## 2021-12-14 PROCEDURE — 1101F PT FALLS ASSESS-DOCD LE1/YR: CPT | Performed by: INTERNAL MEDICINE

## 2021-12-14 PROCEDURE — G8756 NO BP MEASURE DOC: HCPCS | Performed by: INTERNAL MEDICINE

## 2021-12-14 PROCEDURE — G8432 DEP SCR NOT DOC, RNG: HCPCS | Performed by: INTERNAL MEDICINE

## 2021-12-14 PROCEDURE — G8427 DOCREV CUR MEDS BY ELIG CLIN: HCPCS | Performed by: INTERNAL MEDICINE

## 2021-12-14 PROCEDURE — 99214 OFFICE O/P EST MOD 30 MIN: CPT | Performed by: INTERNAL MEDICINE

## 2021-12-14 RX ORDER — BUDESONIDE AND FORMOTEROL FUMARATE DIHYDRATE 160; 4.5 UG/1; UG/1
2 AEROSOL RESPIRATORY (INHALATION) 2 TIMES DAILY
Qty: 10.2 G | Refills: 5 | Status: SHIPPED | OUTPATIENT
Start: 2021-12-14

## 2021-12-14 NOTE — PROGRESS NOTES
HISTORY OF PRESENT ILLNESS   ORTEGA Drake is a 77 y.o. male following up for chronic cough. Pt with complaint of chronic cough with onset in [address], with partial improvement since. Pt still has a less frequent cough but now produces a clear mucoid phlegm which he describes as Erlinda Skeeters" sometimes. Cough is more prominent in the mornings and is sometimes associated with an occasional rattle in the chest. Outside chest xrays done in Feb and last week demonstrate post sternotomy changes and underinflated lungs but otherwise no parenchymal changes. Pt endorses nasal congestion with occasional rhinorrhea and allergic salute. He was started on Flonase with good response which he now takes intermittently. Pt was seen in Urgent care November for increased cough, shortness of breath and wheezing, diagnosed with \"bronchitis\" and treated with Prednisone, Albuterol and Azithromycin with noted improvement. Pt was also Rxed Symbicort but did not fill this due to expense. PMH includes Aortic stenosis S/p bioprosthetic valve placement, GERD on Omeprazole. 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 He smoked briefly while in the [employer] but quit over 40 years ago. He is on disability after a work injury resulting in RLE crush injury and multiple surgeries to repair this. He used to work in construction and as an . Review of Systems   Constitutional: Negative for chills, diaphoresis, fever, malaise/fatigue and weight loss. HENT: Negative for congestion, ear discharge, ear pain, hearing loss, nosebleeds, sinus pain, sore throat and tinnitus. Eyes: Negative for blurred vision, double vision, photophobia, pain, discharge and redness. Respiratory: Positive for cough and sputum production (clear mucoid). Negative for hemoptysis and stridor. Shortness of breath: improved. Wheezing: improved.     Cardiovascular: Negative for chest pain, palpitations, orthopnea, claudication, leg swelling and PND. Gastrointestinal: Negative for abdominal pain, blood in stool, constipation, diarrhea, heartburn, melena, nausea and vomiting. Genitourinary: Negative for dysuria, flank pain, frequency, hematuria and urgency. Musculoskeletal: Positive for back pain and joint pain. Negative for falls, myalgias and neck pain. Skin: Negative for itching and rash. Neurological: Negative for dizziness, tingling, tremors, sensory change, speech change, focal weakness, seizures, loss of consciousness, weakness and headaches. Endo/Heme/Allergies: Positive for environmental allergies. Negative for polydipsia. Does not bruise/bleed easily. Psychiatric/Behavioral: Negative for depression, hallucinations, memory loss, substance abuse and suicidal ideas. The patient is not nervous/anxious and does not have insomnia. Past Medical History:   Diagnosis Date    Diabetes ([facility] [address] 1995    Dyslipidemia 3/1/2017    Fractures 1995    R Knee/ Tib fib fracture/surgery    Gout 2010    Graves disease 1/21/2015    High cholesterol 2005    HTN (hypertension) 1990    Osteoarthritis 2013    Severe aortic stenosis 3/29/2017    Spinal stenosis of lumbar region 3/1/2016    Spondylolisthesis 3/1/2016    Thyroid trouble     Type 2 diabetes mellitus with diabetic nephropathy ([address] 4/28/2015     Past Surgical History:   Procedure Laterality Date    HX HIP REPLACEMENT Left 2009    HX LUMBAR FUSION      HX ORTHOPAEDIC Right     R knee/Tibfib surgery     Current Outpatient Medications on File Prior to Visit   Medication Sig Dispense Refill    traMADoL (ULTRAM) 50 mg tablet Take 1 Tablet by mouth every eight (8) hours as needed for Pain for up to 10 days. Max Daily Amount: 150 mg. 30 Tablet 0    montelukast (SINGULAIR) 10 mg tablet Take 10 mg by mouth daily.  hydrALAZINE (APRESOLINE) 25 mg tablet Take 50 mg by mouth three (3) times daily.  On hold per Patient      dextran 70/hypromellose (ARTIFICIAL TEARS, PF, OP) Apply 1 Drop to eye six (6) times daily.  omeprazole (PRILOSEC) 40 mg capsule Take 1 Capsule by mouth two (2) times a day. 60 Capsule 1    fluticasone propionate (FLONASE) 50 mcg/actuation nasal spray 2 Sprays by Both Nostrils route daily. 1 Bottle 3    amLODIPine (NORVASC) 10 mg tablet Take 1 Tablet by mouth daily. 90 Tablet 1    magnesium (MAGINEX) 61 mg (615 mg) TbEC Take 615 mg by mouth.  rosuvastatin (Crestor) 20 mg tablet Take 20 mg by mouth nightly.  levothyroxine (SYNTHROID) 150 mcg tablet Take 1 Tab by mouth Daily (before breakfast). 30 Tab 2    spironolactone (ALDACTONE) 25 mg tablet Take 1 Tab by mouth daily.  b complex vitamins tablet Take 1 Tab by mouth daily.  Alpha Lipoic Acid 600 mg cap Take 1 Capsule by mouth daily.  aspirin (ASPIRIN) 325 mg tablet Take 325 mg by mouth daily.  ascorbic acid, vitamin C, (VITAMIN C) 250 mg tablet Take 250 mg by mouth.  DULoxetine (CYMBALTA) 60 mg capsule Take 1 Cap by mouth daily. 30 Cap 1    capsicum oleoresin 0.025 % topical cream Apply  to affected area three (3) times daily.  diclofenac (VOLTAREN) 1 % gel Apply  to affected area four (4) times daily.  magnesium chloride (MAG DELAY) 64 mg delayed release tablet Take 8 tablets 3 x day      insulin glargine (LANTUS) 100 unit/mL injection 30 Units by SubCUTAneous route daily. Take 30 units once daily  Indications: type 2 diabetes mellitus 3 Vial 3    febuxostat (ULORIC) 40 mg tab tablet Take 1 Tab by mouth daily. Indications: GOUT PREVENTION 90 Tab 1    insulin aspart (NOVOLOG) 100 unit/mL injection 6 Units by SubCUTAneous route Before breakfast, lunch, and dinner. 6 units before meals       glucose blood VI test strips (ASCENSIA AUTODISC VI, ONE TOUCH ULTRA TEST VI) strip Dispense precision extra test strips 3 x day to check blood glucose 200 Each 3    Cholecalciferol, Vitamin D3, 1,000 unit cap Take 3,000 Units by mouth daily.  furosemide (LASIX) 20 mg tablet Take 1 Tab by mouth daily. (Patient not taking: Reported on 2021) 30 Tab 0    testosterone (ANDROGEL) 20.25 mg/1.25 gram (1.62 %) gel Apply  to affected area as needed. (Patient not taking: Reported on 2021)       No current facility-administered medications on file prior to visit.      Allergies   Allergen Reactions    Watermelon Anaphylaxis and Swelling    Citric Acid Rash    Peach (Prunus Persica) Itching     Family History   Problem Relation Age of Onset    Hypertension Mother     Thyroid Disease Mother     Hypertension Father     Diabetes Father     Drug Abuse Brother      Social History     Socioeconomic History    Marital status:      Spouse name: Not on file    Number of children: Not on file    Years of education: Not on file    Highest education level: Not on file   Occupational History    Not on file   Tobacco Use    Smoking status: Former Smoker     Packs/day: 1.00     Years: 7.00     Pack years: 7.00     Types: Cigarettes     Start date: 1969     Quit date: 1995     Years since quittin.9    Smokeless tobacco: Never Used   Vaping Use    Vaping Use: Never used   Substance and Sexual Activity    Alcohol use: No    Drug use: No    Sexual activity: Yes     Partners: Female   Other Topics Concern     Service Yes     Comment: Served in [address] Blood Transfusions No    Caffeine Concern No    Occupational Exposure No    Hobby Hazards No    Sleep Concern No    Stress Concern No    Weight Concern Yes    Special Diet No    Back Care Yes     Comment: Lower Back pain when walking    Exercise No    Bike Helmet No    Seat Belt Yes    Self-Exams Yes   Social History Narrative    Not on file     Social Determinants of Health     Financial Resource Strain:     Difficulty of Paying Living Expenses: Not on file   Food Insecurity:     Worried About Running Out of Food in the Last Year: Not on file    Christiano vo Food in the Last Year: Not on file   Transportation Needs:     Lack of Transportation (Medical): Not on file    Lack of Transportation (Non-Medical): Not on file   Physical Activity:     Days of Exercise per Week: Not on file    Minutes of Exercise per Session: Not on file   Stress:     Feeling of Stress : Not on file   Social Connections:     Frequency of Communication with Friends and Family: Not on file    Frequency of Social Gatherings with Friends and Family: Not on file    Attends Confucianist Services: Not on file    Active Member of [address] [place] [address] [employer] or Organizations: Not on file    Attends Club or Organization Meetings: Not on file    Marital Status: Not on file   Intimate Partner Violence:     Fear of Current or Ex-Partner: Not on file    Emotionally Abused: Not on file    Physically Abused: Not on file    Sexually Abused: Not on file   Housing Stability:     Unable to Pay for Housing in the Last Year: Not on file    Number of [place] in the Last Year: Not on file    Unstable Housing in the Last Year: Not on file     There were no vitals taken for this visit. Physical Exam  Constitutional:       General: He is not in acute distress. Appearance: He is obese. He is not ill-appearing, toxic-appearing or diaphoretic. Comments: Uses a high rolling walker   HENT:      Head: Normocephalic and atraumatic. Right Ear: External ear normal.      Left Ear: External ear normal.      Nose:      Comments: Deferred      Mouth/Throat:      Comments: Deferred   Eyes:      General: No scleral icterus. Right eye: No discharge. Left eye: No discharge. Extraocular Movements: Extraocular movements intact. Conjunctiva/sclera: Conjunctivae normal.      Pupils: Pupils are equal, round, and reactive to light. Neck:      Vascular: No carotid bruit. Cardiovascular:      Rate and Rhythm: Normal rate and regular rhythm. Pulses: Normal pulses.       Heart sounds: Normal heart sounds. Murmur: 2/6 blowing systolic murmur. No gallop. Pulmonary:      Effort: Pulmonary effort is normal. No respiratory distress. Breath sounds: Normal breath sounds. No stridor. No wheezing, rhonchi or rales. Chest:      Chest wall: No tenderness. Abdominal:      General: There is no distension. Palpations: Abdomen is soft. There is no mass. Tenderness: There is no abdominal tenderness. Musculoskeletal:         General: No swelling or tenderness. Cervical back: No rigidity or tenderness. Comments: Multiple old surgical scars R leg   Lymphadenopathy:      Cervical: No cervical adenopathy. Skin:     General: Skin is warm and dry. Coloration: Skin is not jaundiced or pale. Findings: No bruising, erythema, lesion or rash. Neurological:      General: No focal deficit present. Mental Status: He is alert and oriented to person, place, and time. Coordination: Coordination normal.   Psychiatric:         Mood and Affect: Mood normal.         Behavior: Behavior normal.         Thought Content: Thought content normal.         Judgment: Judgment normal.       CHEST PORTABLE    Impression      No acute process     Signed By: Ephraim Gonzalez MD on 2/20/2021 2:23 PM  Narrative    EXAM:  AP Portable Chest X-ray 1 view     INDICATION: Cough     COMPARISON: March 1, 2017     _______________     FINDINGS: Sternotomy wires and sternal cortical plate from prior thoracotomy. Heart and mediastinal contours are within normal limits for portable radiograph. Lungs are clear of active disease. There are no pleural effusions. No acute osseous findings. Right humeral arthroplasty. ________________  Other Result Text    Interface, [employer] Rad Res - 02/20/2021  2:25 PM EST   EXAM:  AP Portable Chest X-ray 1 view     INDICATION: Cough     COMPARISON: March 1, 2017     _______________     FINDINGS: Sternotomy wires and sternal cortical plate from prior thoracotomy.  Heart and mediastinal contours are within normal limits for portable radiograph. Lungs are clear of active disease. There are no pleural effusions. No acute osseous findings. Right humeral arthroplasty. ________________       IMPRESSION  No acute process     Signed By: Wanda Anderson MD on 2/20/2021 2:23 PM     Date: 02/20/21   Received From: [address]     Encounter Summary     04/06/21    ECHO ADULT COMPLETE 04/06/2021 4/6/2021    Interpretation Summary  · LV: Estimated LVEF is 55 - 60%. Normal cavity size and systolic function (ejection fraction normal). Mild concentric hypertrophy. Wall motion: normal. Mild (grade 1) left ventricular diastolic dysfunction. · LA: Mildly dilated left atrium. Left Atrium volume index is 38.76 mL/m2. · RV: Mildly dilated right ventricle. · RA: Mildly dilated right atrium. · AV: Prosthetic aortic valve. Aortic valve mean gradient is 8.1 mmHg. There is a 25 mm [facility] bioprosthetic aortic valve. Prosthesis is normal.  · MV: Mitral annular calcification. Mild mitral valve regurgitation is present. · TV: Mild tricuspid valve regurgitation is present. · AO: Mild ascending aorta dilatation. Ascending aorta diameter = 3.7 cm. · PA: Pulmonary arterial systolic pressure is 26 mmHg. Signed by: Coy Hendrix MD on 4/6/2021 12:26 PM      ASSESSMENT and PLAN  Encounter Diagnoses   Name Primary?  Mild persistent asthmatic bronchitis without complication Yes    Former smoker     Chronic cough     Obesity (BMI 30-39. 9)      Pt with chronic cough with no apparent lung pathology on recent chest xray, however has elements of chronic bronchitis by history. Differentials include GERD (pt with known diagnosis), Upper airway cough syndrome. Rx printed for Symbicort and instructed pt to fill this at the [place] where he usually obtains medications. Continue prn Albuterol  Continue Flonase daily.   Schedule full PFT's  Pt with significant allergy to watermelon, will need to consider the possibility of cross reaction/Latex allergy on future visits. RTC 3 months.

## 2021-12-14 NOTE — PROGRESS NOTES
Michaela Harden presents today for No chief complaint on file. Is someone accompanying this pt? No    Is the patient using any DME equipment during OV? Yes     -DME Company     Depression Screening:  3 most recent PHQ Screens 12/7/2021   Little interest or pleasure in doing things Not at all   Feeling down, depressed, irritable, or hopeless Not at all   Total Score PHQ 2 0   Trouble falling or staying asleep, or sleeping too much -   Feeling tired or having little energy -   Poor appetite, weight loss, or overeating -   Feeling bad about yourself - or that you are a failure or have let yourself or your family down -   Trouble concentrating on things such as school, work, reading, or watching TV -   Moving or speaking so slowly that other people could have noticed; or the opposite being so fidgety that others notice -   Thoughts of being better off dead, or hurting yourself in some way -   PHQ 9 Score -   How difficult have these problems made it for you to do your work, take care of your home and get along with others -       Learning Assessment:  Learning Assessment 8/24/2021   PRIMARY LEARNER Patient   HIGHEST LEVEL OF EDUCATION - PRIMARY LEARNER  -   BARRIERS PRIMARY LEARNER -   CO-LEARNER CAREGIVER -   PRIMARY LANGUAGE ENGLISH   LEARNER PREFERENCE PRIMARY DEMONSTRATION   ANSWERED BY Patient   RELATIONSHIP SELF       Abuse Screening:  Abuse Screening Questionnaire 12/7/2021   Do you ever feel afraid of your partner? N   Are you in a relationship with someone who physically or mentally threatens you? N   Is it safe for you to go home? Y       Fall Risk  Fall Risk Assessment, last 12 mths 12/7/2021   Able to walk? Yes   Fall in past 12 months? 0   Do you feel unsteady? 0   Are you worried about falling 0   Is TUG test greater than 12 seconds? -   Is the gait abnormal? -   Number of falls in past 12 months -   Fall with injury? -         Coordination of Care:  1.  Have you been to the ER, urgent care clinic since your last visit? Hospitalized since your last visit? Urgent care 11/17/21 bronchitis     2. Have you seen or consulted any other health care providers outside of the [address] [place] [address] since your last visit? Include any pap smears or colon screening.  Dr Mckenna Ramirez

## 2021-12-15 DIAGNOSIS — I50.9 CONGESTIVE HEART FAILURE, UNSPECIFIED HF CHRONICITY, UNSPECIFIED HEART FAILURE TYPE (HCC): ICD-10-CM

## 2021-12-15 RX ORDER — FUROSEMIDE 20 MG/1
20 TABLET ORAL DAILY
Qty: 30 TABLET | Refills: 0 | Status: SHIPPED | OUTPATIENT
Start: 2021-12-15 | End: 2022-01-14 | Stop reason: SDUPTHER

## 2021-12-15 NOTE — TELEPHONE ENCOUNTER
Requested Prescriptions     Pending Prescriptions Disp Refills    furosemide (LASIX) 20 mg tablet 30 Tablet 0     Sig: Take 1 Tablet by mouth daily. Rosana Zaragoza called for their medication refill.     Last Office visit:  12-  Last labs:  12-  Follow up visit:  04-  Last date prescribe 03-    Please Advise

## 2022-01-14 DIAGNOSIS — I50.9 CONGESTIVE HEART FAILURE, UNSPECIFIED HF CHRONICITY, UNSPECIFIED HEART FAILURE TYPE (HCC): ICD-10-CM

## 2022-01-14 NOTE — TELEPHONE ENCOUNTER
Please see patient refill request-    Subject: Refill Request     Patient was last seen on 12-7-2021    Last prescribed 12-  #30 x 0    QUESTIONS   Name of Medication? furosemide (LASIX) 20 mg tablet   Patient-reported dosage and instructions? 20 mg   How many days do you have left? 0   Preferred Pharmacy? [id number] phone number (if available)? [phone]   Additional Information for Provider?  Needs this filled today just took the   last one he has an appt on 1/24     Thank you

## 2022-01-17 RX ORDER — FUROSEMIDE 20 MG/1
20 TABLET ORAL DAILY
Qty: 30 TABLET | Refills: 0 | Status: SHIPPED | OUTPATIENT
Start: 2022-01-17 | End: 2022-02-14 | Stop reason: SDUPTHER

## 2022-02-14 DIAGNOSIS — I50.9 CONGESTIVE HEART FAILURE, UNSPECIFIED HF CHRONICITY, UNSPECIFIED HEART FAILURE TYPE (HCC): ICD-10-CM

## 2022-02-14 NOTE — TELEPHONE ENCOUNTER
Requested Prescriptions     Pending Prescriptions Disp Refills    furosemide (LASIX) 20 mg tablet 30 Tablet 0     Sig: Take 1 Tablet by mouth daily. Henry Ruby called for their medication refill.     Last Office visit:  12-  Last labs:  12-  Follow up visit:  04-  Last date prescribe 01-    Please Advise

## 2022-02-14 NOTE — TELEPHONE ENCOUNTER
This patient contacted office for the following prescriptions to be filled:    Last office visit: 12/7/22  Follow up appointment: 4/7/22  Medication requested :   Requested Prescriptions     Pending Prescriptions Disp Refills    furosemide (LASIX) 20 mg tablet 30 Tablet 0     Sig: Take 1 Tablet by mouth daily.        PCP: Dr. Michael Pineda  Mail order or Local pharmacy name: [facility]

## 2022-02-15 RX ORDER — FUROSEMIDE 20 MG/1
20 TABLET ORAL DAILY
Qty: 30 TABLET | Refills: 0 | Status: SHIPPED | OUTPATIENT
Start: 2022-02-15 | End: 2022-07-05 | Stop reason: ALTCHOICE

## 2022-03-13 RX ORDER — FLUTICASONE PROPIONATE 50 MCG
SPRAY, SUSPENSION (ML) NASAL
Qty: 1 EACH | Refills: 1 | Status: SHIPPED | OUTPATIENT
Start: 2022-03-13 | End: 2022-10-10 | Stop reason: ALTCHOICE

## 2022-03-18 PROBLEM — Z95.2 S/P AVR (AORTIC VALVE REPLACEMENT): Status: ACTIVE | Noted: 2017-05-16

## 2022-03-18 PROBLEM — M51.36 DDD (DEGENERATIVE DISC DISEASE), LUMBAR: Status: ACTIVE | Noted: 2018-03-30

## 2022-03-18 PROBLEM — K21.9 GASTROESOPHAGEAL REFLUX DISEASE: Status: ACTIVE | Noted: 2018-07-18

## 2022-03-18 PROBLEM — M51.369 DDD (DEGENERATIVE DISC DISEASE), LUMBAR: Status: ACTIVE | Noted: 2018-03-30

## 2022-03-18 PROBLEM — R05.9 COUGH: Status: ACTIVE | Noted: 2021-02-20

## 2022-03-19 PROBLEM — E66.01 SEVERE OBESITY (BMI 35.0-39.9) WITH COMORBIDITY (HCC): Status: ACTIVE | Noted: 2018-03-21

## 2022-03-19 PROBLEM — Z01.818 PRE-OP EVALUATION: Status: ACTIVE | Noted: 2017-03-01

## 2022-03-19 PROBLEM — R06.02 SOB (SHORTNESS OF BREATH): Status: ACTIVE | Noted: 2017-03-01

## 2022-03-19 PROBLEM — M54.16 LUMBAR RADICULOPATHY: Status: ACTIVE | Noted: 2018-03-30

## 2022-03-19 PROBLEM — I35.0 SEVERE AORTIC STENOSIS: Status: ACTIVE | Noted: 2017-03-29

## 2022-03-19 PROBLEM — M48.062 SPINAL STENOSIS, LUMBAR REGION WITH NEUROGENIC CLAUDICATION: Status: ACTIVE | Noted: 2018-04-27

## 2022-03-19 PROBLEM — M46.1 SACROILIITIS (HCC): Status: ACTIVE | Noted: 2018-05-29

## 2022-03-19 PROBLEM — R79.89 ELEVATED TROPONIN: Status: ACTIVE | Noted: 2021-02-20

## 2022-03-19 PROBLEM — M12.561 TRAUMATIC ARTHRITIS OF RIGHT KNEE: Status: ACTIVE | Noted: 2017-08-22

## 2022-03-19 PROBLEM — R77.8 ELEVATED TROPONIN: Status: ACTIVE | Noted: 2021-02-20

## 2022-03-19 PROBLEM — Z71.89 ACP (ADVANCE CARE PLANNING): Status: ACTIVE | Noted: 2017-04-25

## 2022-03-19 PROBLEM — E78.5 DYSLIPIDEMIA: Status: ACTIVE | Noted: 2017-03-01

## 2022-03-19 PROBLEM — M96.1 LUMBAR POSTLAMINECTOMY SYNDROME: Status: ACTIVE | Noted: 2018-03-30

## 2022-03-20 PROBLEM — I10 ESSENTIAL HYPERTENSION: Status: ACTIVE | Noted: 2017-05-31

## 2022-03-20 PROBLEM — M54.16 LUMBAR NEURITIS: Status: ACTIVE | Noted: 2018-06-26

## 2022-03-20 PROBLEM — M43.10 SPONDYLOLISTHESIS, ACQUIRED: Status: ACTIVE | Noted: 2018-03-30

## 2022-04-07 ENCOUNTER — OFFICE VISIT (OUTPATIENT)
Dept: FAMILY MEDICINE CLINIC | Age: 77
End: 2022-04-07
Payer: MEDICARE

## 2022-04-07 VITALS
DIASTOLIC BLOOD PRESSURE: 55 MMHG | HEIGHT: 70 IN | WEIGHT: 275 LBS | OXYGEN SATURATION: 99 % | HEART RATE: 50 BPM | BODY MASS INDEX: 39.37 KG/M2 | SYSTOLIC BLOOD PRESSURE: 136 MMHG | RESPIRATION RATE: 20 BRPM | TEMPERATURE: 97.8 F

## 2022-04-07 DIAGNOSIS — E66.01 SEVERE OBESITY (BMI 35.0-39.9) WITH COMORBIDITY (HCC): ICD-10-CM

## 2022-04-07 DIAGNOSIS — I10 ESSENTIAL HYPERTENSION: ICD-10-CM

## 2022-04-07 DIAGNOSIS — E78.5 DYSLIPIDEMIA: ICD-10-CM

## 2022-04-07 DIAGNOSIS — E11.21 TYPE 2 DIABETES MELLITUS WITH DIABETIC NEPHROPATHY, UNSPECIFIED WHETHER LONG TERM INSULIN USE (HCC): Primary | ICD-10-CM

## 2022-04-07 DIAGNOSIS — N18.30 STAGE 3 CHRONIC KIDNEY DISEASE, UNSPECIFIED WHETHER STAGE 3A OR 3B CKD (HCC): ICD-10-CM

## 2022-04-07 DIAGNOSIS — M46.1 SACROILIITIS (HCC): ICD-10-CM

## 2022-04-07 DIAGNOSIS — M10.9 GOUT, UNSPECIFIED CAUSE, UNSPECIFIED CHRONICITY, UNSPECIFIED SITE: ICD-10-CM

## 2022-04-07 DIAGNOSIS — I50.9 CONGESTIVE HEART FAILURE, UNSPECIFIED HF CHRONICITY, UNSPECIFIED HEART FAILURE TYPE (HCC): ICD-10-CM

## 2022-04-07 DIAGNOSIS — K21.9 GASTROESOPHAGEAL REFLUX DISEASE, UNSPECIFIED WHETHER ESOPHAGITIS PRESENT: ICD-10-CM

## 2022-04-07 DIAGNOSIS — E03.9 HYPOTHYROIDISM, UNSPECIFIED TYPE: ICD-10-CM

## 2022-04-07 DIAGNOSIS — Z95.2 S/P AVR (AORTIC VALVE REPLACEMENT): ICD-10-CM

## 2022-04-07 DIAGNOSIS — E11.51 TYPE 2 DIABETES MELLITUS WITH DIABETIC PERIPHERAL ANGIOPATHY WITHOUT GANGRENE, UNSPECIFIED WHETHER LONG TERM INSULIN USE (HCC): ICD-10-CM

## 2022-04-07 LAB
CREAT SERPL-MCNC: 200 MG/DL
HBA1C MFR BLD HPLC: 6.1 %
MICROALBUMIN UR TEST STR-MCNC: 150 MG/L
MICROALBUMIN/CREAT RATIO POC: NORMAL MG/G

## 2022-04-07 PROCEDURE — G8510 SCR DEP NEG, NO PLAN REQD: HCPCS | Performed by: FAMILY MEDICINE

## 2022-04-07 PROCEDURE — G8536 NO DOC ELDER MAL SCRN: HCPCS | Performed by: FAMILY MEDICINE

## 2022-04-07 PROCEDURE — G8754 DIAS BP LESS 90: HCPCS | Performed by: FAMILY MEDICINE

## 2022-04-07 PROCEDURE — 3044F HG A1C LEVEL LT 7.0%: CPT | Performed by: FAMILY MEDICINE

## 2022-04-07 PROCEDURE — G8417 CALC BMI ABV UP PARAM F/U: HCPCS | Performed by: FAMILY MEDICINE

## 2022-04-07 PROCEDURE — 99215 OFFICE O/P EST HI 40 MIN: CPT | Performed by: FAMILY MEDICINE

## 2022-04-07 PROCEDURE — 83036 HEMOGLOBIN GLYCOSYLATED A1C: CPT | Performed by: FAMILY MEDICINE

## 2022-04-07 PROCEDURE — 2022F DILAT RTA XM EVC RTNOPTHY: CPT | Performed by: FAMILY MEDICINE

## 2022-04-07 PROCEDURE — G8752 SYS BP LESS 140: HCPCS | Performed by: FAMILY MEDICINE

## 2022-04-07 PROCEDURE — 82044 UR ALBUMIN SEMIQUANTITATIVE: CPT | Performed by: FAMILY MEDICINE

## 2022-04-07 PROCEDURE — 3017F COLORECTAL CA SCREEN DOC REV: CPT | Performed by: FAMILY MEDICINE

## 2022-04-07 PROCEDURE — 1101F PT FALLS ASSESS-DOCD LE1/YR: CPT | Performed by: FAMILY MEDICINE

## 2022-04-07 PROCEDURE — G8427 DOCREV CUR MEDS BY ELIG CLIN: HCPCS | Performed by: FAMILY MEDICINE

## 2022-04-07 NOTE — PROGRESS NOTES
[facility]  Nafisa Guzman comes in for follow-up care. Hypertension: Patient has hypertension. Denies headache, changes in vision or focal weakness. He is on hydralazine, amlodipine, spironolactone. Blood pressure is stable. We will continue with the current treatment plan. CKD: Patient has chronic kidney disease stage IIIa. He has been followed up by the nephrologist.  Currently stable. Plan is to avoid any nephrotoxic medications. Most recent creatinine was done through the [place]. This was at 1.7. Diabetes mellitus type 2 continue diabetes mellitus. HbA1c today is 6.1. He is doing lifestyle and dietary modification. We will check his microalbumin. He is on insulin. He has been followed up by the endocrinologist.  We will continue with the current treatment plan. Hypomagnesemia: Patient has hypomagnesemia. He is on magnesium replacement therapy. Most recent lab results through the [place] have shown stable magnesium. He is concerned about the magnesium preparations that he is able to access through the market. He was on magnesium delayed release 64 mg but this is some calcium in it. He has secondary hyperparathyroidism. He needs to avoid taking calcium. He has a different preparation but this causes abdominal discomfort on and off. We discussed supportive care measures. He denies hematemesis or blood in stool. Peptic ulcer disease: Patient states that he has a history of gastric ulcer. This was noted in the past.  At the time he was having an EGD done. We will request those records. He has been stable. GERD: Patient has gastroesophageal reflux. He takes omeprazole. He denies hematemesis or dark stools. Dyslipidemia: Patient has dyslipidemia. He is on rosuvastatin. Stable on the medication. He will continue to exercise and take a diet low in polysaturated fats. Hypothyroidism: Patient has hypothyroidism and is on levothyroxine. Stable on the medication.   Continue current treatment plan.  Gout: Patient has a history of gout arthritis. Currently no acute gout symptoms. He will take the purine-restricted diet. He is on Uloric. Sacroiliitis: Patient has a history of sacroiliitis. He gets low back pain, sacral pain. He will take Tylenol arthritis for pain as needed. Cardiac: Patient has a history of valvular heart disease and severe aortic stenosis. He is status post aortic valve replacement. Denies chest pain, shortness of breath or diaphoresis. He is not in failure. He will follow up with the cardiologist.  Obesity: Patient has BMI of 39.46. He will intensify lifestyle and dietary modification. Past Medical History  Past Medical History:   Diagnosis Date    Diabetes ([facility] [address] 1995    Dyslipidemia 3/1/2017    Fractures 1995    R Knee/ Tib fib fracture/surgery    Gout 2010    Graves disease 1/21/2015    High cholesterol 2005    HTN (hypertension) 1990    Osteoarthritis 2013    Severe aortic stenosis 3/29/2017    Spinal stenosis of lumbar region 3/1/2016    Spondylolisthesis 3/1/2016    Thyroid trouble     Type 2 diabetes mellitus with diabetic nephropathy ([facility] [address] 4/28/2015       Surgical History  Past Surgical History:   Procedure Laterality Date    HX HIP REPLACEMENT Left 2009    HX LUMBAR FUSION      HX ORTHOPAEDIC Right     R knee/Tibfib surgery        Medications  Current Outpatient Medications   Medication Sig Dispense Refill    fluticasone propionate (FLONASE) 50 mcg/actuation nasal spray INHALE 2 SPRAYS IN BOTH NOSTRILS ROUTE ONCE DAILY 1 Each 1    furosemide (LASIX) 20 mg tablet Take 1 Tablet by mouth daily. 30 Tablet 0    budesonide-formoteroL (SYMBICORT) 160-4.5 mcg/actuation HFAA Take 2 Puffs by inhalation two (2) times a day. 10.2 g 5    montelukast (SINGULAIR) 10 mg tablet Take 10 mg by mouth daily.  hydrALAZINE (APRESOLINE) 25 mg tablet Take 50 mg by mouth three (3) times daily.  On hold per Patient      dextran 70/hypromellose (ARTIFICIAL TEARS, PF, OP) Apply 1 Drop to eye six (6) times daily.  omeprazole (PRILOSEC) 40 mg capsule Take 1 Capsule by mouth two (2) times a day. 60 Capsule 1    amLODIPine (NORVASC) 10 mg tablet Take 1 Tablet by mouth daily. 90 Tablet 1    magnesium (MAGINEX) 61 mg (615 mg) TbEC Take 615 mg by mouth.  rosuvastatin (Crestor) 20 mg tablet Take 20 mg by mouth nightly.  levothyroxine (SYNTHROID) 150 mcg tablet Take 1 Tab by mouth Daily (before breakfast). 30 Tab 2    spironolactone (ALDACTONE) 25 mg tablet Take 1 Tab by mouth daily.  b complex vitamins tablet Take 1 Tab by mouth daily.  Alpha Lipoic Acid 600 mg cap Take 1 Capsule by mouth daily.  aspirin (ASPIRIN) 325 mg tablet Take 325 mg by mouth daily.  ascorbic acid, vitamin C, (VITAMIN C) 250 mg tablet Take 250 mg by mouth.  DULoxetine (CYMBALTA) 60 mg capsule Take 1 Cap by mouth daily. 30 Cap 1    capsicum oleoresin 0.025 % topical cream Apply  to affected area three (3) times daily.  diclofenac (VOLTAREN) 1 % gel Apply  to affected area four (4) times daily.  magnesium chloride (MAG DELAY) 64 mg delayed release tablet Take 8 tablets 3 x day      insulin glargine (LANTUS) 100 unit/mL injection 30 Units by SubCUTAneous route daily. Take 30 units once daily  Indications: type 2 diabetes mellitus 3 Vial 3    febuxostat (ULORIC) 40 mg tab tablet Take 1 Tab by mouth daily. Indications: GOUT PREVENTION 90 Tab 1    insulin aspart (NOVOLOG) 100 unit/mL injection 6 Units by SubCUTAneous route Before breakfast, lunch, and dinner. 6 units before meals       glucose blood VI test strips (ASCENSIA AUTODISC VI, ONE TOUCH ULTRA TEST VI) strip Dispense precision extra test strips 3 x day to check blood glucose 200 Each 3    Cholecalciferol, Vitamin D3, 1,000 unit cap Take 3,000 Units by mouth daily.  testosterone (ANDROGEL) 20.25 mg/1.25 gram (1.62 %) gel Apply  to affected area as needed.  (Patient not taking: Reported on 2021)         Allergies  Allergies   Allergen Reactions    Watermelon Anaphylaxis and Swelling    Citric Acid Rash    Peach (Prunus Persica) Itching       Family History  Family History   Problem Relation Age of Onset    Hypertension Mother     Thyroid Disease Mother     Hypertension Father     Diabetes Father     Drug Abuse Brother        Social History  Social History     Socioeconomic History    Marital status:      Spouse name: Not on file    Number of children: Not on file    Years of education: Not on file    Highest education level: Not on file   Occupational History    Not on file   Tobacco Use    Smoking status: Former Smoker     Packs/day: 1.00     Years: 7.00     Pack years: 7.00     Types: Cigarettes     Start date: 1969     Quit date: 1995     Years since quittin.2    Smokeless tobacco: Never Used   Vaping Use    Vaping Use: Never used   Substance and Sexual Activity    Alcohol use: No    Drug use: No    Sexual activity: Yes     Partners: Female   Other Topics Concern     Service Yes     Comment: Served in [address] Blood Transfusions No    Caffeine Concern No    Occupational Exposure No    Hobby Hazards No    Sleep Concern No    Stress Concern No    Weight Concern Yes    Special Diet No    Back Care Yes     Comment: Lower Back pain when walking    Exercise No    Bike Helmet No    Seat Belt Yes    Self-Exams Yes   Social History Narrative    Not on file     Social Determinants of Health     Financial Resource Strain:     Difficulty of Paying Living Expenses: Not on file   Food Insecurity:     Worried About Running Out of Food in the Last Year: Not on file    [employer] of Food in the Last Year: Not on file   Transportation Needs:     Lack of Transportation (Medical): Not on file    Lack of Transportation (Non-Medical):  Not on file   Physical Activity:     Days of Exercise per Week: Not on file    Minutes of Exercise per Session: Not on file   Stress:     Feeling of Stress : Not on file   Social Connections:     Frequency of Communication with Friends and Family: Not on file    Frequency of Social Gatherings with Friends and Family: Not on file    Attends Baptism Services: Not on file    Active Member of Clubs or Organizations: Not on file    Attends Club or Organization Meetings: Not on file    Marital Status: Not on file   Intimate Partner Violence:     Fear of Current or Ex-Partner: Not on file    Emotionally Abused: Not on file    Physically Abused: Not on file    Sexually Abused: Not on file   Housing Stability:     Unable to Pay for Housing in the Last Year: Not on file    Number of [place] in the Last Year: Not on file    Unstable Housing in the Last Year: Not on file       Review of Systems  Review of Systems - Review of all systems is negative except as noted above in the HPI.     Vital Signs  Visit Vitals  BP (!) 136/55 (BP 1 Location: Left upper arm, BP Patient Position: Sitting, BP Cuff Size: Adult)   Pulse (!) 50   Temp 97.8 °F (36.6 °C) (Temporal)   Resp 20   Ht 5' 10\" (1.778 m)   Wt 275 lb (124.7 kg)   SpO2 99%   BMI 39.46 kg/m²         Physical Exam  Physical Examination: General appearance - alert, well appearing, and in no distress, oriented to person, place, and time, overweight and acyanotic, in no respiratory distress  Mental status - alert, oriented to person, place, and time, affect appropriate to mood  Neck - supple, no significant adenopathy  Lymphatics - no palpable lymphadenopathy, no hepatosplenomegaly  Chest - no tachypnea, retractions or cyanosis  Heart - systolic murmur 2/6 at 2nd right intercostal space  Abdomen - no rebound tenderness noted  Back exam - limited range of motion  Neurological - abnormal neurological exam unchanged from prior examinations  Musculoskeletal - osteoarthritic changes noted in both hands  Extremities - intact peripheral pulses      Results  Results for orders placed or performed during the hospital encounter of 12/08/21   CBC WITH AUTOMATED DIFF   Result Value Ref Range    WBC 5.0 4.6 - 13.2 K/uL    RBC 4.36 4.35 - 5.65 M/uL    HGB 13.1 13.0 - 16.0 g/dL    HCT 39.0 36.0 - 48.0 %    MCV 89.4 78.0 - 100.0 FL    MCH 30.0 24.0 - 34.0 PG    MCHC 33.6 31.0 - 37.0 g/dL    RDW 13.5 11.6 - 14.5 %    PLATELET 624 872 - 184 K/uL    MPV 10.1 9.2 - 11.8 FL    NRBC 0.0 0  WBC    ABSOLUTE NRBC 0.00 0.00 - 0.01 K/uL    NEUTROPHILS 46 40 - 73 %    LYMPHOCYTES 37 21 - 52 %    MONOCYTES 9 3 - 10 %    EOSINOPHILS 8 (H) 0 - 5 %    BASOPHILS 1 0 - 2 %    IMMATURE GRANULOCYTES 0 0.0 - 0.5 %    ABS. NEUTROPHILS 2.3 1.8 - 8.0 K/UL    ABS. LYMPHOCYTES 1.8 0.9 - 3.6 K/UL    ABS. MONOCYTES 0.4 0.05 - 1.2 K/UL    ABS. EOSINOPHILS 0.4 0.0 - 0.4 K/UL    ABS. BASOPHILS 0.0 0.0 - 0.1 K/UL    ABS. IMM. GRANS. 0.0 0.00 - 0.04 K/UL    DF AUTOMATED     MAGNESIUM   Result Value Ref Range    Magnesium 1.7 1.6 - 2.6 mg/dL   METABOLIC PANEL, COMPREHENSIVE   Result Value Ref Range    Sodium 141 136 - 145 mmol/L    Potassium 4.3 3.5 - 5.5 mmol/L    Chloride 110 100 - 111 mmol/L    CO2 24 21 - 32 mmol/L    Anion gap 7 3.0 - 18 mmol/L    Glucose 157 (H) 74 - 99 mg/dL    BUN 20 (H) 7.0 - 18 MG/DL    Creatinine 1.36 (H) 0.6 - 1.3 MG/DL    BUN/Creatinine ratio 15 12 - 20      GFR est AA >60 >60 ml/min/1.73m2    GFR est non-AA 51 (L) >60 ml/min/1.73m2    Calcium 9.0 8.5 - 10.1 MG/DL    Bilirubin, total 0.3 0.2 - 1.0 MG/DL    ALT (SGPT) 47 16 - 61 U/L    AST (SGOT) 32 10 - 38 U/L    Alk. phosphatase 82 45 - 117 U/L    Protein, total 6.3 (L) 6.4 - 8.2 g/dL    Albumin 3.0 (L) 3.4 - 5.0 g/dL    Globulin 3.3 2.0 - 4.0 g/dL    A-G Ratio 0.9 0.8 - 1.7     TSH 3RD GENERATION   Result Value Ref Range    TSH 2.42 0.36 - 3.74 uIU/mL       ASSESSMENT and PLAN    ICD-10-CM ICD-9-CM    1.  Type 2 diabetes mellitus with diabetic nephropathy, unspecified whether long term insulin use (HCC)  E11.21 250.40 AMB POC HEMOGLOBIN A1C 583.81 AMB POC URINE, MICROALBUMIN, SEMIQUANTITATIVE   2. Severe obesity (BMI 35.0-39. 9) with comorbidity ([facility] [address]  E66.01 278.01    3. Sacroiliitis ([facility])  M46.1 720.2    4. Congestive heart failure, unspecified HF chronicity, unspecified heart failure type ([facility])  I50.9 428.0    5. Type 2 diabetes mellitus with diabetic peripheral angiopathy without gangrene, unspecified whether long term insulin use ([facility])  E11.51 250.70      443.81    6. Stage 3 chronic kidney disease, unspecified whether stage 3a or 3b CKD ([facility])  N18.30 585.3    7. Essential hypertension  I10 401.9    8. Hypothyroidism, unspecified type  E03.9 244.9    9. Dyslipidemia  E78.5 272.4    10. S/P AVR (aortic valve replacement)  Z95.2 V43.3    11. Gastroesophageal reflux disease, unspecified whether esophagitis present  K21.9 530.81    12. Gout, unspecified cause, unspecified chronicity, unspecified site  M10.9 274.9      lab results and schedule of future lab studies reviewed with patient  reviewed diet, exercise and weight control  cardiovascular risk and specific lipid/LDL goals reviewed  reviewed medications and side effects in detail  specific diabetic recommendations: low cholesterol diet, weight control and daily exercise discussed, home glucose monitoring emphasized, all medications, side effects and compliance discussed carefully, foot care discussed and Podiatry visits discussed, annual eye examinations at Ophthalmology discussed, glycohemoglobin and other lab monitoring discussed and long term diabetic complications discussed  radiology results and schedule of future radiology studies reviewed with patient      I have discussed the diagnosis with the patient and the intended plan of care as seen in the above orders. The patient has received an after-visit summary and questions were answered concerning future plans. I have discussed medication, side effects, and warnings with the patient in detail.  The patient verbalized understanding and is in agreement with the plan of care. The patient will contact the office with any additional concerns. I spent at least 40 minutes on this visit with this established patient. Radha Tamez MD    PLEASE NOTE:   This document has been produced using voice recognition software.  Unrecognized errors in transcription may be present

## 2022-04-07 NOTE — PROGRESS NOTES
Chief Complaint   Patient presents with    Follow Up Chronic Condition     1. \"Have you been to the ER, urgent care clinic since your last visit? Hospitalized since your last visit? \" No    2. \"Have you seen or consulted any other health care providers outside of the [address] [place] [address] since your last visit? \" No     3. For patients aged 39-70: Has the patient had a colonoscopy / FIT/ Cologuard? Yes - no Care Gap present      If the patient is female:    4. For patients aged 41-77: Has the patient had a mammogram within the past 2 years? NA - based on age or sex      11. For patients aged 21-65: Has the patient had a pap smear?  NA - based on age or sex

## 2022-04-22 ENCOUNTER — TELEPHONE (OUTPATIENT)
Dept: FAMILY MEDICINE CLINIC | Age: 77
End: 2022-04-22

## 2022-04-22 NOTE — TELEPHONE ENCOUNTER
Pt calling because he would like a prescription for cholestene for gout. Please advise.  Thank you!!!

## 2022-04-25 NOTE — TELEPHONE ENCOUNTER
Please let patient know he is on Uloric for gout. Colchicine is a medication that has been used in patients with gout but there is potential for toxicity in persons with kidney problems. I would recommend he continue using the Uloric for now. We can discuss this further at his next visit.   Eveline Thao MD

## 2022-04-28 RX ORDER — COLCHICINE 0.6 MG/1
0.6 CAPSULE ORAL DAILY
Qty: 30 CAPSULE | Refills: 2 | Status: SHIPPED | OUTPATIENT
Start: 2022-04-28 | End: 2022-05-03 | Stop reason: SDUPTHER

## 2022-04-28 NOTE — TELEPHONE ENCOUNTER
Spoke with patient and he stated he have tolerated this medication in the past. Patient also stated that the Uloric doesn't work well.  Please be advised

## 2022-04-29 RX ORDER — COLCHICINE 0.6 MG/1
0.6 CAPSULE ORAL DAILY
Qty: 30 CAPSULE | Refills: 2 | Status: CANCELLED | OUTPATIENT
Start: 2022-04-29

## 2022-04-29 NOTE — TELEPHONE ENCOUNTER
This pharmacy faxed over request for the following prescriptions to be filled:    Medication requested :   Requested Prescriptions     Pending Prescriptions Disp Refills    colchicine (MITIGARE) 0.6 mg capsule 30 Capsule 2     Sig: Take 1 Capsule by mouth daily.        PCP: Dr. Quin River or Print: [facility]  Mail order or Local pharmacy: [facility]     Scheduled appointment if not seen by current providers in office: 6/14/22

## 2022-05-03 NOTE — TELEPHONE ENCOUNTER
This pharmacy faxed over request for the following prescriptions to be filled:    Medication requested :   Requested Prescriptions     Pending Prescriptions Disp Refills    colchicine (MITIGARE) 0.6 mg capsule 30 Capsule 2     Sig: Take 1 Capsule by mouth daily.        PCP: Dr. Augustin Stephens or Print: [facility]  Mail order or Local pharmacy: [facility]    Scheduled appointment if not seen by current providers in office: 6/14/22

## 2022-05-04 RX ORDER — COLCHICINE 0.6 MG/1
0.6 CAPSULE ORAL DAILY
Qty: 30 CAPSULE | Refills: 2 | Status: SHIPPED | OUTPATIENT
Start: 2022-05-04 | End: 2022-06-14 | Stop reason: CLARIF

## 2022-05-04 NOTE — TELEPHONE ENCOUNTER
Spoke with patient and he stated that pharmacy informed him that he need the Colchicine prescription changed from capsule to tablet.  Please be informed

## 2022-05-11 ENCOUNTER — OFFICE VISIT (OUTPATIENT)
Dept: CARDIOLOGY CLINIC | Age: 77
End: 2022-05-11
Payer: MEDICARE

## 2022-05-11 VITALS
WEIGHT: 273 LBS | HEART RATE: 54 BPM | BODY MASS INDEX: 39.08 KG/M2 | SYSTOLIC BLOOD PRESSURE: 137 MMHG | OXYGEN SATURATION: 97 % | HEIGHT: 70 IN | DIASTOLIC BLOOD PRESSURE: 68 MMHG

## 2022-05-11 DIAGNOSIS — I10 ESSENTIAL HYPERTENSION: ICD-10-CM

## 2022-05-11 DIAGNOSIS — E03.4 HYPOTHYROIDISM DUE TO ACQUIRED ATROPHY OF THYROID: ICD-10-CM

## 2022-05-11 DIAGNOSIS — E78.5 DYSLIPIDEMIA: ICD-10-CM

## 2022-05-11 DIAGNOSIS — Z95.2 S/P AVR (AORTIC VALVE REPLACEMENT): Primary | ICD-10-CM

## 2022-05-11 DIAGNOSIS — E11.21 TYPE 2 DIABETES MELLITUS WITH DIABETIC NEPHROPATHY, UNSPECIFIED WHETHER LONG TERM INSULIN USE (HCC): ICD-10-CM

## 2022-05-11 DIAGNOSIS — N18.30 STAGE 3 CHRONIC KIDNEY DISEASE, UNSPECIFIED WHETHER STAGE 3A OR 3B CKD (HCC): ICD-10-CM

## 2022-05-11 PROCEDURE — G8417 CALC BMI ABV UP PARAM F/U: HCPCS | Performed by: INTERNAL MEDICINE

## 2022-05-11 PROCEDURE — 3044F HG A1C LEVEL LT 7.0%: CPT | Performed by: INTERNAL MEDICINE

## 2022-05-11 PROCEDURE — G8427 DOCREV CUR MEDS BY ELIG CLIN: HCPCS | Performed by: INTERNAL MEDICINE

## 2022-05-11 PROCEDURE — G8752 SYS BP LESS 140: HCPCS | Performed by: INTERNAL MEDICINE

## 2022-05-11 PROCEDURE — 99214 OFFICE O/P EST MOD 30 MIN: CPT | Performed by: INTERNAL MEDICINE

## 2022-05-11 PROCEDURE — G8536 NO DOC ELDER MAL SCRN: HCPCS | Performed by: INTERNAL MEDICINE

## 2022-05-11 PROCEDURE — 1101F PT FALLS ASSESS-DOCD LE1/YR: CPT | Performed by: INTERNAL MEDICINE

## 2022-05-11 PROCEDURE — G8432 DEP SCR NOT DOC, RNG: HCPCS | Performed by: INTERNAL MEDICINE

## 2022-05-11 PROCEDURE — G8754 DIAS BP LESS 90: HCPCS | Performed by: INTERNAL MEDICINE

## 2022-05-11 NOTE — PROGRESS NOTES
1. Have you been to the ER, urgent care clinic since your last visit? Hospitalized since your last visit? No    2. Have you seen or consulted any other health care providers outside of the [address] [place] [address] since your last visit? Include any pap smears or colon screening. Yes [facility]     3. Do you need any refills today?   no

## 2022-05-11 NOTE — PROGRESS NOTES
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 Hypertension  The history is provided by the patient. This is a chronic problem. The current episode started more than 1 week ago. The problem occurs every several days. The problem has not changed since onset. Shortness of Breath  The history is provided by the patient. This is a chronic problem. The problem occurs intermittently. The current episode started more than 1 week ago. The problem has been gradually improving. Pertinent negatives include no fever, no ear pain, no neck pain, no cough, no sputum production, no hemoptysis, no wheezing, no PND, no orthopnea, no syncope, no vomiting, no rash, no leg pain, no leg swelling and no claudication. Associated medical issues do not include chronic lung disease, CAD or heart failure. Review of Systems   Constitutional: Negative for chills, diaphoresis, fever, malaise/fatigue and weight loss. HENT: Negative for ear discharge, ear pain, hearing loss, nosebleeds and tinnitus. Eyes: Negative for blurred vision. Respiratory: Negative for cough, hemoptysis, sputum production, wheezing and stridor. Cardiovascular: Negative for palpitations, orthopnea, claudication, leg swelling, syncope and PND. Gastrointestinal: Negative for heartburn, nausea and vomiting. Musculoskeletal: Negative for myalgias and neck pain. Skin: Negative for itching and rash. Neurological: Negative for dizziness, tingling, tremors, focal weakness, loss of consciousness and weakness. Psychiatric/Behavioral: Negative for depression and suicidal ideas.      Family History   Problem Relation Age of Onset    Hypertension Mother     Thyroid Disease Mother     Hypertension Father     Diabetes Father     Drug Abuse Brother        Past Medical History:   Diagnosis Date    [facility] ([facility] 1995    Dyslipidemia 3/1/2017    Fractures 1995    R Knee/ Tib fib fracture/surgery    Gout 2010    Graves disease 1/21/2015    High cholesterol     HTN (hypertension)     Osteoarthritis     Severe aortic stenosis 3/29/2017    Spinal stenosis of lumbar region 3/1/2016    Spondylolisthesis 3/1/2016    Thyroid trouble     Type 2 diabetes mellitus with diabetic nephropathy ([facility] 2015       Past Surgical History:   Procedure Laterality Date    HX HIP REPLACEMENT Left 2009    HX LUMBAR FUSION      HX ORTHOPAEDIC Right     R knee/Tibfib surgery       Social History     Tobacco Use    Smoking status: Former Smoker     Packs/day: 1.00     Years: 7.00     Pack years: 7.00     Types: Cigarettes     Start date: 1969     Quit date: 1995     Years since quittin.3    Smokeless tobacco: Never Used   Substance Use Topics    Alcohol use: No       Allergies   Allergen Reactions    Watermelon Anaphylaxis and Swelling    Citric Acid Rash    Peach (Prunus Persica) Itching       Outpatient Medications Marked as Taking for the 22 encounter (Office Visit) with Grace Boyer MD   Medication Sig Dispense Refill    colchicine (MITIGARE) 0.6 mg capsule Take 1 Capsule by mouth daily. 30 Capsule 2    fluticasone propionate (FLONASE) 50 mcg/actuation nasal spray INHALE 2 SPRAYS IN BOTH NOSTRILS ROUTE ONCE DAILY 1 Each 1    furosemide (LASIX) 20 mg tablet Take 1 Tablet by mouth daily. 30 Tablet 0    budesonide-formoteroL (SYMBICORT) 160-4.5 mcg/actuation HFAA Take 2 Puffs by inhalation two (2) times a day. 10.2 g 5    montelukast (SINGULAIR) 10 mg tablet Take 10 mg by mouth daily.  hydrALAZINE (APRESOLINE) 25 mg tablet Take 50 mg by mouth three (3) times daily. On hold per Patient      dextran 70/hypromellose (ARTIFICIAL TEARS, PF, OP) Apply 1 Drop to eye six (6) times daily.  omeprazole (PRILOSEC) 40 mg capsule Take 1 Capsule by mouth two (2) times a day. 60 Capsule 1    amLODIPine (NORVASC) 10 mg tablet Take 1 Tablet by mouth daily.  90 Tablet 1    rosuvastatin (Crestor) 20 mg tablet Take 20 mg by mouth nightly.  levothyroxine (SYNTHROID) 150 mcg tablet Take 1 Tab by mouth Daily (before breakfast). 30 Tab 2    spironolactone (ALDACTONE) 25 mg tablet Take 1 Tab by mouth daily.  b complex vitamins tablet Take 1 Tab by mouth daily.  Alpha Lipoic Acid 600 mg cap Take 1 Capsule by mouth daily.  aspirin (ASPIRIN) 325 mg tablet Take 325 mg by mouth daily.  ascorbic acid, vitamin C, (VITAMIN C) 250 mg tablet Take 250 mg by mouth.  DULoxetine (CYMBALTA) 60 mg capsule Take 1 Cap by mouth daily. 30 Cap 1    capsicum oleoresin 0.025 % topical cream Apply  to affected area three (3) times daily.  diclofenac (VOLTAREN) 1 % gel Apply  to affected area four (4) times daily.  magnesium chloride (MAG DELAY) 64 mg delayed release tablet Take 8 tablets 3 x day      insulin glargine (LANTUS) 100 unit/mL injection 30 Units by SubCUTAneous route daily. Take 30 units once daily  Indications: type 2 diabetes mellitus 3 Vial 3    febuxostat (ULORIC) 40 mg tab tablet Take 1 Tab by mouth daily. Indications: GOUT PREVENTION 90 Tab 1    insulin aspart (NOVOLOG) 100 unit/mL injection 6 Units by SubCUTAneous route Before breakfast, lunch, and dinner. 6 units before meals       glucose blood VI test strips (ASCENSIA AUTODISC VI, ONE TOUCH ULTRA TEST VI) strip Dispense precision extra test strips 3 x day to check blood glucose 200 Each 3    testosterone (ANDROGEL) 20.25 mg/1.25 gram (1.62 %) gel Apply  to affected area as needed.  Cholecalciferol, Vitamin D3, 1,000 unit cap Take 3,000 Units by mouth daily. Visit Vitals  /68 (BP 1 Location: Left upper arm, BP Patient Position: Sitting, BP Cuff Size: Large adult)   Pulse (!) 54   Ht 5' 10\" (1.778 m)   Wt 123.8 kg (273 lb)   SpO2 97%   BMI 39.17 kg/m²     Physical Exam  Constitutional:       General: He is not in acute distress. Appearance: He is well-developed. He is not diaphoretic. HENT:      Head: Atraumatic. Mouth/Throat:      Pharynx: No oropharyngeal exudate. Eyes:      General: No scleral icterus. Right eye: No discharge. Left eye: No discharge. Conjunctiva/sclera: Conjunctivae normal.   Neck:      Thyroid: No thyromegaly. Vascular: No JVD. Trachea: No tracheal deviation. Cardiovascular:      Rate and Rhythm: Normal rate and regular rhythm. Heart sounds: Murmur (2/6 ejection systolic murmur best heard at right parasternal area) heard. No gallop. Comments: Surgical sternal scar  Pulmonary:      Effort: Pulmonary effort is normal. No respiratory distress. Breath sounds: No stridor. No wheezing, rhonchi or rales. Chest:      Chest wall: No tenderness. Abdominal:      Palpations: Abdomen is soft. Tenderness: There is no abdominal tenderness. There is no guarding or rebound. Musculoskeletal:         General: No tenderness. Normal range of motion. Cervical back: Normal range of motion and neck supple. Lymphadenopathy:      Cervical: No cervical adenopathy. Skin:     General: Skin is warm. Neurological:      Mental Status: He is alert and oriented to person, place, and time. Motor: No abnormal muscle tone. Psychiatric:         Behavior: Behavior normal.       ekg sinus rhythm with PVC, no acute st-t changes  Echo 02/2017:  SUMMARY:  Left ventricle: Systolic function was normal by visual assessment. Ejection fraction was estimated in the range of 60 % to 65 %. No obvious  wall motion abnormalities identified in the views obtained. Wall thickness  was at the upper limits of normal.    Aortic valve: Leaflets exhibited markedly increased thickness and reduced  mobility. There was moderate to severe stenosis. Valve peak gradient was  61 mmHg. Valve mean gradient was 39 mmHg. Estimated aortic valve area (by  VTI) was 0.5 cmï¾². ZACKERY likely overestimates severity of aortic stenosis due  to difficulty in measuring LVOT diameter.     Aorta, systemic arteries: The root exhibited dilatation. 02/17/20   ECHO ADULT COMPLETE 02/19/2020 2/19/2020    Narrative · Normal cavity size and systolic function (ejection fraction normal). Mild concentric hypertrophy. Estimated left ventricular ejection fraction   is 55 - 60%. No regional wall motion abnormality noted. Mild (grade 1)   left ventricular diastolic dysfunction. · Mildly dilated right atrium. · There is a 25 mm St. Judes bioprosthetic aortic valve. Prosthesis is   normal.  · Mitral valve thickening. Mild mitral valve regurgitation is present. · Mild tricuspid valve regurgitation is present. · There is no evidence of pulmonary hypertension. · Mild aortic root dilatation. Signed by: Eusebio Tolliver MD   04/06/21    ECHO ADULT COMPLETE 04/06/2021 4/6/2021    Interpretation Summary  · LV: Estimated LVEF is 55 - 60%. Normal cavity size and systolic function (ejection fraction normal). Mild concentric hypertrophy. Wall motion: normal. Mild (grade 1) left ventricular diastolic dysfunction. · LA: Mildly dilated left atrium. Left Atrium volume index is 38.76 mL/m2. · RV: Mildly dilated right ventricle. · RA: Mildly dilated right atrium. · AV: Prosthetic aortic valve. Aortic valve mean gradient is 8.1 mmHg. There is a 25 mm St. Judes bioprosthetic aortic valve. Prosthesis is normal.  · MV: Mitral annular calcification. Mild mitral valve regurgitation is present. · TV: Mild tricuspid valve regurgitation is present. · AO: Mild ascending aorta dilatation. Ascending aorta diameter = 3.7 cm. · PA: Pulmonary arterial systolic pressure is 26 mmHg. Signed by: Eusebio Tolliver MD on 4/6/2021 12:26 PM    ASSESSMENT and PLAN    ICD-10-CM ICD-9-CM    1. S/P AVR (aortic valve replacement)  Z95.2 V43.3 ECHO ADULT COMPLETE   2. Dyslipidemia  E78.5 272.4    3. Type 2 diabetes mellitus with diabetic nephropathy, unspecified whether long term insulin use (HCC)  E11.21 250.40      583.81    4.  Stage 3 chronic kidney disease, unspecified whether stage 3a or 3b CKD (HCC)  N18.30 585.3    5. Hypothyroidism due to acquired atrophy of thyroid  E03.4 244.8      246.8    6. Essential hypertension  I10 401.9      Orders Placed This Encounter    ECHO ADULT COMPLETE     Standing Status:   Future     Standing Expiration Date:   11/11/2022     Order Specific Question:   Contrast Enhancement (Bubble Study, Definity, Optison) may be used if criteria listed in established evidence-based protocol has been identified. Answer:   Yes     Follow-up and Dispositions    · Return in about 6 months (around 11/11/2022). reviewed diet, exercise and weight control  cardiovascular risk and specific lipid/LDL goals reviewed  use of aspirin to prevent MI and TIA's discussed. Now S/p AVR. Echo report 4/21- normal valve function. Remains asymptomatic from cardiac standpoint. Effort tolerance is stable. Continue current meds.    F/u in 6 months

## 2022-06-14 ENCOUNTER — OFFICE VISIT (OUTPATIENT)
Dept: FAMILY MEDICINE CLINIC | Age: 77
End: 2022-06-14
Payer: MEDICARE

## 2022-06-14 VITALS
BODY MASS INDEX: 39.65 KG/M2 | HEART RATE: 48 BPM | WEIGHT: 277 LBS | OXYGEN SATURATION: 97 % | HEIGHT: 70 IN | DIASTOLIC BLOOD PRESSURE: 55 MMHG | TEMPERATURE: 97.9 F | RESPIRATION RATE: 18 BRPM | SYSTOLIC BLOOD PRESSURE: 142 MMHG

## 2022-06-14 DIAGNOSIS — I50.9 CONGESTIVE HEART FAILURE, UNSPECIFIED HF CHRONICITY, UNSPECIFIED HEART FAILURE TYPE (HCC): ICD-10-CM

## 2022-06-14 DIAGNOSIS — G89.29 OTHER CHRONIC PAIN: ICD-10-CM

## 2022-06-14 DIAGNOSIS — I10 ESSENTIAL HYPERTENSION: ICD-10-CM

## 2022-06-14 DIAGNOSIS — N18.30 STAGE 3 CHRONIC KIDNEY DISEASE, UNSPECIFIED WHETHER STAGE 3A OR 3B CKD (HCC): ICD-10-CM

## 2022-06-14 DIAGNOSIS — R39.9 LOWER URINARY TRACT SYMPTOMS (LUTS): ICD-10-CM

## 2022-06-14 DIAGNOSIS — Z00.00 MEDICARE ANNUAL WELLNESS VISIT, SUBSEQUENT: ICD-10-CM

## 2022-06-14 DIAGNOSIS — Z12.5 SCREENING FOR MALIGNANT NEOPLASM OF PROSTATE: ICD-10-CM

## 2022-06-14 DIAGNOSIS — R05.3 CHRONIC COUGH: Primary | ICD-10-CM

## 2022-06-14 DIAGNOSIS — E03.4 HYPOTHYROIDISM DUE TO ACQUIRED ATROPHY OF THYROID: ICD-10-CM

## 2022-06-14 DIAGNOSIS — Z13.31 SCREENING FOR DEPRESSION: ICD-10-CM

## 2022-06-14 DIAGNOSIS — E11.51 TYPE 2 DIABETES MELLITUS WITH DIABETIC PERIPHERAL ANGIOPATHY WITHOUT GANGRENE, UNSPECIFIED WHETHER LONG TERM INSULIN USE (HCC): ICD-10-CM

## 2022-06-14 DIAGNOSIS — M19.90 OSTEOARTHRITIS, UNSPECIFIED OSTEOARTHRITIS TYPE, UNSPECIFIED SITE: ICD-10-CM

## 2022-06-14 PROCEDURE — G0439 PPPS, SUBSEQ VISIT: HCPCS | Performed by: FAMILY MEDICINE

## 2022-06-14 PROCEDURE — 1123F ACP DISCUSS/DSCN MKR DOCD: CPT | Performed by: FAMILY MEDICINE

## 2022-06-14 PROCEDURE — 99215 OFFICE O/P EST HI 40 MIN: CPT | Performed by: FAMILY MEDICINE

## 2022-06-14 PROCEDURE — 1101F PT FALLS ASSESS-DOCD LE1/YR: CPT | Performed by: FAMILY MEDICINE

## 2022-06-14 PROCEDURE — 3044F HG A1C LEVEL LT 7.0%: CPT | Performed by: FAMILY MEDICINE

## 2022-06-14 PROCEDURE — G8417 CALC BMI ABV UP PARAM F/U: HCPCS | Performed by: FAMILY MEDICINE

## 2022-06-14 PROCEDURE — G8754 DIAS BP LESS 90: HCPCS | Performed by: FAMILY MEDICINE

## 2022-06-14 PROCEDURE — G8536 NO DOC ELDER MAL SCRN: HCPCS | Performed by: FAMILY MEDICINE

## 2022-06-14 PROCEDURE — G8510 SCR DEP NEG, NO PLAN REQD: HCPCS | Performed by: FAMILY MEDICINE

## 2022-06-14 PROCEDURE — G8427 DOCREV CUR MEDS BY ELIG CLIN: HCPCS | Performed by: FAMILY MEDICINE

## 2022-06-14 PROCEDURE — G8753 SYS BP > OR = 140: HCPCS | Performed by: FAMILY MEDICINE

## 2022-06-14 RX ORDER — COLCHICINE 0.6 MG/1
0.6 TABLET ORAL
Qty: 30 TABLET | Refills: 0 | Status: SHIPPED | OUTPATIENT
Start: 2022-06-14

## 2022-06-14 RX ORDER — TRAMADOL HYDROCHLORIDE 50 MG/1
50 TABLET ORAL
Qty: 30 TABLET | Refills: 0 | Status: SHIPPED | OUTPATIENT
Start: 2022-06-14 | End: 2022-07-14

## 2022-06-14 NOTE — PROGRESS NOTES
\[facility] [place] [facility]\""  Emily Moore comes in for f/u care. Pedal edema: seen by the Cardiologist and nephrologist, advised to take lasix but he is reluctant. Seen at the [place] and awaits radiological studies of the lower extremities. Cardiac: Patient has a history of valvular heart disease. He status post aortic valve replacement. Denies chest pain, shortness of breath or palpitations. He does get occasional pedal edema. He is on furosemide, spironolactone, hydralazine, aspirin. We will continue current treatment plan. CKD: Patient has chronic kidney disease stage III. He has been followed up by the nephrologist.  Plan is to avoid any nephrotoxic medications. HTN: Patient has hypertension. Blood pressure slightly elevated today. He gets pedal edema with the amlodipine. We discussed medication adjustment. I will increase hydralazine to 100 mg 3 x day. We will discontinue amlodipine. He is also on spironolactone. GERD: Patient has gastroesophageal reflux disease. He gets heartburn on and off. He is on Prilosec. Denies hematemesis or dark stools. Gout: Patient has gout arthritis. He takes colchicine for acute gout attack. He would like a refill of medication to take as needed for acute gout. Prescription will be sent in. He takes Uloric daily for gout prevention. DM2: Patient has type 2 diabetes mellitus. He has been followed up with endocrinologist.  He is on insulin. His HbA1c is 6.1. He will intensify lifestyle and dietary modification. We will continue with the current treatment plan. Chronic cough: Patient has a chronic cough that comes on and off. This has been ongoing for months. He denies fever, chills, shortness of breath or wheeze. He request to see the pulmonologist due to chronicity of the cough. Previous chest x-rays have been stable. Referral will be placed. Hypothyroidism: Patient has hypothyroidism. He is on levothyroxine. We will continue with this medication.   Recheck labs at next visit. Neuropathy: Patient has a history of neuropathy with numbness and tingling of the lower extremities especially the feet. He has lumbar neuropathy. He takes Cymbalta and this helps with symptoms. Chronic back pain: Patient has chronic low back pain with lumbar neuropathy. He is on tramadol as needed for pain. Also takes Tylenol. Needs a refill of the tramadol. Prescription will be sent in. Dyslipidemia: Patient has dyslipidemia. He is on rosuvastatin. Stable on medication. We will recheck lipid panel at next visit. He should take a diet low in polysaturated fats. Obesity: Patient has a BMI of 39.75. He will intensify lifestyle and dietary modification. Past Medical History  Past Medical History:   Diagnosis Date    Diabetes ([facility] [address] 1995    Dyslipidemia 3/1/2017    Fractures 1995    R Knee/ Tib fib fracture/surgery    Gout 2010    Graves disease 1/21/2015    High cholesterol 2005    HTN (hypertension) 1990    Osteoarthritis 2013    Severe aortic stenosis 3/29/2017    Spinal stenosis of lumbar region 3/1/2016    Spondylolisthesis 3/1/2016    Thyroid trouble     Type 2 diabetes mellitus with diabetic nephropathy ([facility] 4/28/2015       Surgical History  Past Surgical History:   Procedure Laterality Date    HX HIP REPLACEMENT Left 2009    HX LUMBAR FUSION      HX ORTHOPAEDIC Right     R knee/Tibfib surgery        Medications  Current Outpatient Medications   Medication Sig Dispense Refill    colchicine (MITIGARE) 0.6 mg capsule Take 1 Capsule by mouth daily. 30 Capsule 2    fluticasone propionate (FLONASE) 50 mcg/actuation nasal spray INHALE 2 SPRAYS IN BOTH NOSTRILS ROUTE ONCE DAILY 1 Each 1    furosemide (LASIX) 20 mg tablet Take 1 Tablet by mouth daily. 30 Tablet 0    budesonide-formoteroL (SYMBICORT) 160-4.5 mcg/actuation HFAA Take 2 Puffs by inhalation two (2) times a day. 10.2 g 5    montelukast (SINGULAIR) 10 mg tablet Take 10 mg by mouth daily.       hydrALAZINE (APRESOLINE) 25 mg tablet Take 50 mg by mouth three (3) times daily. On hold per Patient      dextran 70/hypromellose (ARTIFICIAL TEARS, PF, OP) Apply 1 Drop to eye six (6) times daily.  omeprazole (PRILOSEC) 40 mg capsule Take 1 Capsule by mouth two (2) times a day. 60 Capsule 1    magnesium (MAGINEX) 61 mg (615 mg) TbEC Take 615 mg by mouth.  rosuvastatin (Crestor) 20 mg tablet Take 20 mg by mouth nightly.  levothyroxine (SYNTHROID) 150 mcg tablet Take 1 Tab by mouth Daily (before breakfast). 30 Tab 2    spironolactone (ALDACTONE) 25 mg tablet Take 1 Tab by mouth daily.  b complex vitamins tablet Take 1 Tab by mouth daily.  Alpha Lipoic Acid 600 mg cap Take 1 Capsule by mouth daily.  aspirin (ASPIRIN) 325 mg tablet Take 325 mg by mouth daily.  ascorbic acid, vitamin C, (VITAMIN C) 250 mg tablet Take 250 mg by mouth.  DULoxetine (CYMBALTA) 60 mg capsule Take 1 Cap by mouth daily. 30 Cap 1    capsicum oleoresin 0.025 % topical cream Apply  to affected area three (3) times daily.  diclofenac (VOLTAREN) 1 % gel Apply  to affected area four (4) times daily.  magnesium chloride (MAG DELAY) 64 mg delayed release tablet Take 8 tablets 3 x day      insulin glargine (LANTUS) 100 unit/mL injection 30 Units by SubCUTAneous route daily. Take 30 units once daily  Indications: type 2 diabetes mellitus 3 Vial 3    febuxostat (ULORIC) 40 mg tab tablet Take 1 Tab by mouth daily. Indications: GOUT PREVENTION 90 Tab 1    insulin aspart (NOVOLOG) 100 unit/mL injection 6 Units by SubCUTAneous route Before breakfast, lunch, and dinner. 6 units before meals       glucose blood VI test strips (ASCENSIA AUTODISC VI, ONE TOUCH ULTRA TEST VI) strip Dispense precision extra test strips 3 x day to check blood glucose 200 Each 3    Cholecalciferol, Vitamin D3, 1,000 unit cap Take 3,000 Units by mouth daily.  amLODIPine (NORVASC) 10 mg tablet Take 1 Tablet by mouth daily.  (Patient not taking: Reported on 2022) 90 Tablet 1    testosterone (ANDROGEL) 20.25 mg/1.25 gram (1.62 %) gel Apply  to affected area as needed. Allergies  Allergies   Allergen Reactions    Watermelon Anaphylaxis and Swelling    Citric Acid Rash    Peach (Prunus Persica) Itching       Family History  Family History   Problem Relation Age of Onset    Hypertension Mother     Thyroid Disease Mother     Hypertension Father     Diabetes Father     Drug Abuse Brother        Social History  Social History     Socioeconomic History    Marital status:      Spouse name: Not on file    Number of children: Not on file    Years of education: Not on file    Highest education level: Not on file   Occupational History    Not on file   Tobacco Use    Smoking status: Former Smoker     Packs/day: 1.00     Years: 7.00     Pack years: 7.00     Types: Cigarettes     Start date: 1969     Quit date: 1995     Years since quittin.4    Smokeless tobacco: Never Used   Vaping Use    Vaping Use: Never used   Substance and Sexual Activity    Alcohol use: No    Drug use: No    Sexual activity: Yes     Partners: Female   Other Topics Concern     Service Yes     Comment: Served in [address] Blood Transfusions No    Caffeine Concern No    Occupational Exposure No    Hobby Hazards No    Sleep Concern No    Stress Concern No    Weight Concern Yes    Special Diet No    Back Care Yes     Comment: Lower Back pain when walking    Exercise No    Bike Helmet No    Seat Belt Yes    Self-Exams Yes   Social History Narrative    Not on file     Social Determinants of Health     Financial Resource Strain:     Difficulty of Paying Living Expenses: Not on file   Food Insecurity:     Worried About Running Out of Food in the Last Year: Not on file    Christiano of Food in the Last Year: Not on file   Transportation Needs:     Lack of Transportation (Medical):  Not on file    Lack of Transportation (Non-Medical): Not on file   Physical Activity:     Days of Exercise per Week: Not on file    Minutes of Exercise per Session: Not on file   Stress:     Feeling of Stress : Not on file   Social Connections:     Frequency of Communication with Friends and Family: Not on file    Frequency of Social Gatherings with Friends and Family: Not on file    Attends Sabianism Services: Not on file    Active Member of [address] [place] [address] or Organizations: Not on file    Attends Club or Organization Meetings: Not on file    Marital Status: Not on file   Intimate Partner Violence:     Fear of Current or Ex-Partner: Not on file    Emotionally Abused: Not on file    Physically Abused: Not on file    Sexually Abused: Not on file   Housing Stability:     Unable to Pay for Housing in the Last Year: Not on file    Number of [place] in the Last Year: Not on file    Unstable Housing in the Last Year: Not on file       Review of Systems  Review of Systems - Review of all systems is negative except as noted above in the HPI.     Vital Signs  Visit Vitals  BP (!) 142/55 (BP 1 Location: Left upper arm, BP Patient Position: Sitting, BP Cuff Size: Large adult)   Pulse (!) 48   Temp 97.9 °F (36.6 °C) (Temporal)   Resp 18   Ht 5' 10\" (1.778 m)   Wt 277 lb (125.6 kg)   SpO2 97%   BMI 39.75 kg/m²       Physical Exam  Physical Examination: General appearance - oriented to person, place, and time and overweight  Mental status - affect appropriate to mood  Neck - supple, no significant adenopathy  Lymphatics - no palpable lymphadenopathy, no hepatosplenomegaly  Chest - no tachypnea, retractions or cyanosis  Heart - normal rate and regular rhythm  Abdomen - no rebound tenderness noted  Back exam - limited range of motion  Neurological - abnormal neurological exam unchanged from prior examinations  Musculoskeletal - osteoarthritic changes noted in both hands  Extremities - intact peripheral pulses      Results  Results for orders placed or performed in visit on 04/07/22   AMB POC HEMOGLOBIN A1C   Result Value Ref Range    Hemoglobin A1c (POC) 6.1 %   AMB POC URINE, MICROALBUMIN, SEMIQUANTITATIVE   Result Value Ref Range    Microalbumin urine (POC) 150 MG/L    Microalbumin/creat ratio (POC)  <30 MG/G    Creatinine 200 MG/DL       ASSESSMENT and PLAN    ICD-10-CM ICD-9-CM    1. Chronic cough  R05.3 786.2 REFERRAL TO PULMONARY DISEASE   2. Stage 3 chronic kidney disease, unspecified whether stage 3a or 3b CKD ([facility])  N18.30 585.3    3. Essential hypertension  I10 401.9    4. Type 2 diabetes mellitus with diabetic peripheral angiopathy without gangrene, unspecified whether long term insulin use ([facility])  E11.51 250.70      443.81    5. Congestive heart failure, unspecified HF chronicity, unspecified heart failure type ([facility])  I50.9 428.0    6. Osteoarthritis, unspecified osteoarthritis type, unspecified site  M19.90 715.90 traMADoL (ULTRAM) 50 mg tablet   7. Hypothyroidism due to acquired atrophy of thyroid  E03.4 244.8      246.8    8. Other chronic pain  G89.29 338.29 traMADoL (ULTRAM) 50 mg tablet   9. Lower urinary tract symptoms (LUTS)  R39.9 788.99 PSA SCREENING (SCREENING)   10. Medicare annual wellness visit, subsequent  Z00.00 V70.0    11. Screening for malignant neoplasm of prostate  Z12.5 V76.44 PSA SCREENING (SCREENING)   12.  Screening for depression  Z13.31 V79.0 [address]     lab results and schedule of future lab studies reviewed with patient  reviewed diet, exercise and weight control  cardiovascular risk and specific lipid/LDL goals reviewed  reviewed medications and side effects in detail  specific diabetic recommendations: low cholesterol diet, weight control and daily exercise discussed, home glucose monitoring emphasized, all medications, side effects and compliance discussed carefully, annual eye examinations at Ophthalmology discussed, glycohemoglobin and other lab monitoring discussed and long term diabetic complications discussed      I have discussed the diagnosis with the patient and the intended plan of care as seen in the above orders. The patient has received an after-visit summary and questions were answered concerning future plans. I have discussed medication, side effects, and warnings with the patient in detail. The patient verbalized understanding and is in agreement with the plan of care. The patient will contact the office with any additional concerns. I spent at least 45 minutes on this visit with this established patient. Matt Kendall MD    PLEASE NOTE:   This document has been produced using voice recognition software.  Unrecognized errors in transcription may be present

## 2022-06-14 NOTE — PROGRESS NOTES
This is the Subsequent Medicare Annual Wellness Exam, performed 12 months or more after the Initial AWV or the last Subsequent AWV    I have reviewed the patient's medical history in detail and updated the computerized patient record. Assessment/Plan   Education and counseling provided:  Are appropriate based on today's review and evaluation    1. Medicare annual wellness visit, subsequent    2. Screening for malignant neoplasm of prostate  - PSA SCREENING (SCREENING); Future    3. Screening for depression  - [address] [facility] ANNUAL       Depression Risk Factor Screening     3 most recent PHQ Screens 6/14/2022   Little interest or pleasure in doing things Not at all   Feeling down, depressed, irritable, or hopeless Not at all   Total Score PHQ 2 0   Trouble falling or staying asleep, or sleeping too much -   Feeling tired or having little energy -   Poor appetite, weight loss, or overeating -   Feeling bad about yourself - or that you are a failure or have let yourself or your family down -   Trouble concentrating on things such as school, work, reading, or watching TV -   Moving or speaking so slowly that other people could have noticed; or the opposite being so fidgety that others notice -   Thoughts of being better off dead, or hurting yourself in some way -   PHQ 9 Score -   How difficult have these problems made it for you to do your work, take care of your home and get along with others -       Alcohol & Drug Abuse Risk Screen    Do you average more than 1 drink per night or more than 7 drinks a week: No    In the past three months have you have had more than 4 drinks containing alcohol on one occasion: No          Functional Ability and Level of Safety    Hearing: Hearing is good. Activities of Daily Living: The home contains: no safety equipment.   Patient does total self care      Ambulation: with mild difficulty     Fall Risk:  Fall Risk Assessment, last 12 mths 4/7/2022   Able to walk? Yes   Fall in past 12 months? 0   Do you feel unsteady? 0   Are you worried about falling 0   Is TUG test greater than 12 seconds? -   Is the gait abnormal? -   Number of falls in past 12 months -   Fall with injury? -      Abuse Screen:  Patient is not abused       Cognitive Screening    Has your family/caregiver stated any concerns about your memory: no     Cognitive Screening: Normal - Verbal Fluency Test    Health Maintenance Due     Health Maintenance Due   Topic Date Due    Shingrix Vaccine Age 49> (1 of 2) Never done    Eye Exam Retinal or Dilated  10/14/2017    Foot Exam Q1  06/14/2022    Medicare Yearly Exam  06/15/2022       Patient Care Team   Patient Care Team:  Vero Celaya MD as PCP - General (Family Medicine)  Vero Celaya MD as PCP - [facility]  Tyrel Crawley MD (Nephrology)  Kaveh Romero MD (Cardiovascular Disease Physician)  Nayely Arevalo MD (Orthopedic Surgery)  Zeinab Schmidt MD as Surgeon (Cardiothoracic Surgery)  Kristin Flores MD (Physical Medicine and Rehabilitation Physician)  Efrain Church MD (Pulmonary Disease)    History   Luis Dalal comes in for Medicare wellness exam.    Patient Active Problem List   Diagnosis Code    Graves disease E05.00    Heartburn R12    Gout M10.9    Type 2 diabetes mellitus with diabetic nephropathy ([address] E11.21    Hypomagnesemia E83.42    Renovascular hypertension I15.0    Murmur, cardiac R01.1    CKD (chronic kidney disease) stage 3, GFR 30-59 ml/min ([address] N18.30    Hypothyroidism E03.9    Dyslipidemia E78.5    SOB (shortness of breath) R06.02    Pre-op evaluation Z01.818    Severe aortic stenosis I35.0    ACP (advance care planning) Z71.89    Osteoarthritis M19.90    Spinal stenosis of lumbar region M48.061    Spondylolisthesis M43.10    S/P AVR (aortic valve replacement) Z95.2    Essential hypertension I10    Severe obesity (BMI 35.0-39. 9) with comorbidity ([facility] E66.01    DDD (degenerative disc disease), lumbar M51.36    Lumbar radiculopathy M54.16    Spondylolisthesis, acquired M43.10    Lumbar postlaminectomy syndrome M96.1    Spinal stenosis, lumbar region with neurogenic claudication M48.062    Sacroiliitis (HCC) M46.1    Lumbar neuritis M54.16    Gastroesophageal reflux disease K21.9    Cough R05.9    Elevated troponin R77.8    Traumatic arthritis of right knee M12.561     Past Medical History:   Diagnosis Date    Diabetes ([facility] 1995    Dyslipidemia 3/1/2017    Fractures 1995    R Knee/ Tib fib fracture/surgery    Gout 2010    Graves disease 1/21/2015    High cholesterol 2005    HTN (hypertension) 1990    Osteoarthritis 2013    Severe aortic stenosis 3/29/2017    Spinal stenosis of lumbar region 3/1/2016    Spondylolisthesis 3/1/2016    Thyroid trouble     Type 2 diabetes mellitus with diabetic nephropathy ([facility] 4/28/2015      Past Surgical History:   Procedure Laterality Date    HX HIP REPLACEMENT Left 2009    HX LUMBAR FUSION      HX ORTHOPAEDIC Right     R knee/Tibfib surgery     Current Outpatient Medications   Medication Sig Dispense Refill    colchicine (MITIGARE) 0.6 mg capsule Take 1 Capsule by mouth daily. 30 Capsule 2    fluticasone propionate (FLONASE) 50 mcg/actuation nasal spray INHALE 2 SPRAYS IN BOTH NOSTRILS ROUTE ONCE DAILY 1 Each 1    furosemide (LASIX) 20 mg tablet Take 1 Tablet by mouth daily. 30 Tablet 0    budesonide-formoteroL (SYMBICORT) 160-4.5 mcg/actuation HFAA Take 2 Puffs by inhalation two (2) times a day. 10.2 g 5    montelukast (SINGULAIR) 10 mg tablet Take 10 mg by mouth daily.  hydrALAZINE (APRESOLINE) 25 mg tablet Take 50 mg by mouth three (3) times daily. On hold per Patient      dextran 70/hypromellose (ARTIFICIAL TEARS, PF, OP) Apply 1 Drop to eye six (6) times daily.  omeprazole (PRILOSEC) 40 mg capsule Take 1 Capsule by mouth two (2) times a day.  60 Capsule 1    amLODIPine (NORVASC) 10 mg tablet Take 1 Tablet by mouth daily. 90 Tablet 1    magnesium (MAGINEX) 61 mg (615 mg) TbEC Take 615 mg by mouth.  rosuvastatin (Crestor) 20 mg tablet Take 20 mg by mouth nightly.  levothyroxine (SYNTHROID) 150 mcg tablet Take 1 Tab by mouth Daily (before breakfast). 30 Tab 2    spironolactone (ALDACTONE) 25 mg tablet Take 1 Tab by mouth daily.  b complex vitamins tablet Take 1 Tab by mouth daily.  Alpha Lipoic Acid 600 mg cap Take 1 Capsule by mouth daily.  aspirin (ASPIRIN) 325 mg tablet Take 325 mg by mouth daily.  ascorbic acid, vitamin C, (VITAMIN C) 250 mg tablet Take 250 mg by mouth.  DULoxetine (CYMBALTA) 60 mg capsule Take 1 Cap by mouth daily. 30 Cap 1    capsicum oleoresin 0.025 % topical cream Apply  to affected area three (3) times daily.  diclofenac (VOLTAREN) 1 % gel Apply  to affected area four (4) times daily.  magnesium chloride (MAG DELAY) 64 mg delayed release tablet Take 8 tablets 3 x day      insulin glargine (LANTUS) 100 unit/mL injection 30 Units by SubCUTAneous route daily. Take 30 units once daily  Indications: type 2 diabetes mellitus 3 Vial 3    febuxostat (ULORIC) 40 mg tab tablet Take 1 Tab by mouth daily. Indications: GOUT PREVENTION 90 Tab 1    insulin aspart (NOVOLOG) 100 unit/mL injection 6 Units by SubCUTAneous route Before breakfast, lunch, and dinner. 6 units before meals       glucose blood VI test strips (ASCENSIA AUTODISC VI, ONE TOUCH ULTRA TEST VI) strip Dispense precision extra test strips 3 x day to check blood glucose 200 Each 3    Cholecalciferol, Vitamin D3, 1,000 unit cap Take 3,000 Units by mouth daily.  testosterone (ANDROGEL) 20.25 mg/1.25 gram (1.62 %) gel Apply  to affected area as needed.        Allergies   Allergen Reactions    Watermelon Anaphylaxis and Swelling    Citric Acid Rash    Peach (Prunus Persica) Itching       Family History   Problem Relation Age of Onset    Hypertension Mother     Thyroid Disease Mother     Hypertension Father     Diabetes Father     Drug Abuse Brother      Social History     Tobacco Use    Smoking status: Former Smoker     Packs/day: 1.00     Years: 7.00     Pack years: 7.00     Types: Cigarettes     Start date: 1969     Quit date: 1995     Years since quittin.4    Smokeless tobacco: Never Used   Substance Use Topics    Alcohol use: No     I have discussed the diagnosis with the patient and the intended plan of care as seen in the above orders. The patient has received an after-visit summary and questions were answered concerning future plans. I have discussed medication, side effects, and warnings with the patient in detail. The patient verbalized understanding and is in agreement with the plan of care. The patient will contact the office with any additional concerns. Personalized preventative plan of care was discussed, printed and given to patient.     Coleen Willard MD

## 2022-06-16 ENCOUNTER — HOSPITAL ENCOUNTER (OUTPATIENT)
Dept: LAB | Age: 77
Discharge: HOME | End: 2022-06-16
Payer: MEDICARE

## 2022-06-16 DIAGNOSIS — Z12.5 SCREENING FOR MALIGNANT NEOPLASM OF PROSTATE: ICD-10-CM

## 2022-06-16 DIAGNOSIS — R39.9 LOWER URINARY TRACT SYMPTOMS (LUTS): ICD-10-CM

## 2022-06-16 PROCEDURE — 36415 COLL VENOUS BLD VENIPUNCTURE: CPT

## 2022-06-16 PROCEDURE — 84153 ASSAY OF PSA TOTAL: CPT

## 2022-06-17 LAB — PSA SERPL-MCNC: 2.2 NG/ML (ref 0–4)

## 2022-06-20 NOTE — PATIENT INSTRUCTIONS
Medicare Wellness Visit, Male    The best way to live healthy is to have a lifestyle where you eat a well-balanced diet, exercise regularly, limit alcohol use, and quit all forms of tobacco/nicotine, if applicable. Regular preventive services are another way to keep healthy. Preventive services (vaccines, screening tests, monitoring & exams) can help personalize your care plan, which helps you manage your own care. Screening tests can find health problems at the earliest stages, when they are easiest to treat. [facility] follows the current, evidence-based guidelines published by the [facility] Yonathan ([facility]) when recommending preventive services for our patients. Because we follow these guidelines, sometimes recommendations change over time as research supports it. (For example, a prostate screening blood test is no longer routinely recommended for men with no symptoms). Of course, you and your doctor may decide to screen more often for some diseases, based on your risk and co-morbidities (chronic disease you are already diagnosed with). Preventive services for you include:  - Medicare offers their members a free annual wellness visit, which is time for you and your primary care provider to discuss and plan for your preventive service needs. Take advantage of this benefit every year!  -All adults over age 72 should receive the recommended pneumonia vaccines. Current USPSTF guidelines recommend a series of two vaccines for the best pneumonia protection.   -All adults should have a flu vaccine yearly and tetanus vaccine every 10 years.  -All adults age 48 and older should receive the shingles vaccines (series of two vaccines).        -All adults age 38-68 who are overweight should have a diabetes screening test once every three years.   -Other screening tests & preventive services for persons with diabetes include: an eye exam to screen for diabetic retinopathy, a kidney function test, a foot exam, and stricter control over your cholesterol.   -Cardiovascular screening for adults with routine risk involves an electrocardiogram (ECG) at intervals determined by the provider.   -Colorectal cancer screening should be done for adults age 54-65 with no increased risk factors for colorectal cancer. There are a number of acceptable methods of screening for this type of cancer. Each test has its own benefits and drawbacks. Discuss with your provider what is most appropriate for you during your annual wellness visit. The different tests include: colonoscopy (considered the best screening method), a fecal occult blood test, a fecal DNA test, and sigmoidoscopy.  -All adults born between [facility] should be screened once for Hepatitis C.  -An Abdominal Aortic Aneurysm (AAA) Screening is recommended for men age 73-68 who has ever smoked in their lifetime.      Here is a list of your current Health Maintenance items (your personalized list of preventive services) with a due date:  Health Maintenance Due   Topic Date Due    Shingles Vaccine (1 of 2) Never done    Eye Exam  10/14/2017    Diabetic Foot Care  06/14/2022

## 2022-06-22 ENCOUNTER — OFFICE VISIT (OUTPATIENT)
Dept: FAMILY MEDICINE CLINIC | Age: 77
End: 2022-06-22
Payer: MEDICARE

## 2022-06-22 VITALS
RESPIRATION RATE: 24 BRPM | HEIGHT: 70 IN | TEMPERATURE: 98 F | BODY MASS INDEX: 38.94 KG/M2 | DIASTOLIC BLOOD PRESSURE: 58 MMHG | HEART RATE: 42 BPM | SYSTOLIC BLOOD PRESSURE: 156 MMHG | WEIGHT: 272 LBS | OXYGEN SATURATION: 97 %

## 2022-06-22 DIAGNOSIS — R10.10 PAIN OF UPPER ABDOMEN: ICD-10-CM

## 2022-06-22 DIAGNOSIS — R00.1 BRADYCARDIA: ICD-10-CM

## 2022-06-22 DIAGNOSIS — K21.9 GASTROESOPHAGEAL REFLUX DISEASE, UNSPECIFIED WHETHER ESOPHAGITIS PRESENT: ICD-10-CM

## 2022-06-22 PROCEDURE — 1123F ACP DISCUSS/DSCN MKR DOCD: CPT | Performed by: FAMILY MEDICINE

## 2022-06-22 PROCEDURE — 99213 OFFICE O/P EST LOW 20 MIN: CPT | Performed by: FAMILY MEDICINE

## 2022-06-22 PROCEDURE — 93000 ELECTROCARDIOGRAM COMPLETE: CPT | Performed by: FAMILY MEDICINE

## 2022-06-22 PROCEDURE — G8753 SYS BP > OR = 140: HCPCS | Performed by: FAMILY MEDICINE

## 2022-06-22 PROCEDURE — G8417 CALC BMI ABV UP PARAM F/U: HCPCS | Performed by: FAMILY MEDICINE

## 2022-06-22 PROCEDURE — G8754 DIAS BP LESS 90: HCPCS | Performed by: FAMILY MEDICINE

## 2022-06-22 PROCEDURE — G8427 DOCREV CUR MEDS BY ELIG CLIN: HCPCS | Performed by: FAMILY MEDICINE

## 2022-06-22 PROCEDURE — G8536 NO DOC ELDER MAL SCRN: HCPCS | Performed by: FAMILY MEDICINE

## 2022-06-22 RX ORDER — FAMOTIDINE 40 MG/1
40 TABLET, FILM COATED ORAL
Qty: 90 TABLET | Refills: 0 | Status: SHIPPED | OUTPATIENT
Start: 2022-06-22 | End: 2022-09-20

## 2022-06-22 RX ORDER — OMEPRAZOLE 40 MG/1
40 CAPSULE, DELAYED RELEASE ORAL DAILY
Qty: 180 CAPSULE | Refills: 1 | Status: SHIPPED | OUTPATIENT
Start: 2022-06-22 | End: 2022-09-20

## 2022-06-22 NOTE — PROGRESS NOTES
HPI  This is a 69year old AA male with a PMH significant for DM, HTN, HLD, graves disease, sever aortic stenosis s/p replacement, Gout who is here for abdominal pain    Abdominal pain  States that it has been going on for one week. Intermittent. Located in the upper stomach across top of abdomen. At it's worst he states it is a 10/10. He does have history of GERD and worsening cough at night time. We will increase dose of omeprazole to 40 mg in the morning and start famotidine at bedtime. Since the last visit about 1 week ago he has had weight loss of about 5 lbs. Attests to bloating and belching. I will refer patient to gastroenterology. Bradycardia  States that he has been more short of breath recently. States that he has not noticed slower heart beat as his normal is usually in the 50s and he is on metoprolol. He has never been in the 45s. States that he has been more light headed and disease of late and occasionally has chest fullness. Due to symptomatic bradycardia and EKG was done and showed ventricular PVC and bigemeny. Concern for possible AV block and ACS rule out needed so patient was advised to go to ER as soon as possible.      Past Medical History  Past Medical History:   Diagnosis Date    Diabetes ([address] 1995    Dyslipidemia 3/1/2017    Fractures 1995    R Knee/ Tib fib fracture/surgery    Gout 2010    Graves disease 1/21/2015    High cholesterol 2005    HTN (hypertension) 1990    Osteoarthritis 2013    Severe aortic stenosis 3/29/2017    Spinal stenosis of lumbar region 3/1/2016    Spondylolisthesis 3/1/2016    Thyroid trouble     Type 2 diabetes mellitus with diabetic nephropathy ([address] 4/28/2015       Surgical History  Past Surgical History:   Procedure Laterality Date    HX HIP REPLACEMENT Left 2009    HX LUMBAR FUSION      HX ORTHOPAEDIC Right     R knee/Tibfib surgery        Medications  Current Outpatient Medications   Medication Sig Dispense Refill    omeprazole (PRILOSEC) 40 mg capsule Take 1 Capsule by mouth daily for 90 days. 180 Capsule 1    famotidine (PEPCID) 40 mg tablet Take 1 Tablet by mouth nightly for 90 days. 90 Tablet 0    colchicine 0.6 mg tablet Take 1 Tablet by mouth daily as needed for Gout or Pain. 30 Tablet 0    traMADoL (ULTRAM) 50 mg tablet Take 1 Tablet by mouth daily as needed for Pain for up to 30 days. 30 Tablet 0    fluticasone propionate (FLONASE) 50 mcg/actuation nasal spray INHALE 2 SPRAYS IN BOTH NOSTRILS ROUTE ONCE DAILY 1 Each 1    furosemide (LASIX) 20 mg tablet Take 1 Tablet by mouth daily. 30 Tablet 0    budesonide-formoteroL (SYMBICORT) 160-4.5 mcg/actuation HFAA Take 2 Puffs by inhalation two (2) times a day. 10.2 g 5    hydrALAZINE (APRESOLINE) 25 mg tablet Take 50 mg by mouth three (3) times daily. On hold per Patient      dextran 70/hypromellose (ARTIFICIAL TEARS, PF, OP) Apply 1 Drop to eye six (6) times daily.  rosuvastatin (Crestor) 20 mg tablet Take 20 mg by mouth nightly.  levothyroxine (SYNTHROID) 150 mcg tablet Take 1 Tab by mouth Daily (before breakfast). 30 Tab 2    spironolactone (ALDACTONE) 25 mg tablet Take 1 Tab by mouth daily.  b complex vitamins tablet Take 1 Tab by mouth daily.  Alpha Lipoic Acid 600 mg cap Take 1 Capsule by mouth daily.  aspirin (ASPIRIN) 325 mg tablet Take 325 mg by mouth daily.  ascorbic acid, vitamin C, (VITAMIN C) 250 mg tablet Take 250 mg by mouth.  DULoxetine (CYMBALTA) 60 mg capsule Take 1 Cap by mouth daily. 30 Cap 1    diclofenac (VOLTAREN) 1 % gel Apply  to affected area four (4) times daily.  insulin glargine (LANTUS) 100 unit/mL injection 30 Units by SubCUTAneous route daily. Take 30 units once daily  Indications: type 2 diabetes mellitus 3 Vial 3    febuxostat (ULORIC) 40 mg tab tablet Take 1 Tab by mouth daily.  Indications: GOUT PREVENTION 90 Tab 1    insulin aspart (NOVOLOG) 100 unit/mL injection 6 Units by SubCUTAneous route Before breakfast, lunch, and dinner. 6 units before meals       glucose blood VI test strips (ASCENSIA AUTODISC VI, ONE TOUCH ULTRA TEST VI) strip Dispense precision extra test strips 3 x day to check blood glucose 200 Each 3    testosterone (ANDROGEL) 20.25 mg/1.25 gram (1.62 %) gel Apply  to affected area as needed.  Cholecalciferol, Vitamin D3, 1,000 unit cap Take 3,000 Units by mouth daily.  montelukast (SINGULAIR) 10 mg tablet Take 10 mg by mouth daily. (Patient not taking: Reported on 2022)      magnesium (MAGINEX) 61 mg (615 mg) TbEC Take 615 mg by mouth.  capsicum oleoresin 0.025 % topical cream Apply  to affected area three (3) times daily.  (Patient not taking: Reported on 2022)      magnesium chloride (MAG DELAY) 64 mg delayed release tablet Take 8 tablets 3 x day         Allergies  Allergies   Allergen Reactions    Watermelon Anaphylaxis and Swelling    Citric Acid Rash    Peach (Prunus Persica) Itching       Family History  Family History   Problem Relation Age of Onset    Hypertension Mother     Thyroid Disease Mother     Hypertension Father     Diabetes Father     Drug Abuse Brother        Social History  Social History     Socioeconomic History    Marital status:      Spouse name: Not on file    Number of children: Not on file    Years of education: Not on file    Highest education level: Not on file   Occupational History    Not on file   Tobacco Use    Smoking status: Former Smoker     Packs/day: 1.00     Years: 7.00     Pack years: 7.00     Types: Cigarettes     Start date: 1969     Quit date: 1995     Years since quittin.4    Smokeless tobacco: Never Used   Vaping Use    Vaping Use: Never used   Substance and Sexual Activity    Alcohol use: No    Drug use: No    Sexual activity: Yes     Partners: Female   Other Topics Concern     Service Yes     Comment: Served in [email or web address] Blood Transfusions No    Caffeine Concern No    Occupational Exposure No    Hobby Hazards No    Sleep Concern No    Stress Concern No    Weight Concern Yes    Special Diet No    Back Care Yes     Comment: Lower Back pain when walking    Exercise No    Bike Helmet No    Seat Belt Yes    Self-Exams Yes   Social History Narrative    Not on file     Social Determinants of Health     Financial Resource Strain:     Difficulty of Paying Living Expenses: Not on file   Food Insecurity:     Worried About Running Out of Food in the Last Year: Not on file    [employer] of Food in the Last Year: Not on file   Transportation Needs:     Lack of Transportation (Medical): Not on file    Lack of Transportation (Non-Medical): Not on file   Physical Activity:     Days of Exercise per Week: Not on file    Minutes of Exercise per Session: Not on file   Stress:     Feeling of Stress : Not on file   Social Connections:     Frequency of Communication with Friends and Family: Not on file    Frequency of Social Gatherings with Friends and Family: Not on file    Attends Roman [employer] Services: Not on file    Active Member of [address] [place] [address] [employer] or Organizations: Not on file    Attends Club or Organization Meetings: Not on file    Marital Status: Not on file   Intimate Partner Violence:     Fear of Current or Ex-Partner: Not on file    Emotionally Abused: Not on file    Physically Abused: Not on file    Sexually Abused: Not on file   Housing Stability:     Unable to Pay for Housing in the Last Year: Not on file    Number of [place] in the Last Year: Not on file    Unstable Housing in the Last Year: Not on file       Review of Systems  Review of Systems   Constitutional: Positive for weight loss. Negative for chills and fever. Respiratory: Positive for cough. Negative for shortness of breath. Cardiovascular: Negative for chest pain and leg swelling. Gastrointestinal: Positive for abdominal pain and heartburn. Negative for blood in stool, diarrhea, nausea and vomiting. Genitourinary: Negative for flank pain. Musculoskeletal: Positive for back pain. Skin: Negative for rash. Neurological: Positive for dizziness. Negative for headaches. Vital Signs  Visit Vitals  BP (!) 156/58   Pulse (!) 42   Temp 98 °F (36.7 °C) (Temporal)   Resp 24   Ht 5' 10\" (1.778 m)   Wt 272 lb (123.4 kg)   SpO2 97%   BMI 39.03 kg/m²         Physical Exam  Physical Exam  Constitutional:       Appearance: Normal appearance. HENT:      Head: Normocephalic and atraumatic. Nose: Nose normal.   Eyes:      General: No scleral icterus. Conjunctiva/sclera: Conjunctivae normal.   Cardiovascular:      Rate and Rhythm: Bradycardia present. Rhythm irregular. Heart sounds: Murmur heard. No gallop. Pulmonary:      Effort: Pulmonary effort is normal. No respiratory distress. Breath sounds: No wheezing. Abdominal:      General: There is no distension. Palpations: Abdomen is soft. Tenderness: There is no abdominal tenderness. There is guarding. There is no right CVA tenderness, left CVA tenderness or rebound. Comments: Right upper and epigastric region. Musculoskeletal:      Cervical back: Normal range of motion and neck supple. Right lower leg: Edema present. Left lower leg: Edema present. Comments: Trace pitting edema bilaterally    Skin:     Findings: No erythema or rash. Neurological:      Mental Status: He is alert and oriented to person, place, and time. Mental status is at baseline. Results  Results for orders placed or performed during the hospital encounter of 06/16/22   PSA SCREENING (SCREENING)   Result Value Ref Range    Prostate Specific Ag 2.2 0.0 - 4.0 ng/mL       ASSESSMENT and PLAN  1.  Bradycardia  -chest fullness and lightheadedness  - AMB POC EKG ROUTINE W/ 12 LEADS, INTER & REP: ventricular bigeminy, PVC's and nonspecific ST=T wave abnormalities, irregular rhythm with HR of 44   -Patient was sent to ER for symptomatic bradycardia     2. Pain of upper abdomen  -Rule out ACS vs other cardiac pathology      3. Gastroesophageal reflux disease, unspecified whether esophagitis present  - REFERRAL TO GASTROENTEROLOGY  - omeprazole (PRILOSEC) 40 mg capsule; Take 1 Capsule by mouth daily for 90 days. Dispense: 180 Capsule; Refill: 1  - famotidine (PEPCID) 40 mg tablet; Take 1 Tablet by mouth nightly for 90 days. Dispense: 90 Tablet; Refill: 0           Follow-up and Dispositions    · Return in about 3 months (around 9/22/2022). I have discussed the diagnosis with the patient and the intended plan of care as seen in the above orders. The patient has received an after-visit summary and questions were answered concerning future plans. I have discussed medication, side effects, and warnings with the patient in detail. The patient verbalized understanding and is in agreement with the plan of care. The patient will contact the office with any additional concerns. I spent at least 30 minutes on this visit with this established patient. Kaylynn Guajardo MD    PLEASE NOTE:   This document has been produced using voice recognition software.  Unrecognized errors in transcription may be present

## 2022-06-22 NOTE — PATIENT INSTRUCTIONS
Gastroesophageal Reflux Disease (GERD): Care Instructions  Overview     Gastroesophageal reflux disease (GERD) is the backward flow of stomach acid into the esophagus. The esophagus is the tube that leads from your throat to your stomach. A one-way valve prevents the stomach acid from backing up into this tube. But when you have GERD, this valve does not close tightly enough. This can also cause pain and swelling in your esophagus. (This is called esophagitis.)  If you have mild GERD symptoms including heartburn, you may be able to control the problem with antacids or over-the-counter medicine. You can also make lifestyle changes to help reduce your symptoms. These include changing your diet and eating habits, such as not eating late at night and losing weight. Follow-up care is a key part of your treatment and safety. Be sure to make and go to all appointments, and call your doctor if you are having problems. It's also a good idea to know your test results and keep a list of the medicines you take. How can you care for yourself at home? · Take your medicines exactly as prescribed. Call your doctor if you think you are having a problem with your medicine. · Your doctor may recommend over-the-counter medicine. For mild or occasional indigestion, antacids, such as Tums, Gaviscon, Mylanta, or Maalox, may help. Your doctor also may recommend over-the-counter acid reducers, such as famotidine (Pepcid AC), cimetidine (Tagamet HB), or omeprazole (Prilosec). Read and follow all instructions on the label. If you use these medicines often, talk with your doctor. · Change your eating habits. ? It's best to eat several small meals instead of two or three large meals. ? After you eat, wait 2 to 3 hours before you lie down. ? Avoid foods that make your symptoms worse.  These may include chocolate, mint, alcohol, pepper, spicy foods, high-fat foods, or drinks with caffeine in them, such as tea, coffee, marguerite, or energy drinks. If your symptoms are worse after you eat a certain food, you may want to stop eating it to see if your symptoms get better. · Do not smoke or chew tobacco. Smoking can make GERD worse. If you need help quitting, talk to your doctor about stop-smoking programs and medicines. These can increase your chances of quitting for good. · If you have GERD symptoms at night, raise the head of your bed 6 to 8 inches by putting the frame on blocks or placing a foam wedge under the head of your mattress. (Adding extra pillows does not work.)  · Do not wear tight clothing around your middle. · Lose weight if you need to. Losing just 5 to 10 pounds can help. When should you call for help? Call your doctor now or seek immediate medical care if:    · You have new or different belly pain.     · Your stools are black and tarlike or have streaks of blood. Watch closely for changes in your health, and be sure to contact your doctor if:    · Your symptoms have not improved after 2 days.     · Food seems to catch in your throat or chest.   Where can you learn more? Go to http://www.gray.com/  Enter W167 in the search box to learn more about \"Gastroesophageal Reflux Disease (GERD): Care Instructions. \"  Current as of: September 8, 2021               Content Version: 13.2  © 2006-2022 [employer]. Care instructions adapted under license by [employer] (which disclaims liability or warranty for this information). If you have questions about a medical condition or this instruction, always ask your healthcare professional. [place] [address] any warranty or liability for your use of this information. Bradycardia: Care Instructions  Your Care Instructions     Bradycardia is a slow heart rate. A slow heart rate can be normal and healthy. Or it could be a sign of a problem with the heart's electrical system.  If your heart beats too slowly, it may not supply your body with enough blood. This can make you weak or dizzy. Or it may make you pass out. Bradycardia can be caused by many things. This includes medicine, certain medical conditions, and changes in the heart that are the result of aging. How bradycardia is treated depends on what is causing it. Treatments include treating another health problem, changing a medicine, and getting a pacemaker. Treatment also depends on the symptoms. If bradycardia doesn't cause symptoms, it may not be treated. You and your doctor can decide what treatment is right for you. Follow-up care is a key part of your treatment and safety. Be sure to make and go to all appointments, and call your doctor if you are having problems. It's also a good idea to know your test results and keep a list of the medicines you take. How can you care for yourself at home? · Take your medicines exactly as prescribed. Call your doctor if you think you are having a problem with your medicine. If your bradycardia is caused by another disease, your doctor will try to treat the disease. If it is caused by heart medicines, he or she will adjust your medicines. · Make lifestyle changes to improve your heart health. ? Eat a heart-healthy diet that includes vegetables, fruits, nuts, beans, lean meat, fish, and whole grains. Limit alcohol, sodium, and sugar. ? Get regular exercise. Try for 30 minutes on most days of the week. If you do not have other heart problems, you likely do not have limits on the type or level of activity that you can do. You may want to walk, swim, bike, or do other activities. Ask your doctor what level of exercise is safe for you.  ? Stay at a healthy weight. Lose weight if you need to.  ? Do not smoke. If you need help quitting, talk to your doctor about stop-smoking programs and medicines. These can increase your chances of quitting for good.   ? Manage other health problems such as high blood pressure, high cholesterol, and diabetes. · If you get a pacemaker, you will get information about it. When should you call for help? Call 911 anytime you think you may need emergency care. For example, call if:    · You have symptoms of sudden heart failure. These may include:  ? Severe trouble breathing. ? A fast or irregular heartbeat. ? Coughing up pink, foamy mucus. ? You passed out.     · You have symptoms of a stroke. These may include:  ? Sudden numbness, tingling, weakness, or loss of movement in your face, arm, or leg, especially on only one side of your body. ? Sudden vision changes. ? Sudden trouble speaking. ? Sudden confusion or trouble understanding simple statements. ? Sudden problems with walking or balance. ? A sudden, severe headache that is different from past headaches. Call your doctor now or seek immediate medical care if:    · You have new or changed symptoms of heart failure, such as:  ? New or increased shortness of breath. ? New or worse swelling in your legs, ankles, or feet. ? Sudden weight gain, such as more than 2 to 3 pounds in a day or 5 pounds in a week. (Your doctor may suggest a different range of weight gain.)  ? Feeling dizzy or lightheaded or like you may faint. ? Feeling so tired or weak that you cannot do your usual activities. ? Not sleeping well. Shortness of breath wakes you at night. You need extra pillows to prop yourself up to breathe easier. Watch closely for changes in your health, and be sure to contact your doctor if:    · You do not get better as expected. Where can you learn more? Go to http://www.gray.com/  Enter I370 in the search box to learn more about \"Bradycardia: Care Instructions. \"  Current as of: January 10, 2022               Content Version: 13.2  © 1462-9266 [employer]. Care instructions adapted under license by [employer] (which disclaims liability or warranty for this information).  If you have questions about a medical condition or this instruction, always ask your healthcare professional. [place] [address] any warranty or liability for your use of this information.

## 2022-06-22 NOTE — PROGRESS NOTES
1. \"Have you been to the ER, urgent care clinic since your last visit? Hospitalized since your last visit? \" No    2. \"Have you seen or consulted any other health care providers outside of the [address] [place] [address] since your last visit? \" No     3. For patients aged 39-70: Has the patient had a colonoscopy / FIT/ Cologuard? NA - based on age      If the patient is female:    4. For patients aged 41-77: Has the patient had a mammogram within the past 2 years? NA - based on age or sex      11. For patients aged 21-65: Has the patient had a pap smear?  NA - based on age or sex

## 2022-06-27 ENCOUNTER — PATIENT OUTREACH (OUTPATIENT)
Dept: CASE MANAGEMENT | Age: 77
End: 2022-06-27

## 2022-06-27 NOTE — PROGRESS NOTES
Care Transitions Initial Call    Call within 2 business days of discharge: Yes     Patient: Samir Santos Patient : 1945 MRN: [id number]    Last Discharge [address]       Complaint Diagnosis Description Type Department Provider    18   Admission (Discharged) [facility] Sebastian Cole MD        Was this an external facility discharge? Yes, 2022 - 2022  Discharge Facility: [facility] for epigastric/stomach burning, lightheaded palpitation. Challenges to be reviewed by the provider   Additional needs identified to be addressed with provider: no  none         Method of communication with provider : none    Discussed COVID-19 related testing which was not done at this time. Test results were not done. Patient informed of results, if available? no     Advance Care Planning:   Does patient have an Advance Directive: not on file. Inpatient Readmission Risk score: No data recorded  Was this a readmission? no   Patient stated reason for the admission: epigastric/stomach burning, lightheaded palpitation. Patients top risk factors for readmission: chronic medical condition, recent hospital admission. Interventions to address risk factors: closely follow up with PCP, take all medications as prescribed. Call CTN or PCP office for any questions, concerns and/o needs. Care Transition Nurse (CTN) contacted the patient by telephone to perform post hospital discharge assessment. Verified name and  with patient as identifiers. Provided introduction to self, and explanation of the CTN role. Patient reports that he is doing well. No new or worsening of symptoms reported by Pt. At this time. Pt. Reports that symptoms are better. No questions, concerns and/or needs at this time as per Pt. Reminded Pt. Of his upcoming PCP appt.      Reminded Pt. to go to the nearest emergency room for chest pain, shortness of breath, returning of symptoms that brought him to the emergency room and/or worsening of symptoms. Pt. Verbalized and repeated back understanding. CTN reviewed discharge instructions, medical action plan and red flags with patient who verbalized understanding. Were discharge instructions available to patient? yes. Reviewed appropriate site of care based on symptoms and resources available to patient including: PCP and When to call 911. Patient given an opportunity to ask questions and does not have any further questions or concerns at this time. The patient agrees to contact the PCP office for questions related to their healthcare. Medication reconciliation was not performed with patient, Pt. Reports that he is currently not at home and can't do medication review without being home. Referral to Pharm D needed: Will defer with Pt. PCP. Home Health/Outpatient orders at discharge: none noted at this time and none as per Pt. Durable Medical Equipment ordered at discharge:  none noted at this time and none as per Pt. Discussed follow-up appointments. If no appointment was previously scheduled, appointment scheduling offered: yes. Is follow up appointment scheduled within 7 days of discharge? yes. [facility] follow up appointment(s):   Future Appointments   Date Time Provider Nat Brand   6/29/2022  2:30 PM Jordy Teague MD [facility] BS AMB   7/5/2022  1:15 PM Zoie Quintanilla MD [facility] BS AMB   9/22/2022  8:00 AM PPA SPIROMETRY [facility] BS AMB   9/22/2022  9:15 AM Ya Mortensen MD [facility] BS AMB   10/17/2022 10:30 AM Camilo Perez MD [facility] BS AMB   11/11/2022 11:00 AM Zoie Quintanilla MD [facility] BS AMB   6/14/2023 10:30 AM Aroldo Perez MD [facility]     Non-[facility] follow up appointment(s): none noted. Plan for follow-up call in 7-10 days based on severity of symptoms and risk factors. Plan for next call: follow up PCP apt. attendance, assess symptoms , assess for any needs. CTN provided contact information for future needs.     Goals Addressed This Visit's Progress     Prevent complications post hospitalization. Patient will attend PCP appt as scheduled. Pt. will continue to take all medications as prescribed. Pt. will call CTN or PCP office for any questions, concerns and/or needs.  Understands red flags post discharge. Pt. Will go to the nearest emergency room for chest pain, shortness of breath, returning of symptoms that brought him to the emergency room and/or worsening of symptoms.

## 2022-06-28 ENCOUNTER — PATIENT OUTREACH (OUTPATIENT)
Dept: CASE MANAGEMENT | Age: 77
End: 2022-06-28

## 2022-06-28 NOTE — PROGRESS NOTES
Care Transitions Initial Call    Call within 2 business days of discharge: Yes     Patient: Heber Ragsdale Patient : 1945 MRN: [id number]    Last Discharge [address]       Complaint Diagnosis Description Type Department Provider    18   Admission (Discharged) Ovi Blanco MD          Care Transition Nurse (CTN) contacted the patient by telephone to perform post hospital discharge assessment. Verified name and  with patient as identifiers. Provided introduction to self, and explanation of the CTN role. Patient reports that he is doing better. Pt. States that he is unable to do medrec as he wants to finish what he is doing. No questions, concerns and/or needs verbalized by Pt. At this time. Offer sooner PCP appointment., Pt. States that he prefers to contact PCP office to reschedule his PCP appt. Pt. Kept the conversation short.      [facility] follow up appointment(s):   Future Appointments   Date Time Provider Nat Brand   2022  1:15 PM Pratik Alfonso MD [facility] BS AMB   2022  8:00 AM PPA SPIROMETRY [facility] BS AMB   2022  9:15 AM Jocelyn Mendosa MD [facility] BS AMB   10/17/2022 10:30 AM Dinora Perez MD [facility] BS AMB   2022 11:00 AM Pratik Alfonso MD [facility] BS AMB   2023 10:30 AM Dinora Perez MD [facility] BS AMB

## 2022-07-05 ENCOUNTER — OFFICE VISIT (OUTPATIENT)
Dept: CARDIOLOGY CLINIC | Age: 77
End: 2022-07-05
Payer: MEDICARE

## 2022-07-05 VITALS
WEIGHT: 272 LBS | HEIGHT: 70 IN | OXYGEN SATURATION: 97 % | SYSTOLIC BLOOD PRESSURE: 152 MMHG | HEART RATE: 66 BPM | BODY MASS INDEX: 38.94 KG/M2 | DIASTOLIC BLOOD PRESSURE: 72 MMHG

## 2022-07-05 DIAGNOSIS — N18.30 STAGE 3 CHRONIC KIDNEY DISEASE, UNSPECIFIED WHETHER STAGE 3A OR 3B CKD (HCC): ICD-10-CM

## 2022-07-05 DIAGNOSIS — E66.01 SEVERE OBESITY (BMI 35.0-39.9) WITH COMORBIDITY (HCC): ICD-10-CM

## 2022-07-05 DIAGNOSIS — I10 ESSENTIAL HYPERTENSION: ICD-10-CM

## 2022-07-05 DIAGNOSIS — I50.32 CHRONIC DIASTOLIC CONGESTIVE HEART FAILURE (HCC): Primary | ICD-10-CM

## 2022-07-05 DIAGNOSIS — E78.5 DYSLIPIDEMIA: ICD-10-CM

## 2022-07-05 DIAGNOSIS — Z95.2 S/P AVR (AORTIC VALVE REPLACEMENT): ICD-10-CM

## 2022-07-05 DIAGNOSIS — E11.21 TYPE 2 DIABETES MELLITUS WITH DIABETIC NEPHROPATHY, UNSPECIFIED WHETHER LONG TERM INSULIN USE (HCC): ICD-10-CM

## 2022-07-05 PROCEDURE — 3044F HG A1C LEVEL LT 7.0%: CPT | Performed by: INTERNAL MEDICINE

## 2022-07-05 PROCEDURE — G8427 DOCREV CUR MEDS BY ELIG CLIN: HCPCS | Performed by: INTERNAL MEDICINE

## 2022-07-05 PROCEDURE — 1101F PT FALLS ASSESS-DOCD LE1/YR: CPT | Performed by: INTERNAL MEDICINE

## 2022-07-05 PROCEDURE — G8754 DIAS BP LESS 90: HCPCS | Performed by: INTERNAL MEDICINE

## 2022-07-05 PROCEDURE — G8417 CALC BMI ABV UP PARAM F/U: HCPCS | Performed by: INTERNAL MEDICINE

## 2022-07-05 PROCEDURE — 1123F ACP DISCUSS/DSCN MKR DOCD: CPT | Performed by: INTERNAL MEDICINE

## 2022-07-05 PROCEDURE — G8432 DEP SCR NOT DOC, RNG: HCPCS | Performed by: INTERNAL MEDICINE

## 2022-07-05 PROCEDURE — 99214 OFFICE O/P EST MOD 30 MIN: CPT | Performed by: INTERNAL MEDICINE

## 2022-07-05 PROCEDURE — G8536 NO DOC ELDER MAL SCRN: HCPCS | Performed by: INTERNAL MEDICINE

## 2022-07-05 PROCEDURE — G8753 SYS BP > OR = 140: HCPCS | Performed by: INTERNAL MEDICINE

## 2022-07-05 RX ORDER — FUROSEMIDE 20 MG/1
20 TABLET ORAL DAILY
Qty: 90 TABLET | Refills: 1 | Status: SHIPPED | OUTPATIENT
Start: 2022-07-05

## 2022-07-05 RX ORDER — ROSUVASTATIN CALCIUM 40 MG/1
40 TABLET, COATED ORAL
Qty: 90 TABLET | Refills: 1 | Status: SHIPPED | OUTPATIENT
Start: 2022-07-05

## 2022-07-05 RX ORDER — LOSARTAN POTASSIUM 25 MG/1
25 TABLET ORAL DAILY
COMMUNITY
Start: 2022-06-25 | End: 2022-07-05 | Stop reason: SDUPTHER

## 2022-07-05 RX ORDER — LOSARTAN POTASSIUM 25 MG/1
25 TABLET ORAL DAILY
Qty: 90 TABLET | Refills: 1 | Status: SHIPPED | OUTPATIENT
Start: 2022-07-05

## 2022-07-05 NOTE — PATIENT INSTRUCTIONS
Medications Discontinued During This Encounter   Medication Reason    magnesium (MAGINEX) 61 mg (007 mg) TbEC DUPLICATE ORDER    capsicum oleoresin 0.025 % topical cream LIST CLEANUP    furosemide (LASIX) 20 mg tablet DISCONTINUED BY ANOTHER CLINICIAN    montelukast (SINGULAIR) 10 mg tablet Not A Current Medication    losartan (COZAAR) 25 mg tablet REORDER      After the recommended changes have been made in blood pressure medicines, patient advised to keep BP/HR(pulse rate) chart twice daily and bring us results in next 4 to 5 days. Patient may send the results via \"My Chart\" if desired. Please rest for 5-10 minutes before checking blood pressure. Sit on a comfortable chair without crossing the legs and put your arm on a table. We recommend that you use an upper arm cuff. Check the blood pressure 3 times each time you check the blood pressure and record the lowest reading. If you check the blood pressure in both arms, use the higher reading. Learning About the [facility] Diet  What is the Mediterranean diet? The Mediterranean diet is a style of eating rather than a diet plan. It features foods eaten in [place], [place], [place] and [place], and other countries along the [facility]. It emphasizes eating foods like fish, fruits, vegetables, beans, high-fiber breads and whole grains, nuts, and olive oil. This style of eating includes limited red meat, cheese, and sweets. Why choose the Mediterranean diet? A Mediterranean-style diet may improve heart health. It contains more fat than other heart-healthy diets. But the fats are mainly from nuts, unsaturated oils (such as fish oils and olive oil), and certain nut or seed oils (such as canola, soybean, or flaxseed oil). These fats may help protect the heart and blood vessels. How can you get started on the Mediterranean diet? Here are some things you can do to switch to a more Mediterranean way of eating.   What to eat  · Eat a variety of fruits and vegetables each day, such as grapes, blueberries, tomatoes, broccoli, peppers, figs, olives, spinach, eggplant, beans, lentils, and chickpeas. · Eat a variety of whole-grain foods each day, such as oats, brown rice, and whole wheat bread, pasta, and couscous. · Eat fish at least 2 times a week. Try tuna, salmon, mackerel, lake trout, herring, or sardines. · Eat moderate amounts of low-fat dairy products, such as milk, cheese, or yogurt. · Eat moderate amounts of poultry and eggs. · Choose healthy (unsaturated) fats, such as nuts, olive oil, and certain nut or seed oils like canola, soybean, and flaxseed. · Limit unhealthy (saturated) fats, such as butter, palm oil, and coconut oil. And limit fats found in animal products, such as meat and dairy products made with whole milk. Try to eat red meat only a few times a month in very small amounts. · Limit sweets and desserts to only a few times a week. This includes sugar-sweetened drinks like soda. The Mediterranean diet may also include red wine with your meal1 glass each day for women and up to 2 glasses a day for men. Tips for eating at home  · Use herbs, spices, garlic, lemon zest, and citrus juice instead of salt to add flavor to foods. · Add avocado slices to your sandwich instead of carroll. · Have fish for lunch or dinner instead of red meat. Brush the fish with olive oil, and broil or grill it. · Sprinkle your salad with seeds or nuts instead of cheese. · Cook with olive or canola oil instead of butter or oils that are high in saturated fat. · Switch from 2% milk or whole milk to 1% or fat-free milk. · Dip raw vegetables in a vinaigrette dressing or hummus instead of dips made from mayonnaise or sour cream.  · Have a piece of fruit for dessert instead of a piece of cake. Try baked apples, or have some dried fruit. Tips for eating out  · Try broiled, grilled, baked, or poached fish instead of having it fried or breaded.   · Ask your  to have your meals prepared with olive oil instead of butter. · Order dishes made with marinara sauce or sauces made from olive oil. Avoid sauces made from cream or mayonnaise. · Choose whole-grain breads, whole wheat pasta and pizza crust, brown rice, beans, and lentils. · Cut back on butter or margarine on bread. Instead, you can dip your bread in a small amount of olive oil. · Ask for a side salad or grilled vegetables instead of french fries or chips. Where can you learn more? Go to http://www.balderas.com/  Enter O407 in the search box to learn more about \"Learning About the Mediterranean Diet. \"  Current as of: September 8, 2021               Content Version: 13.2  © 2006-2022 [employer]. Care instructions adapted under license by [employer] (which disclaims liability or warranty for this information). If you have questions about a medical condition or this instruction, always ask your healthcare professional. [place] [address] any warranty or liability for your use of this information.

## 2022-07-05 NOTE — PROGRESS NOTES
1. Have you been to the ER, urgent care clinic since your last visit? Hospitalized since your last visit? Yes, [facility]    2. Have you seen or consulted any other health care providers outside of the [address] [place] [address] since your last visit? Include any pap smears or colon screening. Yes Where: [facility]     3. Since your last visit, have you had any of the following symptoms? shortness of breath, dizziness and swelling in legs/arms. 4.  Have you had any blood work, X-rays or cardiac testing? Yes Where: [facility]     Requested: NO     In [facility]: YES    5. Where do you normally have your labs drawn? [facility]    6. Do you need any refills today?    NO

## 2022-07-05 NOTE — PROGRESS NOTES
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 F/u AVR, CHF, hyperlipidemia, obesity  History of diabetes, CKD, hypothyroidism    7/22 follow-up of recent hospitalization for chest pain. Shortness of Breath  The history is provided by the patient. This is a chronic problem. The problem occurs intermittently. The current episode started more than 1 week ago. The problem has not changed since onset. Associated symptoms include leg swelling. Pertinent negatives include no fever, no headaches, no ear pain, no neck pain, no cough, no sputum production, no hemoptysis, no wheezing, no PND, no orthopnea, no chest pain, no syncope, no vomiting, no rash, no leg pain and no claudication. The problem's precipitants include exercise (walking 150 ft; ). Associated medical issues do not include chronic lung disease, CAD or heart failure. Follow-up  Associated symptoms include shortness of breath. Pertinent negatives include no chest pain and no headaches. Leg Swelling  The history is provided by the patient. This is a new problem. The current episode started more than 1 week ago (early 2021). The problem has not changed since onset. Associated symptoms include shortness of breath. Pertinent negatives include no chest pain and no headaches. The symptoms are aggravated by standing. The symptoms are relieved by sleep. Review of Systems   Constitutional: Negative for chills, diaphoresis, fever, malaise/fatigue and weight loss. HENT: Negative for ear discharge, ear pain, hearing loss, nosebleeds and tinnitus. Eyes: Negative for blurred vision and discharge. Respiratory: Positive for shortness of breath. Negative for cough, hemoptysis, sputum production, wheezing and stridor. Cardiovascular: Positive for leg swelling. Negative for chest pain, palpitations, orthopnea, claudication, syncope and PND. Gastrointestinal: Negative for diarrhea, heartburn, nausea and vomiting.    Genitourinary: Negative for dysuria and hematuria. Musculoskeletal: Negative for joint pain, myalgias and neck pain. Skin: Negative for itching and rash. Neurological: Negative for dizziness, tingling, tremors, focal weakness, seizures, loss of consciousness, weakness and headaches. Endo/Heme/Allergies: Negative for polydipsia. Does not bruise/bleed easily. Psychiatric/Behavioral: Negative for depression, substance abuse and suicidal ideas. The patient does not have insomnia. Family History   Problem Relation Age of Onset    Hypertension Mother     Thyroid Disease Mother     Hypertension Father     Diabetes Father     Drug Abuse Brother        Past Medical History:   Diagnosis Date    Diabetes ([facility] [address]     Dyslipidemia 3/1/2017    Fractures     R Knee/ Tib fib fracture/surgery    Gout     Graves disease 2015    High cholesterol     HTN (hypertension)     Osteoarthritis     Severe aortic stenosis 3/29/2017    Spinal stenosis of lumbar region 3/1/2016    Spondylolisthesis 3/1/2016    Thyroid trouble     Type 2 diabetes mellitus with diabetic nephropathy ([facility] [address] 2015       Past Surgical History:   Procedure Laterality Date    HX HIP REPLACEMENT Left 2009    HX LUMBAR FUSION      HX ORTHOPAEDIC Right     R knee/Tibfib surgery       Social History     Tobacco Use    Smoking status: Former Smoker     Packs/day: 1.00     Years: 7.00     Pack years: 7.00     Types: Cigarettes     Start date: 1969     Quit date: 1995     Years since quittin.5    Smokeless tobacco: Never Used   Substance Use Topics    Alcohol use: No       Allergies   Allergen Reactions    Watermelon Anaphylaxis and Swelling    Citric Acid Rash    Peach (Prunus Persica) Itching       Outpatient Medications Marked as Taking for the 22 encounter (Office Visit) with Izaiah Wray MD   Medication Sig Dispense Refill    losartan (COZAAR) 25 mg tablet Take 25 mg by mouth daily.       omeprazole (PRILOSEC) 40 mg capsule Take 1 Capsule by mouth daily for 90 days. 180 Capsule 1    famotidine (PEPCID) 40 mg tablet Take 1 Tablet by mouth nightly for 90 days. 90 Tablet 0    colchicine 0.6 mg tablet Take 1 Tablet by mouth daily as needed for Gout or Pain. 30 Tablet 0    traMADoL (ULTRAM) 50 mg tablet Take 1 Tablet by mouth daily as needed for Pain for up to 30 days. 30 Tablet 0    fluticasone propionate (FLONASE) 50 mcg/actuation nasal spray INHALE 2 SPRAYS IN BOTH NOSTRILS ROUTE ONCE DAILY 1 Each 1    budesonide-formoteroL (SYMBICORT) 160-4.5 mcg/actuation HFAA Take 2 Puffs by inhalation two (2) times a day. 10.2 g 5    hydrALAZINE (APRESOLINE) 50 mg tablet Take 50 mg by mouth two (2) times a day. On hold per Patient      dextran 70/hypromellose (ARTIFICIAL TEARS, PF, OP) Apply 1 Drop to eye six (6) times daily.  rosuvastatin (Crestor) 20 mg tablet Take 20 mg by mouth nightly.  levothyroxine (SYNTHROID) 150 mcg tablet Take 1 Tab by mouth Daily (before breakfast). 30 Tab 2    spironolactone (ALDACTONE) 25 mg tablet Take 1 Tab by mouth daily.  b complex vitamins tablet Take 1 Tab by mouth daily.  Alpha Lipoic Acid 600 mg cap Take 1 Capsule by mouth daily.  aspirin (ASPIRIN) 325 mg tablet Take 325 mg by mouth daily.  ascorbic acid, vitamin C, (VITAMIN C) 250 mg tablet Take 250 mg by mouth.  DULoxetine (CYMBALTA) 60 mg capsule Take 1 Cap by mouth daily. 30 Cap 1    diclofenac (VOLTAREN) 1 % gel Apply  to affected area four (4) times daily.  magnesium chloride (MAG DELAY) 64 mg delayed release tablet Take 8 tablets 3 x day      insulin glargine (LANTUS) 100 unit/mL injection 30 Units by SubCUTAneous route daily. Take 30 units once daily  Indications: type 2 diabetes mellitus 3 Vial 3    febuxostat (ULORIC) 40 mg tab tablet Take 1 Tab by mouth daily.  Indications: GOUT PREVENTION 90 Tab 1    insulin aspart (NOVOLOG) 100 unit/mL injection 6 Units by SubCUTAneous route Before breakfast, lunch, and dinner. 6 units before meals       glucose blood VI test strips (ASCENSIA AUTODISC VI, ONE TOUCH ULTRA TEST VI) strip Dispense precision extra test strips 3 x day to check blood glucose 200 Each 3    testosterone (ANDROGEL) 20.25 mg/1.25 gram (1.62 %) gel Apply  to affected area as needed.  Cholecalciferol, Vitamin D3, 1,000 unit cap Take 3,000 Units by mouth daily. Visit Vitals  BP (!) 152/72 (BP 1 Location: Left upper arm, BP Patient Position: Sitting, BP Cuff Size: Adult)   Pulse 66   Ht 5' 10\" (1.778 m)   Wt 123.4 kg (272 lb)   SpO2 97%   BMI 39.03 kg/m²     Physical Exam  Constitutional:       General: He is not in acute distress. Appearance: He is well-developed. He is obese. He is not diaphoretic. HENT:      Head: Atraumatic. Mouth/Throat:      Pharynx: No oropharyngeal exudate. Eyes:      General: No scleral icterus. Right eye: No discharge. Left eye: No discharge. Conjunctiva/sclera: Conjunctivae normal.   Neck:      Thyroid: No thyromegaly. Vascular: No JVD. Trachea: No tracheal deviation. Cardiovascular:      Rate and Rhythm: Normal rate and regular rhythm. Heart sounds: Murmur (2/6 ejection systolic murmur best heard at right parasternal area) heard. No gallop. Comments: Surgical sternal scar  Pulmonary:      Effort: Pulmonary effort is normal. No respiratory distress. Breath sounds: No stridor. No wheezing, rhonchi or rales. Chest:      Chest wall: No tenderness. Abdominal:      Palpations: Abdomen is soft. Tenderness: There is no abdominal tenderness. There is no guarding or rebound. Musculoskeletal:         General: No tenderness. Normal range of motion. Cervical back: Normal range of motion and neck supple. Right lower leg: Edema (1) present. Left lower leg: Edema (1) present. Lymphadenopathy:      Cervical: No cervical adenopathy. Skin:     General: Skin is warm. Neurological:      Mental Status: He is alert and oriented to person, place, and time. Motor: No abnormal muscle tone. Psychiatric:         Behavior: Behavior normal.       ekg sinus rhythm with PVC, no acute st-t changes  Echo 02/2017:  SUMMARY:  Left ventricle: Systolic function was normal by visual assessment. Ejection fraction was estimated in the range of 60 % to 65 %. No obvious  wall motion abnormalities identified in the views obtained. Wall thickness  was at the upper limits of normal.    Aortic valve: Leaflets exhibited markedly increased thickness and reduced  mobility. There was moderate to severe stenosis. Valve peak gradient was  61 mmHg. Valve mean gradient was 39 mmHg. Estimated aortic valve area (by  VTI) was 0.5 cmï¾². ZACKERY likely overestimates severity of aortic stenosis due  to difficulty in measuring LVOT diameter. Aorta, systemic arteries: The root exhibited dilatation. 02/17/20   ECHO ADULT COMPLETE 02/19/2020 2/19/2020    Narrative · Normal cavity size and systolic function (ejection fraction normal). Mild concentric hypertrophy. Estimated left ventricular ejection fraction   is 55 - 60%. No regional wall motion abnormality noted. Mild (grade 1)   left ventricular diastolic dysfunction. · Mildly dilated right atrium. · There is a 25 mm St. Judes bioprosthetic aortic valve. Prosthesis is   normal.  · Mitral valve thickening. Mild mitral valve regurgitation is present. · Mild tricuspid valve regurgitation is present. · There is no evidence of pulmonary hypertension. · Mild aortic root dilatation. Signed by: Malena Garcia MD   04/06/21    ECHO ADULT COMPLETE 04/06/2021 4/6/2021    Interpretation Summary  · LV: Estimated LVEF is 55 - 60%. Normal cavity size and systolic function (ejection fraction normal). Mild concentric hypertrophy. Wall motion: normal. Mild (grade 1) left ventricular diastolic dysfunction. · LA: Mildly dilated left atrium.  Left Atrium volume index is 38.76 mL/m2. · RV: Mildly dilated right ventricle. · RA: Mildly dilated right atrium. · AV: Prosthetic aortic valve. Aortic valve mean gradient is 8.1 mmHg. There is a 25 mm St. Judes bioprosthetic aortic valve. Prosthesis is normal.  · MV: Mitral annular calcification. Mild mitral valve regurgitation is present. · TV: Mild tricuspid valve regurgitation is present. · AO: Mild ascending aorta dilatation. Ascending aorta diameter = 3.7 cm. · PA: Pulmonary arterial systolic pressure is 26 mmHg. Signed by: Mando Loredo MD on 4/6/2021 12:26 PM  6/22 carotid duplex    Conclusions: Mild plaque (<50%) is present in the bilateral internal carotid arteries that is not associated with a hemodynamically significant stenosis.     Antegrade flow with a normal hemodynamic profile was present in both vertebral arteries. 6/22 echo   CONCLUSIONS     * Mild MUSA with mildly dilated left ventricular cavity and low normal   appearing systolic function.  EF ~ 77%.     * The basal inferoseptal, and basal inferolateral appear hypokinetic.     * Left ventricular diastolic function: normal for age.   Sofy Cross* Bioprosthetic AVR in situ which appears well seated and appropriately   functioning.     * There is mild mitral valve regurgitation.     * Unable to estimate pulmonary arterial pressure due to inadequate tricuspid   regurgitant Doppler waveforms.     * Technically difficult and limited study.     * Complete transthoracic echocardiogram performed with: Ultrasound enhancing   agent. Comparison     * Compared to prior study from 2/30/2014, aortic stenosis is now mild. Wall   motion abnormalities are a new finding. 6/22 nuclear stress test  Narrative  Performed by RADIOLOGY  CONCLUSIONS     * Abnormal but low risk myocardial perfusion study.     * Small, mild grade, fixed distal apical wall defect suggestive of apical   thinning artifact. No convincing evidence of prior infarct or reversible   ischemia.   * Mildly dilated left ventricular cavity with mild global hypokinesis more   pronounced in the basal to mid inferior wall.  Basal inferoseptal wall appears   dyskinetic.  LVEF 40%.     * No TID. ASSESSMENT and PLAN     Ref Range & Units 12 d ago   Cholesterol 110 - 200 mg/dL 170    Triglyceride 40 - 149 mg/dL 105    HDL >=40 mg/dL 43    Cholesterol/HDL 0.0 - 5.0 4.0    Non-HDL Cholesterol <130 mg/dL 127    LDL CALCULATION 50 - 99 mg/dL 106 High     VLDL CALCULATION 8 - 30 mg/dL 21    LDL/HDL Ratio  2.5        reviewed diet, exercise and weight control  cardiovascular risk and specific lipid/LDL goals reviewed  use of aspirin to prevent MI and TIA's discussed. Now S/p AVR. Echo report 4/21- normal valve function. Remains asymptomatic from cardiac standpoint. Effort tolerance is stable. Continue current meds. F/u in 6 months      Diagnoses and all orders for this visit:    1. Chronic diastolic congestive heart failure (HCC)  -     furosemide (LASIX) 20 mg tablet; Take 1 Tablet by mouth daily. -     losartan (COZAAR) 25 mg tablet; Take 1 Tablet by mouth daily.  -     METABOLIC PANEL, BASIC; Future  -     MAGNESIUM; Future    2. Essential hypertension  -     losartan (COZAAR) 25 mg tablet; Take 1 Tablet by mouth daily.  -     METABOLIC PANEL, BASIC; Future  -     MAGNESIUM; Future  -     METABOLIC PANEL, BASIC; Future    3. S/P AVR (aortic valve replacement)    4. Dyslipidemia  -     rosuvastatin (Crestor) 40 mg tablet; Take 1 Tablet by mouth nightly. -     LIPID PANEL; Future  -     HEPATIC FUNCTION PANEL; Future    5. Severe obesity (BMI 35.0-39. 9) with comorbidity ([facility] [address]    6. Type 2 diabetes mellitus with diabetic nephropathy, unspecified whether long term insulin use (HCC)    7. Stage 3 chronic kidney disease, unspecified whether stage 3a or 3b CKD ([facility] [address]        Pertinent laboratory and test data reviewed and discussed with patient.   See patient instructions also for other medical advice given    Medications Discontinued During This Encounter   Medication Reason    magnesium (MAGINEX) 61 mg (818 mg) TbEC DUPLICATE ORDER    capsicum oleoresin 0.025 % topical cream LIST CLEANUP    furosemide (LASIX) 20 mg tablet DISCONTINUED BY ANOTHER CLINICIAN    montelukast (SINGULAIR) 10 mg tablet Not A Current Medication    losartan (COZAAR) 25 mg tablet REORDER       Follow-up and Dispositions    · Return in about 3 months (around 10/5/2022), or if symptoms worsen or fail to improve, for post test.       7/5/2022 CHF is in chronic decompensated state NYHA class II-III. Aortic valve functioning normally by recent echo and no ischemia by recent stress test.  Blood pressure is elevated. Restart Lasix and follow the home chart  Lipids are not controlled. Increase Crestor and follow the labs. Follow electrolytes and specially magnesium closely on diuretics. Patient takes megadoses of magnesium by endocrinology which will be continued for now. Diet weight and exercise discussed. Mediterranean diet guidelines given.

## 2022-07-06 ENCOUNTER — PATIENT OUTREACH (OUTPATIENT)
Dept: CASE MANAGEMENT | Age: 77
End: 2022-07-06

## 2022-07-06 NOTE — PROGRESS NOTES
Care Transitions Follow Up Call    Challenges to be reviewed by the provider   Additional needs identified to be addressed with provider: no  none           Method of communication with provider : none    Care Transition Nurse (CTN) contacted the patient by telephone to follow up after admission on 22. Verified name and  with patient as identifiers. Patient reports that he thinks that he is doing okay. Pt. States that when he woke up this morning his Bg was 85. Pt. States that it's  low than his usual Bg readings . Pt. Did admit eating light last night. Pt. Reports eating early at night like 5 or 6 pm. CTN informed Pt. That he can have light snack at night and he can check his Bg before sleeping. No new or  worsening of symptoms reported by Pt. At this time. No questions, concerns and/or needs at this time as per Pt. Pt. Kept the conversation short. CTN reviewed discharge instructions, medical action plan and red flags with patient and discussed any barriers to care and/or understanding of plan of care after discharge. Discussed appropriate site of care based on symptoms and resources available to patient including: PCP and When to call 911. The patient agrees to contact the PCP office for questions related to their healthcare.        [facility] follow up appointment(s):   Future Appointments   Date Time Provider Nat Brand   2022  8:00 AM PPA SPIROMETRY [facility] BS AMB   2022  9:15 AM Juancho Tatum MD [facility] BS AMB   10/10/2022 10:30 AM Ashley Pace MD [facility] BS AMB   10/17/2022 10:30 AM Briana Perez MD [facility] BS AMB   2022 11:00 AM Ashley Pace MD [facility] BS AMB   2023 10:30 AM Briana Perez MD [facility] BS AMB

## 2022-07-08 ENCOUNTER — HOSPITAL ENCOUNTER (OUTPATIENT)
Dept: LAB | Age: 77
Discharge: HOME | End: 2022-07-08
Payer: MEDICARE

## 2022-07-08 DIAGNOSIS — E78.5 DYSLIPIDEMIA: ICD-10-CM

## 2022-07-08 DIAGNOSIS — I10 ESSENTIAL HYPERTENSION: ICD-10-CM

## 2022-07-08 DIAGNOSIS — I50.32 CHRONIC DIASTOLIC CONGESTIVE HEART FAILURE (HCC): ICD-10-CM

## 2022-07-08 LAB
ALBUMIN SERPL-MCNC: 3.3 G/DL (ref 3.4–5)
ALBUMIN/GLOB SERPL: 0.8 {RATIO} (ref 0.8–1.7)
ALP SERPL-CCNC: 57 U/L (ref 45–117)
ALT SERPL-CCNC: 30 U/L (ref 16–61)
ANION GAP SERPL CALC-SCNC: 13 MMOL/L (ref 3–18)
AST SERPL W P-5'-P-CCNC: 17 U/L (ref 10–38)
BILIRUB DIRECT SERPL-MCNC: 0.1 MG/DL (ref 0–0.2)
BILIRUB SERPL-MCNC: 0.4 MG/DL (ref 0.2–1)
BUN SERPL-MCNC: 34 MG/DL (ref 7–18)
BUN/CREAT SERPL: 20 (ref 12–20)
CA-I BLD-MCNC: 9.7 MG/DL (ref 8.5–10.1)
CHLORIDE SERPL-SCNC: 107 MMOL/L (ref 100–111)
CHOLEST SERPL-MCNC: 146 MG/DL
CO2 SERPL-SCNC: 20 MMOL/L (ref 21–32)
CREAT SERPL-MCNC: 1.71 MG/DL (ref 0.6–1.3)
GLOBULIN SER CALC-MCNC: 4.4 G/DL (ref 2–4)
GLUCOSE SERPL-MCNC: 151 MG/DL (ref 74–99)
HDLC SERPL-MCNC: 50 MG/DL (ref 40–60)
HDLC SERPL: 2.9 {RATIO} (ref 0–5)
LDLC SERPL CALC-MCNC: 71.4 MG/DL (ref 0–100)
LIPID PROFILE,FLP: NORMAL
MAGNESIUM SERPL-MCNC: 2 MG/DL (ref 1.6–2.6)
POTASSIUM SERPL-SCNC: 4 MMOL/L (ref 3.5–5.5)
PROT SERPL-MCNC: 7.7 G/DL (ref 6.4–8.2)
SODIUM SERPL-SCNC: 140 MMOL/L (ref 136–145)
TRIGL SERPL-MCNC: 123 MG/DL (ref ?–150)
VLDLC SERPL CALC-MCNC: 24.6 MG/DL

## 2022-07-08 PROCEDURE — 80061 LIPID PANEL: CPT

## 2022-07-08 PROCEDURE — 36415 COLL VENOUS BLD VENIPUNCTURE: CPT

## 2022-07-08 PROCEDURE — 83735 ASSAY OF MAGNESIUM: CPT

## 2022-07-08 PROCEDURE — 80048 BASIC METABOLIC PNL TOTAL CA: CPT

## 2022-07-08 PROCEDURE — 80076 HEPATIC FUNCTION PANEL: CPT

## 2022-07-12 ENCOUNTER — PATIENT OUTREACH (OUTPATIENT)
Dept: CASE MANAGEMENT | Age: 77
End: 2022-07-12

## 2022-07-12 NOTE — PROGRESS NOTES
Care Transitions Follow Up Call      CTN  Attempt to contact patient via telephone on 7/12/22  for post hospital  follow up. Unable to reach. Left message on voicemail with office contact information. No Patient medical information left on message.       [facility]):   Future Appointments   Date Time Provider Nat Brand   9/22/2022  8:00 AM PPA SPIROMETRY [facility] BS AMB   9/22/2022  9:15 AM Lainey Watkins MD [facility] AMB   10/10/2022 10:30 AM Mariama Peña MD [facility]  AMB   10/17/2022 10:30 AM Irene Perez MD [facility] AMB   11/11/2022 11:00 AM Mariama ePña MD [facility]  AMB   6/14/2023 10:30 AM Irene Perez MD [facility] AMB

## 2022-07-13 NOTE — PROGRESS NOTES
CERTIFICATE OF WORK       July 13, 2022      Re: Homer Alonzo  [address]      This is to certify that Homer Alonzo has been under my care from 7/13/2022 and can return to work on 7/13/22    RESTRICTIONS: n/a      REMARKS: n/a        SIGNATURE:___________________________________________          Judson Champion MD  [id number]  Dept Phone: [phone] Informed patient of normal result at this time. Patient verbalized understanding at this time.

## 2022-07-15 NOTE — PROGRESS NOTES
Informed patient of normal results at this time. Patient verbalized understanding at this time.
Please let patient know his magnesium level is within normal. He should take his supplements as previously prescribed.   Rosita Sharpe MD
declines

## 2022-07-22 ENCOUNTER — PATIENT OUTREACH (OUTPATIENT)
Dept: CASE MANAGEMENT | Age: 77
End: 2022-07-22

## 2022-07-22 NOTE — PROGRESS NOTES
Care Transitions Follow Up Call    CTN Attempt to contact patient via telephone on 7/22/22 for post hospital  follow up. Unable to reach. Left message on voicemail with office contact information. No Patient medical information left on message.      [facility] follow up appointment(s):   Future Appointments   Date Time Provider Nat Brand   9/22/2022  8:00 AM PPA SPIROMETRY [facility] BS AMB   9/22/2022  9:15 AM Velma Irene MD [facility] AMB   10/10/2022 10:30 AM Louis Walters MD [facility]  AMB   10/17/2022 10:30 AM Mary Perez MD [facility] AMB   11/11/2022 11:00 AM Louis Walters MD [facility]  AMB   6/14/2023 10:30 AM Mary Perez MD [facility] AMB

## 2022-07-26 ENCOUNTER — TELEPHONE (OUTPATIENT)
Dept: FAMILY MEDICINE CLINIC | Age: 77
End: 2022-07-26

## 2022-07-26 DIAGNOSIS — U07.1 COVID-19: Primary | ICD-10-CM

## 2022-07-26 NOTE — TELEPHONE ENCOUNTER
Spoke to patient continue supportive care. If any chest pains or shortness of breath patient needs to go to ED.   Patient verbalized understanding

## 2022-07-26 NOTE — TELEPHONE ENCOUNTER
Patient called to advise he has tested positive for COVID. He was on a cruise, has cough, congestion and sore throat. Headache but that has stopped. Patient test positive on Friday last week-symptoms started on Thursday. Would like medication sent in.   Patient was instructed to try Delsym (OTC cough med) along with tylenol and use a humidifier for his symptoms in addition to drinking plenty of fluids and rest.  Aptient verbalized understanding    Please qadvise

## 2022-08-02 ENCOUNTER — PATIENT OUTREACH (OUTPATIENT)
Dept: CASE MANAGEMENT | Age: 77
End: 2022-08-02

## 2022-08-02 NOTE — PROGRESS NOTES
Patient has graduated from the Transitions of Care Coordination  program on 8/2/22. Patient reports that he is feeling better. No worsening of symptoms reported by Pt. At this time. Pt states that his symptoms are stable at this time. No questions, concerns and/or needs at this time as per Pt. Patient reminded that there is a physician on call 24 hours a day / 7 days a week (M-F 5pm to 8am and from Friday 5pm until Monday 8a for the weekend) should the patient have questions or concerns. Patient reminded to call 911 if situation is emergent ( such as chest pain, shortness of breath, unstoppable bleeding, feeling of passing out,  worsening of symptoms)or patient feels the situation is emergent. Pt verbalizes understanding. Patient's symptoms are stable at this time. Patient/family has the ability to self-manage. Care management goals have been completed at this time. No further care transitions nurse follow up scheduled. Goals Addressed    None       Patient has care transitions nurse contact information for any further questions, concerns, or needs. This episode is closed and resolved.      Patient's upcoming visits:    Future Appointments   Date Time Provider Nat Brand   9/22/2022  8:00 AM PPA SPIROMETRY [facility] BS AMB   9/22/2022  9:15 AM Chapman Goltz, MD [facility] BS AMB   10/10/2022 10:30 AM Nyla Nur MD [facility] BS AMB   10/17/2022 10:30 AM Александр Perez MD [facility] BS AMB   11/11/2022 11:00 AM Nyla Nur MD [facility] BS AMB   6/14/2023 10:30 AM Александр Perez MD [facility] BS AMB

## 2022-09-26 ENCOUNTER — TELEPHONE (OUTPATIENT)
Dept: CARDIOLOGY CLINIC | Age: 77
End: 2022-09-26

## 2022-09-26 ENCOUNTER — TRANSCRIBE ORDER (OUTPATIENT)
Dept: REGISTRATION | Age: 77
End: 2022-09-26

## 2022-09-26 ENCOUNTER — HOSPITAL ENCOUNTER (OUTPATIENT)
Dept: LAB | Age: 77
Discharge: HOME | End: 2022-09-26
Payer: MEDICARE

## 2022-09-26 DIAGNOSIS — E78.5 DYSLIPIDEMIA: Primary | ICD-10-CM

## 2022-09-26 DIAGNOSIS — E78.5 HYPERLIPEMIA: ICD-10-CM

## 2022-09-26 DIAGNOSIS — E78.5 HYPERLIPEMIA: Primary | ICD-10-CM

## 2022-09-26 LAB
ALBUMIN SERPL-MCNC: 3.1 G/DL (ref 3.4–5)
ALBUMIN/GLOB SERPL: 0.7 {RATIO} (ref 0.8–1.7)
ALP SERPL-CCNC: 67 U/L (ref 45–117)
ALT SERPL-CCNC: 42 U/L (ref 16–61)
ANION GAP SERPL CALC-SCNC: 9 MMOL/L (ref 3–18)
AST SERPL W P-5'-P-CCNC: 24 U/L (ref 10–38)
BILIRUB DIRECT SERPL-MCNC: 0.1 MG/DL (ref 0–0.2)
BILIRUB SERPL-MCNC: 0.4 MG/DL (ref 0.2–1)
BUN SERPL-MCNC: 34 MG/DL (ref 7–18)
BUN/CREAT SERPL: 19 (ref 12–20)
CA-I BLD-MCNC: 9.5 MG/DL (ref 8.5–10.1)
CHLORIDE SERPL-SCNC: 103 MMOL/L (ref 100–111)
CHOLEST SERPL-MCNC: 134 MG/DL
CO2 SERPL-SCNC: 27 MMOL/L (ref 21–32)
CREAT SERPL-MCNC: 1.75 MG/DL (ref 0.6–1.3)
GLOBULIN SER CALC-MCNC: 4.2 G/DL (ref 2–4)
GLUCOSE SERPL-MCNC: 139 MG/DL (ref 74–99)
HDLC SERPL-MCNC: 47 MG/DL (ref 40–60)
HDLC SERPL: 2.9 {RATIO} (ref 0–5)
LDLC SERPL CALC-MCNC: 72.4 MG/DL (ref 0–100)
LIPID PROFILE,FLP: NORMAL
POTASSIUM SERPL-SCNC: 3.4 MMOL/L (ref 3.5–5.5)
PROT SERPL-MCNC: 7.3 G/DL (ref 6.4–8.2)
SODIUM SERPL-SCNC: 139 MMOL/L (ref 136–145)
TRIGL SERPL-MCNC: 73 MG/DL (ref ?–150)
VLDLC SERPL CALC-MCNC: 14.6 MG/DL

## 2022-09-26 PROCEDURE — 36415 COLL VENOUS BLD VENIPUNCTURE: CPT

## 2022-09-26 PROCEDURE — 80076 HEPATIC FUNCTION PANEL: CPT

## 2022-09-26 PROCEDURE — 80048 BASIC METABOLIC PNL TOTAL CA: CPT

## 2022-09-26 PROCEDURE — 80061 LIPID PANEL: CPT

## 2022-09-26 NOTE — TELEPHONE ENCOUNTER
----- Message from Risa Pang MD sent at 9/26/2022  1:45 PM EDT -----  Please contact patient and do the following asap    Rpt BMP  in 60 days

## 2022-09-28 NOTE — TELEPHONE ENCOUNTER
Spoke with pt advised to get repeat BMP in 60 days, pt understood and had no questions or concerns at this time.

## 2022-10-10 ENCOUNTER — OFFICE VISIT (OUTPATIENT)
Dept: CARDIOLOGY CLINIC | Age: 77
End: 2022-10-10
Payer: MEDICARE

## 2022-10-10 VITALS
BODY MASS INDEX: 37.94 KG/M2 | HEIGHT: 70 IN | SYSTOLIC BLOOD PRESSURE: 121 MMHG | WEIGHT: 265 LBS | OXYGEN SATURATION: 97 % | HEART RATE: 72 BPM | DIASTOLIC BLOOD PRESSURE: 60 MMHG

## 2022-10-10 DIAGNOSIS — Z95.2 S/P AVR (AORTIC VALVE REPLACEMENT): ICD-10-CM

## 2022-10-10 DIAGNOSIS — Z91.89 AT RISK FOR AMIODARONE TOXICITY WITH LONG TERM USE: ICD-10-CM

## 2022-10-10 DIAGNOSIS — N18.30 STAGE 3 CHRONIC KIDNEY DISEASE, UNSPECIFIED WHETHER STAGE 3A OR 3B CKD (HCC): ICD-10-CM

## 2022-10-10 DIAGNOSIS — Z79.899 AT RISK FOR AMIODARONE TOXICITY WITH LONG TERM USE: ICD-10-CM

## 2022-10-10 DIAGNOSIS — I10 ESSENTIAL HYPERTENSION: ICD-10-CM

## 2022-10-10 DIAGNOSIS — I47.29 NSVT (NONSUSTAINED VENTRICULAR TACHYCARDIA): ICD-10-CM

## 2022-10-10 DIAGNOSIS — I50.32 CHRONIC DIASTOLIC CONGESTIVE HEART FAILURE (HCC): Primary | ICD-10-CM

## 2022-10-10 DIAGNOSIS — E78.5 DYSLIPIDEMIA: ICD-10-CM

## 2022-10-10 PROCEDURE — G8432 DEP SCR NOT DOC, RNG: HCPCS | Performed by: INTERNAL MEDICINE

## 2022-10-10 PROCEDURE — G8427 DOCREV CUR MEDS BY ELIG CLIN: HCPCS | Performed by: INTERNAL MEDICINE

## 2022-10-10 PROCEDURE — G8754 DIAS BP LESS 90: HCPCS | Performed by: INTERNAL MEDICINE

## 2022-10-10 PROCEDURE — 1123F ACP DISCUSS/DSCN MKR DOCD: CPT | Performed by: INTERNAL MEDICINE

## 2022-10-10 PROCEDURE — 1101F PT FALLS ASSESS-DOCD LE1/YR: CPT | Performed by: INTERNAL MEDICINE

## 2022-10-10 PROCEDURE — G8417 CALC BMI ABV UP PARAM F/U: HCPCS | Performed by: INTERNAL MEDICINE

## 2022-10-10 PROCEDURE — G8752 SYS BP LESS 140: HCPCS | Performed by: INTERNAL MEDICINE

## 2022-10-10 PROCEDURE — 99214 OFFICE O/P EST MOD 30 MIN: CPT | Performed by: INTERNAL MEDICINE

## 2022-10-10 PROCEDURE — G8536 NO DOC ELDER MAL SCRN: HCPCS | Performed by: INTERNAL MEDICINE

## 2022-10-10 RX ORDER — ALOGLIPTIN 25 MG/1
12.5 TABLET, FILM COATED ORAL
COMMUNITY
Start: 2022-06-13

## 2022-10-10 RX ORDER — AMIODARONE HYDROCHLORIDE 200 MG/1
200 TABLET ORAL DAILY
COMMUNITY
Start: 2022-08-19

## 2022-10-10 NOTE — PATIENT INSTRUCTIONS
Medications Discontinued During This Encounter   Medication Reason    Alpha Lipoic Acid 600 mg cap DISCONTINUED BY ANOTHER CLINICIAN    fluticasone propionate (FLONASE) 50 mcg/actuation nasal spray DISCONTINUED BY ANOTHER CLINICIAN         A Healthy Heart: Care Instructions  Your Care Instructions     Coronary artery disease, also called heart disease, occurs when a substance called plaque builds up in the vessels that supply oxygen-rich blood to your heart muscle. This can narrow the blood vessels and reduce blood flow. A heart attack happens when blood flow is completely blocked. A high-fat diet, smoking, and other factors increase the risk of heart disease. Your doctor has found that you have a chance of having heart disease. You can do lots of things to keep your heart healthy. It may not be easy, but you can change your diet, exercise more, and quit smoking. These steps really work to lower your chance of heart disease. Follow-up care is a key part of your treatment and safety. Be sure to make and go to all appointments, and call your doctor if you are having problems. It's also a good idea to know your test results and keep a list of the medicines you take. How can you care for yourself at home? Diet    Use less salt when you cook and eat. This helps lower your blood pressure. Taste food before salting. Add only a little salt when you think you need it. With time, your taste buds will adjust to less salt. Eat fewer snack items, fast foods, canned soups, and other high-salt, high-fat, processed foods. Read food labels and try to avoid saturated and trans fats. They increase your risk of heart disease by raising cholesterol levels. Limit the amount of solid fat-butter, margarine, and shortening-you eat. Use olive, peanut, or canola oil when you cook. Bake, broil, and steam foods instead of frying them. Eat a variety of fruit and vegetables every day.  Dark green, deep orange, red, or yellow fruits and vegetables are especially good for you. Examples include spinach, carrots, peaches, and berries. Foods high in fiber can reduce your cholesterol and provide important vitamins and minerals. High-fiber foods include whole-grain cereals and breads, oatmeal, beans, brown rice, citrus fruits, and apples. Eat lean proteins. Heart-healthy proteins include seafood, lean meats and poultry, eggs, beans, peas, nuts, seeds, and soy products. Limit drinks and foods with added sugar. These include candy, desserts, and soda pop. Lifestyle changes    If your doctor recommends it, get more exercise. Walking is a good choice. Bit by bit, increase the amount you walk every day. Try for at least 30 minutes on most days of the week. You also may want to swim, bike, or do other activities. Do not smoke. If you need help quitting, talk to your doctor about stop-smoking programs and medicines. These can increase your chances of quitting for good. Quitting smoking may be the most important step you can take to protect your heart. It is never too late to quit. Limit alcohol to 2 drinks a day for men and 1 drink a day for women. Too much alcohol can cause health problems. Manage other health problems such as diabetes, high blood pressure, and high cholesterol. If you think you may have a problem with alcohol or drug use, talk to your doctor. Medicines    Take your medicines exactly as prescribed. Call your doctor if you think you are having a problem with your medicine. If your doctor recommends aspirin, take the amount directed each day. Make sure you take aspirin and not another kind of pain reliever, such as acetaminophen (Tylenol). When should you call for help? Call 911 if you have symptoms of a heart attack. These may include:    Chest pain or pressure, or a strange feeling in the chest.     Sweating. Shortness of breath.      Pain, pressure, or a strange feeling in the back, neck, jaw, or upper belly or in one or both shoulders or arms. Lightheadedness or sudden weakness. A fast or irregular heartbeat. After you call 911, the  may tell you to chew 1 adult-strength or 2 to 4 low-dose aspirin. Wait for an ambulance. Do not try to drive yourself. Watch closely for changes in your health, and be sure to contact your doctor if you have any problems. Where can you learn more? Go to http://www.balder[employer].com/  Enter F075 in the search box to learn more about \"A Healthy Heart: Care Instructions. \"  Current as of: January 10, 2022               Content Version: 13.2  © 2006-2022 [employer]. Care instructions adapted under license by [employer] (which disclaims liability or warranty for this information). If you have questions about a medical condition or this instruction, always ask your healthcare professional. [address] any warranty or liability for your use of this information.

## 2022-10-10 NOTE — PROGRESS NOTES
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 F/u AVR, CHF, hyperlipidemia, obesity  History of diabetes, CKD, hypothyroidism    7/22 follow-up of recent hospitalization for chest pain. 10/22 dizziness resolved after amiodarone started for frequent PVCs in 8/22. See Holter monitor report    Follow-up  Associated symptoms include shortness of breath. Pertinent negatives include no chest pain and no headaches. Shortness of Breath  The history is provided by the Patient. This is a chronic problem. The problem occurs intermittently. The current episode started more than 1 week ago. The problem has not changed since onset. Pertinent negatives include no fever, no headaches, no ear pain, no neck pain, no cough, no sputum production, no hemoptysis, no wheezing, no PND, no orthopnea, no chest pain, no syncope, no vomiting, no rash, no leg pain, no leg swelling and no claudication. The problem's precipitants include exercise (walking 1 block;). Associated medical issues do not include chronic lung disease, CAD or heart failure. Leg Swelling  The history is provided by the Patient. This is a new problem. The current episode started more than 1 week ago (early 2021). The problem has been resolved (lasix). Associated symptoms include shortness of breath. Pertinent negatives include no chest pain and no headaches. The symptoms are aggravated by standing. The symptoms are relieved by sleep. Review of Systems   Constitutional:  Negative for chills, diaphoresis, fever, malaise/fatigue and weight loss. HENT:  Negative for ear discharge, ear pain, hearing loss, nosebleeds and tinnitus. Eyes:  Negative for blurred vision and discharge. Respiratory:  Positive for shortness of breath. Negative for cough, hemoptysis, sputum production, wheezing and stridor. Cardiovascular:  Negative for chest pain, palpitations, orthopnea, claudication, leg swelling, syncope and PND.    Gastrointestinal:  Negative for diarrhea, heartburn, nausea and vomiting. Genitourinary:  Negative for dysuria and hematuria. Musculoskeletal:  Negative for joint pain, myalgias and neck pain. Skin:  Negative for itching and rash. Neurological:  Negative for dizziness, tingling, tremors, focal weakness, seizures, loss of consciousness, weakness and headaches. Endo/Heme/Allergies:  Negative for polydipsia. Does not bruise/bleed easily. Psychiatric/Behavioral:  Negative for depression, substance abuse and suicidal ideas. The patient does not have insomnia.     Family History   Problem Relation Age of Onset    Hypertension Mother     Thyroid Disease Mother     Hypertension Father     Diabetes Father     Drug Abuse Brother        Past Medical History:   Diagnosis Date    Diabetes ([facility] [address]     Dyslipidemia 3/1/2017    Fractures     R Knee/ Tib fib fracture/surgery    Gout     Graves disease 2015    High cholesterol     HTN (hypertension)     Osteoarthritis     Severe aortic stenosis 3/29/2017    Spinal stenosis of lumbar region 3/1/2016    Spondylolisthesis 3/1/2016    Thyroid trouble     Type 2 diabetes mellitus with diabetic nephropathy ([facility] [address] 2015       Past Surgical History:   Procedure Laterality Date    HX HIP REPLACEMENT Left 2009    HX LUMBAR FUSION      HX ORTHOPAEDIC Right     R knee/Tibfib surgery       Social History     Tobacco Use    Smoking status: Former     Packs/day: 1.00     Years: 7.00     Pack years: 7.00     Types: Cigarettes     Start date: 1969     Quit date: 1995     Years since quittin.7    Smokeless tobacco: Never   Substance Use Topics    Alcohol use: No       Allergies   Allergen Reactions    Watermelon Anaphylaxis and Swelling    Citric Acid Rash    Peach (Prunus Persica) Itching       Outpatient Medications Marked as Taking for the 10/10/22 encounter (Office Visit) with Jethro Hamlin MD   Medication Sig Dispense Refill    alogliptin (NESINA) 25 mg tablet 12.5 mg. amiodarone (CORDARONE) 200 mg tablet Take 200 mg by mouth daily. losartan (COZAAR) 25 mg tablet Take 1 Tablet by mouth daily. 90 Tablet 1    rosuvastatin (Crestor) 40 mg tablet Take 1 Tablet by mouth nightly. 90 Tablet 1    colchicine 0.6 mg tablet Take 1 Tablet by mouth daily as needed for Gout or Pain. 30 Tablet 0    budesonide-formoteroL (SYMBICORT) 160-4.5 mcg/actuation HFAA Take 2 Puffs by inhalation two (2) times a day. 10.2 g 5    hydrALAZINE (APRESOLINE) 50 mg tablet Take 50 mg by mouth three (3) times daily. On hold per Patient      dextran 70/hypromellose (ARTIFICIAL TEARS, PF, OP) Apply 1 Drop to eye six (6) times daily. levothyroxine (SYNTHROID) 150 mcg tablet Take 1 Tab by mouth Daily (before breakfast). 30 Tab 2    spironolactone (ALDACTONE) 25 mg tablet Take 1 Tab by mouth daily. b complex vitamins tablet Take 1 Tab by mouth daily. aspirin (ASPIRIN) 325 mg tablet Take 325 mg by mouth daily. ascorbic acid, vitamin C, (VITAMIN C) 250 mg tablet Take 250 mg by mouth. DULoxetine (CYMBALTA) 60 mg capsule Take 1 Cap by mouth daily. 30 Cap 1    diclofenac (VOLTAREN) 1 % gel Apply  to affected area four (4) times daily. magnesium chloride (MAG DELAY) 64 mg delayed release tablet Take 8 tablets 3 x day      insulin glargine (LANTUS) 100 unit/mL injection 45 Units by SubCUTAneous route daily. Take 30 units once daily  Indications: type 2 diabetes mellitus 3 Vial 3    febuxostat (ULORIC) 40 mg tab tablet Take 1 Tab by mouth daily. Indications: GOUT PREVENTION 90 Tab 1    insulin aspart (NOVOLOG) 100 unit/mL injection 6 Units by SubCUTAneous route Before breakfast, lunch, and dinner. 6 units before meals       glucose blood VI test strips (ASCENSIA AUTODISC VI, ONE TOUCH ULTRA TEST VI) strip Dispense precision extra test strips 3 x day to check blood glucose 200 Each 3    testosterone (ANDROGEL) 20.25 mg/1.25 gram (1.62 %) gel Apply  to affected area as needed. Cholecalciferol, Vitamin D3, 1,000 unit cap Take 3,000 Units by mouth daily. Visit Vitals  /60 (BP 1 Location: Left upper arm, BP Patient Position: Sitting, BP Cuff Size: Adult)   Pulse 72   Ht 5' 10\" (1.778 m)   Wt 120.2 kg (265 lb)   SpO2 97%   BMI 38.02 kg/m²     Physical Exam  Constitutional:       General: He is not in acute distress. Appearance: He is well-developed. He is obese. He is not diaphoretic. HENT:      Head: Atraumatic. Mouth/Throat:      Pharynx: No oropharyngeal exudate. Eyes:      General: No scleral icterus. Right eye: No discharge. Left eye: No discharge. Conjunctiva/sclera: Conjunctivae normal.   Neck:      Thyroid: No thyromegaly. Vascular: No JVD. Trachea: No tracheal deviation. Cardiovascular:      Rate and Rhythm: Normal rate and regular rhythm. Heart sounds: Murmur (2/6 ejection systolic murmur best heard at right parasternal area) heard. No gallop. Comments: Surgical sternal scar  Pulmonary:      Effort: Pulmonary effort is normal. No respiratory distress. Breath sounds: No stridor. No wheezing, rhonchi or rales. Chest:      Chest wall: No tenderness. Abdominal:      Palpations: Abdomen is soft. Tenderness: There is no abdominal tenderness. There is no guarding or rebound. Musculoskeletal:         General: No tenderness. Normal range of motion. Cervical back: Normal range of motion and neck supple. Right lower leg: Edema (1) present. Left lower leg: Edema (1) present. Lymphadenopathy:      Cervical: No cervical adenopathy. Skin:     General: Skin is warm. Neurological:      Mental Status: He is alert and oriented to person, place, and time. Motor: No abnormal muscle tone. Psychiatric:         Behavior: Behavior normal.     ekg sinus rhythm with PVC, no acute st-t changes  Echo 02/2017:  SUMMARY:  Left ventricle: Systolic function was normal by visual assessment.   Ejection fraction was estimated in the range of 60 % to 65 %. No obvious  wall motion abnormalities identified in the views obtained. Wall thickness  was at the upper limits of normal.    Aortic valve: Leaflets exhibited markedly increased thickness and reduced  mobility. There was moderate to severe stenosis. Valve peak gradient was  61 mmHg. Valve mean gradient was 39 mmHg. Estimated aortic valve area (by  VTI) was 0.5 cmï¾². ZACKERY likely overestimates severity of aortic stenosis due  to difficulty in measuring LVOT diameter. Aorta, systemic arteries: The root exhibited dilatation. 02/17/20   ECHO ADULT COMPLETE 02/19/2020 2/19/2020    Narrative · Normal cavity size and systolic function (ejection fraction normal). Mild concentric hypertrophy. Estimated left ventricular ejection fraction   is 55 - 60%. No regional wall motion abnormality noted. Mild (grade 1)   left ventricular diastolic dysfunction. · Mildly dilated right atrium. · There is a 25 mm St. Judes bioprosthetic aortic valve. Prosthesis is   normal.  · Mitral valve thickening. Mild mitral valve regurgitation is present. · Mild tricuspid valve regurgitation is present. · There is no evidence of pulmonary hypertension. · Mild aortic root dilatation. Signed by: Saurabh Rivero MD   04/06/21    ECHO ADULT COMPLETE 04/06/2021 4/6/2021    Interpretation Summary  · LV: Estimated LVEF is 55 - 60%. Normal cavity size and systolic function (ejection fraction normal). Mild concentric hypertrophy. Wall motion: normal. Mild (grade 1) left ventricular diastolic dysfunction. · LA: Mildly dilated left atrium. Left Atrium volume index is 38.76 mL/m2. · RV: Mildly dilated right ventricle. · RA: Mildly dilated right atrium. · AV: Prosthetic aortic valve. Aortic valve mean gradient is 8.1 mmHg. There is a 25 mm St. Judes bioprosthetic aortic valve. Prosthesis is normal.  · MV: Mitral annular calcification. Mild mitral valve regurgitation is present.   · TV: Mild tricuspid valve regurgitation is present. · AO: Mild ascending aorta dilatation. Ascending aorta diameter = 3.7 cm. · PA: Pulmonary arterial systolic pressure is 26 mmHg. Signed by: Tonny West MD on 4/6/2021 12:26 PM  6/22 carotid duplex    Conclusions: Mild plaque (<50%) is present in the bilateral internal carotid arteries that is not associated with a hemodynamically significant stenosis. Antegrade flow with a normal hemodynamic profile was present in both vertebral arteries. 6/22 echo   CONCLUSIONS     * Mild MUSA with mildly dilated left ventricular cavity and low normal   appearing systolic function. EF ~ 50%. * The basal inferoseptal, and basal inferolateral appear hypokinetic. * Left ventricular diastolic function: normal for age. * Bioprosthetic AVR in situ which appears well seated and appropriately   functioning. * There is mild mitral valve regurgitation. * Unable to estimate pulmonary arterial pressure due to inadequate tricuspid   regurgitant Doppler waveforms. * Technically difficult and limited study. * Complete transthoracic echocardiogram performed with: Ultrasound enhancing   agent. Comparison     * Compared to prior study from 2/30/2014, aortic stenosis is now mild. Wall   motion abnormalities are a new finding. 6/22 nuclear stress test  Narrative  Performed by [facility]  CONCLUSIONS     * Abnormal but low risk myocardial perfusion study. * Small, mild grade, fixed distal apical wall defect suggestive of apical   thinning artifact. No convincing evidence of prior infarct or reversible   ischemia. * Mildly dilated left ventricular cavity with mild global hypokinesis more   pronounced in the basal to mid inferior wall. Basal inferoseptal wall appears   dyskinetic. LVEF 40%. * No TID.   7/22 Holter monitor-14 days  Sinus rhythm. Rare supraventricular ectopy. Frequent ventricular ectopy. PVC burden 32.4%.    Frequent short runs of nonsustained VT up to 18 beats. Some of   these appear to have a bigeminy pattern of sinus beat with   aberrancy with ventricular bigeminy. No sustained arrhythmias or heart block. 1 patient recorded events did correlated with period of bursts of   VT.      ASSESSMENT and PLAN     Ref Range & Units 12 d ago   Cholesterol 110 - 200 mg/dL 170    Triglyceride 40 - 149 mg/dL 105    HDL >=40 mg/dL 43    Cholesterol/HDL 0.0 - 5.0 4.0    Non-HDL Cholesterol <130 mg/dL 127    LDL CALCULATION 50 - 99 mg/dL 106 High     VLDL CALCULATION 8 - 30 mg/dL 21    LDL/HDL Ratio  2.5      History of amiodarone treatment: Frequent PVCs and NSVT-total burden 32.7%   LFTs: nl 9/22;   TSH:   DLCO:      reviewed diet, exercise and weight control  cardiovascular risk and specific lipid/LDL goals reviewed  use of aspirin to prevent MI and TIA's discussed. Now S/p AVR. Echo report 4/21- normal valve function. Remains asymptomatic from cardiac standpoint. Effort tolerance is stable. Continue current meds. F/u in 6 months    7/5/2022 CHF is in chronic decompensated state NYHA class II-III. Aortic valve functioning normally by recent echo and no ischemia by recent stress test.  Blood pressure is elevated. Restart Lasix and follow the home chart  Lipids are not controlled. Increase Crestor and follow the labs. Follow electrolytes and specially magnesium closely on diuretics. Patient takes megadoses of magnesium by endocrinology which will be continued for now. Diet weight and exercise discussed. Mediterranean diet guidelines given. Diagnoses and all orders for this visit:    1. Chronic diastolic congestive heart failure (HCC)  -     MAGNESIUM; Future    2. Essential hypertension  -     PFT DLCO; Future  -     PULMONARY FUNCTION TEST; Future  -     TSH 3RD GENERATION; Future  -     METABOLIC PANEL, BASIC; Future  -     MAGNESIUM; Future    3. S/P AVR (aortic valve replacement)    4.  NSVT (nonsustained ventricular tachycardia)    5. At risk for amiodarone toxicity with long term use  -     PFT DLCO; Future  -     PULMONARY FUNCTION TEST; Future  -     TSH 3RD GENERATION; Future    6. Stage 3 chronic kidney disease, unspecified whether stage 3a or 3b CKD ([facility] [address]    7. Dyslipidemia        Pertinent laboratory and test data reviewed and discussed with patient. See patient instructions also for other medical advice given    Medications Discontinued During This Encounter   Medication Reason    Alpha Lipoic Acid 600 mg cap DISCONTINUED BY ANOTHER CLINICIAN    fluticasone propionate (FLONASE) 50 mcg/actuation nasal spray DISCONTINUED BY ANOTHER CLINICIAN       Follow-up and Dispositions    Return in about 3 months (around 1/10/2023), or if symptoms worsen or fail to improve, for with ekg, post test.       10/10/2022 CHF is improving NYHA class II. Needs thyroid and pulmonary functions due to amiodarone. Recent liver functions were normal.  Aortic valve is functioning well. EF by echo was 50% which is more reliable than nuclear where it was 40%. Diet weight and exercise discussed. Lipids are acceptable.

## 2022-10-10 NOTE — PROGRESS NOTES
1. Have you been to the ER, urgent care clinic since your last visit? Hospitalized since your last visit? No    2. Have you seen or consulted any other health care providers outside of the [address] [place] [address] since your last visit? Include any pap smears or colon screening. Yes Where: [facility]      3. Since your last visit, have you had any of the following symptoms? shortness of breath. 4.  Have you had any blood work, X-rays or cardiac testing? Yes Where: [facility]     Requested: NO     In [facility]: YES    5. Where do you normally have your labs drawn? [facility]    6. Do you need any refills today?    No

## 2022-10-17 ENCOUNTER — OFFICE VISIT (OUTPATIENT)
Dept: FAMILY MEDICINE CLINIC | Age: 77
End: 2022-10-17
Payer: MEDICARE

## 2022-10-17 VITALS
OXYGEN SATURATION: 98 % | DIASTOLIC BLOOD PRESSURE: 72 MMHG | WEIGHT: 260 LBS | HEART RATE: 70 BPM | HEIGHT: 70 IN | BODY MASS INDEX: 37.22 KG/M2 | SYSTOLIC BLOOD PRESSURE: 137 MMHG | RESPIRATION RATE: 24 BRPM | TEMPERATURE: 97.7 F

## 2022-10-17 DIAGNOSIS — I10 ESSENTIAL HYPERTENSION: ICD-10-CM

## 2022-10-17 DIAGNOSIS — K21.9 GASTROESOPHAGEAL REFLUX DISEASE, UNSPECIFIED WHETHER ESOPHAGITIS PRESENT: ICD-10-CM

## 2022-10-17 DIAGNOSIS — N18.30 STAGE 3 CHRONIC KIDNEY DISEASE, UNSPECIFIED WHETHER STAGE 3A OR 3B CKD (HCC): ICD-10-CM

## 2022-10-17 DIAGNOSIS — E11.9 COMPREHENSIVE DIABETIC FOOT EXAMINATION, TYPE 2 DM, ENCOUNTER FOR (HCC): ICD-10-CM

## 2022-10-17 DIAGNOSIS — Z23 ENCOUNTER FOR IMMUNIZATION: ICD-10-CM

## 2022-10-17 DIAGNOSIS — I50.9 CONGESTIVE HEART FAILURE, UNSPECIFIED HF CHRONICITY, UNSPECIFIED HEART FAILURE TYPE (HCC): ICD-10-CM

## 2022-10-17 DIAGNOSIS — E03.4 HYPOTHYROIDISM DUE TO ACQUIRED ATROPHY OF THYROID: ICD-10-CM

## 2022-10-17 DIAGNOSIS — R05.3 CHRONIC COUGH: ICD-10-CM

## 2022-10-17 DIAGNOSIS — E11.51 TYPE 2 DIABETES MELLITUS WITH DIABETIC PERIPHERAL ANGIOPATHY WITHOUT GANGRENE, UNSPECIFIED WHETHER LONG TERM INSULIN USE (HCC): Primary | ICD-10-CM

## 2022-10-17 DIAGNOSIS — M10.9 GOUT, UNSPECIFIED CAUSE, UNSPECIFIED CHRONICITY, UNSPECIFIED SITE: ICD-10-CM

## 2022-10-17 LAB — HBA1C MFR BLD HPLC: 7.2 %

## 2022-10-17 PROCEDURE — G8427 DOCREV CUR MEDS BY ELIG CLIN: HCPCS | Performed by: FAMILY MEDICINE

## 2022-10-17 PROCEDURE — G8510 SCR DEP NEG, NO PLAN REQD: HCPCS | Performed by: FAMILY MEDICINE

## 2022-10-17 PROCEDURE — 1101F PT FALLS ASSESS-DOCD LE1/YR: CPT | Performed by: FAMILY MEDICINE

## 2022-10-17 PROCEDURE — G8417 CALC BMI ABV UP PARAM F/U: HCPCS | Performed by: FAMILY MEDICINE

## 2022-10-17 PROCEDURE — G8754 DIAS BP LESS 90: HCPCS | Performed by: FAMILY MEDICINE

## 2022-10-17 PROCEDURE — 99215 OFFICE O/P EST HI 40 MIN: CPT | Performed by: FAMILY MEDICINE

## 2022-10-17 PROCEDURE — 83036 HEMOGLOBIN GLYCOSYLATED A1C: CPT | Performed by: FAMILY MEDICINE

## 2022-10-17 PROCEDURE — 1123F ACP DISCUSS/DSCN MKR DOCD: CPT | Performed by: FAMILY MEDICINE

## 2022-10-17 PROCEDURE — G8752 SYS BP LESS 140: HCPCS | Performed by: FAMILY MEDICINE

## 2022-10-17 PROCEDURE — G0008 ADMIN INFLUENZA VIRUS VAC: HCPCS | Performed by: FAMILY MEDICINE

## 2022-10-17 PROCEDURE — 3051F HG A1C>EQUAL 7.0%<8.0%: CPT | Performed by: FAMILY MEDICINE

## 2022-10-17 PROCEDURE — G8536 NO DOC ELDER MAL SCRN: HCPCS | Performed by: FAMILY MEDICINE

## 2022-10-17 PROCEDURE — 90694 VACC AIIV4 NO PRSRV 0.5ML IM: CPT | Performed by: FAMILY MEDICINE

## 2022-10-17 RX ORDER — HYDRALAZINE HYDROCHLORIDE 50 MG/1
150 TABLET, FILM COATED ORAL 2 TIMES DAILY
COMMUNITY
Start: 2022-10-17

## 2022-10-17 NOTE — PROGRESS NOTES
1. \"Have you been to the ER, urgent care clinic since your last visit? Hospitalized since your last visit? \" No    2. \"Have you seen or consulted any other health care providers outside of the [address] [place] [address] since your last visit? \" No     3. For patients aged 39-70: Has the patient had a colonoscopy / FIT/ Cologuard? NA - based on age      If the patient is female:    4. For patients aged 41-77: Has the patient had a mammogram within the past 2 years? NA - based on age or sex      11. For patients aged 21-65: Has the patient had a pap smear?  NA - based on age or sex

## 2022-10-17 NOTE — PROGRESS NOTES
After obtaining consent, and per orders of Dr. Jack Rushing, injection of [facility] Adjuvanted was given by Rosa Jiménez. Patient instructed to remain in clinic for 20 minutes afterwards, and to report any adverse reaction to me immediately.

## 2022-10-17 NOTE — PROGRESS NOTES
JUANITA Arias comes in for follow up care. DM2: Patient has type 2 diabetes mellitus. He is on insulin. He is also on alogliptin. HbA1c 7.2. I did a foot exam that is stable. He will intensify lifestyle and dietary modification. HTN: Patient has hypertension. Blood pressure is stable. Patient is on hydralazine, losartan, spironolactone. Denies headache, changes in vision or focal weakness. We will continue with the current treatment plan. Cardiac dysrhythmia: Patient has a history of cardiac dysrhythmia. He has been followed up with a cardiologist.  He is on amiodarone. He was getting dizziness and had bradycardia. He has been referred to see the electrophysiologist.  He will continue with the current treatment plan. He denies chest pain, shortness of breath or diaphoresis. Dyslipidemia: Patient has dyslipidemia. Patient is on Crestor 40 mg daily. Stable on medication. He will take a diet low in polysaturated fats. Hypothyroidism: Patient has hypothyroidism. Patient is on levothyroxine. Stable on medication. We will continue current treatment plan. CHF: Patient has a history of chronic diastolic CHF. Denies chest pain, shortness of breath or diaphoresis. Patient is on spironolactone, Lasix, hydralazine. Patient will continue with the current treatment plan. Gout arthritis:Patient has gout arthritis. Patient is on Uloric daily. He will take a purine restricted diet. Obesity: Patient has a BMI of 37.31. He will intensify lifestyle and dietary modification. Back pain: Patient has chronic low back pain. He has history of spinal stenosis and spondylolisthesis. Patient takes Tylenol for pain as needed. Patient is also on Cymbalta. We will continue with these medications. Chronic cough: Patient has a history of chronic cough. He is scheduled to see the pulmonologist.  Previous chest x-rays have been stable. We will follow-up with the pulmonologist recommendations.   CKD: Patient has chronic kidney disease. Plan is to avoid any nephrotoxic medications. We will recheck labs at next visit. Health maintenance: Patient will get the flu vaccine today. Past Medical History  Past Medical History:   Diagnosis Date    Diabetes ([facility] [address] 1995    Dyslipidemia 3/1/2017    Fractures 1995    R Knee/ Tib fib fracture/surgery    Gout 2010    Graves disease 1/21/2015    High cholesterol 2005    HTN (hypertension) 1990    Osteoarthritis 2013    Severe aortic stenosis 3/29/2017    Spinal stenosis of lumbar region 3/1/2016    Spondylolisthesis 3/1/2016    Thyroid trouble     Type 2 diabetes mellitus with diabetic nephropathy ([facility] [address] 4/28/2015       Surgical History  Past Surgical History:   Procedure Laterality Date    HX HIP REPLACEMENT Left 2009    HX LUMBAR FUSION      HX ORTHOPAEDIC Right     R knee/Tibfib surgery        Medications  Current Outpatient Medications   Medication Sig Dispense Refill    alogliptin (NESINA) 25 mg tablet 12.5 mg.      amiodarone (CORDARONE) 200 mg tablet Take 200 mg by mouth daily. furosemide (LASIX) 20 mg tablet Take 1 Tablet by mouth daily. (Patient taking differently: Take 40 mg by mouth daily.) 90 Tablet 1    losartan (COZAAR) 25 mg tablet Take 1 Tablet by mouth daily. 90 Tablet 1    rosuvastatin (Crestor) 40 mg tablet Take 1 Tablet by mouth nightly. 90 Tablet 1    colchicine 0.6 mg tablet Take 1 Tablet by mouth daily as needed for Gout or Pain. 30 Tablet 0    budesonide-formoteroL (SYMBICORT) 160-4.5 mcg/actuation HFAA Take 2 Puffs by inhalation two (2) times a day. 10.2 g 5    dextran 70/hypromellose (ARTIFICIAL TEARS, PF, OP) Apply 1 Drop to eye six (6) times daily. levothyroxine (SYNTHROID) 150 mcg tablet Take 1 Tab by mouth Daily (before breakfast). 30 Tab 2    spironolactone (ALDACTONE) 25 mg tablet Take 1 Tab by mouth daily. b complex vitamins tablet Take 1 Tab by mouth daily. aspirin (ASPIRIN) 325 mg tablet Take 325 mg by mouth daily. ascorbic acid, vitamin C, (VITAMIN C) 250 mg tablet Take 250 mg by mouth. DULoxetine (CYMBALTA) 60 mg capsule Take 1 Cap by mouth daily. 30 Cap 1    diclofenac (VOLTAREN) 1 % gel Apply  to affected area four (4) times daily. magnesium chloride (MAG DELAY) 64 mg delayed release tablet Take 8 tablets 3 x day      insulin glargine (LANTUS) 100 unit/mL injection 45 Units by SubCUTAneous route daily. Take 30 units once daily  Indications: type 2 diabetes mellitus 3 Vial 3    febuxostat (ULORIC) 40 mg tab tablet Take 1 Tab by mouth daily. Indications: GOUT PREVENTION 90 Tab 1    insulin aspart (NOVOLOG) 100 unit/mL injection 6 Units by SubCUTAneous route Before breakfast, lunch, and dinner. 6 units before meals       glucose blood VI test strips (ASCENSIA AUTODISC VI, ONE TOUCH ULTRA TEST VI) strip Dispense precision extra test strips 3 x day to check blood glucose 200 Each 3    testosterone (ANDROGEL) 20.25 mg/1.25 gram (1.62 %) gel Apply  to affected area as needed. Cholecalciferol, Vitamin D3, 1,000 unit cap Take 3,000 Units by mouth daily. hydrALAZINE (APRESOLINE) 50 mg tablet Take 50 mg by mouth three (3) times daily.  On hold per Patient         Allergies  Allergies   Allergen Reactions    Watermelon Anaphylaxis and Swelling    Citric Acid Rash    Peach (Prunus Persica) Itching       Family History  Family History   Problem Relation Age of Onset    Hypertension Mother     Thyroid Disease Mother     Hypertension Father     Diabetes Father     Drug Abuse Brother        Social History  Social History     Socioeconomic History    Marital status:      Spouse name: Not on file    Number of children: Not on file    Years of education: Not on file    Highest education level: Not on file   Occupational History    Not on file   Tobacco Use    Smoking status: Former     Packs/day: 1.00     Years: 7.00     Pack years: 7.00     Types: Cigarettes     Start date: 1/1/1969     Quit date: 1/1/1995 Years since quittin.8    Smokeless tobacco: Never   Vaping Use    Vaping Use: Never used   Substance and Sexual Activity    Alcohol use: No    Drug use: No    Sexual activity: Yes     Partners: Female   Other Topics Concern     Service Yes     Comment: Served in [facility] [employer]    Blood Transfusions No    Caffeine Concern No    Occupational Exposure No    Hobby Hazards No    Sleep Concern No    Stress Concern No    Weight Concern Yes    Special Diet No    Back Care Yes     Comment: Lower Back pain when walking    Exercise No    Bike Helmet No    Seat Belt Yes    Self-Exams Yes   Social History Narrative    Not on file     Social Determinants of Health     Financial Resource Strain: Not on file   Food Insecurity: Not on file   Transportation Needs: Not on file   Physical Activity: Not on file   Stress: Not on file   Social Connections: Not on file   Intimate Partner Violence: Not on file   Housing Stability: Not on file       Review of Systems  Review of Systems - Review of all systems is negative except as noted above in the HPI.     Vital Signs  Visit Vitals  /72 (BP 1 Location: Left upper arm, BP Patient Position: Sitting, BP Cuff Size: Adult long)   Pulse 70   Temp 97.7 °F (36.5 °C)   Resp 24   Ht 5' 10\" (1.778 m)   Wt 260 lb (117.9 kg)   SpO2 98%   BMI 37.31 kg/m²         Physical Exam  Physical Examination: General appearance - alert, well appearing, and in no distress, oriented to person, place, and time, overweight, acyanotic, in no respiratory distress, and well hydrated  Mental status - affect appropriate to mood  Neck - supple, no significant adenopathy  Lymphatics - no palpable lymphadenopathy, no hepatosplenomegaly  Chest - no tachypnea, retractions or cyanosis  Heart - systolic murmur 3/6 at 2nd right intercostal space  Abdomen - no rebound tenderness noted  Back exam - limited range of motion  Neurological - abnormal neurological exam unchanged from prior examinations  Musculoskeletal - osteoarthritic changes noted in both hands  Extremities - intact peripheral pulses  Diabetic foot exam:     Left Foot:   Visual Exam: normal    Pulse DP: 2+ (normal)   Filament test: normal sensation        Right Foot:   Visual Exam: normal    Pulse DP: 2+ (normal)   Filament test: normal sensation        Results  Results for orders placed or performed during the hospital encounter of 09/26/22   LIPID PANEL   Result Value Ref Range    LIPID PROFILE        Cholesterol, total 134 <200 mg/dL    Triglyceride 73 <150 mg/dL    HDL Cholesterol 47 40 - 60 mg/dL    LDL, calculated 72.4 0 - 100 mg/dL    VLDL, calculated 14.6 mg/dL    CHOL/HDL Ratio 2.9 0 - 5.0     HEPATIC FUNCTION PANEL   Result Value Ref Range    Protein, total 7.3 6.4 - 8.2 g/dL    Albumin 3.1 (L) 3.4 - 5.0 g/dL    Globulin 4.2 (H) 2.0 - 4.0 g/dL    A-G Ratio 0.7 (L) 0.8 - 1.7      Bilirubin, total 0.4 0.2 - 1.0 mg/dL    Bilirubin, direct 0.1 0.0 - 0.2 mg/dL    Alk. phosphatase 67 45 - 117 U/L    AST (SGOT) 24 10 - 38 U/L    ALT (SGPT) 42 16 - 61 U/L   METABOLIC PANEL, BASIC   Result Value Ref Range    Sodium 139 136 - 145 mmol/L    Potassium 3.4 (L) 3.5 - 5.5 mmol/L    Chloride 103 100 - 111 mmol/L    CO2 27 21 - 32 mmol/L    Anion gap 9 3.0 - 18.0 mmol/L    Glucose 139 (H) 74 - 99 mg/dL    BUN 34 (H) 7 - 18 mg/dL    Creatinine 1.75 (H) 0.60 - 1.30 mg/dL    BUN/Creatinine ratio 19 12 - 20      GFR est AA 46 (L) >60 ml/min/1.73m2    GFR est non-AA 38 (L) >60 ml/min/1.73m2    Calcium 9.5 8.5 - 10.1 mg/dL       ASSESSMENT and PLAN    ICD-10-CM ICD-9-CM    1. Type 2 diabetes mellitus with diabetic peripheral angiopathy without gangrene, unspecified whether long term insulin use (HCC)  E11.51 250.70 AMB POC HEMOGLOBIN A1C     443.81  DIABETES FOOT EXAM      2. Essential hypertension  I10 401.9       3. Stage 3 chronic kidney disease, unspecified whether stage 3a or 3b CKD (HCC)  N18.30 585.3       4.  Comprehensive diabetic foot examination, type 2 DM, encounter for ([facility]  E11.9 250.00       5. Encounter for immunization  Z23 V03.89 INFLUENZA, FLUAD, (AGE 72 Y+), IM, PF, 0.5 ML      OK IMMUNIZ ADMIN,1 SINGLE/COMB VAC/TOXOID      6. Gastroesophageal reflux disease, unspecified whether esophagitis present  K21.9 530.81       7. Chronic cough  R05.3 786.2       8. Congestive heart failure, unspecified HF chronicity, unspecified heart failure type (HCC)  I50.9 428.0       9. Hypothyroidism due to acquired atrophy of thyroid  E03.4 244.8      246.8       10. Gout, unspecified cause, unspecified chronicity, unspecified site  M10.9 274.9         reviewed diet, exercise and weight control  cardiovascular risk and specific lipid/LDL goals reviewed  reviewed medications and side effects in detail  specific diabetic recommendations: low cholesterol diet, weight control and daily exercise discussed, home glucose monitoring emphasized, all medications, side effects and compliance discussed carefully, foot care discussed and Podiatry visits discussed, annual eye examinations at Ophthalmology discussed, glycohemoglobin and other lab monitoring discussed, and long term diabetic complications discussed  I spent 40 minutes with this established patient. I have discussed the diagnosis with the patient and the intended plan of care as seen in the above orders. The patient has received an after-visit summary and questions were answered concerning future plans. I have discussed medication, side effects, and warnings with the patient in detail. The patient verbalized understanding and is in agreement with the plan of care. The patient will contact the office with any additional concerns. Ashley Wright MD    PLEASE NOTE:   This document has been produced using voice recognition software.  Unrecognized errors in transcription may be present

## 2022-11-16 ENCOUNTER — OFFICE VISIT (OUTPATIENT)
Dept: PULMONOLOGY | Age: 77
End: 2022-11-16
Payer: MEDICARE

## 2022-11-16 VITALS — WEIGHT: 264 LBS | BODY MASS INDEX: 37.8 KG/M2 | RESPIRATION RATE: 20 BRPM | HEIGHT: 70 IN

## 2022-11-16 DIAGNOSIS — J45.30 MILD PERSISTENT ASTHMA WITHOUT COMPLICATION: Primary | ICD-10-CM

## 2022-11-16 PROCEDURE — 94010 BREATHING CAPACITY TEST: CPT | Performed by: INTERNAL MEDICINE

## 2022-11-16 PROCEDURE — 94729 DIFFUSING CAPACITY: CPT | Performed by: INTERNAL MEDICINE

## 2022-11-16 PROCEDURE — 94727 GAS DIL/WSHOT DETER LNG VOL: CPT | Performed by: INTERNAL MEDICINE

## 2022-11-21 ENCOUNTER — OFFICE VISIT (OUTPATIENT)
Dept: PULMONOLOGY | Age: 77
End: 2022-11-21
Payer: MEDICARE

## 2022-11-21 DIAGNOSIS — R05.3 CHRONIC COUGH: Primary | ICD-10-CM

## 2022-11-21 DIAGNOSIS — J45.30 MILD PERSISTENT ASTHMATIC BRONCHITIS WITHOUT COMPLICATION: ICD-10-CM

## 2022-11-21 DIAGNOSIS — I50.20 HFREF (HEART FAILURE WITH REDUCED EJECTION FRACTION) (HCC): ICD-10-CM

## 2022-11-21 PROCEDURE — G8427 DOCREV CUR MEDS BY ELIG CLIN: HCPCS | Performed by: INTERNAL MEDICINE

## 2022-11-21 PROCEDURE — 1123F ACP DISCUSS/DSCN MKR DOCD: CPT | Performed by: INTERNAL MEDICINE

## 2022-11-21 PROCEDURE — 99214 OFFICE O/P EST MOD 30 MIN: CPT | Performed by: INTERNAL MEDICINE

## 2022-11-21 PROCEDURE — G8756 NO BP MEASURE DOC: HCPCS | Performed by: INTERNAL MEDICINE

## 2022-11-21 PROCEDURE — G8510 SCR DEP NEG, NO PLAN REQD: HCPCS | Performed by: INTERNAL MEDICINE

## 2022-11-21 PROCEDURE — G8417 CALC BMI ABV UP PARAM F/U: HCPCS | Performed by: INTERNAL MEDICINE

## 2022-11-21 PROCEDURE — G8536 NO DOC ELDER MAL SCRN: HCPCS | Performed by: INTERNAL MEDICINE

## 2022-11-21 PROCEDURE — 1101F PT FALLS ASSESS-DOCD LE1/YR: CPT | Performed by: INTERNAL MEDICINE

## 2022-11-21 RX ORDER — BUDESONIDE AND FORMOTEROL FUMARATE DIHYDRATE 160; 4.5 UG/1; UG/1
2 AEROSOL RESPIRATORY (INHALATION) 2 TIMES DAILY
Qty: 10.2 G | Refills: 5 | Status: SHIPPED | OUTPATIENT
Start: 2022-11-21 | End: 2022-11-21 | Stop reason: SINTOL

## 2022-11-21 RX ORDER — FLUTICASONE PROPIONATE 110 UG/1
2 AEROSOL, METERED RESPIRATORY (INHALATION) EVERY 12 HOURS
Qty: 12 G | Refills: 3 | Status: SHIPPED | OUTPATIENT
Start: 2022-11-21

## 2022-11-21 RX ORDER — BUDESONIDE AND FORMOTEROL FUMARATE DIHYDRATE 160; 4.5 UG/1; UG/1
2 AEROSOL RESPIRATORY (INHALATION) 2 TIMES DAILY
Qty: 10.2 G | Refills: 5 | Status: SHIPPED | OUTPATIENT
Start: 2022-11-21 | End: 2022-11-21 | Stop reason: SDUPTHER

## 2022-11-21 RX ORDER — FLUTICASONE PROPIONATE 110 UG/1
2 AEROSOL, METERED RESPIRATORY (INHALATION) EVERY 12 HOURS
Qty: 3 EACH | Refills: 3 | Status: SHIPPED | OUTPATIENT
Start: 2022-11-21

## 2022-11-21 NOTE — PROGRESS NOTES
Joanne Darling presents today for   Chief Complaint   Patient presents with    Follow-up    Breathing Problem       Is someone accompanying this pt? yes    Is the patient using any DME equipment during OV? Rollator, cane    -DME Company n/a    Depression Screening:  3 most recent PHQ Screens 11/21/2022   Little interest or pleasure in doing things Not at all   Feeling down, depressed, irritable, or hopeless Not at all   Total Score PHQ 2 0   Trouble falling or staying asleep, or sleeping too much -   Feeling tired or having little energy -   Poor appetite, weight loss, or overeating -   Feeling bad about yourself - or that you are a failure or have let yourself or your family down -   Trouble concentrating on things such as school, work, reading, or watching TV -   Moving or speaking so slowly that other people could have noticed; or the opposite being so fidgety that others notice -   Thoughts of being better off dead, or hurting yourself in some way -   PHQ 9 Score -   How difficult have these problems made it for you to do your work, take care of your home and get along with others -       Learning Assessment:  Learning Assessment 8/24/2021   PRIMARY LEARNER Patient   HIGHEST LEVEL OF EDUCATION - PRIMARY LEARNER  -   BARRIERS PRIMARY LEARNER -   CO-LEARNER CAREGIVER -   PRIMARY LANGUAGE ENGLISH   LEARNER PREFERENCE PRIMARY DEMONSTRATION   ANSWERED BY Patient   RELATIONSHIP SELF       Abuse Screening:  Abuse Screening Questionnaire 6/14/2022   Do you ever feel afraid of your partner? N   Are you in a relationship with someone who physically or mentally threatens you? N   Is it safe for you to go home? Y       Fall Risk  Fall Risk Assessment, last 12 mths 10/17/2022   Able to walk? Yes   Fall in past 12 months? 0   Do you feel unsteady? 0   Are you worried about falling 0   Is TUG test greater than 12 seconds? -   Is the gait abnormal? -   Number of falls in past 12 months -   Fall with injury?  - Coordination of Care:  1. Have you been to the ER, urgent care clinic since your last visit? Hospitalized since your last visit? No    2. Have you seen or consulted any other health care providers outside of the [address] [place] [address] since your last visit? Include any pap smears or colon screening.  no

## 2022-11-21 NOTE — PROGRESS NOTES
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 Pt with complaint of chronic cough with onset in 2021, much improved with Symbicort. No interval ED visits or hospital admission for cough or asthma exacerbation. Pt with development of SVT and PVC's, pt undergoing adjustment in cardiac medications. Recent studies include Echo showing basal inferior wall hypokinesis, Myospect shows FE at 40%, see below. PMH includes Aortic stenosis S/p bioprosthetic valve placement, GERD on Omeprazole. 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 He smoked briefly while in the [employer] but quit over 40 years ago. He is on disability after a work injury resulting in RLE crush injury and multiple surgeries to repair this. He used to work in construction and as an . Review of Systems   Constitutional:  Negative for chills, diaphoresis, fever, malaise/fatigue and weight loss. HENT:  Negative for congestion, ear discharge, ear pain, hearing loss, nosebleeds, sinus pain, sore throat and tinnitus. Eyes:  Negative for blurred vision, double vision, photophobia, pain, discharge and redness. Respiratory:  Positive for cough (resolved). Negative for hemoptysis, sputum production, shortness of breath, wheezing and stridor. Cardiovascular:  Negative for chest pain, palpitations, orthopnea, claudication, leg swelling and PND. Gastrointestinal:  Negative for abdominal pain, blood in stool, constipation, diarrhea, heartburn, melena, nausea and vomiting. Genitourinary:  Negative for dysuria, flank pain, frequency, hematuria and urgency. Musculoskeletal:  Positive for back pain and joint pain. Negative for falls, myalgias and neck pain. Skin:  Negative for itching and rash.    Neurological:  Negative for dizziness, tingling, tremors, sensory change, speech change, focal weakness, seizures, loss of consciousness, weakness and headaches. Endo/Heme/Allergies:  Positive for environmental allergies. Negative for polydipsia. Does not bruise/bleed easily. Psychiatric/Behavioral:  Negative for depression, hallucinations, memory loss, substance abuse and suicidal ideas. The patient is not nervous/anxious and does not have insomnia. Past Medical History:   Diagnosis Date    Diabetes ([facility] [address] 1995    Dyslipidemia 3/1/2017    Fractures 1995    R Knee/ Tib fib fracture/surgery    Gout 2010    Graves disease 1/21/2015    High cholesterol 2005    HTN (hypertension) 1990    Osteoarthritis 2013    Severe aortic stenosis 3/29/2017    Spinal stenosis of lumbar region 3/1/2016    Spondylolisthesis 3/1/2016    Thyroid trouble     Type 2 diabetes mellitus with diabetic nephropathy ([facility] [address] 4/28/2015     Past Surgical History:   Procedure Laterality Date    HX HIP REPLACEMENT Left 2009    HX LUMBAR FUSION      HX ORTHOPAEDIC Right     R knee/Tibfib surgery     Current Outpatient Medications on File Prior to Visit   Medication Sig Dispense Refill    hydrALAZINE (APRESOLINE) 50 mg tablet Take 3 Tablets by mouth two (2) times a day. On hold per Patient      alogliptin (NESINA) 25 mg tablet 12.5 mg.      amiodarone (CORDARONE) 200 mg tablet Take 200 mg by mouth daily. furosemide (LASIX) 20 mg tablet Take 1 Tablet by mouth daily. (Patient taking differently: Take 40 mg by mouth daily.) 90 Tablet 1    losartan (COZAAR) 25 mg tablet Take 1 Tablet by mouth daily. 90 Tablet 1    rosuvastatin (Crestor) 40 mg tablet Take 1 Tablet by mouth nightly. 90 Tablet 1    colchicine 0.6 mg tablet Take 1 Tablet by mouth daily as needed for Gout or Pain. 30 Tablet 0    dextran 70/hypromellose (ARTIFICIAL TEARS, PF, OP) Apply 1 Drop to eye six (6) times daily. levothyroxine (SYNTHROID) 150 mcg tablet Take 1 Tab by mouth Daily (before breakfast). 30 Tab 2    spironolactone (ALDACTONE) 25 mg tablet Take 1 Tab by mouth daily.       b complex vitamins tablet Take 1 Tab by mouth daily. aspirin (ASPIRIN) 325 mg tablet Take 325 mg by mouth daily. ascorbic acid, vitamin C, (VITAMIN C) 250 mg tablet Take 250 mg by mouth. DULoxetine (CYMBALTA) 60 mg capsule Take 1 Cap by mouth daily. 30 Cap 1    diclofenac (VOLTAREN) 1 % gel Apply  to affected area four (4) times daily. magnesium chloride (MAG DELAY) 64 mg delayed release tablet Take 8 tablets 3 x day      insulin glargine (LANTUS) 100 unit/mL injection 45 Units by SubCUTAneous route daily. Take 30 units once daily  Indications: type 2 diabetes mellitus 3 Vial 3    febuxostat (ULORIC) 40 mg tab tablet Take 1 Tab by mouth daily. Indications: GOUT PREVENTION 90 Tab 1    insulin aspart (NOVOLOG) 100 unit/mL injection 6 Units by SubCUTAneous route Before breakfast, lunch, and dinner. 6 units before meals       glucose blood VI test strips (ASCENSIA AUTODISC VI, ONE TOUCH ULTRA TEST VI) strip Dispense precision extra test strips 3 x day to check blood glucose 200 Each 3    testosterone (ANDROGEL) 20.25 mg/1.25 gram (1.62 %) gel Apply  to affected area as needed. Cholecalciferol, Vitamin D3, 1,000 unit cap Take 3,000 Units by mouth daily. No current facility-administered medications on file prior to visit.      Allergies   Allergen Reactions    Watermelon Anaphylaxis and Swelling    Citric Acid Rash    Peach (Prunus Persica) Itching     Family History   Problem Relation Age of Onset    Hypertension Mother     Thyroid Disease Mother     Hypertension Father     Diabetes Father     Drug Abuse Brother      Social History     Socioeconomic History    Marital status:      Spouse name: Not on file    Number of children: Not on file    Years of education: Not on file    Highest education level: Not on file   Occupational History    Not on file   Tobacco Use    Smoking status: Former     Packs/day: 1.00     Years: 7.00     Pack years: 7.00     Types: Cigarettes     Start date: 1/1/1969     Quit date: 1/1/1995 Years since quittin.9    Smokeless tobacco: Never   Vaping Use    Vaping Use: Never used   Substance and Sexual Activity    Alcohol use: No    Drug use: No    Sexual activity: Yes     Partners: Female   Other Topics Concern     Service Yes     Comment: Served in [facility] [employer]    Blood Transfusions No    Caffeine Concern No    Occupational Exposure No    Hobby Hazards No    Sleep Concern No    Stress Concern No    Weight Concern Yes    Special Diet No    Back Care Yes     Comment: Lower Back pain when walking    Exercise No    Bike Helmet No    Seat Belt Yes    Self-Exams Yes   Social History Narrative    Not on file     Social Determinants of Health     Financial Resource Strain: Not on file   Food Insecurity: Not on file   Transportation Needs: Not on file   Physical Activity: Not on file   Stress: Not on file   Social Connections: Not on file   Intimate Partner Violence: Not on file   Housing Stability: Not on file     There were no vitals taken for this visit. Physical Exam  Constitutional:       General: He is not in acute distress. Appearance: He is obese. He is not ill-appearing, toxic-appearing or diaphoretic. Comments: Uses a high rolling walker   HENT:      Head: Normocephalic and atraumatic. Right Ear: External ear normal.      Left Ear: External ear normal.      Nose: Nose normal. No congestion. Mouth/Throat:      Mouth: Mucous membranes are moist.      Pharynx: No oropharyngeal exudate. Eyes:      General: No scleral icterus. Right eye: No discharge. Left eye: No discharge. Extraocular Movements: Extraocular movements intact. Conjunctiva/sclera: Conjunctivae normal.      Pupils: Pupils are equal, round, and reactive to light. Neck:      Vascular: No carotid bruit. Cardiovascular:      Rate and Rhythm: Normal rate and regular rhythm. Pulses: Normal pulses. Heart sounds: Murmur (2/6 blowing systolic murmur unchanged) heard.      No gallop. Pulmonary:      Effort: Pulmonary effort is normal. No respiratory distress. Breath sounds: Normal breath sounds. No stridor. No wheezing, rhonchi or rales. Chest:      Chest wall: No tenderness. Abdominal:      General: There is no distension. Palpations: Abdomen is soft. There is no mass. Tenderness: There is no abdominal tenderness. Musculoskeletal:         General: No swelling, tenderness, deformity or signs of injury. Cervical back: No rigidity or tenderness. Right lower leg: No edema. Left lower leg: No edema. Comments: Multiple old surgical scars R leg   Lymphadenopathy:      Cervical: No cervical adenopathy. Skin:     General: Skin is warm and dry. Coloration: Skin is not jaundiced or pale. Findings: No bruising, erythema, lesion or rash. Neurological:      General: No focal deficit present. Mental Status: He is alert and oriented to person, place, and time. Coordination: Coordination normal.   Psychiatric:         Mood and Affect: Mood normal.         Behavior: Behavior normal.         Thought Content: Thought content normal.         Judgment: Judgment normal.         04/06/21    ECHO ADULT COMPLETE 04/06/2021 4/6/2021    Interpretation Summary  · LV: Estimated LVEF is 55 - 60%. Normal cavity size and systolic function (ejection fraction normal). Mild concentric hypertrophy. Wall motion: normal. Mild (grade 1) left ventricular diastolic dysfunction. · LA: Mildly dilated left atrium. Left Atrium volume index is 38.76 mL/m2. · RV: Mildly dilated right ventricle. · RA: Mildly dilated right atrium. · AV: Prosthetic aortic valve. Aortic valve mean gradient is 8.1 mmHg. There is a 25 mm [facility] bioprosthetic aortic valve. Prosthesis is normal.  · MV: Mitral annular calcification. Mild mitral valve regurgitation is present. · TV: Mild tricuspid valve regurgitation is present. · AO: Mild ascending aorta dilatation.  Ascending aorta diameter = 3.7 cm. · PA: Pulmonary arterial systolic pressure is 26 mmHg. Signed by: Philippe Middleton MD on 4/6/2021 12:26 PM  CT CTA CHEST PULMONARY    Anatomical Region Laterality Modality   Chest -- Computed Tomography     Impression      No evidence of pulmonary embolism or other acute process. Signed By: Jolene Holt MD on 6/22/2022 7:17 PM  Narrative    EXAM: CT CTA CHEST PULMONARY     CLINICAL INDICATION/HISTORY: Provided with order -   Pulmonary embolism (PE) suspected, positive D-dimer     > Additional: Chest pain     COMPARISON: Two-view chest done earlier today. TECHNIQUE: Helical CT imaging from the thoracic inlet through the diaphragm with intravenous contrast. Coronal and axial MIP reformats were generated. One or more dose reduction techniques were used on this CT: automated exposure control, adjustment of the mAs and/or kVp according to patient size, and iterative reconstruction techniques. The specific techniques used on this CT exam have been documented in the patient's electronic medical record. Digital Imaging and Communications in Medicine (DICOM) format image data are available to nonaffiliated external healthcare facilities or entities on a secure, media free, reciprocally searchable basis with patient authorization for at least a 12-month period after this study. _______________     FINDINGS:     EXAM QUALITY: Adequate. PULMONARY ARTERIES: No pulmonary artery filling defects. Normal diameter of the main PA.     MEDIASTINUM: Heart size is normal. No pericardial effusion. Coronary artery calcification with atherosclerotic changes of aorta. LUNGS: No suspicious nodule or mass. No abnormal opacities. PLEURA: Normal.     AIRWAY: Normal.     LYMPH NODES:  No abnormal adenopathy. There are scattered small calcified mediastinal and hilar lymph nodes. UPPER ABDOMEN: Unremarkable. OSSEOUS: No acute or aggressive osseous abnormalities identified.      OTHER: None.     _______________  Procedure Note    Alexandro Perez MD - 06/22/2022   Formatting of this note might be different from the original.   EXAM: CT CTA CHEST PULMONARY     CLINICAL INDICATION/HISTORY: Provided with order -   Pulmonary embolism (PE) suspected, positive D-dimer     > Additional: Chest pain     COMPARISON: Two-view chest done earlier today. TECHNIQUE: Helical CT imaging from the thoracic inlet through the diaphragm with intravenous contrast. Coronal and axial MIP reformats were generated. One or more dose reduction techniques were used on this CT: automated exposure control, adjustment of the mAs and/or kVp according to patient size, and iterative reconstruction techniques. The specific techniques used on this CT exam have been documented in the patient's electronic medical record. Digital Imaging and Communications in Medicine (DICOM) format image data are available to nonaffiliated external healthcare facilities or entities on a secure, media free, reciprocally searchable basis with patient authorization for at least a 12-month period after this study. _______________     FINDINGS:     EXAM QUALITY: Adequate. PULMONARY ARTERIES: No pulmonary artery filling defects. Normal diameter of the main PA.     MEDIASTINUM: Heart size is normal. No pericardial effusion. Coronary artery calcification with atherosclerotic changes of aorta. LUNGS: No suspicious nodule or mass. No abnormal opacities. PLEURA: Normal.     AIRWAY: Normal.     LYMPH NODES:  No abnormal adenopathy. There are scattered small calcified mediastinal and hilar lymph nodes. UPPER ABDOMEN: Unremarkable. OSSEOUS: No acute or aggressive osseous abnormalities identified. OTHER: None.     _______________     IMPRESSION     No evidence of pulmonary embolism or other acute process.      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 EF ~ 50%. * The basal inferoseptal, and basal inferolateral appear hypokinetic. * Left ventricular diastolic function: normal for age. * Bioprosthetic AVR in situ which appears well seated and appropriately   functioning. * There is mild mitral valve regurgitation. * Unable to estimate pulmonary arterial pressure due to inadequate tricuspid   regurgitant Doppler waveforms. * Technically difficult and limited study. * Complete transthoracic echocardiogram performed with: Ultrasound enhancing   agent. Comparison     * Compared to prior study from , aortic stenosis is now mild. Wall   motion abnormalities are a new finding. Patient Info   Name:     Ino Conroy   Age:     69 years   :     1945   Gender:     Male   MRN:     [id number]   Ht:     71 in   Wt:     269 lb   BSA:     2.49 m2   HR:     74 bpm   BP:     158   /     71 mmHg   Heart Rhythm:     Sinus Arrhythmia   Exam Date:     2022 9:26 AM   Site Location:     [address] [place] [address]   Patient Status:     Inpatient     Exam Type:     ECHO CARDIOGRAM COMPLETE     Indications        - Chest pain       History/Risk Factors   Hypertension:     Yes   Diabetic Therapy:     Insulin   Obesity:     Yes   Renal Disease:     Yes   Coronary Artery Disease (CAD)     Yes   Congestive Heart Failure (CHF):     Hx CHF   Diabetes Mellitus:     Yes       Left Ventricle     Mild MUSA with mildly dilated left ventricular cavity and low normal   appearing systolic function. EF ~ 50%. The basal inferoseptal, and basal inferolateral appear hypokinetic.      All other walls appear normal.     Left ventricular chamber size is mildly enlarged. There is asymmetrical left ventricular hypertrophy with a mildly thickened   septal wall. Left ventricular diastolic function: normal for age. Right Ventricle     Right ventricle is not well visualized. Left Atrium     Left atrial chamber is normal with a left atrial volume index of 32.35   ml/m^2 by BP MOD. Right Atrium     Right atrium was not well visualized. Aortic Valve     Bioprosthetic AVR in situ which appears well seated and appropriately   functioning. There is moderate diffuse thickening (sclerosis) of the aortic valve cusps. There is no aortic valve regurgitation. Aortic valve measurements were averaged due to Sinus arrythmia. Pulmonic Valve     The pulmonic valve is not well visualized. There is no pulmonic valve stenosis. Mitral Valve     The mitral valve has thickened leaflets. There is no mitral valve stenosis. There is mild mitral valve regurgitation. Tricuspid Valve     The tricuspid valve leaflets are not well visualized. There is no tricuspid valve stenosis. Unable to estimate pulmonary arterial pressure due to inadequate tricuspid   regurgitant Doppler waveforms. Pericardium/Pleural     There is no pericardial effusion. Inferior Vena Cava     Normal inferior vena cava with >50% collapse upon inspiration. Aorta     The aortic root at the sinus of Valsalva is normal measuring 3.0 cm with an   index of 1.20 cm/m2. The aortic measurements are indexed to age and body surface area.        Left Ventricular Outflow Tract   ----------------------------------------------------------------------   Name                                 Value        Normal   ----------------------------------------------------------------------     LVOT 2D   ----------------------------------------------------------------------   LVOT Diameter                      2.10 cm                   LVOT Doppler   ----------------------------------------------------------------------   LVOT Peak Velocity              77.70 cm/s                 LVOT Peak Gradient                  2 mmHg                 LVOT Mean Gradient                  1 mmHg                 LVOT VTI                          14.90 cm                 LVOT Stroke Volume                51.58 ml                 LVOT Stroke Volume Index       20.70 ml/m2     Pulmonic Valve   ----------------------------------------------------------------------   Name                                 Value        Normal   ----------------------------------------------------------------------     PV Doppler   ----------------------------------------------------------------------   PV Peak Velocity               110.00 cm/s                 PV Peak Gradient                    5 mmHg     Mitral Valve   ----------------------------------------------------------------------   Name                                 Value        Normal   ----------------------------------------------------------------------     MV Doppler   ----------------------------------------------------------------------   MV Peak Gradient                    5 mmHg                 MV Mean Gradient                    3 mmHg                 MV Decel [place]                   413 cm/s2                 MV PHT                               86 ms                 MV Area (PHT)                     2.56 cm2     4.00-5.00   MV Area (Cont Eq VTI)             1.71 cm2                   MV Diastolic Function   ----------------------------------------------------------------------   MV E Peak Velocity              92.50 cm/s                 MV A Peak Velocity              82.70 cm/s                 MV E/A                                1.12        <=1.60   MV A Wave Duration                  114 ms                   MV Annular TDI   ----------------------------------------------------------------------   MV Septal e' Velocity            7.51 cm/s        >=7.00   MV Lateral e' Velocity          11.70 cm/s       >=10.00   MV e' Average                         9.61                 MV E/e' (Average)                    10.11       <=14.00     Aorta   ----------------------------------------------------------------------   Name                                 Value        Normal   ----------------------------------------------------------------------     Ascending Aorta   ----------------------------------------------------------------------   Sinus of Valsalva Diameter          3.0 cm         <=3.7   Sinus of Valsalva Index         1.20 cm/m2        <=1.90     Aortic Valve   ----------------------------------------------------------------------   Name                                 Value        Normal   ----------------------------------------------------------------------     AV 2D/MM   ----------------------------------------------------------------------   AV Cusp Sep (MM)                   2.20 cm                   AV Doppler   ----------------------------------------------------------------------   AV Peak Velocity               205.20 cm/s                 AV Peak Gradient                   17 mmHg                 AV Mean Gradient                    9 mmHg                 AV VTI                            51.00 cm                 AV Area (Cont Eq VTI)             1.76 cm2        >=3.00   AV Area Index (Cont Eq VTI)    0.71 cm2/m2                 AV Area (Cont Eq Ananda)             1.31 cm2                 AV Area Index (Cont Eq Ananda)    0.53 cm2/m2                 AV Dimensionless Index (Peak   Velocity)                             0.38                 AV Dimensionless Index (VTI)          0.29                   AV Regurgitation 2D   ----------------------------------------------------------------------   LVOT Area                         3.46 cm2     Ventricles ----------------------------------------------------------------------   Name                                 Value        Normal   ----------------------------------------------------------------------     LV Dimensions 2D/MM   ----------------------------------------------------------------------   IVS Diastolic Thickness (2D)       1.18 cm        <=1.04   LVID Diastole (2D)                 6.17 cm     6.97-8.96   LVPW Diastolic Thickness   (2D)                               0.88 cm        <=1.04   LVID Systole (2D)                  5.21 cm     2.39-2.22   LVID Diastolic Index (2D)       2.48 cm/m2     2.20-3.00   LVOT Diameter                      2.10 cm                 LV Mass (2D Cubed)                269.64 g  88..00   LV Mass Index (2D Cubed)        0.01 g/cm2     0.00-0.01   Relative Wall Thickness (2D)          0.29     Atria   ----------------------------------------------------------------------   Name                                 Value        Normal   ----------------------------------------------------------------------     LA Dimensions   ----------------------------------------------------------------------   LA Dimension (2D)                  3.90 cm     3.00-4.10   LA Dimen Index (2D)             1.57 cm/m2                 LA Volume (BP MOD)                80.60 ml                 LA Volume Index (BP MOD)       32.35 ml/m2   16.00-34.00       Report Signatures   Finalized by Nicole Turk DO on 6/23/2022 12:11 PM       Technical Quality:     Poor     Any Known Allergies:     Citric acid, watermelon     Staff   Ordering Provider:     Veronica Pierce   Referring Provider:     Veronica Pierce  ; Complete transthoracic echocardiogram performed with: Ultrasound enhancing   agent.      [id number]     Sonographer:     Anne Mark     Reason for Poor Quality:     poor echocardiographic windows     Image Enhancing Agent/Agitated Saline     ------------------------------ Imaging Agent/Ag. Saline:     Definity   Amount:     3.0 ml   Administered By:     Jair Nation   IV Site:     IV - right antecubital  Procedure Note    Alexandr Lloyd DO - 2022   Formatting of this note might be different from the original.   CONCLUSIONS     * Mild MUSA with mildly dilated left ventricular cavity and low normal   appearing systolic function. EF ~ 50%. * The basal inferoseptal, and basal inferolateral appear hypokinetic. * Left ventricular diastolic function: normal for age. * Bioprosthetic AVR in situ which appears well seated and appropriately   functioning. * There is mild mitral valve regurgitation. * Unable to estimate pulmonary arterial pressure due to inadequate tricuspid   regurgitant Doppler waveforms. * Technically difficult and limited study. * Complete transthoracic echocardiogram performed with: Ultrasound enhancing   agent. Comparison     * Compared to prior study from , aortic stenosis is now mild. Wall   motion abnormalities are a new finding. Patient Info   Name:     Torsten De Leon   Age:     69 years   :     1945   Gender:     Male   MRN:     [id number]   Ht:     71 in   Wt:     269 lb   BSA:     2.49 m2   HR:     74 bpm   BP:     158   /     71 mmHg   Heart Rhythm:     Sinus Arrhythmia   Exam Date:     2022 9:26 AM   Site Location:     [address] [place] [address]   Patient Status:     Inpatient     Exam Type:     ECHO CARDIOGRAM COMPLETE     Indications        - Chest pain       History/Risk Factors   Hypertension:     Yes   Diabetic Therapy:     Insulin   Obesity:     Yes   Renal Disease:     Yes   Coronary Artery Disease (CAD)     Yes   Congestive Heart Failure (CHF):     Hx CHF   Diabetes Mellitus:     Yes       Left Ventricle     Mild MUSA with mildly dilated left ventricular cavity and low normal   appearing systolic function. EF ~ 50%. The basal inferoseptal, and basal inferolateral appear hypokinetic.      All other walls appear normal.     Left ventricular chamber size is mildly enlarged. There is asymmetrical left ventricular hypertrophy with a mildly thickened   septal wall. Left ventricular diastolic function: normal for age. Right Ventricle     Right ventricle is not well visualized. Left Atrium     Left atrial chamber is normal with a left atrial volume index of 32.35   ml/m^2 by BP MOD. Right Atrium     Right atrium was not well visualized. Aortic Valve     Bioprosthetic AVR in situ which appears well seated and appropriately   functioning. There is moderate diffuse thickening (sclerosis) of the aortic valve cusps. There is no aortic valve regurgitation. Aortic valve measurements were averaged due to Sinus arrythmia. Pulmonic Valve     The pulmonic valve is not well visualized. There is no pulmonic valve stenosis. Mitral Valve     The mitral valve has thickened leaflets. There is no mitral valve stenosis. There is mild mitral valve regurgitation. Tricuspid Valve     The tricuspid valve leaflets are not well visualized. There is no tricuspid valve stenosis. Unable to estimate pulmonary arterial pressure due to inadequate tricuspid   regurgitant Doppler waveforms. Pericardium/Pleural     There is no pericardial effusion. Inferior Vena Cava     Normal inferior vena cava with >50% collapse upon inspiration. Aorta     The aortic root at the sinus of Valsalva is normal measuring 3.0 cm with an   index of 1.20 cm/m2. The aortic measurements are indexed to age and body surface area.        Left Ventricular Outflow Tract   ----------------------------------------------------------------------   Name                                 Value        Normal   ----------------------------------------------------------------------     LVOT 2D   ----------------------------------------------------------------------   LVOT Diameter 2.10 cm                   LVOT Doppler   ----------------------------------------------------------------------   LVOT Peak Velocity              77.70 cm/s                 LVOT Peak Gradient                  2 mmHg                 LVOT Mean Gradient                  1 mmHg                 LVOT VTI                          14.90 cm                 LVOT Stroke Volume                51.58 ml                 LVOT Stroke Volume Index       20.70 ml/m2     Pulmonic Valve   ----------------------------------------------------------------------   Name                                 Value        Normal   ----------------------------------------------------------------------     PV Doppler   ----------------------------------------------------------------------   PV Peak Velocity               110.00 cm/s                 PV Peak Gradient                    5 mmHg     Mitral Valve   ----------------------------------------------------------------------   Name                                 Value        Normal   ----------------------------------------------------------------------     MV Doppler   ----------------------------------------------------------------------   MV Peak Gradient                    5 mmHg                 MV Mean Gradient                    3 mmHg                 MV Decel Trimble                   413 cm/s2                 MV PHT                               86 ms                 MV Area (PHT)                     2.56 cm2     4.00-5.00   MV Area (Cont Eq VTI)             1.71 cm2                   MV Diastolic Function   ----------------------------------------------------------------------   MV E Peak Velocity              92.50 cm/s                 MV A Peak Velocity              82.70 cm/s                 MV E/A                                1.12        <=1.60   MV A Wave Duration                  114 ms                   MV Annular TDI ----------------------------------------------------------------------   MV Septal e' Velocity            7.51 cm/s        >=7.00   MV Lateral e' Velocity          11.70 cm/s       >=10.00   MV e' Average                         9.61                 MV E/e' (Average)                    10.11       <=14.00     Aorta   ----------------------------------------------------------------------   Name                                 Value        Normal   ----------------------------------------------------------------------     Ascending Aorta   ----------------------------------------------------------------------   Sinus of Valsalva Diameter          3.0 cm         <=3.7   Sinus of Valsalva Index         1.20 cm/m2        <=1.90     Aortic Valve   ----------------------------------------------------------------------   Name                                 Value        Normal   ----------------------------------------------------------------------     AV 2D/MM   ----------------------------------------------------------------------   AV Cusp Sep (MM)                   2.20 cm                   AV Doppler   ----------------------------------------------------------------------   AV Peak Velocity               205.20 cm/s                 AV Peak Gradient                   17 mmHg                 AV Mean Gradient                    9 mmHg                 AV VTI                            51.00 cm                 AV Area (Cont Eq VTI)             1.76 cm2        >=3.00   AV Area Index (Cont Eq VTI)    0.71 cm2/m2                 AV Area (Cont Eq Ananda)             1.31 cm2                 AV Area Index (Cont Eq Ananda)    0.53 cm2/m2                 AV Dimensionless Index (Peak   Velocity)                             0.38                 AV Dimensionless Index (VTI)          0.29                   AV Regurgitation 2D   ----------------------------------------------------------------------   LVOT Area                         3.46 cm2     Ventricles   ----------------------------------------------------------------------   Name                                 Value        Normal   ----------------------------------------------------------------------     LV Dimensions 2D/MM   ----------------------------------------------------------------------   IVS Diastolic Thickness (2D)       1.18 cm        <=1.04   LVID Diastole (2D)                 6.17 cm     3.21-8.56   LVPW Diastolic Thickness   (2D)                               0.88 cm        <=1.04   LVID Systole (2D)                  5.21 cm     2.16-5.53   LVID Diastolic Index (2D)       2.48 cm/m2     2.20-3.00   LVOT Diameter                      2.10 cm                  LV Mass (2D Cubed)                269.64 g  88..00   LV Mass Index (2D Cubed)        0.01 g/cm2     0.00-0.01   Relative Wall Thickness (2D)          0.29     Atria   ----------------------------------------------------------------------   Name                                 Value        Normal   ----------------------------------------------------------------------     LA Dimensions   ----------------------------------------------------------------------   LA Dimension (2D)                  3.90 cm     3.00-4.10   LA Dimen Index (2D)             1.57 cm/m2                 LA Volume (BP MOD)                80.60 ml                 LA Volume Index (BP MOD)       32.35 ml/m2   16.00-34.00       Report Signatures   Finalized by Jonah Daly DO on 6/23/2022 12:11 PM       Technical Quality:     Poor     Any Known Allergies:     Citric acid, watermelon     Staff   Ordering Provider:     Cheo Rivas   Referring Provider:     Cheo Rivas  ; Complete transthoracic echocardiogram performed with: Ultrasound enhancing   agent.      [id number]     Sonographer:     Anel Allen     Reason for Poor Quality:     poor echocardiographic windows     Image Enhancing Agent/Agitated Saline ------------------------------   Imaging Agent/Ag. Saline:     Definity   Amount:     3.0 ml   Administered By:     Chloe Napoles   IV Site:     IV - right antecubital  All Measurements            Exam End: 22 11:10    Specimen Collected: 22 09:26 Last Resulted: 22 12:12   Received From: Rosalina1 MANASA Musa  Result Received: 22 10:42    View Encounter          TERESITA REST/STRESS MULTIPLE SPECT (Thallium Stress)    Anatomical Region Laterality Modality   Chest -- Nuclear Medicine     Narrative    CONCLUSIONS     * Abnormal but low risk myocardial perfusion study. * Small, mild grade, fixed distal apical wall defect suggestive of apical   thinning artifact. No convincing evidence of prior infarct or reversible   ischemia. * Mildly dilated left ventricular cavity with mild global hypokinesis more   pronounced in the basal to mid inferior wall. Basal inferoseptal wall appears   dyskinetic. LVEF 40%. * No TID. Patient Info   Name:     Sadaf Mcfarland   Age:     69 years   :     1945   Gender:     Male   MRN:     [id number]   Ht:     71 in   Wt:     269 lb   BMI:     39.72 kg/m^2   HR:     75 bpm   Exam Date:     2022 8:46 AM   Site Location:     [facility]   Patient Status:     Inpatient     Exam Type:     TERESITA REST/STRESS MULTIPLE SPECT     Indications       CP -       History/Risk Factors   Hypertension:     Yes   Diabetic Therapy:     Insulin   Obesity:     Yes   Renal Disease:     Yes   Coronary Artery Disease (CAD)     Yes   Congestive Heart Failure (CHF):     Hx CHF   Diabetes Mellitus:     Yes     History/Risk Factors     Gout.     hypothyroidism. [id number]. rt TKR. lt Hip replacement.        Radiopharmaceutical:     Tc-99m Sestamibi     Administration Site:     IV - right wrist     Administered By:     Elaine Ta [facility]     Camera Used:     [address]     Radiopharmaceutical:     Tc-99m Sestamibi     Administration Site:     IV - right wrist     Camera Used:     [employer]     Image Protocol   Protocol:     Rest/Stress 1 Day   Rest   Radiopharmaceutical Dose:     11.0 mCi   Imaging Date & Time:     6/23/2022 8:52 AM   Stress   Radiopharmaceutical Dose:     30.0 mCi   Imaging Date & Time:     6/23/2022 11:09 AM     Injection to Imaging Time:     60 min     Injection to Imaging Time:     60 min       Stress ECG Details   Protocol:     Lexiscan   Rest HR:     76 bpm   Peak HR:     95 bpm   Rest Sys BP:     106 mmHg   Peak Sys BP:     122 mmHg   Max Pred HR:     144 bpm   % Max Pred HR Achieved:     66 %   Target HR:     122 bpm   Max RPP:     11,590 bpm*mmHg   BP Response:     Normal blood pressure response   Total Stress Time:     2 min : 0 sec   Rest Mao BP:     56 mmHg   Peak Mao BP:     75 mmHg   Resting ECG     Normal sinus rhythm. Borderline left axis deviation. Abnormal R wave progression. Frequent PVCs. Stress ECG     No ST segment changes suggestive of ischemia under stress. Arrhythmias     Ventricular couplets. Non-sustained ventricular tachycardia (3-5 beats). Bigeminy. Occasional PACs. Termination Reason:     Protocol Complete   Cardiac Symptoms:     Shortness of breath, Fatigue       SPECT Results   Study Limitations:     Patient Motion Artifact, Inferior Wall / Diaphragmatic   Attenuation   Summed Difference Score:     0   Summed Stress Score:     5   Summed Rest Score:     5   Perfusion Findings   Abnormal but low risk myocardial perfusion study.      Diagnostic Quality:     Good       Functional Results   ----------------------------------------------------------------------   Name                                 Value        Normal   ----------------------------------------------------------------------     Stress   ----------------------------------------------------------------------   Stress LV Ejection Fraction           40 %         55-70   Stress LV End Systolic   Volume 108.00 ml                 Nuclear Stress Cardiac   Output                          5.80 l/min                 Stress LV End Diastolic   Volume                           179.00 ml                 Transient Ischemic   Dilatation                            1.06                 Nuclear Stress Myocardial   Mass                              187.00 g   Functional Findings     Mildly dilated left ventricular cavity with mild global hypokinesis more   pronounced in the basal to mid inferior wall. Basal inferoseptal wall appears   dyskinetic. LVEF 40%. Report Signatures   MPI SPECT Finalized by [facility]  DO on 2022 11:51 AM   Stress Finalized by [facility] on 2022 11:51 AM       Current Medications:     aldactone, Crestor, prilosec, Lopressor, synthroid,   lispro, lantus, hydralazine, heparin, uloric, ASA       Any Known Allergies:     Citric acid, watermelon     Staff   Ordering Provider:     Anne-Marie Cruz   Nuclear Technologist:     Puneet Webber   Referring Provider:     Danette Coronel;   Clinical Associates:     Carline Sandra RN   Supervising Nurse:     Tin Mayo RN   Supervising Provider:     Adela Post     Account #:     [de-identified]  Procedure Note    Babak Clancy,  - 2022   Formatting of this note might be different from the original.   CONCLUSIONS     * Abnormal but low risk myocardial perfusion study. * Small, mild grade, fixed distal apical wall defect suggestive of apical   thinning artifact. No convincing evidence of prior infarct or reversible   ischemia. * Mildly dilated left ventricular cavity with mild global hypokinesis more   pronounced in the basal to mid inferior wall. Basal inferoseptal wall appears   dyskinetic. LVEF 40%. * No TID.        Patient Info   Name:     Michael Figueroa   Age:     69 years   :     1945   Gender:     Male   MRN:     [id number]   Ht:     69 in   Wt:     269 lb   BMI:     39.72 kg/m^2   HR:     75 bpm   Exam Date:     6/23/2022 8:46 AM   Site Location:     [facility]   Patient Status:     Inpatient     Exam Type:     TERESITA REST/STRESS MULTIPLE SPECT     Indications       CP -       History/Risk Factors   Hypertension:     Yes   Diabetic Therapy:     Insulin   Obesity:     Yes   Renal Disease:     Yes   Coronary Artery Disease (CAD)     Yes   Congestive Heart Failure (CHF):     Hx CHF   Diabetes Mellitus:     Yes     History/Risk Factors     Gout.     hypothyroidism. [id number]. rt TKR. lt Hip replacement. Radiopharmaceutical:     Tc-99m Sestamibi     Administration Site:     IV - right wrist     Administered By:     Anel [facility]     Camera Used:     [employer]     Radiopharmaceutical:     Tc-99m Sestamibi     Administration Site:     IV - right wrist     Camera Used:     [employer]     Image Protocol   Protocol:     Rest/Stress 1 Day   Rest   Radiopharmaceutical Dose:     11.0 mCi   Imaging Date & Time:     6/23/2022 8:52 AM   Stress   Radiopharmaceutical Dose:     30.0 mCi   Imaging Date & Time:     6/23/2022 11:09 AM     Injection to Imaging Time:     60 min     Injection to Imaging Time:     60 min       Stress ECG Details   Protocol:     Lexiscan   Rest HR:     76 bpm   Peak HR:     95 bpm   Rest Sys BP:     106 mmHg   Peak Sys BP:     122 mmHg   Max Pred HR:     144 bpm   % Max Pred HR Achieved:     66 %   Target HR:     122 bpm   Max RPP:     11,590 bpm*mmHg   BP Response:     Normal blood pressure response   Total Stress Time:     2 min : 0 sec   Rest Mao BP:     56 mmHg   Peak Mao BP:     75 mmHg   Resting ECG     Normal sinus rhythm. Borderline left axis deviation. Abnormal R wave progression. Frequent PVCs. Stress ECG     No ST segment changes suggestive of ischemia under stress. Arrhythmias     Ventricular couplets. Non-sustained ventricular tachycardia (3-5 beats). Bigeminy. Occasional PACs.      Termination Reason:     Protocol Complete   Cardiac Symptoms:     Shortness of breath, Fatigue       SPECT Results   Study Limitations:     Patient Motion Artifact, Inferior Wall / Diaphragmatic   Attenuation   Summed Difference Score:     0   Summed Stress Score:     5   Summed Rest Score:     5   Perfusion Findings   Abnormal but low risk myocardial perfusion study. Diagnostic Quality:     Good       Functional Results   ----------------------------------------------------------------------   Name                                 Value        Normal   ----------------------------------------------------------------------     Stress   ----------------------------------------------------------------------   Stress LV Ejection Fraction           40 %         55-70   Stress LV End Systolic   Volume                           108.00 ml                 Nuclear Stress Cardiac   Output                          5.80 l/min                 Stress LV End Diastolic   Volume                           179.00 ml                 Transient Ischemic   Dilatation                            1.06                 Nuclear Stress Myocardial   Mass                              187.00 g   Functional Findings     Mildly dilated left ventricular cavity with mild global hypokinesis more   pronounced in the basal to mid inferior wall. Basal inferoseptal wall appears   dyskinetic. LVEF 40%.        Report Signatures   MPI SPECT Finalized by Rafa Mei DO on 6/23/2022 11:51 AM   Stress Finalized by Rafa Mei DO on 6/23/2022 11:51 AM       Current Medications:     aldactone, Crestor, prilosec, Lopressor, synthroid,   lispro, lantus, hydralazine, heparin, uloric, ASA       Any Known Allergies:     Citric acid, watermelon     Staff   Ordering Provider:     Janett Fried   Nuclear Technologist:     Matthew Fritz   Referring Provider:     Ludwin Joshi;   Clinical Associates:     Jerry Black RN   Supervising Nurse:     Feli Ramirez RN   Supervising Provider:     Velma Sood     Account #:     [de-identified]  Exam End: 06/23/22 11:16    Specimen Collected: 06/23/22 08:46 Last Resulted: 06/23/22 11:51   Received From: Rosalina1 MANASA Musa  Result Received: 09/26/22 10:42    View Encounter        ASSESSMENT and PLAN  Encounter Diagnoses   Name Primary? Chronic cough Yes    HFrEF (heart failure with reduced ejection fraction) (HCC)     Mild persistent asthmatic bronchitis without complication      Pt with chronic cough with no apparent lung pathology on recent chest xray, possibly bronchospasm vs \"cardiac asthma\". Differentials include GERD (pt with known diagnosis), Upper airway cough syndrome less likely. Although pt responded to Symbicort, the development of SVT is concerning. Will discontinue LABA and monitor response to ICS alone. Should cough and wheezing recur will consider Advair as this may exhibit the least effect on tachyarrhythmias. Rx written for Flovent but pt to call should symptoms recur off Symbicort. RTC 3 months. Skyrizi Counseling: I discussed with the patient the risks of risankizumab-rzaa including but not limited to immunosuppression, and serious infections.  The patient understands that monitoring is required including a PPD at baseline and must alert us or the primary physician if symptoms of infection or other concerning signs are noted.

## 2022-11-29 ENCOUNTER — TELEPHONE (OUTPATIENT)
Dept: PULMONOLOGY | Age: 77
End: 2022-11-29

## 2022-11-29 NOTE — TELEPHONE ENCOUNTER
Rx for Flovent HFA 110mcg was printed and he took to [address] They do not have that but hey want to know if they can substitute with Mometasone/Asmanex which they do have?

## 2022-11-29 NOTE — TELEPHONE ENCOUNTER
Pt calling office today re medications. [facility] but [facility] do not have the other one. He thinks it was flovent, they are asking if it can be changed to mometasone. He would like to know if this change would be ok and what dose he should take. Pt is asking that a nurse call him back TODAY with the answer.  Please advise [phone]

## 2023-02-20 ENCOUNTER — OFFICE VISIT (OUTPATIENT)
Facility: CLINIC | Age: 78
End: 2023-02-20
Payer: MEDICARE

## 2023-02-20 VITALS
DIASTOLIC BLOOD PRESSURE: 70 MMHG | WEIGHT: 264 LBS | RESPIRATION RATE: 20 BRPM | TEMPERATURE: 98.2 F | HEIGHT: 69 IN | OXYGEN SATURATION: 98 % | HEART RATE: 69 BPM | BODY MASS INDEX: 39.1 KG/M2 | SYSTOLIC BLOOD PRESSURE: 133 MMHG

## 2023-02-20 DIAGNOSIS — N18.30 STAGE 3 CHRONIC KIDNEY DISEASE, UNSPECIFIED WHETHER STAGE 3A OR 3B CKD (HCC): ICD-10-CM

## 2023-02-20 DIAGNOSIS — E11.51 TYPE 2 DIABETES MELLITUS WITH DIABETIC PERIPHERAL ANGIOPATHY WITHOUT GANGRENE, WITHOUT LONG-TERM CURRENT USE OF INSULIN (HCC): Primary | ICD-10-CM

## 2023-02-20 DIAGNOSIS — M46.1 SACROILIITIS, NOT ELSEWHERE CLASSIFIED (HCC): ICD-10-CM

## 2023-02-20 DIAGNOSIS — E66.01 SEVERE OBESITY (BMI 35.0-39.9) WITH COMORBIDITY (HCC): ICD-10-CM

## 2023-02-20 DIAGNOSIS — I10 ESSENTIAL (PRIMARY) HYPERTENSION: ICD-10-CM

## 2023-02-20 DIAGNOSIS — E03.9 HYPOTHYROIDISM, UNSPECIFIED TYPE: ICD-10-CM

## 2023-02-20 DIAGNOSIS — K21.9 GASTRO-ESOPHAGEAL REFLUX DISEASE WITHOUT ESOPHAGITIS: ICD-10-CM

## 2023-02-20 DIAGNOSIS — I49.9 CARDIAC ARRHYTHMIA, UNSPECIFIED CARDIAC ARRHYTHMIA TYPE: ICD-10-CM

## 2023-02-20 DIAGNOSIS — M10.9 GOUT, UNSPECIFIED CAUSE, UNSPECIFIED CHRONICITY, UNSPECIFIED SITE: ICD-10-CM

## 2023-02-20 DIAGNOSIS — E78.5 HYPERLIPIDEMIA, UNSPECIFIED HYPERLIPIDEMIA TYPE: ICD-10-CM

## 2023-02-20 DIAGNOSIS — I50.22 CHRONIC SYSTOLIC CONGESTIVE HEART FAILURE (HCC): ICD-10-CM

## 2023-02-20 DIAGNOSIS — M54.16 LUMBAR RADICULOPATHY: ICD-10-CM

## 2023-02-20 PROCEDURE — 4004F PT TOBACCO SCREEN RCVD TLK: CPT | Performed by: FAMILY MEDICINE

## 2023-02-20 PROCEDURE — G8417 CALC BMI ABV UP PARAM F/U: HCPCS | Performed by: FAMILY MEDICINE

## 2023-02-20 PROCEDURE — 99215 OFFICE O/P EST HI 40 MIN: CPT | Performed by: FAMILY MEDICINE

## 2023-02-20 PROCEDURE — 3078F DIAST BP <80 MM HG: CPT | Performed by: FAMILY MEDICINE

## 2023-02-20 PROCEDURE — 1123F ACP DISCUSS/DSCN MKR DOCD: CPT | Performed by: FAMILY MEDICINE

## 2023-02-20 PROCEDURE — 3075F SYST BP GE 130 - 139MM HG: CPT | Performed by: FAMILY MEDICINE

## 2023-02-20 PROCEDURE — G8427 DOCREV CUR MEDS BY ELIG CLIN: HCPCS | Performed by: FAMILY MEDICINE

## 2023-02-20 PROCEDURE — G8484 FLU IMMUNIZE NO ADMIN: HCPCS | Performed by: FAMILY MEDICINE

## 2023-02-20 RX ORDER — FLUTICASONE PROPIONATE 110 UG/1
2 AEROSOL, METERED RESPIRATORY (INHALATION) EVERY 12 HOURS
COMMUNITY
Start: 2022-11-21

## 2023-02-20 RX ORDER — LEVOTHYROXINE SODIUM 0.15 MG/1
150 TABLET ORAL
COMMUNITY
Start: 2020-11-04 | End: 2023-02-20 | Stop reason: DRUGHIGH

## 2023-02-20 RX ORDER — COLCHICINE 0.6 MG/1
0.6 TABLET ORAL DAILY PRN
COMMUNITY
Start: 2022-06-14

## 2023-02-20 RX ORDER — FEBUXOSTAT 40 MG/1
40 TABLET, FILM COATED ORAL DAILY
COMMUNITY
Start: 2016-07-08

## 2023-02-20 RX ORDER — TRAMADOL HYDROCHLORIDE 50 MG/1
50 TABLET ORAL EVERY 6 HOURS PRN
COMMUNITY
End: 2023-02-20 | Stop reason: SDUPTHER

## 2023-02-20 RX ORDER — HYDRALAZINE HYDROCHLORIDE 50 MG/1
150 TABLET, FILM COATED ORAL 2 TIMES DAILY
COMMUNITY
Start: 2022-10-17

## 2023-02-20 RX ORDER — ASCORBIC ACID 250 MG
250 TABLET ORAL
COMMUNITY

## 2023-02-20 RX ORDER — FUROSEMIDE 20 MG/1
20 TABLET ORAL DAILY
COMMUNITY
Start: 2022-07-05

## 2023-02-20 RX ORDER — ALOGLIPTIN 25 MG/1
12.5 TABLET, FILM COATED ORAL
COMMUNITY
Start: 2022-06-13

## 2023-02-20 RX ORDER — ROSUVASTATIN CALCIUM 40 MG/1
40 TABLET, COATED ORAL
COMMUNITY
Start: 2022-07-05

## 2023-02-20 RX ORDER — LOSARTAN POTASSIUM 25 MG/1
25 TABLET ORAL DAILY
COMMUNITY
Start: 2022-07-05

## 2023-02-20 RX ORDER — MEXILETINE HYDROCHLORIDE 150 MG/1
150 CAPSULE ORAL 3 TIMES DAILY
COMMUNITY

## 2023-02-20 RX ORDER — LEVOTHYROXINE SODIUM 0.2 MG/1
200 TABLET ORAL DAILY
Qty: 90 TABLET | Refills: 1 | COMMUNITY
Start: 2023-02-20

## 2023-02-20 RX ORDER — TRAMADOL HYDROCHLORIDE 50 MG/1
50 TABLET ORAL EVERY 8 HOURS PRN
Qty: 30 TABLET | Refills: 0 | Status: SHIPPED | OUTPATIENT
Start: 2023-02-20 | End: 2023-03-02

## 2023-02-20 RX ORDER — MAGNESIUM CHLORIDE 64 MG
TABLET, DELAYED RELEASE (ENTERIC COATED) ORAL
COMMUNITY
Start: 2017-08-11

## 2023-02-20 RX ORDER — TESTOSTERONE 16.2 MG/G
GEL TRANSDERMAL PRN
COMMUNITY

## 2023-02-20 RX ORDER — INSULIN ASPART 100 [IU]/ML
6 INJECTION, SOLUTION INTRAVENOUS; SUBCUTANEOUS
COMMUNITY

## 2023-02-20 RX ORDER — ASPIRIN 325 MG
325 TABLET ORAL DAILY
COMMUNITY

## 2023-02-20 RX ORDER — SPIRONOLACTONE 25 MG/1
25 TABLET ORAL DAILY
COMMUNITY
Start: 2020-06-11

## 2023-02-20 RX ORDER — INSULIN GLARGINE 100 [IU]/ML
45 INJECTION, SOLUTION SUBCUTANEOUS DAILY
COMMUNITY
Start: 2017-04-25

## 2023-02-20 RX ORDER — AMIODARONE HYDROCHLORIDE 200 MG/1
200 TABLET ORAL DAILY
COMMUNITY
Start: 2022-08-19

## 2023-02-20 RX ORDER — DULOXETIN HYDROCHLORIDE 60 MG/1
60 CAPSULE, DELAYED RELEASE ORAL DAILY
COMMUNITY
Start: 2018-06-08

## 2023-02-20 SDOH — ECONOMIC STABILITY: FOOD INSECURITY: WITHIN THE PAST 12 MONTHS, THE FOOD YOU BOUGHT JUST DIDN'T LAST AND YOU DIDN'T HAVE MONEY TO GET MORE.: NEVER TRUE

## 2023-02-20 SDOH — ECONOMIC STABILITY: FOOD INSECURITY: WITHIN THE PAST 12 MONTHS, YOU WORRIED THAT YOUR FOOD WOULD RUN OUT BEFORE YOU GOT MONEY TO BUY MORE.: NEVER TRUE

## 2023-02-20 SDOH — ECONOMIC STABILITY: HOUSING INSECURITY
IN THE LAST 12 MONTHS, WAS THERE A TIME WHEN YOU DID NOT HAVE A STEADY PLACE TO SLEEP OR SLEPT IN A SHELTER (INCLUDING NOW)?: NO

## 2023-02-20 SDOH — ECONOMIC STABILITY: INCOME INSECURITY: HOW HARD IS IT FOR YOU TO PAY FOR THE VERY BASICS LIKE FOOD, HOUSING, MEDICAL CARE, AND HEATING?: NOT HARD AT ALL

## 2023-02-20 ASSESSMENT — PATIENT HEALTH QUESTIONNAIRE - PHQ9
2. FEELING DOWN, DEPRESSED OR HOPELESS: 0
SUM OF ALL RESPONSES TO PHQ9 QUESTIONS 1 & 2: 0
1. LITTLE INTEREST OR PLEASURE IN DOING THINGS: 0
SUM OF ALL RESPONSES TO PHQ QUESTIONS 1-9: 0

## 2023-02-20 NOTE — PROGRESS NOTES
1. \"Have you been to the ER, urgent care clinic since your last visit? Hospitalized since your last visit? \"No    2. \"Have you seen or consulted any other health care providers outside of the [address] [place] [address] since your last visit? \" No    3. For patients aged 39-70: Has the patient had a colonoscopy / FIT/ Cologuard? Not applicable      If the patient is female:    4. For patients aged 41-77: Has the patient had a mammogram within the past 2 years? Not applicable      5. For patients aged 21-65: Has the patient had a pap smear?  Not applicable

## 2023-02-20 NOTE — PROGRESS NOTES
JEFF Dan comes in for follow up care. HTN: Patient has hypertension. Blood pressure is stable. Patient denies headache, changes in vision or focal weakness. Patient is on hydralazine, losartan, spironolactone. Patient has been stable on medication. We will continue current treatment plan. Cardiac: Patient has cardiac dysrhythmia. Patient denies chest pain or syncope. He has palpitations that come on and off. Has been seen by the cardiologist and is planned to have ablation procedure. Currently patient is on mexiletine, amiodarone. He will continue with these medications. CAD: Patient has a history of coronary artery disease. Denies chest pain, shortness of breath or diaphoresis. Patient is on Crestor, losartan, hydralazine, spironolactone, aspirin. Patient will continue with the current treatment plan. Valvular heart disease: Patient has history of valvular heart disease. Patient has a history of aortic stenosis and has had corrective surgery done. Dyslipidemia: Patient has dyslipidemia. Patient is on Crestor. Patient will take diet low in polysaturated fats. We will continue current treatment plan. DM2: Patient has type 2 diabetes mellitus. Patient has been seen in the past by the endocrinologist.  Patient is on insulin and alogliptin. His last HbA1c was 7.2. Patient will continue with the current treatment plan. Hypothyroidism: Patient has hypothyroidism. Patient is on levothyroxine. Patient will continue with the current treatment plan. We will recheck TSH at next visit. Back pain: Patient has chronic back pain. He takes tramadol as needed for the pain. Would like a refill of medication. Prescription will be sent in. Patient has also been on Cymbalta to take daily for back pain and neuropathy. Gout: Patient has a history of gout arthritis. Patient is on Uloric and also takes colchicine. Patient will take a purine restricted diet.   Continue current treatment plan.  CHF: Patient has a history of chronic congestive systolic heart failure. Patient is on spironolactone, furosemide, hydralazine, losartan and takes aspirin daily. Currently stable and not in clinical failure. Sacroiliitis: Patient has a history of sacroiliitis. Currently stable. He is doing supportive care. He can take Tylenol for pain as needed. CKD: Patient has chronic kidney disease stage III. She has been seen by the nephrologist.  Plan is to avoid nephrotoxic medications. Obesity: Patient has a BMI of 38.99. Patient will intensify lifestyle and dietary modification.       Past Medical History  Past Medical History:   Diagnosis Date    Diabetes ([facility] [address] 1995    Dyslipidemia 3/1/2017    Fractures 1995    R Knee/ Tib fib fracture/surgery    Gout 2010    Graves disease 1/21/2015    High cholesterol 2005    HTN (hypertension) 1990    Osteoarthritis 2013    Severe aortic stenosis 3/29/2017    Spinal stenosis of lumbar region 3/1/2016    Spondylolisthesis 3/1/2016    Thyroid trouble     Type 2 diabetes mellitus [facility] nephropathy [facility] 4/28/2015       Surgical History  Past Surgical History:   Procedure Laterality Date    LUMBAR FUSION      ORTHOPEDIC SURGERY Right     R knee/Tibfib surgery    TOTAL HIP ARTHROPLASTY Left 2009        Medications  Current Outpatient Medications   Medication Sig Dispense Refill    ascorbic acid (VITAMIN C) 250 MG tablet Take 250 mg by mouth      aspirin 325 MG tablet Take 325 mg by mouth daily      vitamin D 25 MCG (1000 UT) CAPS Take 3,000 Units by mouth daily      colchicine (COLCRYS) 0.6 MG tablet Take 0.6 mg by mouth daily as needed      diclofenac sodium (VOLTAREN) 1 % GEL Apply topically 4 times daily      DULoxetine (CYMBALTA) 60 MG extended release capsule Take 60 mg by mouth daily      febuxostat (ULORIC) 40 MG TABS tablet Take 40 mg by mouth daily      furosemide (LASIX) 20 MG tablet Take 20 mg by mouth daily      hydrALAZINE (APRESOLINE) 50 MG tablet Take 150 mg by mouth 2 times daily      insulin aspart (NOVOLOG) 100 UNIT/ML injection vial Inject 6 Units into the skin 3 times daily (before meals)      insulin glargine (LANTUS) 100 UNIT/ML injection vial Inject 45 Units into the skin daily      levothyroxine (SYNTHROID) 150 MCG tablet Take 150 mcg by mouth every morning (before breakfast)      losartan (COZAAR) 25 MG tablet Take 25 mg by mouth daily      magnesium chloride (MAG DELAY) 64 MG TBEC extended release tablet Take 8 tablets 3 x day      rosuvastatin (CRESTOR) 40 MG tablet Take 40 mg by mouth      spironolactone (ALDACTONE) 25 MG tablet Take 25 mg by mouth daily      Testosterone (ANDROGEL) 20.25 MG/ACT (1.62%) GEL gel Apply topically as needed. mexiletine (MEXITIL) 150 MG capsule Take 150 mg by mouth 3 times daily      traMADol (ULTRAM) 50 MG tablet Take 50 mg by mouth every 6 hours as needed for Pain. alogliptin (NESINA) 25 MG TABS tablet 12.5 mg      amiodarone (CORDARONE) 200 MG tablet Take 200 mg by mouth daily      fluticasone (FLOVENT HFA) 110 MCG/ACT inhaler Inhale 2 puffs into the lungs every 12 hours (Patient not taking: Reported on 2/20/2023)       No current facility-administered medications for this visit.        Allergies  Allergies   Allergen Reactions    Citrullus Vulgaris Anaphylaxis and Swelling    Citric Acid Rash    Prunus Persica Itching       Family History  Family History   Problem Relation Age of Onset    Hypertension Mother     Hypertension Father     Drug Abuse Brother     Diabetes Father     Thyroid Disease Mother        Social History  Social History     Socioeconomic History    Marital status:      Spouse name: Not on file    Number of children: Not on file    Years of education: Not on file    Highest education level: Not on file   Occupational History    Not on file   Tobacco Use    Smoking status: Former     Packs/day: 1.00     Types: Cigarettes     Start date: 1/1/1969     Quit date: 1/1/1995     Years since quittin.1    Smokeless tobacco: Never   Substance and Sexual Activity    Alcohol use: No    Drug use: No    Sexual activity: Not on file   Other Topics Concern    Not on file   Social History Narrative    Not on file     Social Determinants of Health     Financial Resource Strain: Not on file   Food Insecurity: Not on file   Transportation Needs: Not on file   Physical Activity: Not on file   Stress: Not on file   Social Connections: Not on file   Intimate Partner Violence: Not on file   Housing Stability: Not on file       Review of Systems  Review of Systems - Review of all systems is negative except as noted above in the HPI. Vital Signs  /70   Pulse 69   Temp 98.2 °F (36.8 °C) (Temporal)   Resp 20   Ht 5' 9\" (1.753 m)   Wt 264 lb (119.7 kg)   SpO2 98%   BMI 38.99 kg/m²       Physical Exam  Physical Examination: General appearance - oriented to person, place, and time, overweight, and acyanotic, in no respiratory distress  Mental status - alert, oriented to person, place, and time  Neck - supple, no significant adenopathy  Lymphatics - no palpable lymphadenopathy  Chest - no tachypnea, retractions or cyanosis  Heart - systolic murmur 2/6 at 2nd right intercostal space  Abdomen - soft, nontender, nondistended, no masses or organomegaly  Back exam - limited range of motion  Neurological - abnormal neurological exam unchanged from prior examinations  Musculoskeletal - osteoarthritic changes noted in both hands  Extremities - intact peripheral pulses      Results  No results found for this visit on 23. ASSESSMENT and PLAN    ICD-10-CM    1. Type 2 diabetes mellitus with diabetic peripheral angiopathy without gangrene, without long-term current use of insulin (HCC)  E11.51 CBC with Auto Differential     Comprehensive Metabolic Panel      2. Chronic systolic congestive heart failure (HCC)  I50.22       3. Sacroiliitis, not elsewhere classified ([facility] [address]  M46.1       4.  Stage 3 chronic kidney disease, unspecified whether stage 3a or 3b CKD [facility]  N18.30 CBC with Auto Differential      5. Hypothyroidism, unspecified type  E03.9 levothyroxine (SYNTHROID) 200 MCG tablet     TSH      6. Severe obesity (BMI 35.0-39. 9) with comorbidity ([facility]  E66.01       7. Hyperlipidemia, unspecified hyperlipidemia type  E78.5 Lipid Panel      8. Cardiac arrhythmia, unspecified cardiac arrhythmia type  I49.9 Magnesium      9. Lumbar radiculopathy  M54.16 traMADol (ULTRAM) 50 MG tablet      10. Essential (primary) hypertension  I10       11. Gastro-esophageal reflux disease without esophagitis  K21.9       12. Gout, unspecified cause, unspecified chronicity, unspecified site  M10.9       I spent 40 minutes with this established patient. I have discussed the diagnosis with the patient and the intended plan of care as seen in the above orders. The patient has received an after-visit summary and questions were answered concerning future plans. I have discussed medication, side effects, and warnings with the patient in detail. The patient verbalized understanding and is in agreement with the plan of care. The patient will contact the office with any additional concerns. Neva Marion MD, MD    PLEASE NOTE:   This document has been produced using voice recognition software.  Unrecognized errors in transcription may be present

## 2023-03-20 LAB
CREATININE, EXTERNAL: 1.7
INR, EXTERNAL: 0.95
PT, EXTERNAL: 10.4

## 2023-03-22 ENCOUNTER — HOSPITAL ENCOUNTER (OUTPATIENT)
Age: 78
Discharge: HOME | End: 2023-03-25
Payer: MEDICARE

## 2023-03-22 DIAGNOSIS — Z01.810 PRE-OPERATIVE CARDIOVASCULAR EXAMINATION: ICD-10-CM

## 2023-03-22 DIAGNOSIS — I49.3 VENTRICULAR PREMATURE BEATS: ICD-10-CM

## 2023-03-22 LAB
ERYTHROCYTE [DISTWIDTH] IN BLOOD BY AUTOMATED COUNT: 14.9 % (ref 11.6–14.5)
HCT VFR BLD AUTO: 35.5 % (ref 36–48)
HGB BLD-MCNC: 11.5 G/DL (ref 13–16)
MCH RBC QN AUTO: 26.9 PG (ref 24–34)
MCHC RBC AUTO-ENTMCNC: 32.4 G/DL (ref 31–37)
MCV RBC AUTO: 82.9 FL (ref 78–100)
NRBC # BLD: 0 K/UL (ref 0–0.01)
NRBC BLD-RTO: 0 PER 100 WBC
PLATELET # BLD AUTO: 222 K/UL (ref 135–420)
PMV BLD AUTO: 9.7 FL (ref 9.2–11.8)
RBC # BLD AUTO: 4.28 M/UL (ref 4.35–5.65)
WBC # BLD AUTO: 5.7 K/UL (ref 4.6–13.2)

## 2023-03-22 PROCEDURE — 36415 COLL VENOUS BLD VENIPUNCTURE: CPT

## 2023-03-22 PROCEDURE — 85027 COMPLETE CBC AUTOMATED: CPT

## 2023-05-23 ENCOUNTER — TELEPHONE (OUTPATIENT)
Facility: CLINIC | Age: 78
End: 2023-05-23

## 2023-05-24 DIAGNOSIS — M54.16 LUMBAR RADICULOPATHY: ICD-10-CM

## 2023-05-24 RX ORDER — TRAMADOL HYDROCHLORIDE 50 MG/1
TABLET ORAL
Qty: 30 TABLET | Refills: 0 | OUTPATIENT
Start: 2023-05-24

## 2023-05-25 DIAGNOSIS — M54.16 LUMBAR RADICULOPATHY: Primary | ICD-10-CM

## 2023-05-25 RX ORDER — TRAMADOL HYDROCHLORIDE 50 MG/1
50 TABLET ORAL DAILY PRN
Qty: 30 TABLET | Refills: 0 | Status: SHIPPED | OUTPATIENT
Start: 2023-05-25 | End: 2023-06-24

## 2023-10-02 DIAGNOSIS — G89.29 OTHER CHRONIC PAIN: Primary | ICD-10-CM

## 2023-10-02 RX ORDER — TRAMADOL HYDROCHLORIDE 50 MG/1
50 TABLET ORAL EVERY 8 HOURS PRN
Qty: 30 TABLET | Refills: 0 | Status: SHIPPED | OUTPATIENT
Start: 2023-10-02 | End: 2023-10-12

## 2023-11-29 ENCOUNTER — OFFICE VISIT (OUTPATIENT)
Facility: CLINIC | Age: 78
End: 2023-11-29
Payer: MEDICARE

## 2023-11-29 VITALS
HEART RATE: 55 BPM | OXYGEN SATURATION: 98 % | RESPIRATION RATE: 20 BRPM | BODY MASS INDEX: 36.79 KG/M2 | WEIGHT: 257 LBS | SYSTOLIC BLOOD PRESSURE: 134 MMHG | DIASTOLIC BLOOD PRESSURE: 62 MMHG | TEMPERATURE: 97.9 F | HEIGHT: 70 IN

## 2023-11-29 DIAGNOSIS — I47.29 OTHER VENTRICULAR TACHYCARDIA (HCC): ICD-10-CM

## 2023-11-29 DIAGNOSIS — R26.89 POOR BALANCE: ICD-10-CM

## 2023-11-29 DIAGNOSIS — I50.22 CHRONIC SYSTOLIC CONGESTIVE HEART FAILURE (HCC): ICD-10-CM

## 2023-11-29 DIAGNOSIS — M43.10 SPONDYLOLISTHESIS, UNSPECIFIED SPINAL REGION: ICD-10-CM

## 2023-11-29 DIAGNOSIS — E61.1 IRON DEFICIENCY: ICD-10-CM

## 2023-11-29 DIAGNOSIS — Z12.5 SCREENING FOR MALIGNANT NEOPLASM OF PROSTATE: ICD-10-CM

## 2023-11-29 DIAGNOSIS — M10.9 GOUT, UNSPECIFIED CAUSE, UNSPECIFIED CHRONICITY, UNSPECIFIED SITE: ICD-10-CM

## 2023-11-29 DIAGNOSIS — E11.51 TYPE 2 DIABETES MELLITUS WITH DIABETIC PERIPHERAL ANGIOPATHY WITHOUT GANGRENE, WITHOUT LONG-TERM CURRENT USE OF INSULIN (HCC): ICD-10-CM

## 2023-11-29 DIAGNOSIS — N18.30 STAGE 3 CHRONIC KIDNEY DISEASE, UNSPECIFIED WHETHER STAGE 3A OR 3B CKD (HCC): ICD-10-CM

## 2023-11-29 DIAGNOSIS — E78.5 HYPERLIPIDEMIA, UNSPECIFIED HYPERLIPIDEMIA TYPE: ICD-10-CM

## 2023-11-29 DIAGNOSIS — M54.16 LUMBAR RADICULOPATHY: ICD-10-CM

## 2023-11-29 DIAGNOSIS — E03.9 HYPOTHYROIDISM, UNSPECIFIED TYPE: ICD-10-CM

## 2023-11-29 DIAGNOSIS — E66.9 OBESITY (BMI 30-39.9): ICD-10-CM

## 2023-11-29 DIAGNOSIS — E83.42 HYPOMAGNESEMIA: ICD-10-CM

## 2023-11-29 DIAGNOSIS — K21.9 GASTRO-ESOPHAGEAL REFLUX DISEASE WITHOUT ESOPHAGITIS: ICD-10-CM

## 2023-11-29 DIAGNOSIS — I10 ESSENTIAL (PRIMARY) HYPERTENSION: ICD-10-CM

## 2023-11-29 DIAGNOSIS — Z00.00 MEDICARE ANNUAL WELLNESS VISIT, SUBSEQUENT: Primary | ICD-10-CM

## 2023-11-29 DIAGNOSIS — I49.9 CARDIAC ARRHYTHMIA, UNSPECIFIED CARDIAC ARRHYTHMIA TYPE: ICD-10-CM

## 2023-11-29 DIAGNOSIS — E55.9 HYPOVITAMINOSIS D: ICD-10-CM

## 2023-11-29 DIAGNOSIS — E11.9 COMPREHENSIVE DIABETIC FOOT EXAMINATION, TYPE 2 DM, ENCOUNTER FOR (HCC): ICD-10-CM

## 2023-11-29 LAB — HBA1C MFR BLD: 7.9 %

## 2023-11-29 PROCEDURE — 1123F ACP DISCUSS/DSCN MKR DOCD: CPT | Performed by: FAMILY MEDICINE

## 2023-11-29 PROCEDURE — G8427 DOCREV CUR MEDS BY ELIG CLIN: HCPCS | Performed by: FAMILY MEDICINE

## 2023-11-29 PROCEDURE — 3075F SYST BP GE 130 - 139MM HG: CPT | Performed by: FAMILY MEDICINE

## 2023-11-29 PROCEDURE — 1036F TOBACCO NON-USER: CPT | Performed by: FAMILY MEDICINE

## 2023-11-29 PROCEDURE — G8417 CALC BMI ABV UP PARAM F/U: HCPCS | Performed by: FAMILY MEDICINE

## 2023-11-29 PROCEDURE — 3078F DIAST BP <80 MM HG: CPT | Performed by: FAMILY MEDICINE

## 2023-11-29 PROCEDURE — G0439 PPPS, SUBSEQ VISIT: HCPCS | Performed by: FAMILY MEDICINE

## 2023-11-29 PROCEDURE — 99215 OFFICE O/P EST HI 40 MIN: CPT | Performed by: FAMILY MEDICINE

## 2023-11-29 PROCEDURE — G8484 FLU IMMUNIZE NO ADMIN: HCPCS | Performed by: FAMILY MEDICINE

## 2023-11-29 PROCEDURE — 83036 HEMOGLOBIN GLYCOSYLATED A1C: CPT | Performed by: FAMILY MEDICINE

## 2023-11-29 RX ORDER — TRAMADOL HYDROCHLORIDE 50 MG/1
50 TABLET ORAL EVERY 8 HOURS PRN
Qty: 30 TABLET | Refills: 0 | Status: SHIPPED | OUTPATIENT
Start: 2023-11-29 | End: 2023-12-09

## 2023-11-29 SDOH — ECONOMIC STABILITY: FOOD INSECURITY: WITHIN THE PAST 12 MONTHS, YOU WORRIED THAT YOUR FOOD WOULD RUN OUT BEFORE YOU GOT MONEY TO BUY MORE.: NEVER TRUE

## 2023-11-29 SDOH — ECONOMIC STABILITY: FOOD INSECURITY: WITHIN THE PAST 12 MONTHS, THE FOOD YOU BOUGHT JUST DIDN'T LAST AND YOU DIDN'T HAVE MONEY TO GET MORE.: NEVER TRUE

## 2023-11-29 SDOH — ECONOMIC STABILITY: INCOME INSECURITY: HOW HARD IS IT FOR YOU TO PAY FOR THE VERY BASICS LIKE FOOD, HOUSING, MEDICAL CARE, AND HEATING?: NOT VERY HARD

## 2023-11-29 ASSESSMENT — PATIENT HEALTH QUESTIONNAIRE - PHQ9
SUM OF ALL RESPONSES TO PHQ QUESTIONS 1-9: 0
2. FEELING DOWN, DEPRESSED OR HOPELESS: 0
SUM OF ALL RESPONSES TO PHQ QUESTIONS 1-9: 0
1. LITTLE INTEREST OR PLEASURE IN DOING THINGS: 0
SUM OF ALL RESPONSES TO PHQ QUESTIONS 1-9: 0
SUM OF ALL RESPONSES TO PHQ9 QUESTIONS 1 & 2: 0
SUM OF ALL RESPONSES TO PHQ QUESTIONS 1-9: 0

## 2023-12-21 ENCOUNTER — HOSPITAL ENCOUNTER (OUTPATIENT)
Age: 78
Discharge: HOME | End: 2023-12-24
Payer: MEDICARE

## 2023-12-21 DIAGNOSIS — E03.9 HYPOTHYROIDISM, UNSPECIFIED TYPE: ICD-10-CM

## 2023-12-21 DIAGNOSIS — E55.9 HYPOVITAMINOSIS D: ICD-10-CM

## 2023-12-21 DIAGNOSIS — E11.51 TYPE 2 DIABETES MELLITUS WITH DIABETIC PERIPHERAL ANGIOPATHY WITHOUT GANGRENE, WITHOUT LONG-TERM CURRENT USE OF INSULIN (HCC): ICD-10-CM

## 2023-12-21 DIAGNOSIS — E83.42 HYPOMAGNESEMIA: ICD-10-CM

## 2023-12-21 DIAGNOSIS — E61.1 IRON DEFICIENCY: ICD-10-CM

## 2023-12-21 DIAGNOSIS — E78.5 HYPERLIPIDEMIA, UNSPECIFIED HYPERLIPIDEMIA TYPE: ICD-10-CM

## 2023-12-21 DIAGNOSIS — Z12.5 SCREENING FOR MALIGNANT NEOPLASM OF PROSTATE: ICD-10-CM

## 2023-12-21 LAB
25(OH)D3 SERPL-MCNC: 53.4 NG/ML (ref 30–100)
ALBUMIN SERPL-MCNC: 3 G/DL (ref 3.4–5)
ALBUMIN/GLOB SERPL: 0.8 (ref 0.8–1.7)
ALP SERPL-CCNC: 105 U/L (ref 45–117)
ALT SERPL-CCNC: 42 U/L (ref 16–61)
ANION GAP SERPL CALC-SCNC: 8 MMOL/L (ref 3–18)
AST SERPL W P-5'-P-CCNC: 20 U/L (ref 10–38)
BASOPHILS # BLD: 0 K/UL (ref 0–0.1)
BASOPHILS NFR BLD: 1 % (ref 0–2)
BILIRUB SERPL-MCNC: 0.5 MG/DL (ref 0.2–1)
BUN SERPL-MCNC: 34 MG/DL (ref 7–18)
BUN/CREAT SERPL: 18 (ref 12–20)
CA-I BLD-MCNC: 9.2 MG/DL (ref 8.5–10.1)
CHLORIDE SERPL-SCNC: 105 MMOL/L (ref 100–111)
CHOLEST SERPL-MCNC: 140 MG/DL
CO2 SERPL-SCNC: 28 MMOL/L (ref 21–32)
CREAT SERPL-MCNC: 1.94 MG/DL (ref 0.6–1.3)
DIFFERENTIAL METHOD BLD: ABNORMAL
EOSINOPHIL # BLD: 0.1 K/UL (ref 0–0.4)
EOSINOPHIL NFR BLD: 3 % (ref 0–5)
ERYTHROCYTE [DISTWIDTH] IN BLOOD BY AUTOMATED COUNT: 15.9 % (ref 11.6–14.5)
FERRITIN SERPL-MCNC: 84 NG/ML (ref 8–388)
GLOBULIN SER CALC-MCNC: 3.9 G/DL (ref 2–4)
GLUCOSE SERPL-MCNC: 248 MG/DL (ref 74–99)
HCT VFR BLD AUTO: 40.4 % (ref 36–48)
HDLC SERPL-MCNC: 59 MG/DL (ref 40–60)
HDLC SERPL: 2.4 (ref 0–5)
HGB BLD-MCNC: 14.1 G/DL (ref 13–16)
IMM GRANULOCYTES # BLD AUTO: 0 K/UL (ref 0–0.04)
IMM GRANULOCYTES NFR BLD AUTO: 1 % (ref 0–0.5)
IRON SATN MFR SERPL: 35 % (ref 20–50)
IRON SERPL-MCNC: 88 UG/DL (ref 50–175)
LDLC SERPL CALC-MCNC: 62.8 MG/DL (ref 0–100)
LIPID PANEL: NORMAL
LYMPHOCYTES # BLD: 1.3 K/UL (ref 0.9–3.6)
LYMPHOCYTES NFR BLD: 26 % (ref 21–52)
MAGNESIUM SERPL-MCNC: 1.8 MG/DL (ref 1.6–2.6)
MCH RBC QN AUTO: 30.5 PG (ref 24–34)
MCHC RBC AUTO-ENTMCNC: 34.9 G/DL (ref 31–37)
MCV RBC AUTO: 87.4 FL (ref 78–100)
MONOCYTES # BLD: 0.5 K/UL (ref 0.05–1.2)
MONOCYTES NFR BLD: 10 % (ref 3–10)
NEUTS SEG # BLD: 2.9 K/UL (ref 1.8–8)
NEUTS SEG NFR BLD: 59 % (ref 40–73)
NRBC # BLD: 0 K/UL (ref 0–0.01)
NRBC BLD-RTO: 0 PER 100 WBC
PLATELET # BLD AUTO: 136 K/UL (ref 135–420)
PMV BLD AUTO: 9.2 FL (ref 9.2–11.8)
POTASSIUM SERPL-SCNC: 3.9 MMOL/L (ref 3.5–5.5)
PROT SERPL-MCNC: 6.9 G/DL (ref 6.4–8.2)
PSA SERPL-MCNC: 2.6 NG/ML (ref 0–4)
RBC # BLD AUTO: 4.62 M/UL (ref 4.35–5.65)
SODIUM SERPL-SCNC: 141 MMOL/L (ref 136–145)
TIBC SERPL-MCNC: 248 UG/DL (ref 250–450)
TRIGL SERPL-MCNC: 91 MG/DL
TSH SERPL DL<=0.05 MIU/L-ACNC: 3.18 UIU/ML (ref 0.36–3.74)
VLDLC SERPL CALC-MCNC: 18.2 MG/DL
WBC # BLD AUTO: 4.9 K/UL (ref 4.6–13.2)

## 2023-12-21 PROCEDURE — 83735 ASSAY OF MAGNESIUM: CPT

## 2023-12-21 PROCEDURE — 80053 COMPREHEN METABOLIC PANEL: CPT

## 2023-12-21 PROCEDURE — 82306 VITAMIN D 25 HYDROXY: CPT

## 2023-12-21 PROCEDURE — G0103 PSA SCREENING: HCPCS

## 2023-12-21 PROCEDURE — 85025 COMPLETE CBC W/AUTO DIFF WBC: CPT

## 2023-12-21 PROCEDURE — 82728 ASSAY OF FERRITIN: CPT

## 2023-12-21 PROCEDURE — 84443 ASSAY THYROID STIM HORMONE: CPT

## 2023-12-21 PROCEDURE — 80061 LIPID PANEL: CPT

## 2023-12-21 PROCEDURE — 84153 ASSAY OF PSA TOTAL: CPT

## 2023-12-21 PROCEDURE — 36415 COLL VENOUS BLD VENIPUNCTURE: CPT

## 2023-12-21 PROCEDURE — 83540 ASSAY OF IRON: CPT

## 2024-02-29 ENCOUNTER — OFFICE VISIT (OUTPATIENT)
Facility: CLINIC | Age: 79
End: 2024-02-29

## 2024-02-29 VITALS
TEMPERATURE: 99.5 F | WEIGHT: 270 LBS | DIASTOLIC BLOOD PRESSURE: 68 MMHG | OXYGEN SATURATION: 98 % | RESPIRATION RATE: 20 BRPM | SYSTOLIC BLOOD PRESSURE: 129 MMHG | HEART RATE: 61 BPM | BODY MASS INDEX: 38.65 KG/M2 | HEIGHT: 70 IN

## 2024-02-29 DIAGNOSIS — E03.9 HYPOTHYROIDISM, UNSPECIFIED TYPE: ICD-10-CM

## 2024-02-29 DIAGNOSIS — E66.01 SEVERE OBESITY (BMI 35.0-39.9) WITH COMORBIDITY (HCC): ICD-10-CM

## 2024-02-29 DIAGNOSIS — E66.9 OBESITY (BMI 30-39.9): ICD-10-CM

## 2024-02-29 DIAGNOSIS — I47.29 OTHER VENTRICULAR TACHYCARDIA (HCC): ICD-10-CM

## 2024-02-29 DIAGNOSIS — M46.1 SACROILIITIS, NOT ELSEWHERE CLASSIFIED (HCC): ICD-10-CM

## 2024-02-29 DIAGNOSIS — I50.22 CHRONIC SYSTOLIC CONGESTIVE HEART FAILURE (HCC): ICD-10-CM

## 2024-02-29 DIAGNOSIS — E78.5 HYPERLIPIDEMIA, UNSPECIFIED HYPERLIPIDEMIA TYPE: ICD-10-CM

## 2024-02-29 DIAGNOSIS — I49.9 CARDIAC ARRHYTHMIA, UNSPECIFIED CARDIAC ARRHYTHMIA TYPE: ICD-10-CM

## 2024-02-29 DIAGNOSIS — E11.51 TYPE 2 DIABETES MELLITUS WITH DIABETIC PERIPHERAL ANGIOPATHY WITHOUT GANGRENE, WITHOUT LONG-TERM CURRENT USE OF INSULIN (HCC): Primary | ICD-10-CM

## 2024-02-29 DIAGNOSIS — I10 ESSENTIAL (PRIMARY) HYPERTENSION: ICD-10-CM

## 2024-02-29 DIAGNOSIS — N18.30 STAGE 3 CHRONIC KIDNEY DISEASE, UNSPECIFIED WHETHER STAGE 3A OR 3B CKD (HCC): ICD-10-CM

## 2024-02-29 DIAGNOSIS — E83.42 HYPOMAGNESEMIA: ICD-10-CM

## 2024-02-29 LAB — HBA1C MFR BLD: 6.8 %

## 2024-02-29 RX ORDER — TRAMADOL HYDROCHLORIDE 50 MG/1
50 TABLET ORAL DAILY
Qty: 30 TABLET | Refills: 0 | Status: SHIPPED | OUTPATIENT
Start: 2024-02-29 | End: 2024-03-30

## 2024-02-29 RX ORDER — TRAMADOL HYDROCHLORIDE 50 MG/1
50 TABLET ORAL EVERY 6 HOURS PRN
COMMUNITY
End: 2024-02-29 | Stop reason: SDUPTHER

## 2024-02-29 ASSESSMENT — PATIENT HEALTH QUESTIONNAIRE - PHQ9
2. FEELING DOWN, DEPRESSED OR HOPELESS: 0
1. LITTLE INTEREST OR PLEASURE IN DOING THINGS: 0
SUM OF ALL RESPONSES TO PHQ QUESTIONS 1-9: 0
SUM OF ALL RESPONSES TO PHQ QUESTIONS 1-9: 0
SUM OF ALL RESPONSES TO PHQ9 QUESTIONS 1 & 2: 0
SUM OF ALL RESPONSES TO PHQ QUESTIONS 1-9: 0
SUM OF ALL RESPONSES TO PHQ QUESTIONS 1-9: 0

## 2024-02-29 NOTE — PROGRESS NOTES
JEFF Velarde comes in for follow up care.  Pedal edema: Patient has bilateral pedal edema.  [facility] and [facility] to increase his Lasix.  He has a history of CHF and also has CKD.  Recently had gastroenteritis.  Given the infection this could have exacerbated CHF and cause some swelling of the lower extremities.  Lasix was increased and the since then the lower extremity edema has diminished.  He will continue to take the diuretic medication.  He will follow-up with his cardiologist.  Diarrhea: Patient had diarrhea, nausea and some vomiting.  He had abdominal discomfort.  This was ongoing for the past week.  It has improved over the last 2 days.  Overall he feels stable.  Will recheck labs.  DM2: Patient has type 2 diabetes mellitus.  Blood glucose numbers have been stable.  HbA1c today is 6.8.  He is on insulin, alogliptin.  Will continue with the current treatment plan.  He will intensify lifestyle and dietary modification.  HTN: Patient has hypertension.  Blood pressure is stable.  Denies headache, changes in vision or focal weakness.  He is on hydralazine, losartan, spironolactone.  Will continue with these medications.  CKD: Patient has chronic kidney disease.  He is follow[facility].  Plan is to avoid nephrotoxic medications.  Will recheck labs.  Hypomagnesemia: Patient has a history of low magnesium.  He is on magnesium replacement therapy daily.  Continue current treatment plan.  Gout: Patient has gout arthritis.  He will take a purine-restricted diet.  He is on Uloric.  CHF: Patient has a history of chronic diastolic CHF.  He is seen by the cardiologist Dr. Washington.  He has an appointment next month.  Denies palpitations.  He has lower extremity edema.  He has not increased his Lasix to 40 mg twice a day.  He has had marked improvement of the lower extremity edema.  Denies chest pain or shortness of breath or wheeze.  He will continue to take the

## 2024-04-15 ENCOUNTER — TELEPHONE (OUTPATIENT)
Facility: CLINIC | Age: 79
End: 2024-04-15

## 2024-04-16 DIAGNOSIS — M43.10 SPONDYLOLISTHESIS, UNSPECIFIED SPINAL REGION: Primary | ICD-10-CM

## 2024-04-16 RX ORDER — TRAMADOL HYDROCHLORIDE 50 MG/1
50 TABLET ORAL DAILY PRN
Qty: 30 TABLET | Refills: 0 | Status: SHIPPED | OUTPATIENT
Start: 2024-04-16 | End: 2024-05-16

## 2024-05-28 ENCOUNTER — HOSPITAL ENCOUNTER (OUTPATIENT)
Age: 79
Discharge: HOME | End: 2024-05-31
Payer: MEDICARE

## 2024-05-28 DIAGNOSIS — E11.51 TYPE 2 DIABETES MELLITUS WITH DIABETIC PERIPHERAL ANGIOPATHY WITHOUT GANGRENE, WITHOUT LONG-TERM CURRENT USE OF INSULIN (HCC): ICD-10-CM

## 2024-05-28 DIAGNOSIS — I50.22 CHRONIC SYSTOLIC CONGESTIVE HEART FAILURE (HCC): ICD-10-CM

## 2024-05-28 DIAGNOSIS — E83.42 HYPOMAGNESEMIA: ICD-10-CM

## 2024-05-28 DIAGNOSIS — E03.9 HYPOTHYROIDISM, UNSPECIFIED TYPE: ICD-10-CM

## 2024-05-28 LAB
ALBUMIN SERPL-MCNC: 3.1 G/DL (ref 3.4–5)
ALBUMIN/GLOB SERPL: 0.7 (ref 0.8–1.7)
ALP SERPL-CCNC: 99 U/L (ref 45–117)
ALT SERPL-CCNC: 69 U/L (ref 16–61)
ANION GAP SERPL CALC-SCNC: 9 MMOL/L (ref 3–18)
AST SERPL W P-5'-P-CCNC: 29 U/L (ref 10–38)
BASOPHILS # BLD: 0 K/UL (ref 0–0.1)
BASOPHILS NFR BLD: 1 % (ref 0–2)
BILIRUB SERPL-MCNC: 0.5 MG/DL (ref 0.2–1)
BUN SERPL-MCNC: 31 MG/DL (ref 7–18)
BUN/CREAT SERPL: 16 (ref 12–20)
CA-I BLD-MCNC: 9.4 MG/DL (ref 8.5–10.1)
CHLORIDE SERPL-SCNC: 106 MMOL/L (ref 100–111)
CHOLEST SERPL-MCNC: 112 MG/DL
CO2 SERPL-SCNC: 27 MMOL/L (ref 21–32)
CREAT SERPL-MCNC: 1.89 MG/DL (ref 0.6–1.3)
DIFFERENTIAL METHOD BLD: ABNORMAL
EOSINOPHIL # BLD: 0.1 K/UL (ref 0–0.4)
EOSINOPHIL NFR BLD: 3 % (ref 0–5)
ERYTHROCYTE [DISTWIDTH] IN BLOOD BY AUTOMATED COUNT: 12.8 % (ref 11.6–14.5)
GLOBULIN SER CALC-MCNC: 4.3 G/DL (ref 2–4)
GLUCOSE SERPL-MCNC: 154 MG/DL (ref 74–99)
HCT VFR BLD AUTO: 40.1 % (ref 36–48)
HDLC SERPL-MCNC: 43 MG/DL (ref 40–60)
HDLC SERPL: 2.6 (ref 0–5)
HGB BLD-MCNC: 13.6 G/DL (ref 13–16)
IMM GRANULOCYTES # BLD AUTO: 0 K/UL (ref 0–0.04)
IMM GRANULOCYTES NFR BLD AUTO: 1 % (ref 0–0.5)
LDLC SERPL CALC-MCNC: 49.2 MG/DL (ref 0–100)
LIPID PANEL: NORMAL
LYMPHOCYTES # BLD: 1.7 K/UL (ref 0.9–3.6)
LYMPHOCYTES NFR BLD: 34 % (ref 21–52)
MAGNESIUM SERPL-MCNC: 2.1 MG/DL (ref 1.6–2.6)
MCH RBC QN AUTO: 31.3 PG (ref 24–34)
MCHC RBC AUTO-ENTMCNC: 33.9 G/DL (ref 31–37)
MCV RBC AUTO: 92.4 FL (ref 78–100)
MONOCYTES # BLD: 0.5 K/UL (ref 0.05–1.2)
MONOCYTES NFR BLD: 9 % (ref 3–10)
NEUTS SEG # BLD: 2.7 K/UL (ref 1.8–8)
NEUTS SEG NFR BLD: 52 % (ref 40–73)
NRBC # BLD: 0 K/UL (ref 0–0.01)
NRBC BLD-RTO: 0 PER 100 WBC
PLATELET # BLD AUTO: 215 K/UL (ref 135–420)
PMV BLD AUTO: 9.2 FL (ref 9.2–11.8)
POTASSIUM SERPL-SCNC: 3.9 MMOL/L (ref 3.5–5.5)
PROT SERPL-MCNC: 7.4 G/DL (ref 6.4–8.2)
RBC # BLD AUTO: 4.34 M/UL (ref 4.35–5.65)
SODIUM SERPL-SCNC: 142 MMOL/L (ref 136–145)
TRIGL SERPL-MCNC: 99 MG/DL
TSH SERPL DL<=0.05 MIU/L-ACNC: 2.79 UIU/ML (ref 0.36–3.74)
VLDLC SERPL CALC-MCNC: 19.8 MG/DL
WBC # BLD AUTO: 5.1 K/UL (ref 4.6–13.2)

## 2024-05-28 PROCEDURE — 80053 COMPREHEN METABOLIC PANEL: CPT

## 2024-05-28 PROCEDURE — 85025 COMPLETE CBC W/AUTO DIFF WBC: CPT

## 2024-05-28 PROCEDURE — 83735 ASSAY OF MAGNESIUM: CPT

## 2024-05-28 PROCEDURE — 36415 COLL VENOUS BLD VENIPUNCTURE: CPT

## 2024-05-28 PROCEDURE — 84443 ASSAY THYROID STIM HORMONE: CPT

## 2024-05-28 PROCEDURE — 80061 LIPID PANEL: CPT

## 2024-05-29 ENCOUNTER — OFFICE VISIT (OUTPATIENT)
Facility: CLINIC | Age: 79
End: 2024-05-29
Payer: MEDICARE

## 2024-05-29 VITALS
WEIGHT: 272 LBS | RESPIRATION RATE: 20 BRPM | DIASTOLIC BLOOD PRESSURE: 74 MMHG | BODY MASS INDEX: 38.94 KG/M2 | HEIGHT: 70 IN | OXYGEN SATURATION: 95 % | SYSTOLIC BLOOD PRESSURE: 138 MMHG | TEMPERATURE: 98.1 F | HEART RATE: 69 BPM

## 2024-05-29 DIAGNOSIS — E78.5 HYPERLIPIDEMIA, UNSPECIFIED HYPERLIPIDEMIA TYPE: ICD-10-CM

## 2024-05-29 DIAGNOSIS — R26.89 POOR BALANCE: Primary | ICD-10-CM

## 2024-05-29 DIAGNOSIS — M10.9 GOUT, UNSPECIFIED CAUSE, UNSPECIFIED CHRONICITY, UNSPECIFIED SITE: ICD-10-CM

## 2024-05-29 DIAGNOSIS — E03.9 HYPOTHYROIDISM, UNSPECIFIED TYPE: ICD-10-CM

## 2024-05-29 DIAGNOSIS — F39 MOOD DISORDER (HCC): ICD-10-CM

## 2024-05-29 DIAGNOSIS — E11.51 TYPE 2 DIABETES MELLITUS WITH DIABETIC PERIPHERAL ANGIOPATHY WITHOUT GANGRENE, WITHOUT LONG-TERM CURRENT USE OF INSULIN (HCC): ICD-10-CM

## 2024-05-29 DIAGNOSIS — R40.0 DAYTIME SOMNOLENCE: ICD-10-CM

## 2024-05-29 DIAGNOSIS — E66.9 OBESITY (BMI 30-39.9): ICD-10-CM

## 2024-05-29 DIAGNOSIS — I50.22 CHRONIC SYSTOLIC CONGESTIVE HEART FAILURE (HCC): ICD-10-CM

## 2024-05-29 DIAGNOSIS — M43.10 SPONDYLOLISTHESIS, UNSPECIFIED SPINAL REGION: ICD-10-CM

## 2024-05-29 DIAGNOSIS — I49.9 CARDIAC ARRHYTHMIA, UNSPECIFIED CARDIAC ARRHYTHMIA TYPE: ICD-10-CM

## 2024-05-29 DIAGNOSIS — N18.30 STAGE 3 CHRONIC KIDNEY DISEASE, UNSPECIFIED WHETHER STAGE 3A OR 3B CKD (HCC): ICD-10-CM

## 2024-05-29 DIAGNOSIS — I10 ESSENTIAL (PRIMARY) HYPERTENSION: ICD-10-CM

## 2024-05-29 PROBLEM — R07.9 CHEST PAIN: Status: ACTIVE | Noted: 2022-06-22

## 2024-05-29 PROCEDURE — G8417 CALC BMI ABV UP PARAM F/U: HCPCS | Performed by: FAMILY MEDICINE

## 2024-05-29 PROCEDURE — 3078F DIAST BP <80 MM HG: CPT | Performed by: FAMILY MEDICINE

## 2024-05-29 PROCEDURE — 1036F TOBACCO NON-USER: CPT | Performed by: FAMILY MEDICINE

## 2024-05-29 PROCEDURE — G8427 DOCREV CUR MEDS BY ELIG CLIN: HCPCS | Performed by: FAMILY MEDICINE

## 2024-05-29 PROCEDURE — 3075F SYST BP GE 130 - 139MM HG: CPT | Performed by: FAMILY MEDICINE

## 2024-05-29 PROCEDURE — 99215 OFFICE O/P EST HI 40 MIN: CPT | Performed by: FAMILY MEDICINE

## 2024-05-29 PROCEDURE — 1123F ACP DISCUSS/DSCN MKR DOCD: CPT | Performed by: FAMILY MEDICINE

## 2024-05-29 RX ORDER — CARVEDILOL 6.25 MG/1
6.25 TABLET ORAL 2 TIMES DAILY
COMMUNITY
Start: 2023-08-29

## 2024-05-29 RX ORDER — TRAMADOL HYDROCHLORIDE 50 MG/1
50 TABLET ORAL 3 TIMES DAILY PRN
Status: CANCELLED | OUTPATIENT
Start: 2024-05-29

## 2024-05-29 RX ORDER — COLCHICINE 0.6 MG/1
0.6 TABLET ORAL DAILY PRN
Qty: 30 TABLET | Refills: 0 | Status: SHIPPED | OUTPATIENT
Start: 2024-05-29

## 2024-05-29 RX ORDER — TRAMADOL HYDROCHLORIDE 50 MG/1
50 TABLET ORAL 3 TIMES DAILY PRN
COMMUNITY
End: 2024-05-29

## 2024-05-29 NOTE — PROGRESS NOTES
1. \"Have you been to the ER, urgent care clinic since your last visit?  Hospitalized since your last visit?\"No    2. \"Have you seen or consulted any other health care providers outside of the [facility] since your last visit?\" No    3. For patients aged 45-75: Has the patient had a colonoscopy / FIT/ Cologuard? Not applicable      If the patient is female:    4. For patients aged 40-74: Has the patient had a mammogram within the past 2 years? Not applicable      5. For patients aged 21-65: Has the patient had a pap smear? Not applicable

## 2024-05-29 NOTE — PROGRESS NOTES
CKD.  HPI   ANANYA Velarde comes in for follow up care.  He is accompanied by his daughter.  Poor balance: Patient has poor balance and a poor coordination.  This has been noticed while he is walking.  He almost falls but has to hold himself on an edge for support.  He also walks using a cane.  Patient has been doing physical therapy for strengthening and balance.  He has a history of hip pain and has had total hip arthroplasty left.  He also has knee pain.  He has had total knee arthroplasty.  Physical therapy helped with strengthening of the lower extremities.  For now given the balance and incoordination I will order head CT scan to evaluate for any abnormality.  Patient will continue with physical therapy.  I will follow-up with the results.  Patient states that he takes tramadol prior to activity.  This can also cause drowsiness and altered mentation.  Will discontinue tramadol and have him take Tylenol arthritis for pain.  Daytime somnolence: Patient noted to have daytime somnolence.  He has sleep apnea.  He uses CPAP machine.  He is due for follow-up with his sleep specialist in months.  Patient has fatigue during the day.  He will need reevaluation by the sleep specialist to see whether he is getting adequate sleep.  In the meantime he will continue with supportive care.  Most recent lab work has been stable.  His TSH, electrolytes and CBC have been reassuring.  CKD: Patient has chronic kidney disease stage IIIb.  He has been followed up by the nephrologist.  Plan is to avoid nephrotoxic medications.  He had some of his medications adjusted.  Lasix was cut back to 40 mg daily.  His BUN is elevated.  We discussed dehydration and the effect on renal function.  Patient needs to maintain good hydration status.  Pedal edema: Patient has pedal edema.  He has been on Lasix.  This helped resolve the edema.  Currently he will elevate lower extremities.  Continues to take 40 mg of Lasix daily.  HTN: Patient has

## 2024-06-07 ENCOUNTER — HOSPITAL ENCOUNTER (OUTPATIENT)
Age: 79
End: 2024-06-07
Payer: MEDICARE

## 2024-06-07 DIAGNOSIS — R26.89 POOR BALANCE: ICD-10-CM

## 2024-06-07 PROCEDURE — 70450 CT HEAD/BRAIN W/O DYE: CPT

## 2024-07-15 ENCOUNTER — HOSPITAL ENCOUNTER (OUTPATIENT)
Facility: HOSPITAL | Age: 79
Setting detail: SPECIMEN
Discharge: HOME | End: 2024-07-18
Payer: MEDICARE

## 2024-07-15 ENCOUNTER — OFFICE VISIT (OUTPATIENT)
Facility: CLINIC | Age: 79
End: 2024-07-15

## 2024-07-15 VITALS
WEIGHT: 268 LBS | HEART RATE: 62 BPM | TEMPERATURE: 97.7 F | BODY MASS INDEX: 38.37 KG/M2 | SYSTOLIC BLOOD PRESSURE: 136 MMHG | HEIGHT: 70 IN | RESPIRATION RATE: 24 BRPM | DIASTOLIC BLOOD PRESSURE: 65 MMHG | OXYGEN SATURATION: 96 %

## 2024-07-15 DIAGNOSIS — E03.9 HYPOTHYROIDISM, UNSPECIFIED TYPE: ICD-10-CM

## 2024-07-15 DIAGNOSIS — R82.998 OTHER ABNORMAL FINDINGS IN URINE: ICD-10-CM

## 2024-07-15 DIAGNOSIS — R35.0 FREQUENCY OF URINATION: ICD-10-CM

## 2024-07-15 DIAGNOSIS — R26.89 POOR BALANCE: ICD-10-CM

## 2024-07-15 DIAGNOSIS — E11.51 TYPE 2 DIABETES MELLITUS WITH DIABETIC PERIPHERAL ANGIOPATHY WITHOUT GANGRENE, WITHOUT LONG-TERM CURRENT USE OF INSULIN (HCC): ICD-10-CM

## 2024-07-15 DIAGNOSIS — E11.51 TYPE 2 DIABETES MELLITUS WITH DIABETIC PERIPHERAL ANGIOPATHY WITHOUT GANGRENE, WITHOUT LONG-TERM CURRENT USE OF INSULIN (HCC): Primary | ICD-10-CM

## 2024-07-15 DIAGNOSIS — I50.22 CHRONIC SYSTOLIC CONGESTIVE HEART FAILURE (HCC): ICD-10-CM

## 2024-07-15 DIAGNOSIS — M43.10 SPONDYLOLISTHESIS, UNSPECIFIED SPINAL REGION: ICD-10-CM

## 2024-07-15 LAB
BILIRUBIN, URINE, POC: NEGATIVE
BLOOD URINE, POC: NEGATIVE
GLUCOSE URINE, POC: NEGATIVE
HBA1C MFR BLD: 7.2 %
KETONES, URINE, POC: NEGATIVE
LEUKOCYTE ESTERASE, URINE, POC: NORMAL
NITRITE, URINE, POC: NEGATIVE
PH, URINE, POC: NORMAL (ref 4.6–8)
PROTEIN,URINE, POC: NEGATIVE
SPECIFIC GRAVITY, URINE, POC: 1.01 (ref 1–1.03)
URINALYSIS CLARITY, POC: CLEAR
URINALYSIS COLOR, POC: YELLOW
UROBILINOGEN, POC: NORMAL

## 2024-07-15 PROCEDURE — 87186 SC STD MICRODIL/AGAR DIL: CPT

## 2024-07-15 PROCEDURE — 87088 URINE BACTERIA CULTURE: CPT

## 2024-07-15 PROCEDURE — 87086 URINE CULTURE/COLONY COUNT: CPT

## 2024-07-15 NOTE — PROGRESS NOTES
1. \"Have you been to the ER, urgent care clinic since your last visit?  Hospitalized since your last visit?\"No    2. \"Have you seen or consulted any other health care providers outside of the  since your last visit?\" No    3. For patients aged 45-75: Has the patient had a colonoscopy / FIT/ Cologuard? Yes      If the patient is female:    4. For patients aged 40-74: Has the patient had a mammogram within the past 2 years? Not applicable      5. For patients aged 21-65: Has the patient had a pap smear? Not applicable

## 2024-07-16 RX ORDER — CEPHALEXIN 500 MG/1
500 CAPSULE ORAL 3 TIMES DAILY
Qty: 15 CAPSULE | Refills: 0 | Status: SHIPPED | OUTPATIENT
Start: 2024-07-16 | End: 2024-07-21

## 2024-07-19 LAB
BACTERIA SPEC CULT: ABNORMAL
BACTERIA SPEC CULT: ABNORMAL
CC UR VC: ABNORMAL
SERVICE CMNT-IMP: ABNORMAL

## 2024-10-15 ENCOUNTER — OFFICE VISIT (OUTPATIENT)
Facility: CLINIC | Age: 79
End: 2024-10-15

## 2024-10-15 VITALS
DIASTOLIC BLOOD PRESSURE: 56 MMHG | HEIGHT: 70 IN | RESPIRATION RATE: 20 BRPM | BODY MASS INDEX: 38.08 KG/M2 | HEART RATE: 65 BPM | TEMPERATURE: 98.1 F | OXYGEN SATURATION: 97 % | SYSTOLIC BLOOD PRESSURE: 112 MMHG | WEIGHT: 266 LBS

## 2024-10-15 DIAGNOSIS — N39.0 URINARY TRACT INFECTION WITHOUT HEMATURIA, SITE UNSPECIFIED: ICD-10-CM

## 2024-10-15 DIAGNOSIS — M46.1 SACROILIITIS, NOT ELSEWHERE CLASSIFIED (HCC): ICD-10-CM

## 2024-10-15 DIAGNOSIS — M10.9 GOUT, UNSPECIFIED CAUSE, UNSPECIFIED CHRONICITY, UNSPECIFIED SITE: ICD-10-CM

## 2024-10-15 DIAGNOSIS — E11.51 TYPE 2 DIABETES MELLITUS WITH DIABETIC PERIPHERAL ANGIOPATHY WITHOUT GANGRENE, WITHOUT LONG-TERM CURRENT USE OF INSULIN (HCC): ICD-10-CM

## 2024-10-15 DIAGNOSIS — E66.9 OBESITY (BMI 30-39.9): ICD-10-CM

## 2024-10-15 DIAGNOSIS — M54.50 CHRONIC MIDLINE LOW BACK PAIN, UNSPECIFIED WHETHER SCIATICA PRESENT: Primary | ICD-10-CM

## 2024-10-15 DIAGNOSIS — E03.9 HYPOTHYROIDISM, UNSPECIFIED TYPE: ICD-10-CM

## 2024-10-15 DIAGNOSIS — I49.9 CARDIAC ARRHYTHMIA, UNSPECIFIED CARDIAC ARRHYTHMIA TYPE: ICD-10-CM

## 2024-10-15 DIAGNOSIS — I10 ESSENTIAL (PRIMARY) HYPERTENSION: ICD-10-CM

## 2024-10-15 DIAGNOSIS — R39.9 LOWER URINARY TRACT SYMPTOMS (LUTS): ICD-10-CM

## 2024-10-15 DIAGNOSIS — I50.22 CHRONIC SYSTOLIC CONGESTIVE HEART FAILURE (HCC): ICD-10-CM

## 2024-10-15 DIAGNOSIS — E78.5 HYPERLIPIDEMIA, UNSPECIFIED HYPERLIPIDEMIA TYPE: ICD-10-CM

## 2024-10-15 DIAGNOSIS — G89.29 CHRONIC MIDLINE LOW BACK PAIN, UNSPECIFIED WHETHER SCIATICA PRESENT: Primary | ICD-10-CM

## 2024-10-15 DIAGNOSIS — N18.30 STAGE 3 CHRONIC KIDNEY DISEASE, UNSPECIFIED WHETHER STAGE 3A OR 3B CKD (HCC): ICD-10-CM

## 2024-10-15 LAB — HBA1C MFR BLD: 6.9 %

## 2024-10-15 NOTE — PROGRESS NOTES
JEFF Velarde comes in for follow up care.  Low back pain: Patient has chronic low back pain.  He [facility].  He is now planned to have surgery.  Patient has had previous fusion S4/S5.  Now he has been noted to have degenerative joint disease S3/S4 and needs fusion.  This is to be done next month.  Will follow-up with the records and recommendations.  Patient has been on chronic pain medication for the low back pain.  He is also on Cymbalta.  UTI: Patient has recently been diagnosed with UTI.  [facility] and put [facility] treatment for UTI.  HTN: Patient has hypertension.  Blood pressure is stable.  He is on carvedilol 6.25 mg twice daily, hydralazine, losartan 25 mg daily, Aldactone 25 mg daily.  He will continue with these medications.  CHF: Patient has chronic diastolic heart failure.  He has been followed up by the cardiologist.  He is on spironolactone, carvedilol, aspirin, losartan, hydralazine, furosemide.  Will continue with these medications.  Cardiac dysrhythmia: Patient with history of cardiac dysrhythmia.  He has ventricular tachycardia.  He has been followed up with a cardiologist and electrophysiologist.  He is on carvedilol and amiodarone.  He will continue with management as recommended by the specialist.  CKD: Patient has chronic kidney disease.  He has been followed up by the nephrologist.  Plan is to avoid nephrotoxic medications.  LUTS: Patient has lower urinary tract symptoms.  Gets incomplete emptying of the bladder.  He has been followed up by the urologist.  Hypothyroidism: Patient has hypothyroidism.  He is on Synthroid daily.  We will recheck labs at next visit.  DM2: Patient has type 2 diabetes mellitus.  HbA1c 6.9.  He will intensify lifestyle and dietary modification.  He is on insulin.  Takes Lantus 45 units daily and is also on correctional insulin.  Patient is also on alogliptin.  Will keep a blood glucose log.  I will follow-up at next

## 2024-10-15 NOTE — PROGRESS NOTES
\"Have you been to the ER, urgent care clinic since your last visit?  Hospitalized since your last visit?\"    NO    “Have you seen or consulted any other health care providers outside our system since your last visit?”    NO      “Have you had a diabetic eye exam?”    YES - [facility]  ([place] ) Nurse/CMA to request most recent records if not in the chart     Date of last diabetic eye exam: 10/14/2015

## 2024-12-03 ENCOUNTER — OFFICE VISIT (OUTPATIENT)
Facility: CLINIC | Age: 79
End: 2024-12-03

## 2024-12-03 VITALS
BODY MASS INDEX: 39.08 KG/M2 | DIASTOLIC BLOOD PRESSURE: 71 MMHG | WEIGHT: 273 LBS | OXYGEN SATURATION: 96 % | SYSTOLIC BLOOD PRESSURE: 132 MMHG | HEIGHT: 70 IN | RESPIRATION RATE: 20 BRPM | HEART RATE: 63 BPM | TEMPERATURE: 98.1 F

## 2024-12-03 DIAGNOSIS — I10 ESSENTIAL (PRIMARY) HYPERTENSION: ICD-10-CM

## 2024-12-03 DIAGNOSIS — E11.51 TYPE 2 DIABETES MELLITUS WITH DIABETIC PERIPHERAL ANGIOPATHY WITHOUT GANGRENE, WITHOUT LONG-TERM CURRENT USE OF INSULIN (HCC): ICD-10-CM

## 2024-12-03 DIAGNOSIS — Z00.00 MEDICARE ANNUAL WELLNESS VISIT, SUBSEQUENT: Primary | ICD-10-CM

## 2024-12-03 DIAGNOSIS — N39.0 URINARY TRACT INFECTION WITHOUT HEMATURIA, SITE UNSPECIFIED: ICD-10-CM

## 2024-12-03 DIAGNOSIS — E03.9 HYPOTHYROIDISM, UNSPECIFIED TYPE: ICD-10-CM

## 2024-12-03 DIAGNOSIS — N18.30 STAGE 3 CHRONIC KIDNEY DISEASE, UNSPECIFIED WHETHER STAGE 3A OR 3B CKD (HCC): ICD-10-CM

## 2024-12-03 DIAGNOSIS — R39.9 LOWER URINARY TRACT SYMPTOMS (LUTS): ICD-10-CM

## 2024-12-03 DIAGNOSIS — I50.22 CHRONIC SYSTOLIC CONGESTIVE HEART FAILURE (HCC): ICD-10-CM

## 2024-12-03 DIAGNOSIS — M54.50 CHRONIC MIDLINE LOW BACK PAIN, UNSPECIFIED WHETHER SCIATICA PRESENT: ICD-10-CM

## 2024-12-03 DIAGNOSIS — T14.8XXA EXCORIATION: ICD-10-CM

## 2024-12-03 DIAGNOSIS — G89.29 CHRONIC MIDLINE LOW BACK PAIN, UNSPECIFIED WHETHER SCIATICA PRESENT: ICD-10-CM

## 2024-12-03 RX ORDER — TAMSULOSIN HYDROCHLORIDE 0.4 MG/1
0.4 CAPSULE ORAL DAILY
COMMUNITY

## 2024-12-03 RX ORDER — GINSENG 100 MG
CAPSULE ORAL
Qty: 30 G | Refills: 3 | Status: SHIPPED | OUTPATIENT
Start: 2024-12-03 | End: 2024-12-13

## 2024-12-03 ASSESSMENT — LIFESTYLE VARIABLES
HOW MANY STANDARD DRINKS CONTAINING ALCOHOL DO YOU HAVE ON A TYPICAL DAY: PATIENT DOES NOT DRINK
HOW OFTEN DO YOU HAVE A DRINK CONTAINING ALCOHOL: NEVER

## 2024-12-03 ASSESSMENT — PATIENT HEALTH QUESTIONNAIRE - PHQ9
2. FEELING DOWN, DEPRESSED OR HOPELESS: NOT AT ALL
SUM OF ALL RESPONSES TO PHQ QUESTIONS 1-9: 0
1. LITTLE INTEREST OR PLEASURE IN DOING THINGS: NOT AT ALL
SUM OF ALL RESPONSES TO PHQ QUESTIONS 1-9: 0
SUM OF ALL RESPONSES TO PHQ QUESTIONS 1-9: 0
SUM OF ALL RESPONSES TO PHQ9 QUESTIONS 1 & 2: 0
SUM OF ALL RESPONSES TO PHQ QUESTIONS 1-9: 0

## 2024-12-04 NOTE — PROGRESS NOTES
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  UTI: Patient has UTI.  [facility].  Patient is currently on antibiotics.  He is on Augmentin.  Feels improved.  He will complete his medication as prescribed.  DM2: Patient has type 2 diabetes mellitus.  Blood glucose numbers have been stable.  His last HbA1c was 6.9.  He is doing lifestyle and dietary modification.  Patient is on Jardiance 10 mg daily, insulin Lantus 45 units daily and NovoLog 6 units 3 times a day before meals.  He is also on alogliptin.  Will continue current treatment plan.  HTN: Patient has hypertension.  Blood pressure is stable.  Denies headache, changes in vision or focal weakness.  Patient is on carvedilol 6.25 mg twice a day, hydralazine 50 mg 3 times daily, spironolactone 25 mg daily, losartan 25 mg daily.  He will take a low-sodium diet.  We will continue with the current treatment plan.  GERD: Patient has gastroesophageal reflux disease.  Gets heartburn on and off.  Denies dark stools or hematemesis.  He is on Prilosec 20 mg daily.  Continue current treatment plan.  Dyslipidemia: Patient has dyslipidemia.  He is on Crestor 40 mg daily.  Stable on medication.  We will continue current treatment plan.  Low back pain: Patient has chronic low back pain with sciatica bilateral lower extremities.  He has been followed up by the spine specialist.  [facility].  His surgery was postponed due to UTI.  Plan is to have laminectomy done.  We will follow-up with the specialist recommendations.    Gout: Patient has gout arthritis.  He is on colchicine daily.  Stable on the medication.  Will continue current treatment plan.  CHF: Patient has chronic diastolic CHF.  He is on Coreg, furosemide, hydralazine, losartan, spironolactone and Jardiance.  Denies chest pain, shortness of breath or diaphoresis.  We will continue current treatment plan.  He has been followed by the cardiologist.  LUTS: Patient has lower 
\"Have you been to the ER, urgent care clinic since your last visit?  Hospitalized since your last visit?\"    NO    “Have you seen or consulted any other health care providers outside our system since your last visit?”    NO      “Have you had a diabetic eye exam?”    YES - Where[facility]CMA to request most recent records if not in the chart     Date of last diabetic eye exam: 10/14/2015            
tablet Take 1 tablet by mouth Yes Pamela Soto MD   aspirin 325 MG tablet Take 1 tablet by mouth daily Yes ProviderPamela MD   vitamin D 25 MCG (1000 UT) CAPS Take 3 capsules by mouth daily Yes Pamela Soto MD   diclofenac sodium (VOLTAREN) 1 % GEL Apply topically 4 times daily Yes Pamela Soto MD   DULoxetine (CYMBALTA) 60 MG extended release capsule Take 1 capsule by mouth daily Yes Pamela Soto MD   febuxostat (ULORIC) 40 MG TABS tablet Take 1 tablet by mouth daily Yes ProviderPamela MD   furosemide (LASIX) 20 MG tablet Take 1 tablet by mouth daily Yes Pamela Soto MD   hydrALAZINE (APRESOLINE) 50 MG tablet Take 3 tablets by mouth 2 times daily Yes Pamela Soto MD   insulin aspart (NOVOLOG) 100 UNIT/ML injection vial Inject 6 Units into the skin 3 times daily (before meals) Yes ProviderPamela MD   insulin glargine (LANTUS) 100 UNIT/ML injection vial Inject 45 Units into the skin daily Yes ProviderPamela MD   losartan (COZAAR) 25 MG tablet Take 1 tablet by mouth daily Yes ProviderPamela MD   magnesium chloride (MAG DELAY) 64 MG TBEC extended release tablet Take 8 tablets 3 x day Yes ProviderPamela MD   rosuvastatin (CRESTOR) 40 MG tablet Take 1 tablet by mouth Yes ProviderPamela MD   spironolactone (ALDACTONE) 25 MG tablet Take 1 tablet by mouth daily Yes ProviderPamela MD   Testosterone (ANDROGEL) 20.25 MG/ACT (1.62%) GEL gel Apply topically as needed. Yes Pamela Soto MD   levothyroxine (SYNTHROID) 200 MCG tablet Take 175 mcg by mouth daily Yes Quynh Gamez MD   alogliptin (NESINA) 25 MG TABS tablet 12.5 mg  Patient not taking: Reported on 11/29/2023  Pamela Soto MD CareTeam (Including outside providers/suppliers regularly involved in providing care):   Patient Care Team:  Quynh Gamez MD as PCP - General  Quynh Gamez MD as PCP - Empaneled Provider  Natalio Angel

## 2024-12-04 NOTE — PATIENT INSTRUCTIONS
learn more about \"Advance Directives: Care Instructions.\"  Current as of: November 16, 2023  Content Version: 14.2  © 2024 [employer].   Care instructions adapted under license by [employer]. If you have questions about a medical condition or this instruction, always ask your healthcare professional. [employer] disclaims any warranty or liability for your use of this information.           A Healthy Heart: Care Instructions  Overview     Coronary artery disease, also called heart disease, occurs when a substance called plaque builds up in the vessels that supply oxygen-rich blood to your heart muscle. This can narrow the blood vessels and reduce blood flow. A heart attack happens when blood flow is completely blocked. A high-fat diet, smoking, and other factors increase the risk of heart disease.  Your doctor has found that you have a chance of having heart disease. A heart-healthy lifestyle can help keep your heart healthy and prevent heart disease. This lifestyle includes eating healthy, being active, staying at a weight that's healthy for you, and not smoking or using tobacco. It also includes taking medicines as directed, managing other health conditions, and trying to get a healthy amount of sleep.  Follow-up care is a key part of your treatment and safety. Be sure to make and go to all appointments, and call your doctor if you are having problems. It's also a good idea to know your test results and keep a list of the medicines you take.  How can you care for yourself at home?  Diet    Use less salt when you cook and eat. This helps lower your blood pressure. Taste food before salting. Add only a little salt when you think you need it. With time, your taste buds will adjust to less salt.     Eat fewer snack items, fast foods, canned soups, and other high-salt, high-fat, processed foods.     Read food labels and try to avoid saturated and trans fats. They increase your risk of heart

## 2025-01-08 ENCOUNTER — TELEPHONE (OUTPATIENT)
Facility: CLINIC | Age: 80
End: 2025-01-08

## 2025-01-08 RX ORDER — AMOXICILLIN AND CLAVULANATE POTASSIUM 500; 125 MG/1; MG/1
1 TABLET, FILM COATED ORAL 2 TIMES DAILY
Qty: 20 TABLET | Refills: 0 | Status: SHIPPED | OUTPATIENT
Start: 2025-01-08 | End: 2025-01-18

## 2025-01-08 NOTE — TELEPHONE ENCOUNTER
Pt called in stating he has a UTI and he wants to know can he be prescribed for the following medication:  Amoxcillin 500mg w/ clavk 125mg    Please advise pt.

## 2025-03-03 ENCOUNTER — OFFICE VISIT (OUTPATIENT)
Facility: CLINIC | Age: 80
End: 2025-03-03

## 2025-03-03 VITALS
HEART RATE: 62 BPM | TEMPERATURE: 98 F | SYSTOLIC BLOOD PRESSURE: 122 MMHG | DIASTOLIC BLOOD PRESSURE: 61 MMHG | BODY MASS INDEX: 39.08 KG/M2 | WEIGHT: 273 LBS | RESPIRATION RATE: 20 BRPM | OXYGEN SATURATION: 99 % | HEIGHT: 70 IN

## 2025-03-03 DIAGNOSIS — E66.01 MORBID (SEVERE) OBESITY DUE TO EXCESS CALORIES: ICD-10-CM

## 2025-03-03 DIAGNOSIS — I50.22 CHRONIC SYSTOLIC CONGESTIVE HEART FAILURE (HCC): ICD-10-CM

## 2025-03-03 DIAGNOSIS — N18.30 STAGE 3 CHRONIC KIDNEY DISEASE, UNSPECIFIED WHETHER STAGE 3A OR 3B CKD (HCC): ICD-10-CM

## 2025-03-03 DIAGNOSIS — E11.51 TYPE 2 DIABETES MELLITUS WITH DIABETIC PERIPHERAL ANGIOPATHY WITHOUT GANGRENE, WITHOUT LONG-TERM CURRENT USE OF INSULIN (HCC): Primary | ICD-10-CM

## 2025-03-03 DIAGNOSIS — M10.9 GOUT, UNSPECIFIED CAUSE, UNSPECIFIED CHRONICITY, UNSPECIFIED SITE: ICD-10-CM

## 2025-03-03 DIAGNOSIS — E03.9 HYPOTHYROIDISM, UNSPECIFIED TYPE: ICD-10-CM

## 2025-03-03 DIAGNOSIS — N39.0 URINARY TRACT INFECTION WITHOUT HEMATURIA, SITE UNSPECIFIED: ICD-10-CM

## 2025-03-03 DIAGNOSIS — I47.29 OTHER VENTRICULAR TACHYCARDIA (HCC): ICD-10-CM

## 2025-03-03 DIAGNOSIS — E83.42 HYPOMAGNESEMIA: ICD-10-CM

## 2025-03-03 DIAGNOSIS — E78.5 HYPERLIPIDEMIA, UNSPECIFIED HYPERLIPIDEMIA TYPE: ICD-10-CM

## 2025-03-03 LAB — HBA1C MFR BLD: 7.5 %

## 2025-03-03 SDOH — ECONOMIC STABILITY: FOOD INSECURITY: WITHIN THE PAST 12 MONTHS, THE FOOD YOU BOUGHT JUST DIDN'T LAST AND YOU DIDN'T HAVE MONEY TO GET MORE.: NEVER TRUE

## 2025-03-03 SDOH — ECONOMIC STABILITY: FOOD INSECURITY: WITHIN THE PAST 12 MONTHS, YOU WORRIED THAT YOUR FOOD WOULD RUN OUT BEFORE YOU GOT MONEY TO BUY MORE.: NEVER TRUE

## 2025-03-03 ASSESSMENT — PATIENT HEALTH QUESTIONNAIRE - PHQ9
SUM OF ALL RESPONSES TO PHQ QUESTIONS 1-9: 0
1. LITTLE INTEREST OR PLEASURE IN DOING THINGS: NOT AT ALL
2. FEELING DOWN, DEPRESSED OR HOPELESS: NOT AT ALL
SUM OF ALL RESPONSES TO PHQ QUESTIONS 1-9: 0

## 2025-03-03 NOTE — PROGRESS NOTES
\"Have you been to the ER, urgent care clinic since your last visit?  Hospitalized since your last visit?\"    NO    “Have you seen or consulted any other health care providers outside our system since your last visit?”    NO      “Have you had a diabetic eye exam?”    NO  appt scheduled     Date of last diabetic eye exam: 10/14/2015          
control and daily exercise discussed, home glucose monitoring emphasized, all medications, side effects and compliance discussed carefully, foot care discussed and Podiatry visits discussed, annual eye examinations at Ophthalmology discussed, glycohemoglobin and other lab monitoring discussed, and long term diabetic complications discussed  I spent 40 minutes with this established patient.    I have discussed the diagnosis with the patient and the intended plan of care as seen in the above orders. The patient has received an after-visit summary and questions were answered concerning future plans. I have discussed medication, side effects, and warnings with the patient in detail. The patient verbalized understanding and is in agreement with the plan of care. The patient will contact the office with any additional concerns.    Quynh Gamez MD    PLEASE NOTE:   This document has been produced using voice recognition software. Unrecognized errors in transcription may be present

## 2025-03-28 ENCOUNTER — HOSPITAL ENCOUNTER (OUTPATIENT)
Age: 80
Setting detail: SPECIMEN
Discharge: HOME | End: 2025-03-31
Payer: MEDICARE

## 2025-03-28 DIAGNOSIS — N18.30 STAGE 3 CHRONIC KIDNEY DISEASE, UNSPECIFIED WHETHER STAGE 3A OR 3B CKD (HCC): ICD-10-CM

## 2025-03-28 DIAGNOSIS — E83.42 HYPOMAGNESEMIA: ICD-10-CM

## 2025-03-28 DIAGNOSIS — E11.51 TYPE 2 DIABETES MELLITUS WITH DIABETIC PERIPHERAL ANGIOPATHY WITHOUT GANGRENE, WITHOUT LONG-TERM CURRENT USE OF INSULIN (HCC): ICD-10-CM

## 2025-03-28 DIAGNOSIS — I50.22 CHRONIC SYSTOLIC CONGESTIVE HEART FAILURE (HCC): ICD-10-CM

## 2025-03-28 DIAGNOSIS — E03.9 HYPOTHYROIDISM, UNSPECIFIED TYPE: ICD-10-CM

## 2025-03-28 LAB
ALBUMIN SERPL-MCNC: 2.9 G/DL (ref 3.4–5)
ALBUMIN/GLOB SERPL: 0.5 (ref 0.8–1.7)
ALP SERPL-CCNC: 123 U/L (ref 45–117)
ALT SERPL-CCNC: 58 U/L (ref 16–61)
ANION GAP SERPL CALC-SCNC: 16 MMOL/L (ref 3–18)
AST SERPL W P-5'-P-CCNC: 43 U/L (ref 10–38)
BASOPHILS # BLD: 0 K/UL (ref 0–0.1)
BASOPHILS NFR BLD: 0 % (ref 0–2)
BILIRUB SERPL-MCNC: 0.3 MG/DL (ref 0.2–1)
BUN SERPL-MCNC: 48 MG/DL (ref 7–18)
BUN/CREAT SERPL: 17 (ref 12–20)
CA-I BLD-MCNC: 9.4 MG/DL (ref 8.5–10.1)
CHLORIDE SERPL-SCNC: 100 MMOL/L (ref 100–111)
CO2 SERPL-SCNC: 22 MMOL/L (ref 21–32)
CREAT SERPL-MCNC: 2.88 MG/DL (ref 0.6–1.3)
DIFFERENTIAL METHOD BLD: ABNORMAL
EOSINOPHIL # BLD: 0.43 K/UL (ref 0–0.4)
EOSINOPHIL NFR BLD: 5 % (ref 0–5)
ERYTHROCYTE [DISTWIDTH] IN BLOOD BY AUTOMATED COUNT: 15.8 % (ref 11.6–14.5)
GLOBULIN SER CALC-MCNC: 5.5 G/DL (ref 2–4)
GLUCOSE SERPL-MCNC: 226 MG/DL (ref 74–99)
HCT VFR BLD AUTO: 36 % (ref 36–48)
HGB BLD-MCNC: 11.5 G/DL (ref 13–16)
IMM GRANULOCYTES # BLD AUTO: 0 K/UL
IMM GRANULOCYTES NFR BLD AUTO: 0 %
LYMPHOCYTES # BLD: 0.94 K/UL (ref 0.9–3.6)
LYMPHOCYTES NFR BLD: 11 % (ref 21–52)
MAGNESIUM SERPL-MCNC: 2.3 MG/DL (ref 1.6–2.6)
MCH RBC QN AUTO: 27 PG (ref 24–34)
MCHC RBC AUTO-ENTMCNC: 31.9 G/DL (ref 31–37)
MCV RBC AUTO: 84.5 FL (ref 78–100)
MONOCYTES # BLD: 0.77 K/UL (ref 0.05–1.2)
MONOCYTES NFR BLD: 9 % (ref 3–10)
NEUTS BAND NFR BLD MANUAL: 4 % (ref 0–5)
NEUTS SEG # BLD: 5.95 K/UL (ref 1.8–8)
NEUTS SEG NFR BLD: 66 % (ref 40–73)
NRBC # BLD: 0 K/UL (ref 0–0.01)
NRBC BLD-RTO: 0 PER 100 WBC
OTHER CELLS NFR BLD MANUAL: 5 %
PLATELET # BLD AUTO: 349 K/UL (ref 135–420)
PMV BLD AUTO: 9.3 FL (ref 9.2–11.8)
POTASSIUM SERPL-SCNC: 3.9 MMOL/L (ref 3.5–5.5)
PROT SERPL-MCNC: 8.4 G/DL (ref 6.4–8.2)
RBC # BLD AUTO: 4.26 M/UL (ref 4.35–5.65)
RBC MORPH BLD: ABNORMAL
SODIUM SERPL-SCNC: 138 MMOL/L (ref 136–145)
TSH SERPL DL<=0.05 MIU/L-ACNC: 2.33 UIU/ML (ref 0.36–3.74)
WBC # BLD AUTO: 8.5 K/UL (ref 4.6–13.2)

## 2025-03-28 PROCEDURE — 85025 COMPLETE CBC W/AUTO DIFF WBC: CPT

## 2025-03-28 PROCEDURE — 83036 HEMOGLOBIN GLYCOSYLATED A1C: CPT

## 2025-03-28 PROCEDURE — 84443 ASSAY THYROID STIM HORMONE: CPT

## 2025-03-28 PROCEDURE — 83735 ASSAY OF MAGNESIUM: CPT

## 2025-03-28 PROCEDURE — 36415 COLL VENOUS BLD VENIPUNCTURE: CPT

## 2025-03-28 PROCEDURE — 80061 LIPID PANEL: CPT

## 2025-03-28 PROCEDURE — 80053 COMPREHEN METABOLIC PANEL: CPT

## 2025-03-29 LAB
CHOLEST SERPL-MCNC: 104 MG/DL
EST. AVERAGE GLUCOSE BLD GHB EST-MCNC: 174 MG/DL
HBA1C MFR BLD: 7.7 % (ref 4.2–5.6)
HDLC SERPL-MCNC: 31 MG/DL (ref 40–60)
HDLC SERPL: 3.4 (ref 0–5)
LDLC SERPL CALC-MCNC: 45.8 MG/DL (ref 0–100)
LIPID PANEL: ABNORMAL
TRIGL SERPL-MCNC: 136 MG/DL
VLDLC SERPL CALC-MCNC: 27.2 MG/DL

## 2025-06-03 ENCOUNTER — HOSPITAL ENCOUNTER (OUTPATIENT)
Facility: HOSPITAL | Age: 80
Setting detail: SPECIMEN
Discharge: HOME | End: 2025-06-06
Payer: MEDICARE

## 2025-06-03 ENCOUNTER — OFFICE VISIT (OUTPATIENT)
Facility: CLINIC | Age: 80
End: 2025-06-03

## 2025-06-03 VITALS
HEIGHT: 70 IN | BODY MASS INDEX: 38.65 KG/M2 | HEART RATE: 70 BPM | SYSTOLIC BLOOD PRESSURE: 135 MMHG | TEMPERATURE: 97.7 F | DIASTOLIC BLOOD PRESSURE: 75 MMHG | OXYGEN SATURATION: 100 % | WEIGHT: 270 LBS | RESPIRATION RATE: 24 BRPM

## 2025-06-03 DIAGNOSIS — E78.5 HYPERLIPIDEMIA, UNSPECIFIED HYPERLIPIDEMIA TYPE: ICD-10-CM

## 2025-06-03 DIAGNOSIS — I50.22 CHRONIC SYSTOLIC CONGESTIVE HEART FAILURE (HCC): ICD-10-CM

## 2025-06-03 DIAGNOSIS — M10.9 GOUT, UNSPECIFIED CAUSE, UNSPECIFIED CHRONICITY, UNSPECIFIED SITE: ICD-10-CM

## 2025-06-03 DIAGNOSIS — E11.51 TYPE 2 DIABETES MELLITUS WITH DIABETIC PERIPHERAL ANGIOPATHY WITHOUT GANGRENE, WITHOUT LONG-TERM CURRENT USE OF INSULIN (HCC): ICD-10-CM

## 2025-06-03 DIAGNOSIS — R29.898 WEAKNESS OF BOTH LOWER EXTREMITIES: ICD-10-CM

## 2025-06-03 DIAGNOSIS — N18.30 STAGE 3 CHRONIC KIDNEY DISEASE, UNSPECIFIED WHETHER STAGE 3A OR 3B CKD (HCC): ICD-10-CM

## 2025-06-03 DIAGNOSIS — M54.50 CHRONIC MIDLINE LOW BACK PAIN, UNSPECIFIED WHETHER SCIATICA PRESENT: Primary | ICD-10-CM

## 2025-06-03 DIAGNOSIS — R39.9 LOWER URINARY TRACT SYMPTOMS (LUTS): ICD-10-CM

## 2025-06-03 DIAGNOSIS — E03.9 HYPOTHYROIDISM, UNSPECIFIED TYPE: ICD-10-CM

## 2025-06-03 DIAGNOSIS — I10 ESSENTIAL (PRIMARY) HYPERTENSION: ICD-10-CM

## 2025-06-03 DIAGNOSIS — R30.0 DYSURIA: ICD-10-CM

## 2025-06-03 DIAGNOSIS — G89.29 CHRONIC MIDLINE LOW BACK PAIN, UNSPECIFIED WHETHER SCIATICA PRESENT: Primary | ICD-10-CM

## 2025-06-03 LAB
BILIRUBIN, URINE, POC: NEGATIVE
BLOOD URINE, POC: NEGATIVE
CREAT UR-MCNC: 23.2 MG/DL (ref 30–125)
GLUCOSE URINE, POC: NEGATIVE
HBA1C MFR BLD: 7.4 %
KETONES, URINE, POC: NEGATIVE
LEUKOCYTE ESTERASE, URINE, POC: NEGATIVE
MICROALBUMIN UR-MCNC: <1.2 MG/DL (ref 0–3)
MICROALBUMIN/CREAT UR-RTO: ABNORMAL MG/G (ref 0–30)
NITRITE, URINE, POC: NEGATIVE
PH, URINE, POC: 6 (ref 4.6–8)
PROTEIN,URINE, POC: NEGATIVE
SPECIFIC GRAVITY, URINE, POC: 1.01 (ref 1–1.03)
URINALYSIS CLARITY, POC: CLEAR
URINALYSIS COLOR, POC: YELLOW
UROBILINOGEN, POC: NORMAL

## 2025-06-03 PROCEDURE — 82043 UR ALBUMIN QUANTITATIVE: CPT

## 2025-06-03 PROCEDURE — 82570 ASSAY OF URINE CREATININE: CPT

## 2025-06-03 RX ORDER — ACYCLOVIR 400 MG/1
TABLET ORAL
COMMUNITY

## 2025-06-03 RX ORDER — METOPROLOL SUCCINATE 25 MG/1
25 TABLET, EXTENDED RELEASE ORAL DAILY
COMMUNITY
Start: 2025-03-18

## 2025-06-03 NOTE — PROGRESS NOTES
Have you been to the ER, urgent care clinic since your last visit?  Hospitalized since your last visit?   NO    Have you seen or consulted any other health care providers outside our system since your last visit?   NO      “Have you had a diabetic eye exam?”    YES - [facility] recent [facility] if not in the chart     Date of last diabetic eye exam: 10/14/2015          
current management plan.  I did check a urinalysis that is stable.  CHF: Patient has chronic diastolic CHF.  Denies chest pain, shortness of breath or diaphoresis.  He is on Jardiance and spironolactone and furosemide.  He will continue with these medications.  He has been followed up with a cardiologist.  We will request the records.  Cardiac: Patient has a history of dysrhythmias with multiple PVCs.  Denies palpitations, shortness of breath or syncope.  He is on amiodarone.  Continue current treatment plan.  He is followed up with the cardiologist.  Symptomatically he has noted improvement.  He has had ablation done in the past.  Valvular heart disease: Patient has a history of severe aortic stenosis.  He underwent tissue aortic valve replacement in 2017.  Currently stable.  Denies chest pain, palpitations, shortness of breath or lower extremity swelling.  CKD: Patient has chronic kidney disease stage IV.  He is followed up with a nephrologist.  Plan is to avoid nephrotoxic medications.  Dyslipidemia: Patient has dyslipidemia.  He is on Crestor 40 mg daily.  He will continue to exercise and take a diet low in polysaturated fats.  Will recheck lipid panel at next visit.  Hypothyroidism: Patient has hypothyroidism.  Takes levothyroxine daily.  Will recheck labs at next visit.  GERD: Patient has gastroesophageal reflux disease.  He gets heartburn on and off.  Denies dark stools or hematemesis.  He is on omeprazole.  He will continue current treatment plan.      Past Medical History  Past Medical History:   Diagnosis Date    Diabetes (HCC) 1995    Dyslipidemia 3/1/2017    Fractures 1995    R Knee/ Tib fib fracture/surgery    Gout 2010    Graves disease 1/21/2015    High cholesterol 2005    HTN (hypertension) 1990    Osteoarthritis 2013    Severe aortic stenosis 3/29/2017    Spinal stenosis of lumbar region 3/1/2016    Spondylolisthesis 3/1/2016    Thyroid trouble     Type 2 diabetes mellitus with diabetic nephropathy

## 2025-09-04 ENCOUNTER — OFFICE VISIT (OUTPATIENT)
Facility: CLINIC | Age: 80
End: 2025-09-04

## 2025-09-04 VITALS
WEIGHT: 275 LBS | HEIGHT: 70 IN | TEMPERATURE: 97.9 F | HEART RATE: 60 BPM | BODY MASS INDEX: 39.37 KG/M2 | DIASTOLIC BLOOD PRESSURE: 63 MMHG | SYSTOLIC BLOOD PRESSURE: 116 MMHG | OXYGEN SATURATION: 98 %

## 2025-09-04 DIAGNOSIS — E78.5 HYPERLIPIDEMIA, UNSPECIFIED HYPERLIPIDEMIA TYPE: ICD-10-CM

## 2025-09-04 DIAGNOSIS — N18.4 STAGE 4 CHRONIC KIDNEY DISEASE (HCC): ICD-10-CM

## 2025-09-04 DIAGNOSIS — M54.50 CHRONIC MIDLINE LOW BACK PAIN, UNSPECIFIED WHETHER SCIATICA PRESENT: ICD-10-CM

## 2025-09-04 DIAGNOSIS — R39.9 LOWER URINARY TRACT SYMPTOMS (LUTS): ICD-10-CM

## 2025-09-04 DIAGNOSIS — E83.42 HYPOMAGNESEMIA: ICD-10-CM

## 2025-09-04 DIAGNOSIS — M10.9 GOUT, UNSPECIFIED CAUSE, UNSPECIFIED CHRONICITY, UNSPECIFIED SITE: ICD-10-CM

## 2025-09-04 DIAGNOSIS — E03.9 HYPOTHYROIDISM, UNSPECIFIED TYPE: ICD-10-CM

## 2025-09-04 DIAGNOSIS — Z12.5 SCREENING FOR MALIGNANT NEOPLASM OF PROSTATE: ICD-10-CM

## 2025-09-04 DIAGNOSIS — I50.22 CHRONIC SYSTOLIC CONGESTIVE HEART FAILURE (HCC): ICD-10-CM

## 2025-09-04 DIAGNOSIS — I49.9 CARDIAC ARRHYTHMIA, UNSPECIFIED CARDIAC ARRHYTHMIA TYPE: ICD-10-CM

## 2025-09-04 DIAGNOSIS — I10 ESSENTIAL (PRIMARY) HYPERTENSION: ICD-10-CM

## 2025-09-04 DIAGNOSIS — E11.51 TYPE 2 DIABETES MELLITUS WITH DIABETIC PERIPHERAL ANGIOPATHY WITHOUT GANGRENE, WITHOUT LONG-TERM CURRENT USE OF INSULIN (HCC): Primary | ICD-10-CM

## 2025-09-04 DIAGNOSIS — G89.29 CHRONIC MIDLINE LOW BACK PAIN, UNSPECIFIED WHETHER SCIATICA PRESENT: ICD-10-CM

## 2025-09-04 LAB — HBA1C MFR BLD: 6.9 %

## 2025-09-04 RX ORDER — TORSEMIDE 20 MG/1
20 TABLET ORAL DAILY
COMMUNITY
Start: 2025-09-04

## 2025-09-04 SDOH — ECONOMIC STABILITY: FOOD INSECURITY: WITHIN THE PAST 12 MONTHS, YOU WORRIED THAT YOUR FOOD WOULD RUN OUT BEFORE YOU GOT MONEY TO BUY MORE.: NEVER TRUE

## 2025-09-04 SDOH — ECONOMIC STABILITY: FOOD INSECURITY: WITHIN THE PAST 12 MONTHS, THE FOOD YOU BOUGHT JUST DIDN'T LAST AND YOU DIDN'T HAVE MONEY TO GET MORE.: NEVER TRUE

## 2025-09-04 ASSESSMENT — PATIENT HEALTH QUESTIONNAIRE - PHQ9
SUM OF ALL RESPONSES TO PHQ QUESTIONS 1-9: 0
SUM OF ALL RESPONSES TO PHQ QUESTIONS 1-9: 0
1. LITTLE INTEREST OR PLEASURE IN DOING THINGS: NOT AT ALL
2. FEELING DOWN, DEPRESSED OR HOPELESS: NOT AT ALL
SUM OF ALL RESPONSES TO PHQ QUESTIONS 1-9: 0
SUM OF ALL RESPONSES TO PHQ QUESTIONS 1-9: 0

## (undated) DEVICE — TRAY MYEL SFTY +

## (undated) DEVICE — STERILE POLYISOPRENE POWDER-FREE SURGICAL GLOVES: Brand: PROTEXIS

## (undated) DEVICE — SUTURE NONABSORBABLE BRAIDED 2-0 SH-2 1X30 IN ETHBND EXCEL PXX80

## (undated) DEVICE — MEDIA CONTRAST 10ML 200MG/ML 41%

## (undated) DEVICE — HEART II: Brand: MEDLINE INDUSTRIES, INC.

## (undated) DEVICE — SUTURE ABSORBABLE BRAIDED 2-0 CT-1 27 IN UD VICRYL J259H

## (undated) DEVICE — NDL SPNE MANAN 22GX6IN --

## (undated) DEVICE — 3M™ MEDIPORE™ H SOFT CLOTH SURGICAL TAPE 2864, 4 INCH X 10 YARD (10CM X 9,14M), 12 ROLLS/CASE: Brand: 3M™ MEDIPORE™

## (undated) DEVICE — BLADE SAW W64XL32MM THK079MM S STL CUT THK107MM STRNM

## (undated) DEVICE — CANNULA PERFUSION 5.5IN 9FR AORTIC ROOT

## (undated) DEVICE — TRAY PREP DRY W/ PREM GLV 2 APPL 6 SPNG 2 UNDPD 1 OVERWRAP

## (undated) DEVICE — LIQUID URINE CONTROL DIPSTICK - NEGATIVE/POSITIVE, 20 ML. CONTAINS 3 VIALS NEGATIVE AND 3 VIALS POSITIVE. 8 WEEK OPEN VIAL STABILITY AT ROOM TEMPERATURE. 18 MONTH OPEN VIAL STABILITY WHEN STORED AT 2-8° C.

## (undated) DEVICE — Device

## (undated) DEVICE — (D)BNDG ADHESIVE FABRIC 3/4X3 -- DISC BY MFR USE ITEM 357960

## (undated) DEVICE — SURGIFOAM SPNG SZ 100

## (undated) DEVICE — NEEDLE HYPO 18GA L1.5IN PNK S STL HUB POLYPR SHLD REG BVL

## (undated) DEVICE — WAND SUC INTCARD MINI --

## (undated) DEVICE — RETROGRADE CARDIOPLEGIA CATHETER: Brand: EDWARDS LIFESCIENCES RETROGRADE CARDIOPLEGIA CATHETER

## (undated) DEVICE — FLEX ADVANTAGE 1500CC: Brand: FLEX ADVANTAGE

## (undated) DEVICE — Device: Brand: MEDEX

## (undated) DEVICE — 3M™ BAIR HUGGER® CARDIAC ACCESS BLANKET, 5 PER CASE 63000: Brand: BAIR HUGGER™

## (undated) DEVICE — TUBING PERF PMP L8FT ID1/4IN WALL 1/16IN PRECUT CAPPED PEEL

## (undated) DEVICE — ADAPTER PERF L7.5IN INLET LEG 3IN Y TYP VENT CLR CODE CLMP

## (undated) DEVICE — MEDI-TRACE CADENCE ADULT, DEFIBRILLATION ELECTRODE -RTS  (10 PR/PK) - PHYSIO-CONTROL: Brand: MEDI-TRACE CADENCE

## (undated) DEVICE — ARTERIAL PERFUSION CANNULA: Brand: EDWARDS LIFESCIENCES ARTERIAL PERFUSION CANNULA

## (undated) DEVICE — MEDI-VAC NON-CONDUCTIVE SUCTION TUBING 7MM X 6.1M (20 FT.) L: Brand: CARDINAL HEALTH

## (undated) DEVICE — (D)SYR 10ML 1/5ML GRAD NSAF -- PKGING CHANGE USE ITEM 338027

## (undated) DEVICE — THREE-QUARTER SHEET: Brand: CONVERTORS

## (undated) DEVICE — AORTIC PERFUSION CANNULA: Brand: EDWARDS LIFESCIENCES AORTIC PERFUSION CANNULA

## (undated) DEVICE — SUT PROL 7-0 24IN BV1 DA BLU --

## (undated) DEVICE — APPLIER CLP AUTO MED 9.75 IN TI SURGCLP SUPER INTLOK 20 DISP

## (undated) DEVICE — DRAPE SURG EQUIP DISC 66X44 -- USE ITEM 141557

## (undated) DEVICE — LEAD PCMKR MYOCARDL BPLR TEMP. --

## (undated) DEVICE — 3M(TM) MEDIPORE(TM) H SOFT CLOTH TAPE 2866: Brand: 3M™ MEDIPORE™

## (undated) DEVICE — SUTURE SZ 7 L18IN NONABSORBABLE SIL CCS L48MM 1/2 CIR STRNM M655G

## (undated) DEVICE — ADHESIVE TISS DERMA FLEX 0.7ML -- HIGH VISCOSITY

## (undated) DEVICE — CATH FOL IC URETH 224 16FR -- BARDEX LTX

## (undated) DEVICE — SWITCH PERF L13IN EQL LEG TBL MT MOD ARISS

## (undated) DEVICE — TUBING INSUF L10FT HI FLO 400 INSUFFLATOR SYS DISP FOR LAP

## (undated) DEVICE — COVADERM: Brand: DEROYAL

## (undated) DEVICE — DUAL STAGE VENOUS DRAINAGE CANNULA: Brand: EDWARDS LIFESCIENCES DUAL STAGE VENOUS DRAINAGE CANNULA

## (undated) DEVICE — (D)NDL SPNE 22GX15CM -- DISC BY MFR W/NO SUB

## (undated) DEVICE — REM POLYHESIVE ADULT PATIENT RETURN ELECTRODE: Brand: VALLEYLAB

## (undated) DEVICE — 3M™ STERI-DRAPE™ INSTRUMENT POUCH 1018: Brand: STERI-DRAPE™

## (undated) DEVICE — MEDI-VAC NON-CONDUCTIVE SUCTION TUBING: Brand: CARDINAL HEALTH

## (undated) DEVICE — SUT PROL 6-0 30IN C1 DA BLU --

## (undated) DEVICE — CLIP LIG ADH PD HORZ MED BLU --

## (undated) DEVICE — CLIP LIG ENDOSCP HEMCLP SM -- HORIZON 6/PK 30PK/BX

## (undated) DEVICE — SUT PROL 4-0 30IN SH1 DA BLU --

## (undated) DEVICE — SUTURE ETHBND EXCEL SZ 1 L30IN NONABSORBABLE GRN L48MM CTX X865H

## (undated) DEVICE — DRAPE TWL SURG 16X26IN BLU ORB04] ALLCARE INC]

## (undated) DEVICE — SPONGE HEMOSTAT 12X7MM -- GELFOAM

## (undated) DEVICE — SOLUTION IV 1000ML 0.9% SOD CHL

## (undated) DEVICE — MAYO STAND COVER: Brand: CONVERTORS

## (undated) DEVICE — CLIP INT SM TI EZ LD LIG SYS WECK HORZ

## (undated) DEVICE — (D)PACK BASIN HEART 161 MMC -- DISC BY MFR NO SUB